# Patient Record
Sex: FEMALE | Race: WHITE | NOT HISPANIC OR LATINO | Employment: OTHER | URBAN - METROPOLITAN AREA
[De-identification: names, ages, dates, MRNs, and addresses within clinical notes are randomized per-mention and may not be internally consistent; named-entity substitution may affect disease eponyms.]

---

## 2017-01-09 ENCOUNTER — TELEPHONE (OUTPATIENT)
Dept: TRANSPLANT | Facility: CLINIC | Age: 72
End: 2017-01-09

## 2017-01-09 NOTE — TELEPHONE ENCOUNTER
Correspondence w/patient:    Hi-I'm not sure where folks are getting it, but it can be pretty relieving for some....  Terrific on calling to schedule, and happy to hear you are feeling fine!   Yes, if needed, they could possibly even send a nurse out to take a look and make recommendations.  Thank you,  Albertina      -----Original Message-----  From: DANA QUINONES [mailto:[email or web address]]   Sent: Monday, January 09, 2017 2:33 PM  To: Albertina Diallo  Subject: Re: RE:    THIS EMAIL IS FROM AN EXTERNAL SENDER! DO NOT click links or provide your User ID or Password if the sender is unknown.    Tiger balm - for pain? Is that from a [employer] store? Never heard of it but I'm surely willing to try.   I'll call and schedule an appt. I'm feeling fine though.   I have tried everything for the itching  - I'll check with [facility] to see if there's anything new    Sent from my iPhone    > On Jan 9, 2017, at 2:27 PM, Albertina Diallo <[email or web address]> wrote:  >   > Hi Ms Quinones--Please call scheduling at [phone] to get scheduled--It looks like you are in the recall screen to be seen in early Feb, but I am not sure that she has any availability at that time.   > For the rash on your chest, have you spoken with specialty pharmacy about alternative dressings,techniques w/dressing change? If you haven't tried doing the dressing WITHOUT the skin barrier, that might also be worth trying.   > Regarding the Arnica--Dr Tim said she agreed with whatever stance pharmacy took, and they did not recommend it. You may want to try something topical, like tiger balm? We have a couple patients who have used this and gotten some relief.   > Please let me know if I can help with anything else!  > Albertina   >   > -----Original Message-----  > From: DANA QUINONES [[email or web address]]   > Sent: Monday, January 09, 2017 2:23 PM  > To: Albertina Diallo  > Subject:   >   > THIS EMAIL IS FROM AN EXTERNAL SENDER! DO NOT click links or  provide your User ID or Password if the sender is unknown.  >   > Vasile Eng  > I just remembered that I don't have any future appts scheduled. Should I?  > Also does Dr Tim have any other suggestions for itchy rash on my chest from tape?   > Back pain is still there but I think we ruled out Arnica? Didn't we?  > Jan  >   > Sent from my iPhone

## 2017-01-10 DIAGNOSIS — I27.9 CHRONIC PULMONARY HEART DISEASE: ICD-10-CM

## 2017-01-10 DIAGNOSIS — Z79.899 POLYPHARMACY: Primary | ICD-10-CM

## 2017-01-11 ENCOUNTER — TELEPHONE (OUTPATIENT)
Dept: TRANSPLANT | Facility: CLINIC | Age: 72
End: 2017-01-11

## 2017-01-11 NOTE — TELEPHONE ENCOUNTER
Pt's reply, also forwarded to Dr Tim:    Thanks. I made an appt for March 14 - nothing in Feb. I think I'll skip my midday Adempas

## 2017-01-11 NOTE — TELEPHONE ENCOUNTER
email received from patient, forwarded to Dr Tim. Informed pt she would be contacted with info, once received.     Vasile Eng  I think I might need to lower my Adempas. Once again, I went to rehab. Before I go, I take tracker, Adempas and of course remodulin. When I start, my BP was 101 over something. After 20 min on treadmill I'm at 98 over something. After 20 on bike, I was 82/48. Should I discontinue Adempas? This has been a pattern Abdiel

## 2017-01-13 DIAGNOSIS — I27.0 PRIMARY PULMONARY HYPERTENSION: Primary | ICD-10-CM

## 2017-01-18 DIAGNOSIS — R52 PAIN: Primary | ICD-10-CM

## 2017-01-18 RX ORDER — TRAMADOL HYDROCHLORIDE 50 MG/1
50 TABLET ORAL EVERY 6 HOURS PRN
Qty: 80 TABLET | Refills: 2 | Status: SHIPPED | OUTPATIENT
Start: 2017-01-18 | End: 2017-01-28

## 2017-01-20 ENCOUNTER — TELEPHONE (OUTPATIENT)
Dept: TRANSPLANT | Facility: CLINIC | Age: 72
End: 2017-01-20

## 2017-01-23 ENCOUNTER — TELEPHONE (OUTPATIENT)
Dept: TRANSPLANT | Facility: CLINIC | Age: 72
End: 2017-01-23

## 2017-01-23 NOTE — TELEPHONE ENCOUNTER
Email received from patient over weekend, forwarded to Dr Tim for review:    Another episode of dizziness . BP- 104/60. Couple hours later 91/50. Dizziness was extreme- felt like I was going to pass out. That occurred at 6pm. I had taken Adempas 1 mg, tracleer and cardizem at 3:30pm. Had been taking my meds this way for years. I seem to be having trouble with them now

## 2017-01-23 NOTE — TELEPHONE ENCOUNTER
Adempas approved through 12/31/17, per [place]. Notification sent to Erlinda at [facility].

## 2017-01-24 ENCOUNTER — TELEPHONE (OUTPATIENT)
Dept: TRANSPLANT | Facility: CLINIC | Age: 72
End: 2017-01-24

## 2017-01-24 DIAGNOSIS — Z91.89 AT RISK FOR INFECTION: Primary | ICD-10-CM

## 2017-01-24 NOTE — TELEPHONE ENCOUNTER
----- Message from Alice Tim MD sent at 1/23/2017  5:01 PM CST -----  Another line infection???  Lets have cx drawn, first drug to hold if needed is cardizem    ----- Message -----     From: MELISA Delgado, RN     Sent: 1/23/2017   8:05 AM       To: Alice Tim MD    This was a note I got from her over the weekend...Please advise. Thanks!    Another episode of dizziness . BP- 104/60. Couple hours later 91/50. Dizziness was extreme- felt like I was going to pass out. That occurred at 6pm. I had taken Adempas 1 mg, tracleer and cardizem at 3:30pm. Had been taking my meds this way for years. I seem to be having trouble with them now

## 2017-01-24 NOTE — TELEPHONE ENCOUNTER
Message sent to patient regarding same. Awaiting response regarding where she would like to go for blood cultures.

## 2017-01-25 ENCOUNTER — LAB VISIT (OUTPATIENT)
Dept: LAB | Facility: HOSPITAL | Age: 72
End: 2017-01-25
Payer: MEDICARE

## 2017-01-25 DIAGNOSIS — Z91.89 AT RISK FOR INFECTION: ICD-10-CM

## 2017-01-25 PROCEDURE — 36415 COLL VENOUS BLD VENIPUNCTURE: CPT

## 2017-01-25 PROCEDURE — 87040 BLOOD CULTURE FOR BACTERIA: CPT | Mod: 59

## 2017-01-25 PROCEDURE — 87040 BLOOD CULTURE FOR BACTERIA: CPT

## 2017-01-26 ENCOUNTER — TELEPHONE (OUTPATIENT)
Dept: TRANSPLANT | Facility: CLINIC | Age: 72
End: 2017-01-26

## 2017-01-30 LAB
BACTERIA BLD CULT: NORMAL
BACTERIA BLD CULT: NORMAL

## 2017-02-21 DIAGNOSIS — R52 PAIN: Primary | ICD-10-CM

## 2017-02-22 RX ORDER — TRAMADOL HYDROCHLORIDE 200 MG/1
200 TABLET, EXTENDED RELEASE ORAL DAILY
Qty: 30 TABLET | Refills: 5 | Status: SHIPPED | OUTPATIENT
Start: 2017-02-22 | End: 2017-03-04

## 2017-03-14 ENCOUNTER — HOSPITAL ENCOUNTER (OUTPATIENT)
Dept: PULMONOLOGY | Facility: CLINIC | Age: 72
Discharge: HOME | End: 2017-03-14
Payer: MEDICARE

## 2017-03-14 ENCOUNTER — OFFICE VISIT (OUTPATIENT)
Dept: TRANSPLANT | Facility: CLINIC | Age: 72
End: 2017-03-14
Payer: MEDICARE

## 2017-03-14 VITALS
DIASTOLIC BLOOD PRESSURE: 60 MMHG | BODY MASS INDEX: 20.58 KG/M2 | HEART RATE: 97 BPM | HEIGHT: 66 IN | WEIGHT: 128.06 LBS | OXYGEN SATURATION: 90 % | SYSTOLIC BLOOD PRESSURE: 97 MMHG

## 2017-03-14 VITALS — WEIGHT: 123 LBS | BODY MASS INDEX: 21.79 KG/M2 | HEIGHT: 63 IN

## 2017-03-14 DIAGNOSIS — Q26.3 PARTIAL ANOMALOUS PULMONARY VENOUS CONNECTION (PAPVC): ICD-10-CM

## 2017-03-14 DIAGNOSIS — Z79.01 ANTICOAGULANT LONG-TERM USE: Chronic | ICD-10-CM

## 2017-03-14 DIAGNOSIS — Q21.16 SINUS VENOSUS DEFECT: ICD-10-CM

## 2017-03-14 DIAGNOSIS — I48.3 TYPICAL ATRIAL FLUTTER: ICD-10-CM

## 2017-03-14 DIAGNOSIS — I27.9 CHRONIC PULMONARY HEART DISEASE: ICD-10-CM

## 2017-03-14 DIAGNOSIS — Q21.12 PFO (PATENT FORAMEN OVALE): ICD-10-CM

## 2017-03-14 DIAGNOSIS — J96.11 CHRONIC RESPIRATORY FAILURE WITH HYPOXIA: ICD-10-CM

## 2017-03-14 DIAGNOSIS — I27.20 PULMONARY HYPERTENSION: ICD-10-CM

## 2017-03-14 PROCEDURE — 94620 PR PULMONARY STRESS TESTING,SIMPLE: CPT | Mod: 26,S$PBB,, | Performed by: INTERNAL MEDICINE

## 2017-03-14 PROCEDURE — 99214 OFFICE O/P EST MOD 30 MIN: CPT | Mod: S$PBB,,, | Performed by: INTERNAL MEDICINE

## 2017-03-14 PROCEDURE — 99213 OFFICE O/P EST LOW 20 MIN: CPT | Mod: PBBFAC | Performed by: INTERNAL MEDICINE

## 2017-03-14 PROCEDURE — 99999 PR PBB SHADOW E&M-EST. PATIENT-LVL III: CPT | Mod: PBBFAC,,, | Performed by: INTERNAL MEDICINE

## 2017-03-14 NOTE — PATIENT INSTRUCTIONS
We will switch you back to revatio    And I'll send you back to Dr Hinojosa    Keep salt intake to under 2000 mg sodium, fluids to under 2 L (64 oz)    Call us if you find yourself getting more short of breath, have more swelling or unexpected weight changes, fever, chills or issues with your line

## 2017-03-14 NOTE — PROGRESS NOTES
Subjective:      HPI  Delightful 71 y.o WF with PMHx IPAH with PFO diagnosed in past 10 years (probably for much longer per old CXR) who is now on triple therapy with IV remodulin (69 ng/kg/min), tracleer and adempas (since Dec) who returns for PH f/u-   Pt was Admitted 7/19-26 with fevers at home after being admitted one week prior for the same reason. During the last hospitalization she grew Rosemonas on blood cultures. Central line catheter was not removed. During this hospitalization 2 sets of blood cultures grew Rosemonas and central tip was culture resulted positive for GNR. No speciation up until discharge day. ID was involved in the case and recommended 14 days of cipro po and ceftriaxone 2 g IV daily until 08/05/2016. Home healh orders were placed in and she was set up for ceftriaxone infusion. Bacteremia work up included CT of the abdomen and pelvis as well neck to rule out pyelonephritis as well as osteo of the neck spine. They resulted negative. Patient remained clinically stable and new central line was placed in by nephro intervention. Patient tolerated it well without any complications.  Her blood cultures were sent out to the research lab for speciation and sensitivities. ID was to contact the patient in regards those results and further narrow antibiotic coverage.    In regards her PAH, we did not make any changes. We resumed her Remodulin at 71ng/kg/min, Adempas 1.5 mg TID and Tracleer. Continued with diltiazem and Lasix 40 am and 20 pm and spironolactone.  Coumadin was held initially for line exchange and right before her discharge we resumed warfarin 10 mg po.      Since last visit pt has been doing ok- has episodes where her heart beats fast and irregular and she feels like she's going to pass out. Happened in a restaurant this weekend while standing in line waiting for the restroom, and then again in the restroom. Was not wearing her O2 when it happened, but was on 2L when it happened again. A  "few days later felt the same way as she felt when that episode was starting, but it didn't get as bad. Has also been getting nauseated, and dizzy- says this is reflected in her walk test today.  Prior to the episode on sat had been exercising three days in a row. She has been reducing the dose of her adempas due to SE, has had gastritis three times, as well as low bp/LH. Rarely swells    line is clean, no f/c and no syncope. Skin is sensitive around the line.   Remains on lasix 40mg/20mg with good UOP- has occasional diarrhea, doesn't occur daily like it used to    6mw 222m ( 375m Nov, 305 Sept, 274.5 July,  335.5May, 365  m prev 2 visits)                        O2 sat  96->87% (without O2 started 92%->84%                                                           HR 96-> 109                                                     BP  120/ 64  ->113 /62                                                      Roberto 0.5  ->4      Review of Systems   Constitution: Negative for chills, fever, malaise/fatigue, weight gain and weight loss.   HENT: Negative.    Eyes: Negative.    Cardiovascular: Positive for dyspnea on exertion and palpitations. Negative for chest pain, leg swelling, near-syncope, orthopnea, paroxysmal nocturnal dyspnea and syncope.   Respiratory: Negative for cough and shortness of breath.    Endocrine: Negative.    Skin: Negative.    Musculoskeletal: Positive for arthritis and joint pain. Negative for back pain.   Gastrointestinal: Positive for nausea. Negative for bloating, abdominal pain and change in bowel habit.   Neurological: Negative for dizziness and light-headedness.   Psychiatric/Behavioral: Negative for depression.        Objective:   BP 97/60  Pulse 97  Ht 5' 6" (1.676 m)  Wt 58.1 kg (128 lb 1.4 oz)  SpO2 (!) 90%  BMI 20.67 kg/m2      Physical Exam   Constitutional: She is oriented to person, place, and time. She appears well-developed and well-nourished.   HENT:   Head: Normocephalic and " atraumatic.   Eyes: Right eye exhibits no discharge. Left eye exhibits no discharge.   Neck: Neck supple. No JVD present. No thyromegaly present.   Cardiovascular: Regular rhythm.  Exam reveals no gallop and no friction rub.    No murmur heard.  Tachy, accentuated S2     Pulmonary/Chest: Effort normal and breath sounds normal. No respiratory distress. She has no wheezes. She has no rales.   Abdominal: Soft. Bowel sounds are normal. She exhibits no distension. There is no tenderness.   Musculoskeletal: Normal range of motion. She exhibits no edema or tenderness.   Neurological: She is alert and oriented to person, place, and time. No cranial nerve deficit. Coordination normal.   Skin: Skin is warm and dry. No rash noted.   Psychiatric: She has a normal mood and affect. Judgment and thought content normal.           Chemistry        Component Value Date/Time     03/14/2017 1240    K 4.5 03/14/2017 1240     03/14/2017 1240    CO2 29 03/14/2017 1240    BUN 21 03/14/2017 1240    CREATININE 1.2 03/14/2017 1240    GLU 90 03/14/2017 1240        Component Value Date/Time    CALCIUM 9.8 03/14/2017 1240    ALKPHOS 48 (L) 03/14/2017 1240    AST 19 03/14/2017 1240    ALT 13 03/14/2017 1240    BILITOT 1.1 (H) 03/14/2017 1240            Magnesium   Date Value Ref Range Status   03/14/2017 2.3 1.6 - 2.6 mg/dL Final       Lab Results   Component Value Date    WBC 4.23 03/14/2017    HGB 13.0 03/14/2017    HCT 39.6 03/14/2017    MCV 81 (L) 03/14/2017     03/14/2017         BNP   Date Value Ref Range Status   03/14/2017 173 (H) 0 - 99 pg/mL Final     Comment:     Values of less than 100 pg/ml are consistent with non-CHF populations.   11/02/2016 291 (H) 0 - 99 pg/mL Final     Comment:     Values of less than 100 pg/ml are consistent with non-CHF populations.   09/23/2016 181 (H) 0 - 99 pg/mL Final     Comment:     Values of less than 100 pg/ml are consistent with non-CHF populations.                 Assessment:        1. Pulmonary hypertension- WHO Group 1,6mw distance a worse today.   BNP mildly increased but in her usual range, FC II-III on triple tx   2. PFO (patent foramen ovale)    3. Palpitations/tachycardia  4. Chronic hypoxemic resp failure  5. PAPVR,sinus venosus ASD     Plan:     pt would like a letter requesting approval for a smaller portable O2- she is having trouble managing with her current portable as the machine is heavy and on 2L will last only 1.5hr- O2 qualification study suggests her pulse machine is not enough O2- she needs continuous flow O2- will have to look into smaller portable system that will provide cont flow.     Will switch adempas back to revatio due to symptomatic hypotension    Refer back to EP given her recent episodes of recurrent tachycardia which she can not tolerate- she did not due well with external event recorders/holter due to skin sensitivity, ? If she might be candidate for a loop recorder vs EPS/RFA    Keep salt intake to under 2000 mg sodium, fluids to under 2 L (64 oz)    Check weights every morning after getting out of bed and urinating. If  weight goes up 3# overnight or 5# in one week she should take 40 mg lasix in the pm and call us if fluid doesn't come off.      f/u 2 mo with labs echo and 6mw

## 2017-03-14 NOTE — MR AVS SNAPSHOT
[facility]  [address]  [facility] [address]  Phone: [phone]                  Molly Smith   3/14/2017 2:30 PM   Office Visit    Description:  Female : 1945   Provider:  Alice Tim MD   Department:  [facility]           Reason for Visit     Pulmonary Hypertension           Diagnoses this Visit        Comments    Pulmonary hypertension         Chronic respiratory failure with hypoxia         Typical atrial flutter         Anticoagulant long-term use         Partial anomalous pulmonary venous connection (PAPVC)         PFO (patent foramen ovale)         Sinus venosus defect                To Do List           Goals (5 Years of Data)     None      [facility] On [facility]     [facility] -  Assistance  Registered nurses in the [facility] provide clinical advisement, health education, appointment booking, and other advisory services.  Call for this free service at [phone].             Medications           Message regarding Medications     Verify the changes and/or additions to your medication regime listed below are the same as discussed with your clinician today.  If any of these changes or additions are incorrect, please notify your healthcare provider.             Verify that the below list of medications is an accurate representation of the medications you are currently taking.  If none reported, the list may be blank. If incorrect, please contact your healthcare provider. Carry this list with you in case of emergency.           Current Medications     atorvastatin (LIPITOR) 10 MG tablet Take 10 mg by mouth once daily.      bosentan (TRACLEER) 125 MG Tab Take 1 tablet (125 mg total) by mouth 3 (three) times daily.    CALCIUM CARBONATE (CALCIUM 600 ORAL) Take 2 tablets by mouth once daily.      cetirizine (ZYRTEC) 10 MG tablet Take 10 mg by mouth once daily.      diltiazem (DILACOR XR) 120 MG 24 hr capsule Take 120 mg  "by mouth once daily.      folic acid (FOLVITE) 1 MG tablet Take 1 mg by mouth once daily.      furosemide (LASIX) 40 MG tablet Take 1 tablet (40 mg total) by mouth 2 (two) times daily.    Lactobacillus rhamnosus GG (CULTURELLE) 10 billion cell capsule Take 1 capsule by mouth once daily.    promethazine (PHENERGAN) 6.25 mg/5 mL syrup Take 10 mLs (12.5 mg total) by mouth every 6 (six) hours as needed for Nausea.    riociguat (ADEMPAS) 1 mg Tab Take 1 mg by mouth 3 (three) times daily.    sodium chloride 0.9% 0.9 % SolP 100 mL with treprostinil 1 mg/mL Soln 9,000,000 ng Inject 5,005.5 ng/min into the vein continuous.    spironolactone (ALDACTONE) 25 MG tablet Take 25 mg by mouth once daily.      TREPROSTINIL SODIUM (TREPROSTINIL, REMODULIN, PUMP FOR HOME USE) Pt currently on 69ng/kg/min, continuous IV infusion    warfarin (COUMADIN) 10 MG tablet Take 1 tablet (10 mg total) by mouth Daily.           Clinical Reference Information           Your Vitals Were     BP Pulse Height Weight SpO2 BMI    97/60 97 5' 6" (1.676 m) 58.1 kg (128 lb 1.4 oz) 90% 20.67 kg/m2      Blood Pressure          Most Recent Value    BP  97/60      Allergies as of 3/14/2017     Vancomycin    Multaq [Dronedarone]      Immunizations Administered on Date of Encounter - 3/14/2017     None      Orders Placed During Today's Visit     Future Labs/Procedures Expected by Expires    2D echo with color flow doppler  As directed 3/14/2018      Instructions    We will switch you back to revatio    And I'll send you back to Dr Hinojosa    Keep salt intake to under 2000 mg sodium, fluids to under 2 L (64 oz)    Call us if you find yourself getting more short of breath, have more swelling or unexpected weight changes, fever, chills or issues with your line         Language Assistance Services     ATTENTION: Language assistance services are available, free of charge. Please call [phone].      ATENCIÓN: Si wingla lino, tiene a ovalles disposición servicios " eyados de asistencia lingüística. Eugenio jerry [phone].     RAVEN Ý: N?u b?n nói Ti?ng Vi?t, có các d?ch v? h? tr? ngôn ng? mi?n phí dành cho b?n. G?i s? [phone].         [facility] complies with applicable Federal civil rights laws and does not discriminate on the basis of race, color, national origin, age, disability, or sex.

## 2017-03-15 NOTE — PROCEDURES
Molly Smith is a 71 y.o.  female patient, who presents for a 6 minute walk test ordered by MD Meeta.  The diagnosis is Pulmonary Hypertension.  The patient's BMI is 21.8 kg/m2.  Predicted distance (lower limit of normal) is 333.87 meters.      Test Results:    The test was completed without stopping.    The total time walked was 360 seconds.  During walking, the patient reported:  Dyspnea, Lightheadedness. The patient used no assistive devices during testing.     03/14/2017---------Distance: 222.5 meters (730 feet)     O2 Sat % Supplemental Oxygen Heart Rate Blood Pressure Roberto Scale   Pre-exercise  (Resting) 96 % 3 L/M 96 bpm 120/64 mmHg 0.5   During Exercise 87 % 3 L/M 109 bpm 113/62 mmHg 4   Post-exercise  (Recovery) 94 % 3 L/M  99 bpm   mmHg       Recovery Time: 69 seconds    Performing nurse/tech: MARIELLE Bennett      PREVIOUS STUDY:   The patient had a previous study.  11/02/2016---------Distance: 374.9 meters (1230 feet)       O2 Sat % Supplemental Oxygen Heart Rate Blood Pressure Roberto Scale   Pre-exercise  (Resting) 95 % 4 L/M 88 bpm 106/57 mmHg 0   During Exercise 86 % 4 L/M 114 bpm 117/57 mmHg 7-8   Post-exercise  (Recovery) 96 % 4 L/M  98 bpm   mmHg           CLINICAL INTERPRETATION:  Six minute walk distance is 222.5 meters (730 feet) with somewhat heavy dyspnea.  During exercise, there was significant desaturation while breathing supplemental oxygen.  Both blood pressure and heart rate remained stable with walking.  The patient reported non-pulmonary symptoms during exercise.  Significant exercise impairment is likely due to desaturation.  Since the previous study in November 2016, exercise capacity is significantly worse.  Based upon age and body mass index, exercise capacity is less than predicted.

## 2017-03-16 DIAGNOSIS — I27.0 PRIMARY PULMONARY HYPERTENSION: Primary | ICD-10-CM

## 2017-03-16 RX ORDER — SILDENAFIL CITRATE 20 MG/1
20 TABLET ORAL 3 TIMES DAILY
Qty: 90 TABLET | Refills: 11 | Status: SHIPPED | OUTPATIENT
Start: 2017-03-16 | End: 2017-05-04 | Stop reason: ALTCHOICE

## 2017-03-20 ENCOUNTER — TELEPHONE (OUTPATIENT)
Dept: TRANSPLANT | Facility: CLINIC | Age: 72
End: 2017-03-20

## 2017-03-21 NOTE — TELEPHONE ENCOUNTER
Received notification that PA request was duplicate, approval already in system, good through 12/31/2017. Notification sent to Erlinda at [facility] [place].

## 2017-03-22 ENCOUNTER — TELEPHONE (OUTPATIENT)
Dept: TRANSPLANT | Facility: CLINIC | Age: 72
End: 2017-03-22

## 2017-03-22 NOTE — TELEPHONE ENCOUNTER
Correspondence w/patient(starts at bottom) regarding oxygen. Dr Tim informed that we are still waiting for patient to select continuous portable concentrator, as that is what is recommended for patient.   ----------------------    Ok, please let me know if you decide on a continuous portable unit, and we can get orders sent to wherever they are needed.    -----Original Message-----  From: Abdiel Gilbert [mailto:[email or web address]]   Sent: Wednesday, March 22, 2017 12:11 PM  To: Albertina Diallo  Subject: Re: O2    THIS EMAIL IS FROM AN EXTERNAL SENDER! DO NOT click links or provide your User ID or Password if the sender is unknown.    She gave me a new set up & told me not to use it on continuous. So I'm using continuous on big unit at home and pulse when I'm away from home    Sent from my iPhone    > On Mar 22, 2017, at 10:28 AM, Albertina Diallo <[email or web address]> wrote:  >   > Vasile Martinezmann-Did everything get resolved ok? Please let me know if we can assist.  > Albertina   >   > -----Original Message-----  > From: Abdiel Gilbert [[email or web address]]   > Sent: Tuesday, March 21, 2017 3:09 PM  > To: Albertina Diallo  > Subject: O2  >   > THIS EMAIL IS FROM AN EXTERNAL SENDER! DO NOT click links or provide your User ID or Password if the sender is unknown.  >   > Vasile Eng  > Wanted to update you on oxygen situation. Yesterday [facility] picked up my Simply Go to send off. They left 5 small tanks. Regulator isn't working properly. We went to support gp meeting. I've already gone through 3 tanks that should have lasted 9-15 hours.   > At this point I'm pretty much out of oxygen. We're going past [facility] on the way home to hopefully get equipment that works  >

## 2017-03-22 NOTE — TELEPHONE ENCOUNTER
Patient reports that [employer] portable unit (continous flow) is being repaired or traded for another unit.

## 2017-04-03 ENCOUNTER — TELEPHONE (OUTPATIENT)
Dept: TRANSPLANT | Facility: CLINIC | Age: 72
End: 2017-04-03

## 2017-04-03 NOTE — TELEPHONE ENCOUNTER
Correspondence w/patient, via email-    Hi Ms Smith-I just talked with Dr Tim. She said it is fine to just stop Revatio as well, although she is unsure if your shortness of breath and decreased saturation are related directly to revatio. She agrees that if you are having difficulty, to come to the ER. Please keep us posted on how you are doing.  Albertina     -------------------  Hi Ms Smith--I will let Dr Tim know, but I am not sure how quickly I will have an answer. I think if you continue to have issues with being more short of breath than usual, you shouldn't hesitate to come to the ER.  Albertina       -----Original Message-----  From: Abdiel Gilbert [[email or web address]]   Sent: Sunday, April 02, 2017 6:04 PM  To: Albertina Diallo  Subject:     THIS EMAIL IS FROM AN EXTERNAL SENDER! DO NOT click links or provide your User ID or Password if the sender is unknown.    Vasile Eng  I need advice. I stopped Adempas on Wed night. And started Revatio Fri night. Since Thurs my oxygen level has been dropping. I've been short of breath and my pulse ox readings have been ranging from mid 70's to 87 or so when sitting in oxygen.  Help!    Sent from my iPhone

## 2017-04-04 ENCOUNTER — TELEPHONE (OUTPATIENT)
Dept: TRANSPLANT | Facility: CLINIC | Age: 72
End: 2017-04-04

## 2017-04-04 NOTE — TELEPHONE ENCOUNTER
"Received call from patient, who reports increased episodes of decreased oxygen saturation. Patient says she notices these episodes most frequently when she is stooping down to put something away, and gets back up again. "I feel my heart do this weird rhythm, and then I check my oxygen and it is in the 70s." Pt says if she sits, she is able to get oxygenation up to 90%. She has f/u with Dr Hinojosa on Friday.     Patient was again encouraged to come to the ER, since she has experienced oxygen desaturation with greater frequency. Patient states "It is really off and on. I feel like if I came there, everything would look great."     Patient inquiring about stopping revatio and restarting adempas again. Dr Tim had previously suggested trying this as well. Pt and  wonder if it would be helpful to adjust pt's diltiazem or aldactone to better tolerate Adempas w/her lower blood pressure. Message sent to Dr Tim regarding same. Message sent to [facility] regarding shipping patient 0.5mg tablets, and sending pharmacy nurse to patient to reassess her prior to restarting adempas. Patient is aware that she will need 24 hour free of revatio prior to starting adempas .     Pt's  inquired about other medications/pathways by which to treat/attack PH. Noted that patient is taking medications from all three pathways, but perhaps discussion about opsumit or possibly letairis could be had at next appt. Discussed that letairis may periodically enhance fluid retention.  Pt stated "I wonder if it is just my disease getting worse." Noted to patient that it may be disease progression, medications, or a combination thereof.     Pt will contact us with any questions/concerns between now and restarting adempas. Will await further feedback from Dr Tim as well.   "

## 2017-04-06 DIAGNOSIS — I48.92 ATRIAL FLUTTER, UNSPECIFIED TYPE: Primary | ICD-10-CM

## 2017-04-07 ENCOUNTER — INITIAL CONSULT (OUTPATIENT)
Dept: ELECTROPHYSIOLOGY | Facility: CLINIC | Age: 72
End: 2017-04-07
Payer: MEDICARE

## 2017-04-07 VITALS
DIASTOLIC BLOOD PRESSURE: 68 MMHG | HEIGHT: 63 IN | SYSTOLIC BLOOD PRESSURE: 116 MMHG | BODY MASS INDEX: 22.15 KG/M2 | WEIGHT: 125 LBS | HEART RATE: 99 BPM

## 2017-04-07 DIAGNOSIS — Q21.16 SINUS VENOSUS DEFECT: Primary | ICD-10-CM

## 2017-04-07 DIAGNOSIS — J96.11 CHRONIC RESPIRATORY FAILURE WITH HYPOXIA: ICD-10-CM

## 2017-04-07 DIAGNOSIS — I47.10 SVT (SUPRAVENTRICULAR TACHYCARDIA): ICD-10-CM

## 2017-04-07 DIAGNOSIS — I27.20 PULMONARY HYPERTENSION: ICD-10-CM

## 2017-04-07 DIAGNOSIS — Q26.3 PARTIAL ANOMALOUS PULMONARY VENOUS CONNECTION (PAPVC): ICD-10-CM

## 2017-04-07 DIAGNOSIS — I27.21 PAH (PULMONARY ARTERY HYPERTENSION): ICD-10-CM

## 2017-04-07 DIAGNOSIS — Q21.12 PFO (PATENT FORAMEN OVALE): ICD-10-CM

## 2017-04-07 DIAGNOSIS — R00.2 PALPITATIONS: ICD-10-CM

## 2017-04-07 PROCEDURE — 99999 PR PBB SHADOW E&M-EST. PATIENT-LVL III: CPT | Mod: PBBFAC,,, | Performed by: INTERNAL MEDICINE

## 2017-04-07 PROCEDURE — 99205 OFFICE O/P NEW HI 60 MIN: CPT | Mod: S$PBB,,, | Performed by: INTERNAL MEDICINE

## 2017-04-07 PROCEDURE — 99213 OFFICE O/P EST LOW 20 MIN: CPT | Mod: PBBFAC | Performed by: INTERNAL MEDICINE

## 2017-04-07 NOTE — PROGRESS NOTES
Subjective:    Patient ID:  Molly Smith is a 71 y.o. 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  She also sees Dr. Beaver.  Has had events of rapid heart beating, associated with severe shortness of breath and presyncope, as well as documented hypoxia. Episodes are acute onset, last 1 to 7 minutes. Have been occuring for a year, increasing in frequency, now every couple of weeks. Event monitors have been obtained, unable to capture an event.  She captured events on an [employer] Simón.  Seen in 2014.  We discussed options, including Dronedarone vs RFA.  Recommended the former.  She felt poorly.  6/17/13 no evidence of dual AV michel physiology, AP.  No SVT induced.  Recommended focusing on management of Pulm Htn.  Has continued to have episodes of palpitations, associated with dizziness and increase in baseline shortness of breath.  Has been monitoring her pulse via [employer] over the last few weeks and has not had tachycardia, with no pulse above 103 bpm.  Has had difficulty titrating Pulm Htn medications secondary to drop in blood pressure.      Review of Systems   Constitution: Positive for malaise/fatigue. Negative for weakness.   Cardiovascular: Positive for dyspnea on exertion and palpitations. Negative for chest pain, irregular heartbeat, leg swelling, near-syncope, orthopnea, paroxysmal nocturnal dyspnea and syncope.   Respiratory: Positive for shortness of breath. Negative for cough.    Neurological: Negative for dizziness and light-headedness.   All other systems reviewed and are negative.       Objective:    Physical Exam   Constitutional: She is oriented to person, place, and time. She appears well-developed and well-nourished.   Eyes: Conjunctivae are normal. No scleral icterus.   Neck: No JVD present. No tracheal deviation present.    Cardiovascular: Regular rhythm.  Tachycardia present.  PMI is not displaced.    Pulmonary/Chest: Effort normal and breath sounds normal. No respiratory distress.   Abdominal: Soft. There is no hepatosplenomegaly. There is no tenderness.   Musculoskeletal: She exhibits no edema or tenderness.   Neurological: She is alert and oriented to person, place, and time.   Skin: Skin is warm and dry. No rash noted.   Psychiatric: She has a normal mood and affect. Her behavior is normal.         Assessment:       1. Sinus venosus defect    2. SVT (supraventricular tachycardia)    3. Pulmonary hypertension    4. PFO (patent foramen ovale)    5. Partial anomalous pulmonary venous connection (PAPVC)    6. Palpitations    7. PAH (pulmonary artery hypertension)    8. Chronic respiratory failure with hypoxia         Plan:           Recurrent palpitations. By [employer] in the past has had SVT, suspect AT.  EPS in the past was negative.   My suspicion is that her hypoxia is driving her episodes.  Agree with trial off cardizem, if that will help optimize management of her Pulm Htn.  Options in regard to her tachy-arrhythmias are limited.  Would consider Tikosyn, but would need to make sure her Pulm Htn medications don't prolong QT.

## 2017-04-07 NOTE — LETTER
April 7, 2017      Alice Tim MD  [address]  [facility] [address]           [facility]  [address]  [facility] [address]  Phone: [phone]  Fax: [phone]          Patient: Molly Smith   MR Number: [id number]   YOB: 1945   Date of Visit: 4/7/2017       Dear Dr. Alice Tim:    Thank you for referring Molly Smith to me for evaluation. Attached you will find relevant portions of my assessment and plan of care.    If you have questions, please do not hesitate to call me. I look forward to following Molly Smith along with you.    Sincerely,    Jose J Hinojosa MD    Enclosure  CC:  No Recipients    If you would like to receive this communication electronically, please contact [email or web address] or [phone] to request more information on [employer] access.    For providers and/or their staff who would like to refer a patient to [facility], please contact us through our one-stop-shop provider referral line, [facility], at [phone].    If you feel you have received this communication in error or would no longer like to receive these types of communications, please e-mail [email or web address]

## 2017-04-17 ENCOUNTER — TELEPHONE (OUTPATIENT)
Dept: ELECTROPHYSIOLOGY | Facility: CLINIC | Age: 72
End: 2017-04-17

## 2017-04-17 ENCOUNTER — PATIENT MESSAGE (OUTPATIENT)
Dept: TRANSPLANT | Facility: CLINIC | Age: 72
End: 2017-04-17

## 2017-04-17 NOTE — TELEPHONE ENCOUNTER
Called pt and spouse back. Pt states had two episodes of fast heart rate (150s) over the weekend. Both episodes occurred with minimal exertion (walking 10 steps to the bathroom). Pt's spouse to email EKGs from ap that were recorded during episodes for Dr Hinojosa to review. Will follow up.

## 2017-04-17 NOTE — TELEPHONE ENCOUNTER
----- Message from Migdalia Gomez sent at 4/17/2017  9:51 AM CDT -----  Contact: pt's -Dr.Newman Dr. Parada was calling in reference to the pt's Tachycardia.  She is having shortness of breath.  He said he captured it on a hand held monitor and wants to speak with Dr. Hinojosa about it.  The pt is stable right now.  He can be reached @ [phone].  Thanks!

## 2017-04-18 ENCOUNTER — TELEPHONE (OUTPATIENT)
Dept: TRANSPLANT | Facility: CLINIC | Age: 72
End: 2017-04-18

## 2017-04-18 DIAGNOSIS — I48.92 ATRIAL FLUTTER, UNSPECIFIED TYPE: Primary | ICD-10-CM

## 2017-04-18 RX ORDER — DILTIAZEM HYDROCHLORIDE 60 MG/1
60 TABLET, FILM COATED ORAL DAILY
Qty: 30 TABLET | Refills: 11 | Status: SHIPPED | OUTPATIENT
Start: 2017-04-18 | End: 2017-10-11 | Stop reason: ALTCHOICE

## 2017-04-18 NOTE — TELEPHONE ENCOUNTER
Pt's  requested to have Diltiazem 60mg tabs on hand for when patient's BP is low secondary to Adempas. He reports this was also discussed w/Dr Hinojosa, who was also in agreement. Ok'd per Dr Tim. Pt also will occasionally hold afternoon dose of lasix if BP is too low secondary to Adempas. Will continue to monitor patient closely. Both patient and her  have this RN's direct number for additional questions/concerns.

## 2017-04-18 NOTE — TELEPHONE ENCOUNTER
Correspondence from patient and from [facility] nurse Danilo Henderson.   --------------------------  From Danilo, 4/17/17  A visit was made today to start Mrs. Smith back on Adempas 0.5 mg. Her vital signs stayed stable for the first hour with with /60, 98/62, 98/60 and 90/60.  She stated that when on Adempas previously she experienced gastritis and her  asked about being placed on  some type of antacid or PPI like Prilosec, Protonix, ranitidine etc? He was also questioning if her diltiazem should be reduced.  She is currently on a dose of 120 mg po daily.  She is aware that the dose of Adempas 0.5 mg po TID will remain the same unless directed otherwise by Dr. Tim. Please let me know if you would like me to make any future nursing visits.   ---------------------------------------------    Pt was told OK to take PPI, but one hour around Adempas dose. Ok to also decrease to diltiazem 60mg per Dr Tim. See correspondence from patient, below, received 4/18/17.    Talked with Micaela's office yesterday. Not wise to lower cardizem since onSaturday and Sunday I had episodes of rapid heart beats 150-160s and really low oxygen.   Rufus was at work yesterday so he's going to try to get in touch with Micaela to see if he has any other suggestions   -------------  Will continue to monitor.

## 2017-05-05 RX ORDER — LORATADINE 10 MG/1
10 TABLET ORAL DAILY
COMMUNITY
End: 2018-01-12

## 2017-05-08 ENCOUNTER — OFFICE VISIT (OUTPATIENT)
Dept: TRANSPLANT | Facility: CLINIC | Age: 72
End: 2017-05-08
Payer: MEDICARE

## 2017-05-08 ENCOUNTER — HOSPITAL ENCOUNTER (OUTPATIENT)
Dept: PULMONOLOGY | Facility: CLINIC | Age: 72
Discharge: HOME | End: 2017-05-08
Payer: MEDICARE

## 2017-05-08 ENCOUNTER — HOSPITAL ENCOUNTER (OUTPATIENT)
Dept: CARDIOLOGY | Facility: CLINIC | Age: 72
Discharge: HOME | End: 2017-05-08
Payer: MEDICARE

## 2017-05-08 VITALS
WEIGHT: 129 LBS | HEART RATE: 96 BPM | BODY MASS INDEX: 20.25 KG/M2 | DIASTOLIC BLOOD PRESSURE: 60 MMHG | OXYGEN SATURATION: 92 % | HEIGHT: 67 IN | SYSTOLIC BLOOD PRESSURE: 102 MMHG

## 2017-05-08 VITALS — WEIGHT: 127.88 LBS | BODY MASS INDEX: 22.66 KG/M2 | HEIGHT: 63 IN

## 2017-05-08 DIAGNOSIS — Z79.01 ANTICOAGULANT LONG-TERM USE: Chronic | ICD-10-CM

## 2017-05-08 DIAGNOSIS — J96.11 CHRONIC RESPIRATORY FAILURE WITH HYPOXIA: ICD-10-CM

## 2017-05-08 DIAGNOSIS — Q26.3 PARTIAL ANOMALOUS PULMONARY VENOUS CONNECTION (PAPVC): ICD-10-CM

## 2017-05-08 DIAGNOSIS — I27.20 PULMONARY HYPERTENSION: ICD-10-CM

## 2017-05-08 DIAGNOSIS — R00.2 PALPITATIONS: ICD-10-CM

## 2017-05-08 DIAGNOSIS — Q21.16 SINUS VENOSUS DEFECT: ICD-10-CM

## 2017-05-08 LAB
DIASTOLIC DYSFUNCTION: NO
ESTIMATED PA SYSTOLIC PRESSURE: 116.16
RETIRED EF AND QEF - SEE NOTES: 65 (ref 55–65)
TRICUSPID VALVE REGURGITATION: ABNORMAL

## 2017-05-08 PROCEDURE — 99214 OFFICE O/P EST MOD 30 MIN: CPT | Mod: PBBFAC | Performed by: INTERNAL MEDICINE

## 2017-05-08 PROCEDURE — 93306 TTE W/DOPPLER COMPLETE: CPT | Mod: 26,S$PBB,, | Performed by: INTERNAL MEDICINE

## 2017-05-08 PROCEDURE — 99999 PR PBB SHADOW E&M-EST. PATIENT-LVL IV: CPT | Mod: PBBFAC,,, | Performed by: INTERNAL MEDICINE

## 2017-05-08 PROCEDURE — 99214 OFFICE O/P EST MOD 30 MIN: CPT | Mod: S$PBB,,, | Performed by: INTERNAL MEDICINE

## 2017-05-08 PROCEDURE — 94620 PR PULMONARY STRESS TESTING,SIMPLE: CPT | Mod: 26,S$PBB,, | Performed by: INTERNAL MEDICINE

## 2017-05-08 NOTE — PATIENT INSTRUCTIONS
We will do a right heart catheterization to measure the blood pressure in your lungs-  Take your usual medicines and eat a light breakfast that am.  Labs on 2nd floor- then go to third [facility] cath lab waiting area and check in    Hold coumadin 3 nights before

## 2017-05-08 NOTE — MR AVS SNAPSHOT
[facility]  [address]  [place] [address]  Phone: [phone]                  Molly Smith   2017 2:00 PM   Office Visit    Description:  Female : 1945   Provider:  Alice Tim MD   Department:  [facility]           Reason for Visit     Pulmonary Hypertension           Diagnoses this Visit        Comments    Pulmonary hypertension         Chronic respiratory failure with hypoxia         Anticoagulant long-term use         Sinus venosus defect         Partial anomalous pulmonary venous connection (PAPVC)         Palpitations                To Do List           Goals (5 Years of Data)     None      [facility]     [facility] Nurse Care Line -  Assistance  Unless otherwise directed by your provider, please contact [facility], our nurse care line that is available for  assistance.     Registered nurses in the [facility] provide: appointment scheduling, clinical advisement, health education, and other advisory services.  Call: [phone] (toll free)               Medications           Message regarding Medications     Verify the changes and/or additions to your medication regime listed below are the same as discussed with your clinician today.  If any of these changes or additions are incorrect, please notify your healthcare provider.             Verify that the below list of medications is an accurate representation of the medications you are currently taking.  If none reported, the list may be blank. If incorrect, please contact your healthcare provider. Carry this list with you in case of emergency.           Current Medications     atorvastatin (LIPITOR) 10 MG tablet Take 10 mg by mouth once daily.      bosentan (TRACLEER) 125 MG Tab Take 1 tablet (125 mg total) by mouth 3 (three) times daily.    CALCIUM CARBONATE (CALCIUM 600 ORAL) Take 2 tablets by mouth once daily.      diltiaZEM (CARDIZEM) 60 MG tablet Take 1  "tablet (60 mg total) by mouth once daily.    diltiazem (DILACOR XR) 120 MG 24 hr capsule Take 120 mg by mouth once daily.      furosemide (LASIX) 40 MG tablet Take 1 tablet (40 mg total) by mouth 2 (two) times daily.    Lactobacillus rhamnosus GG (CULTURELLE) 10 billion cell capsule Take 1 capsule by mouth once daily.    loratadine (CLARITIN) 10 mg tablet Take 10 mg by mouth once daily.    promethazine (PHENERGAN) 6.25 mg/5 mL syrup Take 10 mLs (12.5 mg total) by mouth every 6 (six) hours as needed for Nausea.    riociguat (ADEMPAS) 1 mg Tab Take by mouth 3 (three) times daily.    sodium chloride 0.9% 0.9 % SolP 100 mL with treprostinil 1 mg/mL Soln 9,000,000 ng Inject 5,005.5 ng/min into the vein continuous.    spironolactone (ALDACTONE) 25 MG tablet Take 25 mg by mouth once daily.      TREPROSTINIL SODIUM (TREPROSTINIL, REMODULIN, PUMP FOR HOME USE) Pt currently on 69ng/kg/min, continuous IV infusion    warfarin (COUMADIN) 10 MG tablet Take 1 tablet (10 mg total) by mouth Daily.           Clinical Reference Information           Your Vitals Were     BP Pulse Height Weight SpO2 BMI    102/60 96 5' 7" (1.702 m) 58.5 kg (128 lb 15.5 oz) 92% 20.2 kg/m2      Blood Pressure          Most Recent Value    BP  102/60      Allergies as of 5/8/2017     Vancomycin    Multaq [Dronedarone]      Immunizations Administered on Date of Encounter - 5/8/2017     None      Orders Placed During Today's Visit      Normal Orders This Visit    Case Request Operating Room: HEART CATH-RIGHT     Future Labs/Procedures Expected by Expires    CBC auto differential  5/8/2017 (Approximate) 7/7/2018    Comprehensive metabolic panel  5/8/2017 (Approximate) 7/7/2018    Protime-INR  5/8/2017 (Approximate) 7/7/2018      Instructions    We will do a right heart catheterization to measure the blood pressure in your lungs-  Take your usual medicines and eat a light breakfast that am.  Labs on 2nd floor- then go to [facility] cath lab waiting area and " check in    Hold coumadin 3 nights before       Language Assistance Services     ATTENTION: Language assistance services are available, free of charge. Please call [phone].      ATENCIÓN: Si habla lino, tiene a ovalles disposición servicios gratuitos de asistencia lingüística. Llame al [phone].     CHÚ Ý: N?u b?n nói Ti?ng Vi?t, có các d?ch v? h? tr? ngôn ng? mi?n phí dành cho b?n. G?i s? [phone].         [facility] complies with applicable Federal civil rights laws and does not discriminate on the basis of race, color, national origin, age, disability, or sex.

## 2017-05-08 NOTE — PROCEDURES
Molly Smith is a 71 y.o.  female patient, who presents for a 6 minute walk test ordered by MD Meeta.  The diagnosis is Pulmonary Hypertension.  The patient's BMI is 22.7 kg/m2.  Predicted distance (lower limit of normal) is 328.25 meters.      Test Results:    The test was completed without stopping. The total time walked was 360 seconds.  During walking, the patient reported:  Dyspnea, Lightheadedness. The patient used no assistive devices and supplemental oxygen during testing.     05/08/2017---------Distance: 283.46 meters (930 feet)     O2 Sat % Supplemental Oxygen Heart Rate Blood Pressure Roberto Scale   Pre-exercise  (Resting) 94 % 4 L/M 97 bpm 102/60 mmHg 0.5   During Exercise 86 % 4 L/M 108 bpm 100/59 mmHg 4   Post-exercise  (Recovery) 92 % 4 L/M  100 bpm   mmHg       Recovery Time: 77 seconds    Performing nurse/tech: MARIELLE Banks      PREVIOUS STUDY:   The patient had a previous study.  03/14/2017---------Distance: 222.5 meters (730 feet)       O2 Sat % Supplemental Oxygen Heart Rate Blood Pressure Roberto Scale   Pre-exercise  (Resting) 96 % 3 L/M 96 bpm 120/64 mmHg 0.5   During Exercise 87 % 3 L/M 109 bpm 113/62 mmHg 4   Post-exercise  (Recovery) 94 % 3 L/M  99 bpm   mmHg           CLINICAL INTERPRETATION:  Six minute walk distance is 283.46 meters (930 feet) with somewhat heavy dyspnea.  During exercise, there was significant desaturation while breathing supplemental oxygen.  Both blood pressure and heart rate remained stable with walking.  Tachycardia was present prior to exercise.  The patient reported non-pulmonary symptoms during exercise.  Significant exercise impairment is likely due to desaturation and cardiovascular causes.  Since the previous study in March 2017, exercise capacity is unchanged.  Based upon age and body mass index, exercise capacity is less than predicted.

## 2017-05-08 NOTE — PROGRESS NOTES
Subjective:      HPI  Delightful 71 y.o WF with PMHx IPAH with PFO diagnosed in past 10 years (probably for much longer per old CXR) who is now on triple therapy with IV remodulin (69 ng/kg/min), tracleer and adempas (since Dec) who returns for PH f/u-   Pt was Admitted 7/19-26 with fevers at home after being admitted one week prior for the same reason. During the last hospitalization she grew Rosemonas on blood cultures. Central line catheter was not removed. During this hospitalization 2 sets of blood cultures grew Rosemonas and central tip was culture resulted positive for GNR. No speciation up until discharge day. ID was involved in the case and recommended 14 days of cipro po and ceftriaxone 2 g IV daily until 08/05/2016. Home healh orders were placed in and she was set up for ceftriaxone infusion. Bacteremia work up included CT of the abdomen and pelvis as well neck to rule out pyelonephritis as well as osteo of the neck spine. They resulted negative. Patient remained clinically stable and new central line was placed in by nephro intervention. Patient tolerated it well without any complications.  Her blood cultures were sent out to the research lab for speciation and sensitivities. ID was to contact the patient in regards those results and further narrow antibiotic coverage.    In regards her PAH, we did not make any changes. We resumed her Remodulin at 71ng/kg/min, Adempas 1.5 mg TID and Tracleer. Continued with diltiazem and Lasix 40 am and 20 pm and spironolactone.  Coumadin was held initially for line exchange and right before her discharge we resumed warfarin 10 mg po.      Since last visit pt has been feeling up and down- doing better this past week with less heart fluttering- has been very faithful wearing her o2. A couple of weeks ago struggled with low BP associated with dizziness and nausea- we cut her adempas to 0.5 mg tid, seems to be doing better. Even on her o2 her sats remain in the 80's unless  she sits still for a half hour or so with her o2 on, then it will come up- but she is always on the go- not at all sedentary    line is clean, no f/c and no syncope. Skin is sensitive around the line.   Remains on lasix 40mg/20mg with good UOP- has occasional diarrhea, doesn't occur daily like it used to    her Remodulin is at 69ng/kg/min, Adempas 0.5 mg TID and Tracleer    6mw 283m (222m Mar, 375m Nov, 305 Sept, 274.5 July,  335.5May, 365  m prev 2 visits)                        O2 sat  94->86% (without O2 started 92%->84%                                                           HR 97-> 108                                                     BP  102/ 60  ->100 /59                                                      Roberto 0.5  ->4    TTE today    1 - Right ventricular enlargement with hypertrophy, with severely depressed systolic function.     2 - Moderate to severe tricuspid regurgitation.     3 - Pulmonary hypertension. The estimated PA systolic pressure is 116 mmHg.     4 - Biatrial enlargement.     5 - Normal left ventricular systolic function (EF 60-65%); reduced LV stroke volume.  The right ventricle is enlarged measuring 5.8 cm at the base in the apical right ventricle-focused view. There is RVH. Global right ventricular systolic function appears severely depressed. There is abnormal septal motion consistent with   RV pressure/volume overload. Tricuspid annular plane systolic excursion (TAPSE) is 1.5 cm. The estimated PA systolic pressure is 116 mmHg.     Review of Systems   Constitution: Negative for chills, fever, malaise/fatigue, weight gain and weight loss.   HENT: Negative.    Eyes: Negative.    Cardiovascular: Positive for dyspnea on exertion and palpitations. Negative for chest pain, leg swelling, near-syncope, orthopnea, paroxysmal nocturnal dyspnea and syncope.   Respiratory: Negative for cough and shortness of breath.    Endocrine: Negative.    Skin: Negative.    Musculoskeletal: Positive for  "arthritis and joint pain. Negative for back pain.   Gastrointestinal: Positive for nausea. Negative for bloating, abdominal pain and change in bowel habit.   Neurological: Negative for dizziness and light-headedness.   Psychiatric/Behavioral: Negative for depression.        Objective:   /60  Pulse 96  Ht 5' 7" (1.702 m)  Wt 58.5 kg (128 lb 15.5 oz)  SpO2 (!) 92% Comment: Pt on 3L of O2 at time of reading  BMI 20.2 kg/m2      Physical Exam   Constitutional: She is oriented to person, place, and time. She appears well-developed and well-nourished.   HENT:   Head: Normocephalic and atraumatic.   Eyes: Right eye exhibits no discharge. Left eye exhibits no discharge.   Neck: Neck supple. No JVD present. No thyromegaly present.   Cardiovascular: Regular rhythm.  Exam reveals no gallop and no friction rub.    No murmur heard.  Tachy, accentuated S2     Pulmonary/Chest: Effort normal and breath sounds normal. No respiratory distress. She has no wheezes. She has no rales.   Abdominal: Soft. Bowel sounds are normal. She exhibits no distension. There is no tenderness.   Musculoskeletal: Normal range of motion. She exhibits no edema or tenderness.   Neurological: She is alert and oriented to person, place, and time. No cranial nerve deficit. Coordination normal.   Skin: Skin is warm and dry. No rash noted.   Psychiatric: She has a normal mood and affect. Judgment and thought content normal.           Chemistry        Component Value Date/Time     05/08/2017 1157    K 4.3 05/08/2017 1157     05/08/2017 1157    CO2 26 05/08/2017 1157    BUN 22 05/08/2017 1157    CREATININE 1.1 05/08/2017 1157    GLU 88 05/08/2017 1157        Component Value Date/Time    CALCIUM 9.7 05/08/2017 1157    ALKPHOS 53 (L) 05/08/2017 1157    AST 21 05/08/2017 1157    ALT 13 05/08/2017 1157    BILITOT 1.1 (H) 05/08/2017 1157            Magnesium   Date Value Ref Range Status   03/14/2017 2.3 1.6 - 2.6 mg/dL Final       Lab Results "   Component Value Date    WBC 4.83 05/08/2017    HGB 13.4 05/08/2017    HCT 40.1 05/08/2017    MCV 81 (L) 05/08/2017     05/08/2017         BNP   Date Value Ref Range Status   05/08/2017 189 (H) 0 - 99 pg/mL Final     Comment:     Values of less than 100 pg/ml are consistent with non-CHF populations.   03/14/2017 173 (H) 0 - 99 pg/mL Final     Comment:     Values of less than 100 pg/ml are consistent with non-CHF populations.   11/02/2016 291 (H) 0 - 99 pg/mL Final     Comment:     Values of less than 100 pg/ml are consistent with non-CHF populations.                 Assessment:       1. Pulmonary hypertension- WHO Group 1,6mw distance a little better today though not at goal.   BNP mildly increased but in her usual range, FC II-III on triple tx- echo today has me worried as RV is enlarged and severely depressed, no effusion though which is good.   2. PFO (patent foramen ovale)    3. Palpitations/tachycardia- stale, followed by EP, on CCB  4. Chronic hypoxemic resp failure- stable on O2  5. PAPVR,sinus venosus ASD- decision made to treat conservatively     Plan:     no med changes today    Keep salt intake to under 2000 mg sodium, fluids to under 2 L (64 oz)    Check weights every morning after getting out of bed and urinating. If  weight goes up 3# overnight or 5# in one week she should take 40 mg lasix in the pm and call us if fluid doesn't come off.    f/u 2 mo with [facility] with me, hold coumadin 3 nights before

## 2017-07-10 ENCOUNTER — SURGERY (OUTPATIENT)
Age: 72
End: 2017-07-10

## 2017-07-10 ENCOUNTER — HOSPITAL ENCOUNTER (OUTPATIENT)
Facility: HOSPITAL | Age: 72
Discharge: HOME | End: 2017-07-10
Attending: INTERNAL MEDICINE | Admitting: INTERNAL MEDICINE
Payer: MEDICARE

## 2017-07-10 PROCEDURE — 25000003 PHARM REV CODE 250

## 2017-07-10 NOTE — H&P
HPI  Delightful 71 y.o WF with PMHx IPAH with PFO diagnosed in past 10 years (probably for much longer per old CXR) who is now on triple therapy with IV remodulin (69 ng/kg/min), tracleer and adempas (since Dec) who returns for PH f/u-   Pt was Admitted 7/19-26 with fevers at home after being admitted one week prior for the same reason. During the last hospitalization she grew Rosemonas on blood cultures. Central line catheter was not removed. During this hospitalization 2 sets of blood cultures grew Rosemonas and central tip was culture resulted positive for GNR. No speciation up until discharge day. ID was involved in the case and recommended 14 days of cipro po and ceftriaxone 2 g IV daily until 08/05/2016. Home healh orders were placed in and she was set up for ceftriaxone infusion. Bacteremia work up included CT of the abdomen and pelvis as well neck to rule out pyelonephritis as well as osteo of the neck spine. They resulted negative. Patient remained clinically stable and new central line was placed in by nephro intervention. Patient tolerated it well without any complications.  Her blood cultures were sent out to the research lab for speciation and sensitivities. ID was to contact the patient in regards those results and further narrow antibiotic coverage.    In regards her PAH, we did not make any changes. We resumed her Remodulin at 71ng/kg/min, Adempas 1.5 mg TID and Tracleer. Continued with diltiazem and Lasix 40 am and 20 pm and spironolactone.  Coumadin was held initially for line exchange and right before her discharge we resumed warfarin 10 mg po.       Since last visit pt has been feeling up and down- doing better this past week with less heart fluttering- has been very faithful wearing her o2. A couple of weeks ago struggled with low BP associated with dizziness and nausea- we cut her adempas to 0.5 mg tid, seems to be doing better. Even on her o2 her sats remain in the 80's unless she sits still  for a half hour or so with her o2 on, then it will come up- but she is always on the go- not at all sedentary     line is clean, no f/c and no syncope. Skin is sensitive around the line.   Remains on lasix 40mg/20mg with good UOP- has occasional diarrhea, doesn't occur daily like it used to     her Remodulin is at 69ng/kg/min, Adempas 1 mg TID and Tracleer    Now here for RHC to reassess PAP     6mw 283m (222m Mar, 375m Nov, 305 Sept, 274.5 July,  335.5May, 365  m prev 2 visits)                        O2 sat  94->86% (without O2 started 92%->84%                                                           HR 97-> 108                                                     BP  102/ 60  ->100 /59                                                      Roberto 0.5  ->4     TTE today    1 - Right ventricular enlargement with hypertrophy, with severely depressed systolic function.     2 - Moderate to severe tricuspid regurgitation.     3 - Pulmonary hypertension. The estimated PA systolic pressure is 116 mmHg.     4 - Biatrial enlargement.     5 - Normal left ventricular systolic function (EF 60-65%); reduced LV stroke volume.  The right ventricle is enlarged measuring 5.8 cm at the base in the apical right ventricle-focused view. There is RVH. Global right ventricular systolic function appears severely depressed. There is abnormal septal motion consistent with   RV pressure/volume overload. Tricuspid annular plane systolic excursion (TAPSE) is 1.5 cm. The estimated PA systolic pressure is 116 mmHg.      Review of Systems   Constitution: Negative for chills, fever, malaise/fatigue, weight gain and weight loss.   HENT: Negative.    Eyes: Negative.    Cardiovascular: Positive for dyspnea on exertion and palpitations. Negative for chest pain, leg swelling, near-syncope, orthopnea, paroxysmal nocturnal dyspnea and syncope.   Respiratory: Negative for cough and shortness of breath.    Endocrine: Negative.    Skin: Negative.     Musculoskeletal: Positive for arthritis and joint pain. Negative for back pain.   Gastrointestinal: Positive for nausea. Negative for bloating, abdominal pain and change in bowel habit.   Neurological: Negative for dizziness and light-headedness.   Psychiatric/Behavioral: Negative for depression.         Objective:       Physical Exam   Constitutional: She is oriented to person, place, and time. She appears well-developed and well-nourished.   HENT:   Head: Normocephalic and atraumatic.   Eyes: Right eye exhibits no discharge. Left eye exhibits no discharge.   Neck: Neck supple. No JVD present. No thyromegaly present.   Cardiovascular: Regular rhythm.  Exam reveals no gallop and no friction rub.    No murmur heard.  Tachy, accentuated S2     Pulmonary/Chest: Effort normal and breath sounds normal. No respiratory distress. She has no wheezes. She has no rales.   Abdominal: Soft. Bowel sounds are normal. She exhibits no distension. There is no tenderness.   Musculoskeletal: Normal range of motion. She exhibits no edema or tenderness.   Neurological: She is alert and oriented to person, place, and time. No cranial nerve deficit. Coordination normal.   Skin: Skin is warm and dry. No rash noted.   Psychiatric: She has a normal mood and affect. Judgment and thought content normal.          Lab Results   Component Value Date     (H) 05/08/2017     07/10/2017    K 3.9 07/10/2017    MG 2.3 03/14/2017     07/10/2017    CO2 26 07/10/2017    BUN 16 07/10/2017    CREATININE 1.0 07/10/2017    GLU 88 07/10/2017    HGBA1C 5.4 12/16/2011    AST 21 07/10/2017    ALT 13 07/10/2017    ALBUMIN 4.0 07/10/2017    PROT 7.3 07/10/2017    BILITOT 1.6 (H) 07/10/2017    CHOL 127 12/16/2011    HDL 51 12/16/2011    LDLCALC 51.0 (L) 12/16/2011    TRIG 127 12/16/2011       Magnesium   Date Value Ref Range Status   03/14/2017 2.3 1.6 - 2.6 mg/dL Final       Lab Results   Component Value Date    WBC 4.95 07/10/2017    HGB 13.3  07/10/2017    HCT 40.8 07/10/2017    MCV 83 07/10/2017     07/10/2017       Lab Results   Component Value Date    INR 1.2 07/10/2017    INR 2.6 (H) 09/23/2016    INR 1.0 07/25/2016       BNP   Date Value Ref Range Status   05/08/2017 189 (H) 0 - 99 pg/mL Final     Comment:     Values of less than 100 pg/ml are consistent with non-CHF populations.   03/14/2017 173 (H) 0 - 99 pg/mL Final     Comment:     Values of less than 100 pg/ml are consistent with non-CHF populations.   11/02/2016 291 (H) 0 - 99 pg/mL Final     Comment:     Values of less than 100 pg/ml are consistent with non-CHF populations.       No results found for: LDH                          Assessment:       1. Pulmonary hypertension- WHO Group 1,6mw distance a little better today though not at goal.   BNP mildly increased but in her usual range, FC II-III on triple tx- echo today has me worried as RV is enlarged and severely depressed, no effusion though which is good.   2. PFO (patent foramen ovale)    3. Palpitations/tachycardia- stale, followed by EP, on CCB  4. Chronic hypoxemic resp failure- stable on O2  5. 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    I have explained the risks, benefits and alternatives of the procedure in detail. The patient voices understanding and all questions have been answered,. The patient agrees to proceed as planned.

## 2017-08-17 RX ORDER — TRAMADOL HYDROCHLORIDE 200 MG/1
200 TABLET, EXTENDED RELEASE ORAL DAILY
Qty: 30 TABLET | Refills: 3 | Status: SHIPPED | OUTPATIENT
Start: 2017-08-17 | End: 2017-10-16 | Stop reason: ALTCHOICE

## 2017-09-05 ENCOUNTER — TELEPHONE (OUTPATIENT)
Dept: TRANSPLANT | Facility: CLINIC | Age: 72
End: 2017-09-05

## 2017-09-05 NOTE — TELEPHONE ENCOUNTER
Correspondence from patient, who also reports periodic increased HR as high as 162. Notification sent to Dr Tim, and awaiting response. Advised patient that Dr Tim might advise ER.    ------------------------    Vasile Eng  I feel like something needs to change. Weight gain 5 pounds, shortness of breath worsened ,  pulse ox of 74 while off poc for only a moment, corrected to 87 within a few min by sitting with oxygen, longer periods of nausea after eating & no falls but periods of being light headed.  Should I increase lasix ?

## 2017-09-05 NOTE — TELEPHONE ENCOUNTER
"Spoke with patient, and confirmed she received most recent message that recommendation was to come to hospital for evaluation. Pt reports she has been "up and down" for the past week, and actually had a pretty good day today. She states she does not want anybody other than Dr Tim to do RHC. Encouraged patient to come to hospital for evaluation, even if she did not have RHC earlier than currently scheduled. Pt prefers to wait, stating "I am going to get Rufus to his oral surgery, and if anything changes at all I will not hesitate to come in to be admitted." Dr Tim notified.   "

## 2017-09-05 NOTE — TELEPHONE ENCOUNTER
Per Dr Tim, patient should be directly admitted to hospital for suspected fluid volume overload, or come to the ER. Notified patient of same and awaiting response/decision from patient.

## 2017-09-05 NOTE — TELEPHONE ENCOUNTER
"Pt requested via email that I contact her , so he could "explain better" what was going on. Spoke w/patient's , who states "I think things seem more dramatic in writing. She was congested and had a cold and sore throat, but no fever. I think the congestion has made the breathing harder. She is not far from her baseline at 87% on her oxygen, and when she takes the oxygen off, it (her saturation) comes up quickly. She has gained a few pounds (5 lbs) but this is well over course of a week, and her heart rate always goes up to 160s now and then. I don't think any of this is life threatening and I think she just needs to increase her lasix." Informed patient's  that this information would be presented to Dr Tim, but at this time, admission or ER was still the recommendation.   "

## 2017-09-05 NOTE — TELEPHONE ENCOUNTER
Per Dr Tim, recommendation for patient to come to the hospital has not changed. Pt messaged regarding same, and awaiting her response.

## 2017-09-20 ENCOUNTER — TELEPHONE (OUTPATIENT)
Dept: TRANSPLANT | Facility: CLINIC | Age: 72
End: 2017-09-20

## 2017-09-22 DIAGNOSIS — I27.9 CHRONIC PULMONARY HEART DISEASE: ICD-10-CM

## 2017-09-22 DIAGNOSIS — I27.20 PULMONARY HYPERTENSION: Primary | ICD-10-CM

## 2017-09-25 ENCOUNTER — HOSPITAL ENCOUNTER (OUTPATIENT)
Facility: HOSPITAL | Age: 72
Discharge: HOME | End: 2017-09-25
Attending: INTERNAL MEDICINE | Admitting: INTERNAL MEDICINE
Payer: MEDICARE

## 2017-09-25 DIAGNOSIS — I27.20 PULMONARY HYPERTENSION: ICD-10-CM

## 2017-09-25 PROCEDURE — G0378 HOSPITAL OBSERVATION PER HR: HCPCS

## 2017-09-25 PROCEDURE — 93451 RIGHT HEART CATH: CPT | Mod: 26,,, | Performed by: INTERNAL MEDICINE

## 2017-09-25 PROCEDURE — 25000003 PHARM REV CODE 250

## 2017-09-25 PROCEDURE — C1760 CLOSURE DEV, VASC: HCPCS

## 2017-09-25 NOTE — H&P
HPI  Delightful 71 y.o WF with PMHx IPAH with PFO diagnosed in past 10 years (probably for much longer per old CXR) who is now on triple therapy with IV remodulin (69 ng/kg/min), tracleer and adempas (since Dec) who returns for PH f/u-   Pt was Admitted 7/19-26 with fevers at home after being admitted one week prior for the same reason. During the last hospitalization she grew Rosemonas on blood cultures. Central line catheter was not removed. During this hospitalization 2 sets of blood cultures grew Rosemonas and central tip was culture resulted positive for GNR. No speciation up until discharge day. ID was involved in the case and recommended 14 days of cipro po and ceftriaxone 2 g IV daily until 08/05/2016. Home healh orders were placed in and she was set up for ceftriaxone infusion. Bacteremia work up included CT of the abdomen and pelvis as well neck to rule out pyelonephritis as well as osteo of the neck spine. They resulted negative. Patient remained clinically stable and new central line was placed in by nephro intervention. Patient tolerated it well without any complications.  Her blood cultures were sent out to the research lab for speciation and sensitivities. ID was to contact the patient in regards those results and further narrow antibiotic coverage.    In regards her PAH, we did not make any changes. We resumed her Remodulin at 71ng/kg/min, Adempas 1.5 mg TID and Tracleer. Continued with diltiazem and Lasix 40 am and 20 pm and spironolactone.  Coumadin was held initially for line exchange and right before her discharge we resumed warfarin 10 mg po.       Here for RHC to reassess hemodynamics in setting of what sounds like worsening PH.      her Remodulin is at 69ng/kg/min, Adempas 0.5 mg TID and Tracleer     6mw 283m (222m Mar, 375m Nov, 305 Sept, 274.5 July,  335.5May, 365  m prev 2 visits)                        O2 sat  94->86% (without O2 started 92%->84%                                                            HR 97-> 108                                                     BP  102/ 60  ->100 /59                                                      Roberto 0.5  ->4     TTE today    1 - Right ventricular enlargement with hypertrophy, with severely depressed systolic function.     2 - Moderate to severe tricuspid regurgitation.     3 - Pulmonary hypertension. The estimated PA systolic pressure is 116 mmHg.     4 - Biatrial enlargement.     5 - Normal left ventricular systolic function (EF 60-65%); reduced LV stroke volume.  The right ventricle is enlarged measuring 5.8 cm at the base in the apical right ventricle-focused view. There is RVH. Global right ventricular systolic function appears severely depressed. There is abnormal septal motion consistent with   RV pressure/volume overload. Tricuspid annular plane systolic excursion (TAPSE) is 1.5 cm. The estimated PA systolic pressure is 116 mmHg.      Review of Systems   Constitution: Negative for chills, fever, malaise/fatigue, weight gain and weight loss.   HENT: Negative.    Eyes: Negative.    Cardiovascular: Positive for dyspnea on exertion and palpitations. Negative for chest pain, leg swelling, near-syncope, orthopnea, paroxysmal nocturnal dyspnea and syncope.   Respiratory: Negative for cough and shortness of breath.    Endocrine: Negative.    Skin: Negative.    Musculoskeletal: Positive for arthritis and joint pain. Negative for back pain.   Gastrointestinal: Positive for nausea. Negative for bloating, abdominal pain and change in bowel habit.   Neurological: Negative for dizziness and light-headedness.   Psychiatric/Behavioral: Negative for depression.         Objective:       Physical Exam   Constitutional: She is oriented to person, place, and time. She appears well-developed and well-nourished.   HENT:   Head: Normocephalic and atraumatic.   Eyes: Right eye exhibits no discharge. Left eye exhibits no discharge.   Neck: Neck supple. No JVD present. No  thyromegaly present.   Cardiovascular: Regular rhythm.  Exam reveals no gallop and no friction rub.    No murmur heard.  Tachy, accentuated S2     Pulmonary/Chest: Effort normal and breath sounds normal. No respiratory distress. She has no wheezes. She has no rales.   Abdominal: Soft. Bowel sounds are normal. She exhibits no distension. There is no tenderness.   Musculoskeletal: Normal range of motion. She exhibits no edema or tenderness.   Neurological: She is alert and oriented to person, place, and time. No cranial nerve deficit. Coordination normal.   Skin: Skin is warm and dry. No rash noted.   Psychiatric: She has a normal mood and affect. Judgment and thought content normal.          Lab Results   Component Value Date     (H) 09/25/2017     07/10/2017    K 3.9 07/10/2017    MG 2.3 03/14/2017     07/10/2017    CO2 26 07/10/2017    BUN 16 07/10/2017    CREATININE 1.0 07/10/2017    GLU 88 07/10/2017    HGBA1C 5.4 12/16/2011    AST 21 07/10/2017    ALT 13 07/10/2017    ALBUMIN 4.0 07/10/2017    PROT 7.3 07/10/2017    BILITOT 1.6 (H) 07/10/2017    CHOL 127 12/16/2011    HDL 51 12/16/2011    LDLCALC 51.0 (L) 12/16/2011    TRIG 127 12/16/2011       Magnesium   Date Value Ref Range Status   03/14/2017 2.3 1.6 - 2.6 mg/dL Final       Lab Results   Component Value Date    WBC 3.87 (L) 09/25/2017    HGB 12.4 09/25/2017    HCT 39.3 09/25/2017    MCV 81 (L) 09/25/2017     09/25/2017       Lab Results   Component Value Date    INR 1.1 09/25/2017    INR 1.2 07/10/2017    INR 2.6 (H) 09/23/2016       BNP   Date Value Ref Range Status   09/25/2017 366 (H) 0 - 99 pg/mL Final     Comment:     Values of less than 100 pg/ml are consistent with non-CHF populations.   05/08/2017 189 (H) 0 - 99 pg/mL Final     Comment:     Values of less than 100 pg/ml are consistent with non-CHF populations.   03/14/2017 173 (H) 0 - 99 pg/mL Final     Comment:     Values of less than 100 pg/ml are consistent with non-CHF  populations.       No results found for: LDH                       Assessment:       1. Pulmonary hypertension- WHO Group 1,6mw distance a little better today though not at goal.   BNP mildly increased but in her usual range, FC II-III on triple tx- echo today has me worried as RV is enlarged and severely depressed, no effusion though which is good.   2. PFO (patent foramen ovale)    3. Palpitations/tachycardia- stale, followed by EP, on CCB  4. Chronic hypoxemic resp failure- stable on O2  5. 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    I have explained the risks, benefits and alternatives of the procedure in detail. The patient voices understanding and all questions have been answered,. The patient agrees to proceed as planned.

## 2017-09-25 NOTE — DISCHARGE INSTRUCTIONS

## 2017-10-04 ENCOUNTER — HOSPITAL ENCOUNTER (INPATIENT)
Facility: HOSPITAL | Age: 72
LOS: 4 days | Discharge: HOME | DRG: 189 | End: 2017-10-09
Attending: EMERGENCY MEDICINE | Admitting: INTERNAL MEDICINE
Payer: MEDICARE

## 2017-10-04 ENCOUNTER — TELEPHONE (OUTPATIENT)
Dept: TRANSPLANT | Facility: CLINIC | Age: 72
End: 2017-10-04

## 2017-10-04 DIAGNOSIS — R19.7 DIARRHEA: ICD-10-CM

## 2017-10-04 DIAGNOSIS — R19.7 DIARRHEA, UNSPECIFIED TYPE: ICD-10-CM

## 2017-10-04 DIAGNOSIS — R19.7 ACUTE DIARRHEA: ICD-10-CM

## 2017-10-04 DIAGNOSIS — I27.20 PULMONARY HYPERTENSION: ICD-10-CM

## 2017-10-04 DIAGNOSIS — Q24.9 ADULT CONGENITAL HEART DISEASE: ICD-10-CM

## 2017-10-04 DIAGNOSIS — Z79.01 ANTICOAGULANT LONG-TERM USE: Chronic | ICD-10-CM

## 2017-10-04 DIAGNOSIS — Q26.3 PARTIAL ANOMALOUS PULMONARY VENOUS CONNECTION (PAPVC): ICD-10-CM

## 2017-10-04 DIAGNOSIS — J96.11 CHRONIC RESPIRATORY FAILURE WITH HYPOXIA: ICD-10-CM

## 2017-10-04 DIAGNOSIS — R09.02 HYPOXIA: ICD-10-CM

## 2017-10-04 DIAGNOSIS — R52 PAIN: ICD-10-CM

## 2017-10-04 DIAGNOSIS — R06.89 ACUTE RESPIRATORY INSUFFICIENCY: Primary | ICD-10-CM

## 2017-10-04 DIAGNOSIS — J96.21 ACUTE ON CHRONIC RESPIRATORY FAILURE WITH HYPOXIA: ICD-10-CM

## 2017-10-04 DIAGNOSIS — Q21.16 SINUS VENOSUS DEFECT: ICD-10-CM

## 2017-10-04 DIAGNOSIS — R50.9 FEVER, UNSPECIFIED FEVER CAUSE: ICD-10-CM

## 2017-10-04 DIAGNOSIS — I27.21 PAH (PULMONARY ARTERY HYPERTENSION): ICD-10-CM

## 2017-10-04 LAB
ALBUMIN SERPL BCP-MCNC: 3.4 G/DL
ALP SERPL-CCNC: 41 U/L
ALT SERPL W/O P-5'-P-CCNC: 12 U/L
ANION GAP SERPL CALC-SCNC: 8 MMOL/L
APTT BLDCRRT: 24.7 SEC
AST SERPL-CCNC: 31 U/L
BACTERIA #/AREA URNS AUTO: ABNORMAL /HPF
BASOPHILS # BLD AUTO: 0.01 K/UL
BASOPHILS NFR BLD: 0.3 %
BILIRUB SERPL-MCNC: 0.9 MG/DL
BILIRUB UR QL STRIP: NEGATIVE
BNP SERPL-MCNC: 208 PG/ML
BUN SERPL-MCNC: 20 MG/DL
CALCIUM SERPL-MCNC: 7.9 MG/DL
CHLORIDE SERPL-SCNC: 106 MMOL/L
CLARITY UR REFRACT.AUTO: ABNORMAL
CO2 SERPL-SCNC: 17 MMOL/L
COLOR UR AUTO: YELLOW
CREAT SERPL-MCNC: 1 MG/DL
DIFFERENTIAL METHOD: ABNORMAL
EOSINOPHIL # BLD AUTO: 0 K/UL
EOSINOPHIL NFR BLD: 0.8 %
ERYTHROCYTE [DISTWIDTH] IN BLOOD BY AUTOMATED COUNT: 15.3 %
EST. GFR  (AFRICAN AMERICAN): >60 ML/MIN/1.73 M^2
EST. GFR  (NON AFRICAN AMERICAN): 56.8 ML/MIN/1.73 M^2
GLUCOSE SERPL-MCNC: 99 MG/DL
GLUCOSE UR QL STRIP: NEGATIVE
HCT VFR BLD AUTO: 36 %
HGB BLD-MCNC: 11.7 G/DL
HGB UR QL STRIP: NEGATIVE
HYALINE CASTS UR QL AUTO: 0 /LPF
INR PPP: 1.4
KETONES UR QL STRIP: NEGATIVE
LEUKOCYTE ESTERASE UR QL STRIP: ABNORMAL
LIPASE SERPL-CCNC: 53 U/L
LYMPHOCYTES # BLD AUTO: 0.5 K/UL
LYMPHOCYTES NFR BLD: 11.4 %
MCH RBC QN AUTO: 25.9 PG
MCHC RBC AUTO-ENTMCNC: 32.5 G/DL
MCV RBC AUTO: 80 FL
MICROSCOPIC COMMENT: ABNORMAL
MONOCYTES # BLD AUTO: 0.2 K/UL
MONOCYTES NFR BLD: 5.3 %
NEUTROPHILS # BLD AUTO: 3.3 K/UL
NEUTROPHILS NFR BLD: 82.2 %
NITRITE UR QL STRIP: NEGATIVE
PH UR STRIP: 5 [PH] (ref 5–8)
PLATELET # BLD AUTO: 196 K/UL
PMV BLD AUTO: 9.1 FL
POTASSIUM SERPL-SCNC: 4.7 MMOL/L
PROT SERPL-MCNC: 6.8 G/DL
PROT UR QL STRIP: ABNORMAL
PROTHROMBIN TIME: 14.1 SEC
RBC # BLD AUTO: 4.51 M/UL
RBC #/AREA URNS AUTO: 0 /HPF (ref 0–4)
SODIUM SERPL-SCNC: 131 MMOL/L
SP GR UR STRIP: 1.02 (ref 1–1.03)
SQUAMOUS #/AREA URNS AUTO: 0 /HPF
TROPONIN I SERPL DL<=0.01 NG/ML-MCNC: 0.06 NG/ML
URN SPEC COLLECT METH UR: ABNORMAL
UROBILINOGEN UR STRIP-ACNC: NEGATIVE EU/DL
WBC # BLD AUTO: 3.95 K/UL
WBC #/AREA URNS AUTO: 9 /HPF (ref 0–5)

## 2017-10-04 PROCEDURE — 25000003 PHARM REV CODE 250: Performed by: EMERGENCY MEDICINE

## 2017-10-04 PROCEDURE — 85610 PROTHROMBIN TIME: CPT

## 2017-10-04 PROCEDURE — 80053 COMPREHEN METABOLIC PANEL: CPT

## 2017-10-04 PROCEDURE — 87040 BLOOD CULTURE FOR BACTERIA: CPT | Mod: 59

## 2017-10-04 PROCEDURE — 83880 ASSAY OF NATRIURETIC PEPTIDE: CPT

## 2017-10-04 PROCEDURE — G0378 HOSPITAL OBSERVATION PER HR: HCPCS

## 2017-10-04 PROCEDURE — 93010 ELECTROCARDIOGRAM REPORT: CPT | Mod: ,,, | Performed by: INTERNAL MEDICINE

## 2017-10-04 PROCEDURE — 96374 THER/PROPH/DIAG INJ IV PUSH: CPT

## 2017-10-04 PROCEDURE — 99284 EMERGENCY DEPT VISIT MOD MDM: CPT | Mod: 25

## 2017-10-04 PROCEDURE — 99285 EMERGENCY DEPT VISIT HI MDM: CPT | Mod: ,,, | Performed by: EMERGENCY MEDICINE

## 2017-10-04 PROCEDURE — 25000003 PHARM REV CODE 250: Performed by: INTERNAL MEDICINE

## 2017-10-04 PROCEDURE — 93005 ELECTROCARDIOGRAM TRACING: CPT

## 2017-10-04 PROCEDURE — 85730 THROMBOPLASTIN TIME PARTIAL: CPT

## 2017-10-04 PROCEDURE — 84484 ASSAY OF TROPONIN QUANT: CPT

## 2017-10-04 PROCEDURE — 85025 COMPLETE CBC W/AUTO DIFF WBC: CPT

## 2017-10-04 PROCEDURE — 83690 ASSAY OF LIPASE: CPT

## 2017-10-04 PROCEDURE — 96361 HYDRATE IV INFUSION ADD-ON: CPT

## 2017-10-04 PROCEDURE — 81001 URINALYSIS AUTO W/SCOPE: CPT

## 2017-10-04 PROCEDURE — 63600175 PHARM REV CODE 636 W HCPCS: Performed by: EMERGENCY MEDICINE

## 2017-10-04 RX ORDER — BOSENTAN 125 MG/1
125 TABLET, FILM COATED ORAL 3 TIMES DAILY
Status: DISCONTINUED | OUTPATIENT
Start: 2017-10-05 | End: 2017-10-09 | Stop reason: HOSPADM

## 2017-10-04 RX ORDER — ACETAMINOPHEN 325 MG/1
650 TABLET ORAL EVERY 6 HOURS PRN
Status: DISCONTINUED | OUTPATIENT
Start: 2017-10-04 | End: 2017-10-09 | Stop reason: HOSPADM

## 2017-10-04 RX ORDER — WARFARIN SODIUM 5 MG/1
15 TABLET ORAL ONCE
Status: COMPLETED | OUTPATIENT
Start: 2017-10-04 | End: 2017-10-04

## 2017-10-04 RX ORDER — TRAMADOL HYDROCHLORIDE 50 MG/1
50 TABLET ORAL EVERY 6 HOURS
Status: DISCONTINUED | OUTPATIENT
Start: 2017-10-04 | End: 2017-10-05

## 2017-10-04 RX ORDER — DILTIAZEM HYDROCHLORIDE 60 MG/1
60 TABLET, FILM COATED ORAL DAILY
Status: DISCONTINUED | OUTPATIENT
Start: 2017-10-05 | End: 2017-10-05

## 2017-10-04 RX ORDER — ATORVASTATIN CALCIUM 10 MG/1
10 TABLET, FILM COATED ORAL DAILY
Status: DISCONTINUED | OUTPATIENT
Start: 2017-10-05 | End: 2017-10-09 | Stop reason: HOSPADM

## 2017-10-04 RX ORDER — OXYCODONE AND ACETAMINOPHEN 5; 325 MG/1; MG/1
1 TABLET ORAL EVERY 4 HOURS PRN
Status: DISCONTINUED | OUTPATIENT
Start: 2017-10-04 | End: 2017-10-05

## 2017-10-04 RX ORDER — CETIRIZINE HYDROCHLORIDE 5 MG/1
10 TABLET ORAL DAILY
Status: DISCONTINUED | OUTPATIENT
Start: 2017-10-05 | End: 2017-10-09 | Stop reason: HOSPADM

## 2017-10-04 RX ORDER — SODIUM CHLORIDE 9 MG/ML
1000 INJECTION, SOLUTION INTRAVENOUS
Status: COMPLETED | OUTPATIENT
Start: 2017-10-04 | End: 2017-10-04

## 2017-10-04 RX ORDER — LOPERAMIDE HYDROCHLORIDE 2 MG/1
2 CAPSULE ORAL EVERY 4 HOURS PRN
Status: DISCONTINUED | OUTPATIENT
Start: 2017-10-04 | End: 2017-10-09 | Stop reason: HOSPADM

## 2017-10-04 RX ORDER — ONDANSETRON 2 MG/ML
4 INJECTION INTRAMUSCULAR; INTRAVENOUS EVERY 8 HOURS PRN
Status: DISCONTINUED | OUTPATIENT
Start: 2017-10-04 | End: 2017-10-09 | Stop reason: HOSPADM

## 2017-10-04 RX ORDER — ONDANSETRON 2 MG/ML
4 INJECTION INTRAMUSCULAR; INTRAVENOUS
Status: COMPLETED | OUTPATIENT
Start: 2017-10-04 | End: 2017-10-04

## 2017-10-04 RX ADMIN — SODIUM CHLORIDE 1000 ML: 0.9 INJECTION, SOLUTION INTRAVENOUS at 07:10

## 2017-10-04 RX ADMIN — ONDANSETRON 4 MG: 2 INJECTION INTRAMUSCULAR; INTRAVENOUS at 07:10

## 2017-10-04 RX ADMIN — TRAMADOL HYDROCHLORIDE 50 MG: 50 TABLET, FILM COATED ORAL at 11:10

## 2017-10-04 RX ADMIN — WARFARIN SODIUM 15 MG: 5 TABLET ORAL at 11:10

## 2017-10-04 NOTE — ED TRIAGE NOTES
72 year old female with history of pulmonary hypertension on IV Remodulin presents to the ED c/o several episodes of diarrhea that started this morning. Also c/o generalized fatigue

## 2017-10-04 NOTE — TELEPHONE ENCOUNTER
"Received VM from pt's , stating that patient "continues to feel very weak" and her oxygen saturations are in the 70s. He said "She is not getting any better, so we are headed your way." Pt's  asked this RN to please "expedite" her visit in any way possible.     Returned call to pt's  and got VM. Advised possibly stopping at ER closer to pt's home, given pt's low oxygen levels and wait time in [facility] ER. Pt could be transferred to [facility] once stabilized, if needed.     Notified [facility] charge RN of pt's possible arrival, as well as Di Gusman, Meeta, and Harshad. Dosing sheet faxed to [facility] and emailed to all providers, as well as BENEDICT Sweeney RN.   "

## 2017-10-04 NOTE — TELEPHONE ENCOUNTER
"Pt provided "Thumbs up" in response to last message.      If the symptoms are under control with the medications, I would just sit at the current increase and wait until things are more settled out, then try to increase again. This probably means that you need to allow a good week in between increases, rather than trying to increase with each cassette change.    >   > -----Original Message-----  > From: Abdiel Gilbert [[email or web address]]   > Sent: Wednesday, October 04, 2017 9:35 AM  > To: Alberitna Diallo <[email or web address]>  > Subject: Re: RE:   >   > THIS EMAIL IS FROM AN EXTERNAL SENDER! DO NOT click links or provide your User ID or Password if the sender is unknown.  >   > Covering the symptoms but seems to be the same issues I had when we tried to increase remodulin before  >   > Sent from my iPhone  >   >> On Oct 4, 2017, at 8:16 AM, Albertina Diallo <[email or web address]> wrote:  >>   >> Do you have medication for nausea (Zofran or Phenergan) and for diarrhea (lomotil or immodium)?  >>   >>   >> -----Original Message-----  >> From: Abdiel Gilbert [[email or web address]]   >> Sent: Wednesday, October 04, 2017 8:12 AM  >> To: Albertina Diallo <[email or web address]>  >> Subject:   >>   >> THIS EMAIL IS FROM AN EXTERNAL SENDER! DO NOT click links or provide your User ID or Password if the sender is unknown.  >>   >> Vasile Eng  >> I increased my  rate over the last week or so from 36 to 38 and I am so sick.. I've got really bad diahrhea and nausea. What to do?  >>   >> Sent from my iPhone  "

## 2017-10-04 NOTE — TELEPHONE ENCOUNTER
Email exchange w/patient:    Hi-Yes, I got Dr Smith's message and left a voicmail. I am leaving the office now, but I did inform Di Tim, Uzma, and Harshad that you were coming. Dr Gusman and Harshad are on inpatient service at this time. I also suggested possibly going to local ER and then you could be transferred here, if needed because our wait time is a little over an hour right now, and I know beds are tight. I hope you feel better, soon. I will be out of the office tomorrow, but if there is something urgent, you may call [phone], as Sonia is also out of the office.  Take care,  Albertina     -----Original Message-----  From: Abdiel Gilbert [[email or web address]]   Sent: Wednesday, October 04, 2017 4:57 PM  To: Albertina Diallo <[email or web address]>  Subject: Re: RE:     THIS EMAIL IS FROM AN EXTERNAL SENDER! DO NOT click links or provide your User ID or Password if the sender is unknown.    Going to ER    Sent from my iPhone    > On Oct 4, 2017, at 2:50 PM, Albertina Diallo <[email or web address]> wrote:  >   > Hi Ms Smith-I will check with Dr Tim and let you know what she says.  > Albertina   >   > -----Original Message-----  > From: Abdiel Gilbert [[email or web address]]   > Sent: Wednesday, October 04, 2017 2:19 PM  > To: Albertina Diallo <[email or web address]>  > Subject:   >   > THIS EMAIL IS FROM AN EXTERNAL SENDER! DO NOT click links or provide your User ID or Password if the sender is unknown.  >   > What are your thoughts about going back on Adempas?   > I think I was dehydrated today. O2 dropped into 60s for a while. Now it's back up to 83  >   > Sent from my iPhone

## 2017-10-05 PROBLEM — R09.02 HYPOXIA: Status: ACTIVE | Noted: 2017-10-05

## 2017-10-05 PROBLEM — R06.89 ACUTE RESPIRATORY INSUFFICIENCY: Status: ACTIVE | Noted: 2017-10-05

## 2017-10-05 PROBLEM — R19.7 DIARRHEA: Status: ACTIVE | Noted: 2017-10-05

## 2017-10-05 PROBLEM — J96.21 ACUTE ON CHRONIC RESPIRATORY FAILURE WITH HYPOXIA: Status: ACTIVE | Noted: 2017-10-05

## 2017-10-05 LAB
ALBUMIN SERPL BCP-MCNC: 3.1 G/DL
ALLENS TEST: ABNORMAL
ALP SERPL-CCNC: 39 U/L
ALT SERPL W/O P-5'-P-CCNC: 11 U/L
ANION GAP SERPL CALC-SCNC: 7 MMOL/L
AST SERPL-CCNC: 16 U/L
BASOPHILS # BLD AUTO: 0.01 K/UL
BASOPHILS NFR BLD: 0.4 %
BILIRUB SERPL-MCNC: 0.5 MG/DL
BUN SERPL-MCNC: 18 MG/DL
CALCIUM SERPL-MCNC: 7.6 MG/DL
CHLORIDE SERPL-SCNC: 108 MMOL/L
CO2 SERPL-SCNC: 18 MMOL/L
CREAT SERPL-MCNC: 0.9 MG/DL
DELSYS: ABNORMAL
DIFFERENTIAL METHOD: ABNORMAL
EOSINOPHIL # BLD AUTO: 0 K/UL
EOSINOPHIL NFR BLD: 0.4 %
ERYTHROCYTE [DISTWIDTH] IN BLOOD BY AUTOMATED COUNT: 15 %
EST. GFR  (AFRICAN AMERICAN): >60 ML/MIN/1.73 M^2
EST. GFR  (NON AFRICAN AMERICAN): >60 ML/MIN/1.73 M^2
GLUCOSE SERPL-MCNC: 92 MG/DL
HCO3 UR-SCNC: 17.2 MMOL/L (ref 24–28)
HCT VFR BLD AUTO: 34.1 %
HGB BLD-MCNC: 10.7 G/DL
INR PPP: 1.5
LYMPHOCYTES # BLD AUTO: 0.6 K/UL
LYMPHOCYTES NFR BLD: 26.7 %
MAGNESIUM SERPL-MCNC: 1.9 MG/DL
MCH RBC QN AUTO: 25.8 PG
MCHC RBC AUTO-ENTMCNC: 31.4 G/DL
MCV RBC AUTO: 82 FL
MODE: ABNORMAL
MONOCYTES # BLD AUTO: 0.3 K/UL
MONOCYTES NFR BLD: 12.9 %
NEUTROPHILS # BLD AUTO: 1.4 K/UL
NEUTROPHILS NFR BLD: 59.2 %
PCO2 BLDA: 31.6 MMHG (ref 35–45)
PH SMN: 7.34 [PH] (ref 7.35–7.45)
PLATELET # BLD AUTO: 191 K/UL
PMV BLD AUTO: 9.4 FL
PO2 BLDA: 38 MMHG (ref 80–100)
POC BE: -8 MMOL/L
POC SATURATED O2: 70 % (ref 95–100)
POC TCO2: 18 MMOL/L (ref 23–27)
POTASSIUM SERPL-SCNC: 3.4 MMOL/L
PROT SERPL-MCNC: 6 G/DL
PROTHROMBIN TIME: 15.4 SEC
RBC # BLD AUTO: 4.14 M/UL
SAMPLE: ABNORMAL
SITE: ABNORMAL
SODIUM SERPL-SCNC: 133 MMOL/L
SP02: 84
WBC # BLD AUTO: 2.32 K/UL

## 2017-10-05 PROCEDURE — 99223 1ST HOSP IP/OBS HIGH 75: CPT | Mod: ,,, | Performed by: INTERNAL MEDICINE

## 2017-10-05 PROCEDURE — 36415 COLL VENOUS BLD VENIPUNCTURE: CPT

## 2017-10-05 PROCEDURE — C1751 CATH, INF, PER/CENT/MIDLINE: HCPCS

## 2017-10-05 PROCEDURE — 25500020 PHARM REV CODE 255: Performed by: INTERNAL MEDICINE

## 2017-10-05 PROCEDURE — 27000221 HC OXYGEN, UP TO 24 HOURS

## 2017-10-05 PROCEDURE — 63600175 PHARM REV CODE 636 W HCPCS: Performed by: INTERNAL MEDICINE

## 2017-10-05 PROCEDURE — 25000003 PHARM REV CODE 250: Performed by: INTERNAL MEDICINE

## 2017-10-05 PROCEDURE — 80053 COMPREHEN METABOLIC PANEL: CPT

## 2017-10-05 PROCEDURE — 20600001 HC STEP DOWN PRIVATE ROOM

## 2017-10-05 PROCEDURE — 87040 BLOOD CULTURE FOR BACTERIA: CPT | Mod: 59

## 2017-10-05 PROCEDURE — 36569 INSJ PICC 5 YR+ W/O IMAGING: CPT

## 2017-10-05 PROCEDURE — 36600 WITHDRAWAL OF ARTERIAL BLOOD: CPT

## 2017-10-05 PROCEDURE — 94761 N-INVAS EAR/PLS OXIMETRY MLT: CPT

## 2017-10-05 PROCEDURE — 85025 COMPLETE CBC W/AUTO DIFF WBC: CPT

## 2017-10-05 PROCEDURE — 76937 US GUIDE VASCULAR ACCESS: CPT

## 2017-10-05 PROCEDURE — 85610 PROTHROMBIN TIME: CPT

## 2017-10-05 PROCEDURE — 25000003 PHARM REV CODE 250: Performed by: NURSE PRACTITIONER

## 2017-10-05 PROCEDURE — 27100171 HC OXYGEN HIGH FLOW UP TO 24 HOURS

## 2017-10-05 PROCEDURE — 83735 ASSAY OF MAGNESIUM: CPT

## 2017-10-05 PROCEDURE — 63600175 PHARM REV CODE 636 W HCPCS: Performed by: NURSE PRACTITIONER

## 2017-10-05 PROCEDURE — 82803 BLOOD GASES ANY COMBINATION: CPT

## 2017-10-05 RX ORDER — CIPROFLOXACIN 2 MG/ML
400 INJECTION, SOLUTION INTRAVENOUS
Status: DISCONTINUED | OUTPATIENT
Start: 2017-10-05 | End: 2017-10-06

## 2017-10-05 RX ORDER — POTASSIUM CHLORIDE 20 MEQ/1
40 TABLET, EXTENDED RELEASE ORAL ONCE
Status: COMPLETED | OUTPATIENT
Start: 2017-10-05 | End: 2017-10-05

## 2017-10-05 RX ORDER — CEFEPIME HYDROCHLORIDE 2 G/50ML
2 INJECTION, SOLUTION INTRAVENOUS
Status: DISCONTINUED | OUTPATIENT
Start: 2017-10-05 | End: 2017-10-05

## 2017-10-05 RX ORDER — DILTIAZEM HYDROCHLORIDE 120 MG/1
120 CAPSULE, COATED, EXTENDED RELEASE ORAL DAILY
Status: DISCONTINUED | OUTPATIENT
Start: 2017-10-05 | End: 2017-10-09 | Stop reason: HOSPADM

## 2017-10-05 RX ADMIN — TRAMADOL HYDROCHLORIDE 50 MG: 50 TABLET, FILM COATED ORAL at 05:10

## 2017-10-05 RX ADMIN — IOHEXOL 75 ML: 350 INJECTION, SOLUTION INTRAVENOUS at 06:10

## 2017-10-05 RX ADMIN — BOSENTAN 125 MG: 125 TABLET, FILM COATED ORAL at 08:10

## 2017-10-05 RX ADMIN — POTASSIUM CHLORIDE 40 MEQ: 1500 TABLET, EXTENDED RELEASE ORAL at 09:10

## 2017-10-05 RX ADMIN — CIPROFLOXACIN 400 MG: 2 INJECTION, SOLUTION INTRAVENOUS at 02:10

## 2017-10-05 RX ADMIN — ATORVASTATIN CALCIUM 10 MG: 10 TABLET, FILM COATED ORAL at 09:10

## 2017-10-05 RX ADMIN — TREPROSTINIL 73 NG/KG/MIN: 100 INJECTION, SOLUTION INTRAVENOUS; SUBCUTANEOUS at 06:10

## 2017-10-05 RX ADMIN — Medication: at 03:10

## 2017-10-05 RX ADMIN — CETIRIZINE HYDROCHLORIDE 10 MG: 5 TABLET ORAL at 09:10

## 2017-10-05 RX ADMIN — BOSENTAN 125 MG: 125 TABLET, FILM COATED ORAL at 02:10

## 2017-10-05 RX ADMIN — DILTIAZEM HYDROCHLORIDE 120 MG: 120 CAPSULE, COATED, EXTENDED RELEASE ORAL at 09:10

## 2017-10-05 RX ADMIN — BOSENTAN 125 MG: 125 TABLET, FILM COATED ORAL at 05:10

## 2017-10-05 NOTE — ED PROVIDER NOTES
"Encounter Date: 10/4/2017    SCRIBE #1 NOTE: I, Denver Fuller, am scribing for, and in the presence of,  Dr. Garcia. I have scribed the following portions of the note - the EKG reading. Other sections scribed: HPI, ROS, PE.       History     Chief Complaint   Patient presents with    Diarrhea     pulm htn. diarrhea and nausea today. and "my oxygen has dropped as low as 55%". on home O2 at 5L. normally on 3L. has continuous remodulin infusion    Nausea     Time patient was seen by the provider: 7:01 PM      The patient is a 72 y.o. female with hx of: pulmonary HTN that presents to the ED with a complaint of persistent diarrhea, which began approximately 16 hours ago. Pt reports she began feeling unwell 1 day ago during a car ride from [place], [place] to [employer]. She notes an associated generalized malaise(x24 hours), nausea, shortness of breath, low grade fever(99.7), reduced appetiete and mild non productive cough. States cough has persisted for the last several weeks. Pt is on 3 liters of home oxygen at baseline. States today her pulse ox was as low as 59 particularly around episodes of diarrhea.  Also notes an elevated heart rate today. 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 Recently she was put on a higher dose of remodulin after her BNP was found to be above 400 and she was taken off adempas .  believes this may be the source of her diarrhea. She denies any dysuria or vomiting. No recent sick contact or change in diet. Pt further denies any known allergies.       The history is provided by the patient and the spouse.     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    Multaq [dronedarone]      Past Medical History:   Diagnosis Date    Allergy     Anticoagulant long-term use     Arthritis     Heart murmur     Pneumonia     Pulmonary hypertension      Past " Surgical History:   Procedure Laterality Date    BACK SURGERY       Family History   Problem Relation Age of Onset    Arthritis Mother     Arthritis Father     Diabetes Father     Heart disease Father     Hypertension Father      Social History   Substance Use Topics    Smoking status: Never Smoker    Smokeless tobacco: Not on file    Alcohol use 0.6 oz/week     1 Glasses of wine per week      Comment: rarely     Review of Systems   Constitutional: Positive for appetite change and fever (low grade).        (+) generalized malaise    Respiratory: Positive for cough and shortness of breath.    Gastrointestinal: Positive for diarrhea and nausea. Negative for vomiting.   Genitourinary: Negative for dysuria.   All other systems reviewed and are negative.      Physical Exam     Initial Vitals [10/04/17 1850]   BP Pulse Resp Temp SpO2   (!) 92/55 90 (!) 24 99.7 °F (37.6 °C) (!) 78 %      MAP       67.33         Physical Exam  PHYSICAL EXAM:  Vital signs and nursing assessment noted: systolic blood pressure 84, heart rate 84, sats 84    GEN:   Petite female with tacypnea requiring 5 liters of oxygen, A & Ox3, atraumatic, well appearing, nontoxic appearing  HEENT:  PERRLA, EOMI, dry mucous membranes, nl conjunctiva, no scleral icterus, no nystagmus, no nodes/nodules, soft, supple, FROM, no trachial deviation, nexus negative  CV:   RRR no m/r/g, 2+ radial pulses, <2sec cap refill  RESP:  CTA B, no w/r/r, equal and bilateral chest rise  ABD:   soft, Nontender, Nondistended, +BS, no guarding/rebound  BACK:  FROM, no midline tenderness, no paraspinal tenderness  :   Deferred  EXT:   FROM, RANDALL x 4, no edema, no calf tenderness, no swelling  LYMPH:  no gross adenopathy  NEURO:  CN II-XII grossly intact, no obvious motor/sensory deficit, no tremor ,negative Romberg,  nl gait/coordination  PSYCH:   no SI/HI, no anxiety, nl mood/affect, nl judgement/thought process  SKIN:  Warm, dry, intact, no rashes/lesions or masses, nl  color, no pallor    ED Course   Procedures  Labs Reviewed   CBC W/ AUTO DIFFERENTIAL - Abnormal; Notable for the following:        Result Value    Hemoglobin 11.7 (*)     Hematocrit 36.0 (*)     MCV 80 (*)     MCH 25.9 (*)     RDW 15.3 (*)     MPV 9.1 (*)     Lymph # 0.5 (*)     Mono # 0.2 (*)     Gran% 82.2 (*)     Lymph% 11.4 (*)     All other components within normal limits   COMPREHENSIVE METABOLIC PANEL - Abnormal; Notable for the following:     Sodium 131 (*)     CO2 17 (*)     Calcium 7.9 (*)     Albumin 3.4 (*)     Alkaline Phosphatase 41 (*)     eGFR if non  56.8 (*)     All other components within normal limits   TROPONIN I - Abnormal; Notable for the following:     Troponin I 0.060 (*)     All other components within normal limits   B-TYPE NATRIURETIC PEPTIDE - Abnormal; Notable for the following:      (*)     All other components within normal limits   LIPASE   APTT   PROTIME-INR   URINALYSIS, REFLEX TO URINE CULTURE   PROTIME-INR   APTT   URINALYSIS MICROSCOPIC     EKG Readings: (Independently Interpreted)   1951  normal sinus rhythm, heart rate 70, incomplete right  bundle branch block, right ventricular hypertrophy, right axis deviation, ST segment downward segment depression in inferior and lateral LEADs. Compared to EKG on April 7, 2017 appears similar           Medical Decision Making:   History:   Old Medical Records: I decided to obtain old medical records.  Differential Diagnosis:   Shortness of breath differential includes but not exclusive to Pulmonary embolism, asthma/COPD exacerbation, deconditioning, myocardial infarction, pneumonia, CHF, pneumothorax, worsening pulm HTN  Nausea and vomiting differential includes but not exclusive to: DKA, metabolic disturbances, cyclic vomiting syndrome, hyperemesis gravid, Medications and toxic etiologies, Ethanol abuse, viral syndrome, gastritis, Gastroenteritis, Mechanical obstruction, Gastroparesis, Cholecystitis, Pancreatitis,  Hepatitis, Migraine, Increased intracranial pressure, Emotional responses, Psychogenic vomiting, Anxiety disorders, Pain, concussion, Myocardial infarction, Starvation    Independently Interpreted Test(s):   I have ordered and independently interpreted X-rays - see summary below.       <> Summary of X-Ray Reading(s): CXR increased size of pulm vasculature  I have ordered and independently interpreted EKG Reading(s) - see prior notes  Clinical Tests:   Lab Tests: Ordered and Reviewed       <> Summary of Lab: Elevated troponin  Elevated bnp  hyponatremia  Radiological Study: Ordered and Reviewed  Medical Tests: Ordered and Reviewed  ED Management:  Treatment plan includes physical exam, cardiac monitoring, labs, imaging studies, antiemetic, IVF and supportive care.    Consider bipap if needs increase oxygen requirements  Patient improved after treatment  Not tolerating PO with decreased appetite    Patient and family updated on care  SBP 89 Hr89 saO2 85 on 5L O2  Appears comfortable, talking to family  Further plan per inpatient team      Other:   I have discussed this case with another health care provider.       <> Summary of the Discussion: 2022 call out to Counts include 234 beds at the [facility] for Dr. Alice Tim with heart transplant   2030 Dr. James of Cardiology came into ED to stay they will admit patient  Critical care time 31 min    Time spent at the bedside, reviewing test results, discussing the case with staff and/or consult(s), documenting the medical record and time spent with family members discussing treatment and management decisions.    The time involved in the performance of separately reportable procedures was not counted toward critical care time.              Scribe Attestation:   Scribe #1: I performed the above scribed service and the documentation accurately describes the services I performed. I attest to the accuracy of the note.    Attending Attestation:           Physician Attestation for Scribe:  Physician  Attestation Statement for Scribe #1: I, Anayeli Garcia, reviewed documentation, as scribed by Denver Fuller in my presence, and it is both accurate and complete.                 ED Course      Clinical Impression:   The primary encounter diagnosis was Acute respiratory insufficiency. Diagnoses of Pain, Hypoxia, Diarrhea, unspecified type, and Pulmonary hypertension were also pertinent to this visit.    Disposition:   Disposition: Admitted  Condition: Serious                        Anayeli Garcia MD  10/04/17 [id number]

## 2017-10-05 NOTE — ASSESSMENT & PLAN NOTE
Continue home doses of remodulin (recently increased to 75 ng/kg/min and bosentan (125mg TID)   Remodulin doses verified by the cardiology fellow.   Will plan to adjust / titrate PAH medications this admission   Continue coumadin with goal INR 1.5-2.5 (per ACCF/AHA expert consensus)  Will need to dose coumadin daily (10mg given x1) INR 14  Continue oxygen supplementation with plan to wean to 3L (baseline requirement)   Discussed goals of care. If she were to have a respiratory arrest they would want intubation and aggressive care.   Continue lasix 40mg BID

## 2017-10-05 NOTE — PROGRESS NOTES
Pt SpO2 reverted to 68% on 15L of high flow NC- notified MD Rodrigues, ordered pt to be placed on comfort flow 15L and 100% FiO2. Pt SpO2 now 84% and resting comfortably.

## 2017-10-05 NOTE — HPI
Mrs. Molly Smith is a 71 yo female with a PMHx of PAH (currently on remodulin IV and bosentan), PFO, chronic hypoxia on 3L at baseline and previous bacteremia (rosemonas) s/p line exchange who presents to the ED with worsening fatigue, diarrhea since the morning of 10/4/17, nausea and low grade temperature at home (100.4 being the highest). Also had a single event of hypoxia (SpO2 50s) that resolved without intervention.  is an ED physician and states they were both pretty worn out after spending last week with their grand kids in [place] and most of her symptoms manifested around the time of their trip home on Tuesday. Her home remodulin was recently increased a week ago and adempas was stopped after her recent RHC with Dr. Tim showed persistently elevated right sided pressures.

## 2017-10-05 NOTE — ASSESSMENT & PLAN NOTE
71 y/o female with a history of pulmonary arterial hypertension for which she is on IV remodulin chronically at home.  She presented with fevers, nausea, diarrhea and worsening shortness of breath.  Her work up has included a chest x-ray that has been stable,  Blood cultures that are pending.  She has not had any diarrhea today.  Differential includes viral gastroenteritis.  Other possibilities include viral respiratory infection and a line infection.  Recs as follows    -Patient is stable.  No antibiotics for now pending work up.  Discontinue cipro.  -Agree with blood cultures to look for any evidence of line infection.  -Agree with stool cultures and C diff to rule out bacterial causes for her diarrhea.    We will continue to follow

## 2017-10-05 NOTE — SUBJECTIVE & OBJECTIVE
Past Medical History:   Diagnosis Date    Allergy     Anticoagulant long-term use     Arthritis     Heart murmur     Pneumonia     Pulmonary hypertension        Past Surgical History:   Procedure Laterality Date    BACK SURGERY         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    Multaq [dronedarone]        Medications:  Prescriptions Prior to Admission   Medication Sig    atorvastatin (LIPITOR) 10 MG tablet Take 10 mg by mouth once daily.      bosentan (TRACLEER) 125 MG Tab Take 1 tablet (125 mg total) by mouth 3 (three) times daily.    CALCIUM CARBONATE (CALCIUM 600 ORAL) Take 2 tablets by mouth once daily.      diltiaZEM (CARDIZEM) 60 MG tablet Take 1 tablet (60 mg total) by mouth once daily.    diltiazem (DILACOR XR) 120 MG 24 hr capsule Take 120 mg by mouth once daily.      furosemide (LASIX) 40 MG tablet Take 1 tablet (40 mg total) by mouth 2 (two) times daily.    Lactobacillus rhamnosus GG (CULTURELLE) 10 billion cell capsule Take 1 capsule by mouth once daily.    loratadine (CLARITIN) 10 mg tablet Take 10 mg by mouth once daily.    promethazine (PHENERGAN) 6.25 mg/5 mL syrup Take 10 mLs (12.5 mg total) by mouth every 6 (six) hours as needed for Nausea.    sodium chloride 0.9% 0.9 % SolP 100 mL with treprostinil 1 mg/mL Soln 9,000,000 ng Inject 5,005.5 ng/min into the vein continuous.    spironolactone (ALDACTONE) 25 MG tablet Take 25 mg by mouth once daily.      tramadol (ULTRAM-ER) 200 MG Tb24 Take 1 tablet (200 mg total) by mouth once daily.    TREPROSTINIL SODIUM (TREPROSTINIL, REMODULIN, PUMP FOR HOME USE) Pt currently on 71ng/kg/min=36mL/24 hr, continuous IV infusion    warfarin (COUMADIN) 10 MG tablet Take 1 tablet (10 mg total) by mouth Daily.     Antibiotics     Start     Stop Route Frequency Ordered    10/05/17 1330  ciprofloxacin (CIPRO)400mg/200ml D5W IVPB 400 mg      -- IV Every 12 hours (non-standard times) 10/05/17  1259        Antifungals     None        Antivirals     None             There is no immunization history on file for this patient.    Family History     Problem Relation (Age of Onset)    Arthritis Mother, Father    Diabetes Father    Heart disease Father    Hypertension Father        Social History     Social History    Marital status:      Spouse name: N/A    Number of children: N/A    Years of education: N/A     Social History Main Topics    Smoking status: Never Smoker    Smokeless tobacco: None    Alcohol use 0.6 oz/week     1 Glasses of wine per week      Comment: rarely    Drug use: No    Sexual activity: Not Asked     Other Topics Concern    None     Social History Narrative    None     Review of Systems   Constitutional: Positive for chills.   Respiratory: Positive for shortness of breath.    Gastrointestinal: Positive for diarrhea and nausea.   All other systems reviewed and are negative.    Objective:     Vital Signs (Most Recent):  Temp: (!) 100.4 °F (38 °C) (10/05/17 1214)  Pulse: 76 (10/05/17 1500)  Resp: 18 (10/05/17 1214)  BP: (!) 83/46 (10/05/17 1214)  SpO2: (!) 73 % (pt has stuffy nose and slightly mouth breathing) (10/05/17 1500) Vital Signs (24h Range):  Temp:  [98.7 °F (37.1 °C)-100.4 °F (38 °C)] 100.4 °F (38 °C)  Pulse:  [70-90] 76  Resp:  [16-24] 18  SpO2:  [65 %-91 %] 73 %  BP: (81-94)/(46-83) 83/46     Weight: 61.7 kg (136 lb)  Body mass index is 23.71 kg/m².    Estimated Creatinine Clearance: 48.5 mL/min (based on SCr of 0.9 mg/dL).    Physical Exam   Constitutional: She is oriented to person, place, and time. She appears well-developed and well-nourished. No distress.   HENT:   Head: Normocephalic and atraumatic.   Right Ear: External ear normal.   Left Ear: External ear normal.   Nose: Nose normal.   Mouth/Throat: Oropharynx is clear and moist. No oropharyngeal exudate.   Eyes: Conjunctivae and EOM are normal. Pupils are equal, round, and reactive to light. Right eye  exhibits no discharge. Left eye exhibits no discharge. No scleral icterus.   Neck: Normal range of motion. Neck supple. No JVD present. No tracheal deviation present. No thyromegaly present.   Cardiovascular: Normal rate, regular rhythm and intact distal pulses.  Exam reveals no gallop and no friction rub.    Murmur heard.  Pulmonary/Chest: Effort normal and breath sounds normal. No stridor. No respiratory distress. She has no wheezes. She has no rales. She exhibits no tenderness.   Abdominal: Soft. Bowel sounds are normal. She exhibits no distension and no mass. There is no tenderness. There is no rebound and no guarding.   Musculoskeletal: Normal range of motion. She exhibits no edema or tenderness.   Lymphadenopathy:     She has no cervical adenopathy.   Neurological: She is alert and oriented to person, place, and time. She displays normal reflexes. No cranial nerve deficit. She exhibits normal muscle tone. Coordination normal.   Skin: Skin is warm. No rash noted. She is not diaphoretic. No erythema. No pallor.   Left upper chest port.    Left arm midline   Psychiatric: She has a normal mood and affect. Her behavior is normal. Judgment and thought content normal.   Nursing note and vitals reviewed.      Significant Labs:   Blood Culture:   Recent Labs  Lab 10/04/17  2224   LABBLOO No Growth to date  No Growth to date     CBC:   Recent Labs  Lab 10/04/17  1923 10/05/17  0457   WBC 3.95 2.32*   HGB 11.7* 10.7*   HCT 36.0* 34.1*    191     CMP:   Recent Labs  Lab 10/04/17  1923 10/05/17  0457   * 133*   K 4.7 3.4*    108   CO2 17* 18*   GLU 99 92   BUN 20 18   CREATININE 1.0 0.9   CALCIUM 7.9* 7.6*   PROT 6.8 6.0   ALBUMIN 3.4* 3.1*   BILITOT 0.9 0.5   ALKPHOS 41* 39*   AST 31 16   ALT 12 11   ANIONGAP 8 7*   EGFRNONAA 56.8* >60.0       Significant Imaging: I have reviewed all pertinent imaging results/findings within the past 24 hours.

## 2017-10-05 NOTE — PROGRESS NOTES
Ms. Smith is a 72 y.o. Lady with a history of PAH, bacteremia s/p line change, who recent had RHC showing persistent severely elevated right sided filling pressures.  She presents today with diarrhea and nausea since the morning and low grade temperature this afternoon.  Remodulin recently increased a week ago and adempas stopped.  Also with worsening hypoxia this evening.    -DDx includes remodulin effect vs. Infectious diarrhea vs. Bacteremia.  -Admit to HTS  -Blood and stool work up  -Discuss with Dr. Gusman for remodulin dosing    Alessandro James MD

## 2017-10-05 NOTE — ASSESSMENT & PLAN NOTE
DDx: Effect of remodulin vs. Infectious etiology (viral vs. Bacterial)   Will order Cdiff and stool studies   WBC normal.   No recent exposure to ABX

## 2017-10-05 NOTE — ASSESSMENT & PLAN NOTE
-DDx: Effect of remodulin vs. Infectious etiology (viral vs. Bacterial)   - Cdiff and stool studies pending  - No recent exposure to ABX

## 2017-10-05 NOTE — PLAN OF CARE
RN reported patient is hypotensive 83/46, temp 100.4, and reports increased lethargy.     Notified CARRINGTON Diaz.  Will + IV ABXs, switch remodulin to PIV and obtain Cx from [facility] after RN aspirates remodulin from line.

## 2017-10-05 NOTE — PROGRESS NOTES
Admit Note     Met with  to assess needs due to pt sleeping. Patient is a 72 y.o.  female, admitted for Acute respiratory insufficiency [R06.89], Pain [R52], Hypoxia [R09.02], Pulmonary hypertension [I27.20], Diarrhea, unspecified type [R19.7], Acute respiratory insufficiency [R06.89], and fever, per medical record.    Patient admitted from home on 10/4/2017.  At this time, patient presents as asleep.  At this time, patients caregiver presents as alert and oriented x 4, pleasant and asking and answering questions appropriately.    Household/Family Systems     Patient resides with patient's , Rufus Smith at:    [address] [address]     Pt phone: [phone]  Rufus (): [phone]  May (daughter): [phone]    Support system includes  Rufus, four adult children, sister, and sister in laws.  Pt's daughter May lives near pt.  Other children live out of state, and pt and  report they are very supprotive.  Patient does not have dependents that are need of being cared for.     Patients primary caregiver is Rufus Smith, patients , phone number [phone].  Patient's  is an ER doctor in [place] and still works several shifts per month.    During admission, patient's caregiver is not sure if he will be staying in pt's room. Pt's  reports he is scheduled to work tomorrow, and is not sure if he will be able to find someone to cover his shift. Pt's  reports pt will have family with her during the entire admission.  Confirmed patient and patients caregivers do have access to reliable transportation.    Cognitive Status/Learning     Patient reports reading ability as college and states patient does not have difficulty with reading, writing, comprehension and memory.  Patient's  reports pt has some trouble with hearing, and denies having hearing aids.  Patient wears glasses.   Needed: No.   Highest  education level: Associate/Bachelor Degree    Vocation/Disability     Working for Income: No  If no, reason not working: Disability  Patient is disabled due to pulmonary hypertension since .  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    Adherence     Patient's  reports pt has a high level of adherence to patients health care regimen.  Adherence counseling and education provided.  Patient's caregiver verbalizes understanding.    Substance Use    Patient's  reports the following substance usage.    Tobacco: none, patient denies any use.  Alcohol: none, patient denies any use.  Illicit Drugs/Non-prescribed Medications: none, patient denies any use.  Patient states clear understanding of the potential impact of substance use.  Substance abstinence/cessation counseling, education and resources provided and reviewed.     Services Utilizing/ADLS    Infusion Service: Prior to admission, patient utilizing? yes, pt has home IV Remodulin with [facility]  Home Health: Prior to admission, patient utilizing? no  DME: Prior to admission, yes. Pt has a home O2 concentrator, and 2 portable concentrators.   Pulmonary/Cardiac Rehab: Prior to admission, Patient went to pulmonary rehab in the past. Patient reports using [facility] in [facility] in the past.  Dialysis:  Prior to admission, no  Transplant Specialty Pharmacy:  Prior to admission, no.    Prior to admission, patient reports patient was independent with ADLS and was driving.  Patient reports patient is able to care for self at this time.  Patient indicates a willingness to care for self once medically cleared to do so.    Insurance/Medications    Insured by   Payor/Plan Subscr  Sex Relation Sub. Ins. ID Effective Group Num   1. MEDICARE - [place]* MARBELLA QUINONES 1945 Female  [id number]                                    [address]   2. STANDARD LIFE* MARBELLA QUINONES  "1945 Female  [id number] 2/[birth year]/02                                    [address] [address]        Primary Insurance (for UNOS reporting): Public Insurance - Medicare FFS (Fee For Service)  Secondary Insurance (for UNOS reporting): Private Insurance    Patient reports patient is able to obtain and afford medications at this time and at time of discharge.    Living Will/Healthcare Power of     Patient's  states patient does not have a LW and/or HCPA.   provided education regarding LW and HCPA and the completion of forms.    Coping/Mental Health    Patient is coping well with the aid of  family members, friends, maren and engaging in hobbies.  Patient's  reports denies mental health difficulties.  reports pt is "strong" and has never struggled with coping with diagnosis.    Discharge Planning    At time of discharge, patient plans to return to patient's home under the care of self and .  Patients  will transport patient.  Per rounds today, expected discharge date has not been medically determined at this time. Patient and patients caretaker verbalize understanding and are involved in treatment planning and discharge process.    Additional Concerns    Patient's caretaker denies additional needs and/or concerns at this time. Patient is being followed for needs, education, resources, information, emotional support, supportive counseling, and for supportive and skilled discharge plan of care.  providing ongoing psychosocial support, education, resources and d/c planning as needed.  SW remains available. Patient's caregiver verbalizes understanding and agreement with information reviewed,  availability and how to access available resources as needed.  "

## 2017-10-05 NOTE — ASSESSMENT & PLAN NOTE
-Low grade temps overnight.  -Cultures pending.   -CXR without signs of pneumonia. Will get CTA chest today

## 2017-10-05 NOTE — CONSULTS
Single lumen 18g x 8cm midline placed to left brachial vein. Max dwell date 11/3/17, Lot# [id number].  Needle advanced into the vessel under real time ultrasound guidance.  Image recorded and saved.

## 2017-10-05 NOTE — H&P
[facility]  Heart Transplant  H&P    Patient Name: Molly Smith  MRN: [id number]  Admission Date: 10/4/2017  Attending Physician: Katia Gusman MD  Primary Care Provider: Alice Tim MD  Principal Problem:Pulmonary hypertension    Subjective:     History of Present Illness:  Mrs. Molly Smith is a 71 yo female with a PMHx of PAH (currently on remodulin IV and bosentan), PFO, chronic hypoxia on 3L at baseline and previous bacteremia (rosemonas) s/p line exchange who presents to the ED with worsening fatigue, diarrhea since the morning of 10/4/17, nausea and low grade temperature at home (100.4 being the highest). Also had a single event of hypoxia (SpO2 50s) that resolved without intervention.  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 Her home remodulin was recently increased a week ago and adempas was stopped after her recent RHC with Dr. Tim showed persistently elevated right sided pressures.    Past Medical History:   Diagnosis Date    Allergy     Anticoagulant long-term use     Arthritis     Heart murmur     Pneumonia     Pulmonary hypertension      Past Surgical History:   Procedure Laterality Date    BACK SURGERY       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    Multaq [dronedarone]      Current Facility-Administered Medications   Medication    acetaminophen tablet 650 mg    atorvastatin tablet 10 mg    bosentan tablet 125 mg    cetirizine tablet 10 mg    diltiaZEM tablet 60 mg    loperamide capsule 2 mg    ondansetron injection 4 mg    oxycodone-acetaminophen 5-325 mg per tablet 1 tablet    tramadol tablet 50 mg    treprostinil 5 mg/mL vial    VELETRI/REMODULIN CASSETTE    VELETRI/REMODULIN TUBING     Family History     Problem  Relation (Age of Onset)    Arthritis Mother, Father    Diabetes Father    Heart disease Father    Hypertension Father        Social History Main Topics    Smoking status: Never Smoker    Smokeless tobacco: Not on file    Alcohol use 0.6 oz/week     1 Glasses of wine per week      Comment: rarely    Drug use: No    Sexual activity: Not on file     Review of Systems   Constitutional: Positive for fatigue and fever.   HENT: Negative.    Eyes: Negative.    Respiratory: Negative.    Cardiovascular: Negative.    Gastrointestinal: Positive for diarrhea.   Endocrine: Negative.    Genitourinary: Negative.    Musculoskeletal: Negative.    Skin: Negative.    Allergic/Immunologic: Negative.    Neurological: Negative.    Hematological: Negative.    Psychiatric/Behavioral: Negative.      Objective:     Vital Signs (Most Recent):  Temp: 99.7 °F (37.6 °C) (10/04/17 1850)  Pulse: 77 (10/04/17 2334)  Resp: 16 (10/04/17 2334)  BP: (!) 83/50 (10/04/17 2334)  SpO2: (!) 85 % (10/04/17 2334) Vital Signs (24h Range):  Temp:  [99.7 °F (37.6 °C)] 99.7 °F (37.6 °C)  Pulse:  [77-90] 77  Resp:  [16-24] 16  SpO2:  [78 %-86 %] 85 %  BP: (83-92)/(50-55) 83/50     Patient Vitals for the past 72 hrs (Last 3 readings):   Weight   10/04/17 2355 56.7 kg (125 lb)   10/04/17 1850 56.7 kg (125 lb)     Body mass index is 21.8 kg/m².    No intake or output data in the 24 hours ending 10/05/17 0058    Physical Exam   Constitutional: She is oriented to person, place, and time. She appears well-developed and well-nourished.   HENT:   Head: Normocephalic and atraumatic.   Eyes: Pupils are equal, round, and reactive to light. No scleral icterus.   Neck: Neck supple. JVD present.   Cardiovascular: Normal rate and regular rhythm.    No murmur heard.  Dunbar site appears C/D/I   Pulmonary/Chest: Effort normal. No respiratory distress.   Abdominal: Soft. She exhibits no distension. There is no tenderness.   Musculoskeletal: Normal range of motion. She exhibits  no edema.   Neurological: She is oriented to person, place, and time.   Skin: Skin is warm and dry.   Psychiatric: She has a normal mood and affect. Her behavior is normal. Judgment and thought content normal.       Significant Labs:  CBC:    Recent Labs  Lab 10/04/17  1923   WBC 3.95   RBC 4.51   HGB 11.7*   HCT 36.0*      MCV 80*   MCH 25.9*   MCHC 32.5     BNP:    Recent Labs  Lab 10/04/17  1923   *     CMP:    Recent Labs  Lab 10/04/17  1923   GLU 99   CALCIUM 7.9*   ALBUMIN 3.4*   PROT 6.8   *   K 4.7   CO2 17*      BUN 20   CREATININE 1.0   ALKPHOS 41*   ALT 12   AST 31   BILITOT 0.9      Coagulation:     Recent Labs  Lab 10/04/17  1923   INR 1.4*   APTT 24.7     Microbiology:  Microbiology Results (last 7 days)     Procedure Component Value Units Date/Time    Blood culture [[id number]] Collected:  10/04/17 2224    Order Status:  Sent Specimen:  Blood from Peripheral, Antecubital, Right Updated:  10/04/17 2259    Blood culture [[id number]] Collected:  10/04/17 2224    Order Status:  Sent Specimen:  Blood from Peripheral, Hand, Right Updated:  10/04/17 2258    Clostridium difficile EIA [[id number]]     Order Status:  No result Specimen:  Stool from Stool     Stool culture [[id number]]     Order Status:  No result Specimen:  Stool from Stool     Stool culture [[id number]]     Order Status:  No result Specimen:  Stool from Stool         I have reviewed all pertinent labs within the past 24 hours.    Diagnostic Results:    RHC (9/25/17)  RA: 5/2 (1)  RV: 84/-5  RVEDP: 6     PW: 17/19 (16)  PA: 87/21 (47)  AO_SAT: 95  AO: 115/71 (86)  PA_SAT: 71  SPO2: 97  FICKCI: 3.2800  FICKCO: 5.5500    Last TTE (05/08/17)   1 - Right ventricular enlargement with hypertrophy, with severely depressed systolic function.     2 - Moderate to severe tricuspid regurgitation.     3 - Pulmonary hypertension. The estimated PA systolic pressure is 116 mmHg.     4 - Biatrial enlargement.     5 - Normal left ventricular  systolic function (EF 60-65%); reduced LV stroke volume.    Assessment/Plan:     * Pulmonary hypertension    Continue home doses of remodulin (recently increased to 75 ng/kg/min and bosentan (125mg TID)   Remodulin doses verified by the cardiology fellow.   Will plan to adjust / titrate PAH medications this admission   Continue coumadin with goal INR 1.5-2.5 (per ACCF/AHA expert consensus)  Will need to dose coumadin daily (10mg given x1) INR 14  Continue oxygen supplementation with plan to wean to 3L (baseline requirement)   Discussed goals of care. If she were to have a respiratory arrest they would want intubation and aggressive care.   Continue lasix 40mg BID         Diarrhea    DDx: Effect of remodulin vs. Infectious etiology (viral vs. Bacterial)   Will order Cdiff and stool studies   WBC normal.   No recent exposure to ABX         Fever    Afebrile here but 100.2 at home per    No recent sequale of infection (URI/UTI/cellulitis type symptoms)   Will order blood cultures (including culture from [facility]) given previous bacteremia   WBC normal   Hold anti-infectives for now pending workup   CXR without signs of pneumonia           SVT / Aflutter  Continue PTA diltiazem     Discussed Case with Dr. Ruiz who is on call CHF fellow. Patient will be seen by Dr. Gusman in the AM.     Niall garcia,   Heart Transplant  [facility]

## 2017-10-05 NOTE — SUBJECTIVE & OBJECTIVE
Interval History: Feeling a little bit better this am. Still on 100% FiO2 HFNC. Low grade temps overnight.     Continuous Infusions:   treprostinil 5 mg/mL Stopped (10/04/17 2315)    veletri/remodulin cassette      veletri/remodulin tubing       Scheduled Meds:   atorvastatin  10 mg Oral Daily    bosentan  125 mg Oral TID    cetirizine  10 mg Oral Daily    diltiaZEM  120 mg Oral Daily     PRN Meds:acetaminophen, loperamide, ondansetron    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    Multaq [dronedarone]      Objective:     Vital Signs (Most Recent):  Temp: 99.8 °F (37.7 °C) (10/05/17 0800)  Pulse: 80 (10/05/17 0800)  Resp: 18 (10/05/17 0800)  BP: (!) 89/52 (10/05/17 0800)  SpO2: (!) 83 % (10/05/17 0926) Vital Signs (24h Range):  Temp:  [98.7 °F (37.1 °C)-99.8 °F (37.7 °C)] 99.8 °F (37.7 °C)  Pulse:  [70-90] 80  Resp:  [16-24] 18  SpO2:  [65 %-91 %] 83 %  BP: (81-94)/(48-83) 89/52     Patient Vitals for the past 72 hrs (Last 3 readings):   Weight   10/04/17 2355 56.7 kg (125 lb)   10/04/17 2334 56.7 kg (125 lb)   10/04/17 1850 56.7 kg (125 lb)     Body mass index is 21.8 kg/m².    No intake or output data in the 24 hours ending 10/05/17 1017         Physical Exam   Constitutional: She is oriented to person, place, and time. She appears well-developed and well-nourished.   HENT:   Head: Normocephalic.   Eyes: Pupils are equal, round, and reactive to light.   Neck: Normal range of motion. Neck supple.   Cardiovascular: Normal rate and regular rhythm.    Pulmonary/Chest: Effort normal and breath sounds normal.   Decreased BLL   Abdominal: Soft. Bowel sounds are normal.   Musculoskeletal: Normal range of motion.   Neurological: She is alert and oriented to person, place, and time.   Skin: Skin is warm and dry.   Psychiatric: She has a normal mood and affect. Her behavior is normal.   Nursing note and vitals reviewed.      Significant Labs:  CBC:    Recent  Labs  Lab 10/04/17  1923 10/05/17  0457   WBC 3.95 2.32*   RBC 4.51 4.14   HGB 11.7* 10.7*   HCT 36.0* 34.1*    191   MCV 80* 82   MCH 25.9* 25.8*   MCHC 32.5 31.4*     BNP:    Recent Labs  Lab 10/04/17  1923   *     CMP:    Recent Labs  Lab 10/04/17  1923 10/05/17  0457   GLU 99 92   CALCIUM 7.9* 7.6*   ALBUMIN 3.4* 3.1*   PROT 6.8 6.0   * 133*   K 4.7 3.4*   CO2 17* 18*    108   BUN 20 18   CREATININE 1.0 0.9   ALKPHOS 41* 39*   ALT 12 11   AST 31 16   BILITOT 0.9 0.5      Coagulation:     Recent Labs  Lab 10/04/17  1923 10/05/17  0457   INR 1.4* 1.5*   APTT 24.7  --      LDH:  No results for input(s): LDH in the last 72 hours.  Microbiology:  Microbiology Results (last 7 days)     Procedure Component Value Units Date/Time    Blood culture [[id number]] Collected:  10/04/17 2224    Order Status:  Completed Specimen:  Blood from Peripheral, Hand, Right Updated:  10/05/17 0545     Blood Culture, Routine No Growth to date    Blood culture [[id number]] Collected:  10/04/17 2224    Order Status:  Completed Specimen:  Blood from Peripheral, Antecubital, Right Updated:  10/05/17 0545     Blood Culture, Routine No Growth to date    Blood culture [[id number]]     Order Status:  Canceled Specimen:  Blood     Clostridium difficile EIA [[id number]]     Order Status:  No result Specimen:  Stool from Stool     Stool culture [[id number]]     Order Status:  No result Specimen:  Stool from Stool     Stool culture [[id number]]     Order Status:  No result Specimen:  Stool from Stool           I have reviewed all pertinent labs within the past 24 hours.    Estimated Creatinine Clearance: 48.5 mL/min (based on SCr of 0.9 mg/dL).    Diagnostic Results:  I have reviewed all pertinent imaging results/findings within the past 24 hours.

## 2017-10-05 NOTE — SUBJECTIVE & OBJECTIVE
Past Medical History:   Diagnosis Date    Allergy     Anticoagulant long-term use     Arthritis     Heart murmur     Pneumonia     Pulmonary hypertension      Past Surgical History:   Procedure Laterality Date    BACK SURGERY       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    Multaq [dronedarone]      Current Facility-Administered Medications   Medication    acetaminophen tablet 650 mg    atorvastatin tablet 10 mg    bosentan tablet 125 mg    cetirizine tablet 10 mg    diltiaZEM tablet 60 mg    loperamide capsule 2 mg    ondansetron injection 4 mg    oxycodone-acetaminophen 5-325 mg per tablet 1 tablet    tramadol tablet 50 mg    treprostinil 5 mg/mL vial    VELETRI/REMODULIN CASSETTE    VELETRI/REMODULIN TUBING     Family History     Problem Relation (Age of Onset)    Arthritis Mother, Father    Diabetes Father    Heart disease Father    Hypertension Father        Social History Main Topics    Smoking status: Never Smoker    Smokeless tobacco: Not on file    Alcohol use 0.6 oz/week     1 Glasses of wine per week      Comment: rarely    Drug use: No    Sexual activity: Not on file     Review of Systems   Constitutional: Positive for fatigue and fever.   HENT: Negative.    Eyes: Negative.    Respiratory: Negative.    Cardiovascular: Negative.    Gastrointestinal: Positive for diarrhea.   Endocrine: Negative.    Genitourinary: Negative.    Musculoskeletal: Negative.    Skin: Negative.    Allergic/Immunologic: Negative.    Neurological: Negative.    Hematological: Negative.    Psychiatric/Behavioral: Negative.      Objective:     Vital Signs (Most Recent):  Temp: 99.7 °F (37.6 °C) (10/04/17 1850)  Pulse: 77 (10/04/17 2334)  Resp: 16 (10/04/17 2334)  BP: (!) 83/50 (10/04/17 2334)  SpO2: (!) 85 % (10/04/17 2334) Vital Signs (24h Range):  Temp:  [99.7 °F (37.6 °C)] 99.7 °F (37.6 °C)  Pulse:  [77-90] 77  Resp:  [16-24] 16  SpO2:  [78 %-86 %]  85 %  BP: (83-92)/(50-55) 83/50     Patient Vitals for the past 72 hrs (Last 3 readings):   Weight   10/04/17 2355 56.7 kg (125 lb)   10/04/17 1850 56.7 kg (125 lb)     Body mass index is 21.8 kg/m².    No intake or output data in the 24 hours ending 10/05/17 0058    Physical Exam   Constitutional: She is oriented to person, place, and time. She appears well-developed and well-nourished.   HENT:   Head: Normocephalic and atraumatic.   Eyes: Pupils are equal, round, and reactive to light. No scleral icterus.   Neck: Neck supple. JVD present.   Cardiovascular: Normal rate and regular rhythm.    No murmur heard.  Dunbar site appears C/D/I   Pulmonary/Chest: Effort normal. No respiratory distress.   Abdominal: Soft. She exhibits no distension. There is no tenderness.   Musculoskeletal: Normal range of motion. She exhibits no edema.   Neurological: She is oriented to person, place, and time.   Skin: Skin is warm and dry.   Psychiatric: She has a normal mood and affect. Her behavior is normal. Judgment and thought content normal.       Significant Labs:  CBC:    Recent Labs  Lab 10/04/17  1923   WBC 3.95   RBC 4.51   HGB 11.7*   HCT 36.0*      MCV 80*   MCH 25.9*   MCHC 32.5     BNP:    Recent Labs  Lab 10/04/17  1923   *     CMP:    Recent Labs  Lab 10/04/17  1923   GLU 99   CALCIUM 7.9*   ALBUMIN 3.4*   PROT 6.8   *   K 4.7   CO2 17*      BUN 20   CREATININE 1.0   ALKPHOS 41*   ALT 12   AST 31   BILITOT 0.9      Coagulation:     Recent Labs  Lab 10/04/17  1923   INR 1.4*   APTT 24.7     Microbiology:  Microbiology Results (last 7 days)     Procedure Component Value Units Date/Time    Blood culture [[id number]] Collected:  10/04/17 2224    Order Status:  Sent Specimen:  Blood from Peripheral, Antecubital, Right Updated:  10/04/17 2259    Blood culture [[id number]] Collected:  10/04/17 2224    Order Status:  Sent Specimen:  Blood from Peripheral, Hand, Right Updated:  10/04/17 2258    Clostridium  difficile EIA [[id number]]     Order Status:  No result Specimen:  Stool from Stool     Stool culture [[id number]]     Order Status:  No result Specimen:  Stool from Stool     Stool culture [[id number]]     Order Status:  No result Specimen:  Stool from Stool         I have reviewed all pertinent labs within the past 24 hours.    Diagnostic Results:    RHC (9/25/17)  RA: 5/2 (1)  RV: 84/-5  RVEDP: 6     PW: 17/19 (16)  PA: 87/21 (47)  AO_SAT: 95  AO: 115/71 (86)  PA_SAT: 71  SPO2: 97  FICKCI: 3.2800  FICKCO: 5.5500    Last TTE (05/08/17)   1 - Right ventricular enlargement with hypertrophy, with severely depressed systolic function.     2 - Moderate to severe tricuspid regurgitation.     3 - Pulmonary hypertension. The estimated PA systolic pressure is 116 mmHg.     4 - Biatrial enlargement.     5 - Normal left ventricular systolic function (EF 60-65%); reduced LV stroke volume.

## 2017-10-05 NOTE — ASSESSMENT & PLAN NOTE
Suspect that this is viral gastroenteritis.  Bacterial gastroenteritis is also possible.  Most bacterial causes of gastroenteritis are self limited as well.  Recommend supportive care with hydration.  No need for empiric antibiotics for now.

## 2017-10-05 NOTE — PLAN OF CARE
-Pt AAOx4, VSS- BP remains lower SBP 85-91, SpO2 84% on comfort flow 15 L- and 100% FiO2,ABG's revealed low PO2 levels- see flow sheet for results, Tmax 99.7, Tele- NSR  - Pt admitted from ER last night for low grade temp, weakness, and diarrhea- c diff cx waiting to be collected, placed on special contact precautions in meantime  - Has Remodulin continuous to L subclavian catheter running at 37 ml/day- latest  and troponin 0.060 cassette changed per pt at 5pm 10/4/17 before admission.   - Pt also has 22 G in the R hand saline locked  - Pt bed in lowest position, up to BSC,  will remain at the bedside, will continue to monitor.

## 2017-10-05 NOTE — HPI
Ms. Smith is a 73 y/o female with a history of pulmonary hypertension for which she is on chronic home IV remodulin.  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  This was successfully treated with line removal and appropriate antibiotics.  She was in her usual state of health until recently.  She travelled to [place], [place] to visit their children and grandchildren.  The spent about 6 days there.  They ate out frequently while they were there.  She walking with her daughter and their dog.  She reports that she noticed some increased shortness of breath while she was in [place].  About 2 days ago while they were driving back from [place], diarrhea, low grad fevers, nausea.  Yesterday, she noticed that her breathing was significantly worse.  She decided to to come into the hospital to be evaluated.

## 2017-10-05 NOTE — PROGRESS NOTES
[facility]  Heart Transplant  Progress Note    Patient Name: Molly Smith  MRN: [id number]  Admission Date: 10/4/2017  Hospital Length of Stay: 0 days  Attending Physician: Katia Gusman MD  Primary Care Provider: Alice Tim MD  Principal Problem:Pulmonary hypertension    Subjective:     Interval History: Feeling a little bit better this am. Still on 100% FiO2 HFNC. Low grade temps overnight.     Continuous Infusions:   treprostinil 5 mg/mL Stopped (10/04/17 2315)    veletri/remodulin cassette      veletri/remodulin tubing       Scheduled Meds:   atorvastatin  10 mg Oral Daily    bosentan  125 mg Oral TID    cetirizine  10 mg Oral Daily    diltiaZEM  120 mg Oral Daily     PRN Meds:acetaminophen, loperamide, ondansetron    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    Multaq [dronedarone]      Objective:     Vital Signs (Most Recent):  Temp: 99.8 °F (37.7 °C) (10/05/17 0800)  Pulse: 80 (10/05/17 0800)  Resp: 18 (10/05/17 0800)  BP: (!) 89/52 (10/05/17 0800)  SpO2: (!) 83 % (10/05/17 0926) Vital Signs (24h Range):  Temp:  [98.7 °F (37.1 °C)-99.8 °F (37.7 °C)] 99.8 °F (37.7 °C)  Pulse:  [70-90] 80  Resp:  [16-24] 18  SpO2:  [65 %-91 %] 83 %  BP: (81-94)/(48-83) 89/52     Patient Vitals for the past 72 hrs (Last 3 readings):   Weight   10/04/17 2355 56.7 kg (125 lb)   10/04/17 2334 56.7 kg (125 lb)   10/04/17 1850 56.7 kg (125 lb)     Body mass index is 21.8 kg/m².    No intake or output data in the 24 hours ending 10/05/17 1017         Physical Exam   Constitutional: She is oriented to person, place, and time. She appears well-developed and well-nourished.   HENT:   Head: Normocephalic.   Eyes: Pupils are equal, round, and reactive to light.   Neck: Normal range of motion. Neck supple.   Cardiovascular: Normal rate and regular rhythm.    Pulmonary/Chest: Effort normal and breath sounds normal.   Decreased BLL   Abdominal: Soft.  Bowel sounds are normal.   Musculoskeletal: Normal range of motion.   Neurological: She is alert and oriented to person, place, and time.   Skin: Skin is warm and dry.   Psychiatric: She has a normal mood and affect. Her behavior is normal.   Nursing note and vitals reviewed.      Significant Labs:  CBC:    Recent Labs  Lab 10/04/17  1923 10/05/17  0457   WBC 3.95 2.32*   RBC 4.51 4.14   HGB 11.7* 10.7*   HCT 36.0* 34.1*    191   MCV 80* 82   MCH 25.9* 25.8*   MCHC 32.5 31.4*     BNP:    Recent Labs  Lab 10/04/17  1923   *     CMP:    Recent Labs  Lab 10/04/17  1923 10/05/17  0457   GLU 99 92   CALCIUM 7.9* 7.6*   ALBUMIN 3.4* 3.1*   PROT 6.8 6.0   * 133*   K 4.7 3.4*   CO2 17* 18*    108   BUN 20 18   CREATININE 1.0 0.9   ALKPHOS 41* 39*   ALT 12 11   AST 31 16   BILITOT 0.9 0.5      Coagulation:     Recent Labs  Lab 10/04/17  1923 10/05/17  0457   INR 1.4* 1.5*   APTT 24.7  --      LDH:  No results for input(s): LDH in the last 72 hours.  Microbiology:  Microbiology Results (last 7 days)     Procedure Component Value Units Date/Time    Blood culture [[id number]] Collected:  10/04/17 2224    Order Status:  Completed Specimen:  Blood from Peripheral, Hand, Right Updated:  10/05/17 0545     Blood Culture, Routine No Growth to date    Blood culture [[id number]] Collected:  10/04/17 2224    Order Status:  Completed Specimen:  Blood from Peripheral, Antecubital, Right Updated:  10/05/17 0545     Blood Culture, Routine No Growth to date    Blood culture [[id number]]     Order Status:  Canceled Specimen:  Blood     Clostridium difficile EIA [[id number]]     Order Status:  No result Specimen:  Stool from Stool     Stool culture [[id number]]     Order Status:  No result Specimen:  Stool from Stool     Stool culture [[id number]]     Order Status:  No result Specimen:  Stool from Stool           I have reviewed all pertinent labs within the past 24 hours.    Estimated Creatinine Clearance: 48.5 mL/min  (based on SCr of 0.9 mg/dL).    Diagnostic Results:  I have reviewed all pertinent imaging results/findings within the past 24 hours.    Assessment and Plan:     Mrs. Molly Smith is a 71 yo female with a PMHx of PAH (currently on remodulin IV and bosentan), PFO, chronic hypoxia on 3L at baseline and previous bacteremia (rosemonas) s/p line exchange who presents to the ED with worsening fatigue, diarrhea since the morning of 10/4/17, nausea and low grade temperature at home (100.4 being the highest). Also had a single event of hypoxia (SpO2 50s) that resolved without intervention.  is an ED physician and states they were both pretty worn out after spending last week with their grand kids in [place] and most of her symptoms manifested around the time of their trip home on Tuesday. Her home remodulin was recently increased a week ago and adempas was stopped after her recent RHC.    * Pulmonary hypertension    -Continue home doses of remodulin (recently increased to 75 ng/kg/min and bosentan (125mg TID)   -Continue coumadin with goal INR 1.5-2.5.  -Continue oxygen supplementation with plan to wean to 3L (baseline requirement)   -Discussed goals of care. If she were to have a respiratory arrest they would want intubation and aggressive care.   -Continue lasix 40mg BID.          Acute on chronic respiratory failure with hypoxia    -Continue PH treatment as above.  - CTA as above.  - Wean HFNC as tolerated.         Diarrhea    -DDx: Effect of remodulin vs. Infectious etiology (viral vs. Bacterial)   - Cdiff and stool studies pending  - No recent exposure to ABX         Fever    -Low grade temps overnight.  -Cultures pending.   -CXR without signs of pneumonia. Will get CTA chest today            Joe Arellano, NP  Heart Transplant  [facility]

## 2017-10-05 NOTE — ASSESSMENT & PLAN NOTE
Afebrile here but 100.2 at home per    No recent sequale of infection (URI/UTI/cellulitis type symptoms)   Will order blood cultures (including culture from [facility]) given previous bacteremia   WBC normal   Hold anti-infectives for now pending workup   CXR without signs of pneumonia

## 2017-10-05 NOTE — CONSULTS
[facility]  Infectious Disease  Consult Note    Patient Name: Molly Smith  MRN: [id number]  Admission Date: 10/4/2017  Hospital Length of Stay: 0 days  Attending Physician: Katia Gusman MD  Primary Care Provider: Alice Tim MD     Isolation Status: Special Contact    Patient information was obtained from patient, spouse/SO, past medical records and ER records.      Inpatient consult to Infectious Diseases  Consult performed by: GENNA THORPE.  Consult ordered by: CT TANG        Assessment/Plan:     Fever    73 y/o female with a history of pulmonary arterial hypertension for which she is on IV remodulin chronically at home.  She presented with fevers, nausea, diarrhea and worsening shortness of breath.  Her work up has included a chest x-ray that has been stable,  Blood cultures that are pending.  She has not had any diarrhea today.  Differential includes viral gastroenteritis.  Other possibilities include viral respiratory infection and a line infection.  Recs as follows    -Patient is stable.  No antibiotics for now pending work up.  Discontinue cipro.  -Agree with blood cultures to look for any evidence of line infection.  -Agree with stool cultures and C diff to rule out bacterial causes for her diarrhea.    We will continue to follow        Diarrhea    Suspect that this is viral gastroenteritis.  Bacterial gastroenteritis is also possible.  Most bacterial causes of gastroenteritis are self limited as well.  Recommend supportive care with hydration.  No need for empiric antibiotics for now.            Thank you for your consult. I will follow-up with patient. Please contact us if you have any additional questions.    Genna Thorpe MD  Infectious Disease  [facility]    Subjective:     Principal Problem: Pulmonary hypertension    HPI: Ms. Smith is a 73 y/o female with a history of pulmonary hypertension for which she is on chronic home IV remodulin.   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  This was successfully treated with line removal and appropriate antibiotics.  She was in her usual state of health until recently.  She travelled to [place], [place] to visit their children and grandchildren.  The spent about 6 days there.  They ate out frequently while they were there.  She walking with her daughter and their dog.  She reports that she noticed some increased shortness of breath while she was in [place].  About 2 days ago while they were driving back from [place], diarrhea, low grad fevers, nausea.  Yesterday, she noticed that her breathing was significantly worse.  She decided to to come into the hospital to be evaluated.     Past Medical History:   Diagnosis Date    Allergy     Anticoagulant long-term use     Arthritis     Heart murmur     Pneumonia     Pulmonary hypertension        Past Surgical History:   Procedure Laterality Date    BACK SURGERY         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    Multaq [dronedarone]        Medications:  Prescriptions Prior to Admission   Medication Sig    atorvastatin (LIPITOR) 10 MG tablet Take 10 mg by mouth once daily.      bosentan (TRACLEER) 125 MG Tab Take 1 tablet (125 mg total) by mouth 3 (three) times daily.    CALCIUM CARBONATE (CALCIUM 600 ORAL) Take 2 tablets by mouth once daily.      diltiaZEM (CARDIZEM) 60 MG tablet Take 1 tablet (60 mg total) by mouth once daily.    diltiazem (DILACOR XR) 120 MG 24 hr capsule Take 120 mg by mouth once daily.      furosemide (LASIX) 40 MG tablet Take 1 tablet (40 mg total) by mouth 2 (two) times daily.    Lactobacillus rhamnosus GG (CULTURELLE) 10 billion cell capsule Take 1 capsule by mouth once daily.    loratadine (CLARITIN) 10 mg tablet Take 10 mg by mouth once daily.    promethazine (PHENERGAN) 6.25 mg/5 mL syrup Take 10 mLs (12.5 mg  total) by mouth every 6 (six) hours as needed for Nausea.    sodium chloride 0.9% 0.9 % SolP 100 mL with treprostinil 1 mg/mL Soln 9,000,000 ng Inject 5,005.5 ng/min into the vein continuous.    spironolactone (ALDACTONE) 25 MG tablet Take 25 mg by mouth once daily.      tramadol (ULTRAM-ER) 200 MG Tb24 Take 1 tablet (200 mg total) by mouth once daily.    TREPROSTINIL SODIUM (TREPROSTINIL, REMODULIN, PUMP FOR HOME USE) Pt currently on 71ng/kg/min=36mL/24 hr, continuous IV infusion    warfarin (COUMADIN) 10 MG tablet Take 1 tablet (10 mg total) by mouth Daily.     Antibiotics     Start     Stop Route Frequency Ordered    10/05/17 1330  ciprofloxacin (CIPRO)400mg/200ml D5W IVPB 400 mg      -- IV Every 12 hours (non-standard times) 10/05/17 1259        Antifungals     None        Antivirals     None             There is no immunization history on file for this patient.    Family History     Problem Relation (Age of Onset)    Arthritis Mother, Father    Diabetes Father    Heart disease Father    Hypertension Father        Social History     Social History    Marital status:      Spouse name: N/A    Number of children: N/A    Years of education: N/A     Social History Main Topics    Smoking status: Never Smoker    Smokeless tobacco: None    Alcohol use 0.6 oz/week     1 Glasses of wine per week      Comment: rarely    Drug use: No    Sexual activity: Not Asked     Other Topics Concern    None     Social History Narrative    None     Review of Systems   Constitutional: Positive for chills.   Respiratory: Positive for shortness of breath.    Gastrointestinal: Positive for diarrhea and nausea.   All other systems reviewed and are negative.    Objective:     Vital Signs (Most Recent):  Temp: (!) 100.4 °F (38 °C) (10/05/17 1214)  Pulse: 76 (10/05/17 1500)  Resp: 18 (10/05/17 1214)  BP: (!) 83/46 (10/05/17 1214)  SpO2: (!) 73 % (pt has stuffy nose and slightly mouth breathing) (10/05/17 1500) Vital Signs  (24h Range):  Temp:  [98.7 °F (37.1 °C)-100.4 °F (38 °C)] 100.4 °F (38 °C)  Pulse:  [70-90] 76  Resp:  [16-24] 18  SpO2:  [65 %-91 %] 73 %  BP: (81-94)/(46-83) 83/46     Weight: 61.7 kg (136 lb)  Body mass index is 23.71 kg/m².    Estimated Creatinine Clearance: 48.5 mL/min (based on SCr of 0.9 mg/dL).    Physical Exam   Constitutional: She is oriented to person, place, and time. She appears well-developed and well-nourished. No distress.   HENT:   Head: Normocephalic and atraumatic.   Right Ear: External ear normal.   Left Ear: External ear normal.   Nose: Nose normal.   Mouth/Throat: Oropharynx is clear and moist. No oropharyngeal exudate.   Eyes: Conjunctivae and EOM are normal. Pupils are equal, round, and reactive to light. Right eye exhibits no discharge. Left eye exhibits no discharge. No scleral icterus.   Neck: Normal range of motion. Neck supple. No JVD present. No tracheal deviation present. No thyromegaly present.   Cardiovascular: Normal rate, regular rhythm and intact distal pulses.  Exam reveals no gallop and no friction rub.    Murmur heard.  Pulmonary/Chest: Effort normal and breath sounds normal. No stridor. No respiratory distress. She has no wheezes. She has no rales. She exhibits no tenderness.   Abdominal: Soft. Bowel sounds are normal. She exhibits no distension and no mass. There is no tenderness. There is no rebound and no guarding.   Musculoskeletal: Normal range of motion. She exhibits no edema or tenderness.   Lymphadenopathy:     She has no cervical adenopathy.   Neurological: She is alert and oriented to person, place, and time. She displays normal reflexes. No cranial nerve deficit. She exhibits normal muscle tone. Coordination normal.   Skin: Skin is warm. No rash noted. She is not diaphoretic. No erythema. No pallor.   Left upper chest port.    Left arm midline   Psychiatric: She has a normal mood and affect. Her behavior is normal. Judgment and thought content normal.   Nursing note  and vitals reviewed.      Significant Labs:   Blood Culture:   Recent Labs  Lab 10/04/17  2224   LABBLOO No Growth to date  No Growth to date     CBC:   Recent Labs  Lab 10/04/17  1923 10/05/17  0457   WBC 3.95 2.32*   HGB 11.7* 10.7*   HCT 36.0* 34.1*    191     CMP:   Recent Labs  Lab 10/04/17  1923 10/05/17  0457   * 133*   K 4.7 3.4*    108   CO2 17* 18*   GLU 99 92   BUN 20 18   CREATININE 1.0 0.9   CALCIUM 7.9* 7.6*   PROT 6.8 6.0   ALBUMIN 3.4* 3.1*   BILITOT 0.9 0.5   ALKPHOS 41* 39*   AST 31 16   ALT 12 11   ANIONGAP 8 7*   EGFRNONAA 56.8* >60.0       Significant Imaging: I have reviewed all pertinent imaging results/findings within the past 24 hours.

## 2017-10-05 NOTE — PROGRESS NOTES
Pt SpO2 65% on 5 L NC, notified MD Rodrigues and Respiratory therapy. Pt placed on non re breather 15 L and sats returned to 85-90%. ABG ordered, PO2 low at 38, MD Rodrigues notified, ordered high flow NC 15 L

## 2017-10-06 PROBLEM — J96.21 ACUTE ON CHRONIC RESPIRATORY FAILURE WITH HYPOXIA: Status: ACTIVE | Noted: 2017-10-06

## 2017-10-06 LAB
ALBUMIN SERPL BCP-MCNC: 2.9 G/DL
ALP SERPL-CCNC: 49 U/L
ALT SERPL W/O P-5'-P-CCNC: 10 U/L
ANION GAP SERPL CALC-SCNC: 7 MMOL/L
AST SERPL-CCNC: 16 U/L
BASOPHILS # BLD AUTO: 0.01 K/UL
BASOPHILS NFR BLD: 0.4 %
BILIRUB SERPL-MCNC: 0.5 MG/DL
BUN SERPL-MCNC: 13 MG/DL
CALCIUM SERPL-MCNC: 7.4 MG/DL
CHLORIDE SERPL-SCNC: 112 MMOL/L
CO2 SERPL-SCNC: 18 MMOL/L
CREAT SERPL-MCNC: 0.8 MG/DL
DIFFERENTIAL METHOD: ABNORMAL
EOSINOPHIL # BLD AUTO: 0 K/UL
EOSINOPHIL NFR BLD: 1.5 %
ERYTHROCYTE [DISTWIDTH] IN BLOOD BY AUTOMATED COUNT: 15.3 %
EST. GFR  (AFRICAN AMERICAN): >60 ML/MIN/1.73 M^2
EST. GFR  (NON AFRICAN AMERICAN): >60 ML/MIN/1.73 M^2
GLUCOSE SERPL-MCNC: 93 MG/DL
HCT VFR BLD AUTO: 33.4 %
HGB BLD-MCNC: 10.8 G/DL
INR PPP: 1.8
LYMPHOCYTES # BLD AUTO: 0.6 K/UL
LYMPHOCYTES NFR BLD: 23.3 %
MAGNESIUM SERPL-MCNC: 2 MG/DL
MCH RBC QN AUTO: 25.8 PG
MCHC RBC AUTO-ENTMCNC: 32.3 G/DL
MCV RBC AUTO: 80 FL
MONOCYTES # BLD AUTO: 0.4 K/UL
MONOCYTES NFR BLD: 14.8 %
NEUTROPHILS # BLD AUTO: 1.6 K/UL
NEUTROPHILS NFR BLD: 59.6 %
PLATELET # BLD AUTO: 155 K/UL
PMV BLD AUTO: 9 FL
POTASSIUM SERPL-SCNC: 3.4 MMOL/L
PROT SERPL-MCNC: 5.7 G/DL
PROTHROMBIN TIME: 17.7 SEC
RBC # BLD AUTO: 4.19 M/UL
SODIUM SERPL-SCNC: 137 MMOL/L
WBC # BLD AUTO: 2.7 K/UL

## 2017-10-06 PROCEDURE — 83735 ASSAY OF MAGNESIUM: CPT

## 2017-10-06 PROCEDURE — 99232 SBSQ HOSP IP/OBS MODERATE 35: CPT | Mod: ,,, | Performed by: INTERNAL MEDICINE

## 2017-10-06 PROCEDURE — 80053 COMPREHEN METABOLIC PANEL: CPT

## 2017-10-06 PROCEDURE — 25000003 PHARM REV CODE 250: Performed by: INTERNAL MEDICINE

## 2017-10-06 PROCEDURE — 85610 PROTHROMBIN TIME: CPT

## 2017-10-06 PROCEDURE — 20600001 HC STEP DOWN PRIVATE ROOM

## 2017-10-06 PROCEDURE — 27000221 HC OXYGEN, UP TO 24 HOURS

## 2017-10-06 PROCEDURE — 85025 COMPLETE CBC W/AUTO DIFF WBC: CPT

## 2017-10-06 PROCEDURE — 25000003 PHARM REV CODE 250: Performed by: NURSE PRACTITIONER

## 2017-10-06 PROCEDURE — 27100171 HC OXYGEN HIGH FLOW UP TO 24 HOURS

## 2017-10-06 PROCEDURE — 63600175 PHARM REV CODE 636 W HCPCS: Performed by: INTERNAL MEDICINE

## 2017-10-06 PROCEDURE — 36415 COLL VENOUS BLD VENIPUNCTURE: CPT

## 2017-10-06 PROCEDURE — 99232 SBSQ HOSP IP/OBS MODERATE 35: CPT | Mod: GC,,, | Performed by: INTERNAL MEDICINE

## 2017-10-06 PROCEDURE — 63600175 PHARM REV CODE 636 W HCPCS: Performed by: NURSE PRACTITIONER

## 2017-10-06 RX ORDER — BENZONATATE 100 MG/1
100 CAPSULE ORAL 3 TIMES DAILY PRN
Status: DISCONTINUED | OUTPATIENT
Start: 2017-10-06 | End: 2017-10-09 | Stop reason: HOSPADM

## 2017-10-06 RX ORDER — POTASSIUM CHLORIDE 20 MEQ/1
40 TABLET, EXTENDED RELEASE ORAL ONCE
Status: COMPLETED | OUTPATIENT
Start: 2017-10-06 | End: 2017-10-06

## 2017-10-06 RX ADMIN — BOSENTAN 125 MG: 125 TABLET, FILM COATED ORAL at 05:10

## 2017-10-06 RX ADMIN — DILTIAZEM HYDROCHLORIDE 120 MG: 120 CAPSULE, COATED, EXTENDED RELEASE ORAL at 08:10

## 2017-10-06 RX ADMIN — CIPROFLOXACIN 400 MG: 2 INJECTION, SOLUTION INTRAVENOUS at 12:10

## 2017-10-06 RX ADMIN — BENZONATATE 100 MG: 100 CAPSULE ORAL at 09:10

## 2017-10-06 RX ADMIN — CETIRIZINE HYDROCHLORIDE 10 MG: 5 TABLET ORAL at 08:10

## 2017-10-06 RX ADMIN — BOSENTAN 125 MG: 125 TABLET, FILM COATED ORAL at 02:10

## 2017-10-06 RX ADMIN — Medication: at 04:10

## 2017-10-06 RX ADMIN — ATORVASTATIN CALCIUM 10 MG: 10 TABLET, FILM COATED ORAL at 08:10

## 2017-10-06 RX ADMIN — TREPROSTINIL 73 NG/KG/MIN: 100 INJECTION, SOLUTION INTRAVENOUS; SUBCUTANEOUS at 05:10

## 2017-10-06 RX ADMIN — BOSENTAN 125 MG: 125 TABLET, FILM COATED ORAL at 09:10

## 2017-10-06 RX ADMIN — POTASSIUM CHLORIDE 40 MEQ: 1500 TABLET, EXTENDED RELEASE ORAL at 08:10

## 2017-10-06 RX ADMIN — LOPERAMIDE HYDROCHLORIDE 2 MG: 2 CAPSULE ORAL at 11:10

## 2017-10-06 RX ADMIN — ONDANSETRON 4 MG: 2 INJECTION INTRAMUSCULAR; INTRAVENOUS at 11:10

## 2017-10-06 NOTE — PROGRESS NOTES
[facility]  Heart Transplant  Progress Note    Patient Name: Molly Smith  MRN: [id number]  Admission Date: 10/4/2017  Hospital Length of Stay: 1 days  Attending Physician: Katia Gusman MD  Primary Care Provider: Alice Tim MD  Principal Problem:Pulmonary hypertension    Subjective:     Interval History: Feeling a little bit better this am. Fevers a bit better and O2 weaned to 80% this morning.     Continuous Infusions:   treprostinil (REMODULIN) infusion 73 ng/kg/min (10/05/17 1826)    veletri/remodulin cassette      veletri/remodulin tubing       Scheduled Meds:   atorvastatin  10 mg Oral Daily    bosentan  125 mg Oral TID    cetirizine  10 mg Oral Daily    diltiaZEM  120 mg Oral Daily     PRN Meds:acetaminophen, loperamide, ondansetron    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    Multaq [dronedarone]      Objective:     Vital Signs (Most Recent):  Temp: 97.4 °F (36.3 °C) (10/06/17 0803)  Pulse: 81 (10/06/17 0803)  Resp: 20 (10/06/17 0803)  BP: (!) 103/53 (10/06/17 0803)  SpO2: (!) 91 % (10/06/17 0803) Vital Signs (24h Range):  Temp:  [97.4 °F (36.3 °C)-100.4 °F (38 °C)] 97.4 °F (36.3 °C)  Pulse:  [70-84] 81  Resp:  [17-20] 20  SpO2:  [65 %-91 %] 91 %  BP: ()/(46-54) 103/53     Patient Vitals for the past 72 hrs (Last 3 readings):   Weight   10/06/17 0500 60.4 kg (133 lb 1.6 oz)   10/05/17 0600 61.7 kg (136 lb)   10/04/17 2355 56.7 kg (125 lb)     Body mass index is 23.21 kg/m².      Intake/Output Summary (Last 24 hours) at 10/06/17 0956  Last data filed at 10/06/17 0800   Gross per 24 hour   Intake              610 ml   Output             1250 ml   Net             -640 ml        Physical Exam   Constitutional: She is oriented to person, place, and time. She appears well-developed and well-nourished.   HENT:   Head: Normocephalic.   Eyes: Pupils are equal, round, and reactive to light.   Neck: Normal range of  motion. Neck supple.   Cardiovascular: Normal rate and regular rhythm.    Pulmonary/Chest: Effort normal and breath sounds normal.   Decreased BLL   Abdominal: Soft. Bowel sounds are normal.   Musculoskeletal: Normal range of motion.   Neurological: She is alert and oriented to person, place, and time.   Skin: Skin is warm and dry.   Psychiatric: She has a normal mood and affect. Her behavior is normal.   Nursing note and vitals reviewed.      Significant Labs:  CBC:    Recent Labs  Lab 10/04/17  1923 10/05/17  0457 10/06/17  0435   WBC 3.95 2.32* 2.70*   RBC 4.51 4.14 4.19   HGB 11.7* 10.7* 10.8*   HCT 36.0* 34.1* 33.4*    191 155   MCV 80* 82 80*   MCH 25.9* 25.8* 25.8*   MCHC 32.5 31.4* 32.3     BNP:    Recent Labs  Lab 10/04/17  1923   *     CMP:    Recent Labs  Lab 10/04/17  1923 10/05/17  0457 10/06/17  0435   GLU 99 92 93   CALCIUM 7.9* 7.6* 7.4*   ALBUMIN 3.4* 3.1* 2.9*   PROT 6.8 6.0 5.7*   * 133* 137   K 4.7 3.4* 3.4*   CO2 17* 18* 18*    108 112*   BUN 20 18 13   CREATININE 1.0 0.9 0.8   ALKPHOS 41* 39* 49*   ALT 12 11 10   AST 31 16 16   BILITOT 0.9 0.5 0.5      Coagulation:     Recent Labs  Lab 10/04/17  1923 10/05/17  0457 10/06/17  0435   INR 1.4* 1.5* 1.8*   APTT 24.7  --   --      LDH:  No results for input(s): LDH in the last 72 hours.  Microbiology:  Microbiology Results (last 7 days)     Procedure Component Value Units Date/Time    Blood culture [[id number]] Collected:  10/04/17 2224    Order Status:  Completed Specimen:  Blood from Peripheral, Hand, Right Updated:  10/06/17 0613     Blood Culture, Routine No Growth to date     Blood Culture, Routine No Growth to date    Blood culture [[id number]] Collected:  10/04/17 2224    Order Status:  Completed Specimen:  Blood from Peripheral, Antecubital, Right Updated:  10/06/17 0613     Blood Culture, Routine No Growth to date     Blood Culture, Routine No Growth to date    Blood culture [[id number]] Collected:  10/05/17 1929     Order Status:  Completed Specimen:  Blood from Line, Subclavian, Left Updated:  10/06/17 0345     Blood Culture, Routine No Growth to date    Blood culture [[id number]] Collected:  10/05/17 1206    Order Status:  Completed Specimen:  Blood Updated:  10/06/17 0115     Blood Culture, Routine No Growth to date    Narrative:       Left SC, From central line after aspirating Remodulin when  switching to hospital cassette    Blood culture [[id number]]     Order Status:  Canceled Specimen:  Blood     Clostridium difficile EIA [[id number]]     Order Status:  No result Specimen:  Stool from Stool     Stool culture [[id number]]     Order Status:  No result Specimen:  Stool from Stool     Stool culture [[id number]]     Order Status:  No result Specimen:  Stool from Stool           I have reviewed all pertinent labs within the past 24 hours.    Estimated Creatinine Clearance: 54.6 mL/min (based on SCr of 0.8 mg/dL).    Diagnostic Results:  I have reviewed all pertinent imaging results/findings within the past 24 hours.    Assessment and Plan:     Mrs. Molly Smith is a 71 yo female with a PMHx of PAH (currently on remodulin IV and bosentan), PFO, chronic hypoxia on 3L at baseline and previous bacteremia (rosemonas) s/p line exchange who presents to the ED with worsening fatigue, diarrhea since the morning of 10/4/17, nausea and low grade temperature at home (100.4 being the highest). Also had a single event of hypoxia (SpO2 50s) that resolved without intervention.  is an ED physician and states they were both pretty worn out after spending last week with their grand kids in [place] and most of her symptoms manifested around the time of their trip home on Tuesday. Her home remodulin was recently increased a week ago and adempas was stopped after her recent RHC with Dr. Tim showed persistently elevated right sided pressures.    * Pulmonary hypertension    -Continue home doses of remodulin (recently increased to 75  ng/kg/min and bosentan (125mg TID)   -Continue coumadin with goal INR 1.5-2.5.  -Continue oxygen supplementation with plan to wean to 3L (baseline requirement)   -Discussed goals of care. If she were to have a respiratory arrest they would want intubation and aggressive care.   -Continue lasix 40mg BID.          Acute on chronic respiratory failure with hypoxia    - Continue PH treatment as above.  - CTA neg.   - Wean HFNC as tolerated.         Diarrhea    -DDx: Effect of remodulin vs. Infectious etiology (viral vs. Bacterial)   - Cdiff and stool studies pending  - No recent exposure to ABX         Fever    - Fevers improved. May have been viral illness.   - Cultures pending. Neg so far.   - CXR without signs of pneumonia. CTA neg.  - Appreciate ID Cx. Will stop Abx for now.             Joe Arellano, NP  Heart Transplant  [facility]

## 2017-10-06 NOTE — SUBJECTIVE & OBJECTIVE
Interval History: Feeling a little bit better this am. Fevers a bit better and O2 weaned to 80% this morning.     Continuous Infusions:   treprostinil (REMODULIN) infusion 73 ng/kg/min (10/05/17 1826)    veletri/remodulin cassette      veletri/remodulin tubing       Scheduled Meds:   atorvastatin  10 mg Oral Daily    bosentan  125 mg Oral TID    cetirizine  10 mg Oral Daily    diltiaZEM  120 mg Oral Daily     PRN Meds:acetaminophen, loperamide, ondansetron    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    Multaq [dronedarone]      Objective:     Vital Signs (Most Recent):  Temp: 97.4 °F (36.3 °C) (10/06/17 0803)  Pulse: 81 (10/06/17 0803)  Resp: 20 (10/06/17 0803)  BP: (!) 103/53 (10/06/17 0803)  SpO2: (!) 91 % (10/06/17 0803) Vital Signs (24h Range):  Temp:  [97.4 °F (36.3 °C)-100.4 °F (38 °C)] 97.4 °F (36.3 °C)  Pulse:  [70-84] 81  Resp:  [17-20] 20  SpO2:  [65 %-91 %] 91 %  BP: ()/(46-54) 103/53     Patient Vitals for the past 72 hrs (Last 3 readings):   Weight   10/06/17 0500 60.4 kg (133 lb 1.6 oz)   10/05/17 0600 61.7 kg (136 lb)   10/04/17 2355 56.7 kg (125 lb)     Body mass index is 23.21 kg/m².      Intake/Output Summary (Last 24 hours) at 10/06/17 0956  Last data filed at 10/06/17 0800   Gross per 24 hour   Intake              610 ml   Output             1250 ml   Net             -640 ml        Physical Exam   Constitutional: She is oriented to person, place, and time. She appears well-developed and well-nourished.   HENT:   Head: Normocephalic.   Eyes: Pupils are equal, round, and reactive to light.   Neck: Normal range of motion. Neck supple.   Cardiovascular: Normal rate and regular rhythm.    Pulmonary/Chest: Effort normal and breath sounds normal.   Decreased BLL   Abdominal: Soft. Bowel sounds are normal.   Musculoskeletal: Normal range of motion.   Neurological: She is alert and oriented to person, place, and time.   Skin: Skin is  warm and dry.   Psychiatric: She has a normal mood and affect. Her behavior is normal.   Nursing note and vitals reviewed.      Significant Labs:  CBC:    Recent Labs  Lab 10/04/17  1923 10/05/17  0457 10/06/17  0435   WBC 3.95 2.32* 2.70*   RBC 4.51 4.14 4.19   HGB 11.7* 10.7* 10.8*   HCT 36.0* 34.1* 33.4*    191 155   MCV 80* 82 80*   MCH 25.9* 25.8* 25.8*   MCHC 32.5 31.4* 32.3     BNP:    Recent Labs  Lab 10/04/17  1923   *     CMP:    Recent Labs  Lab 10/04/17  1923 10/05/17  0457 10/06/17  0435   GLU 99 92 93   CALCIUM 7.9* 7.6* 7.4*   ALBUMIN 3.4* 3.1* 2.9*   PROT 6.8 6.0 5.7*   * 133* 137   K 4.7 3.4* 3.4*   CO2 17* 18* 18*    108 112*   BUN 20 18 13   CREATININE 1.0 0.9 0.8   ALKPHOS 41* 39* 49*   ALT 12 11 10   AST 31 16 16   BILITOT 0.9 0.5 0.5      Coagulation:     Recent Labs  Lab 10/04/17  1923 10/05/17  0457 10/06/17  0435   INR 1.4* 1.5* 1.8*   APTT 24.7  --   --      LDH:  No results for input(s): LDH in the last 72 hours.  Microbiology:  Microbiology Results (last 7 days)     Procedure Component Value Units Date/Time    Blood culture [[id number]] Collected:  10/04/17 2224    Order Status:  Completed Specimen:  Blood from Peripheral, Hand, Right Updated:  10/06/17 0613     Blood Culture, Routine No Growth to date     Blood Culture, Routine No Growth to date    Blood culture [[id number]] Collected:  10/04/17 2224    Order Status:  Completed Specimen:  Blood from Peripheral, Antecubital, Right Updated:  10/06/17 0613     Blood Culture, Routine No Growth to date     Blood Culture, Routine No Growth to date    Blood culture [[id number]] Collected:  10/05/17 1929    Order Status:  Completed Specimen:  Blood from Line, Subclavian, Left Updated:  10/06/17 0345     Blood Culture, Routine No Growth to date    Blood culture [[id number]] Collected:  10/05/17 1206    Order Status:  Completed Specimen:  Blood Updated:  10/06/17 0115     Blood Culture, Routine No Growth to date    Narrative:        Left SC, From central line after aspirating Remodulin when  switching to hospital cassette    Blood culture [[id number]]     Order Status:  Canceled Specimen:  Blood     Clostridium difficile EIA [[id number]]     Order Status:  No result Specimen:  Stool from Stool     Stool culture [[id number]]     Order Status:  No result Specimen:  Stool from Stool     Stool culture [[id number]]     Order Status:  No result Specimen:  Stool from Stool           I have reviewed all pertinent labs within the past 24 hours.    Estimated Creatinine Clearance: 54.6 mL/min (based on SCr of 0.8 mg/dL).    Diagnostic Results:  I have reviewed all pertinent imaging results/findings within the past 24 hours.

## 2017-10-06 NOTE — ASSESSMENT & PLAN NOTE
-Continue home doses of remodulin (recently increased to 75 ng/kg/min and bosentan (125mg TID)   -Continue coumadin with goal INR 1.5-2.5.  -Continue oxygen supplementation with plan to wean to 3L (baseline requirement)   -Discussed goals of care. If she were to have a respiratory arrest they would want intubation and aggressive care.   -Continue lasix 40mg BID.

## 2017-10-06 NOTE — PROGRESS NOTES
[facility]  Infectious Disease  Progress Note    Patient Name: Molly Smith  MRN: [id number]  Admission Date: 10/4/2017  Length of Stay: 1 days  Attending Physician: Katia Gusman MD  Primary Care Provider: Alice Tim MD    Isolation Status: No active isolations  Assessment/Plan:      Fever    73 y/o female with a history of pulmonary arterial hypertension for which she is on IV remodulin chronically at home.  She presented with fevers, nausea, diarrhea and worsening shortness of breath.  Most of her symptoms have resolved, including diarrhea.  Differential includes viral gastroenteritis.  Other possibilities include viral respiratory infection and a line infection.  Recs as follows    -Patient is stable.  No antibiotics for now pending work up.    -Agree with blood cultures to look for any evidence of line infection.                  Anticipated Disposition: as above    Thank you for your consult. We will follow-up with patient. Please contact us if you have any additional questions.    Annie Ford MD  Infectious Disease  [facility]    Subjective:     Principal Problem:Pulmonary hypertension    HPI: Ms. Smith is a 73 y/o female with a history of pulmonary hypertension for which she is on chronic home IV remodulin.  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  This was successfully treated with line removal and appropriate antibiotics.  She was in her usual state of health until recently.  She travelled to [place], [place] to visit their children and grandchildren.  The spent about 6 days there.  They ate out frequently while they were there.  She walking with her daughter and their dog.  She reports that she noticed some increased shortness of breath while she was in [place].  About 2 days ago while they were driving back from [place], diarrhea, low grad fevers, nausea.  Yesterday, she noticed that her  breathing was significantly worse.  She decided to to come into the hospital to be evaluated.   Past Medical History:   Diagnosis Date    Allergy     Anticoagulant long-term use     Arthritis     Heart murmur     Pneumonia     Pulmonary hypertension        Past Surgical History:   Procedure Laterality Date    BACK SURGERY         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    Multaq [dronedarone]        Medications:  Prescriptions Prior to Admission   Medication Sig    atorvastatin (LIPITOR) 10 MG tablet Take 10 mg by mouth once daily.      bosentan (TRACLEER) 125 MG Tab Take 1 tablet (125 mg total) by mouth 3 (three) times daily.    CALCIUM CARBONATE (CALCIUM 600 ORAL) Take 2 tablets by mouth once daily.      diltiaZEM (CARDIZEM) 60 MG tablet Take 1 tablet (60 mg total) by mouth once daily.    diltiazem (DILACOR XR) 120 MG 24 hr capsule Take 120 mg by mouth once daily.      furosemide (LASIX) 40 MG tablet Take 1 tablet (40 mg total) by mouth 2 (two) times daily.    Lactobacillus rhamnosus GG (CULTURELLE) 10 billion cell capsule Take 1 capsule by mouth once daily.    loratadine (CLARITIN) 10 mg tablet Take 10 mg by mouth once daily.    promethazine (PHENERGAN) 6.25 mg/5 mL syrup Take 10 mLs (12.5 mg total) by mouth every 6 (six) hours as needed for Nausea.    sodium chloride 0.9% 0.9 % SolP 100 mL with treprostinil 1 mg/mL Soln 9,000,000 ng Inject 5,005.5 ng/min into the vein continuous.    spironolactone (ALDACTONE) 25 MG tablet Take 25 mg by mouth once daily.      tramadol (ULTRAM-ER) 200 MG Tb24 Take 1 tablet (200 mg total) by mouth once daily.    TREPROSTINIL SODIUM (TREPROSTINIL, REMODULIN, PUMP FOR HOME USE) Pt currently on 71ng/kg/min=36mL/24 hr, continuous IV infusion    warfarin (COUMADIN) 10 MG tablet Take 1 tablet (10 mg total) by mouth Daily.     Antibiotics     None        Antifungals     None        Antivirals     None              There is no immunization history on file for this patient.    Family History     Problem Relation (Age of Onset)    Arthritis Mother, Father    Diabetes Father    Heart disease Father    Hypertension Father        Social History     Social History    Marital status:      Spouse name: N/A    Number of children: N/A    Years of education: N/A     Social History Main Topics    Smoking status: Never Smoker    Smokeless tobacco: None    Alcohol use 0.6 oz/week     1 Glasses of wine per week      Comment: rarely    Drug use: No    Sexual activity: Not Asked     Other Topics Concern    None     Social History Narrative    None     Review of Systems   Constitutional: Negative for chills and fever.   HENT: Negative for sneezing and sore throat.    Eyes: Negative for pain and visual disturbance.   Respiratory: Negative for cough, shortness of breath and wheezing.    Cardiovascular: Negative for chest pain and leg swelling.   Gastrointestinal: Negative for abdominal pain, constipation, diarrhea and nausea.   Genitourinary: Negative for dysuria, frequency and urgency.   Allergic/Immunologic: Negative for environmental allergies.   Neurological: Negative for dizziness, light-headedness, numbness and headaches.   Psychiatric/Behavioral: Negative for agitation. The patient is not nervous/anxious.    All other systems reviewed and are negative.    Objective:     Vital Signs (Most Recent):  Temp: 98.1 °F (36.7 °C) (10/06/17 1142)  Pulse: 74 (10/06/17 1517)  Resp: 20 (10/06/17 1142)  BP: (!) 106/57 (10/06/17 1142)  SpO2: (!) 89 % (10/06/17 1504) Vital Signs (24h Range):  Temp:  [97.4 °F (36.3 °C)-99.4 °F (37.4 °C)] 98.1 °F (36.7 °C)  Pulse:  [70-84] 74  Resp:  [17-20] 20  SpO2:  [71 %-91 %] 89 %  BP: ()/(52-57) 106/57     Weight: 60.4 kg (133 lb 1.6 oz)  Body mass index is 23.21 kg/m².    Estimated Creatinine Clearance: 54.6 mL/min (based on SCr of 0.8 mg/dL).    Physical Exam   Constitutional: She is  oriented to person, place, and time. She appears well-developed and well-nourished. No distress.   HENT:   Head: Normocephalic and atraumatic.   Right Ear: External ear normal.   Left Ear: External ear normal.   Nose: Nose normal.   Mouth/Throat: Oropharynx is clear and moist. No oropharyngeal exudate.   Eyes: Conjunctivae and EOM are normal. Pupils are equal, round, and reactive to light. Right eye exhibits no discharge. Left eye exhibits no discharge. No scleral icterus.   Neck: Normal range of motion. Neck supple. No JVD present. No tracheal deviation present. No thyromegaly present.   Cardiovascular: Normal rate, regular rhythm and intact distal pulses.  Exam reveals no gallop and no friction rub.    Murmur heard.  Pulmonary/Chest: Effort normal and breath sounds normal. No stridor. No respiratory distress. She has no wheezes. She has no rales. She exhibits no tenderness.   Abdominal: Soft. Bowel sounds are normal. She exhibits no distension and no mass. There is no tenderness. There is no rebound and no guarding.   Musculoskeletal: Normal range of motion. She exhibits no edema or tenderness.   Lymphadenopathy:     She has no cervical adenopathy.   Neurological: She is alert and oriented to person, place, and time. She displays normal reflexes. No cranial nerve deficit. She exhibits normal muscle tone. Coordination normal.   Skin: Skin is warm. No rash noted. She is not diaphoretic. No erythema. No pallor.   Psychiatric: She has a normal mood and affect. Her behavior is normal. Judgment and thought content normal.   Nursing note and vitals reviewed.      Significant Labs:   Blood Culture:     Recent Labs  Lab 10/04/17  2224 10/05/17  1206 10/05/17  1929   LABBLOO No Growth to date  No Growth to date  No Growth to date  No Growth to date No Growth to date No Growth to date     CBC:     Recent Labs  Lab 10/04/17  1923 10/05/17  0457 10/06/17  0435   WBC 3.95 2.32* 2.70*   HGB 11.7* 10.7* 10.8*   HCT 36.0* 34.1*  33.4*    191 155     CMP:     Recent Labs  Lab 10/04/17  1923 10/05/17  0457 10/06/17  0435   * 133* 137   K 4.7 3.4* 3.4*    108 112*   CO2 17* 18* 18*   GLU 99 92 93   BUN 20 18 13   CREATININE 1.0 0.9 0.8   CALCIUM 7.9* 7.6* 7.4*   PROT 6.8 6.0 5.7*   ALBUMIN 3.4* 3.1* 2.9*   BILITOT 0.9 0.5 0.5   ALKPHOS 41* 39* 49*   AST 31 16 16   ALT 12 11 10   ANIONGAP 8 7* 7*   EGFRNONAA 56.8* >60.0 >60.0       Significant Imaging: I have reviewed all pertinent imaging results/findings within the past 24 hours.

## 2017-10-06 NOTE — PLAN OF CARE
Problem: Patient Care Overview  Goal: Plan of Care Review  Outcome: Ongoing (interventions implemented as appropriate)  Pt's VSS, AAOx3, and independent with stand by assist. Oxygen has been titrated down to 10L and 50% comfort flow- Pulse ox of mid 80s. Telemetry monitoring in place. Blood cultures came back negative. CT scan did not show signs of PE.  Romodulin cassette and tubing changed with the same rate of 73ng/kg/min in 9,000,000 ng in 100ml NS at 85ml/hr-dosing weight of 70.5kg infusing to the r.chest wall Dunbar. Pt is up in chair with call bell is within reach and nonskid socks applied. Will continue to monitor.

## 2017-10-06 NOTE — ASSESSMENT & PLAN NOTE
73 y/o female with a history of pulmonary arterial hypertension for which she is on IV remodulin chronically at home.  She presented with fevers, nausea, diarrhea and worsening shortness of breath.  Most of her symptoms have resolved, including diarrhea.  Differential includes viral gastroenteritis.  Other possibilities include viral respiratory infection and a line infection.  Recs as follows    -Patient is stable.  No antibiotics for now pending work up.    -Agree with blood cultures to look for any evidence of line infection.

## 2017-10-06 NOTE — ASSESSMENT & PLAN NOTE
- Fevers improved. May have been viral illness.   - Cultures pending. Neg so far.   - CXR without signs of pneumonia. CTA neg.  - Appreciate ID Cx. Will stop Abx for now.

## 2017-10-06 NOTE — PLAN OF CARE
Problem: Patient Care Overview  Goal: Plan of Care Review  Outcome: Ongoing (interventions implemented as appropriate)  Pt's VSS with 15 L on comfort flow. Pt's blood pressure was slightly hypotensive and had a Tmax of 100.4F- reported to team. Midline placed in left forearm to dedicate to Romodulin-rate changed to 85ml/day, 73ng/kg/min. Romodulin cassette and tubing changed. Cultured Dunbar catheter. CTA of the chest was performed to r/o PE. Telemetry monitoring in place. Pt's bed in lowest position with call bell within reach. Pt's  at bed side.

## 2017-10-06 NOTE — PLAN OF CARE
-Pt AAOx4, VSS- BP remains lower SBP 85-91, SpO2 84-88% on comfort flow 15 L- and 100% FiO2,Tmax 99.4, Tele- NSR, waiting BC results, CTA negative for PE  - Pt also has 22 G in the R hand saline locked  - Pt bed in lowest position, up to BSC,  will remain at the bedside, will continue to monitor.

## 2017-10-06 NOTE — SUBJECTIVE & OBJECTIVE
Past Medical History:   Diagnosis Date    Allergy     Anticoagulant long-term use     Arthritis     Heart murmur     Pneumonia     Pulmonary hypertension        Past Surgical History:   Procedure Laterality Date    BACK SURGERY         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    Multaq [dronedarone]        Medications:  Prescriptions Prior to Admission   Medication Sig    atorvastatin (LIPITOR) 10 MG tablet Take 10 mg by mouth once daily.      bosentan (TRACLEER) 125 MG Tab Take 1 tablet (125 mg total) by mouth 3 (three) times daily.    CALCIUM CARBONATE (CALCIUM 600 ORAL) Take 2 tablets by mouth once daily.      diltiaZEM (CARDIZEM) 60 MG tablet Take 1 tablet (60 mg total) by mouth once daily.    diltiazem (DILACOR XR) 120 MG 24 hr capsule Take 120 mg by mouth once daily.      furosemide (LASIX) 40 MG tablet Take 1 tablet (40 mg total) by mouth 2 (two) times daily.    Lactobacillus rhamnosus GG (CULTURELLE) 10 billion cell capsule Take 1 capsule by mouth once daily.    loratadine (CLARITIN) 10 mg tablet Take 10 mg by mouth once daily.    promethazine (PHENERGAN) 6.25 mg/5 mL syrup Take 10 mLs (12.5 mg total) by mouth every 6 (six) hours as needed for Nausea.    sodium chloride 0.9% 0.9 % SolP 100 mL with treprostinil 1 mg/mL Soln 9,000,000 ng Inject 5,005.5 ng/min into the vein continuous.    spironolactone (ALDACTONE) 25 MG tablet Take 25 mg by mouth once daily.      tramadol (ULTRAM-ER) 200 MG Tb24 Take 1 tablet (200 mg total) by mouth once daily.    TREPROSTINIL SODIUM (TREPROSTINIL, REMODULIN, PUMP FOR HOME USE) Pt currently on 71ng/kg/min=36mL/24 hr, continuous IV infusion    warfarin (COUMADIN) 10 MG tablet Take 1 tablet (10 mg total) by mouth Daily.     Antibiotics     None        Antifungals     None        Antivirals     None             There is no immunization history on file for this patient.    Family History     Problem  Relation (Age of Onset)    Arthritis Mother, Father    Diabetes Father    Heart disease Father    Hypertension Father        Social History     Social History    Marital status:      Spouse name: N/A    Number of children: N/A    Years of education: N/A     Social History Main Topics    Smoking status: Never Smoker    Smokeless tobacco: None    Alcohol use 0.6 oz/week     1 Glasses of wine per week      Comment: rarely    Drug use: No    Sexual activity: Not Asked     Other Topics Concern    None     Social History Narrative    None     Review of Systems   Constitutional: Negative for chills and fever.   HENT: Negative for sneezing and sore throat.    Eyes: Negative for pain and visual disturbance.   Respiratory: Negative for cough, shortness of breath and wheezing.    Cardiovascular: Negative for chest pain and leg swelling.   Gastrointestinal: Negative for abdominal pain, constipation, diarrhea and nausea.   Genitourinary: Negative for dysuria, frequency and urgency.   Allergic/Immunologic: Negative for environmental allergies.   Neurological: Negative for dizziness, light-headedness, numbness and headaches.   Psychiatric/Behavioral: Negative for agitation. The patient is not nervous/anxious.    All other systems reviewed and are negative.    Objective:     Vital Signs (Most Recent):  Temp: 98.1 °F (36.7 °C) (10/06/17 1142)  Pulse: 74 (10/06/17 1517)  Resp: 20 (10/06/17 1142)  BP: (!) 106/57 (10/06/17 1142)  SpO2: (!) 89 % (10/06/17 1504) Vital Signs (24h Range):  Temp:  [97.4 °F (36.3 °C)-99.4 °F (37.4 °C)] 98.1 °F (36.7 °C)  Pulse:  [70-84] 74  Resp:  [17-20] 20  SpO2:  [71 %-91 %] 89 %  BP: ()/(52-57) 106/57     Weight: 60.4 kg (133 lb 1.6 oz)  Body mass index is 23.21 kg/m².    Estimated Creatinine Clearance: 54.6 mL/min (based on SCr of 0.8 mg/dL).    Physical Exam   Constitutional: She is oriented to person, place, and time. She appears well-developed and well-nourished. No distress.    HENT:   Head: Normocephalic and atraumatic.   Right Ear: External ear normal.   Left Ear: External ear normal.   Nose: Nose normal.   Mouth/Throat: Oropharynx is clear and moist. No oropharyngeal exudate.   Eyes: Conjunctivae and EOM are normal. Pupils are equal, round, and reactive to light. Right eye exhibits no discharge. Left eye exhibits no discharge. No scleral icterus.   Neck: Normal range of motion. Neck supple. No JVD present. No tracheal deviation present. No thyromegaly present.   Cardiovascular: Normal rate, regular rhythm and intact distal pulses.  Exam reveals no gallop and no friction rub.    Murmur heard.  Pulmonary/Chest: Effort normal and breath sounds normal. No stridor. No respiratory distress. She has no wheezes. She has no rales. She exhibits no tenderness.   Abdominal: Soft. Bowel sounds are normal. She exhibits no distension and no mass. There is no tenderness. There is no rebound and no guarding.   Musculoskeletal: Normal range of motion. She exhibits no edema or tenderness.   Lymphadenopathy:     She has no cervical adenopathy.   Neurological: She is alert and oriented to person, place, and time. She displays normal reflexes. No cranial nerve deficit. She exhibits normal muscle tone. Coordination normal.   Skin: Skin is warm. No rash noted. She is not diaphoretic. No erythema. No pallor.   Psychiatric: She has a normal mood and affect. Her behavior is normal. Judgment and thought content normal.   Nursing note and vitals reviewed.      Significant Labs:   Blood Culture:     Recent Labs  Lab 10/04/17  2224 10/05/17  1206 10/05/17  1929   LABBLOO No Growth to date  No Growth to date  No Growth to date  No Growth to date No Growth to date No Growth to date     CBC:     Recent Labs  Lab 10/04/17  1923 10/05/17  0457 10/06/17  0435   WBC 3.95 2.32* 2.70*   HGB 11.7* 10.7* 10.8*   HCT 36.0* 34.1* 33.4*    191 155     CMP:     Recent Labs  Lab 10/04/17  1923 10/05/17  0455  10/06/17  0435   * 133* 137   K 4.7 3.4* 3.4*    108 112*   CO2 17* 18* 18*   GLU 99 92 93   BUN 20 18 13   CREATININE 1.0 0.9 0.8   CALCIUM 7.9* 7.6* 7.4*   PROT 6.8 6.0 5.7*   ALBUMIN 3.4* 3.1* 2.9*   BILITOT 0.9 0.5 0.5   ALKPHOS 41* 39* 49*   AST 31 16 16   ALT 12 11 10   ANIONGAP 8 7* 7*   EGFRNONAA 56.8* >60.0 >60.0       Significant Imaging: I have reviewed all pertinent imaging results/findings within the past 24 hours.

## 2017-10-07 LAB
ALBUMIN SERPL BCP-MCNC: 2.8 G/DL
ALP SERPL-CCNC: 48 U/L
ALT SERPL W/O P-5'-P-CCNC: 10 U/L
ANION GAP SERPL CALC-SCNC: 6 MMOL/L
AST SERPL-CCNC: 15 U/L
BASOPHILS # BLD AUTO: 0.01 K/UL
BASOPHILS NFR BLD: 0.2 %
BILIRUB SERPL-MCNC: 0.6 MG/DL
BUN SERPL-MCNC: 13 MG/DL
CALCIUM SERPL-MCNC: 7.7 MG/DL
CHLORIDE SERPL-SCNC: 113 MMOL/L
CO2 SERPL-SCNC: 19 MMOL/L
CREAT SERPL-MCNC: 0.8 MG/DL
DIFFERENTIAL METHOD: ABNORMAL
EOSINOPHIL # BLD AUTO: 0.1 K/UL
EOSINOPHIL NFR BLD: 3.2 %
ERYTHROCYTE [DISTWIDTH] IN BLOOD BY AUTOMATED COUNT: 14.9 %
EST. GFR  (AFRICAN AMERICAN): >60 ML/MIN/1.73 M^2
EST. GFR  (NON AFRICAN AMERICAN): >60 ML/MIN/1.73 M^2
GLUCOSE SERPL-MCNC: 102 MG/DL
HCT VFR BLD AUTO: 36.5 %
HGB BLD-MCNC: 11.5 G/DL
INR PPP: 1.2
LYMPHOCYTES # BLD AUTO: 0.9 K/UL
LYMPHOCYTES NFR BLD: 21.4 %
MAGNESIUM SERPL-MCNC: 2 MG/DL
MCH RBC QN AUTO: 26 PG
MCHC RBC AUTO-ENTMCNC: 31.5 G/DL
MCV RBC AUTO: 82 FL
MONOCYTES # BLD AUTO: 0.5 K/UL
MONOCYTES NFR BLD: 11.2 %
NEUTROPHILS # BLD AUTO: 2.6 K/UL
NEUTROPHILS NFR BLD: 63.8 %
PLATELET # BLD AUTO: 184 K/UL
PMV BLD AUTO: 9.2 FL
POTASSIUM SERPL-SCNC: 4 MMOL/L
PROT SERPL-MCNC: 5.6 G/DL
PROTHROMBIN TIME: 12.5 SEC
RBC # BLD AUTO: 4.43 M/UL
SODIUM SERPL-SCNC: 138 MMOL/L
WBC # BLD AUTO: 4.01 K/UL

## 2017-10-07 PROCEDURE — 85025 COMPLETE CBC W/AUTO DIFF WBC: CPT

## 2017-10-07 PROCEDURE — 27100171 HC OXYGEN HIGH FLOW UP TO 24 HOURS

## 2017-10-07 PROCEDURE — 85610 PROTHROMBIN TIME: CPT

## 2017-10-07 PROCEDURE — 36415 COLL VENOUS BLD VENIPUNCTURE: CPT

## 2017-10-07 PROCEDURE — 27000221 HC OXYGEN, UP TO 24 HOURS

## 2017-10-07 PROCEDURE — 20600001 HC STEP DOWN PRIVATE ROOM

## 2017-10-07 PROCEDURE — 99232 SBSQ HOSP IP/OBS MODERATE 35: CPT | Mod: ,,, | Performed by: INTERNAL MEDICINE

## 2017-10-07 PROCEDURE — 83735 ASSAY OF MAGNESIUM: CPT

## 2017-10-07 PROCEDURE — 99231 SBSQ HOSP IP/OBS SF/LOW 25: CPT | Mod: ,,, | Performed by: INTERNAL MEDICINE

## 2017-10-07 PROCEDURE — 25000003 PHARM REV CODE 250: Performed by: INTERNAL MEDICINE

## 2017-10-07 PROCEDURE — 80053 COMPREHEN METABOLIC PANEL: CPT

## 2017-10-07 PROCEDURE — 63600175 PHARM REV CODE 636 W HCPCS: Performed by: INTERNAL MEDICINE

## 2017-10-07 PROCEDURE — 94761 N-INVAS EAR/PLS OXIMETRY MLT: CPT

## 2017-10-07 RX ADMIN — CETIRIZINE HYDROCHLORIDE 10 MG: 5 TABLET ORAL at 08:10

## 2017-10-07 RX ADMIN — BENZONATATE 100 MG: 100 CAPSULE ORAL at 09:10

## 2017-10-07 RX ADMIN — BOSENTAN 125 MG: 125 TABLET, FILM COATED ORAL at 09:10

## 2017-10-07 RX ADMIN — BOSENTAN 125 MG: 125 TABLET, FILM COATED ORAL at 06:10

## 2017-10-07 RX ADMIN — Medication: at 05:10

## 2017-10-07 RX ADMIN — BOSENTAN 125 MG: 125 TABLET, FILM COATED ORAL at 03:10

## 2017-10-07 RX ADMIN — DILTIAZEM HYDROCHLORIDE 120 MG: 120 CAPSULE, COATED, EXTENDED RELEASE ORAL at 08:10

## 2017-10-07 RX ADMIN — ATORVASTATIN CALCIUM 10 MG: 10 TABLET, FILM COATED ORAL at 08:10

## 2017-10-07 RX ADMIN — TREPROSTINIL 73 NG/KG/MIN: 100 INJECTION, SOLUTION INTRAVENOUS; SUBCUTANEOUS at 05:10

## 2017-10-07 RX ADMIN — LOPERAMIDE HYDROCHLORIDE 2 MG: 2 CAPSULE ORAL at 03:10

## 2017-10-07 RX ADMIN — LOPERAMIDE HYDROCHLORIDE 2 MG: 2 CAPSULE ORAL at 07:10

## 2017-10-07 NOTE — PROGRESS NOTES
[facility]  Infectious Disease  Progress Note    Patient Name: Molly Smith  MRN: [id number]  Admission Date: 10/4/2017  Length of Stay: 2 days  Attending Physician: Katia Gusman MD  Primary Care Provider: Alice Tim MD    Isolation Status: No active isolations  Assessment/Plan:      Fever    71 y/o female with a history of pulmonary arterial hypertension for which she is on IV remodulin chronically at home.  She presented with fevers, nausea, diarrhea and worsening shortness of breath.  Most of her symptoms have resolved, including diarrhea.  Differential includes viral gastroenteritis.  Other possibilities include viral respiratory infection and a line infection.  Continued improvement except some loose stools    Rec:  -No antibiotics.    -f/u cultures.                Thank you for your consult. Will follow at a distance. Please call if ???    Luis E Maravilla MD  Infectious Disease  [facility]    Subjective:     Principal Problem:Pulmonary hypertension    HPI: Ms. Smith is a 71 y/o female with a history of pulmonary hypertension for which she is on chronic home IV remodulin.  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  This was successfully treated with line removal and appropriate antibiotics.  She was in her usual state of health until recently.  She travelled to [place], [place] to visit their children and grandchildren.  The spent about 6 days there.  They ate out frequently while they were there.  She walking with her daughter and their dog.  She reports that she noticed some increased shortness of breath while she was in [place].  About 2 days ago while they were driving back from [place], diarrhea, low grad fevers, nausea.  Yesterday, she noticed that her breathing was significantly worse.  She decided to to come into the hospital to be evaluated.   Interval History: coughing a little because of  throat irritation; some loose stools; no other complaints    Review of Systems   All other systems reviewed and are negative.    Objective:     Vital Signs (Most Recent):  Temp: 98.1 °F (36.7 °C) (10/07/17 1116)  Pulse: 80 (10/07/17 1130)  Resp: 20 (10/07/17 0911)  BP: (!) 95/52 (10/07/17 1116)  SpO2: (!) 92 % (10/07/17 1116) Vital Signs (24h Range):  Temp:  [98 °F (36.7 °C)-99.2 °F (37.3 °C)] 98.1 °F (36.7 °C)  Pulse:  [67-96] 80  Resp:  [19-21] 20  SpO2:  [84 %-93 %] 92 %  BP: ()/(52-62) 95/52     Weight: 60.4 kg (133 lb 1.6 oz)  Body mass index is 23.21 kg/m².    Estimated Creatinine Clearance: 54.6 mL/min (based on SCr of 0.8 mg/dL).    Physical Exam   Constitutional: She is oriented to person, place, and time. She appears well-developed and well-nourished. No distress.   HENT:   Head: Normocephalic and atraumatic.   Mouth/Throat: Oropharynx is clear and moist.   Eyes: Conjunctivae and EOM are normal. Pupils are equal, round, and reactive to light.   Neck: Neck supple.   Cardiovascular: Normal rate and regular rhythm.    Distant S1S2 without murmur; intact pulses   Pulmonary/Chest: Effort normal and breath sounds normal. She has no rales.   Abdominal: Soft. Bowel sounds are normal.   Genitourinary:   Genitourinary Comments: No sargent   Musculoskeletal: Normal range of motion. She exhibits no tenderness or deformity.   R chest IV site nontender   Lymphadenopathy:     She has no cervical adenopathy.   Neurological: She is alert and oriented to person, place, and time. No cranial nerve deficit. She exhibits normal muscle tone. Coordination normal.   Skin: Skin is warm and dry. Capillary refill takes less than 2 seconds.   Psychiatric:   Appropriate and cooperative   Nursing note and vitals reviewed.      Significant Labs:   Blood Culture:   Recent Labs  Lab 10/04/17  2224 10/05/17  1206 10/05/17  1929   LABOO No Growth to date  No Growth to date  No Growth to date  No Growth to date  No Growth to date   No Growth to date No Growth to date  No Growth to date No Growth to date  No Growth to date     CBC:   Recent Labs  Lab 10/06/17  0435 10/07/17  0441   WBC 2.70* 4.01   HGB 10.8* 11.5*   HCT 33.4* 36.5*    184     CMP:   Recent Labs  Lab 10/06/17  0435 10/07/17  0440    138   K 3.4* 4.0   * 113*   CO2 18* 19*   GLU 93 102   BUN 13 13   CREATININE 0.8 0.8   CALCIUM 7.4* 7.7*   PROT 5.7* 5.6*   ALBUMIN 2.9* 2.8*   BILITOT 0.5 0.6   ALKPHOS 49* 48*   AST 16 15   ALT 10 10   ANIONGAP 7* 6*   EGFRNONAA >60.0 >60.0     All pertinent labs within the past 24 hours have been reviewed.    Significant Imaging: I have reviewed all pertinent imaging results/findings within the past 24 hours.

## 2017-10-07 NOTE — ASSESSMENT & PLAN NOTE
- Fevers Resolved. May have been viral illness.   - Cultures neg so far  - CXR without signs of pneumonia. CTA neg.  - No abx

## 2017-10-07 NOTE — SUBJECTIVE & OBJECTIVE
Interval History:     Seen this morning at bedside. Report loose stool after a watery one last night at 2300. Denies abdominal pain, fever or chills. Stool study during this visit are negative.    Continuous Infusions:   treprostinil (REMODULIN) infusion 73 ng/kg/min (10/06/17 1727)    veletri/remodulin cassette      veletri/remodulin tubing       Scheduled Meds:   atorvastatin  10 mg Oral Daily    bosentan  125 mg Oral TID    cetirizine  10 mg Oral Daily    diltiaZEM  120 mg Oral Daily     PRN Meds:acetaminophen, benzonatate, loperamide, ondansetron    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    Multaq [dronedarone]      Objective:     Vital Signs (Most Recent):  Temp: 98.1 °F (36.7 °C) (10/07/17 1116)  Pulse: 80 (10/07/17 1130)  Resp: 20 (10/07/17 0911)  BP: (!) 95/52 (10/07/17 1116)  SpO2: (!) 92 % (10/07/17 1116) Vital Signs (24h Range):  Temp:  [98 °F (36.7 °C)-99.2 °F (37.3 °C)] 98.1 °F (36.7 °C)  Pulse:  [67-96] 80  Resp:  [19-21] 20  SpO2:  [84 %-93 %] 92 %  BP: ()/(52-62) 95/52     Patient Vitals for the past 72 hrs (Last 3 readings):   Weight   10/06/17 0500 60.4 kg (133 lb 1.6 oz)   10/05/17 0600 61.7 kg (136 lb)   10/04/17 2355 56.7 kg (125 lb)     Body mass index is 23.21 kg/m².      Intake/Output Summary (Last 24 hours) at 10/07/17 1234  Last data filed at 10/06/17 2300   Gross per 24 hour   Intake             1530 ml   Output              350 ml   Net             1180 ml       Hemodynamic Parameters:       Telemetry:     Physical Exam     Constitutional: She is oriented to person, place, and time. She appears well-developed and well-nourished.   HENT:   Head: Normocephalic.   Eyes: Pupils are equal, round, and reactive to light.   Neck: Normal range of motion. Neck supple.   Cardiovascular: Normal rate and regular rhythm.    Pulmonary/Chest: Effort normal and breath sounds normal.   Decreased BLL   Abdominal: Soft. Bowel sounds  are normal.   Musculoskeletal: Normal range of motion.   Neurological: She is alert and oriented to person, place, and time.   Skin: Skin is warm and dry.   Psychiatric: She has a normal mood and affect. Her behavior is normal.   Nursing note and vitals reviewed.    Significant Labs:  CBC:    Recent Labs  Lab 10/05/17  0457 10/06/17  0435 10/07/17  0441   WBC 2.32* 2.70* 4.01   RBC 4.14 4.19 4.43   HGB 10.7* 10.8* 11.5*   HCT 34.1* 33.4* 36.5*    155 184   MCV 82 80* 82   MCH 25.8* 25.8* 26.0*   MCHC 31.4* 32.3 31.5*     BNP:    Recent Labs  Lab 10/04/17  1923   *     CMP:    Recent Labs  Lab 10/05/17  0457 10/06/17  0435 10/07/17  0440   GLU 92 93 102   CALCIUM 7.6* 7.4* 7.7*   ALBUMIN 3.1* 2.9* 2.8*   PROT 6.0 5.7* 5.6*   * 137 138   K 3.4* 3.4* 4.0   CO2 18* 18* 19*    112* 113*   BUN 18 13 13   CREATININE 0.9 0.8 0.8   ALKPHOS 39* 49* 48*   ALT 11 10 10   AST 16 16 15   BILITOT 0.5 0.5 0.6      Coagulation:     Recent Labs  Lab 10/04/17  1923 10/05/17  0457 10/06/17  0435 10/07/17  0441   INR 1.4* 1.5* 1.8* 1.2   APTT 24.7  --   --   --      LDH:  No results for input(s): LDH in the last 72 hours.  Microbiology:  Microbiology Results (last 7 days)     Procedure Component Value Units Date/Time    Blood culture [[id number]] Collected:  10/04/17 2224    Order Status:  Completed Specimen:  Blood from Peripheral, Hand, Right Updated:  10/07/17 0612     Blood Culture, Routine No Growth to date     Blood Culture, Routine No Growth to date     Blood Culture, Routine No Growth to date    Blood culture [[id number]] Collected:  10/04/17 2224    Order Status:  Completed Specimen:  Blood from Peripheral, Antecubital, Right Updated:  10/07/17 0612     Blood Culture, Routine No Growth to date     Blood Culture, Routine No Growth to date     Blood Culture, Routine No Growth to date    Blood culture [[id number]] Collected:  10/05/17 1929    Order Status:  Completed Specimen:  Blood from Line, Subclavian,  Left Updated:  10/06/17 2212     Blood Culture, Routine No Growth to date     Blood Culture, Routine No Growth to date    Blood culture [[id number]] Collected:  10/05/17 1206    Order Status:  Completed Specimen:  Blood Updated:  10/06/17 1812     Blood Culture, Routine No Growth to date     Blood Culture, Routine No Growth to date    Narrative:       Left SC, From central line after aspirating Remodulin when  switching to hospital cassette    Blood culture [[id number]]     Order Status:  Canceled Specimen:  Blood     Clostridium difficile EIA [[id number]]     Order Status:  No result Specimen:  Stool from Stool     Stool culture [[id number]]     Order Status:  No result Specimen:  Stool from Stool     Stool culture [[id number]]     Order Status:  No result Specimen:  Stool from Stool           I have reviewed all pertinent labs within the past 24 hours.    Estimated Creatinine Clearance: 54.6 mL/min (based on SCr of 0.8 mg/dL).    Diagnostic Results:

## 2017-10-07 NOTE — PLAN OF CARE
Problem: Patient Care Overview  Goal: Plan of Care Review  Outcome: Ongoing (interventions implemented as appropriate)  Pt is AAOx4 in bed wearing non-skid footwear, free from fall/injury so far this shift, bed in low/locked position, call bell next to pt. Pt VSS, on 10L 50% O2 comfort flow, sats >87%. Pt on tele, NSR in the 70's-80's. Pt is afebrile at this time. Proper hand hygiene performed before and after pt care activities. Pt remodulin infusing to RCW maguire per orders, site and dressing CDI. Pt had loose BM x1, but no diarrhea this shift. Plan is to wean pt off of comfort flow O2 before d/c. Pt reminded to use call bell to call for assistance, pt verbalizes understanding. Will continue to monitor.

## 2017-10-07 NOTE — PROGRESS NOTES
[facility]  Heart Transplant  Progress Note    Patient Name: Molly Smith  MRN: [id number]  Admission Date: 10/4/2017  Hospital Length of Stay: 2 days  Attending Physician: Katia Gusman MD  Primary Care Provider: Alice Tim MD  Principal Problem:Pulmonary hypertension    Subjective:     Interval History:     Seen this morning at bedside. Report loose stool after a watery one last night at 2300. Denies abdominal pain, fever or chills. Stool study during this visit are negative.    Continuous Infusions:   treprostinil (REMODULIN) infusion 73 ng/kg/min (10/06/17 1727)    veletri/remodulin cassette      veletri/remodulin tubing       Scheduled Meds:   atorvastatin  10 mg Oral Daily    bosentan  125 mg Oral TID    cetirizine  10 mg Oral Daily    diltiaZEM  120 mg Oral Daily     PRN Meds:acetaminophen, benzonatate, loperamide, ondansetron    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    Multaq [dronedarone]      Objective:     Vital Signs (Most Recent):  Temp: 98.1 °F (36.7 °C) (10/07/17 1116)  Pulse: 80 (10/07/17 1130)  Resp: 20 (10/07/17 0911)  BP: (!) 95/52 (10/07/17 1116)  SpO2: (!) 92 % (10/07/17 1116) Vital Signs (24h Range):  Temp:  [98 °F (36.7 °C)-99.2 °F (37.3 °C)] 98.1 °F (36.7 °C)  Pulse:  [67-96] 80  Resp:  [19-21] 20  SpO2:  [84 %-93 %] 92 %  BP: ()/(52-62) 95/52     Patient Vitals for the past 72 hrs (Last 3 readings):   Weight   10/06/17 0500 60.4 kg (133 lb 1.6 oz)   10/05/17 0600 61.7 kg (136 lb)   10/04/17 2355 56.7 kg (125 lb)     Body mass index is 23.21 kg/m².      Intake/Output Summary (Last 24 hours) at 10/07/17 1234  Last data filed at 10/06/17 2300   Gross per 24 hour   Intake             1530 ml   Output              350 ml   Net             1180 ml       Hemodynamic Parameters:       Telemetry:     Physical Exam     Constitutional: She is oriented to person, place, and time. She appears  well-developed and well-nourished.   HENT:   Head: Normocephalic.   Eyes: Pupils are equal, round, and reactive to light.   Neck: Normal range of motion. Neck supple.   Cardiovascular: Normal rate and regular rhythm.    Pulmonary/Chest: Effort normal and breath sounds normal.   Decreased BLL   Abdominal: Soft. Bowel sounds are normal.   Musculoskeletal: Normal range of motion.   Neurological: She is alert and oriented to person, place, and time.   Skin: Skin is warm and dry.   Psychiatric: She has a normal mood and affect. Her behavior is normal.   Nursing note and vitals reviewed.    Significant Labs:  CBC:    Recent Labs  Lab 10/05/17  0457 10/06/17  0435 10/07/17  0441   WBC 2.32* 2.70* 4.01   RBC 4.14 4.19 4.43   HGB 10.7* 10.8* 11.5*   HCT 34.1* 33.4* 36.5*    155 184   MCV 82 80* 82   MCH 25.8* 25.8* 26.0*   MCHC 31.4* 32.3 31.5*     BNP:    Recent Labs  Lab 10/04/17  1923   *     CMP:    Recent Labs  Lab 10/05/17  0457 10/06/17  0435 10/07/17  0440   GLU 92 93 102   CALCIUM 7.6* 7.4* 7.7*   ALBUMIN 3.1* 2.9* 2.8*   PROT 6.0 5.7* 5.6*   * 137 138   K 3.4* 3.4* 4.0   CO2 18* 18* 19*    112* 113*   BUN 18 13 13   CREATININE 0.9 0.8 0.8   ALKPHOS 39* 49* 48*   ALT 11 10 10   AST 16 16 15   BILITOT 0.5 0.5 0.6      Coagulation:     Recent Labs  Lab 10/04/17  1923 10/05/17  0457 10/06/17  0435 10/07/17  0441   INR 1.4* 1.5* 1.8* 1.2   APTT 24.7  --   --   --      LDH:  No results for input(s): LDH in the last 72 hours.  Microbiology:  Microbiology Results (last 7 days)     Procedure Component Value Units Date/Time    Blood culture [[id number]] Collected:  10/04/17 2224    Order Status:  Completed Specimen:  Blood from Peripheral, Hand, Right Updated:  10/07/17 0612     Blood Culture, Routine No Growth to date     Blood Culture, Routine No Growth to date     Blood Culture, Routine No Growth to date    Blood culture [[id number]] Collected:  10/04/17 2224    Order Status:  Completed Specimen:   Blood from Peripheral, Antecubital, Right Updated:  10/07/17 0612     Blood Culture, Routine No Growth to date     Blood Culture, Routine No Growth to date     Blood Culture, Routine No Growth to date    Blood culture [[id number]] Collected:  10/05/17 1929    Order Status:  Completed Specimen:  Blood from Line, Subclavian, Left Updated:  10/06/17 2212     Blood Culture, Routine No Growth to date     Blood Culture, Routine No Growth to date    Blood culture [[id number]] Collected:  10/05/17 1206    Order Status:  Completed Specimen:  Blood Updated:  10/06/17 1812     Blood Culture, Routine No Growth to date     Blood Culture, Routine No Growth to date    Narrative:       Left SC, From central line after aspirating Remodulin when  switching to hospital cassette    Blood culture [[id number]]     Order Status:  Canceled Specimen:  Blood     Clostridium difficile EIA [[id number]]     Order Status:  No result Specimen:  Stool from Stool     Stool culture [[id number]]     Order Status:  No result Specimen:  Stool from Stool     Stool culture [[id number]]     Order Status:  No result Specimen:  Stool from Stool           I have reviewed all pertinent labs within the past 24 hours.    Estimated Creatinine Clearance: 54.6 mL/min (based on SCr of 0.8 mg/dL).    Diagnostic Results:        Assessment and Plan:     Mrs. Molly Smith is a 71 yo female with a PMHx of PAH (currently on remodulin IV and bosentan), PFO, chronic hypoxia on 3L at baseline and previous bacteremia (rosemonas) s/p line exchange who presents to the ED with worsening fatigue, diarrhea since the morning of 10/4/17, nausea and low grade temperature at home (100.4 being the highest). Also had a single event of hypoxia (SpO2 50s) that resolved without intervention.  is an ED physician and states they were both pretty worn out after spending last week with their grand kids in [place] and most of her symptoms manifested around the time of their trip home on  Tuesday. Her home remodulin was recently increased a week ago and adempas was stopped after her recent RHC with Dr. Tim showed persistently elevated right sided pressures.    * Pulmonary hypertension    -Continue home doses of remodulin (recently increased to 73 ng/kg/min and bosentan (125mg TID)   -Continue coumadin at 10mg with goal INR 1.5-2.5.  -Continue oxygen supplementation with plan to wean to 3L (baseline requirement). Currently at 6 liters   -Code status. If she were to have a respiratory arrest they would want intubation and aggressive care.   -Continue lasix 40mg BID oral.          Acute on chronic respiratory failure with hypoxia    - Continue PH treatment as above.  - CTA neg for PE  - Wean  off oxygen as need  - breathing better with increased dose of Remodulin.        Diarrhea    -DDx: Effect of remodulin vs viral infection  - Cdiff and other stool studies negative  - most likely remodulin related.  - Advised her to titrate lomotil to comfort based on stool fq and constitution  - replace electrolytes if needed  -encourage oral intake        Fever    - Fevers Resolved. May have been viral illness.   - Cultures neg so far  - CXR without signs of pneumonia. CTA neg.  - No abx             Leo Miller MD  Heart Transplant  [facility]

## 2017-10-07 NOTE — ASSESSMENT & PLAN NOTE
-Continue home doses of remodulin (recently increased to 73 ng/kg/min and bosentan (125mg TID)   -Continue coumadin at 10mg with goal INR 1.5-2.5.  -Continue oxygen supplementation with plan to wean to 3L (baseline requirement). Currently at 6 liters   -Code status. If she were to have a respiratory arrest they would want intubation and aggressive care.   -Continue lasix 40mg BID oral.

## 2017-10-07 NOTE — SUBJECTIVE & OBJECTIVE
Interval History: coughing a little because of throat irritation; some loose stools; no other complaints    Review of Systems   All other systems reviewed and are negative.    Objective:     Vital Signs (Most Recent):  Temp: 98.1 °F (36.7 °C) (10/07/17 1116)  Pulse: 80 (10/07/17 1130)  Resp: 20 (10/07/17 0911)  BP: (!) 95/52 (10/07/17 1116)  SpO2: (!) 92 % (10/07/17 1116) Vital Signs (24h Range):  Temp:  [98 °F (36.7 °C)-99.2 °F (37.3 °C)] 98.1 °F (36.7 °C)  Pulse:  [67-96] 80  Resp:  [19-21] 20  SpO2:  [84 %-93 %] 92 %  BP: ()/(52-62) 95/52     Weight: 60.4 kg (133 lb 1.6 oz)  Body mass index is 23.21 kg/m².    Estimated Creatinine Clearance: 54.6 mL/min (based on SCr of 0.8 mg/dL).    Physical Exam   Constitutional: She is oriented to person, place, and time. She appears well-developed and well-nourished. No distress.   HENT:   Head: Normocephalic and atraumatic.   Mouth/Throat: Oropharynx is clear and moist.   Eyes: Conjunctivae and EOM are normal. Pupils are equal, round, and reactive to light.   Neck: Neck supple.   Cardiovascular: Normal rate and regular rhythm.    Distant S1S2 without murmur; intact pulses   Pulmonary/Chest: Effort normal and breath sounds normal. She has no rales.   Abdominal: Soft. Bowel sounds are normal.   Genitourinary:   Genitourinary Comments: No sargent   Musculoskeletal: Normal range of motion. She exhibits no tenderness or deformity.   R chest IV site nontender   Lymphadenopathy:     She has no cervical adenopathy.   Neurological: She is alert and oriented to person, place, and time. No cranial nerve deficit. She exhibits normal muscle tone. Coordination normal.   Skin: Skin is warm and dry. Capillary refill takes less than 2 seconds.   Psychiatric:   Appropriate and cooperative   Nursing note and vitals reviewed.      Significant Labs:   Blood Culture:   Recent Labs  Lab 10/04/17  2224 10/05/17  1206 10/05/17  1929   [facility] No Growth to date  No Growth to date  No Growth to  date  No Growth to date  No Growth to date  No Growth to date No Growth to date  No Growth to date No Growth to date  No Growth to date     CBC:   Recent Labs  Lab 10/06/17  0435 10/07/17  0441   WBC 2.70* 4.01   HGB 10.8* 11.5*   HCT 33.4* 36.5*    184     CMP:   Recent Labs  Lab 10/06/17  0435 10/07/17  0440    138   K 3.4* 4.0   * 113*   CO2 18* 19*   GLU 93 102   BUN 13 13   CREATININE 0.8 0.8   CALCIUM 7.4* 7.7*   PROT 5.7* 5.6*   ALBUMIN 2.9* 2.8*   BILITOT 0.5 0.6   ALKPHOS 49* 48*   AST 16 15   ALT 10 10   ANIONGAP 7* 6*   EGFRNONAA >60.0 >60.0     All pertinent labs within the past 24 hours have been reviewed.    Significant Imaging: I have reviewed all pertinent imaging results/findings within the past 24 hours.

## 2017-10-07 NOTE — ASSESSMENT & PLAN NOTE
- Continue PH treatment as above.  - CTA neg for PE  - Wean  Of oxygen as need  - breathing better with new dose of Remodulin.

## 2017-10-07 NOTE — PLAN OF CARE
Problem: Patient Care Overview  Goal: Plan of Care Review  Outcome: Ongoing (interventions implemented as appropriate)  POC reviewed w/ pt this am to include weaning O2, safety, and controlling diarrhea.  Pt weaned from comfort flow 10L/50% this am to 4 L NC - sat 90-91%.  Pt able to ambulate within room without assistance, commode at bedside for urgent bathroom needs.  Pt has had > 5 BMs today so far, encouraged to take prn immodium for diarrhea.  Remodulin cassette changed this afternoon around 1730.

## 2017-10-07 NOTE — ASSESSMENT & PLAN NOTE
71 y/o female with a history of pulmonary arterial hypertension for which she is on IV remodulin chronically at home.  She presented with fevers, nausea, diarrhea and worsening shortness of breath.  Most of her symptoms have resolved, including diarrhea.  Differential includes viral gastroenteritis.  Other possibilities include viral respiratory infection and a line infection.  Continued improvement except some loose stools    Rec:  -No antibiotics.    -f/u cultures.

## 2017-10-07 NOTE — ASSESSMENT & PLAN NOTE
-DDx: Effect of remodulin vs viral infection  - Cdiff and stool studies negative  - most likely remodulin related.  - Advised to titrate lomotil to comfort based on stool fq and constitution  - replace electrolytes if needed  -encourage oral intake

## 2017-10-08 LAB
ALBUMIN SERPL BCP-MCNC: 3.4 G/DL
ALP SERPL-CCNC: 53 U/L
ALT SERPL W/O P-5'-P-CCNC: 11 U/L
ANION GAP SERPL CALC-SCNC: 5 MMOL/L
AST SERPL-CCNC: 16 U/L
BASOPHILS # BLD AUTO: 0.01 K/UL
BASOPHILS NFR BLD: 0.3 %
BILIRUB SERPL-MCNC: 0.9 MG/DL
BUN SERPL-MCNC: 10 MG/DL
CALCIUM SERPL-MCNC: 8.3 MG/DL
CHLORIDE SERPL-SCNC: 113 MMOL/L
CO2 SERPL-SCNC: 23 MMOL/L
CREAT SERPL-MCNC: 0.8 MG/DL
DIFFERENTIAL METHOD: ABNORMAL
EOSINOPHIL # BLD AUTO: 0.2 K/UL
EOSINOPHIL NFR BLD: 5.6 %
ERYTHROCYTE [DISTWIDTH] IN BLOOD BY AUTOMATED COUNT: 15.2 %
EST. GFR  (AFRICAN AMERICAN): >60 ML/MIN/1.73 M^2
EST. GFR  (NON AFRICAN AMERICAN): >60 ML/MIN/1.73 M^2
GLUCOSE SERPL-MCNC: 93 MG/DL
HCT VFR BLD AUTO: 40 %
HGB BLD-MCNC: 12.5 G/DL
INR PPP: 1
LYMPHOCYTES # BLD AUTO: 1 K/UL
LYMPHOCYTES NFR BLD: 27.6 %
MAGNESIUM SERPL-MCNC: 2.4 MG/DL
MCH RBC QN AUTO: 25.3 PG
MCHC RBC AUTO-ENTMCNC: 31.3 G/DL
MCV RBC AUTO: 81 FL
MONOCYTES # BLD AUTO: 0.3 K/UL
MONOCYTES NFR BLD: 7.2 %
NEUTROPHILS # BLD AUTO: 2.2 K/UL
NEUTROPHILS NFR BLD: 59 %
PLATELET # BLD AUTO: 232 K/UL
PMV BLD AUTO: 9.4 FL
POTASSIUM SERPL-SCNC: 4 MMOL/L
PROT SERPL-MCNC: 6.6 G/DL
PROTHROMBIN TIME: 11.1 SEC
RBC # BLD AUTO: 4.95 M/UL
SODIUM SERPL-SCNC: 141 MMOL/L
WBC # BLD AUTO: 3.73 K/UL

## 2017-10-08 PROCEDURE — 25000003 PHARM REV CODE 250: Performed by: INTERNAL MEDICINE

## 2017-10-08 PROCEDURE — 85610 PROTHROMBIN TIME: CPT

## 2017-10-08 PROCEDURE — 83735 ASSAY OF MAGNESIUM: CPT

## 2017-10-08 PROCEDURE — 80053 COMPREHEN METABOLIC PANEL: CPT

## 2017-10-08 PROCEDURE — 20600001 HC STEP DOWN PRIVATE ROOM

## 2017-10-08 PROCEDURE — 36415 COLL VENOUS BLD VENIPUNCTURE: CPT

## 2017-10-08 PROCEDURE — 63600175 PHARM REV CODE 636 W HCPCS: Performed by: INTERNAL MEDICINE

## 2017-10-08 PROCEDURE — 85025 COMPLETE CBC W/AUTO DIFF WBC: CPT

## 2017-10-08 PROCEDURE — 99232 SBSQ HOSP IP/OBS MODERATE 35: CPT | Mod: ,,, | Performed by: INTERNAL MEDICINE

## 2017-10-08 RX ADMIN — BOSENTAN 125 MG: 125 TABLET, FILM COATED ORAL at 10:10

## 2017-10-08 RX ADMIN — BOSENTAN 125 MG: 125 TABLET, FILM COATED ORAL at 05:10

## 2017-10-08 RX ADMIN — CETIRIZINE HYDROCHLORIDE 10 MG: 5 TABLET ORAL at 08:10

## 2017-10-08 RX ADMIN — DILTIAZEM HYDROCHLORIDE 120 MG: 120 CAPSULE, COATED, EXTENDED RELEASE ORAL at 08:10

## 2017-10-08 RX ADMIN — BOSENTAN 125 MG: 125 TABLET, FILM COATED ORAL at 01:10

## 2017-10-08 RX ADMIN — TREPROSTINIL 73 NG/KG/MIN: 100 INJECTION, SOLUTION INTRAVENOUS; SUBCUTANEOUS at 04:10

## 2017-10-08 RX ADMIN — ATORVASTATIN CALCIUM 10 MG: 10 TABLET, FILM COATED ORAL at 08:10

## 2017-10-08 RX ADMIN — Medication: at 04:10

## 2017-10-08 NOTE — PROGRESS NOTES
[facility]  Heart Transplant  Progress Note    Patient Name: Molly Smith  MRN: [id number]  Admission Date: 10/4/2017  Hospital Length of Stay: 3 days  Attending Physician: Katia Gusman MD  Primary Care Provider: Alice Tim MD  Principal Problem:Pulmonary hypertension    Subjective:     Interval History:     Seen this morning. More comfortable and back to baseline home oxygen with good oxygenation. Still taking imodium once last night but seems to be working ok. Asking about getting back on tadalafil    Continuous Infusions:   treprostinil (REMODULIN) infusion 73 ng/kg/min (10/07/17 1725)    veletri/remodulin cassette      veletri/remodulin tubing       Scheduled Meds:   atorvastatin  10 mg Oral Daily    bosentan  125 mg Oral TID    cetirizine  10 mg Oral Daily    diltiaZEM  120 mg Oral Daily     PRN Meds:acetaminophen, benzonatate, loperamide, ondansetron    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    Multaq [dronedarone]      Objective:     Vital Signs (Most Recent):  Temp: 97.7 °F (36.5 °C) (10/08/17 0745)  Pulse: 74 (10/08/17 1100)  Resp: 18 (10/08/17 0745)  BP: (!) 120/55 (10/08/17 0745)  SpO2: (!) 93 % (10/08/17 0745) Vital Signs (24h Range):  Temp:  [97.7 °F (36.5 °C)-98.8 °F (37.1 °C)] 97.7 °F (36.5 °C)  Pulse:  [74-89] 74  Resp:  [16-20] 18  SpO2:  [90 %-94 %] 93 %  BP: ()/(51-65) 120/55     Patient Vitals for the past 72 hrs (Last 3 readings):   Weight   10/08/17 0455 61.1 kg (134 lb 11.2 oz)   10/06/17 0500 60.4 kg (133 lb 1.6 oz)     Body mass index is 23.49 kg/m².      Intake/Output Summary (Last 24 hours) at 10/08/17 1127  Last data filed at 10/08/17 0800   Gross per 24 hour   Intake             1260 ml   Output                0 ml   Net             1260 ml       Hemodynamic Parameters:       Telemetry: reviewed. No event    Physical Exam     Constitutional: She is oriented to person, place, and time.  She appears well-developed and well-nourished.   HENT:   Head: Normocephalic.   Eyes: Pupils are equal, round, and reactive to light.   Neck: Normal range of motion. Neck supple.   Cardiovascular: Normal rate and regular rhythm.    Pulmonary/Chest: Effort normal and breath sounds normal.   Decreased BLL   Abdominal: Soft. Bowel sounds are normal.   Musculoskeletal: Normal range of motion.   Neurological: She is alert and oriented to person, place, and time.   Skin: Skin is warm and dry.   Psychiatric: She has a normal mood and affect. Her behavior is normal.   Nursing note and vitals reviewed.    Significant Labs:  CBC:    Recent Labs  Lab 10/06/17  0435 10/07/17  0441 10/08/17  0651   WBC 2.70* 4.01 3.73*   RBC 4.19 4.43 4.95   HGB 10.8* 11.5* 12.5   HCT 33.4* 36.5* 40.0    184 232   MCV 80* 82 81*   MCH 25.8* 26.0* 25.3*   MCHC 32.3 31.5* 31.3*     BNP:    Recent Labs  Lab 10/04/17  1923   *     CMP:    Recent Labs  Lab 10/06/17  0435 10/07/17  0440 10/08/17  0651   GLU 93 102 93   CALCIUM 7.4* 7.7* 8.3*   ALBUMIN 2.9* 2.8* 3.4*   PROT 5.7* 5.6* 6.6    138 141   K 3.4* 4.0 4.0   CO2 18* 19* 23   * 113* 113*   BUN 13 13 10   CREATININE 0.8 0.8 0.8   ALKPHOS 49* 48* 53*   ALT 10 10 11   AST 16 15 16   BILITOT 0.5 0.6 0.9      Coagulation:     Recent Labs  Lab 10/04/17  1923  10/06/17  0435 10/07/17  0441 10/08/17  0651   INR 1.4*  < > 1.8* 1.2 1.0   APTT 24.7  --   --   --   --    < > = values in this interval not displayed.  LDH:  No results for input(s): LDH in the last 72 hours.  Microbiology:  Microbiology Results (last 7 days)     Procedure Component Value Units Date/Time    Blood culture [[id number]] Collected:  10/04/17 2224    Order Status:  Completed Specimen:  Blood from Peripheral, Hand, Right Updated:  10/08/17 0612     Blood Culture, Routine No Growth to date     Blood Culture, Routine No Growth to date     Blood Culture, Routine No Growth to date     Blood Culture, Routine No  Growth to date    Blood culture [[id number]] Collected:  10/04/17 2224    Order Status:  Completed Specimen:  Blood from Peripheral, Antecubital, Right Updated:  10/08/17 0612     Blood Culture, Routine No Growth to date     Blood Culture, Routine No Growth to date     Blood Culture, Routine No Growth to date     Blood Culture, Routine No Growth to date    Blood culture [[id number]] Collected:  10/05/17 1929    Order Status:  Completed Specimen:  Blood from Line, Subclavian, Left Updated:  10/07/17 2212     Blood Culture, Routine No Growth to date     Blood Culture, Routine No Growth to date     Blood Culture, Routine No Growth to date    Blood culture [[id number]] Collected:  10/05/17 1206    Order Status:  Completed Specimen:  Blood Updated:  10/07/17 1812     Blood Culture, Routine No Growth to date     Blood Culture, Routine No Growth to date     Blood Culture, Routine No Growth to date    Narrative:       Left SC, From central line after aspirating Remodulin when  switching to hospital cassette    Blood culture [[id number]]     Order Status:  Canceled Specimen:  Blood     Clostridium difficile EIA [[id number]]     Order Status:  No result Specimen:  Stool from Stool     Stool culture [[id number]]     Order Status:  No result Specimen:  Stool from Stool     Stool culture [[id number]]     Order Status:  No result Specimen:  Stool from Stool           I have reviewed all pertinent labs within the past 24 hours.    Estimated Creatinine Clearance: 54.6 mL/min (based on SCr of 0.8 mg/dL).    Diagnostic Results:      Assessment and Plan:     Mrs. Molly Smith is a 71 yo female with a PMHx of PAH (currently on remodulin IV and bosentan), PFO, chronic hypoxia on 3L at baseline and previous bacteremia (rosemonas) s/p line exchange who presents to the ED with worsening fatigue, diarrhea since the morning of 10/4/17, nausea and low grade temperature at home (100.4 being the highest). Also had a single event of hypoxia (SpO2  50s) that resolved without intervention.  is an ED physician and states they were both pretty worn out after spending last week with their grand kids in [place] and most of her symptoms manifested around the time of their trip home on Tuesday. Her home remodulin was recently increased a week ago and adempas was stopped after her recent RHC with Dr. Tim showed persistently elevated right sided pressures.    * Pulmonary hypertension    -Continue home doses of remodulin (recently increased to 73 ng/kg/min and bosentan (125mg TID)   -Continue coumadin at 10mg with goal INR 1.5-2.5.  -Continue oxygen supplementation with plan to wean to 3L (baseline requirement). Currently at 4 liters   -Code status. If she were to have a respiratory arrest they would want intubation and aggressive care.   -Continue lasix 40mg BID oral.          Acute on chronic respiratory failure with hypoxia    - Continue PH treatment as above.  - CTA neg for PE  - Wean of oxygen as need  - breathing better with new dose of Remodulin.  - SpO2 today > 90 at 4 liters  -  Plan on D/C tomorrow.  is the caretaker        Diarrhea    -DDx: Effect of remodulin vs viral infection  - Cdiff and stool studies negative  - most likely remodulin related.  - Advised to titrate lomotil to comfort based on stool fq and constitution  - replace electrolytes if needed  -encourage oral intake        Fever    - Fevers Resolved. May have been viral illness.   - Cultures neg so far  - CXR without signs of pneumonia. CTA neg.  - No abx             Leo Miller MD  Heart Transplant  [facility]

## 2017-10-08 NOTE — ASSESSMENT & PLAN NOTE
-Continue home doses of remodulin (recently increased to 73 ng/kg/min and bosentan (125mg TID)   -Continue coumadin at 10mg with goal INR 1.5-2.5.  -Continue oxygen supplementation with plan to wean to 3L (baseline requirement). Currently at 4 liters   -Code status. If she were to have a respiratory arrest they would want intubation and aggressive care.   -Continue lasix 40mg BID oral.

## 2017-10-08 NOTE — SUBJECTIVE & OBJECTIVE
Interval History:     Seen this morning. More comfortable and back to baseline home oxygen with good oxygenation. Still taking imodium once last night but seems to be working ok. Asking about getting back on tadalafil    Continuous Infusions:   treprostinil (REMODULIN) infusion 73 ng/kg/min (10/07/17 1725)    veletri/remodulin cassette      veletri/remodulin tubing       Scheduled Meds:   atorvastatin  10 mg Oral Daily    bosentan  125 mg Oral TID    cetirizine  10 mg Oral Daily    diltiaZEM  120 mg Oral Daily     PRN Meds:acetaminophen, benzonatate, loperamide, ondansetron    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    Multaq [dronedarone]      Objective:     Vital Signs (Most Recent):  Temp: 97.7 °F (36.5 °C) (10/08/17 0745)  Pulse: 74 (10/08/17 1100)  Resp: 18 (10/08/17 0745)  BP: (!) 120/55 (10/08/17 0745)  SpO2: (!) 93 % (10/08/17 0745) Vital Signs (24h Range):  Temp:  [97.7 °F (36.5 °C)-98.8 °F (37.1 °C)] 97.7 °F (36.5 °C)  Pulse:  [74-89] 74  Resp:  [16-20] 18  SpO2:  [90 %-94 %] 93 %  BP: ()/(51-65) 120/55     Patient Vitals for the past 72 hrs (Last 3 readings):   Weight   10/08/17 0455 61.1 kg (134 lb 11.2 oz)   10/06/17 0500 60.4 kg (133 lb 1.6 oz)     Body mass index is 23.49 kg/m².      Intake/Output Summary (Last 24 hours) at 10/08/17 1127  Last data filed at 10/08/17 0800   Gross per 24 hour   Intake             1260 ml   Output                0 ml   Net             1260 ml       Hemodynamic Parameters:       Telemetry: reviewed. No event    Physical Exam     Constitutional: She is oriented to person, place, and time. She appears well-developed and well-nourished.   HENT:   Head: Normocephalic.   Eyes: Pupils are equal, round, and reactive to light.   Neck: Normal range of motion. Neck supple.   Cardiovascular: Normal rate and regular rhythm.    Pulmonary/Chest: Effort normal and breath sounds normal.   Decreased BLL   Abdominal:  Soft. Bowel sounds are normal.   Musculoskeletal: Normal range of motion.   Neurological: She is alert and oriented to person, place, and time.   Skin: Skin is warm and dry.   Psychiatric: She has a normal mood and affect. Her behavior is normal.   Nursing note and vitals reviewed.    Significant Labs:  CBC:    Recent Labs  Lab 10/06/17  0435 10/07/17  0441 10/08/17  0651   WBC 2.70* 4.01 3.73*   RBC 4.19 4.43 4.95   HGB 10.8* 11.5* 12.5   HCT 33.4* 36.5* 40.0    184 232   MCV 80* 82 81*   MCH 25.8* 26.0* 25.3*   MCHC 32.3 31.5* 31.3*     BNP:    Recent Labs  Lab 10/04/17  1923   *     CMP:    Recent Labs  Lab 10/06/17  0435 10/07/17  0440 10/08/17  0651   GLU 93 102 93   CALCIUM 7.4* 7.7* 8.3*   ALBUMIN 2.9* 2.8* 3.4*   PROT 5.7* 5.6* 6.6    138 141   K 3.4* 4.0 4.0   CO2 18* 19* 23   * 113* 113*   BUN 13 13 10   CREATININE 0.8 0.8 0.8   ALKPHOS 49* 48* 53*   ALT 10 10 11   AST 16 15 16   BILITOT 0.5 0.6 0.9      Coagulation:     Recent Labs  Lab 10/04/17  1923  10/06/17  0435 10/07/17  0441 10/08/17  0651   INR 1.4*  < > 1.8* 1.2 1.0   APTT 24.7  --   --   --   --    < > = values in this interval not displayed.  LDH:  No results for input(s): LDH in the last 72 hours.  Microbiology:  Microbiology Results (last 7 days)     Procedure Component Value Units Date/Time    Blood culture [[id number]] Collected:  10/04/17 2224    Order Status:  Completed Specimen:  Blood from Peripheral, Hand, Right Updated:  10/08/17 0612     Blood Culture, Routine No Growth to date     Blood Culture, Routine No Growth to date     Blood Culture, Routine No Growth to date     Blood Culture, Routine No Growth to date    Blood culture [[id number]] Collected:  10/04/17 2224    Order Status:  Completed Specimen:  Blood from Peripheral, Antecubital, Right Updated:  10/08/17 0612     Blood Culture, Routine No Growth to date     Blood Culture, Routine No Growth to date     Blood Culture, Routine No Growth to date     Blood  Culture, Routine No Growth to date    Blood culture [[id number]] Collected:  10/05/17 1929    Order Status:  Completed Specimen:  Blood from Line, Subclavian, Left Updated:  10/07/17 2212     Blood Culture, Routine No Growth to date     Blood Culture, Routine No Growth to date     Blood Culture, Routine No Growth to date    Blood culture [[id number]] Collected:  10/05/17 1206    Order Status:  Completed Specimen:  Blood Updated:  10/07/17 1812     Blood Culture, Routine No Growth to date     Blood Culture, Routine No Growth to date     Blood Culture, Routine No Growth to date    Narrative:       Left SC, From central line after aspirating Remodulin when  switching to hospital cassette    Blood culture [[id number]]     Order Status:  Canceled Specimen:  Blood     Clostridium difficile EIA [[id number]]     Order Status:  No result Specimen:  Stool from Stool     Stool culture [[id number]]     Order Status:  No result Specimen:  Stool from Stool     Stool culture [[id number]]     Order Status:  No result Specimen:  Stool from Stool           I have reviewed all pertinent labs within the past 24 hours.    Estimated Creatinine Clearance: 54.6 mL/min (based on SCr of 0.8 mg/dL).    Diagnostic Results:

## 2017-10-08 NOTE — PLAN OF CARE
Problem: Patient Care Overview  Goal: Plan of Care Review  Outcome: Ongoing (interventions implemented as appropriate)  Pt is AAOx4 in bed wearing non-skid footwear, free from fall/injury so far this shift, bed in low/locked position, call bell next to pt. Pt VSS, on tele, NSR in the 70's-80's. Pt no longer on comfort flow O2, pt being weaned down back to NC. Pt on 4L O2 NC c sats 91-94% so far this shift. Pt is afebrile at this time. Proper hand hygiene performed before and after pt care activities. Pt remodulin infusing to RCW maguire per orders, site and dressing CDI. Pt has not had a BM so far this shift, but did request prn dose of immodium at beginning of shift. Pt will possibly d/c on monday. Pt reminded to use call bell to call for assistance, pt verbalizes understanding. Will continue to monitor.

## 2017-10-08 NOTE — NURSING
Patient ambulated in [place] approximately 250 feet with standby assistance of nurse. Oxygen in place throughout. Patient denied chest pain, dizziness. Reported mild shortness of breath. Patient assisted back to bedside chair. Will continue to monitor. YENNI-RN

## 2017-10-09 VITALS
HEIGHT: 64 IN | SYSTOLIC BLOOD PRESSURE: 118 MMHG | BODY MASS INDEX: 22.99 KG/M2 | HEART RATE: 89 BPM | OXYGEN SATURATION: 92 % | TEMPERATURE: 98 F | RESPIRATION RATE: 18 BRPM | WEIGHT: 134.69 LBS | DIASTOLIC BLOOD PRESSURE: 58 MMHG

## 2017-10-09 PROBLEM — Q24.9 ADULT CONGENITAL HEART DISEASE: Status: ACTIVE | Noted: 2017-10-09

## 2017-10-09 LAB
ALBUMIN SERPL BCP-MCNC: 2.8 G/DL
ALP SERPL-CCNC: 48 U/L
ALT SERPL W/O P-5'-P-CCNC: 11 U/L
ANION GAP SERPL CALC-SCNC: 5 MMOL/L
AST SERPL-CCNC: 14 U/L
BASOPHILS # BLD AUTO: 0.01 K/UL
BASOPHILS NFR BLD: 0.3 %
BILIRUB SERPL-MCNC: 0.6 MG/DL
BUN SERPL-MCNC: 13 MG/DL
CALCIUM SERPL-MCNC: 7.9 MG/DL
CHLORIDE SERPL-SCNC: 115 MMOL/L
CO2 SERPL-SCNC: 21 MMOL/L
CREAT SERPL-MCNC: 0.8 MG/DL
DIFFERENTIAL METHOD: ABNORMAL
EOSINOPHIL # BLD AUTO: 0.1 K/UL
EOSINOPHIL NFR BLD: 3.8 %
ERYTHROCYTE [DISTWIDTH] IN BLOOD BY AUTOMATED COUNT: 15.2 %
EST. GFR  (AFRICAN AMERICAN): >60 ML/MIN/1.73 M^2
EST. GFR  (NON AFRICAN AMERICAN): >60 ML/MIN/1.73 M^2
GLUCOSE SERPL-MCNC: 92 MG/DL
HCT VFR BLD AUTO: 34.1 %
HGB BLD-MCNC: 11 G/DL
INR PPP: 1
LYMPHOCYTES # BLD AUTO: 1.1 K/UL
LYMPHOCYTES NFR BLD: 28.8 %
MAGNESIUM SERPL-MCNC: 1.9 MG/DL
MCH RBC QN AUTO: 25.6 PG
MCHC RBC AUTO-ENTMCNC: 32.3 G/DL
MCV RBC AUTO: 80 FL
MONOCYTES # BLD AUTO: 0.3 K/UL
MONOCYTES NFR BLD: 7.8 %
NEUTROPHILS # BLD AUTO: 2.2 K/UL
NEUTROPHILS NFR BLD: 59 %
PLATELET # BLD AUTO: 204 K/UL
PMV BLD AUTO: 9.1 FL
POTASSIUM SERPL-SCNC: 3.9 MMOL/L
PROT SERPL-MCNC: 5.5 G/DL
PROTHROMBIN TIME: 10.8 SEC
RBC # BLD AUTO: 4.29 M/UL
SODIUM SERPL-SCNC: 141 MMOL/L
WBC # BLD AUTO: 3.71 K/UL

## 2017-10-09 PROCEDURE — 25000003 PHARM REV CODE 250: Performed by: INTERNAL MEDICINE

## 2017-10-09 PROCEDURE — 83735 ASSAY OF MAGNESIUM: CPT

## 2017-10-09 PROCEDURE — 85610 PROTHROMBIN TIME: CPT

## 2017-10-09 PROCEDURE — 80053 COMPREHEN METABOLIC PANEL: CPT

## 2017-10-09 PROCEDURE — 85025 COMPLETE CBC W/AUTO DIFF WBC: CPT

## 2017-10-09 PROCEDURE — 36415 COLL VENOUS BLD VENIPUNCTURE: CPT

## 2017-10-09 PROCEDURE — 99239 HOSP IP/OBS DSCHRG MGMT >30: CPT | Mod: ,,, | Performed by: INTERNAL MEDICINE

## 2017-10-09 PROCEDURE — 25000003 PHARM REV CODE 250: Performed by: NURSE PRACTITIONER

## 2017-10-09 RX ORDER — FUROSEMIDE 40 MG/1
40 TABLET ORAL 2 TIMES DAILY
Status: DISCONTINUED | OUTPATIENT
Start: 2017-10-09 | End: 2017-10-09 | Stop reason: HOSPADM

## 2017-10-09 RX ADMIN — CETIRIZINE HYDROCHLORIDE 10 MG: 5 TABLET ORAL at 08:10

## 2017-10-09 RX ADMIN — ATORVASTATIN CALCIUM 10 MG: 10 TABLET, FILM COATED ORAL at 08:10

## 2017-10-09 RX ADMIN — FUROSEMIDE 40 MG: 40 TABLET ORAL at 08:10

## 2017-10-09 RX ADMIN — DILTIAZEM HYDROCHLORIDE 120 MG: 120 CAPSULE, COATED, EXTENDED RELEASE ORAL at 08:10

## 2017-10-09 RX ADMIN — BOSENTAN 125 MG: 125 TABLET, FILM COATED ORAL at 06:10

## 2017-10-09 NOTE — PLAN OF CARE
Problem: Patient Care Overview  Goal: Plan of Care Review  Outcome: Ongoing (interventions implemented as appropriate)  Pt is AAOx4 in bed wearing non-skid footwear, free from fall/injury so far this shift, bed in low/locked position, call bell next to pt. Pt VSS, on tele, NSR in the 70's-80's. Pt no longer on comfort Pt on 4L O2 NC c sats 90-94% so far this shift. Pt is afebrile at this time. Proper hand hygiene performed before and after pt care activities. Pt remodulin infusing to RCW maguire per orders, site and dressing CDI. Pt has not had a BM so far this shift, but did request prn dose of immodium at beginning of shift. Pt will possibly d/c td. Pt reminded to use call bell to call for assistance, pt verbalizes understanding. Will continue to monitor.

## 2017-10-09 NOTE — DISCHARGE SUMMARY
[facility]  Heart Transplant  Discharge Summary      Patient Name: Molly Smith  MRN: [id number]  Admission Date: 10/4/2017  Hospital Length of Stay: 4 days  Discharge Date and Time: 10/09/2017 3:14 PM  Attending Physician: No att. providers found   Discharging Provider: Ct Tang NP  Primary Care Provider: Alice Tim MD     HPI: 73 yo female with a PMHx of PAH (currently on remodulin IV and bosentan), PFO, chronic hypoxia on 3L at baseline and previous bacteremia (rosemonas) s/p line exchange who presents to the ED with worsening fatigue, diarrhea since the morning of 10/4/17, nausea and low grade temperature at home (100.4 being the highest). Also had a single event of hypoxia (SpO2 50s) that resolved without intervention.  is an ED physician and states they were both pretty worn out after spending last week with their grand kids in [place] and most of her symptoms manifested around the time of their trip home on Tuesday. Her home remodulin was recently increased a week ago and adempas was stopped after her recent RHC with Dr. Tim showed persistently elevated right sided pressures.    * No surgery found *     Hospital Course: Pt was placed on 100% FiO2 via HFNC on admit secondary to hypoxia. Blood cultures were drawn and remodulin was moved to peripheral line. Blood cultures were drawn from [place] as well. ID was consulted. He symptoms improved gradually throughout hospitalization and she was eventually weaned back to low flow NC. Cultures remained negative and remodulin was moved back to central access. We attributed fevers/hypoxia to possible viral infection. She ws eventually weaned down to 4L NC and discharged home in good condition. She will f/u in 1-2 weeks in PH clinic.     Consults         Status Ordering Provider     Inpatient consult to Infectious Diseases  Once     Provider:  (Not yet assigned)    Completed CT TANG     Inpatient consult to  Midline team  Once     Provider:  (Not yet assigned)    Completed GREGORY BAI          Pending Diagnostic Studies:     None        Final Active Diagnoses:    Diagnosis Date Noted POA    PRINCIPAL PROBLEM:  Pulmonary hypertension [I27.20] 08/24/2012 Yes    Acute on chronic respiratory failure with hypoxia [J96.21] 10/06/2017 Yes    Diarrhea [R19.7] 10/05/2017 Unknown    Acute respiratory insufficiency [R06.89] 10/05/2017 Yes    SVT (supraventricular tachycardia) [I47.1] 04/07/2017 Yes    Fever [R50.9] 07/19/2016 Yes      Problems Resolved During this Admission:    Diagnosis Date Noted Date Resolved POA      Discharged Condition: good    Disposition: Home or Self Care    Follow Up:    Patient Instructions:   No discharge procedures on file.  Medications:  Reconciled Home Medications:   Discharge Medication List as of 10/9/2017 10:30 AM      CONTINUE these medications which have NOT CHANGED    Details   atorvastatin (LIPITOR) 10 MG tablet Take 10 mg by mouth once daily.  , Starting 11/27/2007, Until Discontinued, Historical Med      bosentan (TRACLEER) 125 MG Tab Take 1 tablet (125 mg total) by mouth 3 (three) times daily., Starting 6/14/2016, Until Discontinued, No Print      CALCIUM CARBONATE (CALCIUM 600 ORAL) Take 2 tablets by mouth once daily.  , Starting 5/5/2009, Until Discontinued, Historical Med      diltiaZEM (CARDIZEM) 60 MG tablet Take 1 tablet (60 mg total) by mouth once daily., Starting 4/18/2017, Until Wed 4/18/18, Print      diltiazem (DILACOR XR) 120 MG 24 hr capsule Take 120 mg by mouth once daily.  , Starting 2/28/2005, Until Discontinued, Historical Med      furosemide (LASIX) 40 MG tablet Take 1 tablet (40 mg total) by mouth 2 (two) times daily., Starting 2/19/2014, Until Discontinued, Normal      Lactobacillus rhamnosus GG (CULTURELLE) 10 billion cell capsule Take 1 capsule by mouth once daily., Starting 7/26/2016, Until Discontinued, Normal      loratadine (CLARITIN) 10 mg tablet Take  10 mg by mouth once daily., Until Discontinued, Historical Med      promethazine (PHENERGAN) 6.25 mg/5 mL syrup Take 10 mLs (12.5 mg total) by mouth every 6 (six) hours as needed for Nausea., Starting 5/17/2016, Until Discontinued, Normal      sodium chloride 0.9% 0.9 % SolP 100 mL with treprostinil 1 mg/mL Soln 9,000,000 ng Inject 5,005.5 ng/min into the vein continuous., Starting 7/25/2016, Until Discontinued, Print      spironolactone (ALDACTONE) 25 MG tablet Take 25 mg by mouth once daily.  , Starting 2/28/2005, Until Discontinued, Historical Med      tramadol (ULTRAM-ER) 200 MG Tb24 Take 1 tablet (200 mg total) by mouth once daily., Starting Thu 8/17/2017, Until Fri 12/15/2017, Normal      TREPROSTINIL SODIUM (TREPROSTINIL, REMODULIN, PUMP FOR HOME USE) Pt currently on 71ng/kg/min=36mL/24 hr, continuous IV infusion, No Print      warfarin (COUMADIN) 10 MG tablet Take 1 tablet (10 mg total) by mouth Daily., Starting 7/26/2016, Until Wed 7/26/17, Print             Joe Arellano NP  Heart Transplant  [facility]

## 2017-10-09 NOTE — NURSING
Patient discharged to home accompanied by . Reviewed discharged instructions with patient. Patient verbalized understanding of follow-up on 11/28 for fasting labs, followed by 6 minute walk test, then appointment with Dr. Tim. Patient verbalized understanding that she should contact [facility] to confirm if Dr. Tim wants to see her sooner than this scheduled time. Reviewed medications with patient. Patient verbalized understanding of when next dose of scheduled meds due. Left upper arm midline removed with catheter tip intact. Telemetry monitoring discontinued. Spouse in room to mix home dose of IV remodulin.  instructed to notify nursing once completed so that line can be primed and patient can be switched from hospital dose to home dose. Verbalized understanding. VS stable. Patient denies concerns or questions. YENNI-RN

## 2017-10-09 NOTE — PROGRESS NOTES
D/c note:    SW to pt's room for d/c with no needs identified. Pt and  not in room. Per RN, pt D/C'ed a few minutes ago. SW providing psychosocial counseling and support, education, assistance, resources, and D/C planning as indicated. SW remains available.

## 2017-10-10 LAB
BACTERIA BLD CULT: NORMAL

## 2017-10-11 ENCOUNTER — PATIENT OUTREACH (OUTPATIENT)
Dept: ADMINISTRATIVE | Facility: CLINIC | Age: 72
End: 2017-10-11

## 2017-10-11 NOTE — PHYSICIAN QUERY
PT Name: Molly Smith  MR #: [id number]    Physician Query Form - Atrial Flutter Specificity Clarification     CDS/: Andie Florentino RN, CCDS              Contact information: [email or web address]  This form is a permanent document in the medical record.     Query Date: October 11, 2017    By submitting this query, we are merely seeking further clarification of documentation. Please utilize your independent clinical judgment when addressing the question(s) below.    The medical record contains the following:   Indicators     Supporting Clinical Findings Location in Medical Record   X Atrial Flutter SVT/Atrial Flutter   hx of atrial flutter on long term coumadin H/p 10/5  10/9 d/c summary    EKG results     X Medication Continue pta diliazem H/p 10/5    Treatment      Other       Provider, please further specify the Atrial Flutter diagnosis.    [  ] Atypical  [  ] Type I  [  ] Type II  [  ] Typical  [  ] Other (please specify): ___________________________________  [x  ] Clinically Undetermined    Please document in your progress notes daily for the duration of treatment until resolved, and include in your discharge summary.

## 2017-10-11 NOTE — PHYSICIAN QUERY
PT Name: Molly Smith  MR #: [id number]     Physician Query Form - Documentation Clarification      CDS/: Andie Florentino RN, CCDS          Contact information: [email or web address]    This form is a permanent document in the medical record.     Query Date: October 11, 2017    By submitting this query, we are merely seeking further clarification of documentation. Please utilize your independent clinical judgment when addressing the question(s) below.    The Medical record reflects the following:    Supporting Clinical Findings Location in Medical Record     K+ 3.4        10/5, 10/6 lab     Potassium Chloride 40 meq po x1        10/6 mar                                                                            Doctor, Please specify diagnosis or diagnoses associated with above clinical findings.    Provider Use Only        (   x )  Hypokalemia    (    )  Other_________________                                                                                                                       [  ] Clinically undetermined

## 2017-10-11 NOTE — PATIENT INSTRUCTIONS
Pulmonary Hypertension  Pulmonary hypertension is high pressure in the blood vessels that carry blood into the lungs. This strains the lungs and heart and can lead to serious problems.  Systemic hypertension means the pressure is too high in blood vessels throughout the body. A person with pulmonary hypertension may also have systemic hypertension.   What causes pulmonary hypertension?  The cause of pulmonary hypertension is sometimes unknown. But it is most often caused by another health problem. In many cases, controlling this problem can help prevent or control pulmonary hypertension. Some of the most common causes of pulmonary hypertension are:  In children  · Severe lung problems in a   · Lung conditions, such as cystic fibrosis or interstitial lung disease  · Heart disease  · Congenital heart defects  · HIV infection  · Other conditions, such as scleroderma, lupus, or sickle cell disease  In adults  · Lung conditions, such as chronic obstructive pulmonary disease (COPD), advanced bronchitis, cystic fibrosis, or pulmonary fibrosis  · Liver disease  · Blood clots in the lungs  · Left-sided heart failure  · HIV infection  · Sleep apnea  · Other conditions, such as scleroderma, lupus, or sickle cell disease  What are the symptoms of pulmonary hypertension?  Symptoms may come on suddenly. Or, they may come on slowly over time. Symptoms can include:  · Shortness of breath  · Blue lips or fingernails (signs that the body is having trouble getting oxygen)  · Tiring quickly, especially when active  · Fast heartbeat  · Pain in the upper right side of the abdomen  · Swelling in the legs or ankles  · Chest pain or pressure  · Fainting or dizzy spells  How is pulmonary hypertension diagnosed?  Your healthcare provider will examine you and listen to your heart and lungs. Your blood pressure will also be measured. Tests may be done as well. These may include:  · Blood tests. These measure certain body functions.  They also check for problems such as infection.  · A chest X-ray. This takes a picture of the inside of the chest. It can show certain heart and lung problems.  · An electrocardiogram (ECG). This test records the hearts electrical activity.  · An echocardiogram (echo). This test uses sound waves to create a moving picture of the heart.  · Pulmonary function tests. These tests measure breathing and lung capacity.  · Cardiac catheterization. This procedure gives detailed information about the hearts structures. A thin tube (catheter) is put into a blood vessel in the groin or neck and guided into the right side of the heart. Certain blood pressure tests are then done.  How is pulmonary hypertension treated?   Treatment depends on your age, health, and the severity of your symptoms. Any underlying health problems you have will be treated. Treatment may also include:  · Oxygen  · Medicine to lower the pressure in the lung blood vessels  · Medicine to help the body lose excess water  · Medicine to prevent blood clots  · Medicine that help the heart beat stronger, pump more blood and control abnormal heart rhythms  What are the long-term concerns?  Though pulmonary hypertension has no cure, certain treatments may relieve symptoms and slow progression of the disease. In rare and severe cases, a lung transplant may be needed. Your healthcare provider can tell you more about this if needed.  When you should call your healthcare provider  Call your healthcare provider right away if you have any of the following:  · Persistent blueness of lips or fingernails  · Shortness of breath  · Fever of 100.4°F (38.0°C) or higher  · Fainting spells   Date Last Reviewed: 1/1/2017  © 0035-8576 [employer]. [address] [place] [address], [place] [address]. All rights reserved. This information is not intended as a substitute for professional medical care. Always follow your healthcare professional's instructions.

## 2017-10-16 ENCOUNTER — OFFICE VISIT (OUTPATIENT)
Dept: TRANSPLANT | Facility: CLINIC | Age: 72
End: 2017-10-16
Payer: MEDICARE

## 2017-10-16 ENCOUNTER — HOSPITAL ENCOUNTER (OUTPATIENT)
Dept: PULMONOLOGY | Facility: CLINIC | Age: 72
Discharge: HOME | End: 2017-10-16
Payer: MEDICARE

## 2017-10-16 ENCOUNTER — DOCUMENTATION ONLY (OUTPATIENT)
Dept: TRANSPLANT | Facility: CLINIC | Age: 72
End: 2017-10-16

## 2017-10-16 VITALS
DIASTOLIC BLOOD PRESSURE: 63 MMHG | HEIGHT: 67 IN | WEIGHT: 127 LBS | BODY MASS INDEX: 22.5 KG/M2 | SYSTOLIC BLOOD PRESSURE: 101 MMHG | HEIGHT: 63 IN | BODY MASS INDEX: 20.55 KG/M2 | OXYGEN SATURATION: 89 % | WEIGHT: 130.94 LBS | HEART RATE: 101 BPM

## 2017-10-16 DIAGNOSIS — I27.20 PULMONARY HYPERTENSION: ICD-10-CM

## 2017-10-16 DIAGNOSIS — I27.9 CHRONIC PULMONARY HEART DISEASE: ICD-10-CM

## 2017-10-16 DIAGNOSIS — I27.21 PAH (PULMONARY ARTERY HYPERTENSION): Primary | ICD-10-CM

## 2017-10-16 DIAGNOSIS — G89.29 OTHER CHRONIC PAIN: ICD-10-CM

## 2017-10-16 DIAGNOSIS — Z79.01 ANTICOAGULANT LONG-TERM USE: Chronic | ICD-10-CM

## 2017-10-16 DIAGNOSIS — J96.11 CHRONIC RESPIRATORY FAILURE WITH HYPOXIA: ICD-10-CM

## 2017-10-16 DIAGNOSIS — Q24.9 ADULT CONGENITAL HEART DISEASE: ICD-10-CM

## 2017-10-16 DIAGNOSIS — Q26.3 PARTIAL ANOMALOUS PULMONARY VENOUS CONNECTION (PAPVC): ICD-10-CM

## 2017-10-16 PROCEDURE — 94620 PR PULMONARY STRESS TESTING,SIMPLE: CPT | Mod: PBBFAC | Performed by: INTERNAL MEDICINE

## 2017-10-16 PROCEDURE — 94620 PR PULMONARY STRESS TESTING,SIMPLE: CPT | Mod: 26,S$PBB,, | Performed by: INTERNAL MEDICINE

## 2017-10-16 PROCEDURE — 99999 PR PBB SHADOW E&M-EST. PATIENT-LVL III: CPT | Mod: PBBFAC,,, | Performed by: INTERNAL MEDICINE

## 2017-10-16 PROCEDURE — 99213 OFFICE O/P EST LOW 20 MIN: CPT | Mod: PBBFAC | Performed by: INTERNAL MEDICINE

## 2017-10-16 PROCEDURE — 99214 OFFICE O/P EST MOD 30 MIN: CPT | Mod: S$PBB,,, | Performed by: INTERNAL MEDICINE

## 2017-10-16 RX ORDER — TRAMADOL HYDROCHLORIDE 50 MG/1
50 TABLET ORAL EVERY 6 HOURS PRN
Qty: 90 TABLET | Refills: 3 | Status: SHIPPED | OUTPATIENT
Start: 2017-10-16 | End: 2017-10-26

## 2017-10-16 NOTE — PROCEDURES
Molly Smith is a 72 y.o.  female patient, who presents for a 6 minute walk test ordered by MD. Meeta.  The diagnosis is Pulmonary Hypertension.  The patient's BMI is 22.5 kg/m2.  Predicted distance (lower limit of normal) is 323.67 meters.      Test Results:    The test was completed without stopping.  The total time walked was 360 seconds.  During walking, the patient reported:  Dyspnea. The patient used no assistive devices and supplemental oxygen during testing.     10/16/2017---------Distance: 225.55 meters (740 feet)     O2 Sat % Supplemental Oxygen Heart Rate Blood Pressure Roberto Scale   Pre-exercise  (Resting) 92 % 4 L/M 108 bpm 100/55 mmHg 0.5   During Exercise 84 % 4 L/M 122 bpm 107/57 mmHg 9   Post-exercise  (Recovery) 91 % 4 L/M  113 bpm   mmHg       Recovery Time: 106 seconds    Performing nurse/tech: Justin PALOMARES      PREVIOUS STUDY:   The patient had a previous study.  05/08/2017---------Distance: 283.46 meters (930 feet)       O2 Sat % Supplemental Oxygen Heart Rate Blood Pressure Roberto Scale   Pre-exercise  (Resting) 94 % 4 L/M 97 bpm 102/60 mmHg 0.5   During Exercise 86 % 4 L/M 108 bpm 100/59 mmHg 4   Post-exercise  (Recovery) 92 % 4 L/M  100 bpm   mmHg           CLINICAL INTERPRETATION:  Six minute walk distance is 225.55 meters (740 feet) with very, very heavy dyspnea.  During exercise, there was significant desaturation while breathing supplemental oxygen.  Both blood pressure and heart rate remained stable with walking.  Tachycardia was present prior to exercise.  The patient did not report non-pulmonary symptoms during exercise.  Significant exercise impairment is likely due to desaturation.  Since the previous study in May 2017, exercise capacity is unchanged.  Based upon age and body mass index, exercise capacity is less than predicted.

## 2017-10-16 NOTE — PROGRESS NOTES
Subjective:      HPI  Delightful 72 y.o. 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 She also has h/o Rosemonas bacteremia s/p central line removal, and IV abx per ID with replacement by nephrology.    Pt was admitted 10/4-10/9 with worsening fatigue, diarrhea since the morning of 10/4/17, nausea and low grade temperature at home (100.4 being the highest). Also had a single event of hypoxia (SpO2 50s) that resolved without intervention. During that hospitalization she was placed on 100% FiO2 via HFNC on admit secondary to hypoxia. Blood cultures were drawn and remodulin was moved to peripheral line. Blood cultures were drawn from [facility] as well. ID was consulted. He symptoms improved gradually throughout hospitalization and she was eventually weaned back to low flow NC. Cultures remained negative and remodulin was moved back to central access. They attributed fevers/hypoxia to possible viral infection. She ws eventually weaned down to 4L NC and discharged home in good condition.    Since discharge, she reports feeling better. She denies f/c, no n/v; Her diarrhea has been stable if not improved. However, she reports that her O2 sats are still not quite as good as they were a month ago. She desats pretty quickly with ambulation and experiences some chest pressure/discomfort along with palpitations. She reports that these symptoms resolve with rest. Central line is clean.     Remodulin is at 73ng/kg/min and on Tracleer    6MWT: 225m (283m May, 222m Mar, 375m Nov, 305 Sept, 274.5 July,  335.5May, 365  m prev 2 visits)                        O2 sat  94->86% (without O2 started 92%->84%                             "                               -->122                                                     BP  100/ 55  ->107 /57                                                      Roberto 0.5  ->9    TTE 5/8/17:    1 - Right ventricular enlargement with hypertrophy, with severely depressed systolic function.     2 - Moderate to severe tricuspid regurgitation.     3 - Pulmonary hypertension. The estimated PA systolic pressure is 116 mmHg.     4 - Biatrial enlargement.     5 - Normal left ventricular systolic function (EF 60-65%); reduced LV stroke volume.  The right ventricle is enlarged measuring 5.8 cm at the base in the apical right ventricle-focused view. There is RVH. Global right ventricular systolic function appears severely depressed. There is abnormal septal motion consistent with   RV pressure/volume overload. Tricuspid annular plane systolic excursion (TAPSE) is 1.5 cm. The estimated PA systolic pressure is 116 mmHg.     [facility] 9/25/17:  CONDITION 1 (9/25/2017 11:20:07):  RA: 5/2 (1)  RV: 84/-5  RVEDP: 6     PW: 17/19 (16)  PA: 87/21 (47)  AO_SAT: 95  AO: 115/71 (86)  PA_SAT: 71  SPO2: 97  FICKCI: 3.2800  FICKCO: 5.5500    Review of Systems   Constitution: Negative for chills, fever, malaise/fatigue, weight gain and weight loss.   HENT: Negative.    Eyes: Negative.    Cardiovascular: Positive for dyspnea on exertion and palpitations. Negative for chest pain, leg swelling, near-syncope, orthopnea, paroxysmal nocturnal dyspnea and syncope.   Respiratory: Negative for cough and shortness of breath.    Endocrine: Negative.    Skin: Negative.    Musculoskeletal: Positive for arthritis and joint pain. Negative for back pain.   Gastrointestinal: Negative for bloating, abdominal pain, change in bowel habit and nausea.   Neurological: Negative for dizziness and light-headedness.   Psychiatric/Behavioral: Negative for depression.        Objective:   /63   Pulse 101   Ht 5' 7" (1.702 m)   Wt 59.4 kg (130 lb 15.3 oz)   " SpO2 (!) 89% Comment: Pt is on 4L of O2 at time of reading  BMI 20.51 kg/m²       Physical Exam   Constitutional: She is oriented to person, place, and time. She appears well-developed and well-nourished.   HENT:   Head: Normocephalic and atraumatic.   Eyes: Right eye exhibits no discharge. Left eye exhibits no discharge.   Neck: Neck supple. No JVD present. No thyromegaly present.   Cardiovascular: Regular rhythm.  Exam reveals no gallop and no friction rub.    No murmur heard.  Tachy, accentuated S2     Pulmonary/Chest: Effort normal and breath sounds normal. No respiratory distress. She has no wheezes. She has no rales.   Abdominal: Soft. Bowel sounds are normal. She exhibits no distension. There is no tenderness.   Musculoskeletal: Normal range of motion. She exhibits no edema or tenderness.   Neurological: She is alert and oriented to person, place, and time. No cranial nerve deficit. Coordination normal.   Skin: Skin is warm and dry. No rash noted.   Psychiatric: She has a normal mood and affect. Judgment and thought content normal.           Chemistry        Component Value Date/Time     10/09/2017 0458    K 3.9 10/09/2017 0458     (H) 10/09/2017 0458    CO2 21 (L) 10/09/2017 0458    BUN 13 10/09/2017 0458    CREATININE 0.8 10/09/2017 0458    GLU 92 10/09/2017 0458        Component Value Date/Time    CALCIUM 7.9 (L) 10/09/2017 0458    ALKPHOS 48 (L) 10/09/2017 0458    AST 14 10/09/2017 0458    ALT 11 10/09/2017 0458    BILITOT 0.6 10/09/2017 0458            Magnesium   Date Value Ref Range Status   10/09/2017 1.9 1.6 - 2.6 mg/dL Final       Lab Results   Component Value Date    WBC 4.88 10/16/2017    HGB 12.7 10/16/2017    HCT 40.3 10/16/2017    MCV 81 (L) 10/16/2017     10/16/2017         BNP   Date Value Ref Range Status   10/04/2017 208 (H) 0 - 99 pg/mL Final     Comment:     Values of less than 100 pg/ml are consistent with non-CHF populations.   09/25/2017 366 (H) 0 - 99 pg/mL Final      Comment:     Values of less than 100 pg/ml are consistent with non-CHF populations.   05/08/2017 189 (H) 0 - 99 pg/mL Final     Comment:     Values of less than 100 pg/ml are consistent with non-CHF populations.                 Assessment:       1. Pulmonary hypertension- WHO Group 1,6mw distance a little better today though not at goal.   BNP mildly increased but in her usual range, FC II-III on triple tx- echo today has me worried as RV is enlarged and severely depressed, no effusion though which is good.   2. PFO (patent foramen ovale)    3. Palpitations/tachycardia- stale, followed by EP, on CCB  4. Chronic hypoxemic resp failure- stable on O2  5. PAPVR,sinus venosus ASD- decision made to treat conservatively     Plan:    - Stable since d/c; per patient, her family all go sick just after she was admitted so favor her admission diagnosis related to viral illness (gastroenteritis +/- URI);Hopefully this continues to improve in the coming days/weeks (and thus her lower than normal sats)  - no med changes today; continue up-titrating Remodulin, will go 1ng per week for now; new dosing sheet to be sent  - will discuss with Dr. Tim further tx strategy as she has f/u with her on 11/28.   - Keep salt intake to under 2000 mg sodium, fluids to under 2 L (64 oz)  - Tramadol 50mg q6hrs prn written (in place of 200mg ER tablets that her insurance company no longer covers)    Check weights every morning after getting out of bed and urinating. If  weight goes up 3# overnight or 5# in one week she should take 40 mg lasix in the pm and call us if fluid doesn't come off.    THONG oLvelace MD  Transplant Cardiology

## 2017-10-18 ENCOUNTER — TELEPHONE (OUTPATIENT)
Dept: TRANSPLANT | Facility: CLINIC | Age: 72
End: 2017-10-18

## 2017-10-18 DIAGNOSIS — I27.9 CHRONIC PULMONARY HEART DISEASE: Primary | ICD-10-CM

## 2017-10-18 NOTE — TELEPHONE ENCOUNTER
----- Message from Migdalia Gomez sent at 10/18/2017  1:05 PM CDT -----  Contact: Francesca patel/ [employer]  Francesca called about the pt's adverse reaction to the bosentan (TRACLEER) 125 MG Tab.   She was following up with some questions.  She can be reached @ [phone].  Thanks!!

## 2017-11-07 ENCOUNTER — DOCUMENTATION ONLY (OUTPATIENT)
Dept: TRANSPLANT | Facility: CLINIC | Age: 72
End: 2017-11-07

## 2017-11-07 ENCOUNTER — TELEPHONE (OUTPATIENT)
Dept: TRANSPLANT | Facility: CLINIC | Age: 72
End: 2017-11-07

## 2017-11-07 DIAGNOSIS — R52 PAIN: Primary | ICD-10-CM

## 2017-11-07 RX ORDER — TRAMADOL HYDROCHLORIDE 200 MG/1
200 TABLET, EXTENDED RELEASE ORAL DAILY
Qty: 30 TABLET | Refills: 1
Start: 2017-11-07 | End: 2017-11-17

## 2017-11-07 NOTE — TELEPHONE ENCOUNTER
Ok'd per Dr Tim to restart Adempas 0.5mg. Pt reports she has unopened package on hand. Raghu notified, and [facility] notified.

## 2017-11-07 NOTE — TELEPHONE ENCOUNTER
Email correspondence w/patient (starts at bottom and moves up).     We can send for the , but the issue the last time, if I remember correctly, was that the insurance would not pay for them. Please let me know what you'd like me to do, and I can send the request to Dr Tim.  Albertina     -----Original Message-----  From: Abdiel Gilbert [[email or web address]]   Sent: Monday, November 06, 2017 6:11 PM  To: Albertina Diallo <[email or web address]>  Subject:     THIS EMAIL IS FROM AN EXTERNAL SENDER! DO NOT click links or provide your User ID or Password if the sender is unknown.    Also e xperiencing lots of bone pain as I increase. Tramadol 50 not controlling it. Can I go back to ?    Sent from my iPhone        -------------    Good morning-You should still have one more increase to make, to reach 80.9ng. Do you have that last line? I am not sure if Dr Tim wants you to continue with increases or not. I am going to let her know you are continuing to feel very short of breath. I know it is never a great thing to say, but I have to advise ER if you continue to feel very short of breath. We can look into mixing ahead of time when you are no longer titrating--I could ask Max at [facility] to send you the Remodulin diluent to do this.   Albertina       -----Original Message-----  From: Abdiel Gilbert [[email or web address]]   Sent: Monday, November 06, 2017 4:59 PM  To: Albertina Diallo <[email or web address]>  Subject:     THIS EMAIL IS FROM AN EXTERNAL SENDER! DO NOT click links or provide your User ID or Password if the sender is unknown.    Vasile Eng  I need the next dosing sheet. Ive been increasing every change. Can I mix my cassette ahead of time. Im on rate of 47, dose 79.3 and still very short of breath - in the 70s & on 5L oxygen    Sent from my iPhone

## 2017-11-07 NOTE — TELEPHONE ENCOUNTER
Per Dr Tim, patient should not increase Remodulin further. Dr Tim recommends patient come to ER. Patient informed of same.

## 2017-11-07 NOTE — TELEPHONE ENCOUNTER
Hi-Yes, sometimes this can be typical for PH. Your right heart cath still showed that you have significant/severe PH, so the oxygen dependency could definitely be related to this. However, it is possible that something else could be developing (we've seen a few pneumonias, etc). If things don't improve, please don't hesitate to come.  Thank you,  Albertina     -----Original Message-----  From: Abdiel Gilbert [[email or web address]]   Sent: Tuesday, November 07, 2017 9:26 AM  To: Albertina Diallo <[email or web address]>  Subject: Re: RE: RE: FW:     THIS EMAIL IS FROM AN EXTERNAL SENDER! DO NOT click links or provide your User ID or Password if the sender is unknown.    I've ever been so dependent on supplemental oxygen. Is this typical of PH? With all of my tests good, including the right heart cath, I don't know what to think

## 2017-11-07 NOTE — TELEPHONE ENCOUNTER
Gabbi-I will ask Dr Tim about the Adempas...I am not sure on that.  I think if you are very short of breath, the ER could be helpful in supporting higher oxygen needs, to hopefully help you feel better. They could also assess you to see if you need admission for possible fluid overload, as well.   I am not sure if this is what is to be expected, or not, and I truly wish I had better answers for you. I really hope we are able to help maximize how you feel so you can feel good and have energy to enjoy all of your upcoming activities.  You can take the tramadol every 6 hours, and yes, the pain should decrease as you settle out on the dose you are on. It is highly possible you've had more pain/discomfort because you have increased with every cassette change.   Albertina     -----Original Message-----  From: Abidel Gilbert [[email or web address]]   Sent: Tuesday, November 07, 2017 8:43 AM  To: Albertina Diallo <[email or web address]>  Subject: Re: FW:     THIS EMAIL IS FROM AN EXTERNAL SENDER! DO NOT click links or provide your User ID or Password if the sender is unknown.    When I was feeling better, I was also on that very low dose of Adempas. What about trying that again?   I thought about ER a couple of days ago but I was just there and all my tests were good. What else can be done?   Actually , day before yesterday I felt a bit  less short of breath. On 5 L I even hit 90% once. But yesterday ( I didnt realize the tubing had disconnected) I immediately couldnt breathe and dropped to 63%.   Is this just what is to be expected? Sudeep got my son coming from [place] [facility] & my other son getting  in [place] a week later.

## 2017-11-21 ENCOUNTER — TELEPHONE (OUTPATIENT)
Dept: TRANSPLANT | Facility: CLINIC | Age: 72
End: 2017-11-21

## 2017-11-21 NOTE — TELEPHONE ENCOUNTER
I am happy to hear that you are doing a little better! I hope you have a great time away and enjoy the wedding. Yes, saw that you had an appointment. We will see you then!  Albertina     From: Abdiel Gilbert [[email or web address]]   Sent: Tuesday, November 21, 2017 3:48 PM  To: Albertina Diallo <[email or web address]>  Subject: Re: Checking on you     THIS EMAIL IS FROM AN EXTERNAL SENDER!  STOP! Do you trust this email?  If you are unsure DO NOT click any links and NEVER input your username and password.          Actually Im doing a little better. Oxygens back between 78ish and mid 80s. Feeling better than a couple weeks ago. Leaving [facility] Tues to go to [place] - my son is getting .   Happy thanksgiving to you too. I have an appt on Dec 5  Sent from my iPhone    On Nov 21, 2017, at 3:33 PM, Albertina Diallo <[email or web address]> wrote:  Hi Ms Smith-I wanted to check in to see how things were going back on the Adeas, and I also wanted to wish you a Happy Thanksgiving! I was going to call, but I am headed out the door in a little while, and I am currently on the phone (again!)  When you have a moment, please update me on how you are doing.  Albertina

## 2017-11-30 ENCOUNTER — TELEPHONE (OUTPATIENT)
Dept: TRANSPLANT | Facility: CLINIC | Age: 72
End: 2017-11-30

## 2017-11-30 NOTE — TELEPHONE ENCOUNTER
E mail correspondence from patient:      Hospitalized in [place]-repeat case gastroenteritis with marginal pressures and pulse ox [id number]%.  How coordinate management here with Lisets input?  --------------------------------------------------    Hi Ms Smith-I am so sorry to hear this! The GI thing has definitely been going around. I hate to hear that you are still struggling with oxygenation as well.   The easiest way to coordinate your care is for the physician caring for you in [place] to call [phone] or [phone], and ask to page Dr Tim.   Do you have sufficient medication with you? If not, I can see if the specialty pharmacy can help us with that.  Take care,  Albertina

## 2017-12-05 ENCOUNTER — OFFICE VISIT (OUTPATIENT)
Dept: TRANSPLANT | Facility: CLINIC | Age: 72
End: 2017-12-05
Payer: MEDICARE

## 2017-12-05 ENCOUNTER — HOSPITAL ENCOUNTER (OUTPATIENT)
Dept: PULMONOLOGY | Facility: CLINIC | Age: 72
Discharge: HOME | End: 2017-12-05
Payer: MEDICARE

## 2017-12-05 VITALS
HEART RATE: 92 BPM | HEIGHT: 67 IN | OXYGEN SATURATION: 88 % | DIASTOLIC BLOOD PRESSURE: 48 MMHG | BODY MASS INDEX: 21.63 KG/M2 | SYSTOLIC BLOOD PRESSURE: 96 MMHG | WEIGHT: 137.81 LBS

## 2017-12-05 VITALS — HEIGHT: 63 IN | WEIGHT: 130 LBS | BODY MASS INDEX: 23.04 KG/M2

## 2017-12-05 DIAGNOSIS — D64.9 SYMPTOMATIC ANEMIA: Primary | ICD-10-CM

## 2017-12-05 DIAGNOSIS — R19.7 DIARRHEA, UNSPECIFIED TYPE: ICD-10-CM

## 2017-12-05 DIAGNOSIS — D52.0 DIETARY FOLATE DEFICIENCY ANEMIA: ICD-10-CM

## 2017-12-05 DIAGNOSIS — Q21.16 SINUS VENOSUS DEFECT: ICD-10-CM

## 2017-12-05 DIAGNOSIS — J96.11 CHRONIC RESPIRATORY FAILURE WITH HYPOXIA: ICD-10-CM

## 2017-12-05 DIAGNOSIS — Z00.00 ENCOUNTER FOR SCREENING AND PREVENTATIVE CARE: ICD-10-CM

## 2017-12-05 DIAGNOSIS — E44.1 MILD PROTEIN-CALORIE MALNUTRITION: ICD-10-CM

## 2017-12-05 DIAGNOSIS — Q26.3 PARTIAL ANOMALOUS PULMONARY VENOUS CONNECTION (PAPVC): ICD-10-CM

## 2017-12-05 DIAGNOSIS — I48.3 TYPICAL ATRIAL FLUTTER: ICD-10-CM

## 2017-12-05 DIAGNOSIS — Q24.9 ADULT CONGENITAL HEART DISEASE: ICD-10-CM

## 2017-12-05 DIAGNOSIS — I27.20 PULMONARY HYPERTENSION: ICD-10-CM

## 2017-12-05 DIAGNOSIS — R79.9 ABNORMAL FINDING OF BLOOD CHEMISTRY: ICD-10-CM

## 2017-12-05 DIAGNOSIS — R00.2 PALPITATIONS: ICD-10-CM

## 2017-12-05 DIAGNOSIS — I27.9 CHRONIC PULMONARY HEART DISEASE: ICD-10-CM

## 2017-12-05 DIAGNOSIS — I47.10 SVT (SUPRAVENTRICULAR TACHYCARDIA): ICD-10-CM

## 2017-12-05 PROBLEM — J96.21 ACUTE ON CHRONIC RESPIRATORY FAILURE WITH HYPOXIA: Status: RESOLVED | Noted: 2017-10-06 | Resolved: 2017-12-05

## 2017-12-05 PROCEDURE — 94620 PR PULMONARY STRESS TESTING,SIMPLE: CPT | Mod: 26,S$PBB,, | Performed by: INTERNAL MEDICINE

## 2017-12-05 PROCEDURE — 99213 OFFICE O/P EST LOW 20 MIN: CPT | Mod: PBBFAC | Performed by: INTERNAL MEDICINE

## 2017-12-05 PROCEDURE — 94620 PR PULMONARY STRESS TESTING,SIMPLE: CPT | Mod: PBBFAC | Performed by: INTERNAL MEDICINE

## 2017-12-05 PROCEDURE — 99999 PR PBB SHADOW E&M-EST. PATIENT-LVL III: CPT | Mod: PBBFAC,,, | Performed by: INTERNAL MEDICINE

## 2017-12-05 PROCEDURE — 99214 OFFICE O/P EST MOD 30 MIN: CPT | Mod: S$PBB,,, | Performed by: INTERNAL MEDICINE

## 2017-12-05 NOTE — PROGRESS NOTES
Subjective:      HPI  Delightful 72 y.o. 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 She also has h/o Rosemonas bacteremia s/p central line removal, and IV abx per ID with replacement by nephrology.    Pt was admitted 10/4-10/9 with worsening fatigue, diarrhea since the morning of 10/4/17, nausea and low grade temperature at home (100.4 being the highest). Also had a single event of hypoxia (SpO2 50s) that resolved without intervention. During that hospitalization she was placed on 100% FiO2 via HFNC on admit secondary to hypoxia. Blood cultures were drawn and remodulin was moved to peripheral line. Blood cultures were drawn from [facility] as well. ID was consulted. He symptoms improved gradually throughout hospitalization and she was eventually weaned back to low flow NC. Cultures remained negative and remodulin was moved back to central access. They attributed fevers/hypoxia to possible viral infection. She ws eventually weaned down to 4L NC and discharged home in good condition.    She was then admitted at [facility] 11/29/17 with hypotension w severe vomiting, fever 101.9, chills and diarrhea- she was over hydrated in the ER, GI w/u was (-), had recently increased her remodulin- was discharged in time to go to her son's wedding- since her return home, thinks she might be still carrying fluid- wt was 129-130 when she left home and felt bloated then- was 137# on arrival to the hosp- her cardizem was stopped as her bp was running in the low 90's- (70-80's at night)  Her palps have been quiet even without the CCB.  Lasix is 40mg in am, 20mg in midday, and aldactone.  Still has loose stools but not watery diarrhea  like it was- took until yesterday before she began to have an appetite        Remodulin is at 78 ng/kg/min and on Tracleer, adempas 0.5mg tid    6MWT: 30.5m (225m (283m May, 222m Mar, 375m Nov, 305 Sept, 274.5 July,  335.5May, 365  m prev 2 visits)                        O2 sat  91->80% (without O2 started 92%->84%                                                           HR 86-->104                                                     BP  105/ 58  ->104 /59                                                      Roberto 3  ->7-8    TTE 5/8/17:    1 - Right ventricular enlargement with hypertrophy, with severely depressed systolic function.     2 - Moderate to severe tricuspid regurgitation.     3 - Pulmonary hypertension. The estimated PA systolic pressure is 116 mmHg.     4 - Biatrial enlargement.     5 - Normal left ventricular systolic function (EF 60-65%); reduced LV stroke volume.  The right ventricle is enlarged measuring 5.8 cm at the base in the apical right ventricle-focused view. There is RVH. Global right ventricular systolic function appears severely depressed. There is abnormal septal motion consistent with   RV pressure/volume overload. Tricuspid annular plane systolic excursion (TAPSE) is 1.5 cm. The estimated PA systolic pressure is 116 mmHg.     [facility] 9/25/17:  CONDITION 1 (9/25/2017 11:20:07):  RA: 5/2 (1)  RV: 84/-5  RVEDP: 6     PW: 17/19 (16)  PA: 87/21 (47)  AO_SAT: 95  AO: 115/71 (86)  PA_SAT: 71  SPO2: 97  FICKCI: 3.2800  FICKCO: 5.5500    Review of Systems   Constitution: Negative for chills, fever, malaise/fatigue, weight gain and weight loss.   HENT: Negative.    Eyes: Negative.    Cardiovascular: Positive for dyspnea on exertion and palpitations. Negative for chest pain, leg swelling, near-syncope, orthopnea, paroxysmal nocturnal dyspnea and syncope.   Respiratory: Negative for cough and shortness of breath.    Endocrine: Negative.    Skin: Negative.    Musculoskeletal: Positive for arthritis and  "joint pain. Negative for back pain.   Gastrointestinal: Negative for bloating, abdominal pain, change in bowel habit and nausea.   Neurological: Negative for dizziness and light-headedness.   Psychiatric/Behavioral: Negative for depression.        Objective:   BP (!) 96/48   Pulse 92   Ht 5' 7" (1.702 m)   Wt 62.5 kg (137 lb 12.6 oz)   SpO2 (!) 88% Comment: Pt on 5L of O2 at time of reading  BMI 21.58 kg/m²       Physical Exam   Constitutional: She is oriented to person, place, and time. She appears well-developed and well-nourished.   HENT:   Head: Normocephalic and atraumatic.   Eyes: Right eye exhibits no discharge. Left eye exhibits no discharge.   Neck: Neck supple. No JVD present. No thyromegaly present.   Cardiovascular: Regular rhythm.  Exam reveals no gallop and no friction rub.    No murmur heard.  Tachy, accentuated S2     Pulmonary/Chest: Effort normal and breath sounds normal. No respiratory distress. She has no wheezes. She has no rales.   Abdominal: Soft. Bowel sounds are normal. She exhibits no distension. There is no tenderness.   Musculoskeletal: Normal range of motion. She exhibits no edema or tenderness.   Neurological: She is alert and oriented to person, place, and time. No cranial nerve deficit. Coordination normal.   Skin: Skin is warm and dry. No rash noted.   Psychiatric: She has a normal mood and affect. Judgment and thought content normal.           Chemistry        Component Value Date/Time     10/16/2017 1419    K 3.9 10/16/2017 1419     10/16/2017 1419    CO2 30 (H) 10/16/2017 1419    BUN 20 10/16/2017 1419    CREATININE 1.1 10/16/2017 1419     (H) 10/16/2017 1419        Component Value Date/Time    CALCIUM 9.9 10/16/2017 1419    ALKPHOS 57 10/16/2017 1419    AST 21 10/16/2017 1419    ALT 15 10/16/2017 1419    BILITOT 0.9 10/16/2017 1419            Magnesium   Date Value Ref Range Status   10/09/2017 1.9 1.6 - 2.6 mg/dL Final       Lab Results   Component Value " Date    WBC 4.88 10/16/2017    HGB 12.7 10/16/2017    HCT 40.3 10/16/2017    MCV 81 (L) 10/16/2017     10/16/2017         BNP   Date Value Ref Range Status   12/05/2017 320 (H) 0 - 99 pg/mL Final     Comment:     Values of less than 100 pg/ml are consistent with non-CHF populations.   10/16/2017 172 (H) 0 - 99 pg/mL Final     Comment:     Values of less than 100 pg/ml are consistent with non-CHF populations.   10/04/2017 208 (H) 0 - 99 pg/mL Final     Comment:     Values of less than 100 pg/ml are consistent with non-CHF populations.                 Assessment:       1. Pulmonary hypertension- WHO Group 1,6mw distance terrible today after recent admit for what sounds like gastroenteritis at [facility].   BNP  increased alittle above her usual range, FC III-IV on triple tx- echo has me worried as RV is enlarged and severely depressed, no effusion though which is good.   2. PFO (patent foramen ovale)    3. Palpitations/tachycardia- stale, followed by EP, on CCB  4. Chronic hypoxemic resp failure- stable on O2  5. PAPVR,sinus venosus ASD- decision made to treat conservatively     Plan:    -  Still not back at baseline in terms of her breathing energy level and sats-   - Keep salt intake to under 2000 mg sodium, fluids to under 2 L (64 oz)  - no med changes today- perhaps after the first of the year will try pulmonary rehab if she is  Up to it.    Check weights every morning after getting out of bed and urinating. If  weight goes up 3# overnight or 5# in one week she should take 40 mg lasix in the pm and call us if fluid doesn't come off.    F/u 6 wk with labs and walk- want to keep close eye on her as I fear she is really declining

## 2017-12-06 ENCOUNTER — TELEPHONE (OUTPATIENT)
Dept: TRANSPLANT | Facility: CLINIC | Age: 72
End: 2017-12-06

## 2017-12-06 NOTE — TELEPHONE ENCOUNTER
----- Message from Alice Tim MD sent at 12/6/2017  3:27 PM CST -----  I really dont think shes going to tolerate 1mg but if she wants to try we can-  ----- Message -----  From: MELISA Delgado, RN  Sent: 12/6/2017  10:56 AM  To: Alice Tim MD    She emailed this AM asking about Adempas. Stay at 0.5 or bump up to 1?

## 2017-12-07 ENCOUNTER — DOCUMENTATION ONLY (OUTPATIENT)
Dept: TRANSPLANT | Facility: CLINIC | Age: 72
End: 2017-12-07

## 2017-12-07 ENCOUNTER — LAB VISIT (OUTPATIENT)
Dept: LAB | Facility: HOSPITAL | Age: 72
End: 2017-12-07
Attending: INTERNAL MEDICINE
Payer: MEDICARE

## 2017-12-07 DIAGNOSIS — D52.0 DIETARY FOLATE DEFICIENCY ANEMIA: ICD-10-CM

## 2017-12-07 DIAGNOSIS — D64.9 SYMPTOMATIC ANEMIA: ICD-10-CM

## 2017-12-07 DIAGNOSIS — J96.11 CHRONIC RESPIRATORY FAILURE WITH HYPOXIA: ICD-10-CM

## 2017-12-07 DIAGNOSIS — Q24.9 ADULT CONGENITAL HEART DISEASE: ICD-10-CM

## 2017-12-07 DIAGNOSIS — I27.20 PULMONARY HYPERTENSION: ICD-10-CM

## 2017-12-07 DIAGNOSIS — I47.10 SVT (SUPRAVENTRICULAR TACHYCARDIA): ICD-10-CM

## 2017-12-07 DIAGNOSIS — Z00.00 ENCOUNTER FOR SCREENING AND PREVENTATIVE CARE: ICD-10-CM

## 2017-12-07 DIAGNOSIS — Q26.3 PARTIAL ANOMALOUS PULMONARY VENOUS CONNECTION (PAPVC): ICD-10-CM

## 2017-12-07 DIAGNOSIS — R79.9 ABNORMAL FINDING OF BLOOD CHEMISTRY: ICD-10-CM

## 2017-12-07 LAB
CHOLEST SERPL-MCNC: 149 MG/DL
CHOLEST/HDLC SERPL: 3.2 {RATIO}
FERRITIN SERPL-MCNC: 17 NG/ML
FOLATE SERPL-MCNC: 20 NG/ML
HDLC SERPL-MCNC: 46 MG/DL
HDLC SERPL: 30.9 %
IRON SERPL-MCNC: 35 UG/DL
LDLC SERPL CALC-MCNC: 75.2 MG/DL
NONHDLC SERPL-MCNC: 103 MG/DL
SATURATED IRON: 8 %
TOTAL IRON BINDING CAPACITY: 448 UG/DL
TRANSFERRIN SERPL-MCNC: 303 MG/DL
TRIGL SERPL-MCNC: 139 MG/DL

## 2017-12-07 PROCEDURE — 80061 LIPID PANEL: CPT

## 2017-12-07 PROCEDURE — 82746 ASSAY OF FOLIC ACID SERUM: CPT

## 2017-12-07 PROCEDURE — 36415 COLL VENOUS BLD VENIPUNCTURE: CPT

## 2017-12-07 PROCEDURE — 83540 ASSAY OF IRON: CPT

## 2017-12-07 PROCEDURE — 82728 ASSAY OF FERRITIN: CPT

## 2017-12-07 PROCEDURE — 83921 ORGANIC ACID SINGLE QUANT: CPT

## 2017-12-08 LAB — VIT B12 SERPL-MCNC: 314 NG/L

## 2017-12-12 LAB — METHYLMALONATE SERPL-SCNC: 0.39 NMOL/ML

## 2018-01-09 ENCOUNTER — TELEPHONE (OUTPATIENT)
Dept: TRANSPLANT | Facility: CLINIC | Age: 73
End: 2018-01-09

## 2018-01-09 NOTE — TELEPHONE ENCOUNTER
Per Dr Tim, patient should go to PCP, nothing more to do at this time. Notified patient and will continue to monitor.

## 2018-01-09 NOTE — TELEPHONE ENCOUNTER
Email correspondence w/patient-awaiting further input from Dr Tim.   -------------    Hi-Was flu confirmed?   I would suggest going to your PCP ASAP...You could get started on Tamiflu if it is a confirmed case.   Albertina     -----Original Message-----  From: Abdiel Gilbert [[email or web address]]   Sent: Tuesday, January 09, 2018 8:11 AM  To: Albertina Diallo <[email or web address]  Subject:     THIS EMAIL IS FROM AN EXTERNAL SENDER! DO NOT click links or provide your User ID or Password if the sender is unknown.    Vasile Eng  I started with the flu on Fri. Ive still got a cough , some congestion, no fever ( I did have fever Fri & low fever on Sat), achyness - typical symptoms. When I saw Dr Tim last she said yall were seeing a lot of the upper respiratory flu. Im taking Tylenol but is there anything else you found to help?  Sent from my iPhone

## 2018-01-09 NOTE — TELEPHONE ENCOUNTER
Ok, thank you for clarifying! The main thing we recommend here is mucinex and Claritin or zyrtec regularly. It will help loosen the mucus and dry things out simultaneously.    Albertina     From: Abdiel Gilbert [[email or web address]]   Sent: Tuesday, January 09, 2018 11:40 AM  To: Albertina Diallo <[email or web address]>  Subject: Re: Flu     THIS EMAIL IS FROM AN EXTERNAL SENDER!  STOP! Do you trust this email?  If you are unsure DO NOT click any links and NEVER input your username and password.          Clinically yes. Swab no. I cant use tamiflu now anyway since its been 5 days. Also there is a strong bias against Tamiflu in the ER & I never did run a high fever.  Symptoms have improved, I thought you might have a few tricks up your sleeve since yall have seen so much of it. Im not used to being down like this??

## 2018-01-12 ENCOUNTER — OFFICE VISIT (OUTPATIENT)
Dept: TRANSPLANT | Facility: CLINIC | Age: 73
End: 2018-01-12
Payer: MEDICARE

## 2018-01-12 ENCOUNTER — HOSPITAL ENCOUNTER (OUTPATIENT)
Dept: PULMONOLOGY | Facility: CLINIC | Age: 73
Discharge: HOME | End: 2018-01-12
Payer: MEDICARE

## 2018-01-12 VITALS
HEART RATE: 108 BPM | OXYGEN SATURATION: 73 % | WEIGHT: 129 LBS | WEIGHT: 134.25 LBS | DIASTOLIC BLOOD PRESSURE: 56 MMHG | SYSTOLIC BLOOD PRESSURE: 110 MMHG | HEIGHT: 63 IN | BODY MASS INDEX: 22.86 KG/M2 | BODY MASS INDEX: 23.79 KG/M2 | HEIGHT: 63 IN

## 2018-01-12 DIAGNOSIS — J96.11 CHRONIC RESPIRATORY FAILURE WITH HYPOXIA: ICD-10-CM

## 2018-01-12 DIAGNOSIS — Z79.01 ANTICOAGULANT LONG-TERM USE: Chronic | ICD-10-CM

## 2018-01-12 DIAGNOSIS — I47.10 SVT (SUPRAVENTRICULAR TACHYCARDIA): ICD-10-CM

## 2018-01-12 DIAGNOSIS — R09.02 HYPOXIA: ICD-10-CM

## 2018-01-12 DIAGNOSIS — Q26.3 PARTIAL ANOMALOUS PULMONARY VENOUS CONNECTION (PAPVC): ICD-10-CM

## 2018-01-12 DIAGNOSIS — Q21.16 SINUS VENOSUS DEFECT: ICD-10-CM

## 2018-01-12 DIAGNOSIS — I48.3 TYPICAL ATRIAL FLUTTER: ICD-10-CM

## 2018-01-12 DIAGNOSIS — I27.21 PAH (PULMONARY ARTERY HYPERTENSION): Primary | ICD-10-CM

## 2018-01-12 DIAGNOSIS — I27.20 PULMONARY HYPERTENSION: ICD-10-CM

## 2018-01-12 DIAGNOSIS — Q24.9 ADULT CONGENITAL HEART DISEASE: ICD-10-CM

## 2018-01-12 PROBLEM — R06.89 ACUTE RESPIRATORY INSUFFICIENCY: Status: RESOLVED | Noted: 2017-10-05 | Resolved: 2018-01-12

## 2018-01-12 PROCEDURE — 99213 OFFICE O/P EST LOW 20 MIN: CPT | Mod: PBBFAC | Performed by: INTERNAL MEDICINE

## 2018-01-12 PROCEDURE — 99214 OFFICE O/P EST MOD 30 MIN: CPT | Mod: S$PBB,,, | Performed by: INTERNAL MEDICINE

## 2018-01-12 PROCEDURE — 94618 PULMONARY STRESS TESTING: CPT | Mod: S$PBB,,, | Performed by: INTERNAL MEDICINE

## 2018-01-12 PROCEDURE — 99999 PR PBB SHADOW E&M-EST. PATIENT-LVL III: CPT | Mod: PBBFAC,,, | Performed by: INTERNAL MEDICINE

## 2018-01-12 PROCEDURE — 94618 PULMONARY STRESS TESTING: CPT | Mod: PBBFAC | Performed by: INTERNAL MEDICINE

## 2018-01-12 RX ORDER — FERROUS GLUCONATE 324(38)MG
324 TABLET ORAL
COMMUNITY
Start: 2018-01-12 | End: 2023-01-01 | Stop reason: SDUPTHER

## 2018-01-12 NOTE — PROCEDURES
Molly Smith is a 72 y.o.  female patient, who presents for a 6 minute walk test ordered by Alice Tim MD.  The diagnosis is Pulmonary Hypertension.  The patient's BMI is 22.9 kg/m2.  Predicted distance (lower limit of normal) is 321.17 meters.      Test Results:    The test was completed without stopping.  The total time walked was 360 seconds.  During walking, the patient reported:  Chest pain, Dizziness, Dyspnea, Leg pain, Lightheadedness.  The patient used supplemental oxygen during testing.     01/12/2018---------Distance: 236.22 meters (775 feet)     O2 Sat % Supplemental Oxygen Heart Rate Blood Pressure Roberto Scale   Pre-exercise  (Resting) 88 % 8 L/M 98 bpm 122/69 mmHg 0   During Exercise 77 % 8 L/M 118 bpm 122/58 mmHg 4   Post-exercise  (Recovery) 88 % 8 L/M  102 bpm       Recovery Time:  120 seconds    Performing nurse/tech:  MARIELLE Bennett      PREVIOUS STUDY:   10/16/2017---------Distance: 225.55 meters (740 feet)       O2 Sat % Supplemental Oxygen Heart Rate Blood Pressure Roberto Scale   Pre-exercise  (Resting) 92 % 4 L/M 108 bpm 100/55 mmHg 0.5   During Exercise 84 % 4 L/M 122 bpm 107/57 mmHg 9   Post-exercise  (Recovery) 91 % 4 L/M  113 bpm   mmHg        CLINICAL INTERPRETATION:  Six minute walk distance is 236.22 meters (775 feet) with somewhat heavy dyspnea.  During exercise, there was significant desaturation while breathing supplemental oxygen.  Blood pressure remained stable and Heart rate increased significantly with walking.  The patient reported non-pulmonary symptoms during exercise.  Significant exercise impairment is likely due to desaturation, cardiovascular causes and subjective symptoms.  The patient did complete the study, walking 360 seconds of the 360 second test.  Since the previous study in October 2017, exercise capacity is unchanged.  Based upon age and body mass index, exercise capacity is less than predicted.

## 2018-01-12 NOTE — PROGRESS NOTES
Subjective:      HPI  Delightful 72 y.o. 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 She also has h/o Rosemonas bacteremia s/p central line removal, and IV abx per ID with replacement by nephrology.  Pt was admitted 10/4-10/9 with worsening fatigue, diarrhea since the morning of 10/4/17, nausea and low grade temperature at home (100.4 being the highest). Also had a single event of hypoxia (SpO2 50s) that resolved without intervention. During that hospitalization she was placed on 100% FiO2 via HFNC on admit secondary to hypoxia. Blood cultures were drawn and remodulin was moved to peripheral line. Blood cultures were drawn from [facility] as well. ID was consulted. He symptoms improved gradually throughout hospitalization and she was eventually weaned back to low flow NC. Cultures remained negative and remodulin was moved back to central access. They attributed fevers/hypoxia to possible viral infection. She ws eventually weaned down to 4L NC and discharged home in good condition. She was then admitted at [facility] 11/29/17 with hypotension w severe vomiting, fever 101.9, chills and diarrhea- she was over hydrated in the ER, GI w/u was (-), had recently increased her remodulin- was discharged in time to go to her son's wedding-     since last visit pt had an urgent care visit for fever 101- flu and strep both (-)- had body aches, cough- CXR was (-) for pneumonia, but I klooked at the film and she does seem to have some vascular congestion  and she was not started on abx- she is taking tessalon perles and robutussin DM and alb- these are helping.   Though she Has not been swelling-  Has CP when her O2 gets low, and  also from coughing- has been sleeping in the chair because she feels like there are gallon jugs sitting on her chest. Wt normally 125-6, this am 129  .  Lasix is 40mg in am, 20mg in midday, and aldactone        Remodulin is at 78 ng/kg/min and on Tracleer, adempas 0.5mg tid    6MWT: 236m (225m (283m May, 222m Mar, 375m Nov, 305 Sept, 274.5 July,  335.5May, 365  m prev 2 visits)                        O2 sat  88->77% (without O2 started 92%->84%                                                           HR 98-->118                                                     BP  122/ 569  ->122 /58                                                      Roberto 0 ->4    TTE 5/8/17:    1 - Right ventricular enlargement with hypertrophy, with severely depressed systolic function.     2 - Moderate to severe tricuspid regurgitation.     3 - Pulmonary hypertension. The estimated PA systolic pressure is 116 mmHg.     4 - Biatrial enlargement.     5 - Normal left ventricular systolic function (EF 60-65%); reduced LV stroke volume.  The right ventricle is enlarged measuring 5.8 cm at the base in the apical right ventricle-focused view. There is RVH. Global right ventricular systolic function appears severely depressed. There is abnormal septal motion consistent with   RV pressure/volume overload. Tricuspid annular plane systolic excursion (TAPSE) is 1.5 cm. The estimated PA systolic pressure is 116 mmHg.     [facility] 9/25/17:  CONDITION 1 (9/25/2017 11:20:07):  RA: 5/2 (1)  RV: 84/-5  RVEDP: 6     PW: 17/19 (16)  PA: 87/21 (47)  AO_SAT: 95  AO: 115/71 (86)  PA_SAT: 71  SPO2: 97  FICKCI: 3.2800  FICKCO: 5.5500    Review of Systems   Constitution: Negative for chills, fever, malaise/fatigue, weight gain and weight loss.   HENT: Negative.    Eyes: Negative.    Cardiovascular: Positive for dyspnea on exertion and palpitations. Negative for chest pain, leg swelling, near-syncope, orthopnea, paroxysmal nocturnal dyspnea and syncope.   Respiratory:  "Positive for cough. Negative for shortness of breath.    Endocrine: Negative.    Skin: Negative.    Musculoskeletal: Positive for arthritis and joint pain. Negative for back pain.   Gastrointestinal: Negative for bloating, abdominal pain, change in bowel habit and nausea.   Neurological: Negative for dizziness and light-headedness.   Psychiatric/Behavioral: Negative for depression.        Objective:   BP (!) 110/56 (BP Location: Left arm, Patient Position: Sitting, BP Method: Large (Automatic))   Pulse 108   Ht 5' 3" (1.6 m)   Wt 60.9 kg (134 lb 4.2 oz)   SpO2 (!) 73%   BMI 23.78 kg/m²       Physical Exam   Constitutional: She is oriented to person, place, and time. She appears well-developed and well-nourished.   HENT:   Head: Normocephalic and atraumatic.   Eyes: Right eye exhibits no discharge. Left eye exhibits no discharge.   Neck: Neck supple. No JVD present. No thyromegaly present.   Cardiovascular: Regular rhythm.  Exam reveals no gallop and no friction rub.    No murmur heard.  Tachy, accentuated S2     Pulmonary/Chest: Effort normal and breath sounds normal. No respiratory distress. She has no wheezes. She has no rales.   few rhonchi   Abdominal: Soft. Bowel sounds are normal. She exhibits no distension. There is no tenderness.   Musculoskeletal: Normal range of motion. She exhibits edema. She exhibits no tenderness.   Mild edema in the left ankle (always more swollen,broke it years ago)   Neurological: She is alert and oriented to person, place, and time. No cranial nerve deficit. Coordination normal.   Skin: Skin is warm and dry. No rash noted.   Psychiatric: She has a normal mood and affect. Judgment and thought content normal.               Chemistry        Component Value Date/Time     12/05/2017 1506    K 4.2 12/05/2017 1506     12/05/2017 1506    CO2 27 12/05/2017 1506    BUN 15 12/05/2017 1506    CREATININE 1.1 12/05/2017 1506    GLU 98 12/05/2017 1506        Component Value " Date/Time    CALCIUM 9.3 12/05/2017 1506    ALKPHOS 55 12/05/2017 1506    AST 20 12/05/2017 1506    ALT 17 12/05/2017 1506    BILITOT 0.7 12/05/2017 1506            Magnesium   Date Value Ref Range Status   10/09/2017 1.9 1.6 - 2.6 mg/dL Final       Lab Results   Component Value Date    WBC 7.59 01/12/2018    HGB 9.8 (L) 01/12/2018    HCT 32.9 (L) 01/12/2018    MCV 80 (L) 01/12/2018     01/12/2018         BNP   Date Value Ref Range Status   01/12/2018 410 (H) 0 - 99 pg/mL Final     Comment:     Values of less than 100 pg/ml are consistent with non-CHF populations.   12/05/2017 320 (H) 0 - 99 pg/mL Final     Comment:     Values of less than 100 pg/ml are consistent with non-CHF populations.   10/16/2017 172 (H) 0 - 99 pg/mL Final     Comment:     Values of less than 100 pg/ml are consistent with non-CHF populations.                 Assessment:       1. Pulmonary hypertension- WHO Group 1,6mw distance slightly better today though pt has URI.   BNP  increased alittle above her usual range with orthopnea which is new, FC III-IV on triple tx- echo has me worried as RV is enlarged and severely depressed, no effusion though which is good.   2. PFO (patent foramen ovale)    3. Palpitations/tachycardia- stale, followed by EP, on CCB  4. Chronic hypoxemic resp failure- stable on O2  5. PAPVR,sinus venosus ASD- decision made to treat conservatively     Plan:    -  Will increase lasix to 40mg bid for next few days until wt back to baseline  -  Add iron 324mg daily  - Keep salt intake to under 2000 mg sodium, fluids to under 2 L (64 oz)  - perhaps after her URI resolves will try pulmonary rehab if she is  Up to it.    Check weights every morning after getting out of bed and urinating. If  weight goes up 3# overnight or 5# in one week she should take 40 mg lasix in the pm and call us if fluid doesn't come off.    F/u 8 wk with labs and walk and echo- want to keep close eye on her as I fear she is really  declining

## 2018-01-12 NOTE — PATIENT INSTRUCTIONS
Increase lasix to 40mg in pm for the next few days, when you wt goes back down to 125-6 you can go back to 20mg in pm    Lets add iron 324mg with breakfast     Keep salt intake to under 2000 mg sodium, fluids to under 2 L (64 oz)    Call us if you find yourself getting more short of breath, have more swelling or unexpected weight changes, fever, chills or problems with your line

## 2018-01-19 ENCOUNTER — TELEPHONE (OUTPATIENT)
Dept: TRANSPLANT | Facility: CLINIC | Age: 73
End: 2018-01-19

## 2018-02-02 RX ORDER — TRAMADOL HYDROCHLORIDE 200 MG/1
TABLET, EXTENDED RELEASE ORAL
Qty: 30 TABLET | Refills: 0 | Status: SHIPPED | OUTPATIENT
Start: 2018-02-02 | End: 2018-03-12 | Stop reason: SDUPTHER

## 2018-02-05 ENCOUNTER — TELEPHONE (OUTPATIENT)
Dept: TRANSPLANT | Facility: CLINIC | Age: 73
End: 2018-02-05

## 2018-02-05 ENCOUNTER — TELEPHONE (OUTPATIENT)
Dept: ELECTROPHYSIOLOGY | Facility: CLINIC | Age: 73
End: 2018-02-05

## 2018-02-05 ENCOUNTER — PATIENT MESSAGE (OUTPATIENT)
Dept: ELECTROPHYSIOLOGY | Facility: CLINIC | Age: 73
End: 2018-02-05

## 2018-02-05 DIAGNOSIS — I47.10 SVT (SUPRAVENTRICULAR TACHYCARDIA): Primary | ICD-10-CM

## 2018-02-05 RX ORDER — DILTIAZEM HYDROCHLORIDE 180 MG/1
180 CAPSULE, COATED, EXTENDED RELEASE ORAL DAILY
Qty: 30 CAPSULE | Refills: 11 | Status: SHIPPED | OUTPATIENT
Start: 2018-02-05 | End: 2018-02-06 | Stop reason: SDUPTHER

## 2018-02-05 NOTE — TELEPHONE ENCOUNTER
Message given to Dr Hinojosa along with clinic notes and med list printed. He is going to call Dr Gilbert.

## 2018-02-05 NOTE — TELEPHONE ENCOUNTER
----- Message from Erendira Persaud MA sent at 2/5/2018 12:14 PM CST -----  Contact: Dr. Smith [phone] pt       ----- Message -----  From: Elda Mckeon  Sent: 2/5/2018  11:30 AM  To: Micaela Lux Staff    Pt  states that Dr. Tim told him to speak to Dr. Hinojosa about his wife medication Diltiazem 120 mg    Thanks

## 2018-02-05 NOTE — TELEPHONE ENCOUNTER
2 email messages from pt, this AM--Both forwarded to Dr Tim. Replied to pt that if she is increasingly SOB above baseline, she should come to ER.       My concentrator is set on 5 L , pulse-ox 85,  Sent from my iPhone  -------------    Would it be reasonable to increase my Cardizem cd to 180? Sudeep been on 120cd for a long time. Sudeep had 2 episodes of heart rate greater than 200 & 3 or 4 at 163 over the last 3 weeks all of which abort with coughing. Experience no syncope. BP is 100/56

## 2018-02-05 NOTE — TELEPHONE ENCOUNTER
----- Message from Sonia Rizzo RN sent at 2/5/2018  3:42 PM CST -----  Contact: pt       ----- Message -----  From: Migdalia Gomez  Sent: 2/5/2018   3:39 PM  To: Sonia Rizzo RN    Pt called returning Dr. Hinojosa's call.  He can be reached @ [phone].

## 2018-02-06 RX ORDER — DILTIAZEM HYDROCHLORIDE 180 MG/1
180 CAPSULE, COATED, EXTENDED RELEASE ORAL DAILY
Qty: 30 CAPSULE | Refills: 11 | Status: ON HOLD | OUTPATIENT
Start: 2018-02-06 | End: 2018-03-02

## 2018-02-28 ENCOUNTER — HOSPITAL ENCOUNTER (INPATIENT)
Facility: HOSPITAL | Age: 73
LOS: 5 days | Discharge: HOME | DRG: 286 | End: 2018-03-05
Attending: EMERGENCY MEDICINE | Admitting: INTERNAL MEDICINE
Payer: MEDICARE

## 2018-02-28 ENCOUNTER — TELEPHONE (OUTPATIENT)
Dept: TRANSPLANT | Facility: CLINIC | Age: 73
End: 2018-02-28

## 2018-02-28 DIAGNOSIS — I47.10 SVT (SUPRAVENTRICULAR TACHYCARDIA): ICD-10-CM

## 2018-02-28 DIAGNOSIS — R09.02 HYPOXIA: ICD-10-CM

## 2018-02-28 DIAGNOSIS — R00.2 PALPITATIONS: ICD-10-CM

## 2018-02-28 DIAGNOSIS — R06.02 SHORTNESS OF BREATH: Primary | ICD-10-CM

## 2018-02-28 DIAGNOSIS — I24.9 ACS (ACUTE CORONARY SYNDROME): ICD-10-CM

## 2018-02-28 DIAGNOSIS — R07.89 OTHER CHEST PAIN: ICD-10-CM

## 2018-02-28 DIAGNOSIS — R07.89 CHEST TIGHTNESS: ICD-10-CM

## 2018-02-28 DIAGNOSIS — I27.20 PULMONARY HYPERTENSION: ICD-10-CM

## 2018-02-28 LAB
ALBUMIN SERPL BCP-MCNC: 3.9 G/DL
ALP SERPL-CCNC: 46 U/L
ALT SERPL W/O P-5'-P-CCNC: 13 U/L
ANION GAP SERPL CALC-SCNC: 9 MMOL/L
APTT BLDCRRT: 27.6 SEC
AST SERPL-CCNC: 21 U/L
BASOPHILS # BLD AUTO: 0.05 K/UL
BASOPHILS NFR BLD: 0.9 %
BILIRUB SERPL-MCNC: 0.8 MG/DL
BNP SERPL-MCNC: 350 PG/ML
BUN SERPL-MCNC: 22 MG/DL
CALCIUM SERPL-MCNC: 9.2 MG/DL
CHLORIDE SERPL-SCNC: 105 MMOL/L
CO2 SERPL-SCNC: 24 MMOL/L
CREAT SERPL-MCNC: 1.2 MG/DL
DIFFERENTIAL METHOD: ABNORMAL
EOSINOPHIL # BLD AUTO: 0.1 K/UL
EOSINOPHIL NFR BLD: 0.9 %
ERYTHROCYTE [DISTWIDTH] IN BLOOD BY AUTOMATED COUNT: 15.7 %
EST. GFR  (AFRICAN AMERICAN): 52.5 ML/MIN/1.73 M^2
EST. GFR  (NON AFRICAN AMERICAN): 45.6 ML/MIN/1.73 M^2
GLUCOSE SERPL-MCNC: 206 MG/DL
HCT VFR BLD AUTO: 33.7 %
HGB BLD-MCNC: 10 G/DL
IMM GRANULOCYTES # BLD AUTO: 0.01 K/UL
IMM GRANULOCYTES NFR BLD AUTO: 0.2 %
INR PPP: 2.2
LACTATE SERPL-SCNC: 1.8 MMOL/L
LIPASE SERPL-CCNC: 67 U/L
LYMPHOCYTES # BLD AUTO: 0.9 K/UL
LYMPHOCYTES NFR BLD: 17.6 %
MAGNESIUM SERPL-MCNC: 2.5 MG/DL
MCH RBC QN AUTO: 21.6 PG
MCHC RBC AUTO-ENTMCNC: 29.7 G/DL
MCV RBC AUTO: 73 FL
MONOCYTES # BLD AUTO: 0.3 K/UL
MONOCYTES NFR BLD: 5.5 %
NEUTROPHILS # BLD AUTO: 3.9 K/UL
NEUTROPHILS NFR BLD: 74.9 %
NRBC BLD-RTO: 0 /100 WBC
PLATELET # BLD AUTO: 300 K/UL
PMV BLD AUTO: 9.2 FL
POTASSIUM SERPL-SCNC: 3.7 MMOL/L
PROT SERPL-MCNC: 7.4 G/DL
PROTHROMBIN TIME: 21.2 SEC
RBC # BLD AUTO: 4.63 M/UL
SODIUM SERPL-SCNC: 138 MMOL/L
TROPONIN I SERPL DL<=0.01 NG/ML-MCNC: 1.67 NG/ML
WBC # BLD AUTO: 5.27 K/UL

## 2018-02-28 PROCEDURE — 93005 ELECTROCARDIOGRAM TRACING: CPT

## 2018-02-28 PROCEDURE — 83690 ASSAY OF LIPASE: CPT

## 2018-02-28 PROCEDURE — 25000242 PHARM REV CODE 250 ALT 637 W/ HCPCS: Performed by: STUDENT IN AN ORGANIZED HEALTH CARE EDUCATION/TRAINING PROGRAM

## 2018-02-28 PROCEDURE — 25000003 PHARM REV CODE 250: Performed by: STUDENT IN AN ORGANIZED HEALTH CARE EDUCATION/TRAINING PROGRAM

## 2018-02-28 PROCEDURE — 83735 ASSAY OF MAGNESIUM: CPT

## 2018-02-28 PROCEDURE — 80053 COMPREHEN METABOLIC PANEL: CPT

## 2018-02-28 PROCEDURE — 12000002 HC ACUTE/MED SURGE SEMI-PRIVATE ROOM

## 2018-02-28 PROCEDURE — 63600175 PHARM REV CODE 636 W HCPCS: Performed by: STUDENT IN AN ORGANIZED HEALTH CARE EDUCATION/TRAINING PROGRAM

## 2018-02-28 PROCEDURE — 93010 ELECTROCARDIOGRAM REPORT: CPT | Mod: ,,, | Performed by: INTERNAL MEDICINE

## 2018-02-28 PROCEDURE — 99285 EMERGENCY DEPT VISIT HI MDM: CPT | Mod: 25

## 2018-02-28 PROCEDURE — 85025 COMPLETE CBC W/AUTO DIFF WBC: CPT

## 2018-02-28 PROCEDURE — 83880 ASSAY OF NATRIURETIC PEPTIDE: CPT

## 2018-02-28 PROCEDURE — 83605 ASSAY OF LACTIC ACID: CPT

## 2018-02-28 PROCEDURE — 20600001 HC STEP DOWN PRIVATE ROOM

## 2018-02-28 PROCEDURE — 96374 THER/PROPH/DIAG INJ IV PUSH: CPT

## 2018-02-28 PROCEDURE — 85730 THROMBOPLASTIN TIME PARTIAL: CPT

## 2018-02-28 PROCEDURE — 84484 ASSAY OF TROPONIN QUANT: CPT

## 2018-02-28 PROCEDURE — 85610 PROTHROMBIN TIME: CPT

## 2018-02-28 PROCEDURE — 99291 CRITICAL CARE FIRST HOUR: CPT | Mod: ,,, | Performed by: EMERGENCY MEDICINE

## 2018-02-28 RX ORDER — FUROSEMIDE 10 MG/ML
40 INJECTION INTRAMUSCULAR; INTRAVENOUS ONCE
Status: COMPLETED | OUTPATIENT
Start: 2018-02-28 | End: 2018-02-28

## 2018-02-28 RX ORDER — SPIRONOLACTONE 25 MG/1
25 TABLET ORAL DAILY
Status: DISCONTINUED | OUTPATIENT
Start: 2018-03-01 | End: 2018-03-05 | Stop reason: HOSPADM

## 2018-02-28 RX ORDER — FLUTICASONE PROPIONATE 50 MCG
2 SPRAY, SUSPENSION (ML) NASAL DAILY
Status: DISCONTINUED | OUTPATIENT
Start: 2018-02-28 | End: 2018-03-05 | Stop reason: HOSPADM

## 2018-02-28 RX ORDER — WARFARIN SODIUM 5 MG/1
10 TABLET ORAL DAILY
Status: DISCONTINUED | OUTPATIENT
Start: 2018-03-01 | End: 2018-03-01

## 2018-02-28 RX ORDER — FUROSEMIDE 40 MG/1
40 TABLET ORAL 2 TIMES DAILY
Status: DISCONTINUED | OUTPATIENT
Start: 2018-02-28 | End: 2018-02-28

## 2018-02-28 RX ORDER — DILTIAZEM HYDROCHLORIDE 180 MG/1
180 CAPSULE, COATED, EXTENDED RELEASE ORAL DAILY
Status: DISCONTINUED | OUTPATIENT
Start: 2018-03-01 | End: 2018-03-02

## 2018-02-28 RX ORDER — BOSENTAN 125 MG/1
125 TABLET, FILM COATED ORAL 3 TIMES DAILY
Status: DISCONTINUED | OUTPATIENT
Start: 2018-02-28 | End: 2018-02-28

## 2018-02-28 RX ORDER — SODIUM CHLORIDE 0.9 % (FLUSH) 0.9 %
3 SYRINGE (ML) INJECTION
Status: DISCONTINUED | OUTPATIENT
Start: 2018-02-28 | End: 2018-03-05 | Stop reason: HOSPADM

## 2018-02-28 RX ORDER — BOSENTAN 125 MG/1
125 TABLET, FILM COATED ORAL EVERY 12 HOURS
Status: DISCONTINUED | OUTPATIENT
Start: 2018-02-28 | End: 2018-03-05

## 2018-02-28 RX ADMIN — FUROSEMIDE 40 MG: 10 INJECTION, SOLUTION INTRAMUSCULAR; INTRAVENOUS at 09:02

## 2018-02-28 RX ADMIN — BOSENTAN 125 MG: 125 TABLET, FILM COATED ORAL at 10:02

## 2018-02-28 RX ADMIN — FLUTICASONE PROPIONATE 100 MCG: 50 SPRAY, METERED NASAL at 09:02

## 2018-02-28 NOTE — ED PROVIDER NOTES
Encounter Date: 2/28/2018    SCRIBE #1 NOTE: I, Aundrea Ba, am scribing for, and in the presence of,  Dr. Edwards. I have scribed the following portions of the note - the Resident attestation and the EKG reading.       History     Chief Complaint   Patient presents with    Shortness of Breath     on remodulin for pul htn,      HPI   Pt with PMH as reviewed and detailed below presents with worsening SOB and increase in baseline O2 requirement from 5L to 7L.     She states she was sewing and all of a sudden developed neck tightness and SOB and chest tightness across the front of her chest and feeling of her heart racing.  She checked her O2 and it was in the 50's to 70's on 5L's.  She is usually in the mid to high 80's on 5L.  She increased it to 9L and couldn't get her 02 sats over the 70's.      Denies recent fever, chills, new cough, N/V/D, HA, new neuro deficits, abd pain, flank pain, dysuria.    Now she feels much better, not having chest tightness, neck tighness, chest pain or SOB at this time - currently on 6L O2.    She's been on Remodulin through central line.        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    Multaq [dronedarone]      Past Medical History:   Diagnosis Date    Allergy     Anticoagulant long-term use     Arthritis     Heart murmur     Pneumonia     Pulmonary hypertension      Past Surgical History:   Procedure Laterality Date    BACK SURGERY       Family History   Problem Relation Age of Onset    Arthritis Mother     Arthritis Father     Diabetes Father     Heart disease Father     Hypertension Father      Social History   Substance Use Topics    Smoking status: Never Smoker    Smokeless tobacco: Not on file    Alcohol use No      Comment: rarely     Review of Systems   Respiratory: Positive for chest tightness and shortness of breath.    Cardiovascular: Positive for chest pain and palpitations.   Musculoskeletal:         Neck tightness   All other systems reviewed and are negative.      Physical Exam     Initial Vitals [02/28/18 1548]   BP Pulse Resp Temp SpO2   (!) 103/51 108 (!) 32 99.2 °F (37.3 °C) (!) 81 %      MAP       68.33         Vitals:    02/28/18 1701 02/28/18 1731 02/28/18 1801 02/28/18 1834   BP: (!) 110/56 (!) 100/53 (!) 109/55 (!) 107/55   Pulse: 108 102 104 103   Resp: (!) 25 (!) 21  20   Temp:    98.8 °F (37.1 °C)   TempSrc:    Oral   SpO2: (!) 89% (!) 90% (!) 92% (!) 90%   Weight:       Height:        02/28/18 1914   BP:    Pulse: 104   Resp: (!) 21   Temp:    TempSrc:    SpO2: (!) 89%   Weight:    Height:        Physical Exam   Gen/Constitutional: Interactive. No acute distress  Head: Normocephalic, Atraumatic  Neck: supple, no masses or LAD  Eyes: PERRLA, conjunctiva clear  Ears, Nose and Throat: No rhinorrhea or stridor.  Cardiac: Tachy, regular, No murmur, no noteable JVD  Pulmonary: CTA Bilat, no wheezes, rhonchi, bilateral LL rales  GI: Abdomen soft, non-tender, non-distended; no rebound or guarding  : No CVA tenderness.  Musculoskeletal: Extremities warm, well perfused, no erythema, no edema  Skin: No rashes  Neuro: Alert and Oriented x 3; No focal motor or sensory deficits.    Psych: Normal affect      ED Course   Procedures  Labs Reviewed   CBC W/ AUTO DIFFERENTIAL - Abnormal; Notable for the following:        Result Value    Hemoglobin 10.0 (*)     Hematocrit 33.7 (*)     MCV 73 (*)     MCH 21.6 (*)     MCHC 29.7 (*)     RDW 15.7 (*)     Lymph # 0.9 (*)     Gran% 74.9 (*)     Lymph% 17.6 (*)     All other components within normal limits   COMPREHENSIVE METABOLIC PANEL - Abnormal; Notable for the following:     Glucose 206 (*)     Alkaline Phosphatase 46 (*)     eGFR if  52.5 (*)     eGFR if non  45.6 (*)     All other components within normal limits   B-TYPE NATRIURETIC PEPTIDE - Abnormal; Notable for the following:      (*)     All other components within  normal limits   LIPASE - Abnormal; Notable for the following:     Lipase 67 (*)     All other components within normal limits   PROTIME-INR - Abnormal; Notable for the following:     Prothrombin Time 21.2 (*)     INR 2.2 (*)     All other components within normal limits   TROPONIN I - Abnormal; Notable for the following:     Troponin I 1.672 (*)     All other components within normal limits   APTT   LACTIC ACID, PLASMA   MAGNESIUM     EKG Readings: (Independently Interpreted)   EKG: Sinus tachycardia at 108, with frequent PVC's, incomplete RBBB, no WES's, ST depressions in V2-V6 (present on prior) and T wave inversions in II, III and AVF (present on prior), no STEMI.       Imaging Results          X-Ray Chest 1 View (Final result)  Result time 02/28/18 17:56:51    Final result by Henny Simms MD (02/28/18 17:56:51)                 Impression:      1.  Clear lungs.  2.  Stable enlarged cardiac silhouette and pulmonary arteries.      Electronically signed by: HENNY SIMMS MD  Date:     02/28/18  Time:    17:56              Narrative:    EXAM:  AP CHEST RADIOGRAPH.     CLINICAL INDICATION:  Shortness of breath.    TECHNIQUE: Single AP view of the chest was obtained.     COMPARISON: Prior CT dated 10/5/17    FINDINGS: The mediastinal structures are midline.  The cardiac silhouette is enlarged and stable.  Pulmonary arteries once again appear enlarged suggesting pulmonary hypertension.  There is a right-sided port with tip projecting over the SVC/RA junction. The lungs appear grossly clear.  No osseous abnormalities are identified.                                 Medical Decision Making:   History:   Old Medical Records: I decided to obtain old medical records.  Initial Assessment:   Patient presents after acute onset throat tightness, SOB, chest heaviness, and desaturation to 60% on normal 5 L O2 while at rest. She only coming up to 70s on 9 L while at home. In ED on wall O2 10 L, she is at her baseline 88%  and says symptoms have now resolved. She has been compliant with home meds, no allergan exposures, no recent illness. Will touch base with her primary cardiology team concerning management and dispo.   Independently Interpreted Test(s):   I have ordered and independently interpreted EKG Reading(s) - see prior notes  Clinical Tests:   Lab Tests: Ordered and Reviewed  Radiological Study: Ordered and Reviewed  Medical Tests: Ordered and Reviewed  Other:   I have discussed this case with another health care provider.            Scribe Attestation:   Scribe #1: I performed the above scribed service and the documentation accurately describes the services I performed. I attest to the accuracy of the note.    Attending Attestation:   Physician Attestation Statement for Resident:  As the supervising MD   Physician Attestation Statement: I have personally seen and examined this patient.   I agree with the above history. -:   As the supervising MD I agree with the above PE.    As the supervising MD I agree with the above treatment, course, plan, and disposition.   -: Elements of the bedside immediately after this patient was roomed to evaluate the patient given her tachycardia and hypoxia and her history of pulmonary hypertension on IV medications for treatment.  I considered MI/ACS, PE, CHF exacerbation, pulmonary hypertension exacerbation, pneumonia among other diagnoses.  Immediately after I evaluated the patient I consult the cardiology who evaluated the patient at the bedside.    Discussed with Cards fellow whether or not they would like me to order ASA in the ED given bump in trop from baseline and chronic STD's on EKG.  They wanted to discuss this with their staffa nd stated they would order aspirin for the patient if their staff agreed given they though her symptoms and bump in trop was likely due to demand ischemia from transient tachycardic event.    Critical Care Procedure Note:  Direct patient critical care time:  10 minutes  Additional history critical care time:10 minutes  Ordering / reviewing labs and studies: critical care time:10 minutes  Documentation critical care time: 10 minutes  Consulting other physicians critical care time: 10 minutes  Total critical care time (exclusive of procedural time) : 50 minutes   Reason for Critical Care: Chest pain, hypoxia, cardiopulmonary instability.    I have reviewed and agree with the residents interpretation of the following: lab data, x-rays and EKG.  I have reviewed the following: old records at this facility.                       Clinical Impression:   The primary encounter diagnosis was Shortness of breath. Diagnoses of Hypoxia, Palpitations, Chest tightness, and Pulmonary hypertension were also pertinent to this visit.                           Reid Edwards MD  02/28/18 8050

## 2018-02-28 NOTE — ED NOTES
Patient identifiers verified and correct for Molly Smith.  LOC: The patient is awake, alert and aware of environment with an appropriate affect, the patient is oriented x 3 and speaking appropriately.   APPEARANCE: Patient appears comfortable and in no acute distress, patient is clean and well groomed.  SKIN: The skin is warm and dry, color consistent with ethnicity, moist mucus membranes, skin intact, no breakdown or bruising noted.  MUSCULOSKELETAL: Patient moving all extremities spontaneously, patient has +1 edema bilateral lower extremities.  RESPIRATORY: Airway is open and patent, respirations are spontaneous, patient has a normal effort and tachy rate, no accessory muscle use noted, pt placed on continuous pulse ox with O2 sats noted at 88% on 6L/min. Pt states her normal is O2 sat is around the high 80s.  CARDIAC: Pt placed on cardiac monitor. Patient has a tachy rate and regular rhythm, capillary refill < 3 seconds.   GASTRO: Soft and non tender to palpation, no distention noted, normoactive bowel sounds present in all four quadrants. Pt states bowel movements have been regular.  : Pt denies any pain or frequency with urination.  NEURO: Pt opens eyes spontaneously, behavior appropriate to situation, follows commands, facial expression symmetrical, bilateral hand grasp equal and even, purposeful motor response noted, normal sensation in all extremities when touched with a finger.

## 2018-02-28 NOTE — TELEPHONE ENCOUNTER
"Returned call to pt's , who had left  stating he was bringing pt to ER. He reports "Abdiel is not doing too well. She is requiring 7 liters of oxygen, and had a run with heartrate in the 130s that lasted about 15 minutes. She is quasi-stable so I am bringing her to the ER."  Pt's  confirms Remodulin dose 89.2ng, as per flowsheet. They are not presently getting Remodulin pre-filled cassettes, secondary to "there was a dose discrepancy, so they ([facility]) canned it." Dr Tim notified of patient coming to ER. [facility] notified regarding pre-filled cassette issue.   "

## 2018-02-28 NOTE — ED TRIAGE NOTES
Pt arrived by personal transport to the ED with c/o SOB, pain in her thoat. Pt states her HR got up 140 and O2 sat went down to the 50s. Pt has increased home O2 from 3-5l/min. Current O2 is set at 6l/min. Hx of A-fib. Pt has central line in Right subclavian

## 2018-03-01 PROBLEM — I21.4 NSTEMI (NON-ST ELEVATED MYOCARDIAL INFARCTION): Status: ACTIVE | Noted: 2018-03-01

## 2018-03-01 LAB
ABO + RH BLD: NORMAL
ALBUMIN SERPL BCP-MCNC: 3.8 G/DL
ALP SERPL-CCNC: 41 U/L
ALT SERPL W/O P-5'-P-CCNC: 13 U/L
ANION GAP SERPL CALC-SCNC: 9 MMOL/L
AST SERPL-CCNC: 29 U/L
BASOPHILS # BLD AUTO: 0.06 K/UL
BASOPHILS # BLD AUTO: 0.06 K/UL
BASOPHILS NFR BLD: 1.4 %
BASOPHILS NFR BLD: 1.5 %
BILIRUB SERPL-MCNC: 0.9 MG/DL
BLD GP AB SCN CELLS X3 SERPL QL: NORMAL
BUN SERPL-MCNC: 18 MG/DL
CALCIUM SERPL-MCNC: 9 MG/DL
CHLORIDE SERPL-SCNC: 108 MMOL/L
CO2 SERPL-SCNC: 23 MMOL/L
CREAT SERPL-MCNC: 1 MG/DL
DIFFERENTIAL METHOD: ABNORMAL
DIFFERENTIAL METHOD: ABNORMAL
EOSINOPHIL # BLD AUTO: 0.1 K/UL
EOSINOPHIL # BLD AUTO: 0.1 K/UL
EOSINOPHIL NFR BLD: 2.5 %
EOSINOPHIL NFR BLD: 2.6 %
ERYTHROCYTE [DISTWIDTH] IN BLOOD BY AUTOMATED COUNT: 15.6 %
ERYTHROCYTE [DISTWIDTH] IN BLOOD BY AUTOMATED COUNT: 15.7 %
EST. GFR  (AFRICAN AMERICAN): >60 ML/MIN/1.73 M^2
EST. GFR  (NON AFRICAN AMERICAN): 56.8 ML/MIN/1.73 M^2
ESTIMATED PA SYSTOLIC PRESSURE: 77.44
FACT X PPP CHRO-ACNC: 0.49 IU/ML
FACT X PPP CHRO-ACNC: <0.1 IU/ML
GLUCOSE SERPL-MCNC: 98 MG/DL
HCT VFR BLD AUTO: 33.4 %
HCT VFR BLD AUTO: 34.3 %
HGB BLD-MCNC: 10 G/DL
HGB BLD-MCNC: 10.1 G/DL
IMM GRANULOCYTES # BLD AUTO: 0.01 K/UL
IMM GRANULOCYTES # BLD AUTO: 0.01 K/UL
IMM GRANULOCYTES NFR BLD AUTO: 0.2 %
IMM GRANULOCYTES NFR BLD AUTO: 0.2 %
INR PPP: 1.9
LYMPHOCYTES # BLD AUTO: 1.2 K/UL
LYMPHOCYTES # BLD AUTO: 1.4 K/UL
LYMPHOCYTES NFR BLD: 29.8 %
LYMPHOCYTES NFR BLD: 31.8 %
MAGNESIUM SERPL-MCNC: 2.3 MG/DL
MCH RBC QN AUTO: 21.5 PG
MCH RBC QN AUTO: 21.7 PG
MCHC RBC AUTO-ENTMCNC: 29.4 G/DL
MCHC RBC AUTO-ENTMCNC: 29.9 G/DL
MCV RBC AUTO: 73 FL
MCV RBC AUTO: 73 FL
MITRAL VALVE REGURGITATION: ABNORMAL
MONOCYTES # BLD AUTO: 0.4 K/UL
MONOCYTES # BLD AUTO: 0.5 K/UL
MONOCYTES NFR BLD: 10.1 %
MONOCYTES NFR BLD: 11.1 %
NEUTROPHILS # BLD AUTO: 2.3 K/UL
NEUTROPHILS # BLD AUTO: 2.3 K/UL
NEUTROPHILS NFR BLD: 52.9 %
NEUTROPHILS NFR BLD: 55.9 %
NRBC BLD-RTO: 0 /100 WBC
NRBC BLD-RTO: 0 /100 WBC
PLATELET # BLD AUTO: 266 K/UL
PLATELET # BLD AUTO: 276 K/UL
PMV BLD AUTO: 8.7 FL
PMV BLD AUTO: 8.9 FL
POTASSIUM SERPL-SCNC: 3.8 MMOL/L
PROT SERPL-MCNC: 7.2 G/DL
PROTHROMBIN TIME: 18.6 SEC
RBC # BLD AUTO: 4.6 M/UL
RBC # BLD AUTO: 4.69 M/UL
RETIRED EF AND QEF - SEE NOTES: 55 (ref 55–65)
SODIUM SERPL-SCNC: 140 MMOL/L
TRICUSPID VALVE REGURGITATION: ABNORMAL
TROPONIN I SERPL DL<=0.01 NG/ML-MCNC: 3.75 NG/ML
TROPONIN I SERPL DL<=0.01 NG/ML-MCNC: 4.86 NG/ML
TROPONIN I SERPL DL<=0.01 NG/ML-MCNC: 5.45 NG/ML
TROPONIN I SERPL DL<=0.01 NG/ML-MCNC: 5.6 NG/ML
TROPONIN I SERPL DL<=0.01 NG/ML-MCNC: 5.6 NG/ML
WBC # BLD AUTO: 4.06 K/UL
WBC # BLD AUTO: 4.31 K/UL

## 2018-03-01 PROCEDURE — 25000003 PHARM REV CODE 250

## 2018-03-01 PROCEDURE — C1894 INTRO/SHEATH, NON-LASER: HCPCS

## 2018-03-01 PROCEDURE — B2111ZZ FLUOROSCOPY OF MULTIPLE CORONARY ARTERIES USING LOW OSMOLAR CONTRAST: ICD-10-PCS | Performed by: INTERNAL MEDICINE

## 2018-03-01 PROCEDURE — 99152 MOD SED SAME PHYS/QHP 5/>YRS: CPT | Mod: GC,,, | Performed by: INTERNAL MEDICINE

## 2018-03-01 PROCEDURE — 25000003 PHARM REV CODE 250: Performed by: PHYSICIAN ASSISTANT

## 2018-03-01 PROCEDURE — 4A023N7 MEASUREMENT OF CARDIAC SAMPLING AND PRESSURE, LEFT HEART, PERCUTANEOUS APPROACH: ICD-10-PCS | Performed by: INTERNAL MEDICINE

## 2018-03-01 PROCEDURE — 93458 L HRT ARTERY/VENTRICLE ANGIO: CPT

## 2018-03-01 PROCEDURE — 83735 ASSAY OF MAGNESIUM: CPT

## 2018-03-01 PROCEDURE — 93010 ELECTROCARDIOGRAM REPORT: CPT | Mod: ,,, | Performed by: INTERNAL MEDICINE

## 2018-03-01 PROCEDURE — 36415 COLL VENOUS BLD VENIPUNCTURE: CPT

## 2018-03-01 PROCEDURE — 25000003 PHARM REV CODE 250: Performed by: SURGERY

## 2018-03-01 PROCEDURE — 20600001 HC STEP DOWN PRIVATE ROOM

## 2018-03-01 PROCEDURE — 80053 COMPREHEN METABOLIC PANEL: CPT

## 2018-03-01 PROCEDURE — 25000003 PHARM REV CODE 250: Performed by: INTERNAL MEDICINE

## 2018-03-01 PROCEDURE — 25000003 PHARM REV CODE 250: Performed by: STUDENT IN AN ORGANIZED HEALTH CARE EDUCATION/TRAINING PROGRAM

## 2018-03-01 PROCEDURE — 84484 ASSAY OF TROPONIN QUANT: CPT

## 2018-03-01 PROCEDURE — 85025 COMPLETE CBC W/AUTO DIFF WBC: CPT | Mod: 91

## 2018-03-01 PROCEDURE — 99223 1ST HOSP IP/OBS HIGH 75: CPT | Mod: ,,, | Performed by: INTERNAL MEDICINE

## 2018-03-01 PROCEDURE — 84484 ASSAY OF TROPONIN QUANT: CPT | Mod: 91

## 2018-03-01 PROCEDURE — 93306 TTE W/DOPPLER COMPLETE: CPT | Mod: 26,,, | Performed by: INTERNAL MEDICINE

## 2018-03-01 PROCEDURE — 93458 L HRT ARTERY/VENTRICLE ANGIO: CPT | Mod: 26,GC,, | Performed by: INTERNAL MEDICINE

## 2018-03-01 PROCEDURE — 99223 1ST HOSP IP/OBS HIGH 75: CPT | Mod: AI,,, | Performed by: INTERNAL MEDICINE

## 2018-03-01 PROCEDURE — 85520 HEPARIN ASSAY: CPT

## 2018-03-01 PROCEDURE — 93306 TTE W/DOPPLER COMPLETE: CPT

## 2018-03-01 PROCEDURE — 86850 RBC ANTIBODY SCREEN: CPT

## 2018-03-01 PROCEDURE — 85520 HEPARIN ASSAY: CPT | Mod: 91

## 2018-03-01 PROCEDURE — 93005 ELECTROCARDIOGRAM TRACING: CPT

## 2018-03-01 PROCEDURE — 63600175 PHARM REV CODE 636 W HCPCS: Performed by: INTERNAL MEDICINE

## 2018-03-01 PROCEDURE — 63600175 PHARM REV CODE 636 W HCPCS

## 2018-03-01 PROCEDURE — 85610 PROTHROMBIN TIME: CPT

## 2018-03-01 RX ORDER — PROMETHAZINE HYDROCHLORIDE 25 MG/1
25 TABLET ORAL ONCE AS NEEDED
Status: COMPLETED | OUTPATIENT
Start: 2018-03-01 | End: 2018-03-01

## 2018-03-01 RX ORDER — DIPHENHYDRAMINE HCL 25 MG
CAPSULE ORAL
Status: DISPENSED
Start: 2018-03-01 | End: 2018-03-02

## 2018-03-01 RX ORDER — ASPIRIN 325 MG
TABLET ORAL
Status: DISPENSED
Start: 2018-03-01 | End: 2018-03-01

## 2018-03-01 RX ORDER — POTASSIUM CHLORIDE 750 MG/1
20 CAPSULE, EXTENDED RELEASE ORAL ONCE
Status: COMPLETED | OUTPATIENT
Start: 2018-03-01 | End: 2018-03-01

## 2018-03-01 RX ORDER — ASPIRIN 325 MG
325 TABLET ORAL DAILY
Status: DISCONTINUED | OUTPATIENT
Start: 2018-03-01 | End: 2018-03-05 | Stop reason: HOSPADM

## 2018-03-01 RX ORDER — TRAMADOL HYDROCHLORIDE 50 MG/1
100 TABLET ORAL 2 TIMES DAILY PRN
Status: DISCONTINUED | OUTPATIENT
Start: 2018-03-01 | End: 2018-03-05 | Stop reason: HOSPADM

## 2018-03-01 RX ORDER — CLOPIDOGREL BISULFATE 75 MG/1
300 TABLET ORAL ONCE
Status: COMPLETED | OUTPATIENT
Start: 2018-03-01 | End: 2018-03-01

## 2018-03-01 RX ORDER — ASPIRIN 325 MG
325 TABLET ORAL DAILY
Status: DISCONTINUED | OUTPATIENT
Start: 2018-03-01 | End: 2018-03-01

## 2018-03-01 RX ORDER — HEPARIN SODIUM,PORCINE/D5W 25000/250
17 INTRAVENOUS SOLUTION INTRAVENOUS CONTINUOUS
Status: DISCONTINUED | OUTPATIENT
Start: 2018-03-01 | End: 2018-03-01

## 2018-03-01 RX ORDER — DIPHENHYDRAMINE HCL 50 MG
50 CAPSULE ORAL ONCE
Status: COMPLETED | OUTPATIENT
Start: 2018-03-01 | End: 2018-03-01

## 2018-03-01 RX ORDER — SODIUM CHLORIDE 9 MG/ML
INJECTION, SOLUTION INTRAVENOUS CONTINUOUS
Status: DISCONTINUED | OUTPATIENT
Start: 2018-03-01 | End: 2018-03-01

## 2018-03-01 RX ORDER — SODIUM CHLORIDE 9 MG/ML
INJECTION, SOLUTION INTRAVENOUS CONTINUOUS
Status: ACTIVE | OUTPATIENT
Start: 2018-03-01 | End: 2018-03-01

## 2018-03-01 RX ORDER — CETIRIZINE HYDROCHLORIDE 10 MG/1
10 TABLET ORAL DAILY
Status: DISCONTINUED | OUTPATIENT
Start: 2018-03-01 | End: 2018-03-05 | Stop reason: HOSPADM

## 2018-03-01 RX ADMIN — BOSENTAN 125 MG: 125 TABLET, FILM COATED ORAL at 08:03

## 2018-03-01 RX ADMIN — CLOPIDOGREL 300 MG: 75 TABLET, FILM COATED ORAL at 05:03

## 2018-03-01 RX ADMIN — DILTIAZEM HYDROCHLORIDE 180 MG: 180 CAPSULE, COATED, EXTENDED RELEASE ORAL at 08:03

## 2018-03-01 RX ADMIN — POTASSIUM CHLORIDE 20 MEQ: 750 CAPSULE, EXTENDED RELEASE ORAL at 08:03

## 2018-03-01 RX ADMIN — TRAMADOL HYDROCHLORIDE 50 MG: 50 TABLET, COATED ORAL at 04:03

## 2018-03-01 RX ADMIN — PROMETHAZINE HYDROCHLORIDE 25 MG: 25 TABLET ORAL at 09:03

## 2018-03-01 RX ADMIN — DIPHENHYDRAMINE HYDROCHLORIDE 50 MG: 25 CAPSULE ORAL at 01:03

## 2018-03-01 RX ADMIN — TRAMADOL HYDROCHLORIDE 50 MG: 50 TABLET, COATED ORAL at 09:03

## 2018-03-01 RX ADMIN — SODIUM CHLORIDE: 0.9 INJECTION, SOLUTION INTRAVENOUS at 02:03

## 2018-03-01 RX ADMIN — SPIRONOLACTONE 25 MG: 25 TABLET, FILM COATED ORAL at 08:03

## 2018-03-01 RX ADMIN — CETIRIZINE HYDROCHLORIDE 10 MG: 10 TABLET, FILM COATED ORAL at 09:03

## 2018-03-01 RX ADMIN — HEPARIN SODIUM 17 UNITS/KG/HR: 10000 INJECTION, SOLUTION INTRAVENOUS at 06:03

## 2018-03-01 RX ADMIN — ASPIRIN 325 MG ORAL TABLET 325 MG: 325 PILL ORAL at 05:03

## 2018-03-01 NOTE — CONSULTS
[facility]  Cardiac Electrophysiology  Consult Note    Admission Date: 2/28/2018  Code Status: Full Code   Attending Provider: Alice Tim MD  Consulting Provider: Tal Zavala MD  Principal Problem:<principal problem not specified>    Inpatient consult to Electrophysiology  Consult performed by: TAL ZAVALA  Consult ordered by: WYATT COPELAND        Subjective:     Chief Complaint:  Shortness of breath     HPI:   71 yo female with a PMHx of WHO I- PAH (currently on remodulin Iv, adempas, bosentan). EP consulted for rhythm control of SVT in the setting of severe PH and chronic hypoxic respiratory failure.    She was previously on 3L at baseline, increased to 5L since 1/2018. Yesterday, she noted increasing shortness of breath and some mild chest tightness as well as some palpitations. Noted O2 sats in 70s and inability to increase with her home O2. Here she was in low 90s on 6l, tolerated well overnight.     She follows with Dr. Hinojosa. Recently increased her diltiazem to 180 from 120 as she had reported frequent runs up to 200 bpm lasting 1-7min at home. Since increase, she reports no episodes until yesterday when she noted her HR in 140s during her dyspnea. She brings a rhythm strip from home, consistent with multifocal AT. Per past notes, suspected arrhythmia At. EKG here with sinus tachycardia. Tele reviewed and no SVT present on tele. QRS noted to be 500 on most recent EKG.     She previously tried multaq and did not tolerate. She feels the increased dose of diltiazem has better controlled her episodes of sx palpitations and significantly reduced the frequency over last couple weeks.    She has also been noted to have an increasing troponin up to 4.8 here with EKG changes on presentation. Started on NSTEMI treatment and interventional cardiology consulted. CP free since dyspnea improved yesterday.       Past Medical History:   Diagnosis Date    Allergy      Anticoagulant long-term use     Arthritis     Heart murmur     Pneumonia     Pulmonary hypertension        Past Surgical History:   Procedure Laterality Date    BACK SURGERY         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    Multaq [dronedarone]        No current facility-administered medications on file prior to encounter.      Current Outpatient Prescriptions on File Prior to Encounter   Medication Sig    bosentan (TRACLEER) 125 MG Tab Take 1 tablet (125 mg total) by mouth 3 (three) times daily.    CALCIUM CARBONATE (CALCIUM 600 ORAL) Take 2 tablets by mouth once daily.      diltiaZEM (CARDIZEM CD) 180 MG 24 hr capsule Take 1 capsule (180 mg total) by mouth once daily.    furosemide (LASIX) 40 MG tablet Take 1 tablet (40 mg total) by mouth 2 (two) times daily.    riociguat (ADEMPAS) 0.5 mg Tab tablet Take 1 tablet (0.5 mg total) by mouth 3 (three) times daily.    spironolactone (ALDACTONE) 25 MG tablet Take 25 mg by mouth once daily.      traMADol (ULTRAM-ER) 200 MG Tb24 TAKE ONE TABLET BY MOUTH ONCE A DAY AS NEEDED FOR PAIN THANK YOU!    TREPROSTINIL SODIUM (TREPROSTINIL, REMODULIN, PUMP FOR HOME USE) Pt currently on 89.2 ng/kg/min=45 mL/day dosing weight 70.05 kg    ferrous gluconate (FERGON) 324 MG tablet Take 1 tablet (324 mg total) by mouth daily with breakfast.    sodium chloride 0.9% 0.9 % SolP 100 mL with treprostinil 1 mg/mL Soln 9,000,000 ng Inject 5,005.5 ng/min into the vein continuous.    warfarin (COUMADIN) 10 MG tablet Take 1 tablet (10 mg total) by mouth Daily.     Family History     Problem Relation (Age of Onset)    Arthritis Mother, Father    Diabetes Father    Heart disease Father    Hypertension Father        Social History Main Topics    Smoking status: Never Smoker    Smokeless tobacco: Not on file    Alcohol use No      Comment: rarely    Drug use: No    Sexual activity: Not on file     Review of Systems   All  other systems reviewed and are negative.    Objective:     Vital Signs (Most Recent):  Temp: 98.8 °F (37.1 °C) (02/28/18 1834)  Pulse: 100 (02/28/18 2305)  Resp: 20 (02/28/18 2305)  BP: (!) 110/53 (02/28/18 2114)  SpO2: (!) 92 % (02/28/18 2119) Vital Signs (24h Range):  Temp:  [98.8 °F (37.1 °C)-99.2 °F (37.3 °C)] 98.8 °F (37.1 °C)  Pulse:  [100-108] 100  Resp:  [20-32] 20  SpO2:  [81 %-92 %] 92 %  BP: (100-110)/(51-56) 110/53     Weight: 58.1 kg (128 lb)  Body mass index is 22.67 kg/m².    SpO2: (!) 92 %  O2 Device (Oxygen Therapy): nasal cannula    Physical Exam   Constitutional: She is oriented to person, place, and time. She appears well-developed and well-nourished. No distress.   HENT:   Head: Normocephalic and atraumatic.   Eyes: No scleral icterus.   Neck: JVD present.   JVD 12 cm   Cardiovascular: Regular rhythm.  Tachycardia present.    Pulmonary/Chest: Effort normal. No respiratory distress. She has no wheezes. She has rales.   Abdominal: Soft.   Musculoskeletal: She exhibits no edema.   Neurological: She is alert and oriented to person, place, and time.   Skin: Skin is warm.   Psychiatric: She has a normal mood and affect.       Significant Labs: All pertinent lab results from the last 24 hours have been reviewed.    Significant Imaging: EKG: As above in Hpi    Assessment and Plan:     SVT (supraventricular tachycardia)    71 y/o F with WHO class I Pulmonary HTN, here with acute on chronic hypoxic respiratory failure as well as troponinemia (type I vs type II nstemi). SVT suspected to be atrial tachycardia likely driven by acute on chronic hypoxic respiratory failure. Suspect hypoxia driving episodes rather than tachyarrhythmia driving hypoxia.   Currently sinus tachycardia, EKG with Qtc 500. Previously did not tolerate multaq.  SVT better controlled since increasing diltiazem 120 to 180 daily for last several weeks.  Very limited options in this patient: previously discussed tikosyn but would avoid  given baseline Qt. Not a candidate for sotalol. Amiodarone is a consideration; prior PFTs with nml DLCO; however, any lung toxicity or increase in pulm pressures related to amiodarone use could cause significant worsening of underlying disease in this patient.  Thus, joaquin continue with diltiazem at 180 daily. Better controlled on higher dose with room for uptitration. Would continue at current dose for now given recent increase, can uptitrate going forward as needed.    Seen and discussed with staff Dr. Key as well as discussed with her EP Dr. Hinojoas.                   Tal Cornejo MD  Cardiac Electrophysiology  [facility]  [id number]

## 2018-03-01 NOTE — ASSESSMENT & PLAN NOTE
73 y/o F with WHO class I Pulmonary HTN, here with acute on chronic respiratory insufficiency. Other differentials include URI, ADHF, unlikely to be URI/pneumonia, and her LV EF is normal, with absence of pulmonary edema therefore I do not think this is left sided ADHF. Elevated troponin noted, likely to be demand ischemia in the setting of SVT, and hypoxia. Currently chest pain free and will trend trop until downtrend.    -Admit to TSU  -C/w remodulin, currently has enough in her cassette to last the night  -Home dose of remodulin is 89.2ng/kg/min  -C/w Adempas 0.5mg TID  -C/w bosentan 125mg Q12h   -Lasix 40mg IVP now   -Repeat troponin until downtrend  -EP consult in the morning to address SVT (P.HTN patients tolerate SVT poorly)   -Echo in the AM   -C/w coumadin and daily INR

## 2018-03-01 NOTE — HPI
Patient is a 71 yo female with past medical hx WHO 1- PAH Currently on remodulin, adempas and Bosentan presented to the [facility] on 02/28/18 with a chief complaint of increasing SOB and chest pressure associated with palpitations. Her chest pressure resolved without administration of nitro and she was admitted to the [facility] where she was treated for acute on chronic respiratory insufficiency. The etiology behind her SOB was unclear as it may have been attributed to URI/PNA or worsened ADHF. However CXR was clear and the patient's has a hx of normal LVEF. EKG's taken show  sinus tachycardia with TWI  Anterolateral leads with STD, inferior leads STD, incomplete RBBB. Troponin's have increased from 1.6 to 5.6. Initial BNP was 359. Latest Cr is 1.0 and her Hb is 10.1. ACS protocol was initiated for NSTEMI @06:00 and was loaded with Plavix 300 mg daily. No prior ischemic evaluation on file.    Currently the patient is on 5L and is stating that her SOB has resolved. She is also been CP free. She has a previous LHC at [facility] 17-18 years ago. Takes Warfarin chronically for PAH.

## 2018-03-01 NOTE — HPI
71 yo female with a PMHx of WHO I- PAH (currently on remodulin Iv, adempas, bosentan), PFO, chronic hypoxia on 3L at baseline previously, but on 5L NC since 01/2018. Today patient noticed increasing shortness of breath, states that her O2 sat on pulse oximeter was 50-70% while resting. Denies fever, cough, LE edema, flu like symptoms, syncope. States that she had chest pressure associated with palpitations. Currently O2sat is 92% on 6L Nc, no chest pressure, denies palpitations. Has a rhythm strip from [facility], suggests Atrial fibrillation vs AT, currently sinus tachycardia with TWI  Anterolateral leads with STD, inferior leads STD, incomplete RBBB. Troponin elevated to 1.6. 's, CXR no edema.    EPS in the past was negative. Did not tolerate Multaq, on cardizem.

## 2018-03-01 NOTE — SIGNIFICANT EVENT
POST CATH NOTE    Procedure:  [facility]  Referring MD:  Meeta   Indication:  NSTEMI   Access:  R CFA via 5F sheath    Findings:  Nonobstructive CAD  LVEDP:  10mmHg      Intervention:  None    Patient tolerated the procedure well, no complications    Post Cath Exam:  Vitals:    03/01/18 1150   BP: (!) 115/59   Pulse: 105   Resp: 16   Temp: 98.1 °F (36.7 °C)     No unusual pain, hematoma or thrill at vascular access site.  Distal pulse present without signs of ischemia.    Recommendation:  -No significant coronary artery disease or evidence of plaque rupture to explain elevated troponin  - Possibly from demand ischemia secondary to SVT and hypoxemia. ? possible PE though less likely as she is on coumadin.  - Will give gentle hydration with 100cc/hr x4hrs for CATHERINE prevention given LVEDP=10mmhg.  - Rec primary prevention for CAD.

## 2018-03-01 NOTE — SIGNIFICANT EVENT
Pt seen at bed side after the nurse informed for troponin trend up.     Pt is 72 YOCW with PMHx of WHO I- PAH (currently on remodulin Iv, adempas, bosentan), PFO, chronic hypoxia on 3L at baseline previously, but on 5L NC since 01/2018, who was admitted today for worsening SOB, and during that time has had chest pain. Since she increased her supplemental oxygen dose, her symptoms of chest pain resolved since then.     Currently asymptomatic, no more chest pain.     O/E. Lying comfortably in bed in no distress.   Vitals reviewed and stable  HEART: S1S2 no extra murmur,   LUNGS: Clear to auscultation bilaterlly.   ABD: soft, NT/ND/BS +  EXTR: Normal skin color, no ooze, no venous engorgement.       Plan: After discussing with card fellow, he thnks this is all the setting of acute respiratory failure and there was no need for initiation treatment for ACS, as this is deemed to be demand ischemia.

## 2018-03-01 NOTE — PROGRESS NOTES
Briefly, patient here for acute on chronic respiratory insufficiency 2/2 pulmonary HTN. Initial troponin 1.6, thereafter uptrending to 3.7 then 4.8. Patient reports no chest discomfort, and initial EKG although abnormal was not different from EKG on 10/2017.   In my assessment this troponin elevation is secondary to demand ischemia, however in light of the rate of troponin increase, primary ACS (nSTEMi) cannot be excluded and is high on the differential,  therefore will treat with ACS protocol load with ASA, Plavix and started heparin gtt. Currently NATE score of 2.    Siria Rodriguez DO  Cardiology Fellow

## 2018-03-01 NOTE — PROGRESS NOTES
Admit Note     Met with patient, spouse and sister to assess needs. Patient is a 72 y.o.  female, admitted for SOB and Pulmonary  hypertension.  Pt is on home remodulin.     Patient admitted from emergency on 2/28/2018 .  At this time, patient presents as alert and oriented x 4, pleasant and good eye contact.  At this time, patients caregiver presents as alert and oriented x 4, pleasant, good eye contact and calm.    Household/Family Systems     Patient resides with patient's spouse, at     [address] [place] [address].      Support system includes spouse, sister and four adult children. All but one of the pt's adult children live out of state.     Patient does not have dependents that are need of being cared for.     Patients primary caregiver is Rufus Smith, patients spouse.   Pt's cell:  [phone]  Additional emergency contacts:  Rufus (spouse) [phone]  Andra Palafox (sister, lives in [place]) [phone]  May (daughter, lives near pt) [phone]    During admission, patient's caregiver plans to stay in patient's room.  Confirmed patient and patients caregivers do have access to reliable transportation.    Cognitive Status/Learning     Patient reports reading ability as college and states patient does not have difficulty with reading, writing, seeing, comprehension, learning and memory. Pt does report difficulty with hearing and does wear glasses.   Patient reports patient learns best by reading/one on one.    Needed: No.   Highest education level: Attended College/Technical School    Vocation/Disability   .  Working for Income: No  If no, reason not working: Disability  Patient is disabled due to pulmonary hypertension since 2000.  Prior to disability, patient  was employed as  ..     Pt's spouse is an ER doctor in [place] and works several shifts per month.     Adherence     Patient reports a high level of adherence to patients health  care regimen.  Adherence counseling and education provided. Patient verbalizes understanding.    Substance Use    Patient reports the following substance usage.    Tobacco: none, patient denies any use.  Alcohol: Pt reports she drinks only a little wine once in a while.  Illicit Drugs/Non-prescribed Medications: none, patient denies any use.  Patient states clear understanding of the potential impact of substance use.  Substance abstinence/cessation counseling, education and resources provided and reviewed.     Services Utilizing/ADLS    Infusion Service: Prior to admission, patient utilizing? yes IV Remodulin with [facility]  Home Health: Prior to admission, patient utilizing? no  DME: Prior to admission, yes home 02 concentrator and 2 portable concentrators.  Pulmonary/Cardiac Rehab: Prior to admission, no Pt has gone to pulmonary rehab in the past  Dialysis:  Prior to admission, no  Transplant Specialty Pharmacy:  Prior to admission, no.    Prior to admission, patient reports patient was independent with ADLS and was driving. Pt's spouse and sister also drive.  Patient reports patient is not able to care for self at this time due to compromised medical condition (as documented in medical record) and physical weakness..  Patient indicates a willingness to care for self once medically cleared to do so.    Insurance/Medications    Insured by   Payor/Plan Subscr  Sex Relation Sub. Ins. ID Effective Group Num   1. MEDICARE - [place]* JONI,MARBELLA D 1945 Female  [id number]                                    [address]   2. STANDARD LIFE* MARBELLA QUINONES LENI 1945 Female  [id number]                                    [address] [address]      Primary Insurance (for UNOS reporting): Public Insurance - Medicare FFS (Fee For Service)  Secondary Insurance (for UNOS reporting): Private Insurance    Patient reports patient is able to obtain and afford medications at this time and at time of discharge.    Living  Will/Healthcare Power of     Patient states patient does not have a LW and/or HCPA.   provided education regarding LW and HCPA and the completion of forms.    Coping/Mental Health    Patient is coping adequately with the aid of  family members, friends and maren.  Patient denies mental health difficulties. Pt reports she has a good family support system. General support provided.     Discharge Planning    At time of discharge, patient plans to return to patient's home under the care of spouse.  Patients spouse will transport patient.  Per rounds today, expected discharge date has not been medically determined at this time. Patient and patients caregiver  verbalize understanding and are involved in treatment planning and discharge process.    Additional Concerns    Patient's caretaker denies additional needs and/or concerns at this time. Patient is being followed for needs, education, resources, information, emotional support, supportive counseling, and for supportive and skilled discharge plan of care.  providing ongoing psychosocial support, education, resources and d/c planning as needed.  SW remains available. Patient's caregiver verbalizes understanding and agreement with information reviewed,  availability and how to access available resources as needed. Patient denies additional needs and/or concerns at this time. Patient verbalizes understanding and agreement with information reviewed, social work availability, and how to access available resources as needed.

## 2018-03-01 NOTE — PROGRESS NOTES
Pt arrived to TSU room 8091 via stretcher. Pt on home dose IV remodulin infusing to dedicated line, 5 L n/c.

## 2018-03-01 NOTE — CONSULTS
[facility]  Interventional Cardiology  Consult Note    Patient Name: Molly Smith  MRN: [id number]  Admission Date: 2/28/2018  Hospital Length of Stay: 1 days  Code Status: Full Code   Attending Provider: Alice Tim MD  Consulting Provider: Chely Cheung MD  Primary Care Physician: Alice Tim MD  Principal Problem:NSTEMI (non-ST elevated myocardial infarction)    Patient information was obtained from patient, past medical records and ER records.     Inpatient consult to Interventional Cardiology  Consult performed by: CHELY CHEUNG  Consult ordered by: IRAJ ARGUETA  Reason for consult: NSTEMI  Assessment/Recommendations: Plan [facility]  Femoral access  Consents done and orders placed  ASA done        Subjective:     Chief Complaint:  Worsening SOB sec to NSTEMI     HPI:  Patient is a 73 yo female with past medical hx WHO 1- PAH Currently on remodulin, adempas and Bosentan presented to the C on 02/28/18 with a chief complaint of increasing SOB and chest pressure associated with palpitations. Her chest pressure resolved without administration of nitro and she was [facility] where she was treated for acute on chronic respiratory insufficiency. The etiology behind her SOB was unclear as it may have been attributed to URI/PNA or worsened ADHF. However CXR was clear and the patient's has a hx of normal LVEF. EKG's taken show  sinus tachycardia with TWI  Anterolateral leads with STD, inferior leads STD, incomplete RBBB. Troponin's have increased from 1.6 to 5.6. Initial BNP was 359. Latest Cr is 1.0 and her Hb is 10.1. ACS protocol was initiated for NSTEMI @06:00 and was loaded with Plavix 300 mg daily. No prior ischemic evaluation on file.    Currently the patient is on 5L and is stating that her SOB has resolved. She is also been CP free. She has a previous LHC at [facility] 17-18 years ago. Takes Warfarin chronically for PAH.     Past Medical History:   Diagnosis  Date    Allergy     Anticoagulant long-term use     Arthritis     Heart murmur     Pneumonia     Pulmonary hypertension        Past Surgical History:   Procedure Laterality Date    BACK SURGERY         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    Multaq [dronedarone]        PTA Medications   Medication Sig    bosentan (TRACLEER) 125 MG Tab Take 1 tablet (125 mg total) by mouth 3 (three) times daily.    CALCIUM CARBONATE (CALCIUM 600 ORAL) Take 2 tablets by mouth once daily.      diltiaZEM (CARDIZEM CD) 180 MG 24 hr capsule Take 1 capsule (180 mg total) by mouth once daily.    furosemide (LASIX) 40 MG tablet Take 1 tablet (40 mg total) by mouth 2 (two) times daily.    riociguat (ADEMPAS) 0.5 mg Tab tablet Take 1 tablet (0.5 mg total) by mouth 3 (three) times daily.    spironolactone (ALDACTONE) 25 MG tablet Take 25 mg by mouth once daily.      traMADol (ULTRAM-ER) 200 MG Tb24 TAKE ONE TABLET BY MOUTH ONCE A DAY AS NEEDED FOR PAIN THANK YOU!    TREPROSTINIL SODIUM (TREPROSTINIL, REMODULIN, PUMP FOR HOME USE) Pt currently on 89.2 ng/kg/min=45 mL/day dosing weight 70.05 kg    ferrous gluconate (FERGON) 324 MG tablet Take 1 tablet (324 mg total) by mouth daily with breakfast.    sodium chloride 0.9% 0.9 % SolP 100 mL with treprostinil 1 mg/mL Soln 9,000,000 ng Inject 5,005.5 ng/min into the vein continuous.    warfarin (COUMADIN) 10 MG tablet Take 1 tablet (10 mg total) by mouth Daily.     Family History     Problem Relation (Age of Onset)    Arthritis Mother, Father    Diabetes Father    Heart disease Father    Hypertension Father        Social History Main Topics    Smoking status: Never Smoker    Smokeless tobacco: Not on file    Alcohol use No      Comment: rarely    Drug use: No    Sexual activity: Not on file     Review of Systems   Constitution: Negative for chills and fever.   Cardiovascular: Negative for chest pain.   Respiratory:  Negative for cough and shortness of breath.    Gastrointestinal: Negative for abdominal pain, nausea and vomiting.   Genitourinary: Negative for dysuria and hematuria.   Neurological: Negative for dizziness and focal weakness.   Psychiatric/Behavioral: Negative for altered mental status.   All other systems reviewed and are negative.    Objective:     Vital Signs (Most Recent):  Temp: 98.1 °F (36.7 °C) (03/01/18 1150)  Pulse: 105 (03/01/18 1150)  Resp: 16 (03/01/18 1150)  BP: (!) 115/59 (03/01/18 1150)  SpO2: (!) 89 % (03/01/18 1150) Vital Signs (24h Range):  Temp:  [98.1 °F (36.7 °C)-99.2 °F (37.3 °C)] 98.1 °F (36.7 °C)  Pulse:  [] 105  Resp:  [16-32] 16  SpO2:  [81 %-92 %] 89 %  BP: (100-117)/(51-59) 115/59     Weight: 70 kg (154 lb 5.2 oz)  Body mass index is 27.34 kg/m².    SpO2: (!) 89 %  O2 Device (Oxygen Therapy): nasal cannula      Intake/Output Summary (Last 24 hours) at 03/01/18 1249  Last data filed at 03/01/18 0430   Gross per 24 hour   Intake                0 ml   Output              500 ml   Net             -500 ml       Lines/Drains/Airways     Central Venous Catheter Line                 Tunneled Central Line Insertion/Assessment - Single Lumen  left subclavian -- days         Tunneled Central Line Insertion/Assessment - Single Lumen  right subclavian -- days          Peripheral Intravenous Line                 Midline Catheter Insertion/Assessment  - Single Lumen 10/05/17 1358 Left brachial vein 18g x 8cm 146 days         Peripheral IV - Single Lumen 02/28/18 2154 Right Hand less than 1 day                Physical Exam   Constitutional: She is oriented to person, place, and time. She appears well-developed and well-nourished.   HENT:   Head: Normocephalic and atraumatic.   Eyes: EOM are normal. Pupils are equal, round, and reactive to light.   Neck: Normal range of motion. Neck supple.   Cardiovascular:   Tachycardic  Intact radial pulses bilaterally  R BP is 107/64  L BP is 115/59  2+  femoral, 2+ DP and 2+ PT's   Pulmonary/Chest: Effort normal and breath sounds normal. She has no wheezes.   Abdominal: Soft. Bowel sounds are normal. There is no tenderness.   Musculoskeletal: Normal range of motion. She exhibits no edema.   Neurological: She is alert and oriented to person, place, and time. She has normal reflexes.       Significant Labs:   CMP   Recent Labs  Lab 02/28/18  1710 03/01/18  0546    140   K 3.7 3.8    108   CO2 24 23   * 98   BUN 22 18   CREATININE 1.2 1.0   CALCIUM 9.2 9.0   PROT 7.4 7.2   ALBUMIN 3.9 3.8   BILITOT 0.8 0.9   ALKPHOS 46* 41*   AST 21 29   ALT 13 13   ANIONGAP 9 9   ESTGFRAFRICA 52.5* >60.0   EGFRNONAA 45.6* 56.8*   , CBC   Recent Labs  Lab 02/28/18  1710 03/01/18  0546   WBC 5.27 4.31  4.06   HGB 10.0* 10.1*  10.0*   HCT 33.7* 34.3*  33.4*    276  266   , Lipid Panel No results for input(s): CHOL, HDL, LDLCALC, TRIG, CHOLHDL in the last 48 hours., Troponin   Recent Labs  Lab 03/01/18  0005 03/01/18  0212 03/01/18  0833   TROPONINI 3.749* 4.856* 5.602*    and All pertinent lab results from the last 24 hours have been reviewed.    Significant Imaging: Echocardiogram:   2D echo with color flow doppler:   Results for orders placed or performed during the hospital encounter of 02/28/18   2D echo with color flow doppler   Result Value Ref Range    EF 55 55 - 65    Mitral Valve Regurgitation TRIVIAL     Est. PA Systolic Pressure 77.44 (A)     Tricuspid Valve Regurgitation MODERATE (A)     and X-Ray: CXR: X-Ray Chest 1 View (CXR):   Results for orders placed or performed during the hospital encounter of 02/28/18   X-Ray Chest 1 View    Narrative    EXAM:  AP CHEST RADIOGRAPH.     CLINICAL INDICATION:  Shortness of breath.    TECHNIQUE: Single AP view of the chest was obtained.     COMPARISON: Prior CT dated 10/5/17    FINDINGS: The mediastinal structures are midline.  The cardiac silhouette is enlarged and stable.  Pulmonary arteries once again  appear enlarged suggesting pulmonary hypertension.  There is a right-sided port with tip projecting over the SVC/RA junction. The lungs appear grossly clear.  No osseous abnormalities are identified.    Impression    1.  Clear lungs.  2.  Stable enlarged cardiac silhouette and pulmonary arteries.      Electronically signed by: JASS SIMMS MD  Date:     02/28/18  Time:    17:56      Assessment and Plan:     * NSTEMI (non-ST elevated myocardial infarction)    Patient is a 73 yo female with past medical hx WHO 1- PAH Currently on remodulin, adempas and Bosentan presented to the [facility] on 02/28/18 with a chief complaint of increasing SOB and chest pressure secondary to NSTEMI. NATE risk is 4.     Plan:   For LHC via femoral access JL/JR  -The risks, benefits & alternatives of the procedure were explained to the patient.    -The risks of coronary angiography include but are not limited to:  Bleeding, infection, heart rhythm abnormalities, allergic reactions, kidney injury, stroke and death.    -Should stenting be indicated, the patient has agreed to dual anti-platelet therapy for 1-consecutive year with a drug-eluting stent and a minimum of 1-month with the use of a bare metal stent.    -The risks of moderate sedation include hypotension, respiratory depression, arrhythmias, bronchospasm, & death.    -Informed consent was obtained & the patient is agreeable to proceed with the procedure.  -This patient was discussed with the attending interventional cardiologist who agrees with the above assessment & plan.           Shortness of breath    Secondary to NSTEMI        SVT (supraventricular tachycardia)    Defer to EP        Pulmonary hypertension    Management as per Primary team HTS            VTE Risk Mitigation         Ordered     heparin 25,000 units in dextrose 5% 250 mL (100 units/mL) infusion  Continuous     Route:  Intravenous        03/01/18 0451     Low Risk of VTE  Once      02/28/18 1806          Thank you for  your consult. I will follow-up with patient. Please contact us if you have any additional questions.    Chely Boone MD  Interventional Cardiology   [facility]

## 2018-03-01 NOTE — HPI
71 yo female with a PMHx of WHO I- PAH (currently on remodulin Iv, adempas, bosentan). EP consulted for rhythm control of SVT in the setting of severe PH and chronic hypoxic respiratory failure.    She was previously on 3L at baseline, increased to 5L since 1/2018. Yesterday, she noted increasing shortness of breath and some mild chest tightness as well as some palpitations. Noted O2 sats in 70s and inability to increase with her home O2. Here she was in low 90s on 6l, tolerated well overnight.     She follows with Dr. Hinojosa. Recently increased her diltiazem to 180 from 120 as she had reported frequent runs up to 200 bpm lasting 1-7min at home. Since increase, she reports no episodes until yesterday when she noted her HR in 140s during her dyspnea. She brings a rhythm strip from home, consistent with multifocal AT. Per past notes, suspected arrhythmia At. EKG here with sinus tachycardia. Tele reviewed and no SVT present on tele. QRS noted to be 500 on most recent EKG.     She previously tried multaq and did not tolerate. She feels the increased dose of diltiazem has better controlled her episodes of sx palpitations and significantly reduced the frequency over last couple weeks.    She has also been noted to have an increasing troponin up to 4.8 here with EKG changes on presentation. Started on NSTEMI treatment and interventional cardiology consulted. CP free since dyspnea improved yesterday.

## 2018-03-01 NOTE — PROGRESS NOTES
03/01/18 1430   Type of Frequent Check   Type Post Cardiac Cath   Vital Signs   Pulse 96   Resp 16   SpO2 (!) 89 %   /64   MAP (mmHg) 79   Positioning   Body Position supine   Head of Bed (HOB) other (see comments)  (reverse trendelenberg)   Pt returned to TSU 8091 via bed, accompanied by staff.  VS WNL, start post-cath assessments & VS.  Assessment WNL.  Reverse trendelenberg for patient comfort, states understanding of mobillity restrictions at this time.  Will be able to sit up at 6 pm & OOB at 8 pm

## 2018-03-01 NOTE — ASSESSMENT & PLAN NOTE
Patient is a 71 yo female with past medical hx WHO 1- PAH Currently on remodulin, adempas and Bosentan presented to the [facility] on 02/28/18 with a chief complaint of increasing SOB and chest pressure secondary to NSTEMI. NATE risk is 4.     Plan:   For LHC via femoral access JL/JR  -The risks, benefits & alternatives of the procedure were explained to the patient.    -The risks of coronary angiography include but are not limited to:  Bleeding, infection, heart rhythm abnormalities, allergic reactions, kidney injury, stroke and death.    -Should stenting be indicated, the patient has agreed to dual anti-platelet therapy for 1-consecutive year with a drug-eluting stent and a minimum of 1-month with the use of a bare metal stent.    -The risks of moderate sedation include hypotension, respiratory depression, arrhythmias, bronchospasm, & death.    -Informed consent was obtained & the patient is agreeable to proceed with the procedure.  -This patient was discussed with the attending interventional cardiologist who agrees with the above assessment & plan.

## 2018-03-01 NOTE — SUBJECTIVE & OBJECTIVE
Past Medical History:   Diagnosis Date    Allergy     Anticoagulant long-term use     Arthritis     Heart murmur     Pneumonia     Pulmonary hypertension        Past Surgical History:   Procedure Laterality Date    BACK SURGERY         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    Multaq [dronedarone]        PTA Medications   Medication Sig    bosentan (TRACLEER) 125 MG Tab Take 1 tablet (125 mg total) by mouth 3 (three) times daily.    CALCIUM CARBONATE (CALCIUM 600 ORAL) Take 2 tablets by mouth once daily.      diltiaZEM (CARDIZEM CD) 180 MG 24 hr capsule Take 1 capsule (180 mg total) by mouth once daily.    furosemide (LASIX) 40 MG tablet Take 1 tablet (40 mg total) by mouth 2 (two) times daily.    riociguat (ADEMPAS) 0.5 mg Tab tablet Take 1 tablet (0.5 mg total) by mouth 3 (three) times daily.    spironolactone (ALDACTONE) 25 MG tablet Take 25 mg by mouth once daily.      traMADol (ULTRAM-ER) 200 MG Tb24 TAKE ONE TABLET BY MOUTH ONCE A DAY AS NEEDED FOR PAIN THANK YOU!    TREPROSTINIL SODIUM (TREPROSTINIL, REMODULIN, PUMP FOR HOME USE) Pt currently on 89.2 ng/kg/min=45 mL/day dosing weight 70.05 kg    ferrous gluconate (FERGON) 324 MG tablet Take 1 tablet (324 mg total) by mouth daily with breakfast.    sodium chloride 0.9% 0.9 % SolP 100 mL with treprostinil 1 mg/mL Soln 9,000,000 ng Inject 5,005.5 ng/min into the vein continuous.    warfarin (COUMADIN) 10 MG tablet Take 1 tablet (10 mg total) by mouth Daily.     Family History     Problem Relation (Age of Onset)    Arthritis Mother, Father    Diabetes Father    Heart disease Father    Hypertension Father        Social History Main Topics    Smoking status: Never Smoker    Smokeless tobacco: Not on file    Alcohol use No      Comment: rarely    Drug use: No    Sexual activity: Not on file     Review of Systems   Constitution: Negative for chills and fever.   Cardiovascular:  Negative for chest pain.   Respiratory: Negative for cough and shortness of breath.    Gastrointestinal: Negative for abdominal pain, nausea and vomiting.   Genitourinary: Negative for dysuria and hematuria.   Neurological: Negative for dizziness and focal weakness.   Psychiatric/Behavioral: Negative for altered mental status.   All other systems reviewed and are negative.    Objective:     Vital Signs (Most Recent):  Temp: 98.1 °F (36.7 °C) (03/01/18 1150)  Pulse: 105 (03/01/18 1150)  Resp: 16 (03/01/18 1150)  BP: (!) 115/59 (03/01/18 1150)  SpO2: (!) 89 % (03/01/18 1150) Vital Signs (24h Range):  Temp:  [98.1 °F (36.7 °C)-99.2 °F (37.3 °C)] 98.1 °F (36.7 °C)  Pulse:  [] 105  Resp:  [16-32] 16  SpO2:  [81 %-92 %] 89 %  BP: (100-117)/(51-59) 115/59     Weight: 70 kg (154 lb 5.2 oz)  Body mass index is 27.34 kg/m².    SpO2: (!) 89 %  O2 Device (Oxygen Therapy): nasal cannula      Intake/Output Summary (Last 24 hours) at 03/01/18 1249  Last data filed at 03/01/18 0430   Gross per 24 hour   Intake                0 ml   Output              500 ml   Net             -500 ml       Lines/Drains/Airways     Central Venous Catheter Line                 Tunneled Central Line Insertion/Assessment - Single Lumen  left subclavian -- days         Tunneled Central Line Insertion/Assessment - Single Lumen  right subclavian -- days          Peripheral Intravenous Line                 Midline Catheter Insertion/Assessment  - Single Lumen 10/05/17 1358 Left brachial vein 18g x 8cm 146 days         Peripheral IV - Single Lumen 02/28/18 2154 Right Hand less than 1 day                Physical Exam   Constitutional: She is oriented to person, place, and time. She appears well-developed and well-nourished.   HENT:   Head: Normocephalic and atraumatic.   Eyes: EOM are normal. Pupils are equal, round, and reactive to light.   Neck: Normal range of motion. Neck supple.   Cardiovascular:   Tachycardic  Intact radial pulses bilaterally  R  BP is 107/64  L BP is 115/59  2+ femoral, 2+ DP and 2+ PT's   Pulmonary/Chest: Effort normal and breath sounds normal. She has no wheezes.   Abdominal: Soft. Bowel sounds are normal. There is no tenderness.   Musculoskeletal: Normal range of motion. She exhibits no edema.   Neurological: She is alert and oriented to person, place, and time. She has normal reflexes.       Significant Labs:   CMP   Recent Labs  Lab 02/28/18  1710 03/01/18  0546    140   K 3.7 3.8    108   CO2 24 23   * 98   BUN 22 18   CREATININE 1.2 1.0   CALCIUM 9.2 9.0   PROT 7.4 7.2   ALBUMIN 3.9 3.8   BILITOT 0.8 0.9   ALKPHOS 46* 41*   AST 21 29   ALT 13 13   ANIONGAP 9 9   ESTGFRAFRICA 52.5* >60.0   EGFRNONAA 45.6* 56.8*   , CBC   Recent Labs  Lab 02/28/18  1710 03/01/18  0546   WBC 5.27 4.31  4.06   HGB 10.0* 10.1*  10.0*   HCT 33.7* 34.3*  33.4*    276  266   , Lipid Panel No results for input(s): CHOL, HDL, LDLCALC, TRIG, CHOLHDL in the last 48 hours., Troponin   Recent Labs  Lab 03/01/18  0005 03/01/18  0212 03/01/18  0833   TROPONINI 3.749* 4.856* 5.602*    and All pertinent lab results from the last 24 hours have been reviewed.    Significant Imaging: Echocardiogram:   2D echo with color flow doppler:   Results for orders placed or performed during the hospital encounter of 02/28/18   2D echo with color flow doppler   Result Value Ref Range    EF 55 55 - 65    Mitral Valve Regurgitation TRIVIAL     Est. PA Systolic Pressure 77.44 (A)     Tricuspid Valve Regurgitation MODERATE (A)     and X-Ray: CXR: X-Ray Chest 1 View (CXR):   Results for orders placed or performed during the hospital encounter of 02/28/18   X-Ray Chest 1 View    Narrative    EXAM:  AP CHEST RADIOGRAPH.     CLINICAL INDICATION:  Shortness of breath.    TECHNIQUE: Single AP view of the chest was obtained.     COMPARISON: Prior CT dated 10/5/17    FINDINGS: The mediastinal structures are midline.  The cardiac silhouette is enlarged and stable.   Pulmonary arteries once again appear enlarged suggesting pulmonary hypertension.  There is a right-sided port with tip projecting over the SVC/RA junction. The lungs appear grossly clear.  No osseous abnormalities are identified.    Impression    1.  Clear lungs.  2.  Stable enlarged cardiac silhouette and pulmonary arteries.      Electronically signed by: JASS SIMMS MD  Date:     02/28/18  Time:    17:56

## 2018-03-01 NOTE — SUBJECTIVE & OBJECTIVE
Past Medical History:   Diagnosis Date    Allergy     Anticoagulant long-term use     Arthritis     Heart murmur     Pneumonia     Pulmonary hypertension        Past Surgical History:   Procedure Laterality Date    BACK SURGERY         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    Multaq [dronedarone]        Current Facility-Administered Medications   Medication    bosentan tablet 125 mg    [START ON 3/1/2018] diltiaZEM 24 hr capsule 180 mg    furosemide injection 40 mg    riociguat (ADEMPAS) tablet 0.5 mg    sodium chloride 0.9% flush 3 mL    [START ON 3/1/2018] spironolactone tablet 25 mg    [START ON 3/1/2018] warfarin (COUMADIN) tablet 10 mg     Current Outpatient Prescriptions   Medication Sig    bosentan (TRACLEER) 125 MG Tab Take 1 tablet (125 mg total) by mouth 3 (three) times daily.    CALCIUM CARBONATE (CALCIUM 600 ORAL) Take 2 tablets by mouth once daily.      diltiaZEM (CARDIZEM CD) 180 MG 24 hr capsule Take 1 capsule (180 mg total) by mouth once daily.    furosemide (LASIX) 40 MG tablet Take 1 tablet (40 mg total) by mouth 2 (two) times daily.    riociguat (ADEMPAS) 0.5 mg Tab tablet Take 1 tablet (0.5 mg total) by mouth 3 (three) times daily.    spironolactone (ALDACTONE) 25 MG tablet Take 25 mg by mouth once daily.      traMADol (ULTRAM-ER) 200 MG Tb24 TAKE ONE TABLET BY MOUTH ONCE A DAY AS NEEDED FOR PAIN THANK YOU!    TREPROSTINIL SODIUM (TREPROSTINIL, REMODULIN, PUMP FOR HOME USE) Pt currently on 89.2 ng/kg/min=45 mL/day dosing weight 70.05 kg    ferrous gluconate (FERGON) 324 MG tablet Take 1 tablet (324 mg total) by mouth daily with breakfast.    sodium chloride 0.9% 0.9 % SolP 100 mL with treprostinil 1 mg/mL Soln 9,000,000 ng Inject 5,005.5 ng/min into the vein continuous.    warfarin (COUMADIN) 10 MG tablet Take 1 tablet (10 mg total) by mouth Daily.     Family History     Problem Relation (Age of Onset)     Arthritis Mother, Father    Diabetes Father    Heart disease Father    Hypertension Father        Social History Main Topics    Smoking status: Never Smoker    Smokeless tobacco: Not on file    Alcohol use No      Comment: rarely    Drug use: No    Sexual activity: Not on file     Review of Systems   All other systems reviewed and are negative.    Objective:     Vital Signs (Most Recent):  Temp: 98.8 °F (37.1 °C) (02/28/18 1834)  Pulse: 104 (02/28/18 1914)  Resp: (!) 21 (02/28/18 1914)  BP: (!) 107/55 (02/28/18 1834)  SpO2: (!) 89 % (02/28/18 1914) Vital Signs (24h Range):  Temp:  [98.8 °F (37.1 °C)-99.2 °F (37.3 °C)] 98.8 °F (37.1 °C)  Pulse:  [102-108] 104  Resp:  [20-32] 21  SpO2:  [81 %-92 %] 89 %  BP: (100-110)/(51-56) 107/55     Patient Vitals for the past 72 hrs (Last 3 readings):   Weight   02/28/18 1548 58.1 kg (128 lb)     Body mass index is 22.67 kg/m².    No intake or output data in the 24 hours ending 02/28/18 2007    Physical Exam   Constitutional: She is oriented to person, place, and time. She appears well-developed and well-nourished. No distress.   HENT:   Head: Normocephalic and atraumatic.   Eyes: No scleral icterus.   Neck: JVD present.   JVD 12cm    Cardiovascular: Regular rhythm.    No murmur heard.  Loud S2  Tachycardic    Pulmonary/Chest: Effort normal. No respiratory distress. She has no wheezes. She has rales.   Abdominal: Soft. There is no tenderness.   Musculoskeletal: She exhibits no edema.   Neurological: She is alert and oriented to person, place, and time.   Skin: Skin is warm.   Psychiatric: She has a normal mood and affect.       Significant Labs:  CBC:    Recent Labs  Lab 02/28/18  1710   WBC 5.27   RBC 4.63   HGB 10.0*   HCT 33.7*      MCV 73*   MCH 21.6*   MCHC 29.7*     BNP:    Recent Labs  Lab 02/28/18  1710   *     CMP:    Recent Labs  Lab 02/28/18  1710   *   CALCIUM 9.2   ALBUMIN 3.9   PROT 7.4      K 3.7   CO2 24      BUN 22    CREATININE 1.2   ALKPHOS 46*   ALT 13   AST 21   BILITOT 0.8      Coagulation:     Recent Labs  Lab 02/28/18  1710   INR 2.2*   APTT 27.6     LDH:  No results for input(s): LDH in the last 72 hours.  Microbiology:  Microbiology Results (last 7 days)     ** No results found for the last 168 hours. **          I have reviewed all pertinent labs within the past 24 hours.    Diagnostic Results:  EKG: I have reviewed all pertinent results/findings within the past 24 hours and my personal findings are: Sinus tachycardia, with STD and TWI in anterolateral and inferior leads  Incomplete RBBB

## 2018-03-01 NOTE — SUBJECTIVE & OBJECTIVE
Past Medical History:   Diagnosis Date    Allergy     Anticoagulant long-term use     Arthritis     Heart murmur     Pneumonia     Pulmonary hypertension        Past Surgical History:   Procedure Laterality Date    BACK SURGERY         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    Multaq [dronedarone]        No current facility-administered medications on file prior to encounter.      Current Outpatient Prescriptions on File Prior to Encounter   Medication Sig    bosentan (TRACLEER) 125 MG Tab Take 1 tablet (125 mg total) by mouth 3 (three) times daily.    CALCIUM CARBONATE (CALCIUM 600 ORAL) Take 2 tablets by mouth once daily.      diltiaZEM (CARDIZEM CD) 180 MG 24 hr capsule Take 1 capsule (180 mg total) by mouth once daily.    furosemide (LASIX) 40 MG tablet Take 1 tablet (40 mg total) by mouth 2 (two) times daily.    riociguat (ADEMPAS) 0.5 mg Tab tablet Take 1 tablet (0.5 mg total) by mouth 3 (three) times daily.    spironolactone (ALDACTONE) 25 MG tablet Take 25 mg by mouth once daily.      traMADol (ULTRAM-ER) 200 MG Tb24 TAKE ONE TABLET BY MOUTH ONCE A DAY AS NEEDED FOR PAIN THANK YOU!    TREPROSTINIL SODIUM (TREPROSTINIL, REMODULIN, PUMP FOR HOME USE) Pt currently on 89.2 ng/kg/min=45 mL/day dosing weight 70.05 kg    ferrous gluconate (FERGON) 324 MG tablet Take 1 tablet (324 mg total) by mouth daily with breakfast.    sodium chloride 0.9% 0.9 % SolP 100 mL with treprostinil 1 mg/mL Soln 9,000,000 ng Inject 5,005.5 ng/min into the vein continuous.    warfarin (COUMADIN) 10 MG tablet Take 1 tablet (10 mg total) by mouth Daily.     Family History     Problem Relation (Age of Onset)    Arthritis Mother, Father    Diabetes Father    Heart disease Father    Hypertension Father        Social History Main Topics    Smoking status: Never Smoker    Smokeless tobacco: Not on file    Alcohol use No      Comment: rarely    Drug use: No     Sexual activity: Not on file     Review of Systems   All other systems reviewed and are negative.    Objective:     Vital Signs (Most Recent):  Temp: 98.8 °F (37.1 °C) (02/28/18 1834)  Pulse: 100 (02/28/18 2305)  Resp: 20 (02/28/18 2305)  BP: (!) 110/53 (02/28/18 2114)  SpO2: (!) 92 % (02/28/18 2119) Vital Signs (24h Range):  Temp:  [98.8 °F (37.1 °C)-99.2 °F (37.3 °C)] 98.8 °F (37.1 °C)  Pulse:  [100-108] 100  Resp:  [20-32] 20  SpO2:  [81 %-92 %] 92 %  BP: (100-110)/(51-56) 110/53     Weight: 58.1 kg (128 lb)  Body mass index is 22.67 kg/m².    SpO2: (!) 92 %  O2 Device (Oxygen Therapy): nasal cannula    Physical Exam   Constitutional: She is oriented to person, place, and time. She appears well-developed and well-nourished. No distress.   HENT:   Head: Normocephalic and atraumatic.   Eyes: No scleral icterus.   Neck: JVD present.   JVD 12 cm   Cardiovascular: Regular rhythm.  Tachycardia present.    Pulmonary/Chest: Effort normal. No respiratory distress. She has no wheezes. She has rales.   Abdominal: Soft.   Musculoskeletal: She exhibits no edema.   Neurological: She is alert and oriented to person, place, and time.   Skin: Skin is warm.   Psychiatric: She has a normal mood and affect.       Significant Labs: All pertinent lab results from the last 24 hours have been reviewed.    Significant Imaging: EKG: As above in Hpi

## 2018-03-01 NOTE — NURSING
Notified REILLY Sanchez of continued upward trend of Troponin I - 5.602.  PA states understanding, they will round on patient shortly with staff MD.  Continue NPO

## 2018-03-01 NOTE — PROGRESS NOTES
After discussing the rate of troponin elevation with Card Fellow, he recommended to start on ACS protocol with Heparin bolus and continuous infusion and  and Plavix of 300 mg.

## 2018-03-01 NOTE — NURSING
Patient transported to Cath Lab via stretcher, accompanied by cath lab nursing.  Prior to transport, patient received Benadryl 50 mg.  Telemetry continues, war room notified.  [facility] there is quantity to run for an additional ~12 hours.  Heparin gtt paused at 1255.  Did not start  mL/hr.  LIZBETH Garcia received report & stated understanding.  Spouse & sister going with & will proceed to waiting area as directed by staff.  Monitor on return to [facility] [id number].

## 2018-03-01 NOTE — ASSESSMENT & PLAN NOTE
71 y/o F with WHO class I Pulmonary HTN, here with acute on chronic hypoxic respiratory failure. SVT suspected to be atrial tachycardia likely driven by acute on chronic hypoxic respiratory failure. Suspect hypoxia driving episodes rather than tachyarrhythmia driving hypoxia.   Currently sinus tachycardia, EKG with Qtc 500. Previously did not tolerate multaq.  SVT better controlled since increasing diltiazem 120 to 180 daily for last several weeks.  Very limited options in this patient: previously discussed tikosyn but would avoid given baseline Qt. Not a candidate for sotalol. Amiodarone is a consideration; prior PFTs with nml DLCO; however, any lung toxicity or increase in pulm pressures related to amiodarone use could cause significant worsening of underlying disease in this patient.  Thus, joaquin continue with diltiazem at 180 daily. Better controlled on higher dose with room for uptitration. Would continue at current dose for now given recent increase, can uptitrate going forward as needed.    Seen and discussed with staff Dr. Key as well as discussed with her EP Dr. Hinojosa.

## 2018-03-02 PROBLEM — R79.89 ELEVATED TROPONIN: Status: ACTIVE | Noted: 2018-03-02

## 2018-03-02 LAB
ANION GAP SERPL CALC-SCNC: 7 MMOL/L
BASOPHILS # BLD AUTO: 0.06 K/UL
BASOPHILS NFR BLD: 1.6 %
BUN SERPL-MCNC: 17 MG/DL
CALCIUM SERPL-MCNC: 8.4 MG/DL
CHLORIDE SERPL-SCNC: 111 MMOL/L
CO2 SERPL-SCNC: 21 MMOL/L
CREAT SERPL-MCNC: 1 MG/DL
DIFFERENTIAL METHOD: ABNORMAL
EOSINOPHIL # BLD AUTO: 0.1 K/UL
EOSINOPHIL NFR BLD: 2.4 %
ERYTHROCYTE [DISTWIDTH] IN BLOOD BY AUTOMATED COUNT: 15.8 %
EST. GFR  (AFRICAN AMERICAN): >60 ML/MIN/1.73 M^2
EST. GFR  (NON AFRICAN AMERICAN): 56.8 ML/MIN/1.73 M^2
GLUCOSE SERPL-MCNC: 128 MG/DL
HCT VFR BLD AUTO: 33.2 %
HGB BLD-MCNC: 10 G/DL
IMM GRANULOCYTES # BLD AUTO: 0.01 K/UL
IMM GRANULOCYTES NFR BLD AUTO: 0.3 %
INR PPP: 1.4
LYMPHOCYTES # BLD AUTO: 0.7 K/UL
LYMPHOCYTES NFR BLD: 18.7 %
MAGNESIUM SERPL-MCNC: 2.3 MG/DL
MCH RBC QN AUTO: 21.9 PG
MCHC RBC AUTO-ENTMCNC: 30.1 G/DL
MCV RBC AUTO: 73 FL
MONOCYTES # BLD AUTO: 0.4 K/UL
MONOCYTES NFR BLD: 9.3 %
NEUTROPHILS # BLD AUTO: 2.5 K/UL
NEUTROPHILS NFR BLD: 67.7 %
NRBC BLD-RTO: 0 /100 WBC
PLATELET # BLD AUTO: 256 K/UL
PMV BLD AUTO: 8.4 FL
POTASSIUM SERPL-SCNC: 3.4 MMOL/L
PROTHROMBIN TIME: 13.6 SEC
RBC # BLD AUTO: 4.57 M/UL
SODIUM SERPL-SCNC: 139 MMOL/L
WBC # BLD AUTO: 3.75 K/UL

## 2018-03-02 PROCEDURE — 85610 PROTHROMBIN TIME: CPT

## 2018-03-02 PROCEDURE — 20600001 HC STEP DOWN PRIVATE ROOM

## 2018-03-02 PROCEDURE — 25000003 PHARM REV CODE 250: Performed by: STUDENT IN AN ORGANIZED HEALTH CARE EDUCATION/TRAINING PROGRAM

## 2018-03-02 PROCEDURE — 25000003 PHARM REV CODE 250: Performed by: PHYSICIAN ASSISTANT

## 2018-03-02 PROCEDURE — 85025 COMPLETE CBC W/AUTO DIFF WBC: CPT

## 2018-03-02 PROCEDURE — 25000003 PHARM REV CODE 250: Performed by: INTERNAL MEDICINE

## 2018-03-02 PROCEDURE — 83735 ASSAY OF MAGNESIUM: CPT

## 2018-03-02 PROCEDURE — 25000242 PHARM REV CODE 250 ALT 637 W/ HCPCS: Performed by: STUDENT IN AN ORGANIZED HEALTH CARE EDUCATION/TRAINING PROGRAM

## 2018-03-02 PROCEDURE — 99233 SBSQ HOSP IP/OBS HIGH 50: CPT | Mod: GC,,, | Performed by: INTERNAL MEDICINE

## 2018-03-02 PROCEDURE — 36415 COLL VENOUS BLD VENIPUNCTURE: CPT

## 2018-03-02 PROCEDURE — 99232 SBSQ HOSP IP/OBS MODERATE 35: CPT | Mod: ,,, | Performed by: INTERNAL MEDICINE

## 2018-03-02 PROCEDURE — 80048 BASIC METABOLIC PNL TOTAL CA: CPT

## 2018-03-02 RX ORDER — DILTIAZEM HYDROCHLORIDE 240 MG/1
240 CAPSULE, COATED, EXTENDED RELEASE ORAL DAILY
Status: DISCONTINUED | OUTPATIENT
Start: 2018-03-02 | End: 2018-03-02

## 2018-03-02 RX ORDER — POTASSIUM CHLORIDE 750 MG/1
60 CAPSULE, EXTENDED RELEASE ORAL ONCE
Status: DISCONTINUED | OUTPATIENT
Start: 2018-03-02 | End: 2018-03-02

## 2018-03-02 RX ORDER — POTASSIUM CHLORIDE 750 MG/1
60 CAPSULE, EXTENDED RELEASE ORAL ONCE
Status: COMPLETED | OUTPATIENT
Start: 2018-03-02 | End: 2018-03-02

## 2018-03-02 RX ORDER — DILTIAZEM HYDROCHLORIDE 60 MG/1
60 TABLET, FILM COATED ORAL DAILY PRN
Qty: 30 TABLET | Refills: 2 | Status: SHIPPED | OUTPATIENT
Start: 2018-03-02 | End: 2018-03-05 | Stop reason: HOSPADM

## 2018-03-02 RX ORDER — FUROSEMIDE 40 MG/1
40 TABLET ORAL 2 TIMES DAILY
Status: DISCONTINUED | OUTPATIENT
Start: 2018-03-02 | End: 2018-03-05 | Stop reason: HOSPADM

## 2018-03-02 RX ORDER — ONDANSETRON 4 MG/1
8 TABLET, FILM COATED ORAL EVERY 8 HOURS PRN
Status: DISCONTINUED | OUTPATIENT
Start: 2018-03-02 | End: 2018-03-05 | Stop reason: HOSPADM

## 2018-03-02 RX ORDER — WARFARIN SODIUM 5 MG/1
10 TABLET ORAL DAILY
Status: DISCONTINUED | OUTPATIENT
Start: 2018-03-02 | End: 2018-03-05 | Stop reason: HOSPADM

## 2018-03-02 RX ORDER — DILTIAZEM HYDROCHLORIDE 240 MG/1
240 CAPSULE, COATED, EXTENDED RELEASE ORAL DAILY
Status: DISCONTINUED | OUTPATIENT
Start: 2018-03-03 | End: 2018-03-02

## 2018-03-02 RX ORDER — DILTIAZEM HYDROCHLORIDE 240 MG/1
240 CAPSULE, COATED, EXTENDED RELEASE ORAL DAILY
Qty: 30 CAPSULE | Refills: 11 | Status: SHIPPED | OUTPATIENT
Start: 2018-03-02 | End: 2018-03-05 | Stop reason: HOSPADM

## 2018-03-02 RX ORDER — AMIODARONE HYDROCHLORIDE 200 MG/1
400 TABLET ORAL DAILY
Status: DISCONTINUED | OUTPATIENT
Start: 2018-03-03 | End: 2018-03-05 | Stop reason: HOSPADM

## 2018-03-02 RX ADMIN — WARFARIN SODIUM 10 MG: 5 TABLET ORAL at 04:03

## 2018-03-02 RX ADMIN — FUROSEMIDE 40 MG: 40 TABLET ORAL at 07:03

## 2018-03-02 RX ADMIN — SPIRONOLACTONE 25 MG: 25 TABLET, FILM COATED ORAL at 09:03

## 2018-03-02 RX ADMIN — FLUTICASONE PROPIONATE 100 MCG: 50 SPRAY, METERED NASAL at 09:03

## 2018-03-02 RX ADMIN — DILTIAZEM HYDROCHLORIDE 240 MG: 240 CAPSULE, COATED, EXTENDED RELEASE ORAL at 12:03

## 2018-03-02 RX ADMIN — ONDANSETRON 8 MG: 4 TABLET, FILM COATED ORAL at 09:03

## 2018-03-02 RX ADMIN — BOSENTAN 125 MG: 125 TABLET, FILM COATED ORAL at 09:03

## 2018-03-02 RX ADMIN — FUROSEMIDE 40 MG: 40 TABLET ORAL at 11:03

## 2018-03-02 RX ADMIN — CETIRIZINE HYDROCHLORIDE 10 MG: 10 TABLET, FILM COATED ORAL at 09:03

## 2018-03-02 RX ADMIN — ASPIRIN 325 MG ORAL TABLET 325 MG: 325 PILL ORAL at 09:03

## 2018-03-02 RX ADMIN — TRAMADOL HYDROCHLORIDE 50 MG: 50 TABLET, COATED ORAL at 11:03

## 2018-03-02 RX ADMIN — POTASSIUM CHLORIDE 60 MEQ: 750 CAPSULE, EXTENDED RELEASE ORAL at 11:03

## 2018-03-02 NOTE — ASSESSMENT & PLAN NOTE
-Cont home dose of IV remodulin 89.2ng/kg/min  -Stopped Adempas on admit. Cont bosentan 125mg Q12h   -Restart home dose Lasix 40 BID today. Cont spironolactone  -Echo yesterday with normal LV function, RVE with depressed function

## 2018-03-02 NOTE — ASSESSMENT & PLAN NOTE
73 y/o F with WHO class I Pulmonary HTN, here with acute on chronic hypoxic respiratory failure. SVT suspected to be atrial tachycardia likely driven by acute on chronic hypoxic respiratory failure. Suspect hypoxia driving episodes rather than tachyarrhythmia driving hypoxia.   Currently sinus tachycardia, EKG with Qtc 500. Previously did not tolerate multaq.  SVT better controlled since increasing diltiazem 120 to 180 daily for last several weeks.  Very limited options in this patient: previously discussed tikosyn but would avoid given baseline Qt. Not a candidate for sotalol.   Would consider amiodarone as next line but with significant caution given advanced PAH. Prior PFTs with nml DLCO. Would need frequent monitoring    Plan to continue diltiazem and uptitrate as indicated. Plan for 240 today.

## 2018-03-02 NOTE — ASSESSMENT & PLAN NOTE
-Chronic resp failure, on 5L O2 at home  -Now requiring 6L O2 today to keep O2 sat above 80%. Usually runs between 83-89% at home

## 2018-03-02 NOTE — PROGRESS NOTES
DISCHARGE    SW to pt's room for d/c plan. Pt presents as aaox3 with calm and pleasant affect. Pt reports in agreement with plan to d/c home tomorrow with no needs. Pt denies transportation issues. No questions or concerns voiced at this time. SW providing psychosocial and counseling support, education, resources, and d/c planning as needed. SW remains available.

## 2018-03-02 NOTE — SUBJECTIVE & OBJECTIVE
Subjective: NAEON. Sinus tachycardia on telemetry. [facility] normal coronaries. Remains on 6L.    Review of Systems   All other systems reviewed and are negative.    Objective:     Vital Signs (Most Recent):  Temp: 99.1 °F (37.3 °C) (03/02/18 0741)  Pulse: 107 (03/02/18 0741)  Resp: 18 (03/02/18 0741)  BP: (!) 95/52 (03/02/18 0741)  SpO2: (!) 86 % (03/02/18 0741) Vital Signs (24h Range):  Temp:  [97.8 °F (36.6 °C)-99.1 °F (37.3 °C)] 99.1 °F (37.3 °C)  Pulse:  [] 107  Resp:  [16-18] 18  SpO2:  [85 %-89 %] 86 %  BP: ()/(52-66) 95/52     Weight: 60.6 kg (133 lb 9.6 oz)  Body mass index is 23.67 kg/m².    SpO2: (!) 86 %  O2 Device (Oxygen Therapy): nasal cannula    Physical Exam   Constitutional: She is oriented to person, place, and time. She appears well-developed and well-nourished. No distress.   HENT:   Head: Normocephalic and atraumatic.   Eyes: No scleral icterus.   Neck: No JVD present.   Cardiovascular: Regular rhythm.  Tachycardia present.    Pulmonary/Chest: Effort normal. No respiratory distress. She has no wheezes. She has rales.   Abdominal: Soft.   Musculoskeletal: She exhibits no edema.   Neurological: She is alert and oriented to person, place, and time.   Skin: Skin is warm.   Psychiatric: She has a normal mood and affect.       Significant Labs: All pertinent lab results from the last 24 hours have been reviewed.    Significant Imaging: EKG: As above in Hpi

## 2018-03-02 NOTE — PLAN OF CARE
Problem: Patient Care Overview  Goal: Plan of Care Review  Outcome: Ongoing (interventions implemented as appropriate)  Pt is AAOX4, independent, and VSS-pulse ox was decreased from 79-80% on 5.5L NC. Pt denies pain. Telemetry monitoring in place. Pt's bed in lowest position, call bell within reach, and nonskid socks on. Pt's  at bedside. Will continue to monitor.

## 2018-03-02 NOTE — PLAN OF CARE
Problem: Patient Care Overview  Goal: Plan of Care Review  Outcome: Ongoing (interventions implemented as appropriate)  - No complaints of SOB, chest pain.  Trend troponins (ordered TID), labs.  Monitor telemetry & VS  - Keep sats >88%, currently on home rate of O2 (5 LPM NC with humidification)  - Pt to continue home dose remodulin unless urgently indicated to change to hospital mix.  She will mix and change her cassette this evening.  - Heparin gtt stopped.  See cath lab notes  - Bed low & locked, call light in reach, nonslip socks in use, calls for assistance appropriately

## 2018-03-02 NOTE — PROGRESS NOTES
[facility]  Heart Transplant  Progress Note    Patient Name: Molly Smith  MRN: [id number]  Admission Date: 2/28/2018  Hospital Length of Stay: 2 days  Attending Physician: Alice Tim MD  Primary Care Provider: Alice Tim MD  Principal Problem:SVT (supraventricular tachycardia)    Subjective:     Interval History: Feels a little LH this am    Continuous Infusions:  Scheduled Meds:   aspirin  325 mg Oral Daily    bosentan  125 mg Oral Q12H    cetirizine  10 mg Oral Daily    diltiaZEM  240 mg Oral Daily    fluticasone  2 spray Each Nare Daily    furosemide  40 mg Oral BID    spironolactone  25 mg Oral Daily    warfarin  10 mg Oral Daily     PRN Meds:ondansetron, sodium chloride 0.9%, traMADol, treprostinil (REMODULIN) infusion, veletri/remodulin cassette, veletri/remodulin tubing    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    Multaq [dronedarone]      Objective:     Vital Signs (Most Recent):  Temp: 98.5 °F (36.9 °C) (03/02/18 1211)  Pulse: 108 (03/02/18 1424)  Resp: 18 (03/02/18 0741)  BP: (!) 93/47 (03/02/18 1424)  SpO2: (!) 81 % (03/02/18 1424) Vital Signs (24h Range):  Temp:  [97.8 °F (36.6 °C)-99.1 °F (37.3 °C)] 98.5 °F (36.9 °C)  Pulse:  [] 108  Resp:  [16-18] 18  SpO2:  [81 %-88 %] 81 %  BP: ()/(47-66) 93/47     Patient Vitals for the past 72 hrs (Last 3 readings):   Weight   03/02/18 0545 60.6 kg (133 lb 9.6 oz)   03/01/18 0430 70 kg (154 lb 5.2 oz)   02/28/18 2345 71.8 kg (158 lb 4.6 oz)     Body mass index is 23.67 kg/m².      Intake/Output Summary (Last 24 hours) at 03/02/18 1436  Last data filed at 03/02/18 0906   Gross per 24 hour   Intake           420.03 ml   Output                0 ml   Net           420.03 ml       Hemodynamic Parameters:         Physical Exam   Constitutional: She is oriented to person, place, and time. She appears well-developed and well-nourished.   Neck: Normal range of  motion. Neck supple. JVD (a few cm above clavicle) present.   Cardiovascular: Normal rate and regular rhythm.  Exam reveals no gallop and no friction rub.    No murmur heard.  Pulmonary/Chest: Effort normal and breath sounds normal.   On O2 via NC   Abdominal: Soft. Bowel sounds are normal. There is no tenderness.   Musculoskeletal: She exhibits no edema.   Neurological: She is alert and oriented to person, place, and time.   Skin: Skin is warm and dry.       Significant Labs:  CBC:    Recent Labs  Lab 02/28/18 1710 03/01/18  0546 03/02/18  0842   WBC 5.27 4.31  4.06 3.75*   RBC 4.63 4.69  4.60 4.57   HGB 10.0* 10.1*  10.0* 10.0*   HCT 33.7* 34.3*  33.4* 33.2*    276  266 256   MCV 73* 73*  73* 73*   MCH 21.6* 21.5*  21.7* 21.9*   MCHC 29.7* 29.4*  29.9* 30.1*     BNP:    Recent Labs  Lab 02/28/18 1710   *     CMP:    Recent Labs  Lab 02/28/18 1710 03/01/18  0546 03/02/18  0842   * 98 128*   CALCIUM 9.2 9.0 8.4*   ALBUMIN 3.9 3.8  --    PROT 7.4 7.2  --     140 139   K 3.7 3.8 3.4*   CO2 24 23 21*    108 111*   BUN 22 18 17   CREATININE 1.2 1.0 1.0   ALKPHOS 46* 41*  --    ALT 13 13  --    AST 21 29  --    BILITOT 0.8 0.9  --       Coagulation:     Recent Labs  Lab 02/28/18 1710 03/01/18  0546 03/02/18  0842   INR 2.2* 1.9* 1.4*   APTT 27.6  --   --      LDH:  No results for input(s): LDH in the last 72 hours.  Microbiology:  Microbiology Results (last 7 days)     ** No results found for the last 168 hours. **          I have reviewed all pertinent labs within the past 24 hours.    Estimated Creatinine Clearance: 42.7 mL/min (based on SCr of 1 mg/dL).    Diagnostic Results:  I have reviewed all pertinent imaging results/findings within the past 24 hours.    Assessment and Plan:     73 yo female with a PMHx of WHO I- PAH (currently on remodulin Iv, adempas, bosentan), PFO, chronic hypoxia on 3L at baseline previously, but on 5L NC since 01/2018. Today patient noticed  increasing shortness of breath, states that her O2 sat on pulse oximeter was 50-70% while resting. Denies fever, cough, LE edema, flu like symptoms, syncope. States that she had chest pressure associated with palpitations. Currently O2sat is 92% on 6L Nc, no chest pressure, denies palpitations. Has a rhythm strip from [facility], suggests Atrial fibrillation vs AT, currently sinus tachycardia with TWI  Anterolateral leads with STD, inferior leads STD, incomplete RBBB. Troponin elevated to 1.6. 's, CXR no edema.    EPS in the past was negative. Did not tolerate Multaq, on cardizem.    * SVT (supraventricular tachycardia)    -Had a run of SVT (likley A Tach) prior to admit  -EP following- Very limited options in this patient: previously discussed tikosyn but would avoid given baseline Qt. Not a candidate for sotalol. Would consider amiodarone as next line but with significant caution given advanced PAH. Prior PFTs with nml DLCO. Would need frequent monitoring. For now, increase diltiazem  -Diltiazem dose increased to 180 mg daily 2 weeks ago. Increase to 240 mg today and monitor BP  -Cont coumadin. INR goal 2-3        Pulmonary hypertension    -Cont home dose of IV remodulin 89.2ng/kg/min  -Stopped Adempas on admit. Cont bosentan 125mg Q12h   -Restart home dose Lasix 40 BID today. Cont spironolactone  -Echo yesterday with normal LV function, RVE with depressed function          Elevated troponin    -Due to demand ischemia in setting of epsiode of A Tach        Hypoxia    -Chronic resp failure, on 5L O2 at home  -Now requiring 6L O2 today to keep O2 sat above 80%. Usually runs between 83-89% at home             Shell Ocampo PA-C  Heart Transplant  [facility]

## 2018-03-02 NOTE — SUBJECTIVE & OBJECTIVE
Interval History: Feels a little LH this am    Continuous Infusions:  Scheduled Meds:   aspirin  325 mg Oral Daily    bosentan  125 mg Oral Q12H    cetirizine  10 mg Oral Daily    diltiaZEM  240 mg Oral Daily    fluticasone  2 spray Each Nare Daily    furosemide  40 mg Oral BID    spironolactone  25 mg Oral Daily    warfarin  10 mg Oral Daily     PRN Meds:ondansetron, sodium chloride 0.9%, traMADol, treprostinil (REMODULIN) infusion, veletri/remodulin cassette, veletri/remodulin tubing    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    Multaq [dronedarone]      Objective:     Vital Signs (Most Recent):  Temp: 98.5 °F (36.9 °C) (03/02/18 1211)  Pulse: 108 (03/02/18 1424)  Resp: 18 (03/02/18 0741)  BP: (!) 93/47 (03/02/18 1424)  SpO2: (!) 81 % (03/02/18 1424) Vital Signs (24h Range):  Temp:  [97.8 °F (36.6 °C)-99.1 °F (37.3 °C)] 98.5 °F (36.9 °C)  Pulse:  [] 108  Resp:  [16-18] 18  SpO2:  [81 %-88 %] 81 %  BP: ()/(47-66) 93/47     Patient Vitals for the past 72 hrs (Last 3 readings):   Weight   03/02/18 0545 60.6 kg (133 lb 9.6 oz)   03/01/18 0430 70 kg (154 lb 5.2 oz)   02/28/18 2345 71.8 kg (158 lb 4.6 oz)     Body mass index is 23.67 kg/m².      Intake/Output Summary (Last 24 hours) at 03/02/18 1436  Last data filed at 03/02/18 0906   Gross per 24 hour   Intake           420.03 ml   Output                0 ml   Net           420.03 ml       Hemodynamic Parameters:         Physical Exam   Constitutional: She is oriented to person, place, and time. She appears well-developed and well-nourished.   Neck: Normal range of motion. Neck supple. JVD (a few cm above clavicle) present.   Cardiovascular: Normal rate and regular rhythm.  Exam reveals no gallop and no friction rub.    No murmur heard.  Pulmonary/Chest: Effort normal and breath sounds normal.   On O2 via NC   Abdominal: Soft. Bowel sounds are normal. There is no tenderness.    Musculoskeletal: She exhibits no edema.   Neurological: She is alert and oriented to person, place, and time.   Skin: Skin is warm and dry.       Significant Labs:  CBC:    Recent Labs  Lab 02/28/18 1710 03/01/18  0546 03/02/18  0842   WBC 5.27 4.31  4.06 3.75*   RBC 4.63 4.69  4.60 4.57   HGB 10.0* 10.1*  10.0* 10.0*   HCT 33.7* 34.3*  33.4* 33.2*    276  266 256   MCV 73* 73*  73* 73*   MCH 21.6* 21.5*  21.7* 21.9*   MCHC 29.7* 29.4*  29.9* 30.1*     BNP:    Recent Labs  Lab 02/28/18 1710   *     CMP:    Recent Labs  Lab 02/28/18 1710 03/01/18  0546 03/02/18  0842   * 98 128*   CALCIUM 9.2 9.0 8.4*   ALBUMIN 3.9 3.8  --    PROT 7.4 7.2  --     140 139   K 3.7 3.8 3.4*   CO2 24 23 21*    108 111*   BUN 22 18 17   CREATININE 1.2 1.0 1.0   ALKPHOS 46* 41*  --    ALT 13 13  --    AST 21 29  --    BILITOT 0.8 0.9  --       Coagulation:     Recent Labs  Lab 02/28/18 1710 03/01/18  0546 03/02/18  0842   INR 2.2* 1.9* 1.4*   APTT 27.6  --   --      LDH:  No results for input(s): LDH in the last 72 hours.  Microbiology:  Microbiology Results (last 7 days)     ** No results found for the last 168 hours. **          I have reviewed all pertinent labs within the past 24 hours.    Estimated Creatinine Clearance: 42.7 mL/min (based on SCr of 1 mg/dL).    Diagnostic Results:  I have reviewed all pertinent imaging results/findings within the past 24 hours.

## 2018-03-02 NOTE — PROGRESS NOTES
[facility]  Cardiac Electrophysiology  Progress Note    Admission Date: 2/28/2018  Code Status: Full Code   Attending Physician: Alice Tim MD   Expected Discharge Date: 3/2/2018  Principal Problem:SVT (supraventricular tachycardia)    Subjective:     Subjective: NAEON. Sinus tachycardia on telemetry. [facility] normal coronaries. Remains on 6L.    Review of Systems   All other systems reviewed and are negative.    Objective:     Vital Signs (Most Recent):  Temp: 99.1 °F (37.3 °C) (03/02/18 0741)  Pulse: 107 (03/02/18 0741)  Resp: 18 (03/02/18 0741)  BP: (!) 95/52 (03/02/18 0741)  SpO2: (!) 86 % (03/02/18 0741) Vital Signs (24h Range):  Temp:  [97.8 °F (36.6 °C)-99.1 °F (37.3 °C)] 99.1 °F (37.3 °C)  Pulse:  [] 107  Resp:  [16-18] 18  SpO2:  [85 %-89 %] 86 %  BP: ()/(52-66) 95/52     Weight: 60.6 kg (133 lb 9.6 oz)  Body mass index is 23.67 kg/m².    SpO2: (!) 86 %  O2 Device (Oxygen Therapy): nasal cannula    Physical Exam   Constitutional: She is oriented to person, place, and time. She appears well-developed and well-nourished. No distress.   HENT:   Head: Normocephalic and atraumatic.   Eyes: No scleral icterus.   Neck: No JVD present.   Cardiovascular: Regular rhythm.  Tachycardia present.    Pulmonary/Chest: Effort normal. No respiratory distress. She has no wheezes. She has rales.   Abdominal: Soft.   Musculoskeletal: She exhibits no edema.   Neurological: She is alert and oriented to person, place, and time.   Skin: Skin is warm.   Psychiatric: She has a normal mood and affect.       Significant Labs: All pertinent lab results from the last 24 hours have been reviewed.    Significant Imaging: EKG: As above in Hpi    Assessment and Plan:     * SVT (supraventricular tachycardia)    71 y/o F with WHO class I Pulmonary HTN, here with acute on chronic hypoxic respiratory failure. SVT suspected to be atrial tachycardia likely driven by acute on chronic hypoxic respiratory failure.  Suspect hypoxia driving episodes rather than tachyarrhythmia driving hypoxia.   Currently sinus tachycardia, EKG with Qtc 500. Previously did not tolerate multaq.  SVT better controlled since increasing diltiazem 120 to 180 daily for last several weeks.  Very limited options in this patient: previously discussed tikosyn but would avoid given baseline Qt. Not a candidate for sotalol.   Would consider amiodarone as next line but with significant caution given advanced PAH. Prior PFTs with nml DLCO. Would need frequent monitoring    Plan to continue diltiazem and uptitrate as indicated. Plan for 240 today.                   Tal Cornejo MD  Cardiac Electrophysiology  [facility]

## 2018-03-02 NOTE — PLAN OF CARE
AAO x 4. VSS, afebrile, SpO2>88% on 6L NC. Telemetry monitoring-ST. Pt denies pain at this time. Troponin TID. Home dose Remodulin changed at 2100. R groin site CDI. Pt reports that she is sometimes nauseous after PM meals-one time dose of PO Phenergan 25 mg administered. Fall precautions maintained and pt repositions self. See flowsheet for assessment findings. Will continue to monitor.

## 2018-03-02 NOTE — ASSESSMENT & PLAN NOTE
-Had a run of SVT (micahley A Tach) prior to admit  -EP following- Very limited options in this patient: previously discussed tikosyn but would avoid given baseline Qt. Not a candidate for sotalol. Would consider amiodarone as next line but with significant caution given advanced PAH. Prior PFTs with nml DLCO. Would need frequent monitoring. For now, increase diltiazem  -Diltiazem dose increased to 180 mg daily 2 weeks ago. Increase to 240 mg today and monitor BP  -Cont coumadin. INR goal 2-3

## 2018-03-02 NOTE — SIGNIFICANT EVENT
POST CATH NOTE    Procedure:  [facility]  Referring MD:  Meeta   Indication:  NSTEMI   Access:  R CFA via 4F sheath       Post Cath Exam:  Vitals:    03/02/18 0359   BP: (!) 89/53   Pulse: 80   Resp: 18   Temp: 98 °F (36.7 °C)     No unusual pain, hematoma or thrill at vascular access site.  Distal pulse present without signs of ischemia.    Recommendation:  - Cont Routine post cath care  - Medical management per primary

## 2018-03-03 LAB
ANION GAP SERPL CALC-SCNC: 8 MMOL/L
BASOPHILS # BLD AUTO: 0.05 K/UL
BASOPHILS NFR BLD: 1.2 %
BUN SERPL-MCNC: 22 MG/DL
CALCIUM SERPL-MCNC: 8.2 MG/DL
CHLORIDE SERPL-SCNC: 110 MMOL/L
CO2 SERPL-SCNC: 21 MMOL/L
CREAT SERPL-MCNC: 1 MG/DL
DIFFERENTIAL METHOD: ABNORMAL
EOSINOPHIL # BLD AUTO: 0.2 K/UL
EOSINOPHIL NFR BLD: 4.1 %
ERYTHROCYTE [DISTWIDTH] IN BLOOD BY AUTOMATED COUNT: 15.7 %
EST. GFR  (AFRICAN AMERICAN): >60 ML/MIN/1.73 M^2
EST. GFR  (NON AFRICAN AMERICAN): 56.8 ML/MIN/1.73 M^2
GLUCOSE SERPL-MCNC: 89 MG/DL
HCT VFR BLD AUTO: 30.3 %
HGB BLD-MCNC: 8.7 G/DL
IMM GRANULOCYTES # BLD AUTO: 0.01 K/UL
IMM GRANULOCYTES NFR BLD AUTO: 0.2 %
INR PPP: 1.2
LYMPHOCYTES # BLD AUTO: 1.2 K/UL
LYMPHOCYTES NFR BLD: 29.4 %
MAGNESIUM SERPL-MCNC: 2.2 MG/DL
MCH RBC QN AUTO: 21 PG
MCHC RBC AUTO-ENTMCNC: 28.7 G/DL
MCV RBC AUTO: 73 FL
MONOCYTES # BLD AUTO: 0.4 K/UL
MONOCYTES NFR BLD: 9.5 %
NEUTROPHILS # BLD AUTO: 2.3 K/UL
NEUTROPHILS NFR BLD: 55.6 %
NRBC BLD-RTO: 0 /100 WBC
PLATELET # BLD AUTO: 262 K/UL
PMV BLD AUTO: 9.3 FL
POTASSIUM SERPL-SCNC: 4.5 MMOL/L
PROTHROMBIN TIME: 11.9 SEC
RBC # BLD AUTO: 4.14 M/UL
SODIUM SERPL-SCNC: 139 MMOL/L
WBC # BLD AUTO: 4.12 K/UL

## 2018-03-03 PROCEDURE — 25000003 PHARM REV CODE 250: Performed by: PHYSICIAN ASSISTANT

## 2018-03-03 PROCEDURE — 80048 BASIC METABOLIC PNL TOTAL CA: CPT

## 2018-03-03 PROCEDURE — 85025 COMPLETE CBC W/AUTO DIFF WBC: CPT

## 2018-03-03 PROCEDURE — 85610 PROTHROMBIN TIME: CPT

## 2018-03-03 PROCEDURE — 25000003 PHARM REV CODE 250: Performed by: INTERNAL MEDICINE

## 2018-03-03 PROCEDURE — 83735 ASSAY OF MAGNESIUM: CPT

## 2018-03-03 PROCEDURE — 25000242 PHARM REV CODE 250 ALT 637 W/ HCPCS: Performed by: STUDENT IN AN ORGANIZED HEALTH CARE EDUCATION/TRAINING PROGRAM

## 2018-03-03 PROCEDURE — 99232 SBSQ HOSP IP/OBS MODERATE 35: CPT | Mod: ,,, | Performed by: INTERNAL MEDICINE

## 2018-03-03 PROCEDURE — 36415 COLL VENOUS BLD VENIPUNCTURE: CPT

## 2018-03-03 PROCEDURE — 25000003 PHARM REV CODE 250: Performed by: STUDENT IN AN ORGANIZED HEALTH CARE EDUCATION/TRAINING PROGRAM

## 2018-03-03 PROCEDURE — 20600001 HC STEP DOWN PRIVATE ROOM

## 2018-03-03 RX ADMIN — ASPIRIN 325 MG ORAL TABLET 325 MG: 325 PILL ORAL at 08:03

## 2018-03-03 RX ADMIN — WARFARIN SODIUM 10 MG: 5 TABLET ORAL at 05:03

## 2018-03-03 RX ADMIN — CETIRIZINE HYDROCHLORIDE 10 MG: 10 TABLET, FILM COATED ORAL at 08:03

## 2018-03-03 RX ADMIN — AMIODARONE HYDROCHLORIDE 400 MG: 200 TABLET ORAL at 08:03

## 2018-03-03 RX ADMIN — BOSENTAN 125 MG: 125 TABLET, FILM COATED ORAL at 08:03

## 2018-03-03 RX ADMIN — FUROSEMIDE 40 MG: 40 TABLET ORAL at 08:03

## 2018-03-03 RX ADMIN — FUROSEMIDE 40 MG: 40 TABLET ORAL at 05:03

## 2018-03-03 RX ADMIN — FLUTICASONE PROPIONATE 100 MCG: 50 SPRAY, METERED NASAL at 08:03

## 2018-03-03 RX ADMIN — SPIRONOLACTONE 25 MG: 25 TABLET, FILM COATED ORAL at 08:03

## 2018-03-03 NOTE — PROGRESS NOTES
Notified Dr Montoya that the patient was 82% at rest on 6LNC and that at home the patients oxygen will only go up to 5LNC.  Patient is not SOB at 82%.  Will continue to monitor.

## 2018-03-03 NOTE — PROGRESS NOTES
Patient AAO I today.  Admitted with SOB UNGER currently. Sats low 80s on 6LNC.  Patients  stated that the patient will have the ability to go up to 10 LNC at home when the patient is dcd.  Patient was started back on adempas (home supply) today. SR per tele.  Patient currently on remodulin, patients  will change cassette tonight.  NO pain noted today.  Patient will stay throughout the weekend and possibly DC on Monday.

## 2018-03-03 NOTE — SUBJECTIVE & OBJECTIVE
Interval History: Oxygen saturation continues to be in low 80s on 6 L NC oxygen. Light headed on ambulation.    Continuous Infusions:  Scheduled Meds:   amiodarone  400 mg Oral Daily    aspirin  325 mg Oral Daily    bosentan  125 mg Oral Q12H    cetirizine  10 mg Oral Daily    fluticasone  2 spray Each Nare Daily    furosemide  40 mg Oral BID    spironolactone  25 mg Oral Daily    warfarin  10 mg Oral Daily     PRN Meds:ondansetron, sodium chloride 0.9%, traMADol, treprostinil (REMODULIN) infusion, veletri/remodulin cassette, veletri/remodulin tubing    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    Multaq [dronedarone]      Objective:     Vital Signs (Most Recent):  Temp: 98.7 °F (37.1 °C) (03/03/18 1143)  Pulse: 92 (03/03/18 1143)  Resp: 18 (03/03/18 1143)  BP: (!) 93/55 (asymptomatic) (03/03/18 1143)  SpO2: (!) 85 % (03/03/18 1143) Vital Signs (24h Range):  Temp:  [97.3 °F (36.3 °C)-98.9 °F (37.2 °C)] 98.7 °F (37.1 °C)  Pulse:  [] 92  Resp:  [15-20] 18  SpO2:  [78 %-86 %] 85 %  BP: ()/(47-59) 93/55     Patient Vitals for the past 72 hrs (Last 3 readings):   Weight   03/03/18 0415 57.9 kg (127 lb 10.3 oz)   03/02/18 0545 60.6 kg (133 lb 9.6 oz)   03/01/18 0430 70 kg (154 lb 5.2 oz)     Body mass index is 22.61 kg/m².      Intake/Output Summary (Last 24 hours) at 03/03/18 1318  Last data filed at 03/03/18 0400   Gross per 24 hour   Intake              780 ml   Output              500 ml   Net              280 ml       Hemodynamic Parameters:           Physical Exam  General: alert, awake and oriented x 3  Eyes:PERRL.   Neck:no JVD   Lungs:  clear to auscultation bilaterally and normal respiratory effort  Cardiovascular: Heart: regular rate and rhythm, S1, S2 normal, no murmur, click, rub or gallop.   Chest Wall: no tenderness.   Extremities: no cyanosis or edema.   Pulses: 2+ and symmetric.        Significant Labs:  CBC:    Recent Labs  Lab  03/01/18  0546 03/02/18  0842 03/03/18  0353   WBC 4.31  4.06 3.75* 4.12   RBC 4.69  4.60 4.57 4.14   HGB 10.1*  10.0* 10.0* 8.7*   HCT 34.3*  33.4* 33.2* 30.3*     266 256 262   MCV 73*  73* 73* 73*   MCH 21.5*  21.7* 21.9* 21.0*   MCHC 29.4*  29.9* 30.1* 28.7*     BNP:    Recent Labs  Lab 02/28/18 1710   *     CMP:    Recent Labs  Lab 02/28/18 1710 03/01/18 0546 03/02/18  0842 03/03/18  0353   * 98 128* 89   CALCIUM 9.2 9.0 8.4* 8.2*   ALBUMIN 3.9 3.8  --   --    PROT 7.4 7.2  --   --     140 139 139   K 3.7 3.8 3.4* 4.5   CO2 24 23 21* 21*    108 111* 110   BUN 22 18 17 22   CREATININE 1.2 1.0 1.0 1.0   ALKPHOS 46* 41*  --   --    ALT 13 13  --   --    AST 21 29  --   --    BILITOT 0.8 0.9  --   --       Coagulation:     Recent Labs  Lab 02/28/18 1710 03/01/18 0546 03/02/18  0842 03/03/18  0353   INR 2.2* 1.9* 1.4* 1.2   APTT 27.6  --   --   --      LDH:  No results for input(s): LDH in the last 72 hours.  Microbiology:  Microbiology Results (last 7 days)     ** No results found for the last 168 hours. **

## 2018-03-03 NOTE — PROGRESS NOTES
[facility]  Heart Transplant  Progress Note    Patient Name: Molly Smith  MRN: [id number]  Admission Date: 2/28/2018  Hospital Length of Stay: 3 days  Attending Physician: Alice Tim MD  Primary Care Provider: Alice Tim MD  Principal Problem:SVT (supraventricular tachycardia)    Subjective:     Interval History: Oxygen saturation continues to be in low 80s on 6 L NC oxygen. Light headed on ambulation.    Continuous Infusions:  Scheduled Meds:   amiodarone  400 mg Oral Daily    aspirin  325 mg Oral Daily    bosentan  125 mg Oral Q12H    cetirizine  10 mg Oral Daily    fluticasone  2 spray Each Nare Daily    furosemide  40 mg Oral BID    spironolactone  25 mg Oral Daily    warfarin  10 mg Oral Daily     PRN Meds:ondansetron, sodium chloride 0.9%, traMADol, treprostinil (REMODULIN) infusion, veletri/remodulin cassette, veletri/remodulin tubing    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    Multaq [dronedarone]      Objective:     Vital Signs (Most Recent):  Temp: 98.7 °F (37.1 °C) (03/03/18 1143)  Pulse: 92 (03/03/18 1143)  Resp: 18 (03/03/18 1143)  BP: (!) 93/55 (asymptomatic) (03/03/18 1143)  SpO2: (!) 85 % (03/03/18 1143) Vital Signs (24h Range):  Temp:  [97.3 °F (36.3 °C)-98.9 °F (37.2 °C)] 98.7 °F (37.1 °C)  Pulse:  [] 92  Resp:  [15-20] 18  SpO2:  [78 %-86 %] 85 %  BP: ()/(47-59) 93/55     Patient Vitals for the past 72 hrs (Last 3 readings):   Weight   03/03/18 0415 57.9 kg (127 lb 10.3 oz)   03/02/18 0545 60.6 kg (133 lb 9.6 oz)   03/01/18 0430 70 kg (154 lb 5.2 oz)     Body mass index is 22.61 kg/m².      Intake/Output Summary (Last 24 hours) at 03/03/18 1318  Last data filed at 03/03/18 0400   Gross per 24 hour   Intake              780 ml   Output              500 ml   Net              280 ml       Hemodynamic Parameters:           Physical Exam  General: alert, awake and oriented x  3  Eyes:PERRL.   Neck:no JVD   Lungs:  clear to auscultation bilaterally and normal respiratory effort  Cardiovascular: Heart: regular rate and rhythm, S1, S2 normal, no murmur, click, rub or gallop.   Chest Wall: no tenderness.   Extremities: no cyanosis or edema.   Pulses: 2+ and symmetric.        Significant Labs:  CBC:    Recent Labs  Lab 03/01/18  0546 03/02/18  0842 03/03/18  0353   WBC 4.31  4.06 3.75* 4.12   RBC 4.69  4.60 4.57 4.14   HGB 10.1*  10.0* 10.0* 8.7*   HCT 34.3*  33.4* 33.2* 30.3*     266 256 262   MCV 73*  73* 73* 73*   MCH 21.5*  21.7* 21.9* 21.0*   MCHC 29.4*  29.9* 30.1* 28.7*     BNP:    Recent Labs  Lab 02/28/18  1710   *     CMP:    Recent Labs  Lab 02/28/18 1710 03/01/18  0546 03/02/18  0842 03/03/18  0353   * 98 128* 89   CALCIUM 9.2 9.0 8.4* 8.2*   ALBUMIN 3.9 3.8  --   --    PROT 7.4 7.2  --   --     140 139 139   K 3.7 3.8 3.4* 4.5   CO2 24 23 21* 21*    108 111* 110   BUN 22 18 17 22   CREATININE 1.2 1.0 1.0 1.0   ALKPHOS 46* 41*  --   --    ALT 13 13  --   --    AST 21 29  --   --    BILITOT 0.8 0.9  --   --       Coagulation:     Recent Labs  Lab 02/28/18 1710 03/01/18  0546 03/02/18  0842 03/03/18  0353   INR 2.2* 1.9* 1.4* 1.2   APTT 27.6  --   --   --      LDH:  No results for input(s): LDH in the last 72 hours.  Microbiology:  Microbiology Results (last 7 days)     ** No results found for the last 168 hours. **              Assessment and Plan:     71 yo female with a PMHx of WHO I- PAH (currently on remodulin Iv, adempas, bosentan), PFO, chronic hypoxia on 3L at baseline previously, but on 5L NC since 01/2018. Today patient noticed increasing shortness of breath, states that her O2 sat on pulse oximeter was 50-70% while resting. Denies fever, cough, LE edema, flu like symptoms, syncope. States that she had chest pressure associated with palpitations. Currently O2sat is 92% on 6L Nc, no chest pressure, denies palpitations. Has a rhythm  strip from [facility], suggests Atrial fibrillation vs AT, currently sinus tachycardia with TWI  Anterolateral leads with STD, inferior leads STD, incomplete RBBB. Troponin elevated to 1.6. 's, CXR no edema.    EPS in the past was negative. Did not tolerate Multaq, on cardizem.    * SVT (supraventricular tachycardia)    -Had a run of SVT (likley A Tach) prior to admit  -EP following- Very limited options in this patient: previously discussed tikosyn but would avoid given baseline Qt. Not a candidate for sotalol.  -Discontinuing Cardizem due to poor RV function  -started on amiodarone 400 mg po QD. Prior PFTs with nml DLCO. Would need frequent monitoring.   -Cont coumadin. INR goal 2-3        Elevated troponin    -Due to demand ischemia in setting of epsiode of A Tach  -Negative [facility]      Hypoxia    -Chronic resp failure, on 5L O2 at home  -Now requiring 6L O2 today to keep O2 sat above 80%. Usually runs between 83-89% at home   -will restart Adempas to see if improves oxygenation  -LVEDP 10 on [facility]        Pulmonary hypertension    -Cont home dose of IV remodulin 89.2ng/kg/min  -Stopped Adempas on admit-will restart at home dose as continues to be hypoxic. Cont bosentan 125mg Q12h   -Restart home dose Lasix 40 BID today. Cont spironolactone  -Echo with normal LV function, RVE with depressed function              Adis Montoya MD  Heart Transplant  [facility]

## 2018-03-04 LAB
ANION GAP SERPL CALC-SCNC: 8 MMOL/L
BASOPHILS # BLD AUTO: 0.05 K/UL
BASOPHILS NFR BLD: 1.2 %
BUN SERPL-MCNC: 19 MG/DL
CALCIUM SERPL-MCNC: 8.6 MG/DL
CHLORIDE SERPL-SCNC: 107 MMOL/L
CO2 SERPL-SCNC: 22 MMOL/L
CREAT SERPL-MCNC: 0.9 MG/DL
DIFFERENTIAL METHOD: ABNORMAL
EOSINOPHIL # BLD AUTO: 0.2 K/UL
EOSINOPHIL NFR BLD: 4.5 %
ERYTHROCYTE [DISTWIDTH] IN BLOOD BY AUTOMATED COUNT: 15.8 %
EST. GFR  (AFRICAN AMERICAN): >60 ML/MIN/1.73 M^2
EST. GFR  (NON AFRICAN AMERICAN): >60 ML/MIN/1.73 M^2
GLUCOSE SERPL-MCNC: 89 MG/DL
HCT VFR BLD AUTO: 32.9 %
HGB BLD-MCNC: 9.8 G/DL
IMM GRANULOCYTES # BLD AUTO: 0.01 K/UL
IMM GRANULOCYTES NFR BLD AUTO: 0.2 %
INR PPP: 1.1
LYMPHOCYTES # BLD AUTO: 0.8 K/UL
LYMPHOCYTES NFR BLD: 20.2 %
MAGNESIUM SERPL-MCNC: 2.3 MG/DL
MCH RBC QN AUTO: 21.5 PG
MCHC RBC AUTO-ENTMCNC: 29.8 G/DL
MCV RBC AUTO: 72 FL
MONOCYTES # BLD AUTO: 0.4 K/UL
MONOCYTES NFR BLD: 9 %
NEUTROPHILS # BLD AUTO: 2.6 K/UL
NEUTROPHILS NFR BLD: 64.9 %
NRBC BLD-RTO: 0 /100 WBC
PLATELET # BLD AUTO: 266 K/UL
PMV BLD AUTO: 9.1 FL
POCT GLUCOSE: 102 MG/DL (ref 70–110)
POTASSIUM SERPL-SCNC: 3.8 MMOL/L
PROTHROMBIN TIME: 11.6 SEC
RBC # BLD AUTO: 4.55 M/UL
SODIUM SERPL-SCNC: 137 MMOL/L
WBC # BLD AUTO: 4.01 K/UL

## 2018-03-04 PROCEDURE — 36415 COLL VENOUS BLD VENIPUNCTURE: CPT

## 2018-03-04 PROCEDURE — 25000003 PHARM REV CODE 250: Performed by: PHYSICIAN ASSISTANT

## 2018-03-04 PROCEDURE — 85025 COMPLETE CBC W/AUTO DIFF WBC: CPT

## 2018-03-04 PROCEDURE — 99233 SBSQ HOSP IP/OBS HIGH 50: CPT | Mod: GC,,, | Performed by: INTERNAL MEDICINE

## 2018-03-04 PROCEDURE — 25000003 PHARM REV CODE 250: Performed by: STUDENT IN AN ORGANIZED HEALTH CARE EDUCATION/TRAINING PROGRAM

## 2018-03-04 PROCEDURE — 20600001 HC STEP DOWN PRIVATE ROOM

## 2018-03-04 PROCEDURE — 99232 SBSQ HOSP IP/OBS MODERATE 35: CPT | Mod: GC,,, | Performed by: INTERNAL MEDICINE

## 2018-03-04 PROCEDURE — 85610 PROTHROMBIN TIME: CPT

## 2018-03-04 PROCEDURE — 80048 BASIC METABOLIC PNL TOTAL CA: CPT

## 2018-03-04 PROCEDURE — 83735 ASSAY OF MAGNESIUM: CPT

## 2018-03-04 PROCEDURE — 25000003 PHARM REV CODE 250: Performed by: INTERNAL MEDICINE

## 2018-03-04 RX ADMIN — BOSENTAN 125 MG: 125 TABLET, FILM COATED ORAL at 08:03

## 2018-03-04 RX ADMIN — FUROSEMIDE 40 MG: 40 TABLET ORAL at 08:03

## 2018-03-04 RX ADMIN — FLUTICASONE PROPIONATE 100 MCG: 50 SPRAY, METERED NASAL at 08:03

## 2018-03-04 RX ADMIN — CETIRIZINE HYDROCHLORIDE 10 MG: 10 TABLET, FILM COATED ORAL at 08:03

## 2018-03-04 RX ADMIN — FUROSEMIDE 40 MG: 40 TABLET ORAL at 05:03

## 2018-03-04 RX ADMIN — AMIODARONE HYDROCHLORIDE 400 MG: 200 TABLET ORAL at 08:03

## 2018-03-04 RX ADMIN — WARFARIN SODIUM 10 MG: 5 TABLET ORAL at 04:03

## 2018-03-04 RX ADMIN — TRAMADOL HYDROCHLORIDE 50 MG: 50 TABLET, COATED ORAL at 09:03

## 2018-03-04 RX ADMIN — SPIRONOLACTONE 25 MG: 25 TABLET, FILM COATED ORAL at 08:03

## 2018-03-04 RX ADMIN — ASPIRIN 325 MG ORAL TABLET 325 MG: 325 PILL ORAL at 08:03

## 2018-03-04 RX ADMIN — ONDANSETRON 8 MG: 4 TABLET, FILM COATED ORAL at 10:03

## 2018-03-04 RX ADMIN — ONDANSETRON 8 MG: 4 TABLET, FILM COATED ORAL at 06:03

## 2018-03-04 NOTE — ASSESSMENT & PLAN NOTE
73 y/o F with WHO class I Pulmonary HTN, here with acute on chronic hypoxic respiratory failure. SVT suspected to be atrial tachycardia likely driven by acute on chronic hypoxic respiratory failure. Suspect hypoxia driving episodes rather than tachyarrhythmia driving hypoxia.   Currently sinus tachycardia, EKG with Qtc 500. Previously did not tolerate multaq.  SVT better controlled since increasing diltiazem 120 to 180 daily for last several weeks.  Very limited options in this patient: previously discussed tikosyn but would avoid given baseline Qt. Not a candidate for sotalol. Diltiazem stopped 2/2 concern for negative ionotropy.    -Started on amiodarone 400 daily, tolerating well  -Will need frequent PFT monitoring

## 2018-03-04 NOTE — PLAN OF CARE
Problem: Patient Care Overview  Goal: Plan of Care Review  Outcome: Ongoing (interventions implemented as appropriate)  * AAO x 4  * Regular diet patient tolerating well. See chart for % eaten.   * No edema noted.  * Bed at lowest position and locked, call light within reach, non-slip socks on,  at bedside, and side rails up x 2.  Encouraged patient to call for assistance when needed patient and  stated understanding. patient and  ambulated in hopkins gait and balance WNL. Patient does report SOB with long ambulation.  * Right PIV 22 G (3/2/18) patent, dressing clean dry and intact no signs or symptoms of infection noted.  * Right CW perm-a-cath patent, dressing clean dry and intact no signs or symptoms of infection noted.  * Remodulin 89.2 ng/kg/min DW=70.5 kg patient on home pump cassette changed by  who was at bedside through most of the night. Patient tolerated well.    * Oxygen 6 liters via nasal cannula.  Oxygen saturation 84-88 % respirations even and unlabored SOB noted with extended ambulation.   * Skin assessment preformed no breakdown noted.  * Standard precautions maintained.  * Continuous telemetry monitoring continued SR 80-90s.  * Patient able to turn self no assistance needed.  * Patient voiding unmeasured urine.  See flow sheet for intake and output totals.

## 2018-03-04 NOTE — PROGRESS NOTES
[facility]  Heart Transplant  Progress Note    Patient Name: Molly Smith  MRN: [id number]  Admission Date: 2/28/2018  Hospital Length of Stay: 4 days  Attending Physician: Alice Tim MD  Primary Care Provider: Alice Tim MD  Principal Problem:SVT (supraventricular tachycardia)    Subjective:     Interval History: Oxygen saturation had improved overnight and this am 86 % on 6 L-decreased to 5 L -Patient became lightheaded and flushed while sitting up-Bp systolic 90s, oxygen saturation remained stable, SInus on tele-improved after moving back to bed.    Continuous Infusions:  Scheduled Meds:   amiodarone  400 mg Oral Daily    aspirin  325 mg Oral Daily    bosentan  125 mg Oral Q12H    cetirizine  10 mg Oral Daily    fluticasone  2 spray Each Nare Daily    furosemide  40 mg Oral BID    riociguat  0.5 mg Oral TID    spironolactone  25 mg Oral Daily    warfarin  10 mg Oral Daily     PRN Meds:ondansetron, sodium chloride 0.9%, traMADol, treprostinil (REMODULIN) infusion, veletri/remodulin cassette, veletri/remodulin tubing    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    Multaq [dronedarone]      Objective:     Vital Signs (Most Recent):  Temp: 98.3 °F (36.8 °C) (03/04/18 1202)  Pulse: 92 (03/04/18 1210)  Resp: 20 (03/04/18 1202)  BP: (!) 101/55 (03/04/18 1202)  SpO2: (!) 88 % (03/04/18 1202) Vital Signs (24h Range):  Temp:  [97.7 °F (36.5 °C)-98.7 °F (37.1 °C)] 98.3 °F (36.8 °C)  Pulse:  [] 92  Resp:  [15-20] 20  SpO2:  [82 %-90 %] 88 %  BP: ()/(51-59) 101/55     Patient Vitals for the past 72 hrs (Last 3 readings):   Weight   03/03/18 0415 57.9 kg (127 lb 10.3 oz)   03/02/18 0545 60.6 kg (133 lb 9.6 oz)     Body mass index is 22.61 kg/m².      Intake/Output Summary (Last 24 hours) at 03/04/18 1446  Last data filed at 03/04/18 0600   Gross per 24 hour   Intake              560 ml   Output                0 ml    Net              560 ml       Hemodynamic Parameters:           Physical Exam  General: alert, awake and oriented x 3  Eyes:PERRL.   Neck:no JVD   Lungs:  clear to auscultation bilaterally and normal respiratory effort  Cardiovascular: Heart: regular rate and rhythm, S1, S2 normal, no murmur, click, rub or gallop.   Chest Wall: no tenderness.   Extremities: no cyanosis or edema.   Pulses: 2+ and symmetric.  Abdomen/Rectal: Abdomen: soft, non-tender non-distented; bowel sounds normal      Significant Labs:  CBC:    Recent Labs  Lab 03/02/18  0842 03/03/18  0353 03/04/18  0410   WBC 3.75* 4.12 4.01   RBC 4.57 4.14 4.55   HGB 10.0* 8.7* 9.8*   HCT 33.2* 30.3* 32.9*    262 266   MCV 73* 73* 72*   MCH 21.9* 21.0* 21.5*   MCHC 30.1* 28.7* 29.8*     BNP:    Recent Labs  Lab 02/28/18  1710   *     CMP:    Recent Labs  Lab 02/28/18 1710 03/01/18  0546 03/02/18  0842 03/03/18  0353 03/04/18  0409   * 98 128* 89 89   CALCIUM 9.2 9.0 8.4* 8.2* 8.6*   ALBUMIN 3.9 3.8  --   --   --    PROT 7.4 7.2  --   --   --     140 139 139 137   K 3.7 3.8 3.4* 4.5 3.8   CO2 24 23 21* 21* 22*    108 111* 110 107   BUN 22 18 17 22 19   CREATININE 1.2 1.0 1.0 1.0 0.9   ALKPHOS 46* 41*  --   --   --    ALT 13 13  --   --   --    AST 21 29  --   --   --    BILITOT 0.8 0.9  --   --   --       Coagulation:     Recent Labs  Lab 02/28/18  1710  03/02/18  0842 03/03/18  0353 03/04/18  0410   INR 2.2*  < > 1.4* 1.2 1.1   APTT 27.6  --   --   --   --    < > = values in this interval not displayed.  LDH:  No results for input(s): LDH in the last 72 hours.  Microbiology:  Microbiology Results (last 7 days)     ** No results found for the last 168 hours. **              Assessment and Plan:     71 yo female with a PMHx of WHO I- PAH (currently on remodulin Iv, adempas, bosentan), PFO, chronic hypoxia on 3L at baseline previously, but on 5L NC since 01/2018. Today patient noticed increasing shortness of breath, states that  her O2 sat on pulse oximeter was 50-70% while resting. Denies fever, cough, LE edema, flu like symptoms, syncope. States that she had chest pressure associated with palpitations. Currently O2sat is 92% on 6L [place], no chest pressure, denies palpitations. Has a rhythm strip from [facility], suggests Atrial fibrillation vs AT, currently sinus tachycardia with TWI  Anterolateral leads with STD, inferior leads STD, incomplete RBBB. Troponin elevated to 1.6. 's, CXR no edema.    EPS in the past was negative. Did not tolerate Multaq, on cardizem.    * SVT (supraventricular tachycardia)    -Had a run of SVT (likley A Tach) prior to admit  -EP following- Very limited options in this patient: previously discussed tikosyn but would avoid given baseline Qt. Not a candidate for sotalol.  -Discontinuing Cardizem due to poor RV function  -started on amiodarone 400 mg po QD. Prior PFTs with nml DLCO. Would need frequent monitoring.   -Cont coumadin. INR goal 2-3        Elevated troponin    -Due to demand ischemia in setting of epsiode of A Tach  -Negative [facility]      Hypoxia    -   -Chronic resp failure, on 5L O2 at home  -Now requiring 6L O2 today to keep O2 sat above 80%. Usually runs between 83-89% at home   -restarted Adempas with improved  oxygenation  -LVEDP 10 on [facility]  -Continue weaning to home o2  -Episode this am likely related to restarting adempas             Pulmonary hypertension    -Cont home dose of IV remodulin 89.2ng/kg/min  -Stopped Adempas on admit-restarted yesterday. Cont bosentan 125mg Q12h   -Restart home dose Lasix 40 BID. Cont spironolactone  -Echo with normal LV function, RVE with depressed function              Adis Montoya MD  Heart Transplant  [facility]

## 2018-03-04 NOTE — PROGRESS NOTES
"Patient stated "im feeling slightly better.  BG WNL.  Vitals stable. Zofran administered.  Will continue to monitor.  "

## 2018-03-04 NOTE — SUBJECTIVE & OBJECTIVE
Interval History: Oxygen saturation had improved overnight and this am 86 % on 6 L-decreased to 5 L -Patient became lightheaded and flushed while sitting up-Bp systolic 90s, oxygen saturation remained stable, SInus on tele-improved after moving back to bed.    Continuous Infusions:  Scheduled Meds:   amiodarone  400 mg Oral Daily    aspirin  325 mg Oral Daily    bosentan  125 mg Oral Q12H    cetirizine  10 mg Oral Daily    fluticasone  2 spray Each Nare Daily    furosemide  40 mg Oral BID    riociguat  0.5 mg Oral TID    spironolactone  25 mg Oral Daily    warfarin  10 mg Oral Daily     PRN Meds:ondansetron, sodium chloride 0.9%, traMADol, treprostinil (REMODULIN) infusion, veletri/remodulin cassette, veletri/remodulin tubing    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    Multaq [dronedarone]      Objective:     Vital Signs (Most Recent):  Temp: 98.3 °F (36.8 °C) (03/04/18 1202)  Pulse: 92 (03/04/18 1210)  Resp: 20 (03/04/18 1202)  BP: (!) 101/55 (03/04/18 1202)  SpO2: (!) 88 % (03/04/18 1202) Vital Signs (24h Range):  Temp:  [97.7 °F (36.5 °C)-98.7 °F (37.1 °C)] 98.3 °F (36.8 °C)  Pulse:  [] 92  Resp:  [15-20] 20  SpO2:  [82 %-90 %] 88 %  BP: ()/(51-59) 101/55     Patient Vitals for the past 72 hrs (Last 3 readings):   Weight   03/03/18 0415 57.9 kg (127 lb 10.3 oz)   03/02/18 0545 60.6 kg (133 lb 9.6 oz)     Body mass index is 22.61 kg/m².      Intake/Output Summary (Last 24 hours) at 03/04/18 1446  Last data filed at 03/04/18 0600   Gross per 24 hour   Intake              560 ml   Output                0 ml   Net              560 ml       Hemodynamic Parameters:           Physical Exam  General: alert, awake and oriented x 3  Eyes:PERRL.   Neck:no JVD   Lungs:  clear to auscultation bilaterally and normal respiratory effort  Cardiovascular: Heart: regular rate and rhythm, S1, S2 normal, no murmur, click, rub or gallop.   Chest Wall:  no tenderness.   Extremities: no cyanosis or edema.   Pulses: 2+ and symmetric.  Abdomen/Rectal: Abdomen: soft, non-tender non-distented; bowel sounds normal      Significant Labs:  CBC:    Recent Labs  Lab 03/02/18  0842 03/03/18  0353 03/04/18  0410   WBC 3.75* 4.12 4.01   RBC 4.57 4.14 4.55   HGB 10.0* 8.7* 9.8*   HCT 33.2* 30.3* 32.9*    262 266   MCV 73* 73* 72*   MCH 21.9* 21.0* 21.5*   MCHC 30.1* 28.7* 29.8*     BNP:    Recent Labs  Lab 02/28/18 1710   *     CMP:    Recent Labs  Lab 02/28/18 1710 03/01/18  0546 03/02/18  0842 03/03/18  0353 03/04/18  0409   * 98 128* 89 89   CALCIUM 9.2 9.0 8.4* 8.2* 8.6*   ALBUMIN 3.9 3.8  --   --   --    PROT 7.4 7.2  --   --   --     140 139 139 137   K 3.7 3.8 3.4* 4.5 3.8   CO2 24 23 21* 21* 22*    108 111* 110 107   BUN 22 18 17 22 19   CREATININE 1.2 1.0 1.0 1.0 0.9   ALKPHOS 46* 41*  --   --   --    ALT 13 13  --   --   --    AST 21 29  --   --   --    BILITOT 0.8 0.9  --   --   --       Coagulation:     Recent Labs  Lab 02/28/18 1710 03/02/18  0842 03/03/18  0353 03/04/18  0410   INR 2.2*  < > 1.4* 1.2 1.1   APTT 27.6  --   --   --   --    < > = values in this interval not displayed.  LDH:  No results for input(s): LDH in the last 72 hours.  Microbiology:  Microbiology Results (last 7 days)     ** No results found for the last 168 hours. **

## 2018-03-04 NOTE — PROGRESS NOTES
"Deana called RN to the room.  Patient stated "i dont feel well, im nauseated and dizzy".  Vitals stable. BP low.  Notified Lindsay NUÑEZ.  Will continue to monitor closely.    "

## 2018-03-04 NOTE — SUBJECTIVE & OBJECTIVE
Subjective: NAEON. Sinus rhythm mostly  on telemetry. Remains on 6l, feeling better this am.  Review of Systems   All other systems reviewed and are negative.    Objective:     Vital Signs (Most Recent):  Temp: 98 °F (36.7 °C) (03/04/18 0828)  Pulse: 101 (03/04/18 0828)  Resp: 20 (03/04/18 0828)  BP: (!) 111/56 (03/04/18 0828)  SpO2: (!) 87 % (03/04/18 0828) Vital Signs (24h Range):  Temp:  [97.7 °F (36.5 °C)-98.7 °F (37.1 °C)] 98 °F (36.7 °C)  Pulse:  [] 101  Resp:  [15-20] 20  SpO2:  [82 %-88 %] 87 %  BP: ()/(51-59) 111/56     Weight: 57.9 kg (127 lb 10.3 oz)  Body mass index is 22.61 kg/m².    SpO2: (!) 87 %  O2 Device (Oxygen Therapy): nasal cannula    Physical Exam   Constitutional: She is oriented to person, place, and time. She appears well-developed and well-nourished. No distress.   HENT:   Head: Normocephalic and atraumatic.   Eyes: No scleral icterus.   Neck: No JVD present.   Cardiovascular: Regular rhythm.  Tachycardia present.    Pulmonary/Chest: Effort normal. No respiratory distress. She has no wheezes. She has rales.   Abdominal: Soft.   Musculoskeletal: She exhibits no edema.   Neurological: She is alert and oriented to person, place, and time.   Skin: Skin is warm.   Psychiatric: She has a normal mood and affect.       Significant Labs: All pertinent lab results from the last 24 hours have been reviewed.    Significant Imaging: EKG: As above in Hpi

## 2018-03-04 NOTE — PROGRESS NOTES
[facility]  Cardiac Electrophysiology  Progress Note    Admission Date: 2/28/2018  Code Status: Full Code   Attending Physician: Alice Tim MD   Expected Discharge Date: 3/3/2018  Principal Problem:SVT (supraventricular tachycardia)    Subjective:     Subjective: NAEON. Sinus rhythm mostly  on telemetry. Remains on 6l, feeling better this am.  Review of Systems   All other systems reviewed and are negative.    Objective:     Vital Signs (Most Recent):  Temp: 98 °F (36.7 °C) (03/04/18 0828)  Pulse: 101 (03/04/18 0828)  Resp: 20 (03/04/18 0828)  BP: (!) 111/56 (03/04/18 0828)  SpO2: (!) 87 % (03/04/18 0828) Vital Signs (24h Range):  Temp:  [97.7 °F (36.5 °C)-98.7 °F (37.1 °C)] 98 °F (36.7 °C)  Pulse:  [] 101  Resp:  [15-20] 20  SpO2:  [82 %-88 %] 87 %  BP: ()/(51-59) 111/56     Weight: 57.9 kg (127 lb 10.3 oz)  Body mass index is 22.61 kg/m².    SpO2: (!) 87 %  O2 Device (Oxygen Therapy): nasal cannula    Physical Exam   Constitutional: She is oriented to person, place, and time. She appears well-developed and well-nourished. No distress.   HENT:   Head: Normocephalic and atraumatic.   Eyes: No scleral icterus.   Neck: No JVD present.   Cardiovascular: Regular rhythm.  Tachycardia present.    Pulmonary/Chest: Effort normal. No respiratory distress. She has no wheezes. She has rales.   Abdominal: Soft.   Musculoskeletal: She exhibits no edema.   Neurological: She is alert and oriented to person, place, and time.   Skin: Skin is warm.   Psychiatric: She has a normal mood and affect.       Significant Labs: All pertinent lab results from the last 24 hours have been reviewed.    Significant Imaging: EKG: As above in Hpi    Assessment and Plan:     * SVT (supraventricular tachycardia)    71 y/o F with WHO class I Pulmonary HTN, here with acute on chronic hypoxic respiratory failure. SVT suspected to be atrial tachycardia likely driven by acute on chronic hypoxic respiratory  failure. Suspect hypoxia driving episodes rather than tachyarrhythmia driving hypoxia.   Currently sinus tachycardia, EKG with Qtc 500. Previously did not tolerate multaq.  SVT better controlled since increasing diltiazem 120 to 180 daily for last several weeks.  Very limited options in this patient: previously discussed tikosyn but would avoid given baseline Qt. Not a candidate for sotalol. Diltiazem stopped 2/2 concern for negative ionotropy.    -Started on amiodarone 400 daily, tolerating well  -Will need frequent PFT monitoring                 Tal Cornejo MD  Cardiac Electrophysiology  [facility]

## 2018-03-04 NOTE — PROGRESS NOTES
While discussing the patients medications the patient stated that she as taking bosentan at home 3 times a day.  Upon review of the MAR patient is only receiving bosentan BID in the hospital.  Notified  with heart transplant.  No new orders will continue to monitor.

## 2018-03-05 VITALS
OXYGEN SATURATION: 88 % | RESPIRATION RATE: 16 BRPM | TEMPERATURE: 98 F | SYSTOLIC BLOOD PRESSURE: 105 MMHG | HEART RATE: 91 BPM | HEIGHT: 63 IN | BODY MASS INDEX: 22.61 KG/M2 | WEIGHT: 127.63 LBS | DIASTOLIC BLOOD PRESSURE: 60 MMHG

## 2018-03-05 LAB
ANION GAP SERPL CALC-SCNC: 8 MMOL/L
BASOPHILS # BLD AUTO: 0.05 K/UL
BASOPHILS NFR BLD: 1.3 %
BUN SERPL-MCNC: 21 MG/DL
CALCIUM SERPL-MCNC: 8.4 MG/DL
CHLORIDE SERPL-SCNC: 107 MMOL/L
CO2 SERPL-SCNC: 23 MMOL/L
CREAT SERPL-MCNC: 1.1 MG/DL
DIFFERENTIAL METHOD: ABNORMAL
EOSINOPHIL # BLD AUTO: 0.1 K/UL
EOSINOPHIL NFR BLD: 3.7 %
ERYTHROCYTE [DISTWIDTH] IN BLOOD BY AUTOMATED COUNT: 15.6 %
EST. GFR  (AFRICAN AMERICAN): 58.4 ML/MIN/1.73 M^2
EST. GFR  (NON AFRICAN AMERICAN): 50.6 ML/MIN/1.73 M^2
GLUCOSE SERPL-MCNC: 87 MG/DL
HCT VFR BLD AUTO: 30.8 %
HGB BLD-MCNC: 9.3 G/DL
IMM GRANULOCYTES # BLD AUTO: 0.01 K/UL
IMM GRANULOCYTES NFR BLD AUTO: 0.3 %
INR PPP: 1.3
LYMPHOCYTES # BLD AUTO: 1 K/UL
LYMPHOCYTES NFR BLD: 26.5 %
MAGNESIUM SERPL-MCNC: 2 MG/DL
MCH RBC QN AUTO: 21.8 PG
MCHC RBC AUTO-ENTMCNC: 30.2 G/DL
MCV RBC AUTO: 72 FL
MONOCYTES # BLD AUTO: 0.4 K/UL
MONOCYTES NFR BLD: 9.5 %
NEUTROPHILS # BLD AUTO: 2.2 K/UL
NEUTROPHILS NFR BLD: 58.7 %
NRBC BLD-RTO: 0 /100 WBC
PLATELET # BLD AUTO: 248 K/UL
PMV BLD AUTO: 8.9 FL
POTASSIUM SERPL-SCNC: 3.8 MMOL/L
PROTHROMBIN TIME: 12.8 SEC
RBC # BLD AUTO: 4.26 M/UL
SODIUM SERPL-SCNC: 138 MMOL/L
TSH SERPL DL<=0.005 MIU/L-ACNC: 1.93 UIU/ML
WBC # BLD AUTO: 3.78 K/UL

## 2018-03-05 PROCEDURE — 85025 COMPLETE CBC W/AUTO DIFF WBC: CPT

## 2018-03-05 PROCEDURE — 25000003 PHARM REV CODE 250: Performed by: PHYSICIAN ASSISTANT

## 2018-03-05 PROCEDURE — 85610 PROTHROMBIN TIME: CPT

## 2018-03-05 PROCEDURE — 25000003 PHARM REV CODE 250: Performed by: STUDENT IN AN ORGANIZED HEALTH CARE EDUCATION/TRAINING PROGRAM

## 2018-03-05 PROCEDURE — 25000003 PHARM REV CODE 250: Performed by: INTERNAL MEDICINE

## 2018-03-05 PROCEDURE — 36415 COLL VENOUS BLD VENIPUNCTURE: CPT

## 2018-03-05 PROCEDURE — 84443 ASSAY THYROID STIM HORMONE: CPT

## 2018-03-05 PROCEDURE — 80048 BASIC METABOLIC PNL TOTAL CA: CPT

## 2018-03-05 PROCEDURE — 99231 SBSQ HOSP IP/OBS SF/LOW 25: CPT | Mod: GC,,, | Performed by: INTERNAL MEDICINE

## 2018-03-05 PROCEDURE — 83735 ASSAY OF MAGNESIUM: CPT

## 2018-03-05 PROCEDURE — 99238 HOSP IP/OBS DSCHRG MGMT 30/<: CPT | Mod: ,,, | Performed by: INTERNAL MEDICINE

## 2018-03-05 RX ORDER — BOSENTAN 125 MG/1
125 TABLET, FILM COATED ORAL 3 TIMES DAILY
Status: DISCONTINUED | OUTPATIENT
Start: 2018-03-05 | End: 2018-03-05

## 2018-03-05 RX ORDER — POTASSIUM CHLORIDE 750 MG/1
20 CAPSULE, EXTENDED RELEASE ORAL ONCE
Status: COMPLETED | OUTPATIENT
Start: 2018-03-05 | End: 2018-03-05

## 2018-03-05 RX ORDER — BOSENTAN 125 MG/1
125 TABLET, FILM COATED ORAL EVERY 12 HOURS
Qty: 60 TABLET | Refills: 0
Start: 2018-03-05 | End: 2018-04-04

## 2018-03-05 RX ORDER — BOSENTAN 125 MG/1
125 TABLET, FILM COATED ORAL EVERY 12 HOURS
Status: DISCONTINUED | OUTPATIENT
Start: 2018-03-05 | End: 2018-03-05 | Stop reason: HOSPADM

## 2018-03-05 RX ORDER — AMIODARONE HYDROCHLORIDE 400 MG/1
400 TABLET ORAL DAILY
Qty: 30 TABLET | Refills: 11 | Status: SHIPPED | OUTPATIENT
Start: 2018-03-06 | End: 2018-03-23 | Stop reason: SDUPTHER

## 2018-03-05 RX ADMIN — POTASSIUM CHLORIDE 20 MEQ: 750 CAPSULE, EXTENDED RELEASE ORAL at 08:03

## 2018-03-05 RX ADMIN — BOSENTAN 125 MG: 125 TABLET, FILM COATED ORAL at 08:03

## 2018-03-05 RX ADMIN — ASPIRIN 325 MG ORAL TABLET 325 MG: 325 PILL ORAL at 08:03

## 2018-03-05 RX ADMIN — SPIRONOLACTONE 25 MG: 25 TABLET, FILM COATED ORAL at 08:03

## 2018-03-05 RX ADMIN — AMIODARONE HYDROCHLORIDE 400 MG: 200 TABLET ORAL at 08:03

## 2018-03-05 RX ADMIN — FUROSEMIDE 40 MG: 40 TABLET ORAL at 08:03

## 2018-03-05 RX ADMIN — CETIRIZINE HYDROCHLORIDE 10 MG: 10 TABLET, FILM COATED ORAL at 08:03

## 2018-03-05 RX ADMIN — FLUTICASONE PROPIONATE 100 MCG: 50 SPRAY, METERED NASAL at 08:03

## 2018-03-05 NOTE — ASSESSMENT & PLAN NOTE
73 y/o F with WHO class I Pulmonary HTN, here with acute on chronic hypoxic respiratory failure. SVT suspected to be atrial tachycardia likely driven by acute on chronic hypoxic respiratory failure. Suspect hypoxia driving episodes rather than tachyarrhythmia driving hypoxia.   Currently sinus tachycardia, EKG with Qtc 500. Previously did not tolerate multaq.  SVT better controlled since increasing diltiazem 120 to 180 daily for last several weeks.  Very limited options in this patient: previously discussed tikosyn but would avoid given baseline Qt. Not a candidate for sotalol. Diltiazem stopped 2/2 concern for negative ionotropy.    -Started on amiodarone 400 daily, tolerating well, remains in sinus rhythm since end of last week  -Will need frequent PFT monitoring as outpatient, monitor for decrease in DLCO as most sensitive measure for amio toxicity

## 2018-03-05 NOTE — SUBJECTIVE & OBJECTIVE
Subjective: NAEON. Sinus rhythm 70s-90s. Feeling better, ready to go home.  Review of Systems   All other systems reviewed and are negative.    Objective:     Vital Signs (Most Recent):  Temp: 97.7 °F (36.5 °C) (03/05/18 0427)  Pulse: 92 (03/05/18 0826)  Resp: 16 (03/05/18 0826)  BP: (!) 108/57 (03/05/18 0826)  SpO2: (!) 86 % (03/05/18 0826) Vital Signs (24h Range):  Temp:  [97.7 °F (36.5 °C)-98.4 °F (36.9 °C)] 97.7 °F (36.5 °C)  Pulse:  [] 92  Resp:  [15-20] 16  SpO2:  [84 %-88 %] 86 %  BP: ()/(51-59) 108/57     Weight: 57.9 kg (127 lb 10.3 oz)  Body mass index is 22.61 kg/m².    SpO2: (!) 86 %  O2 Device (Oxygen Therapy): room air    Physical Exam   Constitutional: She is oriented to person, place, and time. She appears well-developed and well-nourished. No distress.   HENT:   Head: Normocephalic and atraumatic.   Eyes: No scleral icterus.   Neck: No JVD present.   Cardiovascular: Regular rhythm.  Tachycardia present.    Pulmonary/Chest: Effort normal. No respiratory distress. She has no wheezes. She has rales.   Abdominal: Soft.   Musculoskeletal: She exhibits no edema.   Neurological: She is alert and oriented to person, place, and time.   Skin: Skin is warm.   Psychiatric: She has a normal mood and affect.       Significant Labs: All pertinent lab results from the last 24 hours have been reviewed.    Significant Imaging: EKG: As above in Hpi

## 2018-03-05 NOTE — DISCHARGE SUMMARY
[facility]  Heart Transplant  Discharge Summary      Patient Name: Molly Smith  MRN: [id number]  Admission Date: 2/28/2018  Hospital Length of Stay: 5 days  Discharge Date and Time: 03/05/2018 1:35 PM  Attending Physician: No att. providers found   Discharging Provider: Shell Ocampo PA-C  Primary Care Provider: Alice Tim MD     HPI: 73 yo female with a PMHx of WHO I- PAH (currently on remodulin Iv, adempas, bosentan), PFO, chronic hypoxia on 3L at baseline previously, but on 5L NC since 01/2018. Today patient noticed increasing shortness of breath, states that her O2 sat on pulse oximeter was 50-70% while resting. Denies fever, cough, LE edema, flu like symptoms, syncope. States that she had chest pressure associated with palpitations. Currently O2sat is 92% on 6L Nc, no chest pressure, denies palpitations. Has a rhythm strip from cardi, suggests Atrial fibrillation vs AT, currently sinus tachycardia with TWI  Anterolateral leads with STD, inferior leads STD, incomplete RBBB. Troponin elevated to 1.6. 's, CXR no edema.    EPS in the past was negative. Did not tolerate Multaq, on cardizem.    Procedure(s) (LRB):  Left heart cath (Left)     Hospital Course: Pt was admitted to [facility] with increased SOB and O2 requirements in setting of prolonged episode of A tach with associated neck pressure/pain. Troponin was positive and EKG had nonspecific ST changes. Pt was loaded with ASA and Plavix and started on Heparin bridge. Interventional Cards consulted for NSTEMI/LHC, but LHC 3/1/18 showed normal coronary arteries.  EP was consulted for A Tach. There are very limited options in this patient-  previously discussed tikosyn but would avoid given baseline Qt. Not a candidate for sotalol. Given that pt has had ongoing intermittent atrial tach and does not tolerate it, will try amio in place of cardizem. Pt had no further episodes on tele. Pt will need monitoring of PFTs, TFTs, and LFTs  while on Amio.   Pt was continued on home dose of IV Remodulin. We attempted to wean off Adempas, but with continued increasing oxygen requirements, restarted Adempas and this improved so she will continue on this for now. Also of note, pt has been taking bosentan TID for years, when this should be a BID dosed med. So we will have her change bosentan to BID dosing on d/c. Pt has f/u in PH clinic in 2 weeks. She will need INR on this Wed 3/7/18- decreased dose of coumadin since starting on Amio.     Consults         Status Ordering Provider     Inpatient consult to Electrophysiology  Once     Provider:  (Not yet assigned)    Completed WYATT COPELAND     Inpatient consult to Interventional Cardiology  Once     Provider:  (Not yet assigned)    Completed IRAJ ARGUETA              Pending Diagnostic Studies:     None        Final Active Diagnoses:    Diagnosis Date Noted POA    PRINCIPAL PROBLEM:  Pulmonary hypertension [I27.20] 08/24/2012 Yes    SVT (supraventricular tachycardia) [I47.1] 04/07/2017 Yes    Elevated troponin [R74.8] 03/02/2018 Yes    NSTEMI (non-ST elevated myocardial infarction) [I21.4] 03/01/2018 Unknown    Shortness of breath [R06.02] 02/28/2018 Yes    Hypoxia [R09.02] 10/05/2017 Yes    Anticoagulant long-term use [Z79.01] 12/11/2014 Not Applicable     Chronic      Problems Resolved During this Admission:    Diagnosis Date Noted Date Resolved POA      Discharged Condition: stable    Disposition: Home or Self Care      Patient Instructions:     Diet Adult Regular   Order Specific Question Answer Comments   Na restriction, if any: 2gNa      Activity as tolerated     Notify your health care provider if you experience any of the following:  temperature >100.4     Notify your health care provider if you experience any of the following:  persistent nausea and vomiting or diarrhea     Notify your health care provider if you experience any of the following:  redness, tenderness, or  signs of infection (pain, swelling, redness, odor or green/yellow discharge around incision site)     Notify your health care provider if you experience any of the following:  severe uncontrolled pain     Notify your health care provider if you experience any of the following:  difficulty breathing or increased cough     Notify your health care provider if you experience any of the following:  severe persistent headache     Notify your health care provider if you experience any of the following:  persistent dizziness, light-headedness, or visual disturbances     Notify your health care provider if you experience any of the following:  increased confusion or weakness     Activity as tolerated     Notify your health care provider if you experience any of the following:  temperature >100.4     Notify your health care provider if you experience any of the following:  persistent nausea and vomiting or diarrhea     Notify your health care provider if you experience any of the following:  severe uncontrolled pain     Notify your health care provider if you experience any of the following:  redness, tenderness, or signs of infection (pain, swelling, redness, odor or green/yellow discharge around incision site)     Notify your health care provider if you experience any of the following:  difficulty breathing or increased cough     Notify your health care provider if you experience any of the following:  severe persistent headache     Notify your health care provider if you experience any of the following:  worsening rash     Notify your health care provider if you experience any of the following:  persistent dizziness, light-headedness, or visual disturbances     Notify your health care provider if you experience any of the following:  increased confusion or weakness     Notify your health care provider if you experience any of the following:       Medications:  Reconciled Home Medications:   Discharge Medication List as of  3/5/2018 10:32 AM      START taking these medications    Details   amiodarone (PACERONE) 400 MG tablet Take 1 tablet (400 mg total) by mouth once daily., Starting Tue 3/6/2018, Until Wed 3/6/2019, Normal         CONTINUE these medications which have CHANGED    Details   bosentan (TRACLEER) 125 MG Tab Take 1 tablet (125 mg total) by mouth every 12 (twelve) hours., Starting Mon 3/5/2018, Until Wed 4/4/2018, No Print      riociguat (ADEMPAS) 0.5 mg Tab tablet Take 1 tablet (0.5 mg total) by mouth 3 (three) times daily., Starting Mon 3/5/2018, No Print         CONTINUE these medications which have NOT CHANGED    Details   CALCIUM CARBONATE (CALCIUM 600 ORAL) Take 2 tablets by mouth once daily.  , Starting 5/5/2009, Until Discontinued, Historical Med      furosemide (LASIX) 40 MG tablet Take 1 tablet (40 mg total) by mouth 2 (two) times daily., Starting 2/19/2014, Until Discontinued, Normal      spironolactone (ALDACTONE) 25 MG tablet Take 25 mg by mouth once daily.  , Starting 2/28/2005, Until Discontinued, Historical Med      traMADol (ULTRAM-ER) 200 MG Tb24 TAKE ONE TABLET BY MOUTH ONCE A DAY AS NEEDED FOR PAIN THANK YOU!, Normal      TREPROSTINIL SODIUM (TREPROSTINIL, REMODULIN, PUMP FOR HOME USE) Pt currently on 89.2 ng/kg/min=45 mL/day dosing weight 70.05 kg, No Print      ferrous gluconate (FERGON) 324 MG tablet Take 1 tablet (324 mg total) by mouth daily with breakfast., Starting Fri 1/12/2018, OTC      sodium chloride 0.9% 0.9 % SolP 100 mL with treprostinil 1 mg/mL Soln 9,000,000 ng Inject 5,005.5 ng/min into the vein continuous., Starting 7/25/2016, Until Discontinued, Print      warfarin (COUMADIN) 10 MG tablet Take 1 tablet (10 mg total) by mouth Daily., Starting Tue 7/26/2016, Until Fri 1/12/2018, Print         STOP taking these medications       diltiaZEM (CARDIZEM CD) 240 MG 24 hr capsule Comments:   Reason for Stopping:               Shell Ocampo PA-C  Heart Transplant  [facility]

## 2018-03-05 NOTE — PLAN OF CARE
* AAO x 4  * Regular diet patient tolerating well. See chart for % eaten.   * No edema noted.  * Bed at lowest position and locked, call light within reach, non-slip socks on,  at bedside, and side rails up x 2.  Encouraged patient to call for assistance when needed patient and  stated understanding.   * Right PIV 22 G (3/2/18) patent, dressing clean dry and intact no signs or symptoms of infection noted.  * Right CW perm-a-cath patent, dressing clean dry and intact no signs or symptoms of infection noted.  * Remodulin 89.2 ng/kg/min DW=70.5 kg patient on home pump cassette due to be changed on 3/5/18 at 2100.    * Oxygen 6 liters via nasal cannula.  Oxygen saturation 87-88 % respirations even and unlabored SOB noted with extended ambulation.   * Skin assessment preformed no breakdown noted.  * Standard precautions maintained.  * Continuous telemetry monitoring continued SR 80-90s.  * Patient able to turn self no assistance needed.  * Patient voiding clear yellow urine.  See flow sheet for intake and output totals.

## 2018-03-05 NOTE — PROGRESS NOTES
[facility]  Cardiac Electrophysiology  Progress Note    Admission Date: 2/28/2018  Code Status: Full Code   Attending Physician: Alice Tim MD   Expected Discharge Date: 3/5/2018  Principal Problem:Pulmonary hypertension    Subjective:     Subjective: NAEON. Sinus rhythm 70s-90s. Feeling better, ready to go home.  Review of Systems   All other systems reviewed and are negative.    Objective:     Vital Signs (Most Recent):  Temp: 97.7 °F (36.5 °C) (03/05/18 0427)  Pulse: 92 (03/05/18 0826)  Resp: 16 (03/05/18 0826)  BP: (!) 108/57 (03/05/18 0826)  SpO2: (!) 86 % (03/05/18 0826) Vital Signs (24h Range):  Temp:  [97.7 °F (36.5 °C)-98.4 °F (36.9 °C)] 97.7 °F (36.5 °C)  Pulse:  [] 92  Resp:  [15-20] 16  SpO2:  [84 %-88 %] 86 %  BP: ()/(51-59) 108/57     Weight: 57.9 kg (127 lb 10.3 oz)  Body mass index is 22.61 kg/m².    SpO2: (!) 86 %  O2 Device (Oxygen Therapy): room air    Physical Exam   Constitutional: She is oriented to person, place, and time. She appears well-developed and well-nourished. No distress.   HENT:   Head: Normocephalic and atraumatic.   Eyes: No scleral icterus.   Neck: No JVD present.   Cardiovascular: Regular rhythm.  Tachycardia present.    Pulmonary/Chest: Effort normal. No respiratory distress. She has no wheezes. She has rales.   Abdominal: Soft.   Musculoskeletal: She exhibits no edema.   Neurological: She is alert and oriented to person, place, and time.   Skin: Skin is warm.   Psychiatric: She has a normal mood and affect.       Significant Labs: All pertinent lab results from the last 24 hours have been reviewed.    Significant Imaging: EKG: As above in Hpi    Assessment and Plan:     SVT (supraventricular tachycardia)    71 y/o F with WHO class I Pulmonary HTN, here with acute on chronic hypoxic respiratory failure. SVT suspected to be atrial tachycardia likely driven by acute on chronic hypoxic respiratory failure. Suspect hypoxia driving episodes  rather than tachyarrhythmia driving hypoxia.   Currently sinus tachycardia, EKG with Qtc 500. Previously did not tolerate multaq.  SVT better controlled since increasing diltiazem 120 to 180 daily for last several weeks.  Very limited options in this patient: previously discussed tikosyn but would avoid given baseline Qt. Not a candidate for sotalol. Diltiazem stopped 2/2 concern for negative ionotropy.    -Started on amiodarone 400 daily, tolerating well, remains in sinus rhythm since end of last week  -Will need frequent PFT monitoring as outpatient, monitor for decrease in DLCO as most sensitive measure for amio toxicity               Tal Cornejo MD  Cardiac Electrophysiology  [facility]

## 2018-03-05 NOTE — HOSPITAL COURSE
Pt was admitted to [facility] with increased SOB and O2 requirements in setting of prolonged episode of A tach with associated neck pressure/pain. Troponin was positive and EKG had nonspecific ST changes. Pt was loaded with ASA and Plavix and started on Heparin bridge. Interventional Cards consulted for NSTEMI/LHC, but LHC 3/1/18 showed normal coronary arteries.  EP was consulted for A Tach. There are very limited options in this patient-  previously discussed tikosyn but would avoid given baseline Qt. Not a candidate for sotalol. Given that pt has had ongoing intermittent atrial tach and does not tolerate it, will try amio in place of cardizem. Pt had no further episodes on tele. Pt will need monitoring of PFTs, TFTs, and LFTs while on Amio.   Pt was continued on home dose of IV Remodulin. We attempted to wean off Adempas, but with continued increasing oxygen requirements, restarted Adempas and this improved so she will continue on this for now. Also of note, pt has been taking bosentan TID for years, when this should be a BID dosed med. So we will have her change bosentan to BID dosing on d/c. Pt has f/u in PH clinic in 2 weeks. She will need INR on this Wed 3/7/18- decreased dose of coumadin since starting on Amio.

## 2018-03-05 NOTE — ASSESSMENT & PLAN NOTE
-Had a run of SVT (likley A Tach) prior to admit  -EP following- Very limited options in this patient: previously discussed tikosyn but would avoid given baseline Qt. Not a candidate for sotalol. Given that pt has had ongoing intermittent atrial tach and does not tolerate it-will try amio in place of cardizem  -Cont coumadin. INR goal 2-3

## 2018-03-05 NOTE — PROGRESS NOTES
Patient DCD per md order.  Given AVS with updated medication list, and up coming appointments.  Patients PIV DCD patient tolerated well.  Patient notified when to call the MD, and educated on coumadin administration.  Patient and patients  gave FULL verbal understanding on all wirtten and verbal DC instructions.  Patient transported down to parking garage with  at her side by RN.  NO acute distress noted at this time.

## 2018-03-05 NOTE — PROGRESS NOTES
[facility]  Heart Transplant  Progress Note    Patient Name: Molly Smith  MRN: [id number]  Admission Date: 2/28/2018  Hospital Length of Stay: 5 days  Attending Physician: No att. providers found  Primary Care Provider: Alice Tim MD  Principal Problem:Pulmonary hypertension    Subjective:     Interval History: Feeling much better this morning. Hoping to go home    Continuous Infusions:  Scheduled Meds:   amiodarone  400 mg Oral Daily    aspirin  325 mg Oral Daily    bosentan  125 mg Oral Q12H    cetirizine  10 mg Oral Daily    fluticasone  2 spray Each Nare Daily    furosemide  40 mg Oral BID    riociguat  0.5 mg Oral TID    spironolactone  25 mg Oral Daily    warfarin  10 mg Oral Daily     PRN Meds:ondansetron, sodium chloride 0.9%, traMADol, treprostinil (REMODULIN) infusion, veletri/remodulin cassette, veletri/remodulin tubing    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    Multaq [dronedarone]      Objective:     Vital Signs (Most Recent):  Temp: 97.6 °F (36.4 °C) (03/05/18 1115)  Pulse: 91 (03/05/18 1115)  Resp: 16 (03/05/18 1115)  BP: 105/60 (03/05/18 1115)  SpO2: (!) 88 % (03/05/18 1115) Vital Signs (24h Range):  Temp:  [97.6 °F (36.4 °C)-98.4 °F (36.9 °C)] 97.6 °F (36.4 °C)  Pulse:  [] 91  Resp:  [15-18] 16  SpO2:  [84 %-88 %] 88 %  BP: ()/(51-60) 105/60     Patient Vitals for the past 72 hrs (Last 3 readings):   Weight   03/03/18 0415 57.9 kg (127 lb 10.3 oz)     Body mass index is 22.61 kg/m².      Intake/Output Summary (Last 24 hours) at 03/05/18 1242  Last data filed at 03/05/18 0000   Gross per 24 hour   Intake              740 ml   Output              850 ml   Net             -110 ml       Hemodynamic Parameters:           Physical Exam    General: alert, awake and oriented x 3  Eyes:PERRL.   Neck:no JVD   Lungs:  clear to auscultation bilaterally and normal respiratory effort  Cardiovascular:  Heart: regular rate and rhythm, S1, S2 normal, no murmur, click, rub or gallop.   Chest Wall: no tenderness.   Extremities: no cyanosis or edema.   Pulses: 2+ and symmetric.  Abdomen/Rectal: Abdomen: soft, non-tender non-distented; bowel sounds normal      Significant Labs:  CBC:    Recent Labs  Lab 03/03/18 0353 03/04/18 0410 03/05/18  0432   WBC 4.12 4.01 3.78*   RBC 4.14 4.55 4.26   HGB 8.7* 9.8* 9.3*   HCT 30.3* 32.9* 30.8*    266 248   MCV 73* 72* 72*   MCH 21.0* 21.5* 21.8*   MCHC 28.7* 29.8* 30.2*     BNP:    Recent Labs  Lab 02/28/18 1710   *     CMP:    Recent Labs  Lab 02/28/18 1710 03/01/18  0546  03/03/18 0353 03/04/18  0409 03/05/18  0432   * 98  < > 89 89 87   CALCIUM 9.2 9.0  < > 8.2* 8.6* 8.4*   ALBUMIN 3.9 3.8  --   --   --   --    PROT 7.4 7.2  --   --   --   --     140  < > 139 137 138   K 3.7 3.8  < > 4.5 3.8 3.8   CO2 24 23  < > 21* 22* 23    108  < > 110 107 107   BUN 22 18  < > 22 19 21   CREATININE 1.2 1.0  < > 1.0 0.9 1.1   ALKPHOS 46* 41*  --   --   --   --    ALT 13 13  --   --   --   --    AST 21 29  --   --   --   --    BILITOT 0.8 0.9  --   --   --   --    < > = values in this interval not displayed.   Coagulation:     Recent Labs  Lab 02/28/18 1710 03/03/18 0353 03/04/18 0410 03/05/18  0432   INR 2.2*  < > 1.2 1.1 1.3*   APTT 27.6  --   --   --   --    < > = values in this interval not displayed.  LDH:  No results for input(s): LDH in the last 72 hours.  Microbiology:  Microbiology Results (last 7 days)     ** No results found for the last 168 hours. **              Assessment and Plan:     73 yo female with a PMHx of WHO I- PAH (currently on remodulin Iv, adempas, bosentan), PFO, chronic hypoxia on 3L at baseline previously, but on 5L NC since 01/2018. Today patient noticed increasing shortness of breath, states that her O2 sat on pulse oximeter was 50-70% while resting. Denies fever, cough, LE edema, flu like symptoms, syncope. States that  she had chest pressure associated with palpitations. Currently O2sat is 92% on 6L Nc, no chest pressure, denies palpitations. Has a rhythm strip from cardia, suggests Atrial fibrillation vs AT, currently sinus tachycardia with TWI  Anterolateral leads with STD, inferior leads STD, incomplete RBBB. Troponin elevated to 1.6. 's, CXR no edema.    EPS in the past was negative. Did not tolerate Multaq, on cardizem.    * Pulmonary hypertension    -Cont home dose of IV remodulin 89.2ng/kg/min  -Stopped Adempas on admit. Cont bosentan 125mg Q12h   -Cont home dose Lasix 40 BID today. Cont spironolactone  -Echo with normal LV function, RVE with depressed function          SVT (supraventricular tachycardia)    -Had a run of SVT (likley A Tach) prior to admit  -EP following- Very limited options in this patient: previously discussed tikosyn but would avoid given baseline Qt. Not a candidate for sotalol. Given that pt has had ongoing intermittent atrial tach and does not tolerate it-will try amio in place of cardizem  -Cont coumadin. INR goal 2-3        Elevated troponin    -Due to demand ischemia in setting of epsiode of A Tach        Hypoxia    -Chronic resp failure, on 5L O2 at home. Back on 5L this am             Shell Ocampo PA-C  Heart Transplant  [facility]

## 2018-03-05 NOTE — SUBJECTIVE & OBJECTIVE
Interval History: Feeling much better this morning. Hoping to go home    Continuous Infusions:  Scheduled Meds:   amiodarone  400 mg Oral Daily    aspirin  325 mg Oral Daily    bosentan  125 mg Oral Q12H    cetirizine  10 mg Oral Daily    fluticasone  2 spray Each Nare Daily    furosemide  40 mg Oral BID    riociguat  0.5 mg Oral TID    spironolactone  25 mg Oral Daily    warfarin  10 mg Oral Daily     PRN Meds:ondansetron, sodium chloride 0.9%, traMADol, treprostinil (REMODULIN) infusion, veletri/remodulin cassette, veletri/remodulin tubing    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    Multaq [dronedarone]      Objective:     Vital Signs (Most Recent):  Temp: 97.6 °F (36.4 °C) (03/05/18 1115)  Pulse: 91 (03/05/18 1115)  Resp: 16 (03/05/18 1115)  BP: 105/60 (03/05/18 1115)  SpO2: (!) 88 % (03/05/18 1115) Vital Signs (24h Range):  Temp:  [97.6 °F (36.4 °C)-98.4 °F (36.9 °C)] 97.6 °F (36.4 °C)  Pulse:  [] 91  Resp:  [15-18] 16  SpO2:  [84 %-88 %] 88 %  BP: ()/(51-60) 105/60     Patient Vitals for the past 72 hrs (Last 3 readings):   Weight   03/03/18 0415 57.9 kg (127 lb 10.3 oz)     Body mass index is 22.61 kg/m².      Intake/Output Summary (Last 24 hours) at 03/05/18 1242  Last data filed at 03/05/18 0000   Gross per 24 hour   Intake              740 ml   Output              850 ml   Net             -110 ml       Hemodynamic Parameters:           Physical Exam    General: alert, awake and oriented x 3  Eyes:PERRL.   Neck:no JVD   Lungs:  clear to auscultation bilaterally and normal respiratory effort  Cardiovascular: Heart: regular rate and rhythm, S1, S2 normal, no murmur, click, rub or gallop.   Chest Wall: no tenderness.   Extremities: no cyanosis or edema.   Pulses: 2+ and symmetric.  Abdomen/Rectal: Abdomen: soft, non-tender non-distented; bowel sounds normal      Significant Labs:  CBC:    Recent Labs  Lab 03/03/18  0353  03/04/18 0410 03/05/18 0432   WBC 4.12 4.01 3.78*   RBC 4.14 4.55 4.26   HGB 8.7* 9.8* 9.3*   HCT 30.3* 32.9* 30.8*    266 248   MCV 73* 72* 72*   MCH 21.0* 21.5* 21.8*   MCHC 28.7* 29.8* 30.2*     BNP:    Recent Labs  Lab 02/28/18 1710   *     CMP:    Recent Labs  Lab 02/28/18 1710 03/01/18  0546  03/03/18 0353 03/04/18  0409 03/05/18  0432   * 98  < > 89 89 87   CALCIUM 9.2 9.0  < > 8.2* 8.6* 8.4*   ALBUMIN 3.9 3.8  --   --   --   --    PROT 7.4 7.2  --   --   --   --     140  < > 139 137 138   K 3.7 3.8  < > 4.5 3.8 3.8   CO2 24 23  < > 21* 22* 23    108  < > 110 107 107   BUN 22 18  < > 22 19 21   CREATININE 1.2 1.0  < > 1.0 0.9 1.1   ALKPHOS 46* 41*  --   --   --   --    ALT 13 13  --   --   --   --    AST 21 29  --   --   --   --    BILITOT 0.8 0.9  --   --   --   --    < > = values in this interval not displayed.   Coagulation:     Recent Labs  Lab 02/28/18 1710 03/03/18 0353 03/04/18 0410 03/05/18 0432   INR 2.2*  < > 1.2 1.1 1.3*   APTT 27.6  --   --   --   --    < > = values in this interval not displayed.  LDH:  No results for input(s): LDH in the last 72 hours.  Microbiology:  Microbiology Results (last 7 days)     ** No results found for the last 168 hours. **

## 2018-03-05 NOTE — PROGRESS NOTES
D/C:    SW to pt's room for no needs D/C. Pt and  already gone. Pt seen by DANIEL Piña for D/C on Friday. No new needs identified over weekend. SW providing psychosocial and counseling support, education, assistance, resources, and D/C planning as indicated. SW remains available.

## 2018-03-05 NOTE — ASSESSMENT & PLAN NOTE
-Cont home dose of IV remodulin 89.2ng/kg/min  -Stopped Adempas on admit. Cont bosentan 125mg Q12h   -Cont home dose Lasix 40 BID today. Cont spironolactone  -Echo with normal LV function, RVE with depressed function

## 2018-03-06 NOTE — PHYSICIAN QUERY
PT Name: Molly Smith  MR #: [id number]  Physician Query Form - Renal Clarification     CDS/: Carol Fisher               Contact information: [email or web address]     This form is a permanent document in the medical record.     QueryDate: March 6, 2018    By submitting this query, we are merely seeking further clarification of documentation. Please utilize your independent clinical judgment when addressing the question(s) below.    The Medical record contains the following:   Indicator Supporting Clinical Findings Location in Medical Record    Kidney (Renal) Insufficiency      Kidney (Renal) Failure / Injury     X Nephrotoxic Agents Lasix 40mg IV x one dose   Lasix 40mg po bid   Lasix 40mg po bid    MAR 2/28  MAR 2/28  MAR 3/2-3/5   X BUN/Creatinine GFR CR 1.2-0.9  BUN 22-21  GFR 45.6- 50.6 Labs 2/28-3/5    Urine: Casts         Eosinophils      Dehydration      Nausea/Vomiting      Dialysis/CRRT     X Treatment: 0.9% NaCl @100cc/hr  MAR 3/1    Other:          Provider, please specify the diagnosis or diagnoses associated with above clinical findings.    [ ] Acute Kidney Failure/Injury with Acute Cortical Necrosis  [ ] Acute Kidney Failure/Injury with Medullary Necrosis  [ ] Acute Kidney Failure/Injury with Tubular Necrosis  [ ] Other Acute Kidney Failure/Injury (please specify): ____________  [ ] Unspecified Acute Kidney Failure/Injury  [ ] Acute Nephritic Syndrome  [ ] Acute Renal Insufficiency  [ ] Acute on Chronic Renal Insufficiency (please specify stage*): _____________  [ ] Acute on Chronic Renal Failure (please specify stage*): _____________  [ ] Chronic Renal Insufficiency (please specify stage*): _____________  [ x] Chronic Kidney Disease (CKD) (please specify stage* below):      *National Kidney Foundation Definitions:   Stage Description      eGFR (mL/min)   [ ] I  Slight kidney damage with normal or increased filtration  90+   [ ] II  Mildly reduced kidney function     60-89   [x ]  III  Moderately reduced kidney function    30-59   [ ] IV  Severly reduced kidney function     15-29   [ ] V  Kidney failure, requiring transplant or dialysis   < 15      [ ] CKD on Chronic Hemodialysis (please specify stage*): _____________  [ ] End Stage Renal Disease (ESRD)  [ ] Other (please specify): _______________________________  [ ] Clinically Undetermined    Please document in your progress notes daily for the duration of treatment, until resolved, and include in your discharge summary.

## 2018-03-06 NOTE — PHYSICIAN QUERY
"PT Name: Molly Smith  MR #: [id number]    Physician Query Form - Heart  Condition Clarification     CDS/: Carol Fisher               Contact information: [email or web address]   This form is a permanent document in the medical record.     Query Date: March 6, 2018    By submitting this query, we are merely seeking further clarification of documentation. Please utilize your independent clinical judgment when addressing the question(s) below.    The medical record contains the following   Indicators     Supporting Clinical Findings Location in Medical Record   X BNP    Lab 2/28   X EF EF 55   2D Echo 3/1   X Radiology findings The cardiac silhouette is enlarged    CXR 2/28   X Echo Results  1 - Normal left ventricular systolic function (EF 55-60%).     2 - Indeterminate LV diastolic function.     3 - Right atrial enlargement.     4 - Right ventricular enlargement with severely depressed systolic function.     5 - Trivial mitral regurgitation.     6 - Moderate tricuspid regurgitation.     7 - No wall motion abnormalities.     8 - Pulmonary hypertension. The estimated PA systolic pressure is greater than 77 mmHg.  2D Echo 3/1    "Ascites" documented     X "SOB" or "UNGER" documented Admitted with increased SOB and O2 requirements    H&P 2/28   X "Hypoxia" documented , chronic hypoxia on 3L at baseline previously, but on 5L NC since 01/2018. H&P 2/28    Heart Failure documented      "Edema" documented     X Diuretics/Meds Lasix 40mg IV x one dose   Lasix 40mg po bid   Lasix 40mg po bid  MAR 2/28  MAR 2/28  MAR 3/2-3/5    Treatment:      Other:          Provider, please specify diagnosis or diagnoses associated with above clinical findings.                               [  ] Acute Diastolic Heart Failure ( EF > 40)*    [  ] Acute on Chronic Diastolic Heart Failure( EF > 40)*    [  ] Chronic Diastolic Heart Failure (EF > 40)*    [  x] Other Type of Heart Failure (please specify type): __acute " chronic RVF_______________________    [  ] Heart Failure Ruled Out    [  ] Clinically Undetermined            *American Heart Association                                                                                                          Please document in your progress notes daily for the duration of treatment until resolved and include in your discharge summary.

## 2018-03-06 NOTE — PHYSICIAN QUERY
PT Name: Molly Smith  MR #: [id number]     Physician Query Form - Diagnosis Clarification      CDS/: Carol Fisher               Contact information: [email or web address]     This form is a permanent document in the medical record.     Query Date: March 6, 2018    By submitting this query, we are merely seeking further clarification of documentation.  Please utilize your independent clinical judgment when addressing the question(s) below.     The medical record contains the following:      Findings Supporting Clinical Information Location in Medical Record   NSTEMI          Admitted with increased SOB and O2 requirements in setting of prolonged episode of a tach with associated chest pressure-  Trop + and with nonspecific ST changes on ECG ( I viewed tracings)     -interventional c/s for NSTEMI/LHC appreciated- LHC normal DC Summary     Transplant Heart PN 3/4          Transplant Heart PN 3/4     Please clarify if the __NSTEMI_______ diagnosis has been:    [  ] Ruled In  [  ] Ruled In, Now Resolved  [ x ] Ruled Out  [  ] Clinically insignificant  [  ] Clinically undetermined  [  ] Other/Clarification of findings (please specify)_______________________________    Please document in your progress notes daily for the duration of treatment, until resolved, and include in your discharge summary.

## 2018-03-06 NOTE — PHYSICIAN QUERY
PT Name: Molly Smith  MR #: [id number]     Physician Query Form - Documentation Clarification      CDS/: Carol Fisher               Contact information: [email or web address]   This form is a permanent document in the medical record.     Query Date: March 6, 2018    By submitting this query, we are merely seeking further clarification of documentation. Please utilize your independent clinical judgment when addressing the question(s) below.    The Medical record reflects the following:    Supporting Clinical Findings Location in Medical Record   Potassium 3.4-3.8    Potassium chloride CR 20meq po x one dose   Potassium chloride CR 20meq po x one dose   Potassium chloride CR 60meq po x one dose  Lab 3/2-3/5    MAR 3/1  MAR 3/5  MAR 3/2                                                                                Doctor, Please specify diagnosis or diagnoses associated with above clinical findings.    Provider Use Only    ( x ) Hypokalemia     (  ) Other_________________                                                                                                         [  ] Clinically undetermined

## 2018-03-07 ENCOUNTER — PATIENT OUTREACH (OUTPATIENT)
Dept: ADMINISTRATIVE | Facility: CLINIC | Age: 73
End: 2018-03-07

## 2018-03-07 NOTE — PATIENT INSTRUCTIONS
Discharge Instructions for Catheter Ablation  You have had a procedure called catheter ablation. It was used to treat an abnormal heartbeat (arrhythmia). This procedure destroyed (ablated) the cells in your heart that were causing your heart rhythm problem. During the procedure, the healthcare provider put a thin, flexible wire (catheter) into a blood vessel in your upper thigh. The provider then threaded it up to your heart.  Home care  Here are recommendations for care at home:   · You won't be able to drive yourself home because you had medicine to relax you (sedation) You will need to make arrangements for a ride. Your healthcare provider may tell you not to drive for 24 hours after the procedure.  · You should be able to go back to your normal daily activities in the next 1 to 2 days. These include walking, climbing stairs, and doing household chores.  · Don't do any heavy physical activity for several days after the procedure. This will allow your body to heal.  · Ask your healthcare provider when you can return to work.  · Take your temperature and check your incision for signs of infection every day for a week. Signs of infection include redness, swelling, drainage, or warmth at the incision site. It is normal to have a small bruise or lump where the catheter was inserted.  · Take your medicines exactly as directed. Dont skip doses. You may need to make some changes in your medicines because of the ablation procedure. Be sure to go over your medicine instructions with your healthcare provider before you are discharged.  · Learn to take your own pulse. Keep a record of your results. Ask your healthcare provider which readings mean that you need medical attention.  · Don't lift heavy objects for a period of time after your ablation. Ask your healthcare provider for specific advice.  Follow-up care  Make a follow-up appointment as directed by your healthcare provider. Your provider will check how your  incision site is healing. In many cases, one ablation is enough to treat an arrhythmia. But sometimes the problem comes back or another is found. If this happens, you may need a second procedure.  When to call your healthcare provider  Call your healthcare provider right away if you have any of the following:  · Redness, pain, swelling, bleeding, or drainage from your incision  · Chest pain, shortness of breath, or dizziness  · Temperature of 100.4°F (38.0°C) or higher, or as directed by your healthcare provider   · Sudden coldness, pain, or numbness in the leg or arm with the insertion site  · Nausea or vomiting  Note: Ask your healthcare provider what to expect about your heartbeat. Sometimes the irregularity goes away right after the procedure. Other times it may take longer to go away.   Date Last Reviewed: 10/1/2016  © 6414-3390 [employer]. [address] [place] [address]. All rights reserved. This information is not intended as a substitute for professional medical care. Always follow your healthcare professional's instructions.

## 2018-03-09 ENCOUNTER — TELEPHONE (OUTPATIENT)
Dept: TRANSPLANT | Facility: CLINIC | Age: 73
End: 2018-03-09

## 2018-03-09 NOTE — TELEPHONE ENCOUNTER
Approval received for Adempas, 90 tabs for 30 days, good through 12/31/2018. Notification sent to Damaris at [facility], per request.

## 2018-03-12 RX ORDER — TRAMADOL HYDROCHLORIDE 200 MG/1
TABLET, EXTENDED RELEASE ORAL
Qty: 30 TABLET | Refills: 0 | Status: SHIPPED | OUTPATIENT
Start: 2018-03-12 | End: 2018-04-18 | Stop reason: SDUPTHER

## 2018-03-23 ENCOUNTER — HOSPITAL ENCOUNTER (OUTPATIENT)
Dept: CARDIOLOGY | Facility: CLINIC | Age: 73
Discharge: HOME | End: 2018-03-23
Attending: INTERNAL MEDICINE
Payer: MEDICARE

## 2018-03-23 ENCOUNTER — HOSPITAL ENCOUNTER (OUTPATIENT)
Dept: PULMONOLOGY | Facility: CLINIC | Age: 73
Discharge: HOME | End: 2018-03-23
Payer: MEDICARE

## 2018-03-23 ENCOUNTER — OFFICE VISIT (OUTPATIENT)
Dept: TRANSPLANT | Facility: CLINIC | Age: 73
End: 2018-03-23
Payer: MEDICARE

## 2018-03-23 VITALS
WEIGHT: 133.38 LBS | BODY MASS INDEX: 23.63 KG/M2 | WEIGHT: 128 LBS | DIASTOLIC BLOOD PRESSURE: 52 MMHG | OXYGEN SATURATION: 88 % | HEIGHT: 63 IN | BODY MASS INDEX: 22.68 KG/M2 | HEART RATE: 85 BPM | HEIGHT: 63 IN | SYSTOLIC BLOOD PRESSURE: 102 MMHG

## 2018-03-23 DIAGNOSIS — I36.9 NONRHEUMATIC TRICUSPID VALVE DISORDER: ICD-10-CM

## 2018-03-23 DIAGNOSIS — Q21.16 SINUS VENOSUS DEFECT: ICD-10-CM

## 2018-03-23 DIAGNOSIS — I47.10 SVT (SUPRAVENTRICULAR TACHYCARDIA): ICD-10-CM

## 2018-03-23 DIAGNOSIS — I27.20 PULMONARY HYPERTENSION: ICD-10-CM

## 2018-03-23 DIAGNOSIS — Q26.3 PARTIAL ANOMALOUS PULMONARY VENOUS CONNECTION (PAPVC): ICD-10-CM

## 2018-03-23 DIAGNOSIS — I27.21 PAH (PULMONARY ARTERY HYPERTENSION): ICD-10-CM

## 2018-03-23 DIAGNOSIS — Z79.01 ANTICOAGULANT LONG-TERM USE: Chronic | ICD-10-CM

## 2018-03-23 DIAGNOSIS — J96.11 CHRONIC RESPIRATORY FAILURE WITH HYPOXIA: ICD-10-CM

## 2018-03-23 DIAGNOSIS — I27.20 PULMONARY HYPERTENSION: Primary | ICD-10-CM

## 2018-03-23 LAB
DIASTOLIC DYSFUNCTION: YES
ESTIMATED PA SYSTOLIC PRESSURE: 89.72
GLOBAL PERICARDIAL EFFUSION: ABNORMAL
MITRAL VALVE MOBILITY: NORMAL
RETIRED EF AND QEF - SEE NOTES: 60 (ref 55–65)
TRICUSPID VALVE REGURGITATION: ABNORMAL

## 2018-03-23 PROCEDURE — 94618 PULMONARY STRESS TESTING: CPT | Mod: 26,S$PBB,, | Performed by: INTERNAL MEDICINE

## 2018-03-23 PROCEDURE — 99214 OFFICE O/P EST MOD 30 MIN: CPT | Mod: S$PBB,,, | Performed by: INTERNAL MEDICINE

## 2018-03-23 PROCEDURE — 99213 OFFICE O/P EST LOW 20 MIN: CPT | Mod: PBBFAC,25 | Performed by: INTERNAL MEDICINE

## 2018-03-23 PROCEDURE — 99999 PR PBB SHADOW E&M-EST. PATIENT-LVL III: CPT | Mod: PBBFAC,,, | Performed by: INTERNAL MEDICINE

## 2018-03-23 PROCEDURE — 93306 TTE W/DOPPLER COMPLETE: CPT | Mod: PBBFAC | Performed by: INTERNAL MEDICINE

## 2018-03-23 PROCEDURE — 94618 PULMONARY STRESS TESTING: CPT | Mod: PBBFAC | Performed by: INTERNAL MEDICINE

## 2018-03-23 RX ORDER — AMIODARONE HYDROCHLORIDE 200 MG/1
200 TABLET ORAL DAILY
Qty: 30 TABLET | Refills: 11 | Status: SHIPPED | OUTPATIENT
Start: 2018-03-23 | End: 2019-04-15 | Stop reason: SDUPTHER

## 2018-03-23 NOTE — PROGRESS NOTES
Subjective:      HPI  Delightful 72 y.o. 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 She also has h/o Rosemonas bacteremia s/p central line removal, and IV abx per ID with replacement by nephrology.  Pt was admitted 10/4-10/9 with worsening fatigue, diarrhea since the morning of 10/4/17, nausea and low grade temperature at home (100.4 being the highest). Also had a single event of hypoxia (SpO2 50s) that resolved without intervention. During that hospitalization she was placed on 100% FiO2 via HFNC on admit secondary to hypoxia. Blood cultures were drawn and remodulin was moved to peripheral line. Blood cultures were drawn from [facility] as well. ID was consulted. He symptoms improved gradually throughout hospitalization and she was eventually weaned back to low flow NC. Cultures remained negative and remodulin was moved back to central access. They attributed fevers/hypoxia to possible viral infection. She ws eventually weaned down to 4L NC and discharged home in good condition. She was then admitted at [facility] 11/29/17 with hypotension w severe vomiting, fever 101.9, chills and diarrhea- she was over hydrated in the ER, GI w/u was (-), had recently increased her remodulin- was discharged in time to go to her son's wedding-     since last visit pt was admitted 2/28-3/5 with  increased SOB and O2 requirements in setting of prolonged episode of A tach with associated neck pressure/pain. Troponin was positive and EKG had nonspecific ST changes. Pt was loaded with ASA and Plavix and started on Heparin bridge. Interventional Cards consulted for NSTEMI/LHC, but LHC 3/1/18 showed normal coronary arteries.  EP was consulted  for A Tach. There are very limited options in this patient-  previously discussed tikosyn but would avoid given baseline Qt. Not a candidate for sotalol. Given that pt has had ongoing intermittent atrial tach and does not tolerate it, will try amio in place of cardizem. Pt had no further episodes on tele. Pt will need monitoring of PFTs, TFTs, and LFTs while on Amio.   Pt was continued on home dose of IV Remodulin. We attempted to wean off Adempas, but with continued increasing oxygen requirements, restarted Adempas and this improved so she will continue on this for now. Also of note, pt has been taking bosentan TID for years, when this should be a BID dosed med. So we will have her change bosentan to BID dosing on d/c.    Since dc home, pt says yest was a BAD day- took her amio yest am (400mg) and her o2 level was really low (55-65% most of the day) and she felt miserable- couldn't take two steps without feeling like she was going to pass out- considered coming into the hosp, by evening she felt better- this am she hasnt taken it and feels better- her pulse yest was in the 60-80 range and has not had any more runs of A tach since she started it  Had been sleeping in the lounge chair as she cant lay flat, but has not had any swelling and her wt has been 125-130# (bounces up and down a 2-3 # over the week)      Remodulin is at 78 ng/kg/min and on Tracleer, adempas 0.5mg tid      6MWT: 195m (236m Abdiel, 225m, 283m May, 222m Mar, 375m Nov, 305 Sept, 274.5 July,  335.5May, 365  m prev 2 visits)                        O2 sat  88->76% 8L pull tank                                                          HR 82-->103                           BP  96/ 52  ->107/53                                                      Roberto 0 ->9    TTE today    1 - Normal left ventricular systolic function (EF 60-65%).     2 - No wall motion abnormalities.     3 - Biatrial enlargement.     4 - Impaired LV relaxation, elevated LAP (grade 2  diastolic dysfunction).     5 - Right ventricular enlargement with hypertrophy, with moderately depressed systolic function.     6 - Pulmonary hypertension. The estimated PA systolic pressure is 90 mmHg.     7 - Small pericardial effusion.     8 - Intermediate central venous pressure.     TTE 3/1/18    1 - Normal left ventricular systolic function (EF 55-60%).     2 - Indeterminate LV diastolic function.     3 - Right atrial enlargement.     4 - Right ventricular enlargement with severely depressed systolic function.     5 - Trivial mitral regurgitation.     6 - Moderate tricuspid regurgitation.     7 - No wall motion abnormalities.     8 - Pulmonary hypertension. The estimated PA systolic pressure is greater than 77 mmHg.     TTE 5/8/17:    1 - Right ventricular enlargement with hypertrophy, with severely depressed systolic function.     2 - Moderate to severe tricuspid regurgitation.     3 - Pulmonary hypertension. The estimated PA systolic pressure is 116 mmHg.     4 - Biatrial enlargement.     5 - Normal left ventricular systolic function (EF 60-65%); reduced LV stroke volume.  The right ventricle is enlarged measuring 5.8 cm at the base in the apical right ventricle-focused view. There is RVH. Global right ventricular systolic function appears severely depressed. There is abnormal septal motion consistent with   RV pressure/volume overload. Tricuspid annular plane systolic excursion (TAPSE) is 1.5 cm. The estimated PA systolic pressure is 116 mmHg.     [facility] 9/25/17:  CONDITION 1 (9/25/2017 11:20:07):  RA: 5/2 (1)  RV: 84/-5  RVEDP: 6     PW: 17/19 (16)  PA: 87/21 (47)  AO_SAT: 95  AO: 115/71 (86)  PA_SAT: 71  SPO2: 97  FICKCI: 3.2800  FICKCO: 5.5500    Review of Systems   Constitution: Negative for chills, fever, malaise/fatigue, weight gain and weight loss.   HENT: Negative.    Eyes: Negative.    Cardiovascular: Positive for dyspnea on exertion and palpitations. Negative for chest pain, leg swelling,  "near-syncope, orthopnea, paroxysmal nocturnal dyspnea and syncope.   Respiratory: Positive for cough. Negative for shortness of breath.    Endocrine: Negative.    Skin: Negative.    Musculoskeletal: Positive for arthritis and joint pain. Negative for back pain.   Gastrointestinal: Negative for bloating, abdominal pain, change in bowel habit and nausea.   Neurological: Negative for dizziness and light-headedness.   Psychiatric/Behavioral: Negative for depression.        Objective:   BP (!) 102/52   Pulse 85   Ht 5' 3" (1.6 m)   Wt 60.5 kg (133 lb 6.1 oz)   SpO2 (!) 88% Comment: Pt on 6L of O2 at time of reading  BMI 23.63 kg/m²       Physical Exam   Constitutional: She is oriented to person, place, and time. She appears well-developed and well-nourished.   HENT:   Head: Normocephalic and atraumatic.   Eyes: Right eye exhibits no discharge. Left eye exhibits no discharge.   Neck: Neck supple. No JVD present. No thyromegaly present.   Cardiovascular: Regular rhythm.  Exam reveals no gallop and no friction rub.    No murmur heard.  Tachy, accentuated S2     Pulmonary/Chest: Effort normal and breath sounds normal. No respiratory distress. She has no wheezes. She has no rales.   few rhonchi   Abdominal: Soft. Bowel sounds are normal. She exhibits no distension. There is no tenderness.   Musculoskeletal: Normal range of motion. She exhibits edema. She exhibits no tenderness.   Mild edema in the left ankle (always more swollen,broke it years ago)   Neurological: She is alert and oriented to person, place, and time. No cranial nerve deficit. Coordination normal.   Skin: Skin is warm and dry. No rash noted.   Psychiatric: She has a normal mood and affect. Judgment and thought content normal.                   Chemistry        Component Value Date/Time     03/05/2018 0432    K 3.8 03/05/2018 0432     03/05/2018 0432    CO2 23 03/05/2018 0432    BUN 21 03/05/2018 0432    CREATININE 1.1 03/05/2018 0432    GLU 87 " 03/05/2018 0432        Component Value Date/Time    CALCIUM 8.4 (L) 03/05/2018 0432    ALKPHOS 41 (L) 03/01/2018 0546    AST 29 03/01/2018 0546    ALT 13 03/01/2018 0546    BILITOT 0.9 03/01/2018 0546            Magnesium   Date Value Ref Range Status   03/05/2018 2.0 1.6 - 2.6 mg/dL Final       Lab Results   Component Value Date    WBC 4.42 03/23/2018    HGB 9.0 (L) 03/23/2018    HCT 31.8 (L) 03/23/2018    MCV 74 (L) 03/23/2018     03/23/2018         BNP   Date Value Ref Range Status   02/28/2018 350 (H) 0 - 99 pg/mL Final     Comment:     Values of less than 100 pg/ml are consistent with non-CHF populations.   01/12/2018 410 (H) 0 - 99 pg/mL Final     Comment:     Values of less than 100 pg/ml are consistent with non-CHF populations.   12/05/2017 320 (H) 0 - 99 pg/mL Final     Comment:     Values of less than 100 pg/ml are consistent with non-CHF populations.                 Assessment:       1. Pulmonary hypertension- WHO Group 1,6mw distance slightly worse again today after having symptomatic relative bradycardiac yet (has been on amio load).   BNP  pending FC III-IV on triple tx- echo has me worried as RV is enlarged and severely depressed, and now with small pericardial effusion   2. PFO (patent foramen ovale)    3. Palpitations/tachycardia- stale, followed by EP, on amio now  4. Chronic hypoxemic resp failure- stable on O2  5. PAPVR,sinus venosus ASD- decision made to treat conservatively     Plan:    - reduce amio to 200mg daily  - Keep salt intake to under 2000 mg sodium, fluids to under 2 L (64 oz)  - will see what her next 6mw looks like, if not at her baseline next visit will plan to repeat RHC with express purpose of deciding whether to titrate up her remodulin farther    Check weights every morning after getting out of bed and urinating. If  weight goes up 3# overnight or 5# in one week she should take 40 mg lasix in the pm and call us if fluid doesn't come off.    F/u 4-6 wk with labs and walk

## 2018-03-23 NOTE — PROCEDURES
Molly Smith is a 72 y.o.  female patient, who presents for a 6 minute walk test ordered by Meeta NUÑEZ.  The diagnosis is Pulmonary Hypertension.  The patient's BMI is 22.7 kg/m2.  Predicted distance (lower limit of normal) is 322.42 meters.      Test Results:    The test was completed without stopping.   The total time walked was 360 seconds.  During walking, the patient reported:  Chest pain, Leg pain, Dyspnea, Lightheadedness. The patient used no assistive devices and supplemental oxygen during testing.     03/23/2018---------Distance: 26.82 meters (88 feet)     O2 Sat % Supplemental Oxygen Heart Rate Blood Pressure Roberto Scale   Pre-exercise  (Resting) 88 % Other (see comments) (8lpm NC) 82 bpm 96/52 mmHg 0   During Exercise 76 % Other (see comments) (8lpm NC) 103 bpm 107/53 mmHg 9   Post-exercise  (Recovery) 89 % Other (see comments) (8lpm NC)  90 bpm   mmHg 0     Recovery Time: 103 seconds    Performing nurse/tech: Jose PALOMARES      PREVIOUS STUDY:   The patient had a previous study.  01/12/2018---------Distance: 236.22 meters (775 feet)       O2 Sat % Supplemental Oxygen Heart Rate Blood Pressure Roberto Scale   Pre-exercise  (Resting) 88 % 8 L/M 98 bpm 122/69 mmHg 0   During Exercise 77 % 8 L/M 118 bpm 122/58 mmHg 4   Post-exercise  (Recovery) 88 % 8 L/M  102 bpm             CLINICAL INTERPRETATION:  Six minute walk distance is 26.82 meters (88 feet) with very, very heavy dyspnea.  During exercise, there was significant desaturation while breathing supplemental oxygen.  Both blood pressure and heart rate remained stable with walking.  The patient reported non-pulmonary symptoms during exercise.  Severe exercise impairment is likely due to desaturation and subjective symptoms.  Since the previous study in January 2018, exercise capacity is significantly worse.  Based upon age and body mass index, exercise capacity is less than predicted.

## 2018-03-23 NOTE — PATIENT INSTRUCTIONS
Reduce amiodarone to 200mg daily    Keep salt intake to under 2000 mg sodium, fluids to under 2 L (64 oz)    Check your weights every morning after getting out of bed and urinating. If your weight goes up 3# overnight or 5# in one week call Albertina

## 2018-03-26 ENCOUNTER — TELEPHONE (OUTPATIENT)
Dept: TRANSPLANT | Facility: CLINIC | Age: 73
End: 2018-03-26

## 2018-03-27 ENCOUNTER — TELEPHONE (OUTPATIENT)
Dept: TRANSPLANT | Facility: CLINIC | Age: 73
End: 2018-03-27

## 2018-03-27 NOTE — TELEPHONE ENCOUNTER
email correspondence with patient:    Hi-I will talk w/Dr Tim. You would probably need to go to sleep medicine for a consult, so they could evaluate you for this and also order it.  Thank you,  Albertina     -----Original Message-----  From: Abdiel Gilbert [[email or web address]]   Sent: Tuesday, March 27, 2018 12:08 PM  To: Albertina Diallo <[email or web address]>  Subject:     THIS EMAIL IS FROM AN EXTERNAL SENDER! DO NOT click links or provide your User ID or Password if the sender is unknown.    Vasile Sheets been having to increase my liter flow to 6 & 7 particularly at night. Of course that dries my nose totally & it starts to hurt. [facility] said they could work with me maybe with bi-pap. Is this something that could be suitable for me?    Sent from my iPhone

## 2018-03-27 NOTE — PHYSICIAN QUERY
PT Name: Molly Smith  MR #: [id number]    Physician Query Form - Consultant Diagnosis Clarification     CDS/: Carol Fisher               Contact information: [email or web address]   This form is a permanent document in the medical record.     Query Date: March 27, 2018      By submitting this query, we are merely seeking further clarification of documentation.  Please utilize your independent clinical judgment when addressing the question(s) below.      The Medical record contains the following:   Diagnosis Supporting Clinical Information Location in Medical Record   here with acute on chronic hypoxic respiratory failure       Admitted with increased SOB and O2 requirements     chronic hypoxia on 3L at baseline previously    here with acute on chronic respiratory insufficiency    Hypoxia.    Current O2 is set at 6l/min    pt placed on continuous pulse ox with O2 sats noted at 88% on 6L/min.  Arrhythmia Consult 3/1      H&P 2/28    H&P 2/28    H&P 2/28    H&P 2/28    RN ED Triage Note 2/28    ED Note 2/28@458           Do you agree with the Consultants diagnosis of cute on chronic hypoxic respiratory failure__?                 [x   ]   Yes               [   ]   Yes, but it resolved prior to my assessment of the patient               [   ]   No                [   ]   Clinically insignificant               [   ]   Clinically undetermined               [   ]   Other/Clarification of findings: ___________________________________________

## 2018-03-28 DIAGNOSIS — I27.9 CHRONIC PULMONARY HEART DISEASE: Primary | ICD-10-CM

## 2018-04-12 RX ORDER — BOSENTAN 125 MG/1
TABLET, FILM COATED ORAL 2 TIMES DAILY
COMMUNITY
End: 2019-07-09 | Stop reason: ALTCHOICE

## 2018-04-18 RX ORDER — TRAMADOL HYDROCHLORIDE 200 MG/1
TABLET, EXTENDED RELEASE ORAL
Qty: 30 TABLET | Refills: 0 | Status: SHIPPED | OUTPATIENT
Start: 2018-04-18 | End: 2018-05-22 | Stop reason: SDUPTHER

## 2018-05-04 ENCOUNTER — OFFICE VISIT (OUTPATIENT)
Dept: TRANSPLANT | Facility: CLINIC | Age: 73
End: 2018-05-04
Payer: MEDICARE

## 2018-05-04 ENCOUNTER — HOSPITAL ENCOUNTER (OUTPATIENT)
Dept: PULMONOLOGY | Facility: CLINIC | Age: 73
Discharge: HOME | End: 2018-05-04
Payer: MEDICARE

## 2018-05-04 VITALS
HEART RATE: 90 BPM | OXYGEN SATURATION: 84 % | BODY MASS INDEX: 22.5 KG/M2 | SYSTOLIC BLOOD PRESSURE: 97 MMHG | HEIGHT: 63 IN | WEIGHT: 127 LBS | DIASTOLIC BLOOD PRESSURE: 48 MMHG | HEIGHT: 63 IN

## 2018-05-04 DIAGNOSIS — R06.02 SHORTNESS OF BREATH: ICD-10-CM

## 2018-05-04 DIAGNOSIS — Q26.3 PARTIAL ANOMALOUS PULMONARY VENOUS CONNECTION (PAPVC): ICD-10-CM

## 2018-05-04 DIAGNOSIS — I27.20 PULMONARY HYPERTENSION: ICD-10-CM

## 2018-05-04 DIAGNOSIS — J96.11 CHRONIC RESPIRATORY FAILURE WITH HYPOXIA: ICD-10-CM

## 2018-05-04 DIAGNOSIS — I48.3 TYPICAL ATRIAL FLUTTER: ICD-10-CM

## 2018-05-04 DIAGNOSIS — Q24.9 ADULT CONGENITAL HEART DISEASE: ICD-10-CM

## 2018-05-04 DIAGNOSIS — Q21.16 SINUS VENOSUS DEFECT: ICD-10-CM

## 2018-05-04 DIAGNOSIS — I27.20 PULMONARY HYPERTENSION: Primary | ICD-10-CM

## 2018-05-04 DIAGNOSIS — I47.10 SVT (SUPRAVENTRICULAR TACHYCARDIA): ICD-10-CM

## 2018-05-04 DIAGNOSIS — R00.2 PALPITATIONS: ICD-10-CM

## 2018-05-04 DIAGNOSIS — Z79.01 ANTICOAGULANT LONG-TERM USE: Chronic | ICD-10-CM

## 2018-05-04 PROCEDURE — 99999 PR PBB SHADOW E&M-EST. PATIENT-LVL III: CPT | Mod: PBBFAC,,, | Performed by: INTERNAL MEDICINE

## 2018-05-04 PROCEDURE — 94618 PULMONARY STRESS TESTING: CPT | Mod: 26,S$PBB,, | Performed by: INTERNAL MEDICINE

## 2018-05-04 PROCEDURE — 99213 OFFICE O/P EST LOW 20 MIN: CPT | Mod: PBBFAC,25 | Performed by: INTERNAL MEDICINE

## 2018-05-04 PROCEDURE — 94618 PULMONARY STRESS TESTING: CPT | Mod: PBBFAC | Performed by: INTERNAL MEDICINE

## 2018-05-04 PROCEDURE — 99214 OFFICE O/P EST MOD 30 MIN: CPT | Mod: S$PBB,,, | Performed by: INTERNAL MEDICINE

## 2018-05-04 NOTE — PROCEDURES
Molly Smith is a 72 y.o.  female patient, who presents for a 6 minute walk test ordered by Alice iTm MD.  The diagnosis is Pulmonary Hypertension.  The patient's BMI is 22.5 kg/m2.  Predicted distance (lower limit of normal) is 323.67 meters.      Test Results:    The test was completed without stopping.  The total time walked was 360 seconds.  During walking, the patient reported:  Dyspnea, Leg pain, Lightheadedness.  The patient used a walker for assistance and supplemental oxygen during testing.     05/04/2018---------Distance: 231.04 meters (758 feet)     O2 Sat % Supplemental Oxygen Heart Rate Blood Pressure Roberto Scale   Pre-exercise  (Resting) 90 % 10 L/M 87 bpm 89/52 mmHg 0   During Exercise 78 % 10 L/M 108 bpm 110/53 mmHg 7-8   Post-exercise  (Recovery) 90 % 10 L/M  92 bpm       Recovery Time:  153 seconds    Performing nurse/tech:  MARIELLE Anderson.      PREVIOUS STUDY:   03/23/2018---------Distance: 26.82 meters (88 feet)       O2 Sat % Supplemental Oxygen Heart Rate Blood Pressure Roberto Scale   Pre-exercise  (Resting) 88 % Other (see comments) (8lpm NC) 82 bpm 96/52 mmHg 0   During Exercise 76 % Other (see comments) (8lpm NC) 103 bpm 107/53 mmHg 9   Post-exercise  (Recovery) 89 % Other (see comments) (8lpm NC)  90 bpm   mmHg 0       CLINICAL INTERPRETATION:  Six minute walk distance is 231.04 meters (758 feet) with very heavy dyspnea.  During exercise, there was significant desaturation while breathing supplemental oxygen.  Blood pressure remained stable and Heart rate increased significantly with walking.  Hypotension was present prior to exercise.  The patient reported non-pulmonary symptoms during exercise.  Significant exercise impairment is likely due to desaturation, cardiovascular causes and subjective symptoms.  The patient did complete the study, walking 360 seconds of the 360 second test.  Since the previous study in March 2018, exercise capacity is significantly improved.  Based  upon age and body mass index, exercise capacity is less than predicted.

## 2018-05-04 NOTE — PATIENT INSTRUCTIONS
I'll ask Albertina to send you a new dosing sheet that goes to 100 ng- maybe once or week try to go up by 1 ng- call us if your pressure drops or if  You get dizzy or if your heart starts to race again    Keep salt intake to under 2000 mg sodium, fluids to under 2 L (64 oz)    Check your weights every morning after getting out of bed and urinating. If your weight goes up 3# overnight or 5# in one week call us    Call us if you find yourself getting more short of breath, have more swelling or unexpected weight changes, fever, chills or problems with your line

## 2018-05-04 NOTE — PROGRESS NOTES
Subjective:      HPI  Delightful 72 y.o. 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 She also has h/o Rosemonas bacteremia s/p central line removal, and IV abx per ID with replacement by nephrology.  Pt was admitted 10/4-10/9 with worsening fatigue, diarrhea since the morning of 10/4/17, nausea and low grade temperature at home (100.4 being the highest). Also had a single event of hypoxia (SpO2 50s) that resolved without intervention. During that hospitalization she was placed on 100% FiO2 via HFNC on admit secondary to hypoxia. Blood cultures were drawn and remodulin was moved to peripheral line. Blood cultures were drawn from [facility] as well. ID was consulted. He symptoms improved gradually throughout hospitalization and she was eventually weaned back to low flow NC. Cultures remained negative and remodulin was moved back to central access. They attributed fevers/hypoxia to possible viral infection. She ws eventually weaned down to 4L NC and discharged home in good condition. She was then admitted at [facility] 11/29/17 with hypotension w severe vomiting, fever 101.9, chills and diarrhea- she was over hydrated in the ER, GI w/u was (-), had recently increased her remodulin- was discharged in time to go to her son's wedding-  pt was then admitted 2/28-3/5 with  increased SOB and O2 requirements in setting of prolonged episode of A tach with associated neck pressure/pain. Troponin was positive and EKG had nonspecific ST changes. Pt was loaded with ASA and Plavix and started on Heparin bridge. Interventional Cards consulted for NSTEMI/LHC, but LHC 3/1/18 showed normal coronary arteries.  EP was consulted for A Tach.  There are very limited options in this patient-  previously discussed tikosyn but would avoid given baseline Qt. Not a candidate for sotalol. Given that pt has had ongoing intermittent atrial tach and does not tolerate it, will try amio in place of cardizem. Pt had no further episodes on tele. Pt will need monitoring of PFTs, TFTs, and LFTs while on Amio.   Pt was continued on home dose of IV Remodulin. We attempted to wean off Adempas, but with continued increasing oxygen requirements, restarted Adempas and this improved so she will continue on this for now. Also of note, pt has been taking bosentan TID for years, when this should be a BID dosed med. So we will have her change bosentan to BID dosing on d/c.    Since last visit pt has been feeling better- does find her O2 levels are no better, though she feel comfortable with her sats in the 70's. Does not have much swelling now, no cp/palps/LH. Line is clean.      Remodulin is at 89.2 ng/kg/min (unchanged from Flowsheet recorded in march) and on Tracleer, adempas 0.5mg tid      6MWT: 231m (195m (236m Abdiel, 225m, 283m May, 222m Mar, 375m Nov, 305 Sept, 274.5 July,  335.5May, 365  m prev 2 visits)                        O2 sat  90->78% 8L pull tank                                                          HR 87-->108                           BP  89/ 52  ->110/53                                                      Roberto 0 ->7-8    TTE 3/23/18    1 - Normal left ventricular systolic function (EF 60-65%).     2 - No wall motion abnormalities.     3 - Biatrial enlargement.     4 - Impaired LV relaxation, elevated LAP (grade 2 diastolic dysfunction).     5 - Right ventricular enlargement with hypertrophy, with moderately depressed systolic function.     6 - Pulmonary hypertension. The estimated PA systolic pressure is 90 mmHg.     7 - Small pericardial effusion.     8 - Intermediate central venous pressure.     TTE 3/1/18    1 - Normal left ventricular systolic function  (EF 55-60%).     2 - Indeterminate LV diastolic function.     3 - Right atrial enlargement.     4 - Right ventricular enlargement with severely depressed systolic function.     5 - Trivial mitral regurgitation.     6 - Moderate tricuspid regurgitation.     7 - No wall motion abnormalities.     8 - Pulmonary hypertension. The estimated PA systolic pressure is greater than 77 mmHg.     TTE 5/8/17:    1 - Right ventricular enlargement with hypertrophy, with severely depressed systolic function.     2 - Moderate to severe tricuspid regurgitation.     3 - Pulmonary hypertension. The estimated PA systolic pressure is 116 mmHg.     4 - Biatrial enlargement.     5 - Normal left ventricular systolic function (EF 60-65%); reduced LV stroke volume.  The right ventricle is enlarged measuring 5.8 cm at the base in the apical right ventricle-focused view. There is RVH. Global right ventricular systolic function appears severely depressed. There is abnormal septal motion consistent with   RV pressure/volume overload. Tricuspid annular plane systolic excursion (TAPSE) is 1.5 cm. The estimated PA systolic pressure is 116 mmHg.     [facility] 9/25/17:  CONDITION 1 (9/25/2017 11:20:07):  RA: 5/2 (1)  RV: 84/-5  RVEDP: 6     PW: 17/19 (16)  PA: 87/21 (47)  AO_SAT: 95  AO: 115/71 (86)  PA_SAT: 71  SPO2: 97  FICKCI: 3.2800  FICKCO: 5.5500    Review of Systems   Constitution: Negative for chills, fever, malaise/fatigue, weight gain and weight loss.   HENT: Negative.    Eyes: Negative.    Cardiovascular: Positive for dyspnea on exertion and palpitations. Negative for chest pain, leg swelling, near-syncope, orthopnea, paroxysmal nocturnal dyspnea and syncope.   Respiratory: Positive for cough. Negative for shortness of breath.    Endocrine: Negative.    Skin: Negative.    Musculoskeletal: Positive for arthritis and joint pain. Negative for back pain.   Gastrointestinal: Negative for bloating, abdominal pain, change in bowel habit and nausea.  "  Neurological: Negative for dizziness and light-headedness.   Psychiatric/Behavioral: Negative for depression.        Objective:   BP (!) 97/48   Pulse 90   Ht 5' 3" (1.6 m)   SpO2 (!) 84% Comment: Pt on 5L of O2 at reading      Physical Exam   Constitutional: She is oriented to person, place, and time. She appears well-developed and well-nourished.   HENT:   Head: Normocephalic and atraumatic.   Eyes: Right eye exhibits no discharge. Left eye exhibits no discharge.   Neck: Neck supple. No JVD present. No thyromegaly present.   Cardiovascular: Regular rhythm.  Exam reveals no gallop and no friction rub.    No murmur heard.  Tachy, accentuated S2     Pulmonary/Chest: Effort normal and breath sounds normal. No respiratory distress. She has no wheezes. She has no rales.   few rhonchi   Abdominal: Soft. Bowel sounds are normal. She exhibits no distension. There is no tenderness.   Musculoskeletal: Normal range of motion. She exhibits edema. She exhibits no tenderness.   Mild edema in the left ankle (always more swollen,broke it years ago)   Neurological: She is alert and oriented to person, place, and time. No cranial nerve deficit. Coordination normal.   Skin: Skin is warm and dry. No rash noted.   Psychiatric: She has a normal mood and affect. Judgment and thought content normal.                     Chemistry        Component Value Date/Time     04/02/2018 1911    K 3.2 (L) 04/02/2018 1911    CL 98 04/02/2018 1911    CO2 31 (H) 04/02/2018 1911    BUN 20 (H) 04/02/2018 1911    CREATININE 1.20 04/02/2018 1911     (H) 04/02/2018 1911        Component Value Date/Time    CALCIUM 9.3 04/02/2018 1911    ALKPHOS 42 04/02/2018 1911    AST 30 04/02/2018 1911    ALT 21 04/02/2018 1911    BILITOT 0.9 04/02/2018 1911            Magnesium   Date Value Ref Range Status   03/05/2018 2.0 1.6 - 2.6 mg/dL Final       Lab Results   Component Value Date    WBC 4.90 04/02/2018    HGB 10.5 (L) 04/02/2018    HCT 33.1 (L) " 04/02/2018    MCV 71 (L) 04/02/2018     04/02/2018         BNP   Date Value Ref Range Status   03/23/2018 620 (H) 0 - 99 pg/mL Final     Comment:     Values of less than 100 pg/ml are consistent with non-CHF populations.   02/28/2018 350 (H) 0 - 99 pg/mL Final     Comment:     Values of less than 100 pg/ml are consistent with non-CHF populations.   01/12/2018 410 (H) 0 - 99 pg/mL Final     Comment:     Values of less than 100 pg/ml are consistent with non-CHF populations.                 Assessment:       1. Pulmonary hypertension- WHO Group 1,6mw distance not at goal, and sats remain in 70's- without a doubt, pt suffers from R to left shunting- ? If we might be able to reduce her PVR a little by increasing her remodulin and reduce some of the shunting-  BNP  Also higher today which is a little worrisome- FC III-IV on triple tx- echo has me worried as RV is enlarged and severely depressed, and now with small pericardial effusion   2. PFO (patent foramen ovale)    3. Palpitations/tachycardia- stale, followed by EP, on amio now  4. Chronic hypoxemic resp failure- stable on O2  5. PAPVR,sinus venosus ASD- decision made to treat conservatively     Plan:    -  Will send pt new dosing sheet that goes to 100 ng/kg/min- will have her increase once or twice a week as bp allows- if she tolerates this, will plan to push her higher.  -tramadol refilled 4/18/18  - Keep salt intake to under 2000 mg sodium, fluids to under 2 L (64 oz)    Check weights every morning after getting out of bed and urinating. If  weight goes up 3# overnight or 5# in one week she should take 40 mg lasix in the pm and call us if fluid doesn't come off.    F/u 8 wk with labs and walk

## 2018-05-07 ENCOUNTER — TELEPHONE (OUTPATIENT)
Dept: TRANSPLANT | Facility: CLINIC | Age: 73
End: 2018-05-07

## 2018-05-07 NOTE — TELEPHONE ENCOUNTER
----- Message from Alice Tim MD sent at 5/4/2018  4:16 PM CDT -----  Will need higher dosing sheet- will start with 100 ng and then maybe go up from there if we can

## 2018-05-22 RX ORDER — TRAMADOL HYDROCHLORIDE 200 MG/1
TABLET, EXTENDED RELEASE ORAL
Qty: 30 TABLET | Refills: 0 | Status: SHIPPED | OUTPATIENT
Start: 2018-05-22 | End: 2018-06-12 | Stop reason: SDUPTHER

## 2018-06-07 ENCOUNTER — TELEPHONE (OUTPATIENT)
Dept: TRANSPLANT | Facility: CLINIC | Age: 73
End: 2018-06-07

## 2018-06-07 NOTE — TELEPHONE ENCOUNTER
Email correspondence with patient--Dr Tim notified.     ----------    Tried increasing twice. Each time I had tremendous body pain so I went back down. Im still at original dosage  Sent from my iPhone    On Jun 6, 2018, at 2:52 PM, Albertina Ruffin <[email or web address]> wrote:  Hi Ms Smith-Checking in to see how you are doing. Are you making Remodulin increases ok?  Take care,  Albertina

## 2018-06-12 RX ORDER — TRAMADOL HYDROCHLORIDE 200 MG/1
200 TABLET, EXTENDED RELEASE ORAL DAILY
Qty: 30 TABLET | Refills: 5
Start: 2018-06-12 | End: 2019-01-03 | Stop reason: SDUPTHER

## 2018-07-31 PROBLEM — M61.00: Status: ACTIVE | Noted: 2018-07-31

## 2018-08-03 ENCOUNTER — HOSPITAL ENCOUNTER (OUTPATIENT)
Dept: PULMONOLOGY | Facility: CLINIC | Age: 73
Discharge: HOME | End: 2018-08-03
Payer: MEDICARE

## 2018-08-03 ENCOUNTER — OFFICE VISIT (OUTPATIENT)
Dept: TRANSPLANT | Facility: CLINIC | Age: 73
End: 2018-08-03
Payer: MEDICARE

## 2018-08-03 VITALS
HEIGHT: 64 IN | BODY MASS INDEX: 22.81 KG/M2 | DIASTOLIC BLOOD PRESSURE: 55 MMHG | OXYGEN SATURATION: 84 % | SYSTOLIC BLOOD PRESSURE: 99 MMHG | WEIGHT: 133.63 LBS | HEART RATE: 87 BPM

## 2018-08-03 VITALS — HEIGHT: 63 IN | WEIGHT: 128 LBS | BODY MASS INDEX: 22.68 KG/M2

## 2018-08-03 DIAGNOSIS — Q26.3 PARTIAL ANOMALOUS PULMONARY VENOUS CONNECTION (PAPVC): ICD-10-CM

## 2018-08-03 DIAGNOSIS — I27.20 PULMONARY HYPERTENSION: ICD-10-CM

## 2018-08-03 DIAGNOSIS — Q21.16 SINUS VENOSUS DEFECT: ICD-10-CM

## 2018-08-03 DIAGNOSIS — I47.10 SVT (SUPRAVENTRICULAR TACHYCARDIA): ICD-10-CM

## 2018-08-03 DIAGNOSIS — I27.21 PAH (PULMONARY ARTERY HYPERTENSION): Primary | ICD-10-CM

## 2018-08-03 DIAGNOSIS — Q24.9 ADULT CONGENITAL HEART DISEASE: ICD-10-CM

## 2018-08-03 DIAGNOSIS — R00.2 PALPITATIONS: ICD-10-CM

## 2018-08-03 DIAGNOSIS — Z79.01 ANTICOAGULANT LONG-TERM USE: Chronic | ICD-10-CM

## 2018-08-03 DIAGNOSIS — I48.3 TYPICAL ATRIAL FLUTTER: ICD-10-CM

## 2018-08-03 DIAGNOSIS — J96.11 CHRONIC RESPIRATORY FAILURE WITH HYPOXIA: ICD-10-CM

## 2018-08-03 PROCEDURE — 99999 PR PBB SHADOW E&M-EST. PATIENT-LVL IV: CPT | Mod: PBBFAC,,, | Performed by: INTERNAL MEDICINE

## 2018-08-03 PROCEDURE — 99214 OFFICE O/P EST MOD 30 MIN: CPT | Mod: S$PBB,,, | Performed by: INTERNAL MEDICINE

## 2018-08-03 PROCEDURE — 94618 PULMONARY STRESS TESTING: CPT | Mod: 26,S$PBB,, | Performed by: INTERNAL MEDICINE

## 2018-08-03 PROCEDURE — 99214 OFFICE O/P EST MOD 30 MIN: CPT | Mod: PBBFAC,25 | Performed by: INTERNAL MEDICINE

## 2018-08-03 PROCEDURE — 94618 PULMONARY STRESS TESTING: CPT | Mod: PBBFAC | Performed by: INTERNAL MEDICINE

## 2018-08-03 NOTE — PROGRESS NOTES
Subjective:      HPI  Delightful 72 y.o. 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 She also has h/o Rosemonas bacteremia s/p central line removal, and IV abx per ID with replacement by nephrology.  Pt was admitted 10/4-10/9 with worsening fatigue, diarrhea since the morning of 10/4/17, nausea and low grade temperature at home (100.4 being the highest). Also had a single event of hypoxia (SpO2 50s) that resolved without intervention. During that hospitalization she was placed on 100% FiO2 via HFNC on admit secondary to hypoxia. Blood cultures were drawn and remodulin was moved to peripheral line. Blood cultures were drawn from [facility] as well. ID was consulted. He symptoms improved gradually throughout hospitalization and she was eventually weaned back to low flow NC. Cultures remained negative and remodulin was moved back to central access. They attributed fevers/hypoxia to possible viral infection. She ws eventually weaned down to 4L NC and discharged home in good condition. She was then admitted at [facility] 11/29/17 with hypotension w severe vomiting, fever 101.9, chills and diarrhea- she was over hydrated in the ER, GI w/u was (-), had recently increased her remodulin- was discharged in time to go to her son's wedding-  pt was then admitted 2/28-3/5 with  increased SOB and O2 requirements in setting of prolonged episode of A tach with associated neck pressure/pain. Troponin was positive and EKG had nonspecific ST changes. Pt was loaded with ASA and Plavix and started on Heparin bridge. Interventional Cards consulted for NSTEMI/LHC, but LHC 3/1/18 showed normal coronary arteries.  EP was consulted for A Tach.  There are very limited options in this patient-  previously discussed tikosyn but would avoid given baseline Qt. Not a candidate for sotalol. Given that pt has had ongoing intermittent atrial tach and does not tolerate it, will try amio in place of cardizem. Pt had no further episodes on tele. Pt will need monitoring of PFTs, TFTs, and LFTs while on Amio.   Pt was continued on home dose of IV Remodulin. We attempted to wean off Adempas, but with continued increasing oxygen requirements, restarted Adempas and this improved so she will continue on this for now. Also of note, pt has been taking bosentan TID for years, when this should be a BID dosed med. So we will have her change bosentan to BID dosing on d/c.    Since last visit, pt says she has been doing ok- her wt at home goes up and down just a couple of # as always- 128.9-131.3#  Attempted to increase her remodulin but had GI SE and went back down. Says her breathing has been pretty stable. Spends her time sewing, goes  Outside and plans to start walking the dog- has a stationary bike and elliptical at home, knows she needs to do some more activity- occasionally swells in her left ankle which she broke- no f/f and line is clean    Does not have much swelling now, no cp/palps/LH.       Remodulin is at 89.2 ng/kg/min (unchanged from Flowsheet recorded in march) and on Tracleer, adempas 0.5mg tid      6MWT: 231m (195m (236m Abdiel, 225m, 283m May, 222m Mar, 375m Nov, 305 Sept, 274.5 July,  335.5May, 365  m prev 2 visits)                        O2 sat  90->78% 8L pull tank                                                          HR 87-->108                           BP  89/ 52  ->110/53                                                      Roberto 0 ->7-8    TTE 3/23/18    1 - Normal left ventricular systolic function (EF 60-65%).     2 - No wall motion abnormalities.     3 - Biatrial enlargement.     4 - Impaired LV relaxation, elevated LAP (grade 2 diastolic  dysfunction).     5 - Right ventricular enlargement with hypertrophy, with moderately depressed systolic function.     6 - Pulmonary hypertension. The estimated PA systolic pressure is 90 mmHg.     7 - Small pericardial effusion.     8 - Intermediate central venous pressure.     TTE 3/1/18    1 - Normal left ventricular systolic function (EF 55-60%).     2 - Indeterminate LV diastolic function.     3 - Right atrial enlargement.     4 - Right ventricular enlargement with severely depressed systolic function.     5 - Trivial mitral regurgitation.     6 - Moderate tricuspid regurgitation.     7 - No wall motion abnormalities.     8 - Pulmonary hypertension. The estimated PA systolic pressure is greater than 77 mmHg.     TTE 5/8/17:    1 - Right ventricular enlargement with hypertrophy, with severely depressed systolic function.     2 - Moderate to severe tricuspid regurgitation.     3 - Pulmonary hypertension. The estimated PA systolic pressure is 116 mmHg.     4 - Biatrial enlargement.     5 - Normal left ventricular systolic function (EF 60-65%); reduced LV stroke volume.  The right ventricle is enlarged measuring 5.8 cm at the base in the apical right ventricle-focused view. There is RVH. Global right ventricular systolic function appears severely depressed. There is abnormal septal motion consistent with   RV pressure/volume overload. Tricuspid annular plane systolic excursion (TAPSE) is 1.5 cm. The estimated PA systolic pressure is 116 mmHg.     [facility] 9/25/17:  CONDITION 1 (9/25/2017 11:20:07):  RA: 5/2 (1)  RV: 84/-5  RVEDP: 6     PW: 17/19 (16)  PA: 87/21 (47)  AO_SAT: 95  AO: 115/71 (86)  PA_SAT: 71  SPO2: 97  FICKCI: 3.2800  FICKCO: 5.5500    Review of Systems   Constitution: Negative for chills, fever, malaise/fatigue, weight gain and weight loss.   HENT: Negative.    Eyes: Negative.    Cardiovascular: Positive for dyspnea on exertion and palpitations. Negative for chest pain, leg swelling, near-syncope,  "orthopnea, paroxysmal nocturnal dyspnea and syncope.   Respiratory: Positive for cough. Negative for shortness of breath.    Endocrine: Negative.    Skin: Negative.    Musculoskeletal: Positive for arthritis and joint pain. Negative for back pain.   Gastrointestinal: Negative for bloating, abdominal pain, change in bowel habit and nausea.   Neurological: Negative for dizziness and light-headedness.   Psychiatric/Behavioral: Negative for depression.        Objective:   BP (!) 99/55   Pulse 87   Ht 5' 3.5" (1.613 m)   Wt 60.6 kg (133 lb 9.6 oz)   SpO2 (!) 84% Comment: 5 LITERS O2  BMI 23.29 kg/m²       Physical Exam   Constitutional: She is oriented to person, place, and time. She appears well-developed and well-nourished.   HENT:   Head: Normocephalic and atraumatic.   Eyes: Right eye exhibits no discharge. Left eye exhibits no discharge.   Neck: Neck supple. No JVD present. No thyromegaly present.   Cardiovascular: Regular rhythm.  Exam reveals no gallop and no friction rub.    No murmur heard.  Tachy, accentuated S2     Pulmonary/Chest: Effort normal and breath sounds normal. No respiratory distress. She has no wheezes. She has no rales.   few rhonchi   Abdominal: Soft. Bowel sounds are normal. She exhibits no distension. There is no tenderness.   Musculoskeletal: Normal range of motion. She exhibits edema. She exhibits no tenderness.   Mild edema in the left ankle (always more swollen,broke it years ago)   Neurological: She is alert and oriented to person, place, and time. No cranial nerve deficit. Coordination normal.   Skin: Skin is warm and dry. No rash noted.   Psychiatric: She has a normal mood and affect. Judgment and thought content normal.                             Chemistry        Component Value Date/Time     08/03/2018 1523    K 3.8 08/03/2018 1523     08/03/2018 1523    CO2 28 08/03/2018 1523    BUN 25 (H) 08/03/2018 1523    CREATININE 1.3 08/03/2018 1523    GLU 93 08/03/2018 1523    "     Component Value Date/Time    CALCIUM 9.7 08/03/2018 1523    ALKPHOS 59 08/03/2018 1523    AST 17 08/03/2018 1523    ALT 12 08/03/2018 1523    BILITOT 1.2 (H) 08/03/2018 1523            Magnesium   Date Value Ref Range Status   03/05/2018 2.0 1.6 - 2.6 mg/dL Final       Lab Results   Component Value Date    WBC 4.93 08/03/2018    HGB 14.9 08/03/2018    HCT 45.5 08/03/2018    MCV 90 08/03/2018     08/03/2018         BNP   Date Value Ref Range Status   08/03/2018 335 (H) 0 - 99 pg/mL Final     Comment:     Values of less than 100 pg/ml are consistent with non-CHF populations.   05/04/2018 570 (H) 0 - 99 pg/mL Final     Comment:     Values of less than 100 pg/ml are consistent with non-CHF populations.   03/23/2018 620 (H) 0 - 99 pg/mL Final     Comment:     Values of less than 100 pg/ml are consistent with non-CHF populations.                 Assessment:       1. Pulmonary hypertension- WHO Group 1,6mw distance not at goal, and sats remain in 70's- without a doubt, pt suffers from R to left shunting- was unable to tolerate increasing remodulin  BNP  A little better today - FC III on triple tx- echo has me worried as RV is enlarged and severely depressed, and now with small pericardial effusion- focus now should be on improving her QOL as much as possible   2. PFO (patent foramen ovale)    3. Palpitations/tachycardia- stale, followed by EP, on amio now  4. Chronic hypoxemic resp failure- stable on O2  5. PAPVR,sinus venosus ASD- decision made to treat conservatively     Plan:    -  No change to remodulin today  -  she was considering flying to [place] when her new grandbaby is born, but I tried to discourage her from flying given her baseline hypoxia    - Keep salt intake to under 2000 mg sodium, fluids to under 2 L (64 oz)    Check weights every morning after getting out of bed and urinating. If  weight goes up 3# overnight or 5# in one week she should take 40 mg lasix in the pm and call us if fluid doesn't come  off.    F/u 12 wk with labs and walk     Interested in implantable pump once we are ready to start implanting them.     Might ultimately be a good pt to have meet with palliative care once the [facility] is up and running

## 2018-08-03 NOTE — PATIENT INSTRUCTIONS
1. Continue current therapy.  2.  Keep salt intake to under 2000 mg sodium, fluids to under 2 L (64 oz)  3  Check your weights every morning after getting out of bed and urinating. If your weight goes up 3# overnight or 5# in one week call us  4. Call us if you find yourself getting more short of breath, have more swelling or unexpected weight changes, fever, chills, or problems with your line    Flu shot this fall    support group is 9/18

## 2018-08-07 NOTE — PROCEDURES
Molly Smith is a 72 y.o.  female patient, who presents for a 6 minute walk test ordered by Alice Tim MD.  The diagnosis is Pulmonary Hypertension.  The patient's BMI is 22.7 kg/m2.  Predicted distance (lower limit of normal) is 322.42 meters.      Test Results:    The test was completed without stopping.  The total time walked was 360 seconds.  During walking, the patient reported:  Dyspnea, Leg pain, Lightheadedness.  The patient used supplemental oxygen during testing.     08/03/2018---------Distance: 223.11 meters (732 feet)     O2 Sat % Supplemental Oxygen Heart Rate Blood Pressure Roberto Scale   Pre-exercise  (Resting) 85 % 5 L/M 90 bpm 89/50 mmHg 0   During Exercise 79 % 5 L/M 101 bpm 116/60 mmHg 7-8   Post-exercise  (Recovery) 85 % 5 L/M  92 bpm       Recovery Time:  109 seconds    Performing nurse/tech:  LEVI Bennett RRT      PREVIOUS STUDY:   05/04/2018---------Distance: 231.04 meters (758 feet)       O2 Sat % Supplemental Oxygen Heart Rate Blood Pressure Roberto Scale   Pre-exercise  (Resting) 90 % 10 L/M 87 bpm 89/52 mmHg 0   During Exercise 78 % 10 L/M 108 bpm 110/53 mmHg 7-8   Post-exercise  (Recovery) 90 % 10 L/M  92 bpm          CLINICAL INTERPRETATION:  Six minute walk distance is 223.11 meters (732 feet) with very heavy dyspnea.  During exercise, there was significant desaturation while breathing supplemental oxygen.  Blood pressure increased significantly and Heart rate remained stable with walking.  Hypotension was present prior to exercise.  The patient reported non-pulmonary symptoms during exercise.  Significant exercise impairment is likely due to desaturation, cardiovascular causes and subjective symptoms.  The patient did complete the study, walking 360 seconds of the 360 second test.  Since the previous study in May 2018, exercise capacity is unchanged.  Based upon age and body mass index, exercise capacity is less than predicted.

## 2018-08-20 ENCOUNTER — TELEPHONE (OUTPATIENT)
Dept: TRANSPLANT | Facility: CLINIC | Age: 73
End: 2018-08-20

## 2018-11-13 ENCOUNTER — OFFICE VISIT (OUTPATIENT)
Dept: TRANSPLANT | Facility: CLINIC | Age: 73
End: 2018-11-13
Payer: MEDICARE

## 2018-11-13 ENCOUNTER — HOSPITAL ENCOUNTER (OUTPATIENT)
Dept: PULMONOLOGY | Facility: CLINIC | Age: 73
Discharge: HOME | End: 2018-11-13
Payer: MEDICARE

## 2018-11-13 VITALS
SYSTOLIC BLOOD PRESSURE: 101 MMHG | OXYGEN SATURATION: 88 % | WEIGHT: 131.19 LBS | BODY MASS INDEX: 22.4 KG/M2 | HEIGHT: 64 IN | DIASTOLIC BLOOD PRESSURE: 58 MMHG | HEART RATE: 86 BPM

## 2018-11-13 VITALS — BODY MASS INDEX: 22.15 KG/M2 | WEIGHT: 125 LBS | HEIGHT: 63 IN

## 2018-11-13 DIAGNOSIS — I27.20 PULMONARY HYPERTENSION: ICD-10-CM

## 2018-11-13 DIAGNOSIS — Z79.01 ANTICOAGULANT LONG-TERM USE: Chronic | ICD-10-CM

## 2018-11-13 DIAGNOSIS — I47.10 SVT (SUPRAVENTRICULAR TACHYCARDIA): ICD-10-CM

## 2018-11-13 DIAGNOSIS — Q26.3 PARTIAL ANOMALOUS PULMONARY VENOUS CONNECTION (PAPVC): ICD-10-CM

## 2018-11-13 DIAGNOSIS — Q24.9 ADULT CONGENITAL HEART DISEASE: ICD-10-CM

## 2018-11-13 DIAGNOSIS — I48.3 TYPICAL ATRIAL FLUTTER: ICD-10-CM

## 2018-11-13 DIAGNOSIS — Z79.899 LONG TERM CURRENT USE OF AMIODARONE: ICD-10-CM

## 2018-11-13 DIAGNOSIS — R06.02 SHORTNESS OF BREATH: ICD-10-CM

## 2018-11-13 DIAGNOSIS — Q21.16 SINUS VENOSUS DEFECT: ICD-10-CM

## 2018-11-13 DIAGNOSIS — I27.21 PAH (PULMONARY ARTERY HYPERTENSION): Primary | ICD-10-CM

## 2018-11-13 DIAGNOSIS — J96.11 CHRONIC RESPIRATORY FAILURE WITH HYPOXIA: ICD-10-CM

## 2018-11-13 DIAGNOSIS — I21.4 NSTEMI (NON-ST ELEVATED MYOCARDIAL INFARCTION): ICD-10-CM

## 2018-11-13 PROBLEM — R09.02 HYPOXIA: Status: RESOLVED | Noted: 2017-10-05 | Resolved: 2018-11-13

## 2018-11-13 PROCEDURE — 99999 PR PBB SHADOW E&M-EST. PATIENT-LVL IV: CPT | Mod: PBBFAC,,, | Performed by: INTERNAL MEDICINE

## 2018-11-13 PROCEDURE — 99214 OFFICE O/P EST MOD 30 MIN: CPT | Mod: S$PBB,,, | Performed by: INTERNAL MEDICINE

## 2018-11-13 PROCEDURE — 94618 PULMONARY STRESS TESTING: CPT | Mod: 26,S$PBB,, | Performed by: INTERNAL MEDICINE

## 2018-11-13 PROCEDURE — 94618 PULMONARY STRESS TESTING: CPT | Mod: PBBFAC | Performed by: INTERNAL MEDICINE

## 2018-11-13 PROCEDURE — 99214 OFFICE O/P EST MOD 30 MIN: CPT | Mod: PBBFAC,25 | Performed by: INTERNAL MEDICINE

## 2018-11-13 RX ORDER — PROMETHAZINE HYDROCHLORIDE 25 MG/1
TABLET ORAL
COMMUNITY
End: 2020-03-06

## 2018-11-13 RX ORDER — OMEPRAZOLE 20 MG/1
CAPSULE, DELAYED RELEASE ORAL DAILY
Refills: 0 | COMMUNITY
Start: 2018-10-16 | End: 2019-03-23

## 2018-11-13 RX ORDER — ONDANSETRON 4 MG/1
TABLET, ORALLY DISINTEGRATING ORAL
Refills: 6 | COMMUNITY
Start: 2018-09-26 | End: 2022-05-17

## 2018-11-13 NOTE — PROGRESS NOTES
Subjective:      HPI    Delightful 73 y.o. 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 She also has h/o Rosemonas bacteremia s/p central line removal, and IV abx per ID with replacement by nephrology.  Pt was admitted 10/4-10/9 with worsening fatigue, diarrhea since the morning of 10/4/17, nausea and low grade temperature at home (100.4 being the highest). Also had a single event of hypoxia (SpO2 50s) that resolved without intervention. During that hospitalization she was placed on 100% FiO2 via HFNC on admit secondary to hypoxia. Blood cultures were drawn and remodulin was moved to peripheral line. Blood cultures were drawn from [facility] as well. ID was consulted. He symptoms improved gradually throughout hospitalization and she was eventually weaned back to low flow NC. Cultures remained negative and remodulin was moved back to central access. They attributed fevers/hypoxia to possible viral infection. She ws eventually weaned down to 4L NC and discharged home in good condition. She was then admitted at [facility] 11/29/17 with hypotension w severe vomiting, fever 101.9, chills and diarrhea- she was over hydrated in the ER, GI w/u was (-), had recently increased her remodulin- was discharged in time to go to her son's wedding-  pt was then admitted 2/28-3/5 with  increased SOB and O2 requirements in setting of prolonged episode of A tach with associated neck pressure/pain. Troponin was positive and EKG had nonspecific ST changes. Pt was loaded with ASA and Plavix and started on Heparin bridge. Interventional Cards consulted for NSTEMI/LHC, but LHC 3/1/18 showed normal coronary arteries.  EP was consulted for A Tach.  There are very limited options in this patient-  previously discussed tikosyn but would avoid given baseline Qt. Not a candidate for sotalol. Given that pt has had ongoing intermittent atrial tach and does not tolerate it, will try amio in place of cardizem. Pt had no further episodes on tele. Pt will need monitoring of PFTs, TFTs, and LFTs while on Amio.   Pt was continued on home dose of IV Remodulin. We attempted to wean off Adempas, but with continued increasing oxygen requirements, restarted Adempas and this improved so she will continue on this for now. Also of note, pt has been taking bosentan TID for years, when this should be a BID dosed med. So we will have her change bosentan to BID dosing on d/c.      Since last visit, pt says she had a flare of what she was told was colitis years ago- saw GI  Who did an US and didn't see anything no abd bloating, does alternate though bet diarrhea and constipation, and at times no matter how little she eats feels bloated  Trying to eat smaller meals for freq and that seemed to help. Has noticed having her rapid heart beat episodes recur, though not as frequent or as bad as before she was on amio, but when it happens she cant breathe will sit and then recovers her breath. Got on her stationary bike for 20 min yest and her pulse ox actually improved     no f/c and line is clean    Does not have much swelling now, no cp/palps/LH.       Remodulin is at 89.2 ng/kg/min (unchanged from Flowsheet recorded in march) and on Tracleer, adempas 0.5mg tid      6MWT: 223m (unchanged from August) 231m (195m (236m Abdiel, 225m, 283m May, 222m Mar, 375m Nov, 305 Sept, 274.5 July,  335.5May, 365  m prev 2 visits)                        O2 sat  86->81% 6L pull tank                                                          HR 87-->94                           BP  110/ 57  ->121/62                                                      Roberto 0.5 ->9    TTE 3/23/18    1 - Normal left ventricular  systolic function (EF 60-65%).     2 - No wall motion abnormalities.     3 - Biatrial enlargement.     4 - Impaired LV relaxation, elevated LAP (grade 2 diastolic dysfunction).     5 - Right ventricular enlargement with hypertrophy, with moderately depressed systolic function.     6 - Pulmonary hypertension. The estimated PA systolic pressure is 90 mmHg.     7 - Small pericardial effusion.     8 - Intermediate central venous pressure.     TTE 3/1/18    1 - Normal left ventricular systolic function (EF 55-60%).     2 - Indeterminate LV diastolic function.     3 - Right atrial enlargement.     4 - Right ventricular enlargement with severely depressed systolic function.     5 - Trivial mitral regurgitation.     6 - Moderate tricuspid regurgitation.     7 - No wall motion abnormalities.     8 - Pulmonary hypertension. The estimated PA systolic pressure is greater than 77 mmHg.     TTE 5/8/17:    1 - Right ventricular enlargement with hypertrophy, with severely depressed systolic function.     2 - Moderate to severe tricuspid regurgitation.     3 - Pulmonary hypertension. The estimated PA systolic pressure is 116 mmHg.     4 - Biatrial enlargement.     5 - Normal left ventricular systolic function (EF 60-65%); reduced LV stroke volume.  The right ventricle is enlarged measuring 5.8 cm at the base in the apical right ventricle-focused view. There is RVH. Global right ventricular systolic function appears severely depressed. There is abnormal septal motion consistent with   RV pressure/volume overload. Tricuspid annular plane systolic excursion (TAPSE) is 1.5 cm. The estimated PA systolic pressure is 116 mmHg.     [facility] 9/25/17:  CONDITION 1 (9/25/2017 11:20:07):  RA: 5/2 (1)  RV: 84/-5  RVEDP: 6     PW: 17/19 (16)  PA: 87/21 (47)  AO_SAT: 95  AO: 115/71 (86)  PA_SAT: 71  SPO2: 97  FICKCI: 3.2800  FICKCO: 5.5500    Review of Systems   Constitution: Negative for chills, fever, malaise/fatigue, weight gain and weight loss.  "  HENT: Negative.    Eyes: Negative.    Cardiovascular: Positive for dyspnea on exertion and palpitations. Negative for chest pain, leg swelling, near-syncope, orthopnea, paroxysmal nocturnal dyspnea and syncope.   Respiratory: Positive for cough. Negative for shortness of breath.    Endocrine: Negative.    Skin: Negative.    Musculoskeletal: Positive for arthritis and joint pain. Negative for back pain.   Gastrointestinal: Negative for bloating, abdominal pain, change in bowel habit and nausea.   Neurological: Negative for dizziness and light-headedness.   Psychiatric/Behavioral: Negative for depression.        Objective:   BP (!) 101/58 (BP Location: Left arm, Patient Position: Sitting, BP Method: Small (Automatic))   Pulse 86   Ht 5' 3.5" (1.613 m)   Wt 59.5 kg (131 lb 2.8 oz)   SpO2 (!) 88% Comment: 5L  BMI 22.87 kg/m²       Physical Exam   Constitutional: She is oriented to person, place, and time. She appears well-developed and well-nourished.   HENT:   Head: Normocephalic and atraumatic.   Eyes: Right eye exhibits no discharge. Left eye exhibits no discharge.   Neck: Neck supple. No JVD present. No thyromegaly present.   Cardiovascular: Regular rhythm. Exam reveals no gallop and no friction rub.   No murmur heard.  Tachy, accentuated S2     Pulmonary/Chest: Effort normal and breath sounds normal. No respiratory distress. She has no wheezes. She has no rales.   few rhonchi   Abdominal: Soft. Bowel sounds are normal. She exhibits no distension. There is no tenderness.   Musculoskeletal: Normal range of motion. She exhibits edema. She exhibits no tenderness.   Mild edema in the left ankle (always more swollen,broke it years ago)   Neurological: She is alert and oriented to person, place, and time. No cranial nerve deficit. Coordination normal.   Skin: Skin is warm and dry. No rash noted.   Psychiatric: She has a normal mood and affect. Judgment and thought content normal.                             " Chemistry        Component Value Date/Time     11/13/2018 1320    K 4.4 11/13/2018 1320     11/13/2018 1320    CO2 25 11/13/2018 1320    BUN 25 (H) 11/13/2018 1320    CREATININE 1.2 11/13/2018 1320    GLU 95 11/13/2018 1320        Component Value Date/Time    CALCIUM 9.4 11/13/2018 1320    ALKPHOS 57 11/13/2018 1320    AST 18 11/13/2018 1320    ALT 13 11/13/2018 1320    BILITOT 1.2 (H) 11/13/2018 1320            Magnesium   Date Value Ref Range Status   03/05/2018 2.0 1.6 - 2.6 mg/dL Final       Lab Results   Component Value Date    WBC 5.10 11/13/2018    HGB 15.0 11/13/2018    HCT 46.4 11/13/2018    MCV 91 11/13/2018     11/13/2018         BNP   Date Value Ref Range Status   11/13/2018 210 (H) 0 - 99 pg/mL Final     Comment:     Values of less than 100 pg/ml are consistent with non-CHF populations.   08/03/2018 335 (H) 0 - 99 pg/mL Final     Comment:     Values of less than 100 pg/ml are consistent with non-CHF populations.   05/04/2018 570 (H) 0 - 99 pg/mL Final     Comment:     Values of less than 100 pg/ml are consistent with non-CHF populations.                 Assessment:       1. Pulmonary hypertension- WHO Group 1,6mw distance not at goal, and sats remain in 80's on 6LNC- without a doubt, pt suffers from R to left shunting- was unable to tolerate increasing remodulin  BNP  A little better again today - FC III on triple tx- echo has me worried as RV is enlarged and severely depressed, and now with small pericardial effusion- focus now should be on improving her QOL as much as possible   2. PFO (patent foramen ovale)    3. Palpitations/tachycardia- stale, followed by EP, on amio now  4. Chronic hypoxemic resp failure- stable on O2  5. PAPVR,sinus venosus ASD- decision made to treat conservatively     Plan:    -  No change to remodulin today  -  Will check TSH and PFTs next time as pt remains on amio    - Keep salt intake to under 2000 mg sodium, fluids to under 2 L (64 oz)    Check weights  every morning after getting out of bed and urinating. If  weight goes up 3# overnight or 5# in one week she should take 40 mg lasix in the pm and call us if fluid doesn't come off.    Interested in implantable pump once we are ready to start implanting them.     Might ultimately be a good pt to have meet with palliative care once the [facility] is up and running    Got her flu shot, discussed shingles booster    F/u 2-3 mo with walk, labs, echo, PFTs and TSH as above

## 2018-11-13 NOTE — PATIENT INSTRUCTIONS
1. Continue current therapy.  2.  Keep salt intake to under 2000 mg sodium, fluids to under 2 L (64 oz)  3  Check your weights every morning after getting out of bed and urinating. If your weight goes up 3# overnight or 5# in one week take an extra lasix  and call us if the fluid doesn't come off.  4. Call us if you find yourself getting more short of breath, have more swelling or unexpected weight changes, fever, chills, or problems with your line       From: Jazmine Garza  To: Aida Lackey MD  Sent: 4/7/2017 8:45 AM CDT  Subject: Health Screening form    Hi Dr Lackey!     I was just informed that I need a physician to fill out a health screening form for my insurance program this year. I'm pretty sure we've already completed the biometric tests (Blood Pressure, Glucose, Cholesterol) so it would just be you filling out the form and signing it. Is this something you'd be able to do? Would I just be able to come in sometime or should I email the form to you?     Any help would be much appreciated!   Thanks!

## 2018-11-14 NOTE — PROCEDURES
Molly Smith is a 73 y.o.  female patient, who presents for a 6 minute walk test ordered by MD Meeta.  The diagnosis is Pulmonary Hypertension.  The patient's BMI is 22.2 kg/m2.  Predicted distance (lower limit of normal) is 319.71 meters.      Test Results:    The test was completed without stopping.    The total time walked was 360 seconds.  During walking, the patient reported:  Chest pain, Dyspnea, Lightheadedness. The patient used no assistive devices and supplemental oxygen during testing.     11/13/2018---------Distance: 223.11 meters (732 feet)     O2 Sat % Supplemental Oxygen Heart Rate Blood Pressure Roberto Scale   Pre-exercise  (Resting) 86 % 6 L/M 87 bpm 110/57 mmHg 0.5   During Exercise 81 % 6 L/M 94 bpm 121/62 mmHg 9   Post-exercise  (Recovery) 88 % 6 L/M  88 bpm   mmHg       Recovery Time: 95 seconds    Performing nurse/tech: LEVI Bennett RRT      PREVIOUS STUDY:   The patient had a previous study.  08/03/2018---------Distance: 223.11 meters (732 feet)       O2 Sat % Supplemental Oxygen Heart Rate Blood Pressure Roberto Scale   Pre-exercise  (Resting) 85 % 5 L/M 90 bpm 89/50 mmHg 0   During Exercise 79 % 5 L/M 101 bpm 116/60 mmHg 7-8   Post-exercise  (Recovery) 85 % 5 L/M  92 bpm             CLINICAL INTERPRETATION:  Six minute walk distance is 223.11 meters (732 feet) with very, very heavy dyspnea.  During exercise, there was significant desaturation while breathing supplemental oxygen.  Both blood pressure and heart rate remained stable with walking.  The patient reported non-pulmonary symptoms during exercise.  Significant exercise impairment is likely due to desaturation.  Since the previous study in August 2018, exercise capacity is unchanged.  Based upon age and body mass index, exercise capacity is less than predicted.

## 2018-12-24 ENCOUNTER — TELEPHONE (OUTPATIENT)
Dept: TRANSPLANT | Facility: CLINIC | Age: 73
End: 2018-12-24

## 2018-12-26 NOTE — TELEPHONE ENCOUNTER
"Approval for tracleer received, good through 12/31/19. Re-sent PA for Adempas, because paper received which said "Duplicate" (insurance believed they received two PAs for Tracleer). Approval notification for Tracleer sent to [facility].   "

## 2018-12-26 NOTE — TELEPHONE ENCOUNTER
Approval notification received for Adempas, good through 12/31/2019. Notification sent to cynthia Krishnamurthyo.

## 2019-01-01 NOTE — PLAN OF CARE
Problem: Patient Care Overview  Goal: Plan of Care Review  Outcome: Ongoing (interventions implemented as appropriate)  * AAO x 4  * Regular diet patient tolerating well. See chart for % eaten.   * No edema noted.  * Bed at lowest position and locked, call light within reach, non-slip socks on,  at bedside, and side rails up x 2.  Encouraged patient to call for assistance when needed patient and  stated understanding. patient and  ambulated in hopkins gait and balance WNL. Patient does report SOB with long ambulation.  * Right PIV 22 G (3/2/18) patent, dressing clean dry and intact no signs or symptoms of infection noted.  * Right CW perm-a-cath patent, dressing clean dry and intact no signs or symptoms of infection noted.  * Remodulin 89.2 ng/kg/min DW=70.5 kg patient on home pump cassette due to be changed on Saturday night.    * Oxygen 6 liters via nasal cannula.  Oxygen saturation 76-82 % respirations even and unlabored SOB noted with extended ambulation.   * Skin assessment preformed no breakdown noted.  * Standard precautions maintained.  * Continuous telemetry monitoring continued SR 80-90s.  * Patient able to turn self no assistance needed.  * Patient voiding clear yello urine.  See flow sheet for intake and output totals.         Subjective:       Patient ID: Kev Cannon is a 5 m.o. male.    Chief Complaint: Stridor; Snoring; and Sleep Apnea (not all the time)    EVIE Angulo is a 5 m.o. male who presents for evaluation of noisy breathing. The problem is described as moderate. The problem began 5 months ago. The stridor is always present. The stridor is variable.     The parents do note vibrations in the chest/back. The child does have associated retractions.  The child is thriving. The problem seems to be unchanged over the last 5 months .      There is no prior history of intubation. There is no history of unusual cry and/or hoarseness. There is no  history of  skin hemangiomas. The child does frequently spit up . The child does not have GERD.     The child has been treated with the following: none . Response to this treatment is described as:NA.      Review of Systems   Constitutional: Negative for appetite change and fever.        No wt loss   HENT: Negative for congestion, rhinorrhea and trouble swallowing.    Eyes: Negative.  Negative for visual disturbance.   Respiratory: Positive for stridor. Negative for apnea, cough and wheezing.    Cardiovascular: Negative for fatigue with feeds and cyanosis.        Neg for CHD   Gastrointestinal: Negative for diarrhea and vomiting.   Genitourinary: Negative.         Neg for congenital abn   Musculoskeletal: Negative for joint swelling.   Skin: Negative for color change and rash.   Neurological: Negative for seizures and facial asymmetry.   Hematological: Negative for adenopathy. Does not bruise/bleed easily.           (Peds Addendum)    PMH: Gestation/: Term, well child            G&D: Nl             Med/Surg/Accidents:    See ROS                                                  CV: no congenital abn                                                    Pulm: no asthma, no chronic diseases                                                       FH:  Bleeding disorders:                          none         MH/anesthetic problems:                 none                  Sickle Cell:                                      none         OM/HL:                                           none         Allergy/Asthma:                              none    SH:  Nursery/School:                             0   - d/wk          Tobacco Exposure:                             0          Objective:      Physical Exam   Constitutional: He appears well-developed and well-nourished. He is active. He does not appear ill (mild variable stridor; robust infant; smiles all the time ). No distress.   HENT:   Head: Normocephalic. No cranial deformity or facial anomaly.   Right Ear: Tympanic membrane, external ear and pinna normal. Ear canal is occluded (ci). No middle ear effusion.   Left Ear: Tympanic membrane, external ear, pinna and canal normal. Ear canal is occluded (ci).  No middle ear effusion.   Nose: Nose normal. No nasal deformity or nasal discharge.   Mouth/Throat: Mucous membranes are moist. No oral lesions. Tonsils are 1+ on the right. Tonsils are 1+ on the left. Oropharynx is clear.   Eyes: Pupils are equal, round, and reactive to light. EOM are normal.   Neck: Trachea normal and normal range of motion. Thyroid normal.   Cardiovascular: Normal rate and regular rhythm.   Pulmonary/Chest: Effort normal. No respiratory distress.   Musculoskeletal: Normal range of motion.   Lymphadenopathy:     He has no cervical adenopathy.   Neurological: He is alert. No cranial nerve deficit.   Skin: Skin is warm. No rash noted.           Cerumen removal: Ears cleared under microscopic vision with curette, forceps and suction as necessary. Child appropriately restrained by parent or/and papoose board.        Nasal/Nasopharyngo/Laryn/Hypopharyngoscopy Procedures    Procedure:  Diagnostic nasal, nasopharyngoscopy, laryngoscopy and hypopharyngoscopy.    Routine preparation with local atomizer with 1% neosynephrine and lidocaine . With  customary flexible endoscope.     NOSE:   External:  No gross deformity   Intranasal:    Mucosa:  No polyps, ulcers or lesions.    Septum:  No gross deformity.    Turbinates:  Not enlarged.    Nasopharynx:  No lesions.   Mucosa:  No lesions.   Adenoids:  Present.small, < 15%    Posterior Choanae:  Patent.   Eustachian Tubes:  Patent.  Larynx/hypopharynx:   Epiglottis: clearly omega shaped w proplapse   AE Folds:  Floppy w prolapse on insp   Vocal cords:  No polyps; nl mobility   Subglottis: No obvious stenosis   Hypopharynx:  No lesions.   Piriform sinus:  No pooling or lesions.   Post Cricoid:  No edema or erythema  Assessment:       1. Stridor    2. Snoring    3. Laryngomalacia    4. Spitting up infant        Plan:       1. Reassure   2 no surg nec; will outgrow   3. Consult requested by:  Felipa Pedraza, CAL

## 2019-01-03 RX ORDER — TRAMADOL HYDROCHLORIDE 200 MG/1
TABLET, EXTENDED RELEASE ORAL
Qty: 30 TABLET | Refills: 5 | Status: SHIPPED | OUTPATIENT
Start: 2019-01-03 | End: 2019-05-20 | Stop reason: SDUPTHER

## 2019-01-14 ENCOUNTER — TELEPHONE (OUTPATIENT)
Dept: TRANSPLANT | Facility: CLINIC | Age: 74
End: 2019-01-14

## 2019-01-14 NOTE — TELEPHONE ENCOUNTER
"Pt emails, stating "The cuff is exposed but with no discharge & no fever. Some redness above the catheter . Cleaning it with chloraprep , applying Bactraban  & taking doxy 100 mg twice a day. What should I do?" Replied to patient that because cuff is exposed, catheter will need to be replaced. Inquired with patient regarding whether or not she still takes coumadin. Awaiting response.       "

## 2019-01-14 NOTE — TELEPHONE ENCOUNTER
""We were out of town on the weekend .  Rufus got concerned & thought we should start something (doxy) but we didnt have cultures . He thought this one would be ok until we got to [facility]."   -----------------------  Pt instructed not to use bactroban on site, as this makes site more irritated/sticky, plus involves touching what is supposed to be a sterile area. Pt set up for line replacement Thursday (will hold coumadin 3 days in anticipation), and instructed to bring new remodulin set up with her (cassette, line, etc). Ginger in nephrology access was sent picture of catheter site, which she will review with Dr Atkins. If concerning for infection, she will let us know, and we can change plan of care for patient accordingly. Claire RN case manager notifed of pt's appointment as well, so TSU can be available to support Neph nurses as needed. Per Ginger, difficult to tell what is going on from pic (cuff does not look exposed). Will have pt stop coumadin in anticipation of possible replacement, and then come to have line evaluated.     "

## 2019-01-17 ENCOUNTER — HOSPITAL ENCOUNTER (OUTPATIENT)
Facility: HOSPITAL | Age: 74
Discharge: HOME | End: 2019-01-17
Attending: INTERNAL MEDICINE | Admitting: INTERNAL MEDICINE
Payer: MEDICARE

## 2019-01-17 VITALS
WEIGHT: 126 LBS | BODY MASS INDEX: 21.51 KG/M2 | DIASTOLIC BLOOD PRESSURE: 60 MMHG | SYSTOLIC BLOOD PRESSURE: 120 MMHG | HEART RATE: 82 BPM | RESPIRATION RATE: 18 BRPM | HEIGHT: 64 IN | TEMPERATURE: 98 F | OXYGEN SATURATION: 87 %

## 2019-01-17 DIAGNOSIS — I27.20 PULMONARY HTN: ICD-10-CM

## 2019-01-17 PROCEDURE — 99152 PR MOD CONSCIOUS SEDATION, SAME PHYS, 5+ YRS, FIRST 15 MIN: ICD-10-PCS | Mod: ,,, | Performed by: INTERNAL MEDICINE

## 2019-01-17 PROCEDURE — 99152 MOD SED SAME PHYS/QHP 5/>YRS: CPT | Performed by: INTERNAL MEDICINE

## 2019-01-17 PROCEDURE — 36581 REPLACE TUNNELED CV CATH: CPT

## 2019-01-17 PROCEDURE — 63600175 PHARM REV CODE 636 W HCPCS: Performed by: INTERNAL MEDICINE

## 2019-01-17 PROCEDURE — 36582 PR REPLACE TUNNELED CV CATH - W/ PORT COMPLETE: ICD-10-PCS | Mod: ,,, | Performed by: INTERNAL MEDICINE

## 2019-01-17 PROCEDURE — 77001 CHG FLUOROGUIDE CNTRL VEN ACCESS,PLACE,REPLACE,REMOVE: ICD-10-PCS | Mod: 26,,, | Performed by: INTERNAL MEDICINE

## 2019-01-17 PROCEDURE — C1769 GUIDE WIRE: HCPCS | Performed by: INTERNAL MEDICINE

## 2019-01-17 PROCEDURE — 99152 MOD SED SAME PHYS/QHP 5/>YRS: CPT | Mod: ,,, | Performed by: INTERNAL MEDICINE

## 2019-01-17 PROCEDURE — C1751 CATH, INF, PER/CENT/MIDLINE: HCPCS | Performed by: INTERNAL MEDICINE

## 2019-01-17 PROCEDURE — 36582 REPLACE TUNNELED CV CATH: CPT | Mod: ,,, | Performed by: INTERNAL MEDICINE

## 2019-01-17 PROCEDURE — 77001 FLUOROGUIDE FOR VEIN DEVICE: CPT | Mod: 26,,, | Performed by: INTERNAL MEDICINE

## 2019-01-17 DEVICE — POWERHICKMAN 8F SINGLE-LUMEN POLYURETHANE CATHETER WITH SURECUFF INGROWTH CUFF (5CM) INTERMEDIATE KIT
Type: IMPLANTABLE DEVICE | Site: NECK | Status: FUNCTIONAL
Brand: POWERHICKMAN

## 2019-01-17 RX ORDER — FENTANYL CITRATE 50 UG/ML
INJECTION, SOLUTION INTRAMUSCULAR; INTRAVENOUS
Status: DISCONTINUED | OUTPATIENT
Start: 2019-01-17 | End: 2019-01-17 | Stop reason: HOSPADM

## 2019-01-17 NOTE — H&P
Nephrology Consult  H&P      Consult Requested By: Phoenix Hand MD  Reason for Consult: catheter malfunction     SUBJECTIVE:     History of Present Illness:  Patient is a 73 y.o. female presents for catheter malfunction/exposed cuff. Patient currently on Doxy for prophylaxis. Patient denied fever, chills, bleeding, drainage.     PTA Medications   Medication Sig    amiodarone (PACERONE) 200 MG Tab Take 1 tablet (200 mg total) by mouth once daily.    bosentan (TRACLEER) 125 MG Tab Take by mouth 2 (two) times daily.    CALCIUM CARBONATE (CALCIUM 600 ORAL) Take 2 tablets by mouth once daily.      chlordiazepoxide-clidinium 5-2.5 mg (LIBRAX) 5-2.5 mg Cap Take 1 capsule by mouth 3 (three) times daily with meals. TAKE TABLET AS PRN.    ferrous gluconate (FERGON) 324 MG tablet Take 1 tablet (324 mg total) by mouth daily with breakfast.    FLUZONE HIGH-DOSE 2018-19, PF, 180 mcg/0.5 mL vaccine ADM 0.5ML IM UTD    folic acid (FOLVITE) 1 MG tablet Take 1 mg by mouth once daily.    furosemide (LASIX) 40 MG tablet Take 1 tablet (40 mg total) by mouth 2 (two) times daily.    influenza (FLUZONE HIGH-DOSE 2017-18, PF,) 180 mcg/0.5 mL vaccine Fluzone High-Dose 2095-5726 (PF) 180 mcg/0.5 mL intramuscular syringe   ADM 0.5ML IM UTD    loratadine (CLARITIN) 10 mg tablet Take 10 mg by mouth once daily.    omeprazole (PRILOSEC) 20 MG capsule     ondansetron (ZOFRAN-ODT) 4 MG TbDL     promethazine (PHENERGAN) 25 MG tablet promethazine 25 mg tablet    riociguat (ADEMPAS) 0.5 mg Tab tablet Take 1 tablet (0.5 mg total) by mouth 3 (three) times daily.    sodium chloride 0.9% 0.9 % SolP 100 mL with treprostinil 1 mg/mL Soln 9,000,000 ng Inject 5,005.5 ng/min into the vein continuous.    spironolactone (ALDACTONE) 25 MG tablet Take 25 mg by mouth once daily.      traMADol (ULTRAM-ER) 200 MG Tb24 TAKE ONE TABLET BY MOUTH ONCE A DAY AS NEEDED FOR PAIN THANK YOU!    TREPROSTINIL SODIUM (TREPROSTINIL, REMODULIN, PUMP FOR  HOME USE) Pt currently on 89.2 ng/kg/min=45 mL/day dosing weight 70.05 kg    warfarin (COUMADIN) 10 MG tablet Take 1 tablet (10 mg total) by mouth Daily.       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    Multaq [dronedarone]         Past Medical History:   Diagnosis Date    Allergy     Anticoagulant long-term use     Arthritis     Heart murmur     Pneumonia     Pulmonary hypertension     Tachycardia      Past Surgical History:   Procedure Laterality Date    ABLATION N/A 6/17/2013    Performed by [facility] J [facility]josa [facility] at [facility] CATH LAB    BACK SURGERY      HEART CATH-RIGHT Right 7/10/2017    Performed by Alice Tim MD at [facility] CATH LAB    INSERTION-CATHETER-ROBERTSON Left 6/24/2014    Performed by González Hernandez MD at [facility] OR [facility] FLR     Family History   Problem Relation Age of Onset    Arthritis Mother     Arthritis Father     Diabetes Father     Heart disease Father     Hypertension Father      Social History     Tobacco Use    Smoking status: Never Smoker    Smokeless tobacco: Never Used   Substance Use Topics    Alcohol use: No     Comment: rarely    Drug use: No       Review of Systems:  Constitutional: Negative for fatigue.   Eyes: Negative for discharge.   Respiratory: Negative for cough, shortness of breath and wheezing.   Cardiovascular: Negative for chest pain and palpitations.   Gastrointestinal: Negative for nausea, vomiting, abdominal pain and diarrhea.   Genitourinary: Negative for dysuria, urgency, frequency and hematuria.   Skin: Negative for color change and rash.   Psychiatric/Behavioral: Negative for confusion.      OBJECTIVE:     Vital Signs (Most Recent)  Temp: 98.1 °F (36.7 °C) (01/17/19 1311)  Pulse: 83 (01/17/19 1311)  Resp: 18 (01/17/19 1311)  BP: (!) 111/55 (01/17/19 1312)  SpO2: (!) 83 % (01/17/19 1311)       No intake or output data in the 24 hours ending 01/17/19 1323    Physical Exam:  Gen: AAOx3,  NAD  HEENT: mmm  Neck: no bruit, no JVD  CV: RRR, no m/r  Resp: CTAx2, normal effort  GI: soft, ND, NTTP, +BS  Extr: no LE edema  Neuro: normal reflexes, no focal deficits      Laboratory:  CBC with Diff:   No results for input(s): WBC, HGB, HCT, PLT, NEUTOPHILPCT, LYMPH, MONO, EOSINOPHIL in the last 168 hours.  COAG:  No results for input(s): APTT, INR, PTT in the last 168 hours.    CMP: No results for input(s): GLU, CALCIUM, ALBUMIN, PROT, NA, K, CO2, CL, BUN, CREATININE, ALKPHOS, ALT, AST, BILITOT, MG, PHOS in the last 168 hours.  CrCl cannot be calculated (Patient's most recent lab result is older than the maximum 7 days allowed.).  Corrected Calcium:      Lab Results   Component Value Date    LABPROT 12.8 (H) 03/05/2018     Lab Results   Component Value Date    CALCIUM 8.8 11/22/2018    PHOS 2.9 07/19/2016     Lab Results   Component Value Date    IRON 35 12/07/2017    TIBC 448 12/07/2017    FERRITIN 17 (L) 12/07/2017           Diagnostic Results:  Labs: Reviewed      Scheduled Meds:  Continuous Infusions:          ASSESSMENT/PLAN:     Patient Active Problem List   Diagnosis    Atrial flutter    Palpitations    Pseudogout    Anticoagulant long-term use    Swelling of joint of right wrist    Chronic pulmonary heart disease    Chronic respiratory failure with hypoxia    Partial anomalous pulmonary venous connection (PAPVC)    Sinus venosus defect    PAH (pulmonary artery hypertension)    SVT (supraventricular tachycardia)    Diarrhea    Adult congenital heart disease    Shortness of breath    NSTEMI (non-ST elevated myocardial infarction)    Elevated troponin    Myositis ossificans traumatica    Long term current use of amiodarone    Pulmonary HTN       Plan:     Will exchange single lumen maguire OTW.

## 2019-01-17 NOTE — PLAN OF CARE
Problem: Adult Inpatient Plan of Care  Goal: Plan of Care Review  Outcome: Ongoing (interventions implemented as appropriate)  Pt transferred from cath lab via stretcher.  Vss.  Pt aao, tolerating po.  Post op orders and assessment initiated.  ness maguire remains mikala.  0 bleed, 0 hematoma.  Pt in no acute distress and verbalizes no complaints.  Will continue to monitor.  Call bell within reach.

## 2019-01-17 NOTE — NURSING
Patient discharged per MD orders. Instructions given on medications, wound care, activity, signs of infection, when to call MD, and follow up appointments. Pt verbalized understanding.  Patient AAOx3, VSS, no c/o pain or discomfort at this time. Telemetry and PIV removed. Patient left unit via wheelchair with transport and family.

## 2019-01-17 NOTE — Clinical Note
The site was marked. Prepped: right neck. Prepped with: ChloraPrep and Hibiclens. The patient was draped.

## 2019-01-17 NOTE — PROCEDURES
DATE OF PROCEDURE: 1/17/19    PROCEDURE TYPE: Exchange of right internal jugular tunneled single dunbar catheter rewiring from existing dunbar catheter using same exit site.     INDICATIONS: Dunbar catheter malfunction.     Operators: Phoenix Hand MD     PROCEDURE NOTE: After informed consent was obtained, the area of Mrs. Smith's right neck, right chest and dunbar catheter was sterilely prepped and draped in the usual fashion. Anesthesia was then obtained with 2% lidocaine with epinephrine. Roadrunner guidewire was introduced into the existing catheter and the wire was parked in the IVC under fluoroscopic guidance. Then the catheter was removed. A new dunbar catheter was introduced over the wire using previous exit site under fluoroscopic guidance. The tip of the catheter was confirmed to be in the center of the right atrium via fluoroscopy. There was excellent aspiration and flush through both ports. The catheter was secured using 3-0 Prolene in a wrapped stitch. Mrs. Smith tolerated the procedure well. There were no immediate complications. Estimated blood loss was less than 5 mL. She was then tranferred from the Nephrology Access Suite in stable condition.     Anesthesia:  Conscious sedation was achieved with 12.5 mcg of Fentanyl. Local anesthesia was achieved with 20 ml of Lidocaine 2%. Moderate conscious sedation was performed and cardiorespiratory functions were monitored the entire procedure by me and RN. Sedation time 20 minutes.

## 2019-01-17 NOTE — PROGRESS NOTES
Right IJ single lumen Dunbar placed under direct flouro without problems. OK for use per Dr. Hand.

## 2019-01-17 NOTE — DISCHARGE INSTRUCTIONS
Discharge Instructions: Caring for Your Central Line  You are going home with a central line. Its also called a central venous access device (CVAD) or central venous catheter (CVC). A small, soft tube (catheter) has been put in a vein that leads to your heart. This provides medicine during your treatment. It is taken out when you no longer need it. At home, you need to take care of your central line to keep it working. A central line has a high infection risk. So you must take extra care washing your hands and preventing the spread of germs. This sheet will help you remember what to do at home.  Understanding your role  · A nurse or other healthcare provider will teach you and your caregivers how to care for the central line. Before leaving the hospital, make sure you understand what to do at home, how long you may need the central line, and when to have a follow-up visit.  · You will likely be told to flush the central line with saline or heparin solution. You may also be told to change the catheters injection caps and change the bandage (dressing). Or, a nurse may do this for you during a follow-up visit. Only do these things if youre told to do so. Follow the instructions you were given.  Protecting the central line  If the central line gets damaged, it wont work right and could raise your chance of infection. Call your healthcare team right away if any damage occurs. To protect the central line at home:  · Prevent infection. Use good hand hygiene by following the guidelines on this sheet. Dont touch the catheter or dressing unless you need to. And always clean your hands before and after you come in contact with any part of the central line. Your caregivers, family members, and any visitors should use good hand hygiene, too.  · Keep the central line dry. The catheter and dressing must stay dry. Dont take baths, go swimming, use a hot tub, or do other activities that could get the central line wet. Take a  sponge bath to avoid getting the central line wet, unless your healthcare provider tells you otherwise. Ask your provider about the best way to keep the line dry when bathing or showering. If the dressing does get wet, change it only if you have been shown how. Otherwise, call your healthcare team right away for help.  · Avoid damage. Dont use any sharp or pointy objects around the catheter. This includes scissors, pins, knives, razors, or anything else that could cut it or put a hole in it (puncture it). Also, dont let anything pull or rub on the catheter, such as clothing.  · Watch for signs of problems. Pay attention to how much of the catheter sticks out from your skin. If this changes at all, let your healthcare provider know. Also watch for cracks, leaks, or other damage. If the dressing becomes dirty, loose, or wet, change it (if you have been instructed to). Or call your healthcare team right away.  · Avoid lowering your chest below your waist. This includes bending at the waist to do things like tying your shoes. When your chest is below your waist, especially for a long time, the catheters internal tip could slip out of place in the vein.  · Tell your healthcare team if you vomit or have severe coughing. This can also make the catheter slip out of place.  Risk of blood clot  If a blood clot forms it can block blood flow through the vein where the catheter is placed. Signs of a blood clot include pain or swelling in your neck, face, chest or arm. If you have any of these symptoms, call your healthcare provider right away. You may need an ultrasound exam to find the blood clot. You may also be treated with a blood thinner.    Prevent infection with good hand hygiene  A central line can let germs into your body. This can lead to serious and sometimes deadly infections. To prevent infection, its very important that you, your caregivers, and others around you use good hand hygiene. This means washing your  hands well with soap and water, and cleaning them with alcohol-based hand gel as directed. Never touch the central line or dressing without first using one of these methods.  To wash your hands with soap and water:  1. Wet your hands with warm water. (Avoid hot water. It can cause skin irritation when you wash your hands often.)  2. Apply enough soap to cover the entire surface of your hands, including your fingers.  3. Rub your hands together briskly for at least 15 seconds. Make sure to rub the front and back of each hand up to the wrist, your fingers and fingernails, between the fingers, and each thumb.  4. Rinse your hands with warm water.  5. Dry your hands completely with a new, unused paper towel. Dont use a cloth towel or other reusable towel. These can harbor germs.  6. Use the paper towel to turn off the faucet, then throw it away. If youre in a bathroom, also use a paper towel to open the door instead of touching the handle.  When you dont have access to soap and water: Use alcohol-based hand gel to clean your hands. The gel should have at least 60% alcohol. Follow the instructions on the package. Your healthcare team can answer any questions you have about when to use hand gel, or when its better to wash with soap and water.   When to seek medical care  Call your healthcare provider right away if you have any of the following:  · Pain or burning in your shoulder, chest, back, arm, or leg  · Fever of 100.4°F (38.0°C) or higher  · Chills  · Signs of infection at the catheter site (pain, redness, drainage, burning, or stinging)  · Coughing, wheezing, or shortness of breath  · A racing or irregular heartbeat  · Muscle stiffness or trouble moving  · Gurgling noises coming from the catheter  · The catheter falls out, breaks, cracks, leaks, or has other damage   Date Last Reviewed: 7/1/2016  © 9980-9595 [employer]. [address] [place] [address], [place] [address]. All rights reserved. This  information is not intended as a substitute for professional medical care. Always follow your healthcare professional's instructions.

## 2019-01-17 NOTE — DISCHARGE SUMMARY
[facility]  Discharge Note  Short Stay    Admit Date: 1/17/2019    Discharge Date and Time: 1/17/19    Attending Physician: Phoenix Hand MD     Discharge Provider: Phoenix Hand    Diagnoses:  Active Hospital Problems    Diagnosis  POA    Pulmonary HTN [I27.20]  Yes      Resolved Hospital Problems   No resolved problems to display.       Discharged Condition: stable    Hospital Course: Patient was admitted for an outpatient procedure and tolerated the procedure well with no complications.    Final Diagnoses: Same as principal problem.    Disposition: Home or Self Care    Follow up/Patient Instructions:  F/u with HTS. Instruction given.     Medications:  Reconciled Home Medications:      Medication List      ASK your doctor about these medications    amiodarone 200 MG Tab  Commonly known as:  PACERONE  Take 1 tablet (200 mg total) by mouth once daily.     bosentan 125 MG Tab  Commonly known as:  TRACLEER  Take by mouth 2 (two) times daily.     CALCIUM 600 ORAL  Take 2 tablets by mouth once daily.     chlordiazepoxide-clidinium 5-2.5 mg 5-2.5 mg Cap  Commonly known as:  LIBRAX  Take 1 capsule by mouth 3 (three) times daily with meals. TAKE TABLET AS PRN.     ferrous gluconate 324 MG tablet  Commonly known as:  FERGON  Take 1 tablet (324 mg total) by mouth daily with breakfast.     FLUZONE HIGH-DOSE 2017-18 (PF) 180 mcg/0.5 mL vaccine  Generic drug:  influenza  Fluzone High-Dose 0826-9779 (PF) 180 mcg/0.5 mL intramuscular syringe   ADM 0.5ML IM UTD     FLUZONE HIGH-DOSE 2018-19 (PF) 180 mcg/0.5 mL vaccine  Generic drug:  influenza  ADM 0.5ML IM UTD     folic acid 1 MG tablet  Commonly known as:  FOLVITE  Take 1 mg by mouth once daily.     furosemide 40 MG tablet  Commonly known as:  LASIX  Take 1 tablet (40 mg total) by mouth 2 (two) times daily.     loratadine 10 mg tablet  Commonly known as:  CLARITIN  Take 10 mg by mouth once daily.     omeprazole 20 MG capsule  Commonly known as:   PRILOSEC  once daily.     ondansetron 4 MG Tbdl  Commonly known as:  ZOFRAN-ODT     promethazine 25 MG tablet  Commonly known as:  PHENERGAN  promethazine 25 mg tablet     riociguat 0.5 mg Tab tablet  Commonly known as:  ADEMPAS  Take 1 tablet (0.5 mg total) by mouth 3 (three) times daily.     sodium chloride 0.9% 0.9 % SolP 100 mL with treprostinil 1 mg/mL Soln 9,000,000 ng  Inject 5,005.5 ng/min into the vein continuous.     spironolactone 25 MG tablet  Commonly known as:  ALDACTONE  Take 25 mg by mouth once daily.     traMADol 200 MG Tb24  Commonly known as:  ULTRAM-ER  TAKE ONE TABLET BY MOUTH ONCE A DAY AS NEEDED FOR PAIN THANK YOU!     TREPROSTINIL (REMODULIN) PUMP FOR HOME USE  Pt currently on 89.2 ng/kg/min=45 mL/day dosing weight 70.05 kg     warfarin 10 MG tablet  Commonly known as:  COUMADIN  Take 1 tablet (10 mg total) by mouth Daily.          No discharge procedures on file.      Discharge Procedure Orders: Resume previous diet and activity.

## 2019-01-25 PROBLEM — M81.0 AGE-RELATED OSTEOPOROSIS WITHOUT CURRENT PATHOLOGICAL FRACTURE: Status: ACTIVE | Noted: 2019-01-25

## 2019-01-29 ENCOUNTER — HOSPITAL ENCOUNTER (OUTPATIENT)
Dept: PULMONOLOGY | Facility: CLINIC | Age: 74
Discharge: HOME | End: 2019-01-29
Payer: MEDICARE

## 2019-01-29 ENCOUNTER — OFFICE VISIT (OUTPATIENT)
Dept: TRANSPLANT | Facility: CLINIC | Age: 74
End: 2019-01-29
Payer: MEDICARE

## 2019-01-29 ENCOUNTER — HOSPITAL ENCOUNTER (OUTPATIENT)
Dept: CARDIOLOGY | Facility: CLINIC | Age: 74
Discharge: HOME | End: 2019-01-29
Attending: INTERNAL MEDICINE
Payer: MEDICARE

## 2019-01-29 VITALS
SYSTOLIC BLOOD PRESSURE: 104 MMHG | HEART RATE: 68 BPM | SYSTOLIC BLOOD PRESSURE: 120 MMHG | BODY MASS INDEX: 22.02 KG/M2 | BODY MASS INDEX: 22.17 KG/M2 | WEIGHT: 129.88 LBS | DIASTOLIC BLOOD PRESSURE: 61 MMHG | HEIGHT: 64 IN | HEIGHT: 64 IN | WEIGHT: 129 LBS | OXYGEN SATURATION: 86 % | HEART RATE: 89 BPM | DIASTOLIC BLOOD PRESSURE: 60 MMHG

## 2019-01-29 VITALS — WEIGHT: 129.19 LBS | BODY MASS INDEX: 22.06 KG/M2 | HEIGHT: 64 IN

## 2019-01-29 DIAGNOSIS — Z79.899 LONG TERM CURRENT USE OF AMIODARONE: ICD-10-CM

## 2019-01-29 DIAGNOSIS — I27.21 PAH (PULMONARY ARTERY HYPERTENSION): Primary | ICD-10-CM

## 2019-01-29 DIAGNOSIS — I27.20 PULMONARY HYPERTENSION: ICD-10-CM

## 2019-01-29 DIAGNOSIS — I27.21 PAH (PULMONARY ARTERY HYPERTENSION): ICD-10-CM

## 2019-01-29 DIAGNOSIS — Q26.3 PARTIAL ANOMALOUS PULMONARY VENOUS CONNECTION (PAPVC): ICD-10-CM

## 2019-01-29 DIAGNOSIS — Q24.9 ADULT CONGENITAL HEART DISEASE: ICD-10-CM

## 2019-01-29 LAB
ASCENDING AORTA: 2.67 CM
AV INDEX (PROSTH): 0.88
AV MEAN GRADIENT: 2.82 MMHG
AV PEAK GRADIENT: 5.48 MMHG
AV VALVE AREA: 2.41 CM2
AV VELOCITY RATIO: 0.82
BSA FOR ECHO PROCEDURE: 1.63 M2
CV ECHO LV RWT: 0.33 CM
DOP CALC AO PEAK VEL: 1.17 M/S
DOP CALC AO VTI: 19.71 CM
DOP CALC LVOT AREA: 2.75 CM2
DOP CALC LVOT DIAMETER: 1.87 CM
DOP CALC LVOT PEAK VEL: 0.96 M/S
DOP CALC LVOT STROKE VOLUME: 47.57 CM3
DOP CALCLVOT PEAK VEL VTI: 17.33 CM
E WAVE DECELERATION TIME: 195.77 MSEC
E/A RATIO: 0.7
E/E' RATIO: 9.5
ECHO LV POSTERIOR WALL: 0.71 CM (ref 0.6–1.1)
FRACTIONAL SHORTENING: 40 % (ref 28–44)
INTERVENTRICULAR SEPTUM: 0.75 CM (ref 0.6–1.1)
IVRT: 0.06 MSEC
LA MAJOR: 4.89 CM
LA MINOR: 4.96 CM
LA WIDTH: 4.05 CM
LEFT ATRIUM SIZE: 4.61 CM
LEFT ATRIUM VOLUME INDEX: 48.1 ML/M2
LEFT ATRIUM VOLUME: 78.16 CM3
LEFT INTERNAL DIMENSION IN SYSTOLE: 2.6 CM (ref 2.1–4)
LEFT VENTRICLE DIASTOLIC VOLUME INDEX: 51.45 ML/M2
LEFT VENTRICLE DIASTOLIC VOLUME: 83.53 ML
LEFT VENTRICLE MASS INDEX: 57.8 G/M2
LEFT VENTRICLE SYSTOLIC VOLUME INDEX: 15.1 ML/M2
LEFT VENTRICLE SYSTOLIC VOLUME: 24.5 ML
LEFT VENTRICULAR INTERNAL DIMENSION IN DIASTOLE: 4.31 CM (ref 3.5–6)
LEFT VENTRICULAR MASS: 93.81 G
LV LATERAL E/E' RATIO: 8.44
LV SEPTAL E/E' RATIO: 10.86
MV PEAK A VEL: 1.09 M/S
MV PEAK E VEL: 0.76 M/S
PISA TR MAX VEL: 4.64 M/S
PRE FEV1 FVC: 72
PRE FEV1: 1.46
PRE FVC: 2.02
PREDICTED FEV1 FVC: 78
PREDICTED FEV1: 2.18
PREDICTED FVC: 2.82
RA MAJOR: 5.01 CM
RA PRESSURE: 3 MMHG
RA WIDTH: 5.81 CM
RIGHT VENTRICULAR END-DIASTOLIC DIMENSION: 6.6 CM
RV TISSUE DOPPLER FREE WALL SYSTOLIC VELOCITY 1 (APICAL 4 CHAMBER VIEW): 8.9 M/S
SINUS: 2.74 CM
STJ: 2.4 CM
TDI LATERAL: 0.09
TDI SEPTAL: 0.07
TDI: 0.08
TR MAX PG: 86.12 MMHG
TRICUSPID ANNULAR PLANE SYSTOLIC EXCURSION: 1.53 CM
TV REST PULMONARY ARTERY PRESSURE: 89 MMHG

## 2019-01-29 PROCEDURE — 94618 PULMONARY STRESS TESTING: CPT | Mod: 26,S$PBB,, | Performed by: INTERNAL MEDICINE

## 2019-01-29 PROCEDURE — 94727 PR PULM FUNCTION TEST BY GAS: ICD-10-PCS | Mod: 26,S$PBB,, | Performed by: INTERNAL MEDICINE

## 2019-01-29 PROCEDURE — 94010 BREATHING CAPACITY TEST: ICD-10-PCS | Mod: 26,59,S$PBB, | Performed by: INTERNAL MEDICINE

## 2019-01-29 PROCEDURE — 94729 DIFFUSING CAPACITY: CPT | Mod: 26,S$PBB,, | Performed by: INTERNAL MEDICINE

## 2019-01-29 PROCEDURE — 93306 TTE W/DOPPLER COMPLETE: CPT | Mod: PBBFAC | Performed by: INTERNAL MEDICINE

## 2019-01-29 PROCEDURE — 99999 PR PBB SHADOW E&M-EST. PATIENT-LVL III: CPT | Mod: PBBFAC,,, | Performed by: INTERNAL MEDICINE

## 2019-01-29 PROCEDURE — 99999 PR PBB SHADOW E&M-EST. PATIENT-LVL III: ICD-10-PCS | Mod: PBBFAC,,, | Performed by: INTERNAL MEDICINE

## 2019-01-29 PROCEDURE — 99213 OFFICE O/P EST LOW 20 MIN: CPT | Mod: PBBFAC,25 | Performed by: INTERNAL MEDICINE

## 2019-01-29 PROCEDURE — 99214 PR OFFICE/OUTPT VISIT, EST, LEVL IV, 30-39 MIN: ICD-10-PCS | Mod: S$PBB,,, | Performed by: INTERNAL MEDICINE

## 2019-01-29 PROCEDURE — 93306 TRANSTHORACIC ECHO (TTE) COMPLETE (CUPID ONLY): ICD-10-PCS | Mod: 26,S$PBB,, | Performed by: INTERNAL MEDICINE

## 2019-01-29 PROCEDURE — 94729 DIFFUSING CAPACITY: CPT | Mod: PBBFAC | Performed by: INTERNAL MEDICINE

## 2019-01-29 PROCEDURE — 94010 BREATHING CAPACITY TEST: CPT | Mod: 26,59,S$PBB, | Performed by: INTERNAL MEDICINE

## 2019-01-29 PROCEDURE — 94727 GAS DIL/WSHOT DETER LNG VOL: CPT | Mod: 26,S$PBB,, | Performed by: INTERNAL MEDICINE

## 2019-01-29 PROCEDURE — 99214 OFFICE O/P EST MOD 30 MIN: CPT | Mod: S$PBB,,, | Performed by: INTERNAL MEDICINE

## 2019-01-29 PROCEDURE — 94729 PR C02/MEMBANE DIFFUSE CAPACITY: ICD-10-PCS | Mod: 26,S$PBB,, | Performed by: INTERNAL MEDICINE

## 2019-01-29 PROCEDURE — 94618 PULMONARY STRESS TESTING: CPT | Mod: PBBFAC | Performed by: INTERNAL MEDICINE

## 2019-01-29 PROCEDURE — 94618 PULMONARY STRESS TESTING: ICD-10-PCS | Mod: 26,S$PBB,, | Performed by: INTERNAL MEDICINE

## 2019-01-29 PROCEDURE — 94010 BREATHING CAPACITY TEST: CPT | Mod: PBBFAC | Performed by: INTERNAL MEDICINE

## 2019-01-29 PROCEDURE — 94727 GAS DIL/WSHOT DETER LNG VOL: CPT | Mod: PBBFAC | Performed by: INTERNAL MEDICINE

## 2019-01-29 RX ORDER — FUROSEMIDE 20 MG/1
20 TABLET ORAL DAILY
Qty: 30 TABLET | Refills: 11
Start: 2019-01-29 | End: 2019-09-06

## 2019-01-29 NOTE — PROGRESS NOTES
I have personally taken the history and examined this patient and agree with Dr Wasserman's note as stated above.    Remains FC III, without volume on exam- PFTs today look good, and echo images demonstrate small, underfilled hyperdynamic LV with markedly enlarged right sided chambers and pressure overload/RV depression, with LOW filling pressures    Will reduce lasix to 20mg once a day to increase preload and follow daily wts with extra dose prn.    Discussed implantable pump and unfortunately I fear she is too sick to be considered a candidate     F/u 2 mo with labs, walk

## 2019-01-29 NOTE — PATIENT INSTRUCTIONS
PH on Facebook: A couple of our patients created a group on Facebook for people living in [place] with PH, it's called [place] [facility].    Check it out!      [place] [facility]  February 20, 2019  11am-1pm  [facility]  [address] [address]  RSVP to Marianna Patterson by 2/18/19 at [phone] or [email or web address]        Reduce lasix to 20mg once a day    Check your weights every morning after getting out of bed and urinating. If your weight goes up 3# overnight or 5# in one week take a second one in the afternoon and call us if it doesn't come off

## 2019-01-29 NOTE — PROCEDURES
Molly Smith is a 73 y.o.  female patient, who presents for a 6 minute walk test ordered by MD Meeta.  The diagnosis is Pulmonary Hypertension.  The patient's BMI is 22.2 kg/m2.  Predicted distance (lower limit of normal) is 319.71 meters.      Test Results:    The test was completed without stopping. The total time walked was 360 seconds.  During walking, the patient reported:  Dyspnea, Leg pain. The patient used no assistive devices and supplemental oxygen during testing.     01/29/2019---------Distance: 243.84 meters (800 feet)     O2 Sat % Supplemental Oxygen Heart Rate Blood Pressure Roberto Scale   Pre-exercise  (Resting) 91 % 4 L/M 98 bpm 104/64 mmHg 2   During Exercise 83 % 4 L/M 113 bpm 113/56 mmHg 4   Post-exercise  (Recovery) 91 % 4 L/M  100 bpm   mmHg       Recovery Time: 130 seconds    Performing nurse/tech: Lillian CORCORAN      PREVIOUS STUDY:   The patient had a previous study.  11/13/2018---------Distance: 223.11 meters (732 feet)       O2 Sat % Supplemental Oxygen Heart Rate Blood Pressure Roberto Scale   Pre-exercise  (Resting) 86 % 6 L/M 87 bpm 110/57 mmHg 0.5   During Exercise 81 % 6 L/M 94 bpm 121/62 mmHg 9   Post-exercise  (Recovery) 88 % 6 L/M  88 bpm   mmHg           CLINICAL INTERPRETATION:  Six minute walk distance is 243.84 meters (800 feet) with somewhat heavy dyspnea.  During exercise, there was significant desaturation while breathing supplemental oxygen.  Both blood pressure and heart rate remained stable with walking.  The patient reported non-pulmonary symptoms during exercise.  Significant exercise impairment is likely due to desaturation.  Since the previous study in 11/2018, exercise capacity is unchanged.  Based upon age and body mass index, exercise capacity is less than predicted.

## 2019-01-29 NOTE — PROGRESS NOTES
Subjective:   Ms. Smith is a 73 y.o. year old White female with PH who is been seen today for routine follow up.        HPI:    73 y.o. 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 She also has h/o Rosemonas bacteremia s/p central line removal, and IV abx per ID with replacement by nephrology.Pt was admitted 10/4-10/9 with worsening fatigue, diarrhea since the morning of 10/4/17, nausea and low grade temperature at home (100.4 being the highest). Also had a single event of hypoxia (SpO2 50s) that resolved without intervention. During that hospitalization she was placed on 100% FiO2 via HFNC on admit secondary to hypoxia. Blood cultures were drawn and remodulin was moved to peripheral line. Blood cultures were drawn from [place] as well. ID was consulted. He symptoms improved gradually throughout hospitalization and she was eventually weaned back to low flow NC. Cultures remained negative and remodulin was moved back to central access. They attributed fevers/hypoxia to possible viral infection. She ws eventually weaned down to 4L NC and discharged home in good condition. She was then admitted at [facility] 11/29/17 with hypotension w severe vomiting, fever 101.9, chills and diarrhea- she was over hydrated in the ER, GI w/u was (-), had recently increased her remodulin- was discharged in time to go to her son's wedding-  pt was then admitted 2/28-3/5 with  increased SOB and O2 requirements in setting of prolonged episode of A tach with associated neck pressure/pain. Troponin was positive and EKG had nonspecific ST changes. Pt was loaded with ASA and Plavix and started on Heparin bridge. Interventional Cards consulted for  NSTEMI/LHC, but LHC 3/1/18 showed normal coronary arteries.EP was consulted for A Tach. There are very limited options in this patient-  previously discussed tikosyn but would avoid given baseline Qt. Not a candidate for sotalol. Given that pt has had ongoing intermittent atrial tach and does not tolerate it, will try amio in place of cardizem. Pt had no further episodes on tele. Pt will need monitoring of PFTs, TFTs, and LFTs while on Amio. Pt was continued on home dose of IV Remodulin. We attempted to wean off Adempas, but with continued increasing oxygen requirements, restarted Adempas and this improved so she will continue on this for now. Also of note, pt has been taking bosentan TID for years, when this should be a BID dosed med. So we will have her change bosentan to BID dosing on d/c.    Patient is been seen today for routine follow up. On most recent labs the patient was noted with stable cell counts and renal function. BNP is elevated but within patient's baseline. T. Bili is also mildly elevated. TSH is stable. Patient underwent recently and exchange of her central catheter for Remodulin infusion. Patient with stable symptoms although falling on WHO FC III. Patient denied any new side effects related to PAH therapy. Patient has denied recent hospitalization since last evaluation. PFT done today with normal spirometry and DLCO.    Remodulin is at 89.2 ng/kg/min (unchanged from Flowsheet recorded in march) and on Tracleer, adempas 0.5mg tid        6MWT: 244 m                     O2 sat  91->83% 6L pull tank                                                          HR 98-->113                          BP  104/64  ->113/56                                                    Roberto 2 ->4    TTE (01/29/2019):    · Normal left ventricular systolic function. The estimated ejection fraction is 65%  · Septal wall has abnormal motion. Systolic flattening of the interventricular septum consistent with right ventricle  pressure overload.  · Biatrial enlargement.  · Grade I (mild) left ventricular diastolic dysfunction consistent with impaired relaxation.  · Normal left atrial pressure.  · Moderately reduced right ventricular systolic function.  · Severe right ventricular enlargement.  · Moderate tricuspid regurgitation.  · Moderate pulmonic regurgitation.  · The estimated PA systolic pressure is 89 mm Hg  · Pulmonary hypertension present.  · Normal central venous pressure (3 mm Hg).      6MWT: 223m (unchanged from August) 231m (195m (236m Jan, 225m, 283m May, 222m Mar, 375m Nov, 305 Sept, 274.5 July,  335.5May, 365  m prev 2 visits)                        O2 sat  86->81% 6L pull tank                                                          HR 87-->94                           BP  110/ 57  ->121/62                                                      Roberto 0.5 ->9    TTE 3/23/18    1 - Normal left ventricular systolic function (EF 60-65%).     2 - No wall motion abnormalities.     3 - Biatrial enlargement.     4 - Impaired LV relaxation, elevated LAP (grade 2 diastolic dysfunction).     5 - Right ventricular enlargement with hypertrophy, with moderately depressed systolic function.     6 - Pulmonary hypertension. The estimated PA systolic pressure is 90 mmHg.     7 - Small pericardial effusion.     8 - Intermediate central venous pressure.     TTE 3/1/18    1 - Normal left ventricular systolic function (EF 55-60%).     2 - Indeterminate LV diastolic function.     3 - Right atrial enlargement.     4 - Right ventricular enlargement with severely depressed systolic function.     5 - Trivial mitral regurgitation.     6 - Moderate tricuspid regurgitation.     7 - No wall motion abnormalities.     8 - Pulmonary hypertension. The estimated PA systolic pressure is greater than 77 mmHg.     TTE 5/8/17:    1 - Right ventricular enlargement with hypertrophy, with severely depressed systolic function.     2 - Moderate to severe tricuspid  "regurgitation.     3 - Pulmonary hypertension. The estimated PA systolic pressure is 116 mmHg.     4 - Biatrial enlargement.     5 - Normal left ventricular systolic function (EF 60-65%); reduced LV stroke volume.  The right ventricle is enlarged measuring 5.8 cm at the base in the apical right ventricle-focused view. There is RVH. Global right ventricular systolic function appears severely depressed. There is abnormal septal motion consistent with   RV pressure/volume overload. Tricuspid annular plane systolic excursion (TAPSE) is 1.5 cm. The estimated PA systolic pressure is 116 mmHg.     [facility] 9/25/17:  CONDITION 1 (9/25/2017 11:20:07):  RA: 5/2 (1)  RV: 84/-5  RVEDP: 6     PW: 17/19 (16)  PA: 87/21 (47)  AO_SAT: 95  AO: 115/71 (86)  PA_SAT: 71  SPO2: 97  FICKCI: 3.2800  FICKCO: 5.5500          Review of Systems   Constitution: Negative for malaise/fatigue.   Cardiovascular: Positive for dyspnea on exertion (NYHA class III) and palpitations. Negative for chest pain, claudication, irregular heartbeat, leg swelling, near-syncope, orthopnea and syncope.   Respiratory: Negative for hemoptysis.    Gastrointestinal: Negative for abdominal pain and hematemesis.       Objective:   Height 5' 4" (1.626 m), weight 58.9 kg (129 lb 13.6 oz).body mass index is 22.29 kg/m².    Physical Exam   Constitutional: She is oriented to person, place, and time. She appears well-developed.   HENT:   Head: Normocephalic.   Eyes: Pupils are equal, round, and reactive to light.   Neck: Normal range of motion. No JVD present.   Cardiovascular: Normal rate.   Murmur heard.  Pulmonary/Chest: Effort normal and breath sounds normal. No respiratory distress. She has no wheezes. She has no rales.   Abdominal: Soft. Bowel sounds are normal. She exhibits no distension. There is no tenderness. There is no rebound.   Musculoskeletal: Normal range of motion. She exhibits no edema.   Neurological: She is alert and oriented to person, place, and time. She " has normal reflexes.       Lab Results   Component Value Date    WBC 4.63 01/29/2019    HGB 14.5 01/29/2019    HCT 45.3 01/29/2019    MCV 92 01/29/2019     01/29/2019    CO2 26 01/29/2019    CREATININE 1.4 01/29/2019    CALCIUM 10.6 (H) 01/29/2019    ALBUMIN 4.0 01/29/2019    AST 20 01/29/2019     (H) 01/29/2019    ALT 15 01/29/2019       Lab Results   Component Value Date    INR 2.3 (H) 04/02/2018    INR 1.3 (H) 03/05/2018    INR 1.1 03/04/2018       BNP   Date Value Ref Range Status   01/29/2019 220 (H) 0 - 99 pg/mL Final     Comment:     Values of less than 100 pg/ml are consistent with non-CHF populations.   11/13/2018 210 (H) 0 - 99 pg/mL Final     Comment:     Values of less than 100 pg/ml are consistent with non-CHF populations.   08/03/2018 335 (H) 0 - 99 pg/mL Final     Comment:     Values of less than 100 pg/ml are consistent with non-CHF populations.           Assessment:     1. PAH (pulmonary artery hypertension)    2. Partial anomalous pulmonary venous connection (PAPVC)    3. Long term current use of amiodarone    4. Adult congenital heart disease        Plan:   -Will reduce Lasix to 20 mg PO daily, 2D TTE revised today, CVP is low and LV is hyperdynamic, suggest mild dehydration.  -Will continue with current PAH therapy, patient [facility] WHO FC III but stable although TTE with evidence of severe RV enlargement and severely elevated PASP.  -Keep salt intake to under 2000 mg sodium, fluids to under 2 L (64 oz)  -Check weights every morning after getting out of bed and urinating. If  weight goes up 3# overnight or 5# in one week she should take 40 mg lasix in the pm and call us if fluid doesn't come off.  -Case discussed with attending physician Dr. MIGNON Uribe MD

## 2019-03-23 ENCOUNTER — HOSPITAL ENCOUNTER (INPATIENT)
Facility: HOSPITAL | Age: 74
LOS: 13 days | Discharge: HOME HEALTH | DRG: 469 | End: 2019-04-05
Attending: EMERGENCY MEDICINE | Admitting: INTERNAL MEDICINE
Payer: MEDICARE

## 2019-03-23 ENCOUNTER — ANESTHESIA EVENT (OUTPATIENT)
Dept: EMERGENCY MEDICINE | Facility: HOSPITAL | Age: 74
End: 2019-03-23

## 2019-03-23 ENCOUNTER — ANESTHESIA (OUTPATIENT)
Dept: EMERGENCY MEDICINE | Facility: HOSPITAL | Age: 74
End: 2019-03-23

## 2019-03-23 DIAGNOSIS — K59.00 CONSTIPATION, UNSPECIFIED CONSTIPATION TYPE: ICD-10-CM

## 2019-03-23 DIAGNOSIS — I50.810 RVF (RIGHT VENTRICULAR FAILURE): ICD-10-CM

## 2019-03-23 DIAGNOSIS — S72.002A CLOSED FRACTURE OF LEFT HIP, INITIAL ENCOUNTER: ICD-10-CM

## 2019-03-23 DIAGNOSIS — J96.11 CHRONIC RESPIRATORY FAILURE WITH HYPOXIA: ICD-10-CM

## 2019-03-23 DIAGNOSIS — I47.10 SVT (SUPRAVENTRICULAR TACHYCARDIA): ICD-10-CM

## 2019-03-23 DIAGNOSIS — Z76.89 ENCOUNTER FOR ASSESSMENT FOR DEEP VEIN THROMBOSIS (DVT): ICD-10-CM

## 2019-03-23 DIAGNOSIS — S72.002D CLOSED FRACTURE OF NECK OF LEFT FEMUR WITH ROUTINE HEALING, SUBSEQUENT ENCOUNTER: ICD-10-CM

## 2019-03-23 DIAGNOSIS — S72.002A CLOSED FRACTURE OF NECK OF LEFT FEMUR: ICD-10-CM

## 2019-03-23 DIAGNOSIS — R50.9 FEVER, UNSPECIFIED FEVER CAUSE: ICD-10-CM

## 2019-03-23 DIAGNOSIS — K59.03 DRUG-INDUCED CONSTIPATION: ICD-10-CM

## 2019-03-23 DIAGNOSIS — I27.20 PULMONARY HYPERTENSION: ICD-10-CM

## 2019-03-23 DIAGNOSIS — Z01.818 PREOP EXAMINATION: ICD-10-CM

## 2019-03-23 DIAGNOSIS — Q24.9 ADULT CONGENITAL HEART DISEASE: ICD-10-CM

## 2019-03-23 DIAGNOSIS — I27.21 PAH (PULMONARY ARTERY HYPERTENSION): ICD-10-CM

## 2019-03-23 DIAGNOSIS — S72.002A CLOSED FRACTURE OF NECK OF LEFT FEMUR, INITIAL ENCOUNTER: Primary | ICD-10-CM

## 2019-03-23 DIAGNOSIS — I95.9 HYPOTENSION, UNSPECIFIED HYPOTENSION TYPE: ICD-10-CM

## 2019-03-23 DIAGNOSIS — I49.9 ABNORMAL HEART RHYTHM: ICD-10-CM

## 2019-03-23 LAB
ABO + RH BLD: NORMAL
BILIRUB UR QL STRIP: NEGATIVE
BLD GP AB SCN CELLS X3 SERPL QL: NORMAL
CLARITY UR REFRACT.AUTO: CLEAR
COLOR UR AUTO: NORMAL
ESTIMATED AVG GLUCOSE: 105 MG/DL
GLUCOSE UR QL STRIP: NEGATIVE
HBA1C MFR BLD HPLC: 5.3 %
HGB UR QL STRIP: NEGATIVE
INR PPP: 2.2
KETONES UR QL STRIP: NEGATIVE
LEUKOCYTE ESTERASE UR QL STRIP: NEGATIVE
MAGNESIUM SERPL-MCNC: 2.1 MG/DL
NITRITE UR QL STRIP: NEGATIVE
PH UR STRIP: 7 [PH] (ref 5–8)
PHOSPHATE SERPL-MCNC: 2.8 MG/DL
PREALB SERPL-MCNC: 28 MG/DL
PROT UR QL STRIP: NEGATIVE
PROTHROMBIN TIME: 21.6 SEC
SP GR UR STRIP: 1.01 (ref 1–1.03)
TRANSFERRIN SERPL-MCNC: 221 MG/DL
URN SPEC COLLECT METH UR: NORMAL

## 2019-03-23 PROCEDURE — 25000003 PHARM REV CODE 250: Performed by: NURSE PRACTITIONER

## 2019-03-23 PROCEDURE — 93010 EKG 12-LEAD: ICD-10-PCS | Mod: ,,, | Performed by: INTERNAL MEDICINE

## 2019-03-23 PROCEDURE — 99285 EMERGENCY DEPT VISIT HI MDM: CPT | Mod: ,,, | Performed by: PHYSICIAN ASSISTANT

## 2019-03-23 PROCEDURE — 99285 EMERGENCY DEPT VISIT HI MDM: CPT

## 2019-03-23 PROCEDURE — 84100 ASSAY OF PHOSPHORUS: CPT

## 2019-03-23 PROCEDURE — 20600001 HC STEP DOWN PRIVATE ROOM

## 2019-03-23 PROCEDURE — 99285 PR EMERGENCY DEPT VISIT,LEVEL V: ICD-10-PCS | Mod: ,,, | Performed by: PHYSICIAN ASSISTANT

## 2019-03-23 PROCEDURE — 83036 HEMOGLOBIN GLYCOSYLATED A1C: CPT

## 2019-03-23 PROCEDURE — 86901 BLOOD TYPING SEROLOGIC RH(D): CPT

## 2019-03-23 PROCEDURE — 81003 URINALYSIS AUTO W/O SCOPE: CPT

## 2019-03-23 PROCEDURE — 84134 ASSAY OF PREALBUMIN: CPT

## 2019-03-23 PROCEDURE — 93005 ELECTROCARDIOGRAM TRACING: CPT

## 2019-03-23 PROCEDURE — 84466 ASSAY OF TRANSFERRIN: CPT

## 2019-03-23 PROCEDURE — 86920 COMPATIBILITY TEST SPIN: CPT

## 2019-03-23 PROCEDURE — 93010 ELECTROCARDIOGRAM REPORT: CPT | Mod: ,,, | Performed by: INTERNAL MEDICINE

## 2019-03-23 PROCEDURE — 83735 ASSAY OF MAGNESIUM: CPT

## 2019-03-23 PROCEDURE — 85610 PROTHROMBIN TIME: CPT

## 2019-03-23 RX ORDER — FUROSEMIDE 20 MG/1
20 TABLET ORAL
Status: DISCONTINUED | OUTPATIENT
Start: 2019-03-24 | End: 2019-04-05 | Stop reason: HOSPADM

## 2019-03-23 RX ORDER — FERROUS GLUCONATE 324(38)MG
324 TABLET ORAL
Status: DISCONTINUED | OUTPATIENT
Start: 2019-03-24 | End: 2019-04-02

## 2019-03-23 RX ORDER — PROMETHAZINE HYDROCHLORIDE 25 MG/1
25 TABLET ORAL EVERY 6 HOURS PRN
Status: DISCONTINUED | OUTPATIENT
Start: 2019-03-23 | End: 2019-03-25

## 2019-03-23 RX ORDER — FENTANYL CITRATE 50 UG/ML
12.5 INJECTION, SOLUTION INTRAMUSCULAR; INTRAVENOUS
Status: DISCONTINUED | OUTPATIENT
Start: 2019-03-23 | End: 2019-03-25

## 2019-03-23 RX ORDER — WARFARIN SODIUM 5 MG/1
5 TABLET ORAL
Status: ON HOLD | COMMUNITY
End: 2019-04-05 | Stop reason: HOSPADM

## 2019-03-23 RX ORDER — BOSENTAN 125 MG/1
125 TABLET, FILM COATED ORAL EVERY 12 HOURS
Status: DISCONTINUED | OUTPATIENT
Start: 2019-03-23 | End: 2019-04-05 | Stop reason: HOSPADM

## 2019-03-23 RX ORDER — FOLIC ACID 1 MG/1
1 TABLET ORAL DAILY
Status: DISCONTINUED | OUTPATIENT
Start: 2019-03-24 | End: 2019-04-05 | Stop reason: HOSPADM

## 2019-03-23 RX ORDER — SODIUM CHLORIDE 0.9 % (FLUSH) 0.9 %
3 SYRINGE (ML) INJECTION EVERY 8 HOURS
Status: DISCONTINUED | OUTPATIENT
Start: 2019-03-23 | End: 2019-04-05 | Stop reason: HOSPADM

## 2019-03-23 RX ORDER — TRAMADOL HYDROCHLORIDE 50 MG/1
100 TABLET ORAL EVERY 6 HOURS PRN
Status: DISCONTINUED | OUTPATIENT
Start: 2019-03-23 | End: 2019-03-25

## 2019-03-23 RX ORDER — AMIODARONE HYDROCHLORIDE 200 MG/1
200 TABLET ORAL DAILY
Status: DISCONTINUED | OUTPATIENT
Start: 2019-03-24 | End: 2019-04-05 | Stop reason: HOSPADM

## 2019-03-23 RX ORDER — CHLORDIAZEPOXIDE HYDROCHLORIDE AND CLIDINIUM BROMIDE 5; 2.5 MG/1; MG/1
1 CAPSULE ORAL
Status: DISCONTINUED | OUTPATIENT
Start: 2019-03-23 | End: 2019-03-24

## 2019-03-23 RX ORDER — CALCIUM CARBONATE 500(1250)
1000 TABLET ORAL DAILY
Status: DISCONTINUED | OUTPATIENT
Start: 2019-03-24 | End: 2019-04-05 | Stop reason: HOSPADM

## 2019-03-23 RX ORDER — CETIRIZINE HYDROCHLORIDE 5 MG/1
5 TABLET ORAL DAILY
Status: DISCONTINUED | OUTPATIENT
Start: 2019-03-24 | End: 2019-04-05 | Stop reason: HOSPADM

## 2019-03-23 RX ORDER — SPIRONOLACTONE 25 MG/1
25 TABLET ORAL DAILY
Status: DISCONTINUED | OUTPATIENT
Start: 2019-03-24 | End: 2019-03-27

## 2019-03-23 RX ORDER — ONDANSETRON 4 MG/1
4 TABLET, ORALLY DISINTEGRATING ORAL EVERY 6 HOURS PRN
Status: DISCONTINUED | OUTPATIENT
Start: 2019-03-23 | End: 2019-03-25

## 2019-03-23 RX ADMIN — ONDANSETRON 4 MG: 4 TABLET, ORALLY DISINTEGRATING ORAL at 06:03

## 2019-03-23 RX ADMIN — BOSENTAN 125 MG: 125 TABLET, FILM COATED ORAL at 10:03

## 2019-03-23 RX ADMIN — TRAMADOL HYDROCHLORIDE 100 MG: 50 TABLET, FILM COATED ORAL at 09:03

## 2019-03-23 RX ADMIN — RIOCIGUAT 0.5 MG: 0.5 TABLET, FILM COATED ORAL at 11:03

## 2019-03-23 NOTE — ED TRIAGE NOTES
Pt arrived via [facility] from [facility]. Pt has confirmed left hip fracture. Good 2+ pedal pulses present. No numbness or tingling. Tong in place with 80ml output. Hx of pulm htn with pump in place infusing. 23ml left. Rates pain 3/10. Does not want pain medication at this time due to extreme sensitivity. Pillow and blankets placed under left leg for support.

## 2019-03-23 NOTE — CONSULTS
[facility]  Cardiology  Consult Note    Patient Name: Molly Smith  MRN: [id number]  Admission Date: 3/23/2019  Hospital Length of Stay: 0 days  Code Status: Prior   Attending Provider: Kimberly Brenner MD   Consulting Provider: Scott Kinsey MD  Primary Care Physician: Roberto Braun MD  Principal Problem:<principal problem not specified>    Patient information was obtained from patient, past medical records and ER records.     Inpatient consult to Cardiology  Consult performed by: Scott Kinsey MD  Consult ordered by: Marie Roe PA-C        Subjective:     Chief Complaint:  Hip fracture     HPI:   73 y.o. 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  She was transfered for higher level of care and orthopedic consult for left femoral neck fracture.  Patient presented to [facility] for hip pain and inability to bear weight on the left leg after a trip and fall yesterday.  Patient tripped on a cord for her pulm HTN pump falling directly onto the left hip.  Patient denies head trauma or upper body injury. She denies numbness or tingling.  Remodulin is at 89.2 ng/kg/min (unchanged from Flowsheet recorded in march) and on Tracleer, adempas 0.5mg tid.    Past Medical History:   Diagnosis Date    Allergy     Anticoagulant long-term use     Arthritis     Heart murmur     Pneumonia     Pulmonary hypertension     Tachycardia        Past Surgical History:   Procedure Laterality Date    ABLATION N/A 6/17/2013    Performed by [facility] J [facility]josa [facility] at [facility] CATH LAB    BACK SURGERY      HEART CATH-RIGHT Right 7/10/2017     Performed by Alice Tim MD at [facility] CATH LAB    INSERTION-CATHETER-ROBERTSON Left 6/24/2014    Performed by González Hernandez MD at [facility] OR 2ND FLR    REPLACEMENT, CATHETER, DIALYSIS, OVER GUIDEWIRE, USING EXISTING VENOUS ACCESS N/A 1/17/2019    Performed by Phoenix Hand MD at [facility] CATH LAB       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    Multaq [dronedarone]        No current facility-administered medications on file prior to encounter.      Current Outpatient Medications on File Prior to Encounter   Medication Sig    amiodarone (PACERONE) 200 MG Tab Take 1 tablet (200 mg total) by mouth once daily.    bosentan (TRACLEER) 125 MG Tab Take by mouth 2 (two) times daily.    CALCIUM CARBONATE (CALCIUM 600 ORAL) Take 2 tablets by mouth once daily.      ferrous gluconate (FERGON) 324 MG tablet Take 1 tablet (324 mg total) by mouth daily with breakfast.    folic acid (FOLVITE) 1 MG tablet Take 1 mg by mouth once daily.    furosemide (LASIX) 20 MG tablet Take 1 tablet (20 mg total) by mouth once daily. (Patient taking differently: Take 20 mg by mouth after lunch. )    loratadine (CLARITIN) 10 mg tablet Take 10 mg by mouth once daily.    ondansetron (ZOFRAN-ODT) 4 MG TbDL     riociguat (ADEMPAS) 0.5 mg Tab tablet Take 1 tablet (0.5 mg total) by mouth 3 (three) times daily.    spironolactone (ALDACTONE) 25 MG tablet Take 25 mg by mouth once daily.      traMADol (ULTRAM-ER) 200 MG Tb24 TAKE ONE TABLET BY MOUTH ONCE A DAY AS NEEDED FOR PAIN THANK YOU!    TREPROSTINIL SODIUM (TREPROSTINIL, REMODULIN, PUMP FOR HOME USE) Pt currently on 89.2 ng/kg/min=45 mL/day dosing weight 70.05 kg    warfarin (COUMADIN) 5 MG tablet Take 5 mg by mouth every Mon, Wed, Fri.    chlordiazepoxide-clidinium 5-2.5 mg (LIBRAX) 5-2.5 mg Cap Take 1 capsule by mouth 3 (three) times daily with meals. TAKE TABLET AS PRN.    promethazine (PHENERGAN) 25 MG tablet  promethazine 25 mg tablet    sodium chloride 0.9% 0.9 % SolP 100 mL with treprostinil 1 mg/mL Soln 9,000,000 ng Inject 5,005.5 ng/min into the vein continuous.    [DISCONTINUED] omeprazole (PRILOSEC) 20 MG capsule once daily.      Family History     Problem Relation (Age of Onset)    Arthritis Mother, Father    Diabetes Father    Heart disease Father    Hypertension Father        Tobacco Use    Smoking status: Never Smoker    Smokeless tobacco: Never Used   Substance and Sexual Activity    Alcohol use: No     Comment: rarely    Drug use: No    Sexual activity: Not on file     Review of Systems   Constitution: Negative.   HENT: Negative.    Eyes: Negative.    Cardiovascular: Negative.  Negative for chest pain, dyspnea on exertion and irregular heartbeat.   Respiratory: Positive for shortness of breath.    Endocrine: Negative.    Skin: Negative.    Musculoskeletal: Positive for falls and joint pain.   Gastrointestinal: Negative.    Genitourinary: Negative.    Neurological: Negative.      Objective:     Vital Signs (Most Recent):  Temp: 98.7 °F (37.1 °C) (03/23/19 1335)  Pulse: 81 (03/23/19 1418)  Resp: 19 (03/23/19 1418)  BP: (!) 102/57 (03/23/19 1417)  SpO2: (!) 86 % (03/23/19 1418) Vital Signs (24h Range):  Temp:  [98.7 °F (37.1 °C)-99.1 °F (37.3 °C)] 98.7 °F (37.1 °C)  Pulse:  [80-89] 81  Resp:  [19-20] 19  SpO2:  [84 %-88 %] 86 %  BP: ()/(57-75) 102/57     Weight: 57.2 kg (126 lb)  Body mass index is 22.32 kg/m².    SpO2: (!) 86 %  O2 Device (Oxygen Therapy): nasal cannula    No intake or output data in the 24 hours ending 03/23/19 1436    Lines/Drains/Airways     Central Venous Catheter Line                 Tunneled Central Line Insertion/Assessment - Single Lumen  left subclavian -- days         Tunneled Central Line Insertion/Assessment - Single Lumen  right subclavian -- days          Drain                 Urethral Catheter 03/23/19 1040 Latex 16 Fr. less than 1 day          Peripheral Intravenous  Line                 Peripheral IV - Single Lumen 03/23/19 1020 Right Forearm less than 1 day                Physical Exam   Constitutional: She is oriented to person, place, and time. She appears well-developed and well-nourished.   HENT:   Head: Normocephalic and atraumatic.   Eyes: Conjunctivae and EOM are normal. Pupils are equal, round, and reactive to light.   Neck: Normal range of motion. Neck supple. No JVD present.   Cardiovascular: Normal rate, regular rhythm and normal heart sounds. Exam reveals no gallop and no friction rub.   No murmur heard.  Pulmonary/Chest: Effort normal and breath sounds normal. No respiratory distress. She has no wheezes. She has no rales. She exhibits no tenderness.   On O2   Abdominal: Soft. Bowel sounds are normal. She exhibits no distension. There is no tenderness.   Musculoskeletal: She exhibits no edema or tenderness.   L hip fracture, leg immoile and shortened.   Neurological: She is alert and oriented to person, place, and time.   Skin: Skin is warm and dry. No erythema. No pallor.       Significant Labs:   CMP   Recent Labs   Lab 03/23/19  1020      K 4.3      CO2 26   GLU 91   BUN 21*   CREATININE 1.10   CALCIUM 9.1   PROT 7.5   ALBUMIN 4.3   BILITOT 1.3   ALKPHOS 50   AST 27   ALT 24   ESTGFRAFRICA 58*   EGFRNONAA 50*   , CBC   Recent Labs   Lab 03/23/19  1020   WBC 6.60   HGB 15.2   HCT 45.7      , INR   Recent Labs   Lab 03/23/19  1418   INR 2.2*   , Lipid Panel No results for input(s): CHOL, HDL, LDLCALC, TRIG, CHOLHDL in the last 48 hours. and Troponin No results for input(s): TROPONINI in the last 48 hours.    Significant Imaging:   L hip xr:  Subcapital left hip fracture    CXR: Airspace opacity in the left lung base, concerning for pneumonia.  New trace small bilateral pleural effusions.  Stable prominence of the hilum along with cardiomegaly, suggestive of longstanding pulmonary hypertension.    2decho 1/29/19  · Normal left ventricular  systolic function. The estimated ejection fraction is 65%  · Septal wall has abnormal motion. Systolic flattening of the interventricular septum consistent with right ventricle pressure overload.  · Biatrial enlargement.  · Grade I (mild) left ventricular diastolic dysfunction consistent with impaired relaxation.  · Normal left atrial pressure.  · Moderately reduced right ventricular systolic function.  · Severe right ventricular enlargement.  · Moderate tricuspid regurgitation.  · Moderate pulmonic regurgitation.  · The estimated PA systolic pressure is 89 mm Hg  · Pulmonary hypertension present.  · Normal central venous pressure (3 mm Hg).        Assessment and Plan:     Closed left hip fracture    Pain control with narcotics as needed  Orthopedics consult  Immobilize left leg until surgery  dvt prophylaxis     PAH (pulmonary artery hypertension)    Continue PH meds as indicated in HPI  Currently euvolemic so would continue lasix 20mg po daily  I/o/dw/2gm na/1500cc  Admit to HTS         VTE Risk Mitigation (From admission, onward)    None          Thank you for your consult.   Discussed w/hts staff    Scott Kinsey MD  Cardiology   [facility]

## 2019-03-23 NOTE — ED NOTES
Pt repositioned for comfort per request. Pt asking for pain medication after eating her meal. Sister is bringing her a meal soon.

## 2019-03-23 NOTE — SUBJECTIVE & OBJECTIVE
Past Medical History:   Diagnosis Date    Allergy     Anticoagulant long-term use     Arthritis     Heart murmur     Pneumonia     Pulmonary hypertension     Tachycardia        Past Surgical History:   Procedure Laterality Date    ABLATION N/A 6/17/2013    Performed by [facility] at [facility] CATH LAB    BACK SURGERY      HEART CATH-RIGHT Right 7/10/2017    Performed by Alice Tim MD at [facility] CATH LAB    INSERTION-CATHETER-ROBERTSON Left 6/24/2014    Performed by González Hernandez MD at [facility] OR 2ND FLR    REPLACEMENT, CATHETER, DIALYSIS, OVER GUIDEWIRE, USING EXISTING VENOUS ACCESS N/A 1/17/2019    Performed by Phoenix Hand MD at [facility] CATH LAB       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    Multaq [dronedarone]        Current Facility-Administered Medications   Medication    [START ON 3/24/2019] amiodarone tablet 200 mg    bosentan tablet 125 mg    [START ON 3/24/2019] calcium carbonate (OS-MARIELLE) tablet 500 mg    [START ON 3/24/2019] cetirizine tablet 5 mg    chlordiazepoxide-clidinium 5-2.5 mg per capsule 1 capsule    fentaNYL injection 12.5 mcg    [START ON 3/24/2019] ferrous gluconate tablet 324 mg    [START ON 3/24/2019] folic acid tablet 1 mg    [START ON 3/24/2019] furosemide tablet 20 mg    ondansetron disintegrating tablet 4 mg    promethazine tablet 25 mg    riociguat (ADEMPAS) tablet 0.5 mg    sodium chloride 0.9% flush 3 mL    [START ON 3/24/2019] spironolactone tablet 25 mg    traMADol tablet 100 mg    [START ON 3/24/2019] treprostinil (REMODULIN) 12,000,000 ng in sodium chloride 0.9% 100 mL infusion    VELETRI/REMODULIN CASSETTE    VELETRI/REMODULIN TUBING     Current Outpatient Medications   Medication Sig    amiodarone (PACERONE) 200 MG Tab Take 1 tablet (200 mg total) by mouth once daily.    bosentan (TRACLEER) 125 MG Tab Take by mouth 2 (two) times daily.    CALCIUM CARBONATE (CALCIUM 600 ORAL)  Take 2 tablets by mouth once daily.      ferrous gluconate (FERGON) 324 MG tablet Take 1 tablet (324 mg total) by mouth daily with breakfast.    folic acid (FOLVITE) 1 MG tablet Take 1 mg by mouth once daily.    furosemide (LASIX) 20 MG tablet Take 1 tablet (20 mg total) by mouth once daily. (Patient taking differently: Take 20 mg by mouth after lunch. )    loratadine (CLARITIN) 10 mg tablet Take 10 mg by mouth once daily.    ondansetron (ZOFRAN-ODT) 4 MG TbDL     riociguat (ADEMPAS) 0.5 mg Tab tablet Take 1 tablet (0.5 mg total) by mouth 3 (three) times daily.    spironolactone (ALDACTONE) 25 MG tablet Take 25 mg by mouth once daily.      traMADol (ULTRAM-ER) 200 MG Tb24 TAKE ONE TABLET BY MOUTH ONCE A DAY AS NEEDED FOR PAIN THANK YOU!    TREPROSTINIL SODIUM (TREPROSTINIL, REMODULIN, PUMP FOR HOME USE) Pt currently on 89.2 ng/kg/min=45 mL/day dosing weight 70.05 kg    warfarin (COUMADIN) 5 MG tablet Take 5 mg by mouth every Mon, Wed, Fri.    chlordiazepoxide-clidinium 5-2.5 mg (LIBRAX) 5-2.5 mg Cap Take 1 capsule by mouth 3 (three) times daily with meals. TAKE TABLET AS PRN.    promethazine (PHENERGAN) 25 MG tablet promethazine 25 mg tablet    sodium chloride 0.9% 0.9 % SolP 100 mL with treprostinil 1 mg/mL Soln 9,000,000 ng Inject 5,005.5 ng/min into the vein continuous.     Family History     Problem Relation (Age of Onset)    Arthritis Mother, Father    Diabetes Father    Heart disease Father    Hypertension Father        Tobacco Use    Smoking status: Never Smoker    Smokeless tobacco: Never Used   Substance and Sexual Activity    Alcohol use: No     Comment: rarely    Drug use: No    Sexual activity: Not on file     Review of Systems   Constitutional: Negative.    HENT: Negative.    Eyes: Negative.    Respiratory: Negative.    Cardiovascular: Negative.    Gastrointestinal: Negative.    Endocrine: Negative.    Genitourinary: Negative.    Musculoskeletal:        L hip pain with any movement    Allergic/Immunologic: Negative.    Neurological: Negative.    Hematological: Negative.    Psychiatric/Behavioral: Negative.      Objective:     Vital Signs (Most Recent):  Temp: 98.7 °F (37.1 °C) (03/23/19 1335)  Pulse: 90 (03/23/19 1547)  Resp: 17 (03/23/19 1547)  BP: (!) 106/58 (03/23/19 1547)  SpO2: (!) 88 % (03/23/19 1547) Vital Signs (24h Range):  Temp:  [98.7 °F (37.1 °C)-99.1 °F (37.3 °C)] 98.7 °F (37.1 °C)  Pulse:  [80-90] 90  Resp:  [17-21] 17  SpO2:  [84 %-88 %] 88 %  BP: ()/(56-75) 106/58     Patient Vitals for the past 72 hrs (Last 3 readings):   Weight   03/23/19 1335 57.2 kg (126 lb)     Body mass index is 22.32 kg/m².    No intake or output data in the 24 hours ending 03/23/19 1609    Physical Exam   Constitutional: She is oriented to person, place, and time. She appears well-developed and well-nourished.   HENT:   Head: Normocephalic.   Eyes: Pupils are equal, round, and reactive to light.   Neck: Normal range of motion. Neck supple. No JVD present.   [place] site CDI   Cardiovascular: Normal rate and regular rhythm.   Pulmonary/Chest: Effort normal and breath sounds normal.   Abdominal: Soft. Bowel sounds are normal.   Musculoskeletal: Normal range of motion.   Neurological: She is alert and oriented to person, place, and time.   Skin: Skin is warm and dry.   Psychiatric: She has a normal mood and affect. Her behavior is normal.   Nursing note and vitals reviewed.      Significant Labs:  CBC:  Recent Labs   Lab 03/23/19  1020   WBC 6.60   RBC 5.00   HGB 15.2   HCT 45.7      MCV 91   MCH 30.4   MCHC 33.2     BNP:  No results for input(s): BNP in the last 168 hours.    Invalid input(s): BNPTRIAGELBLO  CMP:  Recent Labs   Lab 03/23/19  1020   GLU 91   CALCIUM 9.1   ALBUMIN 4.3   PROT 7.5      K 4.3   CO2 26      BUN 21*   CREATININE 1.10   ALKPHOS 50   ALT 24   AST 27   BILITOT 1.3      Coagulation:   Recent Labs   Lab 03/23/19  1418   INR 2.2*     LDH:  No results for  input(s): LDH in the last 72 hours.  Microbiology:  Microbiology Results (last 7 days)     ** No results found for the last 168 hours. **        I have reviewed all pertinent labs within the past 24 hours.    Diagnostic Results:  I have reviewed and interpreted all pertinent imaging results/findings within the past 24 hours.

## 2019-03-23 NOTE — HPI
73 y.o. 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  She was transfered for higher level of care and orthopedic consult for left femoral neck fracture.  Patient presented to [facility] for hip pain and inability to bear weight on the left leg after a trip and fall yesterday.  Patient tripped on a cord for her pulm HTN pump falling directly onto the left hip.  Patient denies head trauma or upper body injury. She denies numbness or tingling.  Remodulin is at 89.2 ng/kg/min (unchanged from Flowsheet recorded in march) and on Tracleer, adempas 0.5mg tid.

## 2019-03-23 NOTE — ASSESSMENT & PLAN NOTE
-Euvolemic on exam. Continue PO Lasix.  -Continue riociguat, bosentan, remodulin([facility] CDI).  - to manage remodulin infusion.  -Hold warfarin.

## 2019-03-23 NOTE — ED PROVIDER NOTES
Encounter Date: 3/23/2019       History     Chief Complaint   Patient presents with    Extremity Weakness     patient arrives as a transfer from [facility] for evaluation of left femur fracture sustained last night after a trip and fall - patient has excellent pedal pulses to affected extremity     73-year-old female with a history of pulmonary hypertension with chronic respiratory failure on 4 L continuous O2 (baseline O2 sats in mid 80s to high 70s) on Remodulin therapy presents to the ED as a transfer for higher level of care and orthopedic consult for left femoral neck fracture.  Patient presented to [facility] for hip pain and inability to bear weight on the left leg after a trip and fall yesterday.  Patient tripped on a cord for her pulm HTN pump falling directly onto the left hip.  Patient denies head trauma or upper body injury. She denies numbness or tingling.           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    Multaq [dronedarone]      Past Medical History:   Diagnosis Date    Allergy     Anticoagulant long-term use     Arthritis     Heart murmur     Pneumonia     Pulmonary hypertension     Tachycardia      Past Surgical History:   Procedure Laterality Date    ABLATION N/A 6/17/2013    Performed by [facility] J [facility]osa MD at [facility] CATH LAB    BACK SURGERY      HEART CATH-RIGHT Right 7/10/2017    Performed by Alice Tim MD at [facility] CATH LAB    INSERTION-CATHETER-ROBERTSON Left 6/24/2014    Performed by González Hernandez MD at [facility] OR 2ND FLR    REPLACEMENT, CATHETER, DIALYSIS, OVER GUIDEWIRE, USING EXISTING VENOUS ACCESS N/A 1/17/2019    Performed by Phoenix Hand MD at [facility] CATH LAB     Family History   Problem Relation Age of Onset    Arthritis Mother     Arthritis Father     Diabetes Father     Heart disease Father     Hypertension Father      Social History     Tobacco Use    Smoking status: Never Smoker     Smokeless tobacco: Never Used   Substance Use Topics    Alcohol use: No     Comment: rarely    Drug use: No     Review of Systems   Constitutional: Negative for fever.   HENT: Negative for sore throat.    Respiratory: Negative for shortness of breath.    Cardiovascular: Negative for chest pain.   Gastrointestinal: Negative for nausea.   Genitourinary: Negative for dysuria.   Musculoskeletal: Negative for back pain.        Left hip pain   Skin: Negative for rash.   Neurological: Negative for weakness and headaches.   Hematological: Does not bruise/bleed easily.       Physical Exam     Initial Vitals [03/23/19 1335]   BP Pulse Resp Temp SpO2   (!) 99/59 83 20 98.7 °F (37.1 °C) (!) 88 %      MAP       --         Physical Exam    Nursing note and vitals reviewed.  Constitutional: She appears well-developed and well-nourished. She is not diaphoretic.  Non-toxic appearance. She does not appear ill. No distress.   HENT:   Head: Normocephalic and atraumatic.   Neck: Neck supple.   Cardiovascular: Normal rate and regular rhythm. Exam reveals no gallop and no friction rub.    No murmur heard.  Pulmonary/Chest: Effort normal and breath sounds normal. No accessory muscle usage. No tachypnea. No respiratory distress. She has no decreased breath sounds. She has no wheezes. She has no rhonchi. She has no rales.   Abdominal: She exhibits no distension.   Musculoskeletal:   ROM of L hip not assessed due to known fracture.  Strong DP pulse.  Normal sensation to the LLE.    Neurological: She is alert.   Skin: No rash noted.   Psychiatric: She has a normal mood and affect. Her behavior is normal.         ED Course   Procedures  Labs Reviewed   PROTIME-INR - Abnormal; Notable for the following components:       Result Value    Prothrombin Time 21.6 (*)     INR 2.2 (*)     All other components within normal limits   HEMOGLOBIN A1C   URINALYSIS, REFLEX TO URINE CULTURE    Narrative:     Preferred Collection Type->Urine, Clean  Catch  yellow and grey   TRANSFERRIN   PREALBUMIN   PHOSPHORUS   MAGNESIUM   TYPE & SCREEN          Imaging Results          X-Ray Femur 2 View Left (In process)                X-Ray Chest 1 View (Final result)  Result time 03/23/19 14:16:47    Final result by Kalia Díaz MD (03/23/19 14:16:47)                 Impression:      Airspace opacity in the left lung base, concerning for pneumonia.    New trace small bilateral pleural effusions.    Stable prominence of the hilum along with cardiomegaly, suggestive of longstanding pulmonary hypertension.      Electronically signed by: Kalia Díaz MD  Date:    03/23/2019  Time:    14:16             Narrative:    EXAMINATION:  XR CHEST 1 VIEW    CLINICAL HISTORY:  Encounter for other preprocedural examination    TECHNIQUE:  Single frontal view of the chest was performed.    COMPARISON:  11/22/2018.    FINDINGS:  There is a stable appearance of a right-sided central access catheter with its tip in the right atrium.  There are calcifications involving the tracheal cartilage.  The trachea is otherwise unremarkable.  There is stable enlargement of the cardiac silhouette.  There are calcifications of the aortic knob.  There is stable prominence of the bilateral hilum.  There are small bilateral pleural effusions.  These are new compared to the prior examination.  There is no evidence of a pneumothorax.  There is no evidence of pneumomediastinum.  There is an azygos fissure.  There is a new airspace opacity in the left lung base.  There are degenerative changes in the osseous structures.  The subcutaneous tissues are unremarkable.                                 Medical Decision Making:   History:   Old Medical Records: I decided to obtain old medical records.  Differential Diagnosis:   My differential diagnosis includes but is not limited to:  Fracture, dislocation, strain, contusion  Clinical Tests:   Lab Tests: Ordered and Reviewed  Radiological Study: Ordered and  Reviewed  Medical Tests: Ordered and Reviewed  Other:   I have discussed this case with another health care provider.       <> Summary of the Discussion: Heart transplant service, orthopedics  I did not independently interpret the above noted test(s).     I did not obtain additional patient history or summarized patients previous records      APC / Resident Notes:   73-year-old female presents as a transfer for further management of a left femoral neck fracture sustained after a fall yesterday onto the left hip.  Patient is a pulmonary hypertension patient on Remodulin infusion.  O2 sats are in the mid to high 80s, which is baseline for her.  Patient in no acute distress. Left DP pulse is strong.  Sensation is normal to the left lower extremity.    Cardiology/HTS service was consulted given her pulm HTN and meds.  Ortho also consulted.  Pt will be admitted to HTS service with ortho following. Preop evaluation chest x-ray, EKG, INR and type and screen were added.  Basic blood work obtained prior to transfer.  Offered patient pain medication, but she declined at this time.   I have reviewed the patient's records and discussed this case with my supervising physician.                   Clinical Impression:       ICD-10-CM ICD-9-CM   1. Closed fracture of neck of left femur, initial encounter S72.002A 820.8   2. Preop examination Z01.818 V72.84   3. Pulmonary hypertension I27.20 416.8         Disposition:   Disposition: Admitted  Condition: Fair                               Marie Roe PA-C  03/23/19 1705       Marie Roe PA-C  04/05/19 1158

## 2019-03-23 NOTE — ED NOTES
"Sydney from [facility] states "she is good for another 6 hours. She should have a bed by shift change but if she doesn't I will come down and change pump." Advised pt of POC.   "

## 2019-03-23 NOTE — PLAN OF CARE
Pt at high risk from cardiac/pulmonary standpoint to undergo general anesthesia. Would recommend regional anesthesia if possible. If general anesthesia is required, we would like cardiac anesthesia involved.

## 2019-03-23 NOTE — ED NOTES
Bed: 12  Expected date: 3/23/19  Expected time: 1:30 PM  Means of arrival:   Comments:  [facility] [place] [facility] according to the [facility] transfer

## 2019-03-23 NOTE — SUBJECTIVE & OBJECTIVE
Past Medical History:   Diagnosis Date    Allergy     Anticoagulant long-term use     Arthritis     Heart murmur     Pneumonia     Pulmonary hypertension     Tachycardia        Past Surgical History:   Procedure Laterality Date    ABLATION N/A 6/17/2013    Performed by [facility] J [facility]osa MD at [facility] CATH LAB    BACK SURGERY      HEART CATH-RIGHT Right 7/10/2017    Performed by Alice Tim MD at [facility] CATH LAB    INSERTION-CATHETER-ROBERTSON Left 6/24/2014    Performed by González Hernandez MD at [facility] OR 2ND FLR    REPLACEMENT, CATHETER, DIALYSIS, OVER GUIDEWIRE, USING EXISTING VENOUS ACCESS N/A 1/17/2019    Performed by Phoenix Hand MD at [facility] CATH LAB       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    Multaq [dronedarone]        No current facility-administered medications on file prior to encounter.      Current Outpatient Medications on File Prior to Encounter   Medication Sig    amiodarone (PACERONE) 200 MG Tab Take 1 tablet (200 mg total) by mouth once daily.    bosentan (TRACLEER) 125 MG Tab Take by mouth 2 (two) times daily.    CALCIUM CARBONATE (CALCIUM 600 ORAL) Take 2 tablets by mouth once daily.      ferrous gluconate (FERGON) 324 MG tablet Take 1 tablet (324 mg total) by mouth daily with breakfast.    folic acid (FOLVITE) 1 MG tablet Take 1 mg by mouth once daily.    furosemide (LASIX) 20 MG tablet Take 1 tablet (20 mg total) by mouth once daily. (Patient taking differently: Take 20 mg by mouth after lunch. )    loratadine (CLARITIN) 10 mg tablet Take 10 mg by mouth once daily.    ondansetron (ZOFRAN-ODT) 4 MG TbDL     riociguat (ADEMPAS) 0.5 mg Tab tablet Take 1 tablet (0.5 mg total) by mouth 3 (three) times daily.    spironolactone (ALDACTONE) 25 MG tablet Take 25 mg by mouth once daily.      traMADol (ULTRAM-ER) 200 MG Tb24 TAKE ONE TABLET BY MOUTH ONCE A DAY AS NEEDED FOR PAIN THANK YOU!    TREPROSTINIL SODIUM  (TREPROSTINIL, REMODULIN, PUMP FOR HOME USE) Pt currently on 89.2 ng/kg/min=45 mL/day dosing weight 70.05 kg    warfarin (COUMADIN) 5 MG tablet Take 5 mg by mouth every Mon, Wed, Fri.    chlordiazepoxide-clidinium 5-2.5 mg (LIBRAX) 5-2.5 mg Cap Take 1 capsule by mouth 3 (three) times daily with meals. TAKE TABLET AS PRN.    promethazine (PHENERGAN) 25 MG tablet promethazine 25 mg tablet    sodium chloride 0.9% 0.9 % SolP 100 mL with treprostinil 1 mg/mL Soln 9,000,000 ng Inject 5,005.5 ng/min into the vein continuous.    [DISCONTINUED] omeprazole (PRILOSEC) 20 MG capsule once daily.      Family History     Problem Relation (Age of Onset)    Arthritis Mother, Father    Diabetes Father    Heart disease Father    Hypertension Father        Tobacco Use    Smoking status: Never Smoker    Smokeless tobacco: Never Used   Substance and Sexual Activity    Alcohol use: No     Comment: rarely    Drug use: No    Sexual activity: Not on file     Review of Systems   Constitution: Negative.   HENT: Negative.    Eyes: Negative.    Cardiovascular: Negative.  Negative for chest pain, dyspnea on exertion and irregular heartbeat.   Respiratory: Positive for shortness of breath.    Endocrine: Negative.    Skin: Negative.    Musculoskeletal: Positive for falls and joint pain.   Gastrointestinal: Negative.    Genitourinary: Negative.    Neurological: Negative.      Objective:     Vital Signs (Most Recent):  Temp: 98.7 °F (37.1 °C) (03/23/19 1335)  Pulse: 81 (03/23/19 1418)  Resp: 19 (03/23/19 1418)  BP: (!) 102/57 (03/23/19 1417)  SpO2: (!) 86 % (03/23/19 1418) Vital Signs (24h Range):  Temp:  [98.7 °F (37.1 °C)-99.1 °F (37.3 °C)] 98.7 °F (37.1 °C)  Pulse:  [80-89] 81  Resp:  [19-20] 19  SpO2:  [84 %-88 %] 86 %  BP: ()/(57-75) 102/57     Weight: 57.2 kg (126 lb)  Body mass index is 22.32 kg/m².    SpO2: (!) 86 %  O2 Device (Oxygen Therapy): nasal cannula    No intake or output data in the 24 hours ending 03/23/19  1436    Lines/Drains/Airways     Central Venous Catheter Line                 Tunneled Central Line Insertion/Assessment - Single Lumen  left subclavian -- days         Tunneled Central Line Insertion/Assessment - Single Lumen  right subclavian -- days          Drain                 Urethral Catheter 03/23/19 1040 Latex 16 Fr. less than 1 day          Peripheral Intravenous Line                 Peripheral IV - Single Lumen 03/23/19 1020 Right Forearm less than 1 day                Physical Exam   Constitutional: She is oriented to person, place, and time. She appears well-developed and well-nourished.   HENT:   Head: Normocephalic and atraumatic.   Eyes: Conjunctivae and EOM are normal. Pupils are equal, round, and reactive to light.   Neck: Normal range of motion. Neck supple. No JVD present.   Cardiovascular: Normal rate, regular rhythm and normal heart sounds. Exam reveals no gallop and no friction rub.   No murmur heard.  Pulmonary/Chest: Effort normal and breath sounds normal. No respiratory distress. She has no wheezes. She has no rales. She exhibits no tenderness.   On O2   Abdominal: Soft. Bowel sounds are normal. She exhibits no distension. There is no tenderness.   Musculoskeletal: She exhibits no edema or tenderness.   L hip fracture, leg immoile and shortened.   Neurological: She is alert and oriented to person, place, and time.   Skin: Skin is warm and dry. No erythema. No pallor.       Significant Labs:   CMP   Recent Labs   Lab 03/23/19  1020      K 4.3      CO2 26   GLU 91   BUN 21*   CREATININE 1.10   CALCIUM 9.1   PROT 7.5   ALBUMIN 4.3   BILITOT 1.3   ALKPHOS 50   AST 27   ALT 24   ESTGFRAFRICA 58*   EGFRNONAA 50*   , CBC   Recent Labs   Lab 03/23/19  1020   WBC 6.60   HGB 15.2   HCT 45.7      , INR   Recent Labs   Lab 03/23/19  1418   INR 2.2*   , Lipid Panel No results for input(s): CHOL, HDL, LDLCALC, TRIG, CHOLHDL in the last 48 hours. and Troponin No results for input(s):  TROPONINI in the last 48 hours.    Significant Imaging:   L hip xr:  Subcapital left hip fracture    CXR: Airspace opacity in the left lung base, concerning for pneumonia.  New trace small bilateral pleural effusions.  Stable prominence of the hilum along with cardiomegaly, suggestive of longstanding pulmonary hypertension.    2decho 1/29/19  · Normal left ventricular systolic function. The estimated ejection fraction is 65%  · Septal wall has abnormal motion. Systolic flattening of the interventricular septum consistent with right ventricle pressure overload.  · Biatrial enlargement.  · Grade I (mild) left ventricular diastolic dysfunction consistent with impaired relaxation.  · Normal left atrial pressure.  · Moderately reduced right ventricular systolic function.  · Severe right ventricular enlargement.  · Moderate tricuspid regurgitation.  · Moderate pulmonic regurgitation.  · The estimated PA systolic pressure is 89 mm Hg  · Pulmonary hypertension present.  · Normal central venous pressure (3 mm Hg).

## 2019-03-23 NOTE — H&P
[facility]  Heart Transplant  H&P    Patient Name: Molly Smith  MRN: [id number]  Admission Date: 3/23/2019  Attending Physician: Kimberly Brenner MD  Primary Care Provider: Roberto Braun MD  Principal Problem:Closed fracture of neck of left femur    Subjective:     History of Present Illness:  73 y.o. 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 Patient presented to [facility] for hip pain and inability to bear weight on the left leg after a trip and fall yesterday.  Patient tripped on a cord for her pulm HTN pump falling directly onto the left hip. Patient denies head trauma or upper body injury. She denies numbness or tingling. Currently lying on stretcher with  at bedside in [facility].      Past Medical History:   Diagnosis Date    Allergy     Anticoagulant long-term use     Arthritis     Heart murmur     Pneumonia     Pulmonary hypertension     Tachycardia        Past Surgical History:   Procedure Laterality Date    ABLATION N/A 6/17/2013    Performed by Jose J [facility]josa MD at [facility] CATH LAB    BACK SURGERY      HEART CATH-RIGHT Right 7/10/2017    Performed by Alice Tim MD at [facility] CATH LAB    INSERTION-CATHETER-ROBERTSON Left 6/24/2014    Performed by González Hernandez MD at [facility] OR 2ND FLR    REPLACEMENT, CATHETER, DIALYSIS, OVER GUIDEWIRE, USING EXISTING VENOUS ACCESS N/A 1/17/2019    Performed by Phoenix Hand MD at [facility] CATH LAB       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    Multaq [dronedarone]        Current Facility-Administered  Medications   Medication    [START ON 3/24/2019] amiodarone tablet 200 mg    bosentan tablet 125 mg    [START ON 3/24/2019] calcium carbonate (OS-MARIELLE) tablet 500 mg    [START ON 3/24/2019] cetirizine tablet 5 mg    chlordiazepoxide-clidinium 5-2.5 mg per capsule 1 capsule    fentaNYL injection 12.5 mcg    [START ON 3/24/2019] ferrous gluconate tablet 324 mg    [START ON 3/24/2019] folic acid tablet 1 mg    [START ON 3/24/2019] furosemide tablet 20 mg    ondansetron disintegrating tablet 4 mg    promethazine tablet 25 mg    riociguat (ADEMPAS) tablet 0.5 mg    sodium chloride 0.9% flush 3 mL    [START ON 3/24/2019] spironolactone tablet 25 mg    traMADol tablet 100 mg    [START ON 3/24/2019] treprostinil (REMODULIN) 12,000,000 ng in sodium chloride 0.9% 100 mL infusion    VELETRI/REMODULIN CASSETTE    VELETRI/REMODULIN TUBING     Current Outpatient Medications   Medication Sig    amiodarone (PACERONE) 200 MG Tab Take 1 tablet (200 mg total) by mouth once daily.    bosentan (TRACLEER) 125 MG Tab Take by mouth 2 (two) times daily.    CALCIUM CARBONATE (CALCIUM 600 ORAL) Take 2 tablets by mouth once daily.      ferrous gluconate (FERGON) 324 MG tablet Take 1 tablet (324 mg total) by mouth daily with breakfast.    folic acid (FOLVITE) 1 MG tablet Take 1 mg by mouth once daily.    furosemide (LASIX) 20 MG tablet Take 1 tablet (20 mg total) by mouth once daily. (Patient taking differently: Take 20 mg by mouth after lunch. )    loratadine (CLARITIN) 10 mg tablet Take 10 mg by mouth once daily.    ondansetron (ZOFRAN-ODT) 4 MG TbDL     riociguat (ADEMPAS) 0.5 mg Tab tablet Take 1 tablet (0.5 mg total) by mouth 3 (three) times daily.    spironolactone (ALDACTONE) 25 MG tablet Take 25 mg by mouth once daily.      traMADol (ULTRAM-ER) 200 MG Tb24 TAKE ONE TABLET BY MOUTH ONCE A DAY AS NEEDED FOR PAIN THANK YOU!    TREPROSTINIL SODIUM (TREPROSTINIL, REMODULIN, PUMP FOR HOME USE) Pt currently on 89.2  ng/kg/min=45 mL/day dosing weight 70.05 kg    warfarin (COUMADIN) 5 MG tablet Take 5 mg by mouth every Mon, Wed, Fri.    chlordiazepoxide-clidinium 5-2.5 mg (LIBRAX) 5-2.5 mg Cap Take 1 capsule by mouth 3 (three) times daily with meals. TAKE TABLET AS PRN.    promethazine (PHENERGAN) 25 MG tablet promethazine 25 mg tablet    sodium chloride 0.9% 0.9 % SolP 100 mL with treprostinil 1 mg/mL Soln 9,000,000 ng Inject 5,005.5 ng/min into the vein continuous.     Family History     Problem Relation (Age of Onset)    Arthritis Mother, Father    Diabetes Father    Heart disease Father    Hypertension Father        Tobacco Use    Smoking status: Never Smoker    Smokeless tobacco: Never Used   Substance and Sexual Activity    Alcohol use: No     Comment: rarely    Drug use: No    Sexual activity: Not on file     Review of Systems   Constitutional: Negative.    HENT: Negative.    Eyes: Negative.    Respiratory: Negative.    Cardiovascular: Negative.    Gastrointestinal: Negative.    Endocrine: Negative.    Genitourinary: Negative.    Musculoskeletal:        L hip pain with any movement   Allergic/Immunologic: Negative.    Neurological: Negative.    Hematological: Negative.    Psychiatric/Behavioral: Negative.      Objective:     Vital Signs (Most Recent):  Temp: 98.7 °F (37.1 °C) (03/23/19 1335)  Pulse: 90 (03/23/19 1547)  Resp: 17 (03/23/19 1547)  BP: (!) 106/58 (03/23/19 1547)  SpO2: (!) 88 % (03/23/19 1547) Vital Signs (24h Range):  Temp:  [98.7 °F (37.1 °C)-99.1 °F (37.3 °C)] 98.7 °F (37.1 °C)  Pulse:  [80-90] 90  Resp:  [17-21] 17  SpO2:  [84 %-88 %] 88 %  BP: ()/(56-75) 106/58     Patient Vitals for the past 72 hrs (Last 3 readings):   Weight   03/23/19 1335 57.2 kg (126 lb)     Body mass index is 22.32 kg/m².    No intake or output data in the 24 hours ending 03/23/19 1609    Physical Exam   Constitutional: She is oriented to person, place, and time. She appears well-developed and well-nourished.   HENT:    Head: Normocephalic.   Eyes: Pupils are equal, round, and reactive to light.   Neck: Normal range of motion. Neck supple. No JVD present.   Maguire site CDI   Cardiovascular: Normal rate and regular rhythm.   Pulmonary/Chest: Effort normal and breath sounds normal.   Abdominal: Soft. Bowel sounds are normal.   Musculoskeletal: Normal range of motion.   Neurological: She is alert and oriented to person, place, and time.   Skin: Skin is warm and dry.   Psychiatric: She has a normal mood and affect. Her behavior is normal.   Nursing note and vitals reviewed.      Significant Labs:  CBC:  Recent Labs   Lab 03/23/19  1020   WBC 6.60   RBC 5.00   HGB 15.2   HCT 45.7      MCV 91   MCH 30.4   MCHC 33.2     BNP:  No results for input(s): BNP in the last 168 hours.    Invalid input(s): BNPTRIAGELBLO  CMP:  Recent Labs   Lab 03/23/19  1020   GLU 91   CALCIUM 9.1   ALBUMIN 4.3   PROT 7.5      K 4.3   CO2 26      BUN 21*   CREATININE 1.10   ALKPHOS 50   ALT 24   AST 27   BILITOT 1.3      Coagulation:   Recent Labs   Lab 03/23/19  1418   INR 2.2*     LDH:  No results for input(s): LDH in the last 72 hours.  Microbiology:  Microbiology Results (last 7 days)     ** No results found for the last 168 hours. **        I have reviewed all pertinent labs within the past 24 hours.    Diagnostic Results:  I have reviewed and interpreted all pertinent imaging results/findings within the past 24 hours.    Assessment/Plan:     * Closed fracture of neck of left femur    -Pain control with tramadol and fentanyl for now.   -Bedrest.  -NPO for now pending Ortho Cx.      PAH (pulmonary artery hypertension)    -Euvolemic on exam. Continue PO Lasix.  -Continue riociguat, bosentan, remodulin(maguire site CDI).  - to manage remodulin infusion.  -Hold warfarin.     SVT (supraventricular tachycardia)    -Continue amio.     Chronic respiratory failure with hypoxia    - on 5L O2 at home.   - Goal sat >/=85%.         Joe  CARRINGTON Arellano [id number]  Heart Transplant  [facility]

## 2019-03-23 NOTE — HPI
73 y.o. 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 Patient presented to [facility] for hip pain and inability to bear weight on the left leg after a trip and fall yesterday.  Patient tripped on a cord for her pulm HTN pump falling directly onto the left hip. Patient denies head trauma or upper body injury. She denies numbness or tingling. Currently lying on stretcher with  at bedside in [place].

## 2019-03-23 NOTE — ASSESSMENT & PLAN NOTE
Continue PH meds as indicated in HPI  Currently euvolemic so would continue lasix 20mg po daily  I/o/dw/2gm na/1500cc  Admit to HTS

## 2019-03-23 NOTE — ED NOTES
Leyda Arellano with Heart transplant team. Pt is NPO after midnight. He will fix order. Verbal order that pt is able to eat. Pt eating her meal at this time.

## 2019-03-23 NOTE — ED NOTES
"Answered call bell. Pt states "it's too hot in here." No thermostat in room. Door opened. Will consult charge nurse. Pt also asking to eat. Pt NPO at this time, but will consult Marie for orders.   "

## 2019-03-23 NOTE — ASSESSMENT & PLAN NOTE
Pain control with narcotics as needed  Orthopedics consult  Immobilize left leg until surgery  dvt prophylaxis

## 2019-03-24 ENCOUNTER — ANESTHESIA (OUTPATIENT)
Dept: SURGERY | Facility: HOSPITAL | Age: 74
DRG: 469 | End: 2019-03-24
Payer: MEDICARE

## 2019-03-24 ENCOUNTER — ANESTHESIA EVENT (OUTPATIENT)
Dept: SURGERY | Facility: HOSPITAL | Age: 74
DRG: 469 | End: 2019-03-24
Payer: MEDICARE

## 2019-03-24 LAB
ANION GAP SERPL CALC-SCNC: 8 MMOL/L (ref 8–16)
BASOPHILS # BLD AUTO: 0.04 K/UL (ref 0–0.2)
BASOPHILS NFR BLD: 0.7 % (ref 0–1.9)
BUN SERPL-MCNC: 21 MG/DL (ref 8–23)
CALCIUM SERPL-MCNC: 8.4 MG/DL (ref 8.7–10.5)
CHLORIDE SERPL-SCNC: 103 MMOL/L (ref 95–110)
CO2 SERPL-SCNC: 27 MMOL/L (ref 23–29)
CREAT SERPL-MCNC: 1.2 MG/DL (ref 0.5–1.4)
DIFFERENTIAL METHOD: ABNORMAL
EOSINOPHIL # BLD AUTO: 0.1 K/UL (ref 0–0.5)
EOSINOPHIL NFR BLD: 2.2 % (ref 0–8)
ERYTHROCYTE [DISTWIDTH] IN BLOOD BY AUTOMATED COUNT: 13.9 % (ref 11.5–14.5)
EST. GFR  (AFRICAN AMERICAN): 52.2 ML/MIN/1.73 M^2
EST. GFR  (NON AFRICAN AMERICAN): 45.3 ML/MIN/1.73 M^2
GLUCOSE SERPL-MCNC: 92 MG/DL (ref 70–110)
HCT VFR BLD AUTO: 42.7 % (ref 37–48.5)
HGB BLD-MCNC: 14.1 G/DL (ref 12–16)
IMM GRANULOCYTES # BLD AUTO: 0.04 K/UL (ref 0–0.04)
IMM GRANULOCYTES NFR BLD AUTO: 0.7 % (ref 0–0.5)
INR PPP: 1.4 (ref 0.8–1.2)
INR PPP: 1.9 (ref 0.8–1.2)
LYMPHOCYTES # BLD AUTO: 0.8 K/UL (ref 1–4.8)
LYMPHOCYTES NFR BLD: 14.4 % (ref 18–48)
MAGNESIUM SERPL-MCNC: 2.2 MG/DL (ref 1.6–2.6)
MCH RBC QN AUTO: 30.7 PG (ref 27–31)
MCHC RBC AUTO-ENTMCNC: 33 G/DL (ref 32–36)
MCV RBC AUTO: 93 FL (ref 82–98)
MONOCYTES # BLD AUTO: 0.5 K/UL (ref 0.3–1)
MONOCYTES NFR BLD: 10 % (ref 4–15)
NEUTROPHILS # BLD AUTO: 3.9 K/UL (ref 1.8–7.7)
NEUTROPHILS NFR BLD: 72 % (ref 38–73)
NRBC BLD-RTO: 0 /100 WBC
PHOSPHATE SERPL-MCNC: 3.2 MG/DL (ref 2.7–4.5)
PLATELET # BLD AUTO: 202 K/UL (ref 150–350)
PMV BLD AUTO: 9.7 FL (ref 9.2–12.9)
POTASSIUM SERPL-SCNC: 3.7 MMOL/L (ref 3.5–5.1)
PROTHROMBIN TIME: 14.1 SEC (ref 9–12.5)
PROTHROMBIN TIME: 18.6 SEC (ref 9–12.5)
RBC # BLD AUTO: 4.59 M/UL (ref 4–5.4)
SODIUM SERPL-SCNC: 138 MMOL/L (ref 136–145)
WBC # BLD AUTO: 5.42 K/UL (ref 3.9–12.7)

## 2019-03-24 PROCEDURE — A4216 STERILE WATER/SALINE, 10 ML: HCPCS | Performed by: NURSE PRACTITIONER

## 2019-03-24 PROCEDURE — 85025 COMPLETE CBC W/AUTO DIFF WBC: CPT

## 2019-03-24 PROCEDURE — 83735 ASSAY OF MAGNESIUM: CPT

## 2019-03-24 PROCEDURE — 25000003 PHARM REV CODE 250: Performed by: STUDENT IN AN ORGANIZED HEALTH CARE EDUCATION/TRAINING PROGRAM

## 2019-03-24 PROCEDURE — 25000003 PHARM REV CODE 250: Performed by: NURSE PRACTITIONER

## 2019-03-24 PROCEDURE — 25000242 PHARM REV CODE 250 ALT 637 W/ HCPCS: Performed by: STUDENT IN AN ORGANIZED HEALTH CARE EDUCATION/TRAINING PROGRAM

## 2019-03-24 PROCEDURE — 63600175 PHARM REV CODE 636 W HCPCS: Performed by: STUDENT IN AN ORGANIZED HEALTH CARE EDUCATION/TRAINING PROGRAM

## 2019-03-24 PROCEDURE — 20600001 HC STEP DOWN PRIVATE ROOM

## 2019-03-24 PROCEDURE — 85610 PROTHROMBIN TIME: CPT | Mod: 91

## 2019-03-24 PROCEDURE — 85610 PROTHROMBIN TIME: CPT

## 2019-03-24 PROCEDURE — 80048 BASIC METABOLIC PNL TOTAL CA: CPT

## 2019-03-24 PROCEDURE — 36415 COLL VENOUS BLD VENIPUNCTURE: CPT

## 2019-03-24 PROCEDURE — 99233 SBSQ HOSP IP/OBS HIGH 50: CPT | Mod: ,,, | Performed by: INTERNAL MEDICINE

## 2019-03-24 PROCEDURE — 84100 ASSAY OF PHOSPHORUS: CPT

## 2019-03-24 PROCEDURE — 99233 PR SUBSEQUENT HOSPITAL CARE,LEVL III: ICD-10-PCS | Mod: ,,, | Performed by: INTERNAL MEDICINE

## 2019-03-24 PROCEDURE — 63600175 PHARM REV CODE 636 W HCPCS: Performed by: NURSE PRACTITIONER

## 2019-03-24 RX ORDER — CHLORDIAZEPOXIDE HYDROCHLORIDE AND CLIDINIUM BROMIDE 5; 2.5 MG/1; MG/1
1 CAPSULE ORAL 3 TIMES DAILY PRN
Status: DISCONTINUED | OUTPATIENT
Start: 2019-03-24 | End: 2019-03-26

## 2019-03-24 RX ORDER — FLUTICASONE PROPIONATE 50 MCG
2 SPRAY, SUSPENSION (ML) NASAL DAILY
Status: DISCONTINUED | OUTPATIENT
Start: 2019-03-24 | End: 2019-03-24

## 2019-03-24 RX ORDER — ACETAMINOPHEN 500 MG
1000 TABLET ORAL 3 TIMES DAILY
Status: DISCONTINUED | OUTPATIENT
Start: 2019-03-24 | End: 2019-03-25

## 2019-03-24 RX ORDER — FLUTICASONE PROPIONATE 50 MCG
2 SPRAY, SUSPENSION (ML) NASAL NIGHTLY
Status: DISCONTINUED | OUTPATIENT
Start: 2019-03-24 | End: 2019-04-05 | Stop reason: HOSPADM

## 2019-03-24 RX ORDER — POTASSIUM CHLORIDE 20 MEQ/1
40 TABLET, EXTENDED RELEASE ORAL ONCE
Status: COMPLETED | OUTPATIENT
Start: 2019-03-24 | End: 2019-03-24

## 2019-03-24 RX ORDER — HYDROCODONE BITARTRATE AND ACETAMINOPHEN 500; 5 MG/1; MG/1
TABLET ORAL
Status: DISCONTINUED | OUTPATIENT
Start: 2019-03-24 | End: 2019-03-25

## 2019-03-24 RX ADMIN — TRAMADOL HYDROCHLORIDE 100 MG: 50 TABLET, FILM COATED ORAL at 04:03

## 2019-03-24 RX ADMIN — ONDANSETRON 4 MG: 4 TABLET, ORALLY DISINTEGRATING ORAL at 02:03

## 2019-03-24 RX ADMIN — FLUTICASONE PROPIONATE 100 MCG: 50 SPRAY, METERED NASAL at 09:03

## 2019-03-24 RX ADMIN — TRAMADOL HYDROCHLORIDE 100 MG: 50 TABLET, FILM COATED ORAL at 12:03

## 2019-03-24 RX ADMIN — FERROUS GLUCONATE TAB 324 MG (37.5 MG ELEMENTAL IRON) 324 MG: 324 (37.5 FE) TAB at 09:03

## 2019-03-24 RX ADMIN — CETIRIZINE HYDROCHLORIDE 5 MG: 5 TABLET ORAL at 09:03

## 2019-03-24 RX ADMIN — RIOCIGUAT 0.5 MG: 0.5 TABLET, FILM COATED ORAL at 09:03

## 2019-03-24 RX ADMIN — RIOCIGUAT 0.5 MG: 0.5 TABLET, FILM COATED ORAL at 03:03

## 2019-03-24 RX ADMIN — SPIRONOLACTONE 25 MG: 25 TABLET ORAL at 09:03

## 2019-03-24 RX ADMIN — Medication 3 ML: at 02:03

## 2019-03-24 RX ADMIN — FENTANYL CITRATE 12.5 MCG: 50 INJECTION, SOLUTION INTRAMUSCULAR; INTRAVENOUS at 10:03

## 2019-03-24 RX ADMIN — FOLIC ACID 1 MG: 1 TABLET ORAL at 09:03

## 2019-03-24 RX ADMIN — FUROSEMIDE 20 MG: 20 TABLET ORAL at 12:03

## 2019-03-24 RX ADMIN — CHLORDIAZEPOXIDE HYDROCHLORIDE AND CLIDINIUM BROMIDE 1 CAPSULE: 5; 2.5 CAPSULE ORAL at 11:03

## 2019-03-24 RX ADMIN — PHYTONADIONE 5 MG: 10 INJECTION, EMULSION INTRAMUSCULAR; INTRAVENOUS; SUBCUTANEOUS at 12:03

## 2019-03-24 RX ADMIN — AMIODARONE HYDROCHLORIDE 200 MG: 200 TABLET ORAL at 09:03

## 2019-03-24 RX ADMIN — POTASSIUM CHLORIDE 40 MEQ: 1500 TABLET, EXTENDED RELEASE ORAL at 09:03

## 2019-03-24 RX ADMIN — CALCIUM 1000 MG: 500 TABLET ORAL at 09:03

## 2019-03-24 RX ADMIN — BOSENTAN 125 MG: 125 TABLET, FILM COATED ORAL at 09:03

## 2019-03-24 RX ADMIN — ACETAMINOPHEN 1000 MG: 500 TABLET ORAL at 09:03

## 2019-03-24 RX ADMIN — ACETAMINOPHEN 1000 MG: 500 TABLET ORAL at 05:03

## 2019-03-24 NOTE — PROGRESS NOTES
Verified home remodulin dose with dosing sheet provided by . Pt is running at 45ml/24 hr; 200,000 concentration; dosing weight 70.05 kg. Pt  has all needed supplies to mix cassette and prefers to stay at home concentration. Pt will go to OR in am for hip repair.

## 2019-03-24 NOTE — PROGRESS NOTES
Notified Dr. Cassidy of the following:  Patient complaining of pain to L leg - tramadol not due until 1900. Patient hesitant to take IV fentanyl due to pain medication sensitivity and associated nausea.   MD ordered Acetaminophen 1 gram TID (scheduled). Will carry out orders. Will continue to monitor.

## 2019-03-24 NOTE — SUBJECTIVE & OBJECTIVE
Past Medical History:   Diagnosis Date    Allergy     Anticoagulant long-term use     Arthritis     Heart murmur     Pneumonia     Pulmonary hypertension     Tachycardia        Past Surgical History:   Procedure Laterality Date    ABLATION N/A 6/17/2013    Performed by Jose J Hinojosa MD at [facility] CATH LAB    BACK SURGERY      HEART CATH-RIGHT Right 7/10/2017    Performed by Alice Tim MD at [facility] CATH LAB    INSERTION-CATHETER-ROBERTSON Left 6/24/2014    Performed by González Hernandez MD at [facility] OR 2ND FLR    REPLACEMENT, CATHETER, DIALYSIS, OVER GUIDEWIRE, USING EXISTING VENOUS ACCESS N/A 1/17/2019    Performed by Phoenix Hand MD at [facility] CATH LAB       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    Multaq [dronedarone]        Current Facility-Administered Medications   Medication    amiodarone tablet 200 mg    bosentan tablet 125 mg    calcium carbonate (OS-MARIELLE) tablet 500 mg    cetirizine tablet 5 mg    chlordiazepoxide-clidinium 5-2.5 mg per capsule 1 capsule    fentaNYL injection 12.5 mcg    ferrous gluconate tablet 324 mg    folic acid tablet 1 mg    furosemide tablet 20 mg    ondansetron disintegrating tablet 4 mg    promethazine tablet 25 mg    riociguat (ADEMPAS) tablet 0.5 mg    sodium chloride 0.9% flush 3 mL    spironolactone tablet 25 mg    traMADol tablet 100 mg    treprostinil (REMODULIN) 12,000,000 ng in sodium chloride 0.9% 100 mL infusion    VELETRI/REMODULIN CASSETTE    VELETRI/REMODULIN TUBING     Family History     Problem Relation (Age of Onset)    Arthritis Mother, Father    Diabetes Father    Heart disease Father    Hypertension Father        Tobacco Use    Smoking status: Never Smoker    Smokeless tobacco: Never Used   Substance and Sexual Activity    Alcohol use: No     Comment: rarely    Drug use: No    Sexual activity: Not on file     ROS See ER Provider ROS  Objective:     Vital Signs (Most  "Recent):  Temp: 98.9 °F (37.2 °C) (03/23/19 1717)  Pulse: 75 (03/24/19 0330)  Resp: 16 (03/24/19 0330)  BP: (!) 91/50 (03/24/19 0330)  SpO2: (!) 86 % (03/24/19 0330) Vital Signs (24h Range):  Temp:  [98.7 °F (37.1 °C)-99.1 °F (37.3 °C)] 98.9 °F (37.2 °C)  Pulse:  [75-90] 75  Resp:  [15-24] 16  SpO2:  [84 %-88 %] 86 %  BP: ()/(49-75) 91/50     Weight: 57.2 kg (126 lb)  Height: 5' 3" (160 cm)  Body mass index is 22.32 kg/m².      Intake/Output Summary (Last 24 hours) at 3/24/2019 0435  Last data filed at 3/23/2019 2300  Gross per 24 hour   Intake 180 ml   Output 550 ml   Net -370 ml       Ortho/SPM Exam  NAD  NCAT  Patient on NC O2  Dunbar central line dressing c/d/i    MSK  LLE  Skin intact  No edema/erythema/signs of infection  TTP L hip  Compartments soft  Full active and passive ankle ROM. ROM hip and knee limited 2/2 hip pain  SILT Sa/Thompson/DP/SP/T  Motor intact EHL/FHL/TA/Gastroc  2+ DP, 2+ PT    All other joints (shoulder/elbow/wrist/hip/knee/ankle) were examined and had full ROM and were non-tender to palpation.        Significant Labs: All pertinent labs within the past 24 hours have been reviewed.  INR 2.2  Recent Labs   Lab 03/23/19  1020   WBC 6.60   RBC 5.00   HGB 15.2   HCT 45.7      MCV 91   MCH 30.4   MCHC 33.2           Significant Imaging: I have reviewed all pertinent imaging results/findings.   Minimally displaced garden 3 left subcapital femoral neck fx  "

## 2019-03-24 NOTE — CONSULTS
[facility]  Orthopedics  Consult Note    Patient Name: Molly Smith  MRN: [id number]  Admission Date: 3/23/2019  Hospital Length of Stay: 1 days  Attending Provider: Sheila Vuong MD  Primary Care Provider: Roberto Braun MD    Patient information was obtained from patient, spouse/SO and ER records.     Inpatient consult to Orthopedic Surgery  Consult performed by: Delano Estrada MD  Consult ordered by: Marie Roe PA-C        Subjective:     Principal Problem:Closed fracture of neck of left femur    Chief Complaint:   Chief Complaint   Patient presents with    Extremity Weakness     patient arrives as a transfer from [facility] for evaluation of left femur fracture sustained last night after a trip and fall - patient has excellent pedal pulses to affected extremity        HPI: 73 y.o. WF with adult congenital heart disease with prior dx of sinus venosus defect, anomalous pulmonary venous drainage with PAH diagnosed in past 10 years who is currently on IV remodulin, adempas and tracleer, no steroid use who was transfered from [facility] for higher level of care and orthopedic consult for left femoral neck fracture. Patient presented to [facility] in the morning on 3/23 for hip pain and inability to bear weight on the left leg after a trip and fall on 3/22 at approximately 9:30 pm.  Patient tripped on a cord for her pulm HTN pump falling directly onto the left hip. Initially she was able to bear weight and did ambulate the evening of the fall. However, on waking this morning she was no longer able to ambulate secondary to pain. Patient denies head trauma or upper body injury. She denies numbness or tingling. Of note patient is anticoagulated on Warfarin 2/2 afib. She ambulates without assistive device at baseline.        Past Medical History:   Diagnosis Date    Allergy     Anticoagulant long-term use     Arthritis     Heart murmur     Pneumonia      Pulmonary hypertension     Tachycardia        Past Surgical History:   Procedure Laterality Date    ABLATION N/A 6/17/2013    Performed by [facility] J [facility]josa [facility] at [facility] CATH LAB    BACK SURGERY      HEART CATH-RIGHT Right 7/10/2017    Performed by Alice Tim MD at [facility] CATH LAB    INSERTION-CATHETER-ROBERTSON Left 6/24/2014    Performed by González Hernandez MD at [facility] OR 2ND FLR    REPLACEMENT, CATHETER, DIALYSIS, OVER GUIDEWIRE, USING EXISTING VENOUS ACCESS N/A 1/17/2019    Performed by Phoenix Hand MD at [facility] CATH LAB       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    Multaq [dronedarone]        Current Facility-Administered Medications   Medication    amiodarone tablet 200 mg    bosentan tablet 125 mg    calcium carbonate (OS-MARIELLE) tablet 500 mg    cetirizine tablet 5 mg    chlordiazepoxide-clidinium 5-2.5 mg per capsule 1 capsule    fentaNYL injection 12.5 mcg    ferrous gluconate tablet 324 mg    folic acid tablet 1 mg    furosemide tablet 20 mg    ondansetron disintegrating tablet 4 mg    promethazine tablet 25 mg    riociguat (ADEMPAS) tablet 0.5 mg    sodium chloride 0.9% flush 3 mL    spironolactone tablet 25 mg    traMADol tablet 100 mg    treprostinil (REMODULIN) 12,000,000 ng in sodium chloride 0.9% 100 mL infusion    VELETRI/REMODULIN CASSETTE    VELETRI/REMODULIN TUBING     Family History     Problem Relation (Age of Onset)    Arthritis Mother, Father    Diabetes Father    Heart disease Father    Hypertension Father        Tobacco Use    Smoking status: Never Smoker    Smokeless tobacco: Never Used   Substance and Sexual Activity    Alcohol use: No     Comment: rarely    Drug use: No    Sexual activity: Not on file     ROS See ER Provider ROS  Objective:     Vital Signs (Most Recent):  Temp: 98.9 °F (37.2 °C) (03/23/19 1717)  Pulse: 75 (03/24/19 0330)  Resp: 16 (03/24/19 0330)  BP: (!) 91/50 (03/24/19  "0330)  SpO2: (!) 86 % (03/24/19 0330) Vital Signs (24h Range):  Temp:  [98.7 °F (37.1 °C)-99.1 °F (37.3 °C)] 98.9 °F (37.2 °C)  Pulse:  [75-90] 75  Resp:  [15-24] 16  SpO2:  [84 %-88 %] 86 %  BP: ()/(49-75) 91/50     Weight: 57.2 kg (126 lb)  Height: 5' 3" (160 cm)  Body mass index is 22.32 kg/m².      Intake/Output Summary (Last 24 hours) at 3/24/2019 0435  Last data filed at 3/23/2019 2300  Gross per 24 hour   Intake 180 ml   Output 550 ml   Net -370 ml       Ortho/SPM Exam  NAD  NCAT  Patient on NC O2  Dunbar central line dressing c/d/i    MSK  LLE  Skin intact  No edema/erythema/signs of infection  TTP L hip  Compartments soft  Full active and passive ankle ROM. ROM hip and knee limited 2/2 hip pain  SILT Sa/Thompson/DP/SP/T  Motor intact EHL/FHL/TA/Gastroc  2+ DP, 2+ PT    All other joints (shoulder/elbow/wrist/hip/knee/ankle) were examined and had full ROM and were non-tender to palpation.        Significant Labs: All pertinent labs within the past 24 hours have been reviewed.  INR 2.2  Recent Labs   Lab 03/23/19  1020   WBC 6.60   RBC 5.00   HGB 15.2   HCT 45.7      MCV 91   MCH 30.4   MCHC 33.2           Significant Imaging: I have reviewed all pertinent imaging results/findings.   Minimally displaced garden 3 left subcapital femoral neck fx    Assessment/Plan:     * Closed fracture of neck of left femur  Pt is a 72 yo F w/ severe pulmonary HTN and L fem neck fx.    -Admitted to heart transplant service  -Discussed with anesthesia and heart transplant teams regarding safest options of providing anesthesia for this patient given severe pulmonary HTN and elevated INR.   -Possible L hip hemiarthroplasty this morning pending discussion between staff physician and family. Patient may benefit from regional anesthesia and therefore may delay case until Monday  -All risks, benefits, alternatives to surgery explained. All questions answered. Patient expresses understanding and would like to proceed with " surgery. Operative site marked.   -NWB LLE  -NPO for possible surgery this AM             Delano Estrada MD  Orthopedics  [facility]

## 2019-03-24 NOTE — ASSESSMENT & PLAN NOTE
Pt is a 72 yo F w/ severe pulmonary HTN and L fem neck fx     -Admitted to heart transplant service  -Discussed with anesthesia and heart transplant teams regarding safest options of providing anesthesia for this patient given severe pulmonary HTN and elevated INR.   -Possible L hip hemiarthroplasty this morning pending discussion between staff physician and family. Patient may benefit from regional anesthesia and therefore may delay case until Monday  -All risks, benefits, alternatives to surgery explained. All questions answered. Patient expresses understanding and would like to proceed with surgery. Operative site marked.   -NWB LLE  -NPO for possible surgery this AM

## 2019-03-24 NOTE — HPI
73 y.o. WF with adult congenital heart disease with prior dx of sinus venosus defect, anomalous pulmonary venous drainage with PAH diagnosed in past 10 years who is currently on IV remodulin, adempas and tracleer, no steroid use who was transfered from [facility] for higher level of care and orthopedic consult for left femoral neck fracture. Patient presented to [facility] in the morning on 3/23 for hip pain and inability to bear weight on the left leg after a trip and fall on 3/22 at approximately 9:30 pm.  Patient tripped on a cord for her pulm HTN pump falling directly onto the left hip. Initially she was able to bear weight and did ambulate the evening of the fall. However, on waking this morning she was no longer able to ambulate secondary to pain. Patient denies head trauma or upper body injury. She denies numbness or tingling. Of note patient is anticoagulated on Warfarin 2/2 afib. She ambulates without assistive device at baseline.

## 2019-03-24 NOTE — PROGRESS NOTES
Pt arrived from ED via stretcher. Pt AAOx4, VS WNL (baseline) including 02 sats 85% on 4lnc. Pt has home remodulin infusing to [facility] ting, nursing communication that  is to manage remodulin and due to be changed tonight. Pt admits to pain 6/10 to left hip and can't tolerate any movement to left leg d't pain.  Initiated tele and ordered kassandra hose and scds. Pt  at side. Oriented pt to room, call bell within reach. Will continue to monitor pt.

## 2019-03-24 NOTE — SUBJECTIVE & OBJECTIVE
"Principal Problem:Closed fracture of neck of left femur    Principal Orthopedic Problem: L femoral neck fx    Interval History: Pt seen and examined at bedside. MIRTA. Pt reports pain controlled. Discussed that we will be talking with her again today regarding plan for fixation of L fem neck fracture.    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    Multaq [dronedarone]        Current Facility-Administered Medications   Medication    0.9%  NaCl infusion (for blood administration)    amiodarone tablet 200 mg    bosentan tablet 125 mg    calcium carbonate (OS-MARIELLE) tablet 500 mg    cetirizine tablet 5 mg    chlordiazepoxide-clidinium 5-2.5 mg per capsule 1 capsule    fentaNYL injection 12.5 mcg    ferrous gluconate tablet 324 mg    fluticasone 50 mcg/actuation nasal spray 100 mcg    folic acid tablet 1 mg    furosemide tablet 20 mg    ondansetron disintegrating tablet 4 mg    promethazine tablet 25 mg    riociguat (ADEMPAS) tablet 0.5 mg    sodium chloride 0.9% flush 3 mL    spironolactone tablet 25 mg    traMADol tablet 100 mg    treprostinil (REMODULIN) 12,000,000 ng in sodium chloride 0.9% 100 mL infusion    VELETRI/REMODULIN CASSETTE    VELETRI/REMODULIN TUBING     Objective:     Vital Signs (Most Recent):  Temp: 98.9 °F (37.2 °C) (03/23/19 1717)  Pulse: 75 (03/24/19 0330)  Resp: 16 (03/24/19 0330)  BP: (!) 91/50 (03/24/19 0330)  SpO2: (!) 86 % (03/24/19 0330) Vital Signs (24h Range):  Temp:  [98.7 °F (37.1 °C)-99.1 °F (37.3 °C)] 98.9 °F (37.2 °C)  Pulse:  [75-90] 75  Resp:  [15-24] 16  SpO2:  [84 %-88 %] 86 %  BP: ()/(49-75) 91/50     Weight: 57.2 kg (126 lb)  Height: 5' 3" (160 cm)  Body mass index is 22.32 kg/m².      Intake/Output Summary (Last 24 hours) at 3/24/2019 0700  Last data filed at 3/23/2019 2300  Gross per 24 hour   Intake 180 ml   Output 550 ml   Net -370 ml     NAD  Normal respiratory effort on NC O2    Ortho/SPM " Exam  Pt lying in bed this morning in NAD  SILT Sa/Thompson/DP/SP/T  Motor intact EHL/FHL/TA/Gastroc  2+ DP, 2+ PT    Significant Labs: All pertinent labs within the past 24 hours have been reviewed.    Significant Imaging: I have reviewed all pertinent imaging results/findings.

## 2019-03-24 NOTE — SUBJECTIVE & OBJECTIVE
Interval History: NAEON.    Continuous Infusions:   treprostinil (REMODULIN) infusion      veletri/remodulin cassette      veletri/remodulin tubing       Scheduled Meds:   amiodarone  200 mg Oral Daily    bosentan  125 mg Oral Q12H    calcium carbonate  1,000 mg Oral Daily    cetirizine  5 mg Oral Daily    chlordiazepoxide-clidinium 5-2.5 mg  1 capsule Oral TID WM    ferrous gluconate  324 mg Oral Daily with breakfast    fluticasone  2 spray Each Nare Daily    folic acid  1 mg Oral Daily    furosemide  20 mg Oral After lunch    riociguat  0.5 mg Oral TID    sodium chloride 0.9%  3 mL Intravenous Q8H    spironolactone  25 mg Oral Daily     PRN Meds:sodium chloride, fentaNYL, ondansetron, promethazine, traMADol    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    Multaq [dronedarone]      Objective:     Vital Signs (Most Recent):  Temp: 98.9 °F (37.2 °C) (03/23/19 1717)  Pulse: 75 (03/24/19 0330)  Resp: 16 (03/24/19 0330)  BP: (!) 91/50 (03/24/19 0330)  SpO2: (!) 86 % (03/24/19 0330) Vital Signs (24h Range):  Temp:  [98.7 °F (37.1 °C)-99.1 °F (37.3 °C)] 98.9 °F (37.2 °C)  Pulse:  [75-90] 75  Resp:  [15-24] 16  SpO2:  [84 %-88 %] 86 %  BP: ()/(49-75) 91/50     Patient Vitals for the past 72 hrs (Last 3 readings):   Weight   03/23/19 1335 57.2 kg (126 lb)     Body mass index is 22.32 kg/m².      Intake/Output Summary (Last 24 hours) at 3/24/2019 0651  Last data filed at 3/23/2019 2300  Gross per 24 hour   Intake 180 ml   Output 550 ml   Net -370 ml       Hemodynamic Parameters:       Physical Exam   Constitutional: She is oriented to person, place, and time. She appears well-developed and well-nourished. No distress.   HENT:   Head: Normocephalic and atraumatic.   Eyes: EOM are normal.   Neck: Neck supple. No JVD present.   Cardiovascular: Normal rate and regular rhythm.   Pulmonary/Chest: Effort normal and breath sounds normal. No respiratory  distress.   Abdominal: Soft. Bowel sounds are normal. She exhibits no distension.   Musculoskeletal: She exhibits no edema.   Neurological: She is alert and oriented to person, place, and time.   Skin: Skin is warm and dry. Capillary refill takes less than 2 seconds. No erythema.   Psychiatric: She has a normal mood and affect. Her behavior is normal. Thought content normal.       Significant Labs:  CBC:  Recent Labs   Lab 03/23/19  1020   WBC 6.60   RBC 5.00   HGB 15.2   HCT 45.7      MCV 91   MCH 30.4   MCHC 33.2     BNP:  No results for input(s): BNP in the last 168 hours.    Invalid input(s): BNPTRIAGELBLO  CMP:  Recent Labs   Lab 03/23/19  1020   GLU 91   CALCIUM 9.1   ALBUMIN 4.3   PROT 7.5      K 4.3   CO2 26      BUN 21*   CREATININE 1.10   ALKPHOS 50   ALT 24   AST 27   BILITOT 1.3      Coagulation:   Recent Labs   Lab 03/23/19  1418   INR 2.2*     LDH:  No results for input(s): LDH in the last 72 hours.  Microbiology:  Microbiology Results (last 7 days)     ** No results found for the last 168 hours. **          I have reviewed all pertinent labs within the past 24 hours.    Estimated Creatinine Clearance: 38.2 mL/min (based on SCr of 1.1 mg/dL).    Diagnostic Results:  I have reviewed all pertinent imaging results/findings within the past 24 hours.

## 2019-03-24 NOTE — PROGRESS NOTES
Notified Dr. Ulloa of the following:   Patient states she does not need scheduled Librax, she takes it for diarrhea and abdominal cramps.   MD stated OK to switch order to 3 x a day PRN. Will carry out orders. Will continue to monitor.

## 2019-03-24 NOTE — ANESTHESIA PREPROCEDURE EVALUATION
[facility]  Anesthesia Pre-Operative Evaluation         Patient Name: Molly Smith  YOB: 1945  MRN: [id number]    SUBJECTIVE:     Pre-operative evaluation for Procedure(s) (LRB):  HEMIARTHROPLASTY, HIP - left  - matthew - peg board (Left)     03/24/2019    Molly Smith is a 73 y.o. 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    Patient's O2 requirements have increased to 6L over the last year. She additionally endorses a hx of a lumbar laminectomy in the 80s, she is unsure what levels. Additionally, pts INR was 2.2 3/23    Per HTS, patient is high risk for general anesthesia and regional is recommended; should patient need general they recommend cardiac staff. Plan currently may be to delay case until Monday, 3/25 for regional availability. General, regional, epidural, a line placement all discussed with patient and patient  at bedside 3/24, consents signed.      LDA: R FA 20G    Prev airway: None documented, all prior MACs    Drips:    treprostinil (REMODULIN) infusion      veletri/remodulin cassette      veletri/remodulin tubing           Patient Active Problem List   Diagnosis    Atrial flutter    Palpitations    Pseudogout    Anticoagulant long-term use    Swelling of joint of right wrist    Chronic pulmonary heart disease    Chronic respiratory failure with hypoxia    Partial anomalous pulmonary venous connection (PAPVC)    Sinus venosus defect    PAH (pulmonary artery hypertension)    SVT (supraventricular tachycardia)    Diarrhea     Adult congenital heart disease    Shortness of breath    NSTEMI (non-ST elevated myocardial infarction)    Elevated troponin    Myositis ossificans traumatica    Long term current use of amiodarone    Pulmonary HTN    Age-related osteoporosis without current pathological fracture    Closed left hip fracture    Closed fracture of neck of left femur       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    Multaq [dronedarone]        Current Inpatient Medications:   amiodarone  200 mg Oral Daily    bosentan  125 mg Oral Q12H    calcium carbonate  1,000 mg Oral Daily    cetirizine  5 mg Oral Daily    chlordiazepoxide-clidinium 5-2.5 mg  1 capsule Oral TID WM    ferrous gluconate  324 mg Oral Daily with breakfast    folic acid  1 mg Oral Daily    furosemide  20 mg Oral After lunch    riociguat  0.5 mg Oral TID    sodium chloride 0.9%  3 mL Intravenous Q8H    spironolactone  25 mg Oral Daily       No current facility-administered medications on file prior to encounter.      Current Outpatient Medications on File Prior to Encounter   Medication Sig Dispense Refill    amiodarone (PACERONE) 200 MG Tab Take 1 tablet (200 mg total) by mouth once daily. 30 tablet 11    bosentan (TRACLEER) 125 MG Tab Take by mouth 2 (two) times daily.      CALCIUM CARBONATE (CALCIUM 600 ORAL) Take 2 tablets by mouth once daily.        ferrous gluconate (FERGON) 324 MG tablet Take 1 tablet (324 mg total) by mouth daily with breakfast.      folic acid (FOLVITE) 1 MG tablet Take 1 mg by mouth once daily.      furosemide (LASIX) 20 MG tablet Take 1 tablet (20 mg total) by mouth once daily. (Patient taking differently: Take 20 mg by mouth after lunch. ) 30 tablet 11    loratadine (CLARITIN) 10 mg tablet Take 10 mg by mouth once daily.      ondansetron (ZOFRAN-ODT) 4 MG TbDL   6    riociguat (ADEMPAS) 0.5 mg Tab tablet Take 1 tablet (0.5 mg total) by mouth 3 (three)  times daily.      spironolactone (ALDACTONE) 25 MG tablet Take 25 mg by mouth once daily.        traMADol (ULTRAM-ER) 200 MG Tb24 TAKE ONE TABLET BY MOUTH ONCE A DAY AS NEEDED FOR PAIN THANK YOU! 30 tablet 5    TREPROSTINIL SODIUM (TREPROSTINIL, REMODULIN, PUMP FOR HOME USE) Pt currently on 89.2 ng/kg/min=45 mL/day dosing weight 70.05 kg 60 mL 11    warfarin (COUMADIN) 5 MG tablet Take 5 mg by mouth every Mon, Wed, Fri.      chlordiazepoxide-clidinium 5-2.5 mg (LIBRAX) 5-2.5 mg Cap Take 1 capsule by mouth 3 (three) times daily with meals. TAKE TABLET AS PRN.      promethazine (PHENERGAN) 25 MG tablet promethazine 25 mg tablet      sodium chloride 0.9% 0.9 % SolP 100 mL with treprostinil 1 mg/mL Soln 9,000,000 ng Inject 5,005.5 ng/min into the vein continuous. 1 ampule 11       Past Surgical History:   Procedure Laterality Date    ABLATION N/A 6/17/2013    Performed by Jose J Hinojosa MD at [facility] CATH LAB    BACK SURGERY      HEART CATH-RIGHT Right 7/10/2017    Performed by Alice Tim MD at [facility] CATH LAB    INSERTION-CATHETER-ROBERTSON Left 6/24/2014    Performed by Gonzláez Hernandez MD at [facility] OR 2ND FLR    REPLACEMENT, CATHETER, DIALYSIS, OVER GUIDEWIRE, USING EXISTING VENOUS ACCESS N/A 1/17/2019    Performed by Phoenix Hand MD at [facility] CATH LAB       Social History     Socioeconomic History    Marital status:      Spouse name: Not on file    Number of children: Not on file    Years of education: Not on file    Highest education level: Not on file   Occupational History    Not on file   Social Needs    Financial resource strain: Not on file    Food insecurity:     Worry: Not on file     Inability: Not on file    Transportation needs:     Medical: Not on file     Non-medical: Not on file   Tobacco Use    Smoking status: Never Smoker    Smokeless tobacco: Never Used   Substance and Sexual Activity    Alcohol use: No     Comment: rarely    Drug use: No    Sexual activity: Not  on file   Lifestyle    Physical activity:     Days per week: Not on file     Minutes per session: Not on file    Stress: Not on file   Relationships    Social connections:     Talks on phone: Not on file     Gets together: Not on file     Attends Gnosticist service: Not on file     Active member of club or organization: Not on file     Attends meetings of clubs or organizations: Not on file     Relationship status: Not on file    Intimate partner violence:     Fear of current or ex partner: Not on file     Emotionally abused: Not on file     Physically abused: Not on file     Forced sexual activity: Not on file   Other Topics Concern    Not on file   Social History Narrative    Not on file       OBJECTIVE:     Vital Signs Range (Last 24H):  Temp:  [37.1 °C (98.7 °F)-37.3 °C (99.1 °F)]   Pulse:  [75-90]   Resp:  [15-24]   BP: ()/(49-75)   SpO2:  [84 %-88 %]       CBC:   Recent Labs     03/23/19  1020   WBC 6.60   RBC 5.00   HGB 15.2   HCT 45.7      MCV 91   MCH 30.4   MCHC 33.2       CMP:   Recent Labs     03/23/19  1020 03/23/19  1448     --    K 4.3  --      --    CO2 26  --    BUN 21*  --    CREATININE 1.10  --    GLU 91  --    MG  --  2.1   PHOS  --  2.8   CALCIUM 9.1  --    ALBUMIN 4.3  --    PROT 7.5  --    ALKPHOS 50  --    ALT 24  --    AST 27  --    BILITOT 1.3  --        INR:  Recent Labs     03/23/19  1418   INR 2.2*       Diagnostic Studies: No relevant studies.    EKG: No recent studies available.    2D ECHO:  Results for orders placed or performed during the hospital encounter of 03/23/18   2D echo with color flow doppler   Result Value Ref Range    QEF 60 55 - 65    Diastolic Dysfunction Yes (A)     Est. PA Systolic Pressure 89.72 (A)     Pericardial Effusion SMALL (A)     Mitral Valve Mobility NORMAL     Tricuspid Valve Regurgitation MILD          ASSESSMENT/PLAN:         Anesthesia Evaluation    I have reviewed the Patient Summary Reports.    I have reviewed the Nursing  Notes.   I have reviewed the Medications.     Review of Systems  Anesthesia Hx:  No problems with previous Anesthesia  History of prior surgery of interest to airway management or planning:   Hematology/Oncology:  Hematology Normal   Oncology Normal     EENT/Dental:EENT/Dental Normal   Cardiovascular:   Exercise tolerance: poor Dysrhythmias ECG has been reviewed. Pulmonary hypertension   Pulmonary:  Pulmonary Normal    Renal/:  Renal/ Normal     Hepatic/GI:  Hepatic/GI Normal    Musculoskeletal:  Musculoskeletal Normal L femoral neck fx   Neurological:  Neurology Normal    Endocrine:  Endocrine Normal    Dermatological:  Skin Normal    Psych:  Psychiatric Normal           Physical Exam  General:  Well nourished    Airway/Jaw/Neck:  Airway Findings: Mouth Opening: Normal Tongue: Normal  NC in place  General Airway Assessment: Adult  Mallampati: II  Improves to I with phonation.  TM Distance: 4-6 cm  Jaw/Neck Findings:  Neck ROM: Normal ROM     Eyes/Ears/Nose:  Eyes/Ears/Nose Findings:    Dental:  Dental Findings: In tact   Chest/Lungs:  Chest/Lungs Findings: Clear to auscultation, Normal Respiratory Rate     Heart/Vascular:  Heart Findings: Rate: Normal  Rhythm: Regular Rhythm  Sounds: Normal  Heart Murmur  Vascular Findings:        Mental Status:  Mental Status Findings:  Cooperative, Alert and Oriented         Anesthesia Plan  Type of Anesthesia, risks & benefits discussed:  Anesthesia Type:  general, regional, epidural  Patient's Preference:   Intra-op Monitoring Plan: standard ASA monitors  Intra-op Monitoring Plan Comments:   Post Op Pain Control Plan: IV/PO Opioids PRN, multimodal analgesia and peripheral nerve block  Post Op Pain Control Plan Comments:   Induction:   IV  Beta Blocker:  Patient is not currently on a Beta-Blocker (No further documentation required).       Informed Consent: Patient understands risks and agrees with Anesthesia plan.  Questions answered. Anesthesia consent signed with  patient.  ASA Score: 4     Day of Surgery Review of History & Physical:    H&P update referred to the surgeon.     Anesthesia Plan Notes: Discussed pre-op awake arterial line, central line, SIFI catheter.  Will plan for epidural placement with sedation and dosing epidural in OR slowly to achieve surgical anesthesia - patient and patient's  voiced understanding and al questions answered.          Ready For Surgery From Anesthesia Perspective.

## 2019-03-24 NOTE — ED NOTES
Pt resting comfortably and independently repositioned in stretcher with bed locked in lowest position for safety. NAD and patient denies pain at this time. Respirations even and unlabored and visible chest rise noted. Patient offered bathroom assistance and denies need at this time. Pt instructed to call if assistance is needed. Pt on continuous cardiac, BP, and O2 monitoring. No needs at this time. Will continue to monitor. Call light within reach. Family at bedside.

## 2019-03-24 NOTE — PROGRESS NOTES
[facility]  Orthopedics  Progress Note    Patient Name: Molly Smith  MRN: [id number]  Admission Date: 3/23/2019  Hospital Length of Stay: 1 days  Attending Provider: Sheila Vuong MD  Primary Care Provider: Roberto Braun MD  Follow-up For: Procedure(s) (LRB):  HEMIARTHROPLASTY, HIP - left  - matthew - peg board (Left)    Post-Operative Day:    Subjective:     Principal Problem:Closed fracture of neck of left femur    Principal Orthopedic Problem: L femoral neck fx    Interval History: Pt seen and examined at bedside. NAEO. Pt reports pain controlled. Discussed that we will be talking with her again today regarding plan for fixation of L fem neck fracture.    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    Multaq [dronedarone]        Current Facility-Administered Medications   Medication    0.9%  NaCl infusion (for blood administration)    amiodarone tablet 200 mg    bosentan tablet 125 mg    calcium carbonate (OS-MARIELLE) tablet 500 mg    cetirizine tablet 5 mg    chlordiazepoxide-clidinium 5-2.5 mg per capsule 1 capsule    fentaNYL injection 12.5 mcg    ferrous gluconate tablet 324 mg    fluticasone 50 mcg/actuation nasal spray 100 mcg    folic acid tablet 1 mg    furosemide tablet 20 mg    ondansetron disintegrating tablet 4 mg    promethazine tablet 25 mg    riociguat (ADEMPAS) tablet 0.5 mg    sodium chloride 0.9% flush 3 mL    spironolactone tablet 25 mg    traMADol tablet 100 mg    treprostinil (REMODULIN) 12,000,000 ng in sodium chloride 0.9% 100 mL infusion    VELETRI/REMODULIN CASSETTE    VELETRI/REMODULIN TUBING     Objective:     Vital Signs (Most Recent):  Temp: 98.9 °F (37.2 °C) (03/23/19 1717)  Pulse: 75 (03/24/19 0330)  Resp: 16 (03/24/19 0330)  BP: (!) 91/50 (03/24/19 0330)  SpO2: (!) 86 % (03/24/19 0330) Vital Signs (24h Range):  Temp:  [98.7 °F (37.1 °C)-99.1 °F (37.3 °C)] 98.9 °F (37.2 °C)  Pulse:   "[75-90] 75  Resp:  [15-24] 16  SpO2:  [84 %-88 %] 86 %  BP: ()/(49-75) 91/50     Weight: 57.2 kg (126 lb)  Height: 5' 3" (160 cm)  Body mass index is 22.32 kg/m².      Intake/Output Summary (Last 24 hours) at 3/24/2019 0700  Last data filed at 3/23/2019 2300  Gross per 24 hour   Intake 180 ml   Output 550 ml   Net -370 ml     NAD  Normal respiratory effort on NC O2    Ortho/SPM Exam  Pt lying in bed this morning in NAD  SILT Sa/Thompson/DP/SP/T  Motor intact EHL/FHL/TA/Gastroc  2+ DP, 2+ PT    Significant Labs: All pertinent labs within the past 24 hours have been reviewed.    Significant Imaging: I have reviewed all pertinent imaging results/findings.    Assessment/Plan:     * Closed fracture of neck of left femur  Pt is a 74 yo F w/ severe pulmonary HTN\    -Admitted to heart transplant service  -Discussed with anesthesia and heart transplant teams regarding safest options of providing anesthesia for this patient given severe pulmonary HTN and elevated INR.   -Plan for L hip hemiarthroplasty this morning  -All risks, benefits, alternatives to surgery explained. All questions answered. Patient expresses understanding and would like to proceed with surgery. Operative site marked.   -NWB LLE  -NPO for surgery this AM    Closed left hip fracture  Pt is a 74 yo F w/ severe pulmonary HTN and L fem neck fx     -Admitted to heart transplant service  -Discussed with anesthesia and heart transplant teams regarding safest options of providing anesthesia for this patient given severe pulmonary HTN and elevated INR.   -Possible L hip hemiarthroplasty this morning pending discussion between staff physician and family. Patient may benefit from regional anesthesia and therefore may delay case until Monday  -All risks, benefits, alternatives to surgery explained. All questions answered. Patient expresses understanding and would like to proceed with surgery. Operative site marked.   -NWB LLE  -NPO for possible surgery this AM "               Delano Estrada MD  Orthopedics  [facility]

## 2019-03-24 NOTE — PROGRESS NOTES
[facility]  Heart Transplant  Progress Note    Patient Name: Molly Smith  MRN: [id number]  Admission Date: 3/23/2019  Hospital Length of Stay: 1 days  Attending Physician: Sheila Vuong MD  Primary Care Provider: Roberto Braun MD  Principal Problem:Closed fracture of neck of left femur    Subjective:     Interval History: NAEON. Patient continues with L hip pain, reluctant to ask for IV PRN analgesia which is available. States would like to c/w tramadol for now. D/w Orthopedic Sx team - plan is for regional and epidural anesthesia tomorrow on 3/25 if INR < 1.2.     Continuous Infusions:   treprostinil (REMODULIN) infusion      veletri/remodulin cassette      veletri/remodulin tubing       Scheduled Meds:   amiodarone  200 mg Oral Daily    bosentan  125 mg Oral Q12H    calcium carbonate  1,000 mg Oral Daily    cetirizine  5 mg Oral Daily    chlordiazepoxide-clidinium 5-2.5 mg  1 capsule Oral TID WM    ferrous gluconate  324 mg Oral Daily with breakfast    fluticasone  2 spray Each Nare Daily    folic acid  1 mg Oral Daily    furosemide  20 mg Oral After lunch    riociguat  0.5 mg Oral TID    sodium chloride 0.9%  3 mL Intravenous Q8H    spironolactone  25 mg Oral Daily     PRN Meds:sodium chloride, fentaNYL, ondansetron, promethazine, traMADol    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    Multaq [dronedarone]      Objective:     Vital Signs (Most Recent):  Temp: 98.9 °F (37.2 °C) (03/23/19 1717)  Pulse: 75 (03/24/19 0330)  Resp: 16 (03/24/19 0330)  BP: (!) 91/50 (03/24/19 0330)  SpO2: (!) 86 % (03/24/19 0330) Vital Signs (24h Range):  Temp:  [98.7 °F (37.1 °C)-99.1 °F (37.3 °C)] 98.9 °F (37.2 °C)  Pulse:  [75-90] 75  Resp:  [15-24] 16  SpO2:  [84 %-88 %] 86 %  BP: ()/(49-75) 91/50     Patient Vitals for the past 72 hrs (Last 3 readings):   Weight   03/23/19 1335 57.2 kg (126 lb)     Body mass index is 22.32  kg/m².      Intake/Output Summary (Last 24 hours) at 3/24/2019 0651  Last data filed at 3/23/2019 2300  Gross per 24 hour   Intake 180 ml   Output 550 ml   Net -370 ml       Hemodynamic Parameters:       Physical Exam   Constitutional: She is oriented to person, place, and time. She appears well-developed and well-nourished. No distress.   HENT:   Head: Normocephalic and atraumatic.   Eyes: EOM are normal.   Neck: Neck supple. No JVD present.   Cardiovascular: Normal rate and regular rhythm.   Pulmonary/Chest: Effort normal and breath sounds normal. No respiratory distress.   Abdominal: Soft. Bowel sounds are normal. She exhibits no distension.   Musculoskeletal: She exhibits no edema.   Neurological: She is alert and oriented to person, place, and time.   Skin: Skin is warm and dry. Capillary refill takes less than 2 seconds. No erythema.   Psychiatric: She has a normal mood and affect. Her behavior is normal. Thought content normal.       Significant Labs:  CBC:  Recent Labs   Lab 03/23/19  1020   WBC 6.60   RBC 5.00   HGB 15.2   HCT 45.7      MCV 91   MCH 30.4   MCHC 33.2     BNP:  No results for input(s): BNP in the last 168 hours.    Invalid input(s): BNPTRIAGELBLO  CMP:  Recent Labs   Lab 03/23/19  1020   GLU 91   CALCIUM 9.1   ALBUMIN 4.3   PROT 7.5      K 4.3   CO2 26      BUN 21*   CREATININE 1.10   ALKPHOS 50   ALT 24   AST 27   BILITOT 1.3      Coagulation:   Recent Labs   Lab 03/23/19  1418   INR 2.2*     LDH:  No results for input(s): LDH in the last 72 hours.  Microbiology:  Microbiology Results (last 7 days)     ** No results found for the last 168 hours. **          I have reviewed all pertinent labs within the past 24 hours.    Estimated Creatinine Clearance: 38.2 mL/min (based on SCr of 1.1 mg/dL).    Diagnostic Results:  I have reviewed all pertinent imaging results/findings within the past 24 hours.    Assessment and Plan:     73 y.o. WF with adult congenital heart disease with  prior dx of sinus venosus defect, anomalous pulmonary venous drainage with PAH diagnosed in past 10 years (probably for much longer per old CXR) who is currently on IV remodulin (89.2ng/kg/min) adempas and tracleer, hx of Rosemonas bacteremia s/p central line removal, who was transfered for higher level of care and orthopedic consult for left femoral neck fracture. Patient presented to [facility] for hip pain and inability to bear weight on the left leg after a trip and fall yesterday.  Patient tripped on a cord for her pulm HTN pump falling directly onto the left hip. Patient denies head trauma or upper body injury. She denies numbness or tingling. Currently lying on stretcher with  at bedside in nad.        * Closed fracture of neck of left femur  -Pain control with tramadol and fentanyl for now (Fenantyl preferred in setting of PH).   -Bedrest.  -Orthopedic Sx and Anesthesia plan to take to OR tomorrow 3/25 - will get regional and epidural anesthesia only as long as INR < 1.2 (as a result will give vitamin K 5 mg PO x 1 now, follow INR tonight)  -Ok for cardiac diet today, NPO > MN      SVT (supraventricular tachycardia), Hx afib/flutter  -Continue amio.  -Hold warfarin and reverse INR < 1.2 with vit K po as above    PAH (pulmonary artery hypertension)  -Euvolemic on exam. Continue PO Lasix.  -Continue riociguat, bosentan, remodulin([place] site CDI).  - to manage remodulin infusion.  -Hold warfarin and reverse INR < 1.2 with vit K po as above    Chronic respiratory failure with hypoxia  - on 5L O2 at home.   - Goal sat >/=85%.        Rosibel Ulloa MD  Heart Transplant  [facility]

## 2019-03-24 NOTE — ASSESSMENT & PLAN NOTE
Pt is a 72 yo F w/ severe pulmonary HTN\    -Admitted to heart transplant service  -Discussed with anesthesia and heart transplant teams regarding safest options of providing anesthesia for this patient given severe pulmonary HTN and elevated INR.   -Plan for L hip hemiarthroplasty this morning  -All risks, benefits, alternatives to surgery explained. All questions answered. Patient expresses understanding and would like to proceed with surgery. Operative site marked.   -NWB LLE  -NPO for surgery this AM

## 2019-03-24 NOTE — PLAN OF CARE
Problem: Adult Inpatient Plan of Care  Goal: Plan of Care Review  - Patient is AAOx4 on bedrest for L femur fracture. Patient's  at bedside attentive to patient's needs. Patient educated to use call bell for assistance, verbalized understanding.   - Afebrile, WBC 5.42   - Telemetry monitoring SR 80's   - Patient is on 5 L O2 NC - sats > 85 %   - Remodulin infusing to R subclavian maguire - gauze dressing in place (no occlusive dressing due to pt reported allergy) - patient's  is managing cassette changes q 48 hours per nursing communication order   - Tong to gravity with clear yellow urine output 250 cc so far this shift   - Patient received PRN Librax once for abdominal cramps    - No weight bearing to LLE   - Patient reports sensitivity to pain medication - PRN tramadol administered twice, PRN IV fentanyl administered once   - Patient complained of nausea once - PRN Zofran administered per order   - INR 1.9 - coumadin on hold - vitamin K administered per order - repeat INR to be collected at 2000   - NPO after midnight for fixation of L femur fracture tomorrow   - See flow sheet for complete assessment details

## 2019-03-25 LAB
ALBUMIN SERPL BCP-MCNC: 3.3 G/DL (ref 3.5–5.2)
ALP SERPL-CCNC: 41 U/L (ref 55–135)
ALT SERPL W/O P-5'-P-CCNC: 12 U/L (ref 10–44)
ANION GAP SERPL CALC-SCNC: 8 MMOL/L (ref 8–16)
ANION GAP SERPL CALC-SCNC: 9 MMOL/L (ref 8–16)
AST SERPL-CCNC: 14 U/L (ref 10–40)
BASOPHILS # BLD AUTO: 0.05 K/UL (ref 0–0.2)
BASOPHILS # BLD AUTO: 0.07 K/UL (ref 0–0.2)
BASOPHILS NFR BLD: 0.6 % (ref 0–1.9)
BASOPHILS NFR BLD: 1.4 % (ref 0–1.9)
BILIRUB SERPL-MCNC: 1.7 MG/DL (ref 0.1–1)
BUN SERPL-MCNC: 15 MG/DL (ref 8–23)
BUN SERPL-MCNC: 21 MG/DL (ref 8–23)
CALCIUM SERPL-MCNC: 7.5 MG/DL (ref 8.7–10.5)
CALCIUM SERPL-MCNC: 8.4 MG/DL (ref 8.7–10.5)
CHLORIDE SERPL-SCNC: 104 MMOL/L (ref 95–110)
CHLORIDE SERPL-SCNC: 107 MMOL/L (ref 95–110)
CO2 SERPL-SCNC: 21 MMOL/L (ref 23–29)
CO2 SERPL-SCNC: 24 MMOL/L (ref 23–29)
CREAT SERPL-MCNC: 0.9 MG/DL (ref 0.5–1.4)
CREAT SERPL-MCNC: 1 MG/DL (ref 0.5–1.4)
DIFFERENTIAL METHOD: ABNORMAL
DIFFERENTIAL METHOD: ABNORMAL
EOSINOPHIL # BLD AUTO: 0 K/UL (ref 0–0.5)
EOSINOPHIL # BLD AUTO: 0.2 K/UL (ref 0–0.5)
EOSINOPHIL NFR BLD: 0.2 % (ref 0–8)
EOSINOPHIL NFR BLD: 3.3 % (ref 0–8)
ERYTHROCYTE [DISTWIDTH] IN BLOOD BY AUTOMATED COUNT: 13.6 % (ref 11.5–14.5)
ERYTHROCYTE [DISTWIDTH] IN BLOOD BY AUTOMATED COUNT: 13.6 % (ref 11.5–14.5)
EST. GFR  (AFRICAN AMERICAN): >60 ML/MIN/1.73 M^2
EST. GFR  (AFRICAN AMERICAN): >60 ML/MIN/1.73 M^2
EST. GFR  (NON AFRICAN AMERICAN): 56.4 ML/MIN/1.73 M^2
EST. GFR  (NON AFRICAN AMERICAN): >60 ML/MIN/1.73 M^2
GLUCOSE SERPL-MCNC: 167 MG/DL (ref 70–110)
GLUCOSE SERPL-MCNC: 92 MG/DL (ref 70–110)
HCT VFR BLD AUTO: 38 % (ref 37–48.5)
HCT VFR BLD AUTO: 44.1 % (ref 37–48.5)
HGB BLD-MCNC: 12.5 G/DL (ref 12–16)
HGB BLD-MCNC: 14.4 G/DL (ref 12–16)
IMM GRANULOCYTES # BLD AUTO: 0.01 K/UL (ref 0–0.04)
IMM GRANULOCYTES # BLD AUTO: 0.06 K/UL (ref 0–0.04)
IMM GRANULOCYTES NFR BLD AUTO: 0.2 % (ref 0–0.5)
IMM GRANULOCYTES NFR BLD AUTO: 0.7 % (ref 0–0.5)
INR PPP: 1.2 (ref 0.8–1.2)
INR PPP: 1.2 (ref 0.8–1.2)
INR PPP: 1.3 (ref 0.8–1.2)
LYMPHOCYTES # BLD AUTO: 0.3 K/UL (ref 1–4.8)
LYMPHOCYTES # BLD AUTO: 0.9 K/UL (ref 1–4.8)
LYMPHOCYTES NFR BLD: 17.5 % (ref 18–48)
LYMPHOCYTES NFR BLD: 3.8 % (ref 18–48)
MAGNESIUM SERPL-MCNC: 2.1 MG/DL (ref 1.6–2.6)
MCH RBC QN AUTO: 30.4 PG (ref 27–31)
MCH RBC QN AUTO: 30.4 PG (ref 27–31)
MCHC RBC AUTO-ENTMCNC: 32.7 G/DL (ref 32–36)
MCHC RBC AUTO-ENTMCNC: 32.9 G/DL (ref 32–36)
MCV RBC AUTO: 93 FL (ref 82–98)
MCV RBC AUTO: 93 FL (ref 82–98)
MONOCYTES # BLD AUTO: 0.5 K/UL (ref 0.3–1)
MONOCYTES # BLD AUTO: 0.5 K/UL (ref 0.3–1)
MONOCYTES NFR BLD: 11 % (ref 4–15)
MONOCYTES NFR BLD: 5.6 % (ref 4–15)
NEUTROPHILS # BLD AUTO: 3.3 K/UL (ref 1.8–7.7)
NEUTROPHILS # BLD AUTO: 7.6 K/UL (ref 1.8–7.7)
NEUTROPHILS NFR BLD: 66.6 % (ref 38–73)
NEUTROPHILS NFR BLD: 89.1 % (ref 38–73)
NRBC BLD-RTO: 0 /100 WBC
NRBC BLD-RTO: 0 /100 WBC
PHOSPHATE SERPL-MCNC: 3.7 MG/DL (ref 2.7–4.5)
PLATELET # BLD AUTO: 157 K/UL (ref 150–350)
PLATELET # BLD AUTO: 192 K/UL (ref 150–350)
PMV BLD AUTO: 9.4 FL (ref 9.2–12.9)
PMV BLD AUTO: 9.7 FL (ref 9.2–12.9)
POTASSIUM SERPL-SCNC: 3.8 MMOL/L (ref 3.5–5.1)
POTASSIUM SERPL-SCNC: 4.3 MMOL/L (ref 3.5–5.1)
PROT SERPL-MCNC: 6.2 G/DL (ref 6–8.4)
PROTHROMBIN TIME: 12.1 SEC (ref 9–12.5)
PROTHROMBIN TIME: 12.1 SEC (ref 9–12.5)
PROTHROMBIN TIME: 12.9 SEC (ref 9–12.5)
RBC # BLD AUTO: 4.11 M/UL (ref 4–5.4)
RBC # BLD AUTO: 4.74 M/UL (ref 4–5.4)
SODIUM SERPL-SCNC: 136 MMOL/L (ref 136–145)
SODIUM SERPL-SCNC: 137 MMOL/L (ref 136–145)
WBC # BLD AUTO: 4.92 K/UL (ref 3.9–12.7)
WBC # BLD AUTO: 8.5 K/UL (ref 3.9–12.7)

## 2019-03-25 PROCEDURE — 25000003 PHARM REV CODE 250: Performed by: NURSE PRACTITIONER

## 2019-03-25 PROCEDURE — 36620 INSERTION CATHETER ARTERY: CPT | Mod: 59,,, | Performed by: ANESTHESIOLOGY

## 2019-03-25 PROCEDURE — C1776 JOINT DEVICE (IMPLANTABLE): HCPCS | Performed by: ORTHOPAEDIC SURGERY

## 2019-03-25 PROCEDURE — 76942 ECHO GUIDE FOR BIOPSY: CPT | Performed by: STUDENT IN AN ORGANIZED HEALTH CARE EDUCATION/TRAINING PROGRAM

## 2019-03-25 PROCEDURE — 27236 TREAT THIGH FRACTURE: CPT | Mod: LT,GC,, | Performed by: ORTHOPAEDIC SURGERY

## 2019-03-25 PROCEDURE — 63600175 PHARM REV CODE 636 W HCPCS: Performed by: STUDENT IN AN ORGANIZED HEALTH CARE EDUCATION/TRAINING PROGRAM

## 2019-03-25 PROCEDURE — 71000033 HC RECOVERY, INTIAL HOUR: Performed by: ORTHOPAEDIC SURGERY

## 2019-03-25 PROCEDURE — D9220A PRA ANESTHESIA: Mod: CRNA,,, | Performed by: NURSE ANESTHETIST, CERTIFIED REGISTERED

## 2019-03-25 PROCEDURE — 83735 ASSAY OF MAGNESIUM: CPT

## 2019-03-25 PROCEDURE — 36556 INSERT NON-TUNNEL CV CATH: CPT | Mod: 59,,, | Performed by: ANESTHESIOLOGY

## 2019-03-25 PROCEDURE — 99233 PR SUBSEQUENT HOSPITAL CARE,LEVL III: ICD-10-PCS | Mod: 57,GC,, | Performed by: ORTHOPAEDIC SURGERY

## 2019-03-25 PROCEDURE — D9220A PRA ANESTHESIA: Mod: ANES,,, | Performed by: ANESTHESIOLOGY

## 2019-03-25 PROCEDURE — 99233 SBSQ HOSP IP/OBS HIGH 50: CPT | Mod: ,,, | Performed by: INTERNAL MEDICINE

## 2019-03-25 PROCEDURE — 25000003 PHARM REV CODE 250: Performed by: ORTHOPAEDIC SURGERY

## 2019-03-25 PROCEDURE — 25000003 PHARM REV CODE 250: Performed by: ANESTHESIOLOGY

## 2019-03-25 PROCEDURE — 85610 PROTHROMBIN TIME: CPT | Mod: 91

## 2019-03-25 PROCEDURE — 37000008 HC ANESTHESIA 1ST 15 MINUTES: Performed by: ORTHOPAEDIC SURGERY

## 2019-03-25 PROCEDURE — 99233 SBSQ HOSP IP/OBS HIGH 50: CPT | Mod: 57,GC,, | Performed by: ORTHOPAEDIC SURGERY

## 2019-03-25 PROCEDURE — 64447 NJX AA&/STRD FEMORAL NRV IMG: CPT | Mod: 59,LT,, | Performed by: ANESTHESIOLOGY

## 2019-03-25 PROCEDURE — 37000009 HC ANESTHESIA EA ADD 15 MINS: Performed by: ORTHOPAEDIC SURGERY

## 2019-03-25 PROCEDURE — D9220A PRA ANESTHESIA: ICD-10-PCS | Mod: CRNA,,, | Performed by: NURSE ANESTHETIST, CERTIFIED REGISTERED

## 2019-03-25 PROCEDURE — 88304 TISSUE EXAM BY PATHOLOGIST: CPT | Performed by: PATHOLOGY

## 2019-03-25 PROCEDURE — 36620 PR INSERT CATH,ART,PERCUT,SHORTTERM: ICD-10-PCS | Mod: 59,,, | Performed by: ANESTHESIOLOGY

## 2019-03-25 PROCEDURE — D9220A PRA ANESTHESIA: ICD-10-PCS | Mod: ANES,,, | Performed by: ANESTHESIOLOGY

## 2019-03-25 PROCEDURE — 88304 TISSUE EXAM BY PATHOLOGIST: CPT | Mod: 26,,, | Performed by: PATHOLOGY

## 2019-03-25 PROCEDURE — 85610 PROTHROMBIN TIME: CPT

## 2019-03-25 PROCEDURE — 36415 COLL VENOUS BLD VENIPUNCTURE: CPT

## 2019-03-25 PROCEDURE — 25000003 PHARM REV CODE 250: Performed by: NURSE ANESTHETIST, CERTIFIED REGISTERED

## 2019-03-25 PROCEDURE — 64448 NJX AA&/STRD FEM NRV NFS IMG: CPT | Performed by: ANESTHESIOLOGY

## 2019-03-25 PROCEDURE — 76942 ECHO GUIDE FOR BIOPSY: CPT | Mod: 26,,, | Performed by: ANESTHESIOLOGY

## 2019-03-25 PROCEDURE — 36000710: Performed by: ORTHOPAEDIC SURGERY

## 2019-03-25 PROCEDURE — 64447 PR NERVE BLOCK INJ, ANES/STEROID, FEMORAL, INCL IMAG GUIDANCE: ICD-10-PCS | Mod: 59,LT,, | Performed by: ANESTHESIOLOGY

## 2019-03-25 PROCEDURE — 88304 TISSUE SPECIMEN TO PATHOLOGY - SURGERY: ICD-10-PCS | Mod: 26,,, | Performed by: PATHOLOGY

## 2019-03-25 PROCEDURE — A4216 STERILE WATER/SALINE, 10 ML: HCPCS | Performed by: NURSE PRACTITIONER

## 2019-03-25 PROCEDURE — 94799 UNLISTED PULMONARY SVC/PX: CPT

## 2019-03-25 PROCEDURE — 63600175 PHARM REV CODE 636 W HCPCS: Performed by: ORTHOPAEDIC SURGERY

## 2019-03-25 PROCEDURE — 36620 INSERTION CATHETER ARTERY: CPT | Performed by: ANESTHESIOLOGY

## 2019-03-25 PROCEDURE — 63600175 PHARM REV CODE 636 W HCPCS: Performed by: NURSE ANESTHETIST, CERTIFIED REGISTERED

## 2019-03-25 PROCEDURE — 27800517 HC TRAY,EPIDURAL-CONTINUOUS: Performed by: STUDENT IN AN ORGANIZED HEALTH CARE EDUCATION/TRAINING PROGRAM

## 2019-03-25 PROCEDURE — 27236 PR FEMORAL FX, OPEN TX: ICD-10-PCS | Mod: LT,GC,, | Performed by: ORTHOPAEDIC SURGERY

## 2019-03-25 PROCEDURE — 36000711: Performed by: ORTHOPAEDIC SURGERY

## 2019-03-25 PROCEDURE — 27201037 HC PRESSURE MONITORING SET UP

## 2019-03-25 PROCEDURE — 64999 SUPRAINGUINAL FASCIA ILIACA CATHETER: ICD-10-PCS | Mod: 59,LT,, | Performed by: ANESTHESIOLOGY

## 2019-03-25 PROCEDURE — 36556 INSERT NON-TUNNEL CV CATH: CPT | Performed by: ANESTHESIOLOGY

## 2019-03-25 PROCEDURE — 63600175 PHARM REV CODE 636 W HCPCS: Performed by: ANESTHESIOLOGY

## 2019-03-25 PROCEDURE — 99233 PR SUBSEQUENT HOSPITAL CARE,LEVL III: ICD-10-PCS | Mod: ,,, | Performed by: INTERNAL MEDICINE

## 2019-03-25 PROCEDURE — 64447 NJX AA&/STRD FEMORAL NRV IMG: CPT | Performed by: STUDENT IN AN ORGANIZED HEALTH CARE EDUCATION/TRAINING PROGRAM

## 2019-03-25 PROCEDURE — 88311 DECALCIFY TISSUE: CPT | Mod: 26,,, | Performed by: PATHOLOGY

## 2019-03-25 PROCEDURE — 76942 PR U/S GUIDANCE FOR NEEDLE GUIDANCE: ICD-10-PCS | Mod: 26,,, | Performed by: ANESTHESIOLOGY

## 2019-03-25 PROCEDURE — 85025 COMPLETE CBC W/AUTO DIFF WBC: CPT | Mod: 91

## 2019-03-25 PROCEDURE — S0020 INJECTION, BUPIVICAINE HYDRO: HCPCS | Performed by: STUDENT IN AN ORGANIZED HEALTH CARE EDUCATION/TRAINING PROGRAM

## 2019-03-25 PROCEDURE — 80048 BASIC METABOLIC PNL TOTAL CA: CPT

## 2019-03-25 PROCEDURE — 20000000 HC ICU ROOM

## 2019-03-25 PROCEDURE — 71000039 HC RECOVERY, EACH ADD'L HOUR: Performed by: ORTHOPAEDIC SURGERY

## 2019-03-25 PROCEDURE — 88311 TISSUE SPECIMEN TO PATHOLOGY - SURGERY: ICD-10-PCS | Mod: 26,,, | Performed by: PATHOLOGY

## 2019-03-25 PROCEDURE — 62273 INJECT EPIDURAL PATCH: CPT | Performed by: ANESTHESIOLOGY

## 2019-03-25 PROCEDURE — 27000221 HC OXYGEN, UP TO 24 HOURS

## 2019-03-25 PROCEDURE — 80053 COMPREHEN METABOLIC PANEL: CPT

## 2019-03-25 PROCEDURE — 36556 PR INSERT NON-TUNNEL CV CATH 5+ YRS OLD: ICD-10-PCS | Mod: 59,,, | Performed by: ANESTHESIOLOGY

## 2019-03-25 PROCEDURE — 25000003 PHARM REV CODE 250: Performed by: STUDENT IN AN ORGANIZED HEALTH CARE EDUCATION/TRAINING PROGRAM

## 2019-03-25 PROCEDURE — 64999 UNLISTED PX NERVOUS SYSTEM: CPT | Mod: 59,LT,, | Performed by: ANESTHESIOLOGY

## 2019-03-25 PROCEDURE — 94761 N-INVAS EAR/PLS OXIMETRY MLT: CPT

## 2019-03-25 PROCEDURE — 84100 ASSAY OF PHOSPHORUS: CPT

## 2019-03-25 PROCEDURE — 27201423 OPTIME MED/SURG SUP & DEVICES STERILE SUPPLY: Performed by: ORTHOPAEDIC SURGERY

## 2019-03-25 PROCEDURE — A4216 STERILE WATER/SALINE, 10 ML: HCPCS | Performed by: NURSE ANESTHETIST, CERTIFIED REGISTERED

## 2019-03-25 PROCEDURE — 27200750 HC INSULATED NEEDLE/ STIMUPLEX: Performed by: STUDENT IN AN ORGANIZED HEALTH CARE EDUCATION/TRAINING PROGRAM

## 2019-03-25 DEVICE — HEAD FEM ENDO UNI MOD 48MM: Type: IMPLANTABLE DEVICE | Site: HIP | Status: FUNCTIONAL

## 2019-03-25 DEVICE — SLEEVE FEM C-TAPER UNI +5MM: Type: IMPLANTABLE DEVICE | Site: HIP | Status: FUNCTIONAL

## 2019-03-25 DEVICE — STEM FEMORAL SZ 9 132 DEG TI: Type: IMPLANTABLE DEVICE | Site: HIP | Status: FUNCTIONAL

## 2019-03-25 RX ORDER — TRANEXAMIC ACID 100 MG/ML
INJECTION, SOLUTION INTRAVENOUS
Status: DISCONTINUED | OUTPATIENT
Start: 2019-03-25 | End: 2019-03-25

## 2019-03-25 RX ORDER — VASOPRESSIN 20 [USP'U]/ML
INJECTION, SOLUTION INTRAMUSCULAR; SUBCUTANEOUS
Status: DISCONTINUED | OUTPATIENT
Start: 2019-03-25 | End: 2019-03-25

## 2019-03-25 RX ORDER — MORPHINE SULFATE 2 MG/ML
2 INJECTION, SOLUTION INTRAMUSCULAR; INTRAVENOUS
Status: DISCONTINUED | OUTPATIENT
Start: 2019-03-25 | End: 2019-03-27

## 2019-03-25 RX ORDER — SODIUM CHLORIDE 9 MG/ML
INJECTION, SOLUTION INTRAVENOUS CONTINUOUS PRN
Status: DISCONTINUED | OUTPATIENT
Start: 2019-03-25 | End: 2019-03-25

## 2019-03-25 RX ORDER — LIDOCAINE HYDROCHLORIDE AND EPINEPHRINE 20; 10 MG/ML; UG/ML
INJECTION, SOLUTION INFILTRATION; PERINEURAL
Status: COMPLETED | OUTPATIENT
Start: 2019-03-25 | End: 2019-03-25

## 2019-03-25 RX ORDER — FENTANYL CITRATE 50 UG/ML
25 INJECTION, SOLUTION INTRAMUSCULAR; INTRAVENOUS EVERY 5 MIN PRN
Status: DISCONTINUED | OUTPATIENT
Start: 2019-03-25 | End: 2019-03-25

## 2019-03-25 RX ORDER — VANCOMYCIN HCL IN 5 % DEXTROSE 1G/250ML
1000 PLASTIC BAG, INJECTION (ML) INTRAVENOUS ONCE
Status: COMPLETED | OUTPATIENT
Start: 2019-03-25 | End: 2019-03-25

## 2019-03-25 RX ORDER — BUPIVACAINE HYDROCHLORIDE AND EPINEPHRINE 5; 5 MG/ML; UG/ML
INJECTION, SOLUTION EPIDURAL; INTRACAUDAL; PERINEURAL
Status: DISCONTINUED | OUTPATIENT
Start: 2019-03-25 | End: 2019-03-25

## 2019-03-25 RX ORDER — CEFAZOLIN SODIUM 1 G/3ML
INJECTION, POWDER, FOR SOLUTION INTRAMUSCULAR; INTRAVENOUS
Status: DISCONTINUED | OUTPATIENT
Start: 2019-03-25 | End: 2019-03-25

## 2019-03-25 RX ORDER — ROPIVACAINE HYDROCHLORIDE 2 MG/ML
2 INJECTION, SOLUTION EPIDURAL; INFILTRATION; PERINEURAL CONTINUOUS
Status: DISCONTINUED | OUTPATIENT
Start: 2019-03-25 | End: 2019-04-04

## 2019-03-25 RX ORDER — ROPIVACAINE HYDROCHLORIDE 5 MG/ML
INJECTION, SOLUTION EPIDURAL; INFILTRATION; PERINEURAL
Status: COMPLETED | OUTPATIENT
Start: 2019-03-25 | End: 2019-03-25

## 2019-03-25 RX ORDER — PREGABALIN 75 MG/1
75 CAPSULE ORAL NIGHTLY
Status: DISCONTINUED | OUTPATIENT
Start: 2019-03-25 | End: 2019-04-04

## 2019-03-25 RX ORDER — GLYCOPYRROLATE 0.2 MG/ML
INJECTION INTRAMUSCULAR; INTRAVENOUS
Status: DISCONTINUED | OUTPATIENT
Start: 2019-03-25 | End: 2019-03-25

## 2019-03-25 RX ORDER — VANCOMYCIN HYDROCHLORIDE 1 G/20ML
INJECTION, POWDER, LYOPHILIZED, FOR SOLUTION INTRAVENOUS
Status: DISCONTINUED | OUTPATIENT
Start: 2019-03-25 | End: 2019-03-25 | Stop reason: HOSPADM

## 2019-03-25 RX ORDER — SODIUM CHLORIDE 0.9 % (FLUSH) 0.9 %
5 SYRINGE (ML) INJECTION
Status: DISCONTINUED | OUTPATIENT
Start: 2019-03-25 | End: 2019-04-05 | Stop reason: HOSPADM

## 2019-03-25 RX ORDER — OXYCODONE HYDROCHLORIDE 5 MG/1
5 TABLET ORAL
Status: DISCONTINUED | OUTPATIENT
Start: 2019-03-25 | End: 2019-03-28

## 2019-03-25 RX ORDER — ACETAMINOPHEN 500 MG
1000 TABLET ORAL EVERY 6 HOURS
Status: DISCONTINUED | OUTPATIENT
Start: 2019-03-25 | End: 2019-03-27

## 2019-03-25 RX ORDER — OXYCODONE HYDROCHLORIDE 10 MG/1
10 TABLET ORAL
Status: DISCONTINUED | OUTPATIENT
Start: 2019-03-25 | End: 2019-03-28

## 2019-03-25 RX ORDER — MIDAZOLAM HYDROCHLORIDE 1 MG/ML
0.5 INJECTION INTRAMUSCULAR; INTRAVENOUS
Status: DISCONTINUED | OUTPATIENT
Start: 2019-03-25 | End: 2019-03-25

## 2019-03-25 RX ORDER — EPINEPHRINE 1 MG/ML
INJECTION, SOLUTION INTRACARDIAC; INTRAMUSCULAR; INTRAVENOUS; SUBCUTANEOUS
Status: DISCONTINUED | OUTPATIENT
Start: 2019-03-25 | End: 2019-03-25

## 2019-03-25 RX ORDER — ENOXAPARIN SODIUM 100 MG/ML
40 INJECTION SUBCUTANEOUS EVERY 24 HOURS
Status: DISCONTINUED | OUTPATIENT
Start: 2019-03-25 | End: 2019-03-28

## 2019-03-25 RX ORDER — ONDANSETRON 2 MG/ML
INJECTION INTRAMUSCULAR; INTRAVENOUS
Status: DISCONTINUED | OUTPATIENT
Start: 2019-03-25 | End: 2019-03-25

## 2019-03-25 RX ORDER — VASOPRESSIN 20 [USP'U]/ML
INJECTION, SOLUTION INTRAMUSCULAR; SUBCUTANEOUS
Status: DISPENSED
Start: 2019-03-25 | End: 2019-03-26

## 2019-03-25 RX ORDER — DEXMEDETOMIDINE HYDROCHLORIDE 100 UG/ML
INJECTION, SOLUTION INTRAVENOUS
Status: DISCONTINUED | OUTPATIENT
Start: 2019-03-25 | End: 2019-03-25

## 2019-03-25 RX ORDER — ACETAMINOPHEN 10 MG/ML
1000 INJECTION, SOLUTION INTRAVENOUS ONCE
Status: DISCONTINUED | OUTPATIENT
Start: 2019-03-25 | End: 2019-03-25

## 2019-03-25 RX ORDER — ONDANSETRON 2 MG/ML
4 INJECTION INTRAMUSCULAR; INTRAVENOUS EVERY 12 HOURS PRN
Status: DISCONTINUED | OUTPATIENT
Start: 2019-03-25 | End: 2019-03-27

## 2019-03-25 RX ORDER — MUPIROCIN 20 MG/G
1 OINTMENT TOPICAL 2 TIMES DAILY
Status: DISCONTINUED | OUTPATIENT
Start: 2019-03-25 | End: 2019-03-30

## 2019-03-25 RX ORDER — BUPIVACAINE HYDROCHLORIDE 7.5 MG/ML
INJECTION, SOLUTION EPIDURAL; RETROBULBAR
Status: COMPLETED | OUTPATIENT
Start: 2019-03-25 | End: 2019-03-25

## 2019-03-25 RX ORDER — CEFAZOLIN SODIUM 1 G/3ML
2 INJECTION, POWDER, FOR SOLUTION INTRAMUSCULAR; INTRAVENOUS
Status: COMPLETED | OUTPATIENT
Start: 2019-03-25 | End: 2019-03-26

## 2019-03-25 RX ORDER — ACETAMINOPHEN 500 MG
1000 TABLET ORAL EVERY 6 HOURS
Status: DISCONTINUED | OUTPATIENT
Start: 2019-03-25 | End: 2019-03-25

## 2019-03-25 RX ORDER — ACETAMINOPHEN 10 MG/ML
INJECTION, SOLUTION INTRAVENOUS
Status: DISCONTINUED | OUTPATIENT
Start: 2019-03-25 | End: 2019-03-25

## 2019-03-25 RX ORDER — PHENYLEPHRINE HCL IN 0.9% NACL 1 MG/10 ML
SYRINGE (ML) INTRAVENOUS
Status: DISCONTINUED
Start: 2019-03-25 | End: 2019-03-25 | Stop reason: WASHOUT

## 2019-03-25 RX ADMIN — Medication 3 ML: at 02:03

## 2019-03-25 RX ADMIN — EPINEPHRINE 10 MCG: 1 INJECTION, SOLUTION INTRAMUSCULAR; SUBCUTANEOUS at 11:03

## 2019-03-25 RX ADMIN — ENOXAPARIN SODIUM 40 MG: 100 INJECTION SUBCUTANEOUS at 05:03

## 2019-03-25 RX ADMIN — PREGABALIN 75 MG: 75 CAPSULE ORAL at 10:03

## 2019-03-25 RX ADMIN — ONDANSETRON 4 MG: 2 INJECTION INTRAMUSCULAR; INTRAVENOUS at 01:03

## 2019-03-25 RX ADMIN — ROPIVACAINE HYDROCHLORIDE 2 ML/HR: 2 INJECTION, SOLUTION EPIDURAL; INFILTRATION at 08:03

## 2019-03-25 RX ADMIN — TRANEXAMIC ACID 500 MG: 100 INJECTION, SOLUTION INTRAVENOUS at 11:03

## 2019-03-25 RX ADMIN — CEFAZOLIN 2 G: 330 INJECTION, POWDER, FOR SOLUTION INTRAMUSCULAR; INTRAVENOUS at 11:03

## 2019-03-25 RX ADMIN — EPINEPHRINE 10 MCG: 1 INJECTION, SOLUTION INTRAMUSCULAR; SUBCUTANEOUS at 12:03

## 2019-03-25 RX ADMIN — ONDANSETRON 4 MG: 2 INJECTION INTRAMUSCULAR; INTRAVENOUS at 10:03

## 2019-03-25 RX ADMIN — LIDOCAINE HYDROCHLORIDE AND EPINEPHRINE 20 ML: 20; 10 INJECTION, SOLUTION INFILTRATION; PERINEURAL at 10:03

## 2019-03-25 RX ADMIN — RIOCIGUAT 0.5 MG: 0.5 TABLET, FILM COATED ORAL at 10:03

## 2019-03-25 RX ADMIN — RIOCIGUAT 0.5 MG: 0.5 TABLET, FILM COATED ORAL at 07:03

## 2019-03-25 RX ADMIN — EPINEPHRINE 0.02 MCG/KG/MIN: 1 INJECTION INTRAMUSCULAR; INTRAVENOUS; SUBCUTANEOUS at 03:03

## 2019-03-25 RX ADMIN — SPIRONOLACTONE 25 MG: 25 TABLET ORAL at 07:03

## 2019-03-25 RX ADMIN — MIDAZOLAM HYDROCHLORIDE 1 MG: 1 INJECTION, SOLUTION INTRAMUSCULAR; INTRAVENOUS at 10:03

## 2019-03-25 RX ADMIN — AMIODARONE HYDROCHLORIDE 200 MG: 200 TABLET ORAL at 07:03

## 2019-03-25 RX ADMIN — BOSENTAN 125 MG: 125 TABLET, FILM COATED ORAL at 09:03

## 2019-03-25 RX ADMIN — EPINEPHRINE 0.02 MCG/KG/MIN: 1 INJECTION INTRAMUSCULAR; INTRAVENOUS; SUBCUTANEOUS at 12:03

## 2019-03-25 RX ADMIN — MORPHINE SULFATE 2 MG: 2 INJECTION, SOLUTION INTRAMUSCULAR; INTRAVENOUS at 09:03

## 2019-03-25 RX ADMIN — FENTANYL CITRATE 25 MCG: 50 INJECTION INTRAMUSCULAR; INTRAVENOUS at 10:03

## 2019-03-25 RX ADMIN — DEXMEDETOMIDINE HYDROCHLORIDE 28 MCG: 100 INJECTION, SOLUTION, CONCENTRATE INTRAVENOUS at 11:03

## 2019-03-25 RX ADMIN — SODIUM CHLORIDE: 0.9 INJECTION, SOLUTION INTRAVENOUS at 10:03

## 2019-03-25 RX ADMIN — DEXMEDETOMIDINE HYDROCHLORIDE 0.5 MCG/KG/HR: 100 INJECTION, SOLUTION, CONCENTRATE INTRAVENOUS at 10:03

## 2019-03-25 RX ADMIN — DEXMEDETOMIDINE HYDROCHLORIDE 28 MCG: 100 INJECTION, SOLUTION, CONCENTRATE INTRAVENOUS at 10:03

## 2019-03-25 RX ADMIN — MIDAZOLAM HYDROCHLORIDE 0.5 MG: 1 INJECTION, SOLUTION INTRAMUSCULAR; INTRAVENOUS at 09:03

## 2019-03-25 RX ADMIN — PROMETHAZINE HYDROCHLORIDE 6.25 MG: 25 INJECTION INTRAMUSCULAR; INTRAVENOUS at 03:03

## 2019-03-25 RX ADMIN — VASOPRESSIN 1 UNITS: 20 INJECTION INTRAVENOUS at 01:03

## 2019-03-25 RX ADMIN — ACETAMINOPHEN 1000 MG: 500 TABLET ORAL at 05:03

## 2019-03-25 RX ADMIN — MIDAZOLAM HYDROCHLORIDE 1 MG: 1 INJECTION, SOLUTION INTRAMUSCULAR; INTRAVENOUS at 11:03

## 2019-03-25 RX ADMIN — ACETAMINOPHEN 1000 MG: 10 INJECTION, SOLUTION INTRAVENOUS at 12:03

## 2019-03-25 RX ADMIN — TRANEXAMIC ACID 500 MG: 100 INJECTION, SOLUTION INTRAVENOUS at 12:03

## 2019-03-25 RX ADMIN — VASOPRESSIN 1 UNITS: 20 INJECTION INTRAVENOUS at 12:03

## 2019-03-25 RX ADMIN — SODIUM CHLORIDE, SODIUM GLUCONATE, SODIUM ACETATE, POTASSIUM CHLORIDE, MAGNESIUM CHLORIDE, SODIUM PHOSPHATE, DIBASIC, AND POTASSIUM PHOSPHATE: .53; .5; .37; .037; .03; .012; .00082 INJECTION, SOLUTION INTRAVENOUS at 11:03

## 2019-03-25 RX ADMIN — ROPIVACAINE HYDROCHLORIDE 20 ML: 5 INJECTION, SOLUTION EPIDURAL; INFILTRATION; PERINEURAL at 09:03

## 2019-03-25 RX ADMIN — OXYCODONE HYDROCHLORIDE 10 MG: 10 TABLET ORAL at 05:03

## 2019-03-25 RX ADMIN — MORPHINE SULFATE 2 MG: 2 INJECTION, SOLUTION INTRAMUSCULAR; INTRAVENOUS at 10:03

## 2019-03-25 RX ADMIN — CEFAZOLIN 2 G: 330 INJECTION, POWDER, FOR SOLUTION INTRAMUSCULAR; INTRAVENOUS at 08:03

## 2019-03-25 RX ADMIN — BUPIVACAINE HYDROCHLORIDE 5 ML: 7.5 INJECTION, SOLUTION EPIDURAL; RETROBULBAR at 09:03

## 2019-03-25 RX ADMIN — ROPIVACAINE HYDROCHLORIDE 2 ML/HR: 2 INJECTION, SOLUTION EPIDURAL; INFILTRATION at 01:03

## 2019-03-25 RX ADMIN — LIDOCAINE HYDROCHLORIDE AND EPINEPHRINE 5 ML: 20; 10 INJECTION, SOLUTION INFILTRATION; PERINEURAL at 11:03

## 2019-03-25 RX ADMIN — VANCOMYCIN HYDROCHLORIDE 1000 MG: 1 INJECTION, POWDER, LYOPHILIZED, FOR SOLUTION INTRAVENOUS at 08:03

## 2019-03-25 RX ADMIN — OXYCODONE HYDROCHLORIDE 10 MG: 10 TABLET ORAL at 10:03

## 2019-03-25 RX ADMIN — MIDAZOLAM HYDROCHLORIDE 1 MG: 1 INJECTION, SOLUTION INTRAMUSCULAR; INTRAVENOUS at 12:03

## 2019-03-25 RX ADMIN — MIDAZOLAM HYDROCHLORIDE 1 MG: 1 INJECTION, SOLUTION INTRAMUSCULAR; INTRAVENOUS at 09:03

## 2019-03-25 RX ADMIN — MORPHINE SULFATE 2 MG: 2 INJECTION, SOLUTION INTRAMUSCULAR; INTRAVENOUS at 05:03

## 2019-03-25 RX ADMIN — BOSENTAN 125 MG: 125 TABLET, FILM COATED ORAL at 07:03

## 2019-03-25 RX ADMIN — GLYCOPYRROLATE 0.2 MG: 0.2 INJECTION, SOLUTION INTRAMUSCULAR; INTRAVENOUS at 11:03

## 2019-03-25 RX ADMIN — ONDANSETRON 4 MG: 2 INJECTION INTRAMUSCULAR; INTRAVENOUS at 02:03

## 2019-03-25 NOTE — ASSESSMENT & PLAN NOTE
Pt is a 72 yo F with pulmonary HTN, chronic respiratory failure, congenital heart disease on coumadin, and closed left femoral neck fracture    -Discussed with anesthesia and heart transplant teams regarding safest options of providing anesthesia for this patient given severe pulmonary HTN and elevated INR.   -Plan for L hip hemiarthroplasty this morning  - Hgb 14.4, 2u pRBC held for procedure  -Correcting INR with vitamin K, 1.3 this morning  -NWB LLE  -NPO for surgery this AM  -All risks, benefits, alternatives to surgery explained. All questions answered. Patient expresses understanding and would like to proceed with surgery. Operative site marked.

## 2019-03-25 NOTE — ANESTHESIA PROCEDURE NOTES
Central Line    Diagnosis: pulmonary hypertension  Patient location during procedure: pre-op  Procedure start time: 3/25/2019 9:58 AM  Timeout: 3/25/2019 9:45 AM  Procedure end time: 3/25/2019 10:15 AM  Staffing  Anesthesiologist: Sonia Saez MD  Performed: anesthesiologist   Anesthesiologist was present at the time of the procedure.  Preanesthetic Checklist  Completed: patient identified, site marked, surgical consent, pre-op evaluation, timeout performed, IV checked, risks and benefits discussed, monitors and equipment checked and anesthesia consent given  Indication   Indication: vascular access, med administration     Anesthesia   local infiltration    Central Line   Skin Prep: skin prepped with ChloraPrep, skin prep agent completely dried prior to procedure  maximum sterile barriers used during central venous catheter insertion  hand hygiene performed prior to central venous catheter insertion  Location: left, internal jugular.   Catheter type: double lumen  Catheter Size: 8 Fr  Inserted Catheter Length: 16 cm  Ultrasound: vascular probe with ultrasound  Vessel Caliber: medium, patent  Needle advanced into vessel with real time Ultrasound guidance.  Guidewire confirmed in vessel.   Manometry: Venous cannualation confirmed by visual estimation of blood vessel pressure using manometry.  Insertion Attempts: 1   Securement:line sutured, chlorhexidine patch, sterile dressing applied and blood return through all ports    Post-Procedure     Guidewire Guidewire removed intact.

## 2019-03-25 NOTE — PROGRESS NOTES
Notified Dr Matthews with orthopedic sx of pt repeat inr 1.4 past vitamin k with goal of <1.2. Md referred this Rn to  jossue.  Jossue stated no intervention at this time and will reassess pt and labs in the AM. Will continue to monitor pt

## 2019-03-25 NOTE — ASSESSMENT & PLAN NOTE
-Euvolemic on exam. Continue PO Lasix.  -Continue riociguat, bosentan, remodulin([facility] CDI).  - to manage remodulin infusion.  -Hold warfarin.

## 2019-03-25 NOTE — PROGRESS NOTES
[facility]  Heart Transplant  Progress Note    Patient Name: Molly Smith  MRN: [id number]  Admission Date: 3/23/2019  Hospital Length of Stay: 2 days  Attending Physician: Sheila Vuong MD  Primary Care Provider: Roberto Braun MD  Principal Problem:Closed fracture of neck of left femur    Subjective:     Interval History: No complaints this am. Ready for surgery.    Continuous Infusions:   treprostinil (REMODULIN) infusion      veletri/remodulin cassette      veletri/remodulin tubing       Scheduled Meds:   acetaminophen  1,000 mg Oral TID    amiodarone  200 mg Oral Daily    bosentan  125 mg Oral Q12H    calcium carbonate  1,000 mg Oral Daily    cetirizine  5 mg Oral Daily    ferrous gluconate  324 mg Oral Daily with breakfast    fluticasone  2 spray Each Nare QHS    folic acid  1 mg Oral Daily    furosemide  20 mg Oral After lunch    riociguat  0.5 mg Oral TID    sodium chloride 0.9%  3 mL Intravenous Q8H    spironolactone  25 mg Oral Daily    vancomycin in dextrose 5 %  1,000 mg Intravenous Once     PRN Meds:sodium chloride, chlordiazepoxide-clidinium 5-2.5 mg, fentaNYL, fentaNYL, midazolam, ondansetron, promethazine, traMADol    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    Multaq [dronedarone]      Objective:     Vital Signs (Most Recent):  Temp: 98.7 °F (37.1 °C) (03/25/19 0840)  Pulse: 87 (03/25/19 0929)  Resp: (!) 22 (03/25/19 0929)  BP: 110/63 (03/25/19 0929)  SpO2: (!) 89 % (03/25/19 0929) Vital Signs (24h Range):  Temp:  [98.5 °F (36.9 °C)-99 °F (37.2 °C)] 98.7 °F (37.1 °C)  Pulse:  [75-94] 87  Resp:  [15-24] 22  SpO2:  [86 %-91 %] 89 %  BP: ()/(62-86) 110/63     Patient Vitals for the past 72 hrs (Last 3 readings):   Weight   03/23/19 1335 57.2 kg (126 lb)     Body mass index is 22.32 kg/m².      Intake/Output Summary (Last 24 hours) at 3/25/2019 0933  Last data filed at 3/25/2019 0600  Gross per 24  hour   Intake 360 ml   Output 1275 ml   Net -915 ml          Physical Exam   Constitutional: She is oriented to person, place, and time. She appears well-developed and well-nourished. No distress.   HENT:   Head: Normocephalic and atraumatic.   Eyes: EOM are normal.   Neck: Neck supple. No JVD present.   Cardiovascular: Normal rate and regular rhythm.   Pulmonary/Chest: Effort normal and breath sounds normal. No respiratory distress.   Abdominal: Soft. Bowel sounds are normal. She exhibits no distension.   Musculoskeletal: She exhibits no edema.   Neurological: She is alert and oriented to person, place, and time.   Skin: Skin is warm and dry. Capillary refill takes less than 2 seconds. No erythema.   Psychiatric: She has a normal mood and affect. Her behavior is normal. Thought content normal.       Significant Labs:  CBC:  Recent Labs   Lab 03/23/19  1020 03/24/19  0629 03/25/19  0153   WBC 6.60 5.42 4.92   RBC 5.00 4.59 4.74   HGB 15.2 14.1 14.4   HCT 45.7 42.7 44.1    202 192   MCV 91 93 93   MCH 30.4 30.7 30.4   MCHC 33.2 33.0 32.7     BNP:  No results for input(s): BNP in the last 168 hours.    Invalid input(s): BNPTRIAGELBLO  CMP:  Recent Labs   Lab 03/23/19  1020 03/24/19  0629 03/25/19  0153   GLU 91 92 92   CALCIUM 9.1 8.4* 8.4*   ALBUMIN 4.3  --  3.3*   PROT 7.5  --  6.2    138 136   K 4.3 3.7 4.3   CO2 26 27 24    103 104   BUN 21* 21 21   CREATININE 1.10 1.2 1.0   ALKPHOS 50  --  41*   ALT 24  --  12   AST 27  --  14   BILITOT 1.3  --  1.7*      Coagulation:   Recent Labs   Lab 03/24/19  1935 03/25/19  0153 03/25/19  0613   INR 1.4* 1.3* 1.2     LDH:  No results for input(s): LDH in the last 72 hours.  Microbiology:  Microbiology Results (last 7 days)     ** No results found for the last 168 hours. **          I have reviewed all pertinent labs within the past 24 hours.    Estimated Creatinine Clearance: 42.1 mL/min (based on SCr of 1 mg/dL).    Diagnostic Results:  I have reviewed  all pertinent imaging results/findings within the past 24 hours.    Assessment and Plan:     73 y.o. 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 Patient presented to [facility] for hip pain and inability to bear weight on the left leg after a trip and fall yesterday.  Patient tripped on a cord for her pulm HTN pump falling directly onto the left hip. Patient denies head trauma or upper body injury. She denies numbness or tingling. Currently lying on stretcher with  at bedside in nad.        * Closed fracture of neck of left femur  -Pain control with tramadol and fentanyl for now.   -Bedrest.  -Plan for OR today with orthopedic mallory.      PAH (pulmonary artery hypertension)  -Euvolemic on exam. Continue PO Lasix.  -Continue riociguat, bosentan, remodulin([facility] CDI).  - to manage remodulin infusion.  -Hold warfarin.    SVT (supraventricular tachycardia)  -Continue amio.    Chronic respiratory failure with hypoxia  - on 5L O2 at home.   - Goal sat >/=85%.      Joe Arellano NP  Heart Transplant  [facility]

## 2019-03-25 NOTE — SUBJECTIVE & OBJECTIVE
"Principal Problem:Closed fracture of neck of left femur    Principal Orthopedic Problem: L femoral neck fx    Interval History: Pt seen and examined at bedside. NAEO. Pt reports pain controlled. NPO since midnight.    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    Multaq [dronedarone]        Current Facility-Administered Medications   Medication    0.9%  NaCl infusion (for blood administration)    acetaminophen tablet 1,000 mg    amiodarone tablet 200 mg    bosentan tablet 125 mg    calcium carbonate (OS-MARIELLE) tablet 500 mg    cetirizine tablet 5 mg    chlordiazepoxide-clidinium 5-2.5 mg per capsule 1 capsule    fentaNYL injection 12.5 mcg    ferrous gluconate tablet 324 mg    fluticasone 50 mcg/actuation nasal spray 100 mcg    folic acid tablet 1 mg    furosemide tablet 20 mg    ondansetron disintegrating tablet 4 mg    promethazine tablet 25 mg    riociguat (ADEMPAS) tablet 0.5 mg    sodium chloride 0.9% flush 3 mL    spironolactone tablet 25 mg    traMADol tablet 100 mg    treprostinil (REMODULIN) 12,000,000 ng in sodium chloride 0.9% 100 mL infusion    VELETRI/REMODULIN CASSETTE    VELETRI/REMODULIN TUBING     Objective:     Vital Signs (Most Recent):  Temp: 98.5 °F (36.9 °C) (03/24/19 1649)  Pulse: 83 (03/25/19 0400)  Resp: 20 (03/25/19 0400)  BP: 116/76 (03/25/19 0400)  SpO2: (!) 91 % (03/25/19 0400) Vital Signs (24h Range):  Temp:  [97.6 °F (36.4 °C)-99 °F (37.2 °C)] 98.5 °F (36.9 °C)  Pulse:  [75-94] 83  Resp:  [15-20] 20  SpO2:  [86 %-91 %] 91 %  BP: ()/(59-76) 116/76     Weight: 57.2 kg (126 lb)  Height: 5' 3" (160 cm)  Body mass index is 22.32 kg/m².      Intake/Output Summary (Last 24 hours) at 3/25/2019 0623  Last data filed at 3/25/2019 0600  Gross per 24 hour   Intake 360 ml   Output 1275 ml   Net -915 ml     NAD  Normal respiratory effort on 5 L NC O2    Ortho/SPM Exam    Pt lying in bed this morning in NAD  SILT " Sa/Thompson/DP/SP/T  Motor intact EHL/FHL/TA/Gastroc  2+ DP, 2+ PT    Significant Labs:   BMP:   Recent Labs   Lab 03/25/19  0153   GLU 92      K 4.3      CO2 24   BUN 21   CREATININE 1.0   CALCIUM 8.4*   MG 2.1     CBC:   Recent Labs   Lab 03/23/19  1020 03/24/19  0629 03/25/19  0153   WBC 6.60 5.42 4.92   HGB 15.2 14.1 14.4   HCT 45.7 42.7 44.1    202 192     Coagulation:   Recent Labs   Lab 03/24/19  0629 03/24/19  1935 03/25/19  0153   LABPROT 18.6* 14.1* 12.9*   INR 1.9* 1.4* 1.3*       Significant Imaging: I have reviewed all pertinent imaging results/findings.

## 2019-03-25 NOTE — OP NOTE
OP NOTE    Date of Sx: 03/25/2019    Preop Dx: Left femoral neck fracture    Postop Dx: Left femoral neck fracture    Procedure: Left hip hemiarthroplasty for femoral neck fracture    Surgeon: Kemal Esposito M.D.    Asst:  Munira Landin M.D    Anesthesia: Spinal epidural plus regional    EBL:  300cc    IVF:  1000cc crystalloid    Implants: [employer] pressfit 9, 132 degree, 48mm Unitrax head, +5  Implant Name Type Inv. Item Serial No.  Lot No. LRB No. Used   [employer] Beaded Cable and Sleeve Set    GENARO [id number] Left 1   HEAD FEM ENDO UNI MOD 48MM - [id number]  HEAD FEM ENDO UNI MOD 48MM  GEN[employer] SALES GUNNAR. [id number] Left 1   STEM FEMORAL SZ 9 132 DEG TI - [id number]  STEM FEMORAL SZ 9 132 DEG TI  GENARO Sqoot GUNNAR. [id number] Left 1   SLEEVE FEM C-TAPER UNI +5MM - [id number]  SLEEVE FEM C-TAPER UNI +5MM  GENARO [employer] GUNNAR. [id number] Left 1         Specimens: Femoral head    Findings: Stable to 90 IR with flexion and adduction    Dispo:  To PACU awake/stable      Indications for Procedure:    The patient is a 73-year-old female who sustained a ground level fall, resulting in a left femoral neck fracture.  She has multiple medical comorbid conditions including chronic respiratory failure and pulmonary hypertension.  She was on Coumadin for AFib and we chose to wait for INR to come down so that spinal epidural anesthesia could be used rather than general anesthesia.  She appeared to have good bone quality but was not a good candidate for total hip arthroplasty, so press fit hemiarthroplasty was chosen.  She had minimal displacement of her fracture but not a true valgus impacted form, and therefore replacement was chosen as a single surgery to get her up in mobile again.  The risks, benefits and alternatives to surgery were discussed with the patient and family at great length prior to going to the operating room.  All questions were answered and informed consent was obtained.       Procedure in Detail:    Patient was marked in the preoperative holding area and brought to the operating room.  A super inguinal fascia iliac a catheter was placed on the patient's hospital bed and a spinal epidural catheter was also placed.  An arterial line had been placed in the preoperative holding area.  Preoperative antibiotics were administered.  The patient was moved to the operating table the placed in the lateral decubitus position with the left hip up and all bony prominences well padded.  The left hip and lower extremity were prepped and draped in sterile fashion.  A timeout was undertaken to confirm patient, side, site, procedure, and the administration of preoperative antibiotics.  All agreed and we proceeded.    A standard posterior approach was made.  The gluteal fascia and iliotibial band were incised in line with the skin incision.  The gluteus jose luis was split bluntly and the Charnley retractor placed.  The short external rotators were identified and the piriformis and conjoint tendons incised at their insertions and tagged with number 2 Ethibond suture.  A posterior capsulotomy was performed and the corners tagged with number 1 vicryl suture.  The femoral head was removed with a power corkscrew and measured.  A 48mm head was trialled and found to be the appropriate size.  Attention was then turned to the femur.    The femoral neck cut was made at the appropriate height.  The box osteotome was used, followed by the canal finder, then the lateralizer, then sequential canal reaming.  The femur was broached to size 8.  At this point I felt that a size 9 would be the appropriate Press-Fit size but was concerned about some rather thin bone in the area of the calcar and proceeded to place a subtrochanteric caval.  I incised to the center portion of the vastus lateralis, passed the cable passer, placed the cable, tensioned it and then crimped and cut it.  I then broached to size 9 and trialed and  ranged with good leg lengths and stability.    The size 8 pressfit stem was placed with the appropriate anteversion and canal fit.  Trialling was again undertaken and a 48mm head with +5 sleeve were placed.  The hip had good clinical leg lengths and stability with the final implants.      A 17/500cc betadine soak was undertaken.  The hip was again irrigated with normal saline via pulse lavage.  The posterior capsule and short external rotators were repaired to the greater trochanter through drill holes after placement of 1 g of vancomycin powder.  The vastus lateralis was repaired with 1.  Vicryl suture.  The gluteal fascia and iliotibial band were repaired with number 1 vicryl in interrupted fashion.  The next layer of fascia repaired with 0 vicryl suture, the subcutaneous tissue with 2-0 vicryl suture, and the skin with Stratafix and dermabond.   An Aquacel dressing and knee immobilizer were placed.      Instrument and sponge counts were reported correct at the end of the case.  There were no complications.  The patient was returned to the supine position on the hospital bed, extubated and taken to the recovery room in stable condition.      Plan for the patient:  Immediate PT and OT with weight bearing as tolerated and posterior hip precautions.    Kemal Esposito MD

## 2019-03-25 NOTE — ASSESSMENT & PLAN NOTE
-Pain control with tramadol and fentanyl for now.   -Bedrest.  -Plan for OR today with orthopedic mallory.

## 2019-03-25 NOTE — PROGRESS NOTES
[facility]  Orthopedics  Progress Note    Patient Name: Molly Smith  MRN: [id number]  Admission Date: 3/23/2019  Hospital Length of Stay: 2 days  Attending Provider: Sheila Vuong MD  Primary Care Provider: Roberto Braun MD  Follow-up For: Procedure(s) (LRB):  HEMIARTHROPLASTY, HIP - left  - matthew - peg board (Left)    Post-Operative Day:    Subjective:     Principal Problem:Closed fracture of neck of left femur    Principal Orthopedic Problem: L femoral neck fx    Interval History: Pt seen and examined at bedside. NAEO. Pt reports pain controlled. NPO since midnight.    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    Multaq [dronedarone]        Current Facility-Administered Medications   Medication    0.9%  NaCl infusion (for blood administration)    acetaminophen tablet 1,000 mg    amiodarone tablet 200 mg    bosentan tablet 125 mg    calcium carbonate (OS-MARIELLE) tablet 500 mg    cetirizine tablet 5 mg    chlordiazepoxide-clidinium 5-2.5 mg per capsule 1 capsule    fentaNYL injection 12.5 mcg    ferrous gluconate tablet 324 mg    fluticasone 50 mcg/actuation nasal spray 100 mcg    folic acid tablet 1 mg    furosemide tablet 20 mg    ondansetron disintegrating tablet 4 mg    promethazine tablet 25 mg    riociguat (ADEMPAS) tablet 0.5 mg    sodium chloride 0.9% flush 3 mL    spironolactone tablet 25 mg    traMADol tablet 100 mg    treprostinil (REMODULIN) 12,000,000 ng in sodium chloride 0.9% 100 mL infusion    VELETRI/REMODULIN CASSETTE    VELETRI/REMODULIN TUBING     Objective:     Vital Signs (Most Recent):  Temp: 98.5 °F (36.9 °C) (03/24/19 1649)  Pulse: 83 (03/25/19 0400)  Resp: 20 (03/25/19 0400)  BP: 116/76 (03/25/19 0400)  SpO2: (!) 91 % (03/25/19 0400) Vital Signs (24h Range):  Temp:  [97.6 °F (36.4 °C)-99 °F (37.2 °C)] 98.5 °F (36.9 °C)  Pulse:  [75-94] 83  Resp:  [15-20] 20  SpO2:  [86 %-91 %] 91 %  BP:  "()/(59-76) 116/76     Weight: 57.2 kg (126 lb)  Height: 5' 3" (160 cm)  Body mass index is 22.32 kg/m².      Intake/Output Summary (Last 24 hours) at 3/25/2019 0623  Last data filed at 3/25/2019 0600  Gross per 24 hour   Intake 360 ml   Output 1275 ml   Net -915 ml     NAD  Normal respiratory effort on 5 L NC O2    Ortho/SPM Exam    Pt lying in bed this morning in NAD  SILT Sa/Thompson/DP/SP/T  Motor intact EHL/FHL/TA/Gastroc  2+ DP, 2+ PT    Significant Labs:   BMP:   Recent Labs   Lab 03/25/19  0153   GLU 92      K 4.3      CO2 24   BUN 21   CREATININE 1.0   CALCIUM 8.4*   MG 2.1     CBC:   Recent Labs   Lab 03/23/19  1020 03/24/19  0629 03/25/19  0153   WBC 6.60 5.42 4.92   HGB 15.2 14.1 14.4   HCT 45.7 42.7 44.1    202 192     Coagulation:   Recent Labs   Lab 03/24/19  0629 03/24/19  1935 03/25/19  0153   LABPROT 18.6* 14.1* 12.9*   INR 1.9* 1.4* 1.3*       Significant Imaging: I have reviewed all pertinent imaging results/findings.    Assessment/Plan:     * Closed fracture of neck of left femur  Pt is a 74 yo F with pulmonary HTN, chronic respiratory failure, congenital heart disease on coumadin, and closed left femoral neck fracture    -Discussed with anesthesia and heart transplant teams regarding safest options of providing anesthesia for this patient given severe pulmonary HTN and elevated INR.   -Plan for L hip hemiarthroplasty this morning  - Hgb 14.4, 2u pRBC held for procedure  -Correcting INR with vitamin K, 1.3 this morning  -NWB LLE  -NPO for surgery this AM  -All risks, benefits, alternatives to surgery explained. All questions answered. Patient expresses understanding and would like to proceed with surgery. Operative site marked.     Closed left hip fracture             Munira Landin MD  Orthopedics  [facility]  "

## 2019-03-25 NOTE — NURSING
Transported to [facility] via bed  accompanied by [facility] staff, tele notified of patient location.

## 2019-03-25 NOTE — CARE UPDATE
INR evaluated. 1.4. Discussed with nurse who graciously contacted transplant. At this time, transplant wishes to observe and see labs in am. This seems reasonable as it's trending down. Need INR 1.2 or less. INR ordered for two hours from now. Will assess.

## 2019-03-25 NOTE — ANESTHESIA PROCEDURE NOTES
Arterial    Diagnosis: pulmonary hypertension    Patient location during procedure: pre-op  Procedure start time: 3/25/2019 9:20 AM  Timeout: 3/25/2019 9:17 AM  Procedure end time: 3/25/2019 9:23 AM  Staffing  Anesthesiologist: Sonia Saez MD  Performed: anesthesiologist   Anesthesiologist was present at the time of the procedure.  Preanesthetic Checklist  Completed: patient identified, site marked, surgical consent, pre-op evaluation, timeout performed, IV checked, risks and benefits discussed, monitors and equipment checked and anesthesia consent givenArterial  Skin Prep: chlorhexidine gluconate  Local Infiltration: lidocaine  Orientation: right  Location: radial  Catheter Size: 20 G  Catheter placement by Anatomical landmarks. Heme positive aspiration all ports.Insertion Attempts: 1  Assessment  Dressing: secured with tape and tegaderm

## 2019-03-25 NOTE — SUBJECTIVE & OBJECTIVE
Interval History: No complaints this am. Ready for surgery.    Continuous Infusions:   treprostinil (REMODULIN) infusion      veletri/remodulin cassette      veletri/remodulin tubing       Scheduled Meds:   acetaminophen  1,000 mg Oral TID    amiodarone  200 mg Oral Daily    bosentan  125 mg Oral Q12H    calcium carbonate  1,000 mg Oral Daily    cetirizine  5 mg Oral Daily    ferrous gluconate  324 mg Oral Daily with breakfast    fluticasone  2 spray Each Nare QHS    folic acid  1 mg Oral Daily    furosemide  20 mg Oral After lunch    riociguat  0.5 mg Oral TID    sodium chloride 0.9%  3 mL Intravenous Q8H    spironolactone  25 mg Oral Daily    vancomycin in dextrose 5 %  1,000 mg Intravenous Once     PRN Meds:sodium chloride, chlordiazepoxide-clidinium 5-2.5 mg, fentaNYL, fentaNYL, midazolam, ondansetron, promethazine, traMADol    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    Multaq [dronedarone]      Objective:     Vital Signs (Most Recent):  Temp: 98.7 °F (37.1 °C) (03/25/19 0840)  Pulse: 87 (03/25/19 0929)  Resp: (!) 22 (03/25/19 0929)  BP: 110/63 (03/25/19 0929)  SpO2: (!) 89 % (03/25/19 0929) Vital Signs (24h Range):  Temp:  [98.5 °F (36.9 °C)-99 °F (37.2 °C)] 98.7 °F (37.1 °C)  Pulse:  [75-94] 87  Resp:  [15-24] 22  SpO2:  [86 %-91 %] 89 %  BP: ()/(62-86) 110/63     Patient Vitals for the past 72 hrs (Last 3 readings):   Weight   03/23/19 1335 57.2 kg (126 lb)     Body mass index is 22.32 kg/m².      Intake/Output Summary (Last 24 hours) at 3/25/2019 0933  Last data filed at 3/25/2019 0600  Gross per 24 hour   Intake 360 ml   Output 1275 ml   Net -915 ml          Physical Exam   Constitutional: She is oriented to person, place, and time. She appears well-developed and well-nourished. No distress.   HENT:   Head: Normocephalic and atraumatic.   Eyes: EOM are normal.   Neck: Neck supple. No JVD present.   Cardiovascular: Normal rate  and regular rhythm.   Pulmonary/Chest: Effort normal and breath sounds normal. No respiratory distress.   Abdominal: Soft. Bowel sounds are normal. She exhibits no distension.   Musculoskeletal: She exhibits no edema.   Neurological: She is alert and oriented to person, place, and time.   Skin: Skin is warm and dry. Capillary refill takes less than 2 seconds. No erythema.   Psychiatric: She has a normal mood and affect. Her behavior is normal. Thought content normal.       Significant Labs:  CBC:  Recent Labs   Lab 03/23/19  1020 03/24/19  0629 03/25/19  0153   WBC 6.60 5.42 4.92   RBC 5.00 4.59 4.74   HGB 15.2 14.1 14.4   HCT 45.7 42.7 44.1    202 192   MCV 91 93 93   MCH 30.4 30.7 30.4   MCHC 33.2 33.0 32.7     BNP:  No results for input(s): BNP in the last 168 hours.    Invalid input(s): BNPTRIAGELBLO  CMP:  Recent Labs   Lab 03/23/19  1020 03/24/19  0629 03/25/19  0153   GLU 91 92 92   CALCIUM 9.1 8.4* 8.4*   ALBUMIN 4.3  --  3.3*   PROT 7.5  --  6.2    138 136   K 4.3 3.7 4.3   CO2 26 27 24    103 104   BUN 21* 21 21   CREATININE 1.10 1.2 1.0   ALKPHOS 50  --  41*   ALT 24  --  12   AST 27  --  14   BILITOT 1.3  --  1.7*      Coagulation:   Recent Labs   Lab 03/24/19  1935 03/25/19  0153 03/25/19  0613   INR 1.4* 1.3* 1.2     LDH:  No results for input(s): LDH in the last 72 hours.  Microbiology:  Microbiology Results (last 7 days)     ** No results found for the last 168 hours. **          I have reviewed all pertinent labs within the past 24 hours.    Estimated Creatinine Clearance: 42.1 mL/min (based on SCr of 1 mg/dL).    Diagnostic Results:  I have reviewed all pertinent imaging results/findings within the past 24 hours.

## 2019-03-25 NOTE — ANESTHESIA PROCEDURE NOTES
Suprainguinal Fascia Iliaca catheter     Patient location during procedure: pre-op   Block not for primary anesthetic.  Reason for block: at surgeon's request and post-op pain management   Post-op Pain Location: left hip pain  Start time: 3/25/2019 9:17 AM  Timeout: 3/25/2019 9:14 AM   End time: 3/25/2019 9:32 AM  Staffing  Anesthesiologist: Asim Yan MD  Resident/CRNA: Rolan Diallo MD  Performed: resident/CRNA   Preanesthetic Checklist  Completed: patient identified, site marked, surgical consent, pre-op evaluation, timeout performed, IV checked, risks and benefits discussed and monitors and equipment checked  Peripheral Block  Patient position: supine  Prep: ChloraPrep  Patient monitoring: cardiac monitor, heart rate, continuous pulse ox, continuous capnometry and frequent blood pressure checks  Block type: fascia iliaca (Supra Inguinal )  Laterality: left  Injection technique: continuous  Needle  Needle type: Stimuplex   Needle gauge: 22 G  Needle length: 2 in  Needle localization: ultrasound guidance and anatomical landmarks   -ultrasound image captured on disc.  Assessment  Injection assessment: local visualized surrounding nerve, negative parasthesia and negative aspiration  Paresthesia pain: none  Heart rate change: no  Slow fractionated injection: yes  Additional Notes  VSS. See [facility] nurse for vitals. Patient tolerated the block well.    20 cc of 0.5% ropivacaine with epi added to 20 cc of NS

## 2019-03-25 NOTE — ANESTHESIA PROCEDURE NOTES
Epidural    Patient location during procedure: OR   Reason for block: primary anesthetic   Reason for block: at surgeon's request  Diagnosis: hip fracture   Start time: 3/25/2019 10:35 AM  Timeout: 3/25/2019 10:32 AM  End time: 3/25/2019 10:45 AM  Surgery related to: hip arthroplasty  Staffing  Anesthesiologist: Sonia Saez MD  Performed: anesthesiologist   Preanesthetic Checklist  Completed: patient identified, site marked, surgical consent, pre-op evaluation, timeout performed, IV checked, risks and benefits discussed, monitors and equipment checked, anesthesia consent given, hand hygiene performed and patient being monitored  Preparation  Patient position: right lateral decubitus  Prep: ChloraPrep  Patient monitoring: Pulse Ox and Blood Pressure  Epidural  Skin Anesthetic: lidocaine 1%  Skin Wheal: 2 mL  Administration type: continuous  Approach: midline  Interspace: L3-4    Injection technique: CHEYENNE saline  Needle and Epidural Catheter  Needle type: Tuohy   Needle gauge: 17  Needle length: 3.5 inches  Needle insertion depth: 5 cm  Catheter type: springwound  Catheter size: 19 G  Catheter at skin depth: 10 cm  Test dose: 3 mL of lidocaine 1.5% with Epi 1-to-200,000  Additional Documentation: no signs/symptoms of IV or SA injection  Needle localization: anatomical landmarks

## 2019-03-26 LAB
ALBUMIN SERPL BCP-MCNC: 2.8 G/DL (ref 3.5–5.2)
ALBUMIN SERPL BCP-MCNC: 3 G/DL (ref 3.5–5.2)
ALP SERPL-CCNC: 36 U/L (ref 55–135)
ALP SERPL-CCNC: 37 U/L (ref 55–135)
ALT SERPL W/O P-5'-P-CCNC: 7 U/L (ref 10–44)
ALT SERPL W/O P-5'-P-CCNC: 9 U/L (ref 10–44)
ANION GAP SERPL CALC-SCNC: 11 MMOL/L (ref 8–16)
ANION GAP SERPL CALC-SCNC: 8 MMOL/L (ref 8–16)
AST SERPL-CCNC: 17 U/L (ref 10–40)
AST SERPL-CCNC: 18 U/L (ref 10–40)
BACTERIA #/AREA URNS AUTO: ABNORMAL /HPF
BASOPHILS # BLD AUTO: 0.03 K/UL (ref 0–0.2)
BASOPHILS # BLD AUTO: 0.05 K/UL (ref 0–0.2)
BASOPHILS NFR BLD: 0.3 % (ref 0–1.9)
BASOPHILS NFR BLD: 0.6 % (ref 0–1.9)
BILIRUB SERPL-MCNC: 1.1 MG/DL (ref 0.1–1)
BILIRUB SERPL-MCNC: 1.4 MG/DL (ref 0.1–1)
BILIRUB UR QL STRIP: NEGATIVE
BUN SERPL-MCNC: 19 MG/DL (ref 8–23)
BUN SERPL-MCNC: 20 MG/DL (ref 8–23)
CALCIUM SERPL-MCNC: 7.8 MG/DL (ref 8.7–10.5)
CALCIUM SERPL-MCNC: 7.9 MG/DL (ref 8.7–10.5)
CHLORIDE SERPL-SCNC: 104 MMOL/L (ref 95–110)
CHLORIDE SERPL-SCNC: 105 MMOL/L (ref 95–110)
CLARITY UR REFRACT.AUTO: ABNORMAL
CO2 SERPL-SCNC: 19 MMOL/L (ref 23–29)
CO2 SERPL-SCNC: 21 MMOL/L (ref 23–29)
COLOR UR AUTO: YELLOW
CREAT SERPL-MCNC: 1.3 MG/DL (ref 0.5–1.4)
CREAT SERPL-MCNC: 1.3 MG/DL (ref 0.5–1.4)
DIFFERENTIAL METHOD: ABNORMAL
DIFFERENTIAL METHOD: ABNORMAL
EOSINOPHIL # BLD AUTO: 0 K/UL (ref 0–0.5)
EOSINOPHIL # BLD AUTO: 0 K/UL (ref 0–0.5)
EOSINOPHIL NFR BLD: 0 % (ref 0–8)
EOSINOPHIL NFR BLD: 0 % (ref 0–8)
ERYTHROCYTE [DISTWIDTH] IN BLOOD BY AUTOMATED COUNT: 13.5 % (ref 11.5–14.5)
ERYTHROCYTE [DISTWIDTH] IN BLOOD BY AUTOMATED COUNT: 13.8 % (ref 11.5–14.5)
EST. GFR  (AFRICAN AMERICAN): 47.4 ML/MIN/1.73 M^2
EST. GFR  (AFRICAN AMERICAN): 47.4 ML/MIN/1.73 M^2
EST. GFR  (NON AFRICAN AMERICAN): 41.1 ML/MIN/1.73 M^2
EST. GFR  (NON AFRICAN AMERICAN): 41.1 ML/MIN/1.73 M^2
GLUCOSE SERPL-MCNC: 141 MG/DL (ref 70–110)
GLUCOSE SERPL-MCNC: 183 MG/DL (ref 70–110)
GLUCOSE UR QL STRIP: NEGATIVE
HCT VFR BLD AUTO: 36.4 % (ref 37–48.5)
HCT VFR BLD AUTO: 36.5 % (ref 37–48.5)
HGB BLD-MCNC: 11.9 G/DL (ref 12–16)
HGB BLD-MCNC: 12.2 G/DL (ref 12–16)
HGB UR QL STRIP: ABNORMAL
HYALINE CASTS UR QL AUTO: 0 /LPF
IMM GRANULOCYTES # BLD AUTO: 0.03 K/UL (ref 0–0.04)
IMM GRANULOCYTES # BLD AUTO: 0.03 K/UL (ref 0–0.04)
IMM GRANULOCYTES NFR BLD AUTO: 0.3 % (ref 0–0.5)
IMM GRANULOCYTES NFR BLD AUTO: 0.3 % (ref 0–0.5)
INR PPP: 1.1 (ref 0.8–1.2)
KETONES UR QL STRIP: ABNORMAL
LEUKOCYTE ESTERASE UR QL STRIP: NEGATIVE
LYMPHOCYTES # BLD AUTO: 0.4 K/UL (ref 1–4.8)
LYMPHOCYTES # BLD AUTO: 0.5 K/UL (ref 1–4.8)
LYMPHOCYTES NFR BLD: 4.4 % (ref 18–48)
LYMPHOCYTES NFR BLD: 5.5 % (ref 18–48)
MAGNESIUM SERPL-MCNC: 2.5 MG/DL (ref 1.6–2.6)
MCH RBC QN AUTO: 30.1 PG (ref 27–31)
MCH RBC QN AUTO: 31.1 PG (ref 27–31)
MCHC RBC AUTO-ENTMCNC: 32.6 G/DL (ref 32–36)
MCHC RBC AUTO-ENTMCNC: 33.5 G/DL (ref 32–36)
MCV RBC AUTO: 92 FL (ref 82–98)
MCV RBC AUTO: 93 FL (ref 82–98)
MICROSCOPIC COMMENT: ABNORMAL
MONOCYTES # BLD AUTO: 0.7 K/UL (ref 0.3–1)
MONOCYTES # BLD AUTO: 0.7 K/UL (ref 0.3–1)
MONOCYTES NFR BLD: 7.5 % (ref 4–15)
MONOCYTES NFR BLD: 8.4 % (ref 4–15)
NEUTROPHILS # BLD AUTO: 7.3 K/UL (ref 1.8–7.7)
NEUTROPHILS # BLD AUTO: 8 K/UL (ref 1.8–7.7)
NEUTROPHILS NFR BLD: 85.2 % (ref 38–73)
NEUTROPHILS NFR BLD: 87.5 % (ref 38–73)
NITRITE UR QL STRIP: NEGATIVE
NRBC BLD-RTO: 0 /100 WBC
NRBC BLD-RTO: 0 /100 WBC
PH UR STRIP: 5 [PH] (ref 5–8)
PHOSPHATE SERPL-MCNC: 2.7 MG/DL (ref 2.7–4.5)
PLATELET # BLD AUTO: 186 K/UL (ref 150–350)
PLATELET # BLD AUTO: 216 K/UL (ref 150–350)
PMV BLD AUTO: 9.2 FL (ref 9.2–12.9)
PMV BLD AUTO: 9.4 FL (ref 9.2–12.9)
POTASSIUM SERPL-SCNC: 3.5 MMOL/L (ref 3.5–5.1)
POTASSIUM SERPL-SCNC: 3.9 MMOL/L (ref 3.5–5.1)
PROT SERPL-MCNC: 5.8 G/DL (ref 6–8.4)
PROT SERPL-MCNC: 5.8 G/DL (ref 6–8.4)
PROT UR QL STRIP: ABNORMAL
PROTHROMBIN TIME: 11.4 SEC (ref 9–12.5)
RBC # BLD AUTO: 3.92 M/UL (ref 4–5.4)
RBC # BLD AUTO: 3.95 M/UL (ref 4–5.4)
RBC #/AREA URNS AUTO: 8 /HPF (ref 0–4)
SODIUM SERPL-SCNC: 133 MMOL/L (ref 136–145)
SODIUM SERPL-SCNC: 135 MMOL/L (ref 136–145)
SP GR UR STRIP: 1.02 (ref 1–1.03)
URN SPEC COLLECT METH UR: ABNORMAL
WBC # BLD AUTO: 8.61 K/UL (ref 3.9–12.7)
WBC # BLD AUTO: 9.15 K/UL (ref 3.9–12.7)
WBC #/AREA URNS AUTO: 2 /HPF (ref 0–5)

## 2019-03-26 PROCEDURE — 25000003 PHARM REV CODE 250: Performed by: NURSE PRACTITIONER

## 2019-03-26 PROCEDURE — 80053 COMPREHEN METABOLIC PANEL: CPT | Mod: 91

## 2019-03-26 PROCEDURE — 63600175 PHARM REV CODE 636 W HCPCS: Performed by: ORTHOPAEDIC SURGERY

## 2019-03-26 PROCEDURE — 25000003 PHARM REV CODE 250: Performed by: ORTHOPAEDIC SURGERY

## 2019-03-26 PROCEDURE — 20000000 HC ICU ROOM

## 2019-03-26 PROCEDURE — 99231 SBSQ HOSP IP/OBS SF/LOW 25: CPT | Mod: ,,, | Performed by: ANESTHESIOLOGY

## 2019-03-26 PROCEDURE — 99231 PR SUBSEQUENT HOSPITAL CARE,LEVL I: ICD-10-PCS | Mod: ,,, | Performed by: ANESTHESIOLOGY

## 2019-03-26 PROCEDURE — 83735 ASSAY OF MAGNESIUM: CPT

## 2019-03-26 PROCEDURE — 84100 ASSAY OF PHOSPHORUS: CPT

## 2019-03-26 PROCEDURE — 94761 N-INVAS EAR/PLS OXIMETRY MLT: CPT

## 2019-03-26 PROCEDURE — 99291 PR CRITICAL CARE, E/M 30-74 MINUTES: ICD-10-PCS | Mod: ,,, | Performed by: PHYSICIAN ASSISTANT

## 2019-03-26 PROCEDURE — 97163 PT EVAL HIGH COMPLEX 45 MIN: CPT

## 2019-03-26 PROCEDURE — 81001 URINALYSIS AUTO W/SCOPE: CPT

## 2019-03-26 PROCEDURE — A4216 STERILE WATER/SALINE, 10 ML: HCPCS | Performed by: ORTHOPAEDIC SURGERY

## 2019-03-26 PROCEDURE — 99291 CRITICAL CARE FIRST HOUR: CPT | Mod: ,,, | Performed by: PHYSICIAN ASSISTANT

## 2019-03-26 PROCEDURE — 63600175 PHARM REV CODE 636 W HCPCS: Performed by: INTERNAL MEDICINE

## 2019-03-26 PROCEDURE — 63600175 PHARM REV CODE 636 W HCPCS: Performed by: STUDENT IN AN ORGANIZED HEALTH CARE EDUCATION/TRAINING PROGRAM

## 2019-03-26 PROCEDURE — 97530 THERAPEUTIC ACTIVITIES: CPT

## 2019-03-26 PROCEDURE — 25000242 PHARM REV CODE 250 ALT 637 W/ HCPCS: Performed by: ORTHOPAEDIC SURGERY

## 2019-03-26 PROCEDURE — 85025 COMPLETE CBC W/AUTO DIFF WBC: CPT | Mod: 91

## 2019-03-26 PROCEDURE — 80053 COMPREHEN METABOLIC PANEL: CPT

## 2019-03-26 PROCEDURE — 87040 BLOOD CULTURE FOR BACTERIA: CPT | Mod: 59

## 2019-03-26 PROCEDURE — 25000003 PHARM REV CODE 250: Performed by: INTERNAL MEDICINE

## 2019-03-26 PROCEDURE — 97166 OT EVAL MOD COMPLEX 45 MIN: CPT

## 2019-03-26 PROCEDURE — 87632 RESP VIRUS 6-11 TARGETS: CPT

## 2019-03-26 PROCEDURE — 85610 PROTHROMBIN TIME: CPT

## 2019-03-26 PROCEDURE — 63600175 PHARM REV CODE 636 W HCPCS: Performed by: NURSE PRACTITIONER

## 2019-03-26 PROCEDURE — 25000003 PHARM REV CODE 250: Performed by: PHYSICIAN ASSISTANT

## 2019-03-26 RX ORDER — SODIUM CHLORIDE 9 MG/ML
INJECTION, SOLUTION INTRAVENOUS CONTINUOUS
Status: ACTIVE | OUTPATIENT
Start: 2019-03-26 | End: 2019-03-26

## 2019-03-26 RX ORDER — WARFARIN 7.5 MG/1
7.5 TABLET ORAL DAILY
Status: DISCONTINUED | OUTPATIENT
Start: 2019-03-26 | End: 2019-04-02

## 2019-03-26 RX ORDER — CEFEPIME HYDROCHLORIDE 1 G/1
1 INJECTION, POWDER, FOR SOLUTION INTRAMUSCULAR; INTRAVENOUS EVERY 24 HOURS
Status: DISCONTINUED | OUTPATIENT
Start: 2019-03-26 | End: 2019-03-27

## 2019-03-26 RX ADMIN — CALCIUM 1000 MG: 500 TABLET ORAL at 09:03

## 2019-03-26 RX ADMIN — MORPHINE SULFATE 2 MG: 2 INJECTION, SOLUTION INTRAMUSCULAR; INTRAVENOUS at 11:03

## 2019-03-26 RX ADMIN — EPINEPHRINE 0.1 MCG/KG/MIN: 1 INJECTION INTRAMUSCULAR; INTRAVENOUS; SUBCUTANEOUS at 09:03

## 2019-03-26 RX ADMIN — MORPHINE SULFATE 2 MG: 2 INJECTION, SOLUTION INTRAMUSCULAR; INTRAVENOUS at 06:03

## 2019-03-26 RX ADMIN — ACETAMINOPHEN 1000 MG: 500 TABLET ORAL at 06:03

## 2019-03-26 RX ADMIN — WARFARIN SODIUM 7.5 MG: 7.5 TABLET ORAL at 06:03

## 2019-03-26 RX ADMIN — CETIRIZINE HYDROCHLORIDE 5 MG: 5 TABLET ORAL at 09:03

## 2019-03-26 RX ADMIN — FERROUS GLUCONATE TAB 324 MG (37.5 MG ELEMENTAL IRON) 324 MG: 324 (37.5 FE) TAB at 09:03

## 2019-03-26 RX ADMIN — RIOCIGUAT 0.5 MG: 0.5 TABLET, FILM COATED ORAL at 09:03

## 2019-03-26 RX ADMIN — BOSENTAN 125 MG: 125 TABLET, FILM COATED ORAL at 11:03

## 2019-03-26 RX ADMIN — FOLIC ACID 1 MG: 1 TABLET ORAL at 09:03

## 2019-03-26 RX ADMIN — SPIRONOLACTONE 25 MG: 25 TABLET ORAL at 09:03

## 2019-03-26 RX ADMIN — AMIODARONE HYDROCHLORIDE 200 MG: 200 TABLET ORAL at 09:03

## 2019-03-26 RX ADMIN — FUROSEMIDE 20 MG: 20 TABLET ORAL at 01:03

## 2019-03-26 RX ADMIN — RIOCIGUAT 0.5 MG: 0.5 TABLET, FILM COATED ORAL at 11:03

## 2019-03-26 RX ADMIN — ROPIVACAINE HYDROCHLORIDE 2 ML/HR: 2 INJECTION, SOLUTION EPIDURAL; INFILTRATION at 04:03

## 2019-03-26 RX ADMIN — Medication 3 ML: at 09:03

## 2019-03-26 RX ADMIN — CEFAZOLIN 2 G: 330 INJECTION, POWDER, FOR SOLUTION INTRAMUSCULAR; INTRAVENOUS at 02:03

## 2019-03-26 RX ADMIN — SODIUM CHLORIDE: 0.9 INJECTION, SOLUTION INTRAVENOUS at 03:03

## 2019-03-26 RX ADMIN — MORPHINE SULFATE 2 MG: 2 INJECTION, SOLUTION INTRAMUSCULAR; INTRAVENOUS at 12:03

## 2019-03-26 RX ADMIN — OXYCODONE HYDROCHLORIDE 5 MG: 5 TABLET ORAL at 09:03

## 2019-03-26 RX ADMIN — BOSENTAN 125 MG: 125 TABLET, FILM COATED ORAL at 10:03

## 2019-03-26 RX ADMIN — MUPIROCIN 1 G: 20 OINTMENT TOPICAL at 09:03

## 2019-03-26 RX ADMIN — ACETAMINOPHEN 1000 MG: 500 TABLET ORAL at 12:03

## 2019-03-26 RX ADMIN — MORPHINE SULFATE 2 MG: 2 INJECTION, SOLUTION INTRAMUSCULAR; INTRAVENOUS at 05:03

## 2019-03-26 RX ADMIN — OXYCODONE HYDROCHLORIDE 10 MG: 10 TABLET ORAL at 04:03

## 2019-03-26 RX ADMIN — ACETAMINOPHEN 1000 MG: 500 TABLET ORAL at 01:03

## 2019-03-26 RX ADMIN — ONDANSETRON 4 MG: 2 INJECTION INTRAMUSCULAR; INTRAVENOUS at 10:03

## 2019-03-26 RX ADMIN — ENOXAPARIN SODIUM 40 MG: 100 INJECTION SUBCUTANEOUS at 06:03

## 2019-03-26 RX ADMIN — VANCOMYCIN HYDROCHLORIDE 750 MG: 750 INJECTION, POWDER, LYOPHILIZED, FOR SOLUTION INTRAVENOUS at 03:03

## 2019-03-26 RX ADMIN — RIOCIGUAT 0.5 MG: 0.5 TABLET, FILM COATED ORAL at 03:03

## 2019-03-26 RX ADMIN — OXYCODONE HYDROCHLORIDE 10 MG: 10 TABLET ORAL at 10:03

## 2019-03-26 RX ADMIN — MORPHINE SULFATE 2 MG: 2 INJECTION, SOLUTION INTRAMUSCULAR; INTRAVENOUS at 09:03

## 2019-03-26 RX ADMIN — MORPHINE SULFATE 2 MG: 2 INJECTION, SOLUTION INTRAMUSCULAR; INTRAVENOUS at 02:03

## 2019-03-26 RX ADMIN — PREGABALIN 75 MG: 75 CAPSULE ORAL at 09:03

## 2019-03-26 RX ADMIN — ROPIVACAINE HYDROCHLORIDE 2 ML/HR: 2 INJECTION, SOLUTION EPIDURAL; INFILTRATION at 11:03

## 2019-03-26 RX ADMIN — FLUTICASONE PROPIONATE 100 MCG: 50 SPRAY, METERED NASAL at 10:03

## 2019-03-26 RX ADMIN — CEFEPIME 1 G: 1 INJECTION, POWDER, FOR SOLUTION INTRAMUSCULAR; INTRAVENOUS at 03:03

## 2019-03-26 RX ADMIN — OXYCODONE HYDROCHLORIDE 10 MG: 10 TABLET ORAL at 06:03

## 2019-03-26 NOTE — PROGRESS NOTES
[facility]  Orthopedics  Progress Note    Patient Name: Molly Smith  MRN: [id number]  Admission Date: 3/23/2019  Hospital Length of Stay: 3 days  Attending Provider: Alice Tim MD  Primary Care Provider: Roberto Braun MD  Follow-up For: Procedure(s) (LRB):  HEMIARTHROPLASTY, HIP - left (Left)    Post-Operative Day: 1 Day Post-Op  Subjective:     Principal Problem:Closed fracture of neck of left femur    Principal Orthopedic Problem: L femoral neck fx    Interval History: Pt seen and examined at bedside. NAEO. Alert and oriented. Reports some pain at fracture site.     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    Multaq [dronedarone]        Current Facility-Administered Medications   Medication    acetaminophen tablet 1,000 mg    amiodarone tablet 200 mg    bosentan tablet 125 mg    calcium carbonate (OS-MARIELLE) tablet 500 mg    cetirizine tablet 5 mg    chlordiazepoxide-clidinium 5-2.5 mg per capsule 1 capsule    enoxaparin injection 40 mg    EPINEPHrine (ADRENALIN) 5 mg in sodium chloride 0.9% 250 mL infusion    ferrous gluconate tablet 324 mg    fluticasone 50 mcg/actuation nasal spray 100 mcg    folic acid tablet 1 mg    furosemide tablet 20 mg    morphine injection 2 mg    morphine injection 2 mg    mupirocin 2 % ointment 1 g    ondansetron injection 4 mg    oxyCODONE immediate release tablet 10 mg    oxyCODONE immediate release tablet 5 mg    pregabalin capsule 75 mg    promethazine (PHENERGAN) 6.25 mg in dextrose 5 % 50 mL IVPB    riociguat (ADEMPAS) tablet 0.5 mg    ropivacaine (PF) 2 mg/ml (0.2%) infusion    sodium chloride 0.9% flush 3 mL    sodium chloride 0.9% flush 5 mL    spironolactone tablet 25 mg    treprostinil (REMODULIN) 12,000,000 ng in sodium chloride 0.9% 100 mL infusion    VELETRI/REMODULIN CASSETTE    VELETRI/REMODULIN TUBING     Objective:     Vital Signs (Most Recent):  Temp:  "97.9 °F (36.6 °C) (03/26/19 0300)  Pulse: 76 (03/26/19 0600)  Resp: 20 (03/26/19 0600)  BP: (!) 100/56 (03/26/19 0600)  SpO2: (!) 89 % (03/26/19 0600) Vital Signs (24h Range):  Temp:  [97.2 °F (36.2 °C)-98.7 °F (37.1 °C)] 97.9 °F (36.6 °C)  Pulse:  [64-90] 76  Resp:  [18-32] 20  SpO2:  [80 %-94 %] 89 %  BP: ()/(37-86) 100/56  Arterial Line BP: ()/(35-61) 112/51     Weight: 57.2 kg (126 lb)  Height: 5' 3" (160 cm)  Body mass index is 22.32 kg/m².      Intake/Output Summary (Last 24 hours) at 3/26/2019 0624  Last data filed at 3/26/2019 0600  Gross per 24 hour   Intake 876.26 ml   Output 1225 ml   Net -348.74 ml     NAD  Normal respiratory effort on 5 L NC O2    Ortho/SPM Exam    Pt lying in bed this morning in NAD  Aquacel dressing c/d/i  SILT Sa/Thompson/DP/SP/T  Motor intact EHL/FHL/TA/Gastroc  2+ DP, 2+ PT    Significant Labs:   BMP:   Recent Labs   Lab 03/26/19  0258   *   *   K 3.9      CO2 19*   BUN 19   CREATININE 1.3   CALCIUM 7.8*   MG 2.5     CBC:   Recent Labs   Lab 03/25/19  0153 03/25/19  1437 03/26/19  0258   WBC 4.92 8.50 9.15   HGB 14.4 12.5 11.9*   HCT 44.1 38.0 36.5*    157 216     Coagulation:   Recent Labs   Lab 03/25/19  0613 03/25/19  1437 03/26/19  0258   LABPROT 12.1 12.1 11.4   INR 1.2 1.2 1.1       Significant Imaging: I have reviewed all pertinent imaging results/findings.    Assessment/Plan:     * Closed fracture of neck of left femur  Pt is a 72 yo F with pulmonary HTN, chronic respiratory failure, congenital heart disease on coumadin, and closed left femoral neck fracture, s/p left patrick 3/25    - Mildly hypotensive, epi .02   - Antibiotics: post-op Ancef  - Weight bearing status: WBAT LLE  - Labs: reviewed, Hgb 11.9  - DVT Prophylaxis: mechanical, lovenox  - Lines/Drains: PIV, A line, sargent, central line, Dunbar, Remodulin pump  - Pain control: PNC per anesthesia  - PT/OT: mobilize with PT/OT today, must be seen by PT 7x/week - please notify orthopedics " if patient is not seen    Dispo: stepdown from CCU per heart transplant team          Munira Landin MD  Orthopedics  [facility]    Attg Note:  Patient seen and examined.  I agree with the resident's assessment and plan.  Did require some additional pressors and will remain in the CICU.  Care per ICU team.      Kemal Esposito MD

## 2019-03-26 NOTE — PT/OT/SLP EVAL
Occupational Therapy   Evaluation    Name: Molly Smith  MRN: [id number]  Admitting Diagnosis:  Closed fracture of neck of left femur 1 Day Post-Op  Pt s/p fall and sustained left femoral neck fracture. Pt is now s/p left hip hemiarthroplasty 3/25/19.  Pt with significant hx of pulmonary HTN and CRF.  Pt on home oxygen and home Veletri.     Recommendations:     Discharge Recommendations:  home     Assessment:     Molly Smith is a 73 y.o. female with a medical diagnosis of Closed fracture of neck of left femur.   Performance deficits affecting function: weakness, impaired sensation, impaired functional mobilty, impaired self care skills, impaired endurance.  Pt tolerated session fair. Pt cooperative and motivated but increased pain and decreased oxygenation saturation during session. Pt is not safe to return home at this time and may need further inpatient therapy prior to d/c home pending medical status and progress.     Rehab Prognosis: Good; patient would benefit from acute skilled OT services to address these deficits and reach maximum level of function.       Plan:     Patient to be seen 4 x/week to address the above listed problems via self-care/home management, therapeutic exercises, therapeutic activities  · Plan of Care Expires:    · Plan of Care Reviewed with: patient, spouse    Subjective     Pt agreeable to therapy.     Occupational Profile:  Living Environment: Pt lives with spouse in one story home with one step within the home with grab bar.   Previous level of function: pt was independent PTA.Pt on home oxygen 5 LPM and home Veletri. Pt was able to drive and perform mobility/ADL skills.   Equipment Used at Home: none     Assistance upon Discharge: spouse is retired MD; t also has 2 supportive daughters.     Pain/Comfort:  · Pain Rating 1: (pt reports 0/10 at rest and pain in left hip with mobility. Pt unable to rate pain. )  · Pain Addressed 1: Pre-medicate for activity, Distraction,  Reposition    Patients cultural, spiritual, Anglican conflicts given the current situation:  none stated     Objective:     Communicated with: nsg  prior to session.    Pt found supine in bed with 6 LPM oxygen in place. Saturation 82% at rest and declined to 78-76% with activity. Once supine, oxygenation saturation returned to 82%.    General Precautions: Standard,     Orthopedic Precautions:LLE weight bearing as tolerated, LLE posterior precautions   Braces: Knee immobilizer     Occupational Performance:    Bed Mobility:    · Patient completed Rolling/Turning to Left with  total assistance  · Patient completed Rolling/Turning to Right with total assistance  · Patient completed Supine to Sit with total assistance  · Patient completed Sit to Supine with total assistance        Activities of Daily Living  Feeding: set-up  G/H: MAX A seated EOB  UE dressing: TOTAL A   LE dressing: TOTAL A   Toileting: TOTAL A     Cognitive/Visual Perceptual  Pt awake, alert and following commands. Pt pleasant and cooperative. Pt keeping eyes closed most of session but able to open on command.       Physical Exam:  Pt is right hand dominant and demo WFL UE strength/ROM, coordination and sensation. Pt with hand deformities but remained functional with use of hands.   AMPAC 6 Click ADL:  AMPAC Total Score: 9    Treatment & Education:  Pt tolerated sitting EOB with fluctuating sitting balance from MIN A to MAX A . Pt tolerated sitting x 10 min.  Education including verbal and written re: posterior hip precautions and WBAT precautions. Left knee immobilizer remained in place during this session.  Education provided re: safety with functional mobility/ADL skills and OT POC. Pt and fly verb understanding.   Education:    Patient left supine with all lines intact, call button in reach, nsg notified and spouse and daughters present    GOALS:   Multidisciplinary Problems     Occupational Therapy Goals        Problem: Occupational Therapy Goal     Goal Priority Disciplines Outcome Interventions   Occupational Therapy Goal     OT, PT/OT     Description:  Goals to be met by: 7 days 4/2/19     Patient will increase functional independence with ADLs by performing:    Pt to complete g/h skills seated with set-up  Pt to complete UE dressing with MIN A   Pt to demo Fair sitting balance to allow for progression with ADL training.  Pt to t/f to chair with MAX A                       History:     Past Medical History:   Diagnosis Date    Allergy     Anticoagulant long-term use     Arthritis     Heart murmur     Pneumonia     Pulmonary hypertension     Tachycardia        Past Surgical History:   Procedure Laterality Date    ABLATION N/A 6/17/2013    Performed by [facility] J [facility]inojosa MD at [facility] CATH LAB    BACK SURGERY      HEART CATH-RIGHT Right 7/10/2017    Performed by Alice Tim MD at [facility] CATH LAB    INSERTION-CATHETER-ROBERTSON Left 6/24/2014    Performed by González Hernandez MD at [facility] OR 2ND FLR    REPLACEMENT, CATHETER, DIALYSIS, OVER GUIDEWIRE, USING EXISTING VENOUS ACCESS N/A 1/17/2019    Performed by Phoenix Hand MD at [facility] CATH LAB       Time Tracking:     OT Date of Treatment: 03/26/19  OT Start Time: 1000  OT Stop Time: 1038  OT Total Time (min): 38 min    Billable Minutes:Evaluation 30    JOHANNE Salcedo  3/26/2019

## 2019-03-26 NOTE — PLAN OF CARE
IV Remodulin Variables:    --Verified the following information while meeting with the patient and her family/ along with pump inspection and [facility] dosing sheet.      HOME Concentration Variables:  Vial conc. = 1mg/ml   mixes 20mg of Remodulin with 80mg of diluent  Final Concentration = 200,000ng/ml  CADD rate = 45mls/24hrs    DOSE = 89.2ng/kg/min  DW = 70.05kg    The patient's  feels strongly to continue managing her CADD pump and mixes q 48hrs to avoid aspiration and priming of the line with a concentration change that would have to pccur if patient was switched to the hospital concentration.   Given the clinical presentation of hypotension, fever, pain management if was decided to continue the patient on her HOME concentration having the  mix q 48hrs. Next change due tomorrow night 3/27.    Dr. Tim and VITO PAULSON are aware.     PRN, Continuous IV Remodulin order placed for Hospital concentration in the event the  can NOT mix.     If the patient has to be switched to Hospital Concentration , TSU OC or Claire ([id number]) should be called to assist with management of switching concentration/aspiration/kpriming of the line.     Hospital Concentration (ONLY AVAILABLE if  cant mix Home Concentration) would be as follows:  Concentration = 120,000ng/ml, CADD rate 75mls/24hrs

## 2019-03-26 NOTE — PROGRESS NOTES
Admit Note     Met with pt's daughters to assess patients needs due to pt sleeping.  Patient is a 73 y.o.  female, admitted closed fracture of neck of left femur. Pt is on home remodulin and 02 5LPM due to pulmonary hypertension.  Pt's daughter reports the pt tripped on 02 or IV tubing.     Patient admitted from emergency on 3/23/2019 .  At this time, patient presents as sleeping.   At this time, patients caregiver presents as alert and oriented x 4, good eye contact, calm and communicative.  Pt's spouse has also been in attendance,however he is currently in the [facility] resting.       Household/Family Systems (as reported by patients caregiver)     Patient resides with patient's spouse, at     [address] [place] [id number].        Support system includes spouse, four adult children and sister.    Patient does not have dependents that are need of being cared for.   One of the pt's daughters lives down the street, two children live in [place] and one lives in [place].      Prior to admission the patients primary caregiver is self.   Pt's cell:  [phone]  Emergency contacts:  Rufus Smith(spouse) [phone]  Pt's daughter reports the pt's  spouse is an ER doctor who works about 10 shifts per month. Pt's daughter also reports her father is hard of hearing over the phone.   May (daughter, works full time, lives down the street from pt) [phone]  Andra Palafox (sister, lives in [place]) [phone]      During admission, patient's caregiver plans to stay in patient's room.  Confirmed patient and patients caregivers do have access to reliable transportation.    Cognitive Status/Learning     Patients caregiver reports patients reading ability as college and states patient does not have difficulty with reading, writing, seeing, hearing, comprehension, learning and memory. Pt wears glasses.  Patients caregiver reports patient learns best by one on one.       Needed: No.   Highest education level: Attended College/Technical School    Vocation/Disability (as reported by patients caregiver)    Working for Income: No  If no, reason not working: Disability  Patient is disabled due to pulmonary hypertension since .  Prior to disability the pt was a .      Adherence     Patients caregiver reports patient has a high level of adherence to patients health care regimen.  Adherence counseling and education provided.  Patient's caregiver verbalizes understanding.    Substance Use    Patients caregiver reports patients substance usage as the following:    Tobacco: none.  Alcohol: none.  Illicit Drugs/Non-prescribed Medications: none.  Patients caregiver states clear understanding of the potential impact of substance use.  Substance abstinence/cessation counseling, education and resources provided and reviewed.     Services Utilizing/ADLS (as reported by patients caregiver)    Infusion Service: Prior to admission, patient utilizing? yes, IV Remodulin with [facility].   Home Health: Prior to admission, patient utilizing? no  DME: Prior to admission, yes, home 02 and two portable concentrators.   Pulmonary/Cardiac Rehab: Prior to admission, no  Pt has gone to pulmonary rehab in the past  Dialysis:  Prior to admission, no  Transplant Specialty Pharmacy:  Prior to admission, no.    Prior to admission, patients caregiver reports patient was mostly independent with ADLS and was driving a little .  Patients caregiver reports patient is not able to care for self at this time due to compromised medical condition (as documented in medical record) and physical weakness..  Patients caregiver reports patient indicates a willingness to care for self once medically cleared to do so.    Insurance/Medications    Insured by   Payor/Plan Subscr  Sex Relation Sub. Ins. ID Effective Group Num   1. MEDICARE - ME* MARBELLA QUINONES 1945 Female  [id number]                                     [address]   2. STANDARD LIFE* MARBELLA QUINONES 1945 Female  [id number] 2/[birth year]/02                                    [address] [address]      Primary Insurance (for UNOS reporting): Public Insurance - Medicare FFS (Fee For Service)  Secondary Insurance (for UNOS reporting): Private Insurance    Patients caregiver reports patient is able to obtain and afford medications at this time and at time of discharge.    Living Will/Healthcare Power of     Patients caregiver reports patient does not have a LW and/or HCPA.   provided education regarding LW and HCPA and the completion of forms.    Coping/Mental Health (as reported by patients caregiver)    Patient is coping adequately with the aid of  family members. Patients caregiver is coping adequately with the aid of  family members.   Pt's family reports the pt does not have any mental health difficulties and has good family support.  Worker and the pt's  daughters discussed pt's possible discharge needs.  Worker provided emotional support.     Discharge Planning (as reported by patients caregiver)    At time of discharge, patient's children are not sure of discharge plan.    Patients family will transport patient.  Per rounds today, expected discharge date has not been medically determined at this time. Patients caretaker verbalizes understanding and is involved in treatment planning and discharge process.    Additional Concerns    Patient is being followed for needs, education, resources, information, emotional support, supportive counseling, and for supportive and skilled discharge plan of care.  providing ongoing psychosocial support, education, resources and d/c planning as needed.  SW remains available.  remains available. Patient's caregiver verbalizes understanding and agreement with information reviewed,  availability and how to access available resources as needed.

## 2019-03-26 NOTE — PROGRESS NOTES
Attempted to call report,awaiting return call from Dainlo NIEVES, pt made ready for  transport with tele to [phone] via stretcher per RN and PCT, pt resting quietly,VSS, resp unlabored, pt remains on Remodulin and Epinephrine, Hickmann,IV and TLC intact, sargent intact draining clear thompson urine to gravity, left hip dressing dry and intact, c/o pain 4/10 and tolerable, stable at present.

## 2019-03-26 NOTE — PLAN OF CARE
Problem: Physical Therapy Goal  Goal: Physical Therapy Goal  Goals to be met by: 2019     Patient will increase functional independence with mobility by performin. Supine to sit with Moderate Assistance  2. Rolling to Left and Right with Minimal Assistance.  3. Sit to stand transfer with Moderate Assistance  4. Sitting at edge of bed x10 minutes with Stand-by Assistance    Outcome: Ongoing (interventions implemented as appropriate)  Pt evaluated and appropriate goals established.

## 2019-03-26 NOTE — ANESTHESIA POST-OP PAIN MANAGEMENT
Acute Pain Service Progress Note    Molly Smith is a 73 y.o., female, [id number].    Surgery:  L Hip Jonny-Arthroplasty     Post Op Day #: 1    Catheter type: perineural  Suprainguinal Fascia Iliaca    Infusion type: Ropivacaine 0.2%  2ml/hr basal    Problem List:    Active Hospital Problems    Diagnosis  POA    *Closed fracture of neck of left femur [S72.002A]  Yes    Closed left hip fracture [S72.002A]  Yes    SVT (supraventricular tachycardia) [I47.1]  Yes    PAH (pulmonary artery hypertension) [I27.21]  Yes    Chronic respiratory failure with hypoxia [J96.11]  Yes      Resolved Hospital Problems   No resolved problems to display.       Subjective:     General appearance of Resting in bed    Pain with rest: 6    Numbers   Pain with movement: 6    Numbers   Side Effects    1. Pruritis No    2. Nausea No    3. Motor Blockade No, 1=Ability to bend knees and ankles    4. Sedation No, 2=slightly drowsy, easily aroused    Objective:     Catheter site clean, dry, intact         Vitals   Vitals:    03/26/19 0701   BP: (!) 111/55   Pulse: 87   Resp: (!) 26   Temp: 37.6 °C (99.6 °F)        Labs    Admission on 03/23/2019   No results displayed because visit has over 200 results.           Meds   Current Facility-Administered Medications   Medication Dose Route Frequency Provider Last Rate Last Dose    acetaminophen tablet 1,000 mg  1,000 mg Oral Q6H Joe Adan, NP   1,000 mg at 03/26/19 0103    amiodarone tablet 200 mg  200 mg Oral Daily Joe Adan, NP   200 mg at 03/25/19 0750    bosentan tablet 125 mg  125 mg Oral Q12H Joe Adan, NP   125 mg at 03/25/19 2100    calcium carbonate (OS-MARIELLE) tablet 500 mg  1,000 mg Oral Daily Joe Adan, NP   1,000 mg at 03/24/19 0905    cetirizine tablet 5 mg  5 mg Oral Daily Joe Adan, NP   5 mg at 03/24/19 0905    chlordiazepoxide-clidinium 5-2.5 mg per capsule 1 capsule  1 capsule Oral TID PRN Munira Landin MD   1 capsule at  03/24/19 1148    enoxaparin injection 40 mg  40 mg Subcutaneous Daily Munira Landin MD   40 mg at 03/25/19 1723    EPINEPHrine (ADRENALIN) 5 mg in sodium chloride 0.9% 250 mL infusion  0.02 mcg/kg/min Intravenous Continuous Joe Arellano NP 5.1 mL/hr at 03/26/19 0705 0.03 mcg/kg/min at 03/26/19 0705    ferrous gluconate tablet 324 mg  324 mg Oral Daily with breakfast Joe Arellano NP   324 mg at 03/24/19 0905    fluticasone 50 mcg/actuation nasal spray 100 mcg  2 spray Each Nare QHS Munira Landin MD   100 mcg at 03/24/19 2123    folic acid tablet 1 mg  1 mg Oral Daily Joe Arellano NP   1 mg at 03/24/19 0905    furosemide tablet 20 mg  20 mg Oral After lunch Joe Arellano NP   20 mg at 03/24/19 1249    morphine injection 2 mg  2 mg Intravenous Q1H PRN Munira Landin MD   2 mg at 03/25/19 2248    morphine injection 2 mg  2 mg Intravenous Q1H PRN Munira Landin MD   2 mg at 03/26/19 0654    mupirocin 2 % ointment 1 g  1 g Nasal BID Munira Landin MD        ondansetron injection 4 mg  4 mg Intravenous Q12H PRN Munira Landin MD   4 mg at 03/25/19 2201    oxyCODONE immediate release tablet 10 mg  10 mg Oral Q3H PRN Munira Landin MD   10 mg at 03/26/19 0654    oxyCODONE immediate release tablet 5 mg  5 mg Oral Q3H PRN Munira Landin MD        pregabalin capsule 75 mg  75 mg Oral QHS Munira Landin MD   75 mg at 03/25/19 2238    promethazine (PHENERGAN) 6.25 mg in dextrose 5 % 50 mL IVPB  6.25 mg Intravenous Q6H PRN Munira Landin  mL/hr at 03/25/19 1525 6.25 mg at 03/25/19 1525    riociguat (ADEMPAS) tablet 0.5 mg  0.5 mg Oral TID Joe Arellano NP   0.5 mg at 03/25/19 4940    ropivacaine (PF) 2 mg/ml (0.2%) infusion  2 mL/hr Perineural Continuous Munira Landin MD 2 mL/hr at 03/26/19 0705 2 mL/hr at 03/26/19 0705    sodium chloride 0.9% flush 3 mL  3 mL  Intravenous Q8H Munira Landin MD   3 mL at 03/25/19 1404    sodium chloride 0.9% flush 5 mL  5 mL Intravenous PRN Munira Landin MD        spironolactone tablet 25 mg  25 mg Oral Daily Joe Arellano, NP   25 mg at 03/25/19 0756    treprostinil (REMODULIN) 12,000,000 ng in sodium chloride 0.9% 100 mL infusion  89.2 ng/kg/min (Order-Specific) Intravenous Continuous Munira Landin MD        VELETRI/REMODULIN CASSETTE   Intravenous Continuous Munira Landin MD        VELETRI/REMODULIN TUBING   Intravenous Continuous Munira Landni MD            Anticoagulant dose Lovenox SubQ 40mg q24    Assessment:     Pain control adequate    Plan:    -- Will give 10ml bolus of ropivacaine through SIFI catheter followed by re-assessment later today   -- Continue to work with PT/OT     Evaluator Mingo Leyva

## 2019-03-26 NOTE — SUBJECTIVE & OBJECTIVE
Interval History: C/O worsening pain this AM. Anesthesia at bedside, bolused ropivacaine in epidural. PT/OT today. Will attempt epi wean if BP stable this afternoon, likely transfer out of the ICU tomorrow.     LINES:  - Tunneled maguire in R subclavian for remodulin  - R Radial A line- 1 day  - L IJ- 1 day      Continuous Infusions:   epinephrine infusion 0.03 mcg/kg/min (03/26/19 1000)    ropivacaine (PF) 2 mg/ml (0.2%) 2 mL/hr (03/26/19 1000)    treprostinil (REMODULIN) infusion      veletri/remodulin cassette      veletri/remodulin tubing       Scheduled Meds:   acetaminophen  1,000 mg Oral Q6H    amiodarone  200 mg Oral Daily    bosentan  125 mg Oral Q12H    calcium carbonate  1,000 mg Oral Daily    cetirizine  5 mg Oral Daily    enoxaparin  40 mg Subcutaneous Daily    ferrous gluconate  324 mg Oral Daily with breakfast    fluticasone  2 spray Each Nare QHS    folic acid  1 mg Oral Daily    furosemide  20 mg Oral After lunch    mupirocin  1 g Nasal BID    pregabalin  75 mg Oral QHS    riociguat  0.5 mg Oral TID    sodium chloride 0.9%  3 mL Intravenous Q8H    spironolactone  25 mg Oral Daily     PRN Meds:chlordiazepoxide-clidinium 5-2.5 mg, morphine, morphine, ondansetron, oxyCODONE, oxyCODONE, promethazine (PHENERGAN) IVPB, sodium chloride 0.9%    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    Multaq [dronedarone]      Objective:     Vital Signs (Most Recent):  Temp: 99.6 °F (37.6 °C) (03/26/19 0701)  Pulse: 93 (03/26/19 1000)  Resp: (!) 24 (03/26/19 1000)  BP: (!) 96/53 (03/26/19 1000)  SpO2: (!) 85 % (03/26/19 1000) Vital Signs (24h Range):  Temp:  [97.2 °F (36.2 °C)-99.6 °F (37.6 °C)] 99.6 °F (37.6 °C)  Pulse:  [64-93] 93  Resp:  [18-32] 24  SpO2:  [80 %-94 %] 85 %  BP: ()/(37-60) 96/53  Arterial Line BP: ()/(35-61) 111/46     Patient Vitals for the past 72 hrs (Last 3 readings):   Weight   03/23/19 1335 57.2 kg (126 lb)      Body mass index is 22.32 kg/m².      Intake/Output Summary (Last 24 hours) at 3/26/2019 1103  Last data filed at 3/26/2019 1000  Gross per 24 hour   Intake 935.08 ml   Output 1360 ml   Net -424.92 ml          Physical Exam   Constitutional: She is oriented to person, place, and time. She appears well-developed and well-nourished.   In pain     HENT:   Head: Normocephalic and atraumatic.   Eyes: EOM are normal.   Neck: Neck supple. No JVD present.   Cardiovascular: Normal rate and regular rhythm.   Pulmonary/Chest: Effort normal and breath sounds normal. No respiratory distress.   Abdominal: Soft. Bowel sounds are normal. She exhibits no distension.   Musculoskeletal: She exhibits no edema.   Neurological: She is alert and oriented to person, place, and time.   Skin: Skin is warm and dry. Capillary refill takes less than 2 seconds. No erythema.   Psychiatric: She has a normal mood and affect. Her behavior is normal. Thought content normal.       Significant Labs:  CBC:  Recent Labs   Lab 03/25/19  0153 03/25/19  1437 03/26/19  0258   WBC 4.92 8.50 9.15   RBC 4.74 4.11 3.95*   HGB 14.4 12.5 11.9*   HCT 44.1 38.0 36.5*    157 216   MCV 93 93 92   MCH 30.4 30.4 30.1   MCHC 32.7 32.9 32.6     BNP:  No results for input(s): BNP in the last 168 hours.    Invalid input(s): BNPTRIAGELBLO  CMP:  Recent Labs   Lab 03/23/19  1020  03/25/19  0153 03/25/19  1437 03/26/19  0258   GLU 91   < > 92 167* 141*   CALCIUM 9.1   < > 8.4* 7.5* 7.8*   ALBUMIN 4.3  --  3.3*  --  3.0*   PROT 7.5  --  6.2  --  5.8*      < > 136 137 135*   K 4.3   < > 4.3 3.8 3.9   CO2 26   < > 24 21* 19*      < > 104 107 105   BUN 21*   < > 21 15 19   CREATININE 1.10   < > 1.0 0.9 1.3   ALKPHOS 50  --  41*  --  36*   ALT 24  --  12  --  9*   AST 27  --  14  --  17   BILITOT 1.3  --  1.7*  --  1.1*    < > = values in this interval not displayed.      Coagulation:   Recent Labs   Lab 03/25/19  0613 03/25/19  1437 03/26/19  0258   INR 1.2  1.2 1.1     LDH:  No results for input(s): LDH in the last 72 hours.  Microbiology:  Microbiology Results (last 7 days)     ** No results found for the last 168 hours. **          I have reviewed all pertinent labs within the past 24 hours.    Estimated Creatinine Clearance: 32.4 mL/min (based on SCr of 1.3 mg/dL).    Diagnostic Results:  I have reviewed all pertinent imaging results/findings within the past 24 hours.

## 2019-03-26 NOTE — ASSESSMENT & PLAN NOTE
- S/P s/p left patrick 3/25, received post-op Ancef  - Weight bearing status: WBAT LLE  - Pain control: PNC per anesthesia  - PT/OT: mobilize with PT/OT today, must be seen by PT 7x/week - please notify orthopedics if patient is not seen

## 2019-03-26 NOTE — ANESTHESIA POSTPROCEDURE EVALUATION
Anesthesia Post Evaluation    Patient: Molly Smith    Procedure(s) Performed: Procedure(s) (LRB):  HEMIARTHROPLASTY, HIP - left (Left)    Final Anesthesia Type: epidural  Patient location during evaluation: ICU  Patient participation: Yes- Able to Participate  Level of consciousness: awake and alert  Post-procedure vital signs: reviewed and stable  Pain management: adequate  Airway patency: patent  PONV status at discharge: No PONV  Anesthetic complications: no      Cardiovascular status: blood pressure returned to baseline  Respiratory status: unassisted and nasal cannula  Hydration status: euvolemic  Follow-up not needed.          Vitals Value Taken Time   /54 3/26/2019  8:02 AM   Temp 37.6 °C (99.6 °F) 3/26/2019  7:01 AM   Pulse 88 3/26/2019  8:44 AM   Resp 23 3/26/2019  8:44 AM   SpO2 83 % 3/26/2019  8:44 AM   Vitals shown include unvalidated device data.      Event Time     Out of Recovery 03/25/2019 21:01:58          Pain/Jair Score: Pain Rating Prior to Med Admin: 4 (3/26/2019  7:05 AM)  Pain Rating Post Med Admin: 2 (3/26/2019  7:24 AM)  Jair Score: 9 (3/25/2019  9:02 PM)

## 2019-03-26 NOTE — SUBJECTIVE & OBJECTIVE
Principal Problem:Closed fracture of neck of left femur    Principal Orthopedic Problem: L femoral neck fx    Interval History: Pt seen and examined at bedside. NAEO. Alert and oriented. Reports some pain at fracture site.     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    Multaq [dronedarone]        Current Facility-Administered Medications   Medication    acetaminophen tablet 1,000 mg    amiodarone tablet 200 mg    bosentan tablet 125 mg    calcium carbonate (OS-MARIELLE) tablet 500 mg    cetirizine tablet 5 mg    chlordiazepoxide-clidinium 5-2.5 mg per capsule 1 capsule    enoxaparin injection 40 mg    EPINEPHrine (ADRENALIN) 5 mg in sodium chloride 0.9% 250 mL infusion    ferrous gluconate tablet 324 mg    fluticasone 50 mcg/actuation nasal spray 100 mcg    folic acid tablet 1 mg    furosemide tablet 20 mg    morphine injection 2 mg    morphine injection 2 mg    mupirocin 2 % ointment 1 g    ondansetron injection 4 mg    oxyCODONE immediate release tablet 10 mg    oxyCODONE immediate release tablet 5 mg    pregabalin capsule 75 mg    promethazine (PHENERGAN) 6.25 mg in dextrose 5 % 50 mL IVPB    riociguat (ADEMPAS) tablet 0.5 mg    ropivacaine (PF) 2 mg/ml (0.2%) infusion    sodium chloride 0.9% flush 3 mL    sodium chloride 0.9% flush 5 mL    spironolactone tablet 25 mg    treprostinil (REMODULIN) 12,000,000 ng in sodium chloride 0.9% 100 mL infusion    VELETRI/REMODULIN CASSETTE    VELETRI/REMODULIN TUBING     Objective:     Vital Signs (Most Recent):  Temp: 97.9 °F (36.6 °C) (03/26/19 0300)  Pulse: 76 (03/26/19 0600)  Resp: 20 (03/26/19 0600)  BP: (!) 100/56 (03/26/19 0600)  SpO2: (!) 89 % (03/26/19 0600) Vital Signs (24h Range):  Temp:  [97.2 °F (36.2 °C)-98.7 °F (37.1 °C)] 97.9 °F (36.6 °C)  Pulse:  [64-90] 76  Resp:  [18-32] 20  SpO2:  [80 %-94 %] 89 %  BP: ()/(37-86) 100/56  Arterial Line BP: ()/(35-61) 112/51  "    Weight: 57.2 kg (126 lb)  Height: 5' 3" (160 cm)  Body mass index is 22.32 kg/m².      Intake/Output Summary (Last 24 hours) at 3/26/2019 0624  Last data filed at 3/26/2019 0600  Gross per 24 hour   Intake 876.26 ml   Output 1225 ml   Net -348.74 ml     NAD  Normal respiratory effort on 5 L NC O2    Ortho/SPM Exam    Pt lying in bed this morning in NAD  Aquacel dressing c/d/i  SILT Sa/Thompson/DP/SP/T  Motor intact EHL/FHL/TA/Gastroc  2+ DP, 2+ PT    Significant Labs:   BMP:   Recent Labs   Lab 03/26/19  0258   *   *   K 3.9      CO2 19*   BUN 19   CREATININE 1.3   CALCIUM 7.8*   MG 2.5     CBC:   Recent Labs   Lab 03/25/19  0153 03/25/19  1437 03/26/19  0258   WBC 4.92 8.50 9.15   HGB 14.4 12.5 11.9*   HCT 44.1 38.0 36.5*    157 216     Coagulation:   Recent Labs   Lab 03/25/19  0613 03/25/19  1437 03/26/19  0258   LABPROT 12.1 12.1 11.4   INR 1.2 1.2 1.1       Significant Imaging: I have reviewed all pertinent imaging results/findings.  "

## 2019-03-26 NOTE — ASSESSMENT & PLAN NOTE
Pt is a 74 yo F with pulmonary HTN, chronic respiratory failure, congenital heart disease on coumadin, and closed left femoral neck fracture    - Mildly hypotensive, epi .02   - Antibiotics: post-op Ancef  - Weight bearing status: WBAT LLE  - Labs: reviewed, Hgb 11.9  - DVT Prophylaxis: mechanical, lovenox  - Lines/Drains: PIV, A line, sargent, central line, Dunbar, Remodulin pump  - Pain control: PNC per anesthesia  - PT/OT: mobilize with PT/OT today, must be seen by PT 7x/week - please notify orthopedics if patient is not seen

## 2019-03-26 NOTE — PROGRESS NOTES
[facility]  Heart Transplant  Progress Note    Patient Name: Molly Smith  MRN: [id number]  Admission Date: 3/23/2019  Hospital Length of Stay: 3 days  Attending Physician: Alice Tim MD  Primary Care Provider: Roberto Braun MD  Principal Problem:Closed fracture of neck of left femur    Subjective:     Interval History: C/O worsening pain this AM. Anesthesia at bedside, bolused ropivacaine in epidural. PT/OT today. Will attempt epi wean if BP stable this afternoon, likely transfer out of the ICU tomorrow.     LINES:  - Tunneled maguire in R subclavian for remodulin  - R Radial A line- 1 day  - L IJ- 1 day      Continuous Infusions:   epinephrine infusion 0.03 mcg/kg/min (03/26/19 1000)    ropivacaine (PF) 2 mg/ml (0.2%) 2 mL/hr (03/26/19 1000)    treprostinil (REMODULIN) infusion      veletri/remodulin cassette      veletri/remodulin tubing       Scheduled Meds:   acetaminophen  1,000 mg Oral Q6H    amiodarone  200 mg Oral Daily    bosentan  125 mg Oral Q12H    calcium carbonate  1,000 mg Oral Daily    cetirizine  5 mg Oral Daily    enoxaparin  40 mg Subcutaneous Daily    ferrous gluconate  324 mg Oral Daily with breakfast    fluticasone  2 spray Each Nare QHS    folic acid  1 mg Oral Daily    furosemide  20 mg Oral After lunch    mupirocin  1 g Nasal BID    pregabalin  75 mg Oral QHS    riociguat  0.5 mg Oral TID    sodium chloride 0.9%  3 mL Intravenous Q8H    spironolactone  25 mg Oral Daily     PRN Meds:chlordiazepoxide-clidinium 5-2.5 mg, morphine, morphine, ondansetron, oxyCODONE, oxyCODONE, promethazine (PHENERGAN) IVPB, sodium chloride 0.9%    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    Multaq [dronedarone]      Objective:     Vital Signs (Most Recent):  Temp: 99.6 °F (37.6 °C) (03/26/19 0701)  Pulse: 93 (03/26/19 1000)  Resp: (!) 24 (03/26/19 1000)  BP: (!) 96/53 (03/26/19 1000)  SpO2: (!) 85 %  (03/26/19 1000) Vital Signs (24h Range):  Temp:  [97.2 °F (36.2 °C)-99.6 °F (37.6 °C)] 99.6 °F (37.6 °C)  Pulse:  [64-93] 93  Resp:  [18-32] 24  SpO2:  [80 %-94 %] 85 %  BP: ()/(37-60) 96/53  Arterial Line BP: ()/(35-61) 111/46     Patient Vitals for the past 72 hrs (Last 3 readings):   Weight   03/23/19 1335 57.2 kg (126 lb)     Body mass index is 22.32 kg/m².      Intake/Output Summary (Last 24 hours) at 3/26/2019 1103  Last data filed at 3/26/2019 1000  Gross per 24 hour   Intake 935.08 ml   Output 1360 ml   Net -424.92 ml          Physical Exam   Constitutional: She is oriented to person, place, and time. She appears well-developed and well-nourished.   In pain     HENT:   Head: Normocephalic and atraumatic.   Eyes: EOM are normal.   Neck: Neck supple. No JVD present.   Cardiovascular: Normal rate and regular rhythm.   Pulmonary/Chest: Effort normal and breath sounds normal. No respiratory distress.   Abdominal: Soft. Bowel sounds are normal. She exhibits no distension.   Musculoskeletal: She exhibits no edema.   Neurological: She is alert and oriented to person, place, and time.   Skin: Skin is warm and dry. Capillary refill takes less than 2 seconds. No erythema.   Psychiatric: She has a normal mood and affect. Her behavior is normal. Thought content normal.       Significant Labs:  CBC:  Recent Labs   Lab 03/25/19  0153 03/25/19  1437 03/26/19  0258   WBC 4.92 8.50 9.15   RBC 4.74 4.11 3.95*   HGB 14.4 12.5 11.9*   HCT 44.1 38.0 36.5*    157 216   MCV 93 93 92   MCH 30.4 30.4 30.1   MCHC 32.7 32.9 32.6     BNP:  No results for input(s): BNP in the last 168 hours.    Invalid input(s): BNPTRIAGELBLO  CMP:  Recent Labs   Lab 03/23/19  1020  03/25/19  0153 03/25/19  1437 03/26/19  0258   GLU 91   < > 92 167* 141*   CALCIUM 9.1   < > 8.4* 7.5* 7.8*   ALBUMIN 4.3  --  3.3*  --  3.0*   PROT 7.5  --  6.2  --  5.8*      < > 136 137 135*   K 4.3   < > 4.3 3.8 3.9   CO2 26   < > 24 21* 19*   CL  104   < > 104 107 105   BUN 21*   < > 21 15 19   CREATININE 1.10   < > 1.0 0.9 1.3   ALKPHOS 50  --  41*  --  36*   ALT 24  --  12  --  9*   AST 27  --  14  --  17   BILITOT 1.3  --  1.7*  --  1.1*    < > = values in this interval not displayed.      Coagulation:   Recent Labs   Lab 03/25/19  0613 03/25/19  1437 03/26/19  0258   INR 1.2 1.2 1.1     LDH:  No results for input(s): LDH in the last 72 hours.  Microbiology:  Microbiology Results (last 7 days)     ** No results found for the last 168 hours. **          I have reviewed all pertinent labs within the past 24 hours.    Estimated Creatinine Clearance: 32.4 mL/min (based on SCr of 1.3 mg/dL).    Diagnostic Results:  I have reviewed all pertinent imaging results/findings within the past 24 hours.    Assessment and Plan:     73 y.o. 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 Patient presented to [facility] for hip pain and inability to bear weight on the left leg after a trip and fall yesterday.  Patient tripped on a cord for her pulm HTN pump falling directly onto the left hip. Patient denies head trauma or upper body injury. She denies numbness or tingling. Currently lying on stretcher with  at bedside in nad.        * Closed fracture of neck of left femur  - S/P s/p left patrick 3/25, received post-op Ancef  - Weight bearing status: WBAT LLE  - Pain control: PNC per anesthesia  - PT/OT: mobilize with PT/OT today, must be seen by PT 7x/week - please notify orthopedics if patient is not seen    SVT (supraventricular tachycardia)  -Continue amio.    PAH (pulmonary artery hypertension)  -  Euvolemic on exam. Continue PO Lasix.  - Continue riociguat, bosentan, remodulin([facility]  -  to manage remodulin infusion.  - Ok to restart warfarin    Chronic respiratory failure with hypoxia  - on 5L O2 at home.   - Goal sat >/=85%        Jayne Larsen PA-C  Heart Transplant  [facility]    Uninterrupted Critical Care/Counseling Time (not including procedures):  45 minutes

## 2019-03-26 NOTE — ASSESSMENT & PLAN NOTE
- Euvolemic on exam. Continue PO Lasix.  - Continue riociguat, bosentan, remodulin([facility] CDI).  -  to manage remodulin infusion.  - Ok to restart warfarin

## 2019-03-26 NOTE — PROGRESS NOTES
Pt's O2 sats 83-84% on NC 6L. Pt is sitting up comfortably, eating breakfast with no c/o of SOB. REILLY Larsen notified. HTS team at bedside, verbalized goal for O2 sats >80%.

## 2019-03-26 NOTE — PLAN OF CARE
Problem: Occupational Therapy Goal  Goal: Occupational Therapy Goal  Goals to be met by: 7 days 4/2/19     Patient will increase functional independence with ADLs by performing:    Pt to complete g/h skills seated with set-up  Pt to complete UE dressing with MIN A   Pt to demo Fair sitting balance to allow for progression with ADL training.  Pt to t/f to chair with MAX A     Goals and POC established

## 2019-03-27 PROBLEM — I50.810 RVF (RIGHT VENTRICULAR FAILURE): Status: ACTIVE | Noted: 2019-03-27

## 2019-03-27 LAB
ALBUMIN SERPL BCP-MCNC: 2.8 G/DL (ref 3.5–5.2)
ALP SERPL-CCNC: 43 U/L (ref 55–135)
ALT SERPL W/O P-5'-P-CCNC: 6 U/L (ref 10–44)
ANION GAP SERPL CALC-SCNC: 10 MMOL/L (ref 8–16)
AST SERPL-CCNC: 22 U/L (ref 10–40)
BASOPHILS # BLD AUTO: 0.06 K/UL (ref 0–0.2)
BASOPHILS NFR BLD: 0.6 % (ref 0–1.9)
BILIRUB SERPL-MCNC: 1.1 MG/DL (ref 0.1–1)
BLD PROD TYP BPU: NORMAL
BLD PROD TYP BPU: NORMAL
BLOOD UNIT EXPIRATION DATE: NORMAL
BLOOD UNIT EXPIRATION DATE: NORMAL
BLOOD UNIT TYPE CODE: 9500
BLOOD UNIT TYPE CODE: 9500
BLOOD UNIT TYPE: NORMAL
BLOOD UNIT TYPE: NORMAL
BUN SERPL-MCNC: 17 MG/DL (ref 8–23)
CALCIUM SERPL-MCNC: 7.8 MG/DL (ref 8.7–10.5)
CHLORIDE SERPL-SCNC: 107 MMOL/L (ref 95–110)
CO2 SERPL-SCNC: 19 MMOL/L (ref 23–29)
CODING SYSTEM: NORMAL
CODING SYSTEM: NORMAL
CREAT SERPL-MCNC: 1 MG/DL (ref 0.5–1.4)
DIFFERENTIAL METHOD: ABNORMAL
DISPENSE STATUS: NORMAL
DISPENSE STATUS: NORMAL
EOSINOPHIL # BLD AUTO: 0 K/UL (ref 0–0.5)
EOSINOPHIL NFR BLD: 0.1 % (ref 0–8)
ERYTHROCYTE [DISTWIDTH] IN BLOOD BY AUTOMATED COUNT: 13.9 % (ref 11.5–14.5)
EST. GFR  (AFRICAN AMERICAN): >60 ML/MIN/1.73 M^2
EST. GFR  (NON AFRICAN AMERICAN): 56.4 ML/MIN/1.73 M^2
GLUCOSE SERPL-MCNC: 138 MG/DL (ref 70–110)
HCT VFR BLD AUTO: 35.9 % (ref 37–48.5)
HGB BLD-MCNC: 11.6 G/DL (ref 12–16)
IMM GRANULOCYTES # BLD AUTO: 0.04 K/UL (ref 0–0.04)
IMM GRANULOCYTES NFR BLD AUTO: 0.4 % (ref 0–0.5)
INR PPP: 1.1 (ref 0.8–1.2)
LYMPHOCYTES # BLD AUTO: 0.5 K/UL (ref 1–4.8)
LYMPHOCYTES NFR BLD: 4.8 % (ref 18–48)
MAGNESIUM SERPL-MCNC: 2.1 MG/DL (ref 1.6–2.6)
MCH RBC QN AUTO: 30.4 PG (ref 27–31)
MCHC RBC AUTO-ENTMCNC: 32.3 G/DL (ref 32–36)
MCV RBC AUTO: 94 FL (ref 82–98)
MONOCYTES # BLD AUTO: 0.9 K/UL (ref 0.3–1)
MONOCYTES NFR BLD: 8.6 % (ref 4–15)
NEUTROPHILS # BLD AUTO: 8.9 K/UL (ref 1.8–7.7)
NEUTROPHILS NFR BLD: 85.5 % (ref 38–73)
NRBC BLD-RTO: 0 /100 WBC
PHOSPHATE SERPL-MCNC: 2 MG/DL (ref 2.7–4.5)
PLATELET # BLD AUTO: 216 K/UL (ref 150–350)
PMV BLD AUTO: 9.4 FL (ref 9.2–12.9)
POTASSIUM SERPL-SCNC: 3.5 MMOL/L (ref 3.5–5.1)
PROT SERPL-MCNC: 5.9 G/DL (ref 6–8.4)
PROTHROMBIN TIME: 11.5 SEC (ref 9–12.5)
RBC # BLD AUTO: 3.82 M/UL (ref 4–5.4)
SODIUM SERPL-SCNC: 136 MMOL/L (ref 136–145)
TRANS ERYTHROCYTES VOL PATIENT: NORMAL ML
TRANS ERYTHROCYTES VOL PATIENT: NORMAL ML
WBC # BLD AUTO: 10.35 K/UL (ref 3.9–12.7)

## 2019-03-27 PROCEDURE — A4216 STERILE WATER/SALINE, 10 ML: HCPCS | Performed by: ORTHOPAEDIC SURGERY

## 2019-03-27 PROCEDURE — 99223 1ST HOSP IP/OBS HIGH 75: CPT | Mod: ,,, | Performed by: INTERNAL MEDICINE

## 2019-03-27 PROCEDURE — 25000003 PHARM REV CODE 250: Performed by: INTERNAL MEDICINE

## 2019-03-27 PROCEDURE — 85610 PROTHROMBIN TIME: CPT

## 2019-03-27 PROCEDURE — 25000003 PHARM REV CODE 250: Performed by: ORTHOPAEDIC SURGERY

## 2019-03-27 PROCEDURE — 25000003 PHARM REV CODE 250: Performed by: STUDENT IN AN ORGANIZED HEALTH CARE EDUCATION/TRAINING PROGRAM

## 2019-03-27 PROCEDURE — 99291 CRITICAL CARE FIRST HOUR: CPT | Mod: ,,, | Performed by: INTERNAL MEDICINE

## 2019-03-27 PROCEDURE — 63600175 PHARM REV CODE 636 W HCPCS: Performed by: ORTHOPAEDIC SURGERY

## 2019-03-27 PROCEDURE — 94761 N-INVAS EAR/PLS OXIMETRY MLT: CPT

## 2019-03-27 PROCEDURE — 99223 PR INITIAL HOSPITAL CARE,LEVL III: ICD-10-PCS | Mod: ,,, | Performed by: INTERNAL MEDICINE

## 2019-03-27 PROCEDURE — 99291 PR CRITICAL CARE, E/M 30-74 MINUTES: ICD-10-PCS | Mod: ,,, | Performed by: INTERNAL MEDICINE

## 2019-03-27 PROCEDURE — 20000000 HC ICU ROOM

## 2019-03-27 PROCEDURE — 27000221 HC OXYGEN, UP TO 24 HOURS

## 2019-03-27 PROCEDURE — 97110 THERAPEUTIC EXERCISES: CPT

## 2019-03-27 PROCEDURE — 85025 COMPLETE CBC W/AUTO DIFF WBC: CPT

## 2019-03-27 PROCEDURE — 97535 SELF CARE MNGMENT TRAINING: CPT

## 2019-03-27 PROCEDURE — 25000003 PHARM REV CODE 250: Performed by: NURSE PRACTITIONER

## 2019-03-27 PROCEDURE — 63600175 PHARM REV CODE 636 W HCPCS: Performed by: NURSE PRACTITIONER

## 2019-03-27 PROCEDURE — 84100 ASSAY OF PHOSPHORUS: CPT

## 2019-03-27 PROCEDURE — 80053 COMPREHEN METABOLIC PANEL: CPT

## 2019-03-27 PROCEDURE — 63600175 PHARM REV CODE 636 W HCPCS: Performed by: STUDENT IN AN ORGANIZED HEALTH CARE EDUCATION/TRAINING PROGRAM

## 2019-03-27 PROCEDURE — 63600175 PHARM REV CODE 636 W HCPCS: Performed by: INTERNAL MEDICINE

## 2019-03-27 PROCEDURE — 83735 ASSAY OF MAGNESIUM: CPT

## 2019-03-27 PROCEDURE — 97116 GAIT TRAINING THERAPY: CPT

## 2019-03-27 RX ORDER — POTASSIUM CHLORIDE 20 MEQ/1
40 TABLET, EXTENDED RELEASE ORAL ONCE
Status: COMPLETED | OUTPATIENT
Start: 2019-03-27 | End: 2019-03-27

## 2019-03-27 RX ORDER — POLYETHYLENE GLYCOL 3350 17 G/17G
17 POWDER, FOR SOLUTION ORAL DAILY
Status: DISCONTINUED | OUTPATIENT
Start: 2019-03-27 | End: 2019-04-05 | Stop reason: HOSPADM

## 2019-03-27 RX ORDER — ACETAMINOPHEN 500 MG
1000 TABLET ORAL EVERY 6 HOURS PRN
Status: DISCONTINUED | OUTPATIENT
Start: 2019-03-27 | End: 2019-03-28

## 2019-03-27 RX ORDER — MORPHINE SULFATE 2 MG/ML
2 INJECTION, SOLUTION INTRAMUSCULAR; INTRAVENOUS
Status: DISCONTINUED | OUTPATIENT
Start: 2019-03-27 | End: 2019-03-28

## 2019-03-27 RX ORDER — ONDANSETRON 4 MG/1
4 TABLET, ORALLY DISINTEGRATING ORAL EVERY 6 HOURS PRN
Status: DISCONTINUED | OUTPATIENT
Start: 2019-03-27 | End: 2019-03-28

## 2019-03-27 RX ORDER — AMOXICILLIN 250 MG
2 CAPSULE ORAL 2 TIMES DAILY
Status: DISCONTINUED | OUTPATIENT
Start: 2019-03-27 | End: 2019-04-05 | Stop reason: HOSPADM

## 2019-03-27 RX ORDER — ACETAMINOPHEN 500 MG
1000 TABLET ORAL EVERY 6 HOURS PRN
Status: DISCONTINUED | OUTPATIENT
Start: 2019-03-27 | End: 2019-03-27

## 2019-03-27 RX ORDER — CEFEPIME HYDROCHLORIDE 2 G/1
2 INJECTION, POWDER, FOR SOLUTION INTRAVENOUS
Status: DISCONTINUED | OUTPATIENT
Start: 2019-03-27 | End: 2019-03-29

## 2019-03-27 RX ADMIN — Medication 3 ML: at 02:03

## 2019-03-27 RX ADMIN — BOSENTAN 125 MG: 125 TABLET, FILM COATED ORAL at 11:03

## 2019-03-27 RX ADMIN — AMIODARONE HYDROCHLORIDE 200 MG: 200 TABLET ORAL at 09:03

## 2019-03-27 RX ADMIN — BOSENTAN 125 MG: 125 TABLET, FILM COATED ORAL at 09:03

## 2019-03-27 RX ADMIN — CALCIUM 1000 MG: 500 TABLET ORAL at 09:03

## 2019-03-27 RX ADMIN — FOLIC ACID 1 MG: 1 TABLET ORAL at 09:03

## 2019-03-27 RX ADMIN — POLYETHYLENE GLYCOL 3350 17 G: 17 POWDER, FOR SOLUTION ORAL at 11:03

## 2019-03-27 RX ADMIN — DIBASIC SODIUM PHOSPHATE, MONOBASIC POTASSIUM PHOSPHATE AND MONOBASIC SODIUM PHOSPHATE 2 TABLET: 852; 155; 130 TABLET ORAL at 01:03

## 2019-03-27 RX ADMIN — STANDARDIZED SENNA CONCENTRATE AND DOCUSATE SODIUM 2 TABLET: 8.6; 5 TABLET, FILM COATED ORAL at 11:03

## 2019-03-27 RX ADMIN — FERROUS GLUCONATE TAB 324 MG (37.5 MG ELEMENTAL IRON) 324 MG: 324 (37.5 FE) TAB at 08:03

## 2019-03-27 RX ADMIN — ACETAMINOPHEN 1000 MG: 500 TABLET ORAL at 01:03

## 2019-03-27 RX ADMIN — POTASSIUM CHLORIDE 40 MEQ: 1500 TABLET, EXTENDED RELEASE ORAL at 06:03

## 2019-03-27 RX ADMIN — MUPIROCIN 1 G: 20 OINTMENT TOPICAL at 09:03

## 2019-03-27 RX ADMIN — OXYCODONE HYDROCHLORIDE 5 MG: 5 TABLET ORAL at 09:03

## 2019-03-27 RX ADMIN — ONDANSETRON 4 MG: 2 INJECTION INTRAMUSCULAR; INTRAVENOUS at 09:03

## 2019-03-27 RX ADMIN — VANCOMYCIN HYDROCHLORIDE 750 MG: 750 INJECTION, POWDER, LYOPHILIZED, FOR SOLUTION INTRAVENOUS at 03:03

## 2019-03-27 RX ADMIN — ROPIVACAINE HYDROCHLORIDE 2 ML/HR: 2 INJECTION, SOLUTION EPIDURAL; INFILTRATION at 09:03

## 2019-03-27 RX ADMIN — ENOXAPARIN SODIUM 40 MG: 100 INJECTION SUBCUTANEOUS at 05:03

## 2019-03-27 RX ADMIN — WARFARIN SODIUM 7.5 MG: 7.5 TABLET ORAL at 05:03

## 2019-03-27 RX ADMIN — PREGABALIN 75 MG: 75 CAPSULE ORAL at 09:03

## 2019-03-27 RX ADMIN — RIOCIGUAT 0.5 MG: 0.5 TABLET, FILM COATED ORAL at 11:03

## 2019-03-27 RX ADMIN — CEFEPIME 2 G: 2 INJECTION, POWDER, FOR SOLUTION INTRAVENOUS at 09:03

## 2019-03-27 RX ADMIN — EPINEPHRINE 0.05 MCG/KG/MIN: 1 INJECTION INTRAMUSCULAR; INTRAVENOUS; SUBCUTANEOUS at 11:03

## 2019-03-27 RX ADMIN — ACETAMINOPHEN 1000 MG: 500 TABLET ORAL at 06:03

## 2019-03-27 RX ADMIN — ROPIVACAINE HYDROCHLORIDE 2 ML/HR: 2 INJECTION, SOLUTION EPIDURAL; INFILTRATION at 06:03

## 2019-03-27 RX ADMIN — OXYCODONE HYDROCHLORIDE 5 MG: 5 TABLET ORAL at 08:03

## 2019-03-27 RX ADMIN — DIBASIC SODIUM PHOSPHATE, MONOBASIC POTASSIUM PHOSPHATE AND MONOBASIC SODIUM PHOSPHATE 2 TABLET: 852; 155; 130 TABLET ORAL at 05:03

## 2019-03-27 RX ADMIN — DIBASIC SODIUM PHOSPHATE, MONOBASIC POTASSIUM PHOSPHATE AND MONOBASIC SODIUM PHOSPHATE 2 TABLET: 852; 155; 130 TABLET ORAL at 11:03

## 2019-03-27 RX ADMIN — ACETAMINOPHEN 1000 MG: 500 TABLET ORAL at 12:03

## 2019-03-27 RX ADMIN — FLUTICASONE PROPIONATE 100 MCG: 50 SPRAY, METERED NASAL at 09:03

## 2019-03-27 RX ADMIN — CETIRIZINE HYDROCHLORIDE 5 MG: 5 TABLET ORAL at 09:03

## 2019-03-27 RX ADMIN — FUROSEMIDE 20 MG: 20 TABLET ORAL at 01:03

## 2019-03-27 RX ADMIN — RIOCIGUAT 0.5 MG: 0.5 TABLET, FILM COATED ORAL at 04:03

## 2019-03-27 RX ADMIN — RIOCIGUAT 0.5 MG: 0.5 TABLET, FILM COATED ORAL at 09:03

## 2019-03-27 RX ADMIN — CEFEPIME 2 G: 2 INJECTION, POWDER, FOR SOLUTION INTRAVENOUS at 08:03

## 2019-03-27 RX ADMIN — ROPIVACAINE HYDROCHLORIDE 2 ML/HR: 2 INJECTION, SOLUTION EPIDURAL; INFILTRATION at 12:03

## 2019-03-27 RX ADMIN — STANDARDIZED SENNA CONCENTRATE AND DOCUSATE SODIUM 2 TABLET: 8.6; 5 TABLET, FILM COATED ORAL at 09:03

## 2019-03-27 NOTE — HPI
73 y.o. 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 Patient presented to [facility] for hip pain and inability to bear weight on the left leg after a trip and fall yesterday.  Patient tripped on a cord for her pulm HTN pump falling directly onto the left hip. Patient denies head trauma or upper body injury.      Pt now s/p left hip hemiarthroplasty on 3/25 for femoral neck fracture.  Pt developed post op fever on 3/26 and was started on empiric Vanc + Cefepime. Blood Cxs taken.  Respiratory viral panel sent.

## 2019-03-27 NOTE — PLAN OF CARE
Problem: Adult Inpatient Plan of Care  Goal: Plan of Care Review  Outcome: Ongoing (interventions implemented as appropriate)    See vital signs and assessments in flowsheets. See below for updates on today's progress.     Pulmonary: 5L NC, O2 sats >80%. No c/o of SOB, breath sounds coarse/diminshed.    Cardiovascular: HR 80-90s, BP 100s/50s, MAP >60.     Neurological: AAO x4, follows commands.     Gastrointestinal: No BM today; cardiac diet.     Genitourinary: Tong C/D/I, total urine output ~600cc, thompson urine.    Skin/Bath: Left hip dressing C/D/I, no drainage/bleeding/ hematoma.       Infusions: Epi, Remodulin, Ropivicaine     Patient progressing towards goals as tolerated, plan of care communicated and reviewed with Molly Smith and family. All concerns addressed. Will continue to monitor.

## 2019-03-27 NOTE — ADDENDUM NOTE
Addendum  created 03/27/19 1134 by Asim Yan MD    Charge Capture section accepted, Cosign clinical note with attestation

## 2019-03-27 NOTE — PLAN OF CARE
Problem: Occupational Therapy Goal  Goal: Occupational Therapy Goal  Goals to be met by: 7 days 4/2/19     Patient will increase functional independence with ADLs by performing:    Pt to complete g/h skills seated with set-up  Pt to complete UE dressing with MIN A   Pt to demo Fair sitting balance to allow for progression with ADL training.  Pt to t/f to chair with MAX A      Goals remain appropriate.

## 2019-03-27 NOTE — PROGRESS NOTES
[facility]  Orthopedics  Progress Note    Patient Name: Molly Smith  MRN: [id number]  Admission Date: 3/23/2019  Hospital Length of Stay: 4 days  Attending Provider: Alice Tim MD  Primary Care Provider: Roberto Braun MD  Follow-up For: Procedure(s) (LRB):  HEMIARTHROPLASTY, HIP - left (Left)    Post-Operative Day: 2 Days Post-Op  Subjective:     Principal Problem:Closed fracture of neck of left femur    Principal Orthopedic Problem: L femoral neck fx    Interval History: Pt seen and examined at bedside. Sleepy, but easily roused. Pain controlled. Epi increased yesterday to 3.4. Worked with PT/OT, sat on EOB 8 min. Febrile to 103 yesterday evening.    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    Multaq [dronedarone]        Current Facility-Administered Medications   Medication    acetaminophen tablet 1,000 mg    amiodarone tablet 200 mg    bosentan tablet 125 mg    calcium carbonate (OS-MARIELLE) tablet 500 mg    ceFEPIme injection 1 g    cetirizine tablet 5 mg    enoxaparin injection 40 mg    EPINEPHrine (ADRENALIN) 5 mg in sodium chloride 0.9% 250 mL infusion    ferrous gluconate tablet 324 mg    fluticasone 50 mcg/actuation nasal spray 100 mcg    folic acid tablet 1 mg    furosemide tablet 20 mg    morphine injection 2 mg    morphine injection 2 mg    mupirocin 2 % ointment 1 g    ondansetron injection 4 mg    oxyCODONE immediate release tablet 10 mg    oxyCODONE immediate release tablet 5 mg    potassium chloride SA CR tablet 40 mEq    pregabalin capsule 75 mg    promethazine (PHENERGAN) 6.25 mg in dextrose 5 % 50 mL IVPB    riociguat (ADEMPAS) tablet 0.5 mg    ropivacaine (PF) 2 mg/ml (0.2%) infusion    sodium chloride 0.9% flush 3 mL    sodium chloride 0.9% flush 5 mL    spironolactone tablet 25 mg    treprostinil (REMODULIN) 12,000,000 ng in sodium chloride 0.9% 100 mL infusion    vancomycin 750 mg  "in dextrose 5 % 250 mL IVPB (ready to mix system)    vasopressin (PITRESSIN) 0.2 Units/mL in dextrose 5 % 100 mL infusion    VELETRI/REMODULIN CASSETTE    VELETRI/REMODULIN TUBING    warfarin (COUMADIN) tablet 7.5 mg     Objective:     Vital Signs (Most Recent):  Temp: 98.5 °F (36.9 °C) (03/27/19 0300)  Pulse: 84 (03/27/19 0400)  Resp: 20 (03/27/19 0400)  BP: (!) 113/56 (03/27/19 0400)  SpO2: (!) 94 % (03/27/19 0400) Vital Signs (24h Range):  Temp:  [98.2 °F (36.8 °C)-103 °F (39.4 °C)] 98.5 °F (36.9 °C)  Pulse:  [] 84  Resp:  [17-39] 20  SpO2:  [74 %-95 %] 94 %  BP: ()/(52-60) 113/56  Arterial Line BP: ()/(38-70) 120/48     Weight: 57.2 kg (126 lb)  Height: 5' 3" (160 cm)  Body mass index is 22.32 kg/m².      Intake/Output Summary (Last 24 hours) at 3/27/2019 0535  Last data filed at 3/27/2019 0400  Gross per 24 hour   Intake 1140.84 ml   Output 2135 ml   Net -994.16 ml     NAD  Normal respiratory effort on 5 L NC O2    Ortho/SPM Exam    Pt lying in bed this morning in NAD  Aquacel dressing c/d/i  SILT Sa/Thompson/DP/SP/T  Motor intact EHL/FHL/TA/Gastroc  2+ DP, 2+ PT    Significant Labs:   BMP:   Recent Labs   Lab 03/27/19  0210   *      K 3.5      CO2 19*   BUN 17   CREATININE 1.0   CALCIUM 7.8*   MG 2.1     CBC:   Recent Labs   Lab 03/26/19  0258 03/26/19  1229 03/27/19  0210   WBC 9.15 8.61 10.35   HGB 11.9* 12.2 11.6*   HCT 36.5* 36.4* 35.9*    186 216     Coagulation:   Recent Labs   Lab 03/25/19  1437 03/26/19  0258 03/27/19  0210   LABPROT 12.1 11.4 11.5   INR 1.2 1.1 1.1       Significant Imaging: I have reviewed all pertinent imaging results/findings.    Assessment/Plan:     * Closed fracture of neck of left femur  Pt is a 74 yo F with pulmonary HTN, chronic respiratory failure, congenital heart disease on coumadin, and closed left femoral neck fracture s/p left hip patrick 3/25    - Epi drip  - Antibiotics: empiric vanc/cefepime for fever  - Weight bearing status: " WBAT LLE  - Labs: reviewed, Hgb 11.6  - DVT Prophylaxis: mechanical, lovenox, warfarin per primary  - Lines/Drains: PIV, A line, central line, Dunbar, Remodulin pump; d/c sargent  - Pain control: PNC per anesthesia  - PT/OT: mobilize with PT/OT today, must be seen by PT 7x/week - please notify orthopedics if patient is not seen      Closed left hip fracture               Munira Landin MD  Orthopedics  [facility]    Attg Note:  Patient seen and examined.  I agree with the resident's assessment and plan.  Looking more energetic today.  Continue to work on mobilization.    Kemal Esposito MD

## 2019-03-27 NOTE — PLAN OF CARE
Problem: Adult Inpatient Plan of Care  Goal: Plan of Care Review  No acute events throughout night, remains on Epi, Remoudulin and Ropivacaine. Sleepy tonight but easily arouse able. O2 remains @ 5L/M via NC. Sats 90-92. VS and assessment per flow sheet, patient progressing towards goals as tolerated, plan of care reviewed with Molly Smith and family, all concerns addressed, will continue to monitor.

## 2019-03-27 NOTE — SUBJECTIVE & OBJECTIVE
Principal Problem:Closed fracture of neck of left femur    Principal Orthopedic Problem: L femoral neck fx    Interval History: Pt seen and examined at bedside. Sleepy, but easily roused. Pain controlled. Epi increased yesterday to 3.4. Worked with PT/OT, sat on EOB 8 min. Febrile to 103 yesterday evening.    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    Multaq [dronedarone]        Current Facility-Administered Medications   Medication    acetaminophen tablet 1,000 mg    amiodarone tablet 200 mg    bosentan tablet 125 mg    calcium carbonate (OS-MARIELLE) tablet 500 mg    ceFEPIme injection 1 g    cetirizine tablet 5 mg    enoxaparin injection 40 mg    EPINEPHrine (ADRENALIN) 5 mg in sodium chloride 0.9% 250 mL infusion    ferrous gluconate tablet 324 mg    fluticasone 50 mcg/actuation nasal spray 100 mcg    folic acid tablet 1 mg    furosemide tablet 20 mg    morphine injection 2 mg    morphine injection 2 mg    mupirocin 2 % ointment 1 g    ondansetron injection 4 mg    oxyCODONE immediate release tablet 10 mg    oxyCODONE immediate release tablet 5 mg    potassium chloride SA CR tablet 40 mEq    pregabalin capsule 75 mg    promethazine (PHENERGAN) 6.25 mg in dextrose 5 % 50 mL IVPB    riociguat (ADEMPAS) tablet 0.5 mg    ropivacaine (PF) 2 mg/ml (0.2%) infusion    sodium chloride 0.9% flush 3 mL    sodium chloride 0.9% flush 5 mL    spironolactone tablet 25 mg    treprostinil (REMODULIN) 12,000,000 ng in sodium chloride 0.9% 100 mL infusion    vancomycin 750 mg in dextrose 5 % 250 mL IVPB (ready to mix system)    vasopressin (PITRESSIN) 0.2 Units/mL in dextrose 5 % 100 mL infusion    VELETRI/REMODULIN CASSETTE    VELETRI/REMODULIN TUBING    warfarin (COUMADIN) tablet 7.5 mg     Objective:     Vital Signs (Most Recent):  Temp: 98.5 °F (36.9 °C) (03/27/19 0300)  Pulse: 84 (03/27/19 0400)  Resp: 20 (03/27/19 0400)  BP: (!) 113/56  "(03/27/19 0400)  SpO2: (!) 94 % (03/27/19 0400) Vital Signs (24h Range):  Temp:  [98.2 °F (36.8 °C)-103 °F (39.4 °C)] 98.5 °F (36.9 °C)  Pulse:  [] 84  Resp:  [17-39] 20  SpO2:  [74 %-95 %] 94 %  BP: ()/(52-60) 113/56  Arterial Line BP: ()/(38-70) 120/48     Weight: 57.2 kg (126 lb)  Height: 5' 3" (160 cm)  Body mass index is 22.32 kg/m².      Intake/Output Summary (Last 24 hours) at 3/27/2019 0535  Last data filed at 3/27/2019 0400  Gross per 24 hour   Intake 1140.84 ml   Output 2135 ml   Net -994.16 ml     NAD  Normal respiratory effort on 5 L NC O2    Ortho/SPM Exam    Pt lying in bed this morning in NAD  Aquacel dressing c/d/i  SILT Sa/Thompson/DP/SP/T  Motor intact EHL/FHL/TA/Gastroc  2+ DP, 2+ PT    Significant Labs:   BMP:   Recent Labs   Lab 03/27/19  0210   *      K 3.5      CO2 19*   BUN 17   CREATININE 1.0   CALCIUM 7.8*   MG 2.1     CBC:   Recent Labs   Lab 03/26/19  0258 03/26/19  1229 03/27/19  0210   WBC 9.15 8.61 10.35   HGB 11.9* 12.2 11.6*   HCT 36.5* 36.4* 35.9*    186 216     Coagulation:   Recent Labs   Lab 03/25/19  1437 03/26/19  0258 03/27/19  0210   LABPROT 12.1 11.4 11.5   INR 1.2 1.1 1.1       Significant Imaging: I have reviewed all pertinent imaging results/findings.  "

## 2019-03-27 NOTE — PLAN OF CARE
Problem: Physical Therapy Goal  Goal: Physical Therapy Goal  Goals to be met by: 2019     Patient will increase functional independence with mobility by performin. Supine to sit with Moderate Assistance  2. Rolling to Left and Right with Minimal Assistance.  3. Sit to stand transfer with Moderate Assistance- met 3/27/2019  Sit to stand transfer with contact guard assistance   4. Sitting at edge of bed x10 minutes with Stand-by Assistance: met 3/27/2019   Sitting at edge of bed x15 mins with supervision      Outcome: Ongoing (interventions implemented as appropriate)  Pt is progressing toward goals. All goals remain appropriate.

## 2019-03-27 NOTE — ANESTHESIA POST-OP PAIN MANAGEMENT
Acute Pain Service Progress Note    Molly Smith is a 73 y.o., female, [id number].    Surgery:  L Hip Jonny-Arthroplasty     Post Op Day #: 2    Catheter type: perineural  Suprainguinal Fascia Iliaca    Infusion type: Ropivacaine 0.2%  2ml/hr basal w/ IB 15 cc/hr    Problem List:    There are no hospital problems to display for this patient.      Subjective:     General appearance of Resting in bed , sleeping   Pain with rest: 1    Numbers   Pain with movement: 6    Numbers   Side Effects    1. Pruritis No    2. Nausea No    3. Motor Blockade No, 1=Ability to bend knees and ankles    4. Sedation No, 2=slightly drowsy, easily aroused    Objective:     Catheter site clean, dry, intact         Vitals   There were no vitals filed for this visit.     Labs    Admission on 03/23/2019   No results displayed because visit has over 200 results.      Admission on 03/23/2019, Discharged on 03/23/2019   Component Date Value Ref Range Status    WBC 03/23/2019 6.60  3.90 - 12.70 K/uL Final    RBC 03/23/2019 5.00  4.00 - 5.40 M/uL Final    Hemoglobin 03/23/2019 15.2  12.0 - 16.0 g/dL Final    Hematocrit 03/23/2019 45.7  37.0 - 48.5 % Final    MCV 03/23/2019 91  82 - 98 fL Final    MCH 03/23/2019 30.4  27.0 - 31.0 pg Final    MCHC 03/23/2019 33.2  32.0 - 36.0 g/dL Final    RDW 03/23/2019 14.4  11.5 - 14.5 % Final    Platelets 03/23/2019 229  150 - 350 K/uL Final    MPV 03/23/2019 7.3* 9.2 - 12.9 fL Final    Gran # (ANC) 03/23/2019 5.4  1.8 - 7.7 K/uL Final    Lymph # 03/23/2019 0.6* 1.0 - 4.8 K/uL Final    Mono # 03/23/2019 0.5  0.3 - 1.0 K/uL Final    Eos # 03/23/2019 0.0  0.0 - 0.5 K/uL Final    Baso # 03/23/2019 0.10  0.00 - 0.20 K/uL Final    nRBC 03/23/2019 0  0 /100 WBC Final    Gran% 03/23/2019 81.9* 38.0 - 73.0 % Final    Lymph% 03/23/2019 9.4* 18.0 - 48.0 % Final    Mono% 03/23/2019 7.6  4.0 - 15.0 % Final    Eosinophil% 03/23/2019 0.3  0.0 - 8.0 % Final    Basophil% 03/23/2019 0.8  0.0 - 1.9 % Final     Differential Method 03/23/2019 Automated   Final    Sodium 03/23/2019 139  136 - 145 mmol/L Final    Potassium 03/23/2019 4.3  3.5 - 5.1 mmol/L Final    Chloride 03/23/2019 104  95 - 110 mmol/L Final    CO2 03/23/2019 26  23 - 29 mmol/L Final    Glucose 03/23/2019 91  74 - 106 mg/dL Final    BUN, Bld 03/23/2019 21* 7 - 17 mg/dL Final    Creatinine 03/23/2019 1.10  0.70 - 1.20 mg/dL Final    Calcium 03/23/2019 9.1  8.4 - 10.2 mg/dL Final    Total Protein 03/23/2019 7.5  6.3 - 8.2 g/dL Final    Albumin 03/23/2019 4.3  3.5 - 5.2 g/dL Final    Total Bilirubin 03/23/2019 1.3  0.2 - 1.3 mg/dL Final    Alkaline Phosphatase 03/23/2019 50  38 - 145 U/L Final    AST 03/23/2019 27  14 - 36 U/L Final    ALT 03/23/2019 24  10 - 44 U/L Final    eGFR if  03/23/2019 58* >60 mL/min/1.73 m^2 Final    eGFR if non African American 03/23/2019 50* >60 mL/min/1.73 m^2 Final        Meds   No current facility-administered medications for this visit.      No current outpatient medications on file.     Facility-Administered Medications Ordered in Other Visits   Medication Dose Route Frequency Provider Last Rate Last Dose    acetaminophen tablet 1,000 mg  1,000 mg Oral Q6H Joe Adan, NP   1,000 mg at 03/27/19 0608    amiodarone tablet 200 mg  200 mg Oral Daily Joe Adan, NP   200 mg at 03/26/19 0914    bosentan tablet 125 mg  125 mg Oral Q12H Joe Adan, NP   125 mg at 03/26/19 2325    calcium carbonate (OS-MARIELLE) tablet 500 mg  1,000 mg Oral Daily Joe Adan, NP   1,000 mg at 03/26/19 0917    ceFEPIme injection 2 g  2 g Intravenous Q12H Alice Tim MD        cetirizine tablet 5 mg  5 mg Oral Daily Joe Adan, NP   5 mg at 03/26/19 0916    enoxaparin injection 40 mg  40 mg Subcutaneous Daily Munira Landin MD   40 mg at 03/26/19 1837    EPINEPHrine (ADRENALIN) 5 mg in sodium chloride 0.9% 250 mL infusion  0.02 mcg/kg/min Intravenous Continuous Joe  Adan, NP 12 mL/hr at 03/27/19 0600 0.07 mcg/kg/min at 03/27/19 0600    ferrous gluconate tablet 324 mg  324 mg Oral Daily with breakfast Joe Arellano NP   324 mg at 03/27/19 0818    fluticasone 50 mcg/actuation nasal spray 100 mcg  2 spray Each Nare QHS Munira Landin MD   100 mcg at 03/26/19 2237    folic acid tablet 1 mg  1 mg Oral Daily Joe Arellano NP   1 mg at 03/26/19 0917    furosemide tablet 20 mg  20 mg Oral After lunch Joe Arellano NP   20 mg at 03/26/19 1321    morphine injection 2 mg  2 mg Intravenous Q1H PRN Munira Landin MD   2 mg at 03/25/19 2248    morphine injection 2 mg  2 mg Intravenous Q1H PRN Munira Landin MD   2 mg at 03/26/19 1101    mupirocin 2 % ointment 1 g  1 g Nasal BID Munira Landin MD   1 g at 03/26/19 2132    ondansetron injection 4 mg  4 mg Intravenous Q12H PRN Munira Landin MD   4 mg at 03/26/19 1031    oxyCODONE immediate release tablet 10 mg  10 mg Oral Q3H PRN Munira Landin MD   10 mg at 03/26/19 1652    oxyCODONE immediate release tablet 5 mg  5 mg Oral Q3H PRN Munira Landin MD   5 mg at 03/26/19 2131    pregabalin capsule 75 mg  75 mg Oral QHS Munira Landin MD   75 mg at 03/26/19 2142    promethazine (PHENERGAN) 6.25 mg in dextrose 5 % 50 mL IVPB  6.25 mg Intravenous Q6H PRN Munira Landin  mL/hr at 03/25/19 1525 6.25 mg at 03/25/19 1525    riociguat (ADEMPAS) tablet 0.5 mg  0.5 mg Oral TID Joe Arellano NP   0.5 mg at 03/26/19 2325    ropivacaine (PF) 2 mg/ml (0.2%) infusion  2 mL/hr Perineural Continuous Reid H MD Keiry 2 mL/hr at 03/27/19 0637 2 mL/hr at 03/27/19 0637    sodium chloride 0.9% flush 3 mL  3 mL Intravenous Q8H Munira Landin MD   3 mL at 03/26/19 2133    sodium chloride 0.9% flush 5 mL  5 mL Intravenous PRN Munira Landin MD        treprostinil (REMODULIN) 12,000,000 ng in sodium  chloride 0.9% 100 mL infusion  89.2 ng/kg/min (Order-Specific) Intravenous Continuous PRN Alice Tim MD        vancomycin 750 mg in dextrose 5 % 250 mL IVPB (ready to mix system)  750 mg Intravenous Q24H Alice Tim  mL/hr at 03/26/19 1515 750 mg at 03/26/19 1515    vasopressin (PITRESSIN) 0.2 Units/mL in dextrose 5 % 100 mL infusion  0.01 Units/min Intravenous Continuous Jayne Larsen PA-C        VELETRI/REMODULIN CASSETTE   Intravenous Continuous Munira May Landin MD        VELETRI/REMODULIN TUBING   Intravenous Continuous Muniraher May Landin MD        warfarin (COUMADIN) tablet 7.5 mg  7.5 mg Oral Daily Alice Tim MD   7.5 mg at 03/26/19 1837        Anticoagulant dose Lovenox SubQ 40mg q24    Assessment:     Pain control adequate    Plan:    Pain well controlled, continue current treatment. Patient with improved pain control following increase of intermittent bolus yesterday.    Evaluator Reid Ricci

## 2019-03-27 NOTE — SUBJECTIVE & OBJECTIVE
Interval History: Sleepy this am. Sister at bedside.     LINES:  - Tunneled maguire in R subclavian for remodulin  - R Radial A line  - L IJ      Continuous Infusions:   epinephrine infusion 0.07 mcg/kg/min (03/27/19 0600)    ropivacaine (PF) 2 mg/ml (0.2%) 2 mL/hr (03/27/19 0637)    treprostinil (REMODULIN) infusion      vasopressin (PITRESSIN) infusion      veletri/remodulin cassette      veletri/remodulin tubing       Scheduled Meds:   acetaminophen  1,000 mg Oral Q6H    amiodarone  200 mg Oral Daily    bosentan  125 mg Oral Q12H    calcium carbonate  1,000 mg Oral Daily    ceFEPime (MAXIPIME) IVPB  2 g Intravenous Q12H    cetirizine  5 mg Oral Daily    enoxaparin  40 mg Subcutaneous Daily    ferrous gluconate  324 mg Oral Daily with breakfast    fluticasone  2 spray Each Nare QHS    folic acid  1 mg Oral Daily    furosemide  20 mg Oral After lunch    mupirocin  1 g Nasal BID    pregabalin  75 mg Oral QHS    riociguat  0.5 mg Oral TID    sodium chloride 0.9%  3 mL Intravenous Q8H    vancomycin (VANCOCIN) IVPB  750 mg Intravenous Q24H    warfarin  7.5 mg Oral Daily     PRN Meds:morphine, morphine, ondansetron, oxyCODONE, oxyCODONE, promethazine (PHENERGAN) IVPB, sodium chloride 0.9%, treprostinil (REMODULIN) infusion    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    Multaq [dronedarone]      Objective:     Vital Signs (Most Recent):  Temp: 98.3 °F (36.8 °C) (03/27/19 0600)  Pulse: 93 (03/27/19 0630)  Resp: (!) 23 (03/27/19 0630)  BP: (!) 113/56 (03/27/19 0400)  SpO2: (!) 87 % (03/27/19 0630) Vital Signs (24h Range):  Temp:  [98.2 °F (36.8 °C)-103 °F (39.4 °C)] 98.3 °F (36.8 °C)  Pulse:  [] 93  Resp:  [17-39] 23  SpO2:  [74 %-95 %] 87 %  BP: ()/(52-60) 113/56  Arterial Line BP: ()/(38-70) 107/42     No data found.  Body mass index is 22.32 kg/m².      Intake/Output Summary (Last 24 hours) at 3/27/2019 0843  Last data  filed at 3/27/2019 0600  Gross per 24 hour   Intake 1370.39 ml   Output 2320 ml   Net -949.61 ml     CVP:  [6 mmHg] 6 mmHg    Physical Exam   Constitutional: She is oriented to person, place, and time. She appears well-developed and well-nourished. She is sleeping.   HENT:   Head: Normocephalic and atraumatic.   Eyes: EOM are normal.   Neck: Neck supple. No JVD present.   Cardiovascular: Normal rate and regular rhythm.   Pulmonary/Chest: Effort normal and breath sounds normal. No respiratory distress.   Abdominal: Soft. Bowel sounds are normal. She exhibits no distension.   Musculoskeletal: She exhibits no edema.   Neurological: She is oriented to person, place, and time.   Skin: Skin is warm and dry. Capillary refill takes less than 2 seconds. No erythema.   Psychiatric: She has a normal mood and affect. Her behavior is normal. Thought content normal.     Significant Labs:  CBC:  Recent Labs   Lab 03/26/19  0258 03/26/19  1229 03/27/19  0210   WBC 9.15 8.61 10.35   RBC 3.95* 3.92* 3.82*   HGB 11.9* 12.2 11.6*   HCT 36.5* 36.4* 35.9*    186 216   MCV 92 93 94   MCH 30.1 31.1* 30.4   MCHC 32.6 33.5 32.3     BNP:  No results for input(s): BNP in the last 168 hours.    Invalid input(s): BNPTRIAGELBLO  CMP:  Recent Labs   Lab 03/26/19  0258 03/26/19  1404 03/27/19  0210   * 183* 138*   CALCIUM 7.8* 7.9* 7.8*   ALBUMIN 3.0* 2.8* 2.8*   PROT 5.8* 5.8* 5.9*   * 133* 136   K 3.9 3.5 3.5   CO2 19* 21* 19*    104 107   BUN 19 20 17   CREATININE 1.3 1.3 1.0   ALKPHOS 36* 37* 43*   ALT 9* 7* 6*   AST 17 18 22   BILITOT 1.1* 1.4* 1.1*      Coagulation:   Recent Labs   Lab 03/25/19  1437 03/26/19  0258 03/27/19  0210   INR 1.2 1.1 1.1     LDH:  No results for input(s): LDH in the last 72 hours.  Microbiology:  Microbiology Results (last 7 days)     Procedure Component Value Units Date/Time    Blood culture [[id number]] Collected:  03/26/19 1420    Order Status:  Completed Specimen:  Blood from Peripheral,  Antecubital, Right Updated:  03/26/19 2145     Blood Culture, Routine No Growth to date    Blood culture [[id number]] Collected:  03/26/19 1440    Order Status:  Completed Specimen:  Blood from Peripheral, Antecubital, Left Updated:  03/26/19 2145     Blood Culture, Routine No Growth to date    Respiratory Viral Panel by PCR [facility]; Nasal Swab [[id number]] Collected:  03/26/19 1846    Order Status:  Sent Specimen:  Respiratory Updated:  03/26/19 1856        I have reviewed all pertinent labs within the past 24 hours.    Estimated Creatinine Clearance: 42.1 mL/min (based on SCr of 1 mg/dL).    Diagnostic Results:  I have reviewed all pertinent imaging results/findings within the past 24 hours.

## 2019-03-27 NOTE — PLAN OF CARE
Problem: Adult Inpatient Plan of Care  Goal: Plan of Care Review  Outcome: Ongoing (interventions implemented as appropriate)  No acute events throughout day. See vital signs and assessments in flowsheets. See below for updates on today's progress.     Pulmonary: Lung sounds diminished. SATs 90-95 on 5 L nasal cannula. No complaints of shortness of breath. Patient on remodulin infusion for pulmonary artery hypertension (infusion managed by her ).    Cardiovascular: NSR. Sinus tach when systolic blood pressure drops below 90. Blood pressure goal today 90 systolic, goal maintained with epi. MAPs between 55-65. Pulses intact throughout.     Neurological: Awake, alert and oriented x4. Afebrile with one elevated temp of 100.2 this afternoon. Treated with tylenol. PERRL. Follows commands.     Gastrointestinal: Abdomen rounded but soft, non-tender. No BM today. Patient started on bowel regimen.     Genitourinary: Tong catheter in place. Output approximately 30 cc/hr. Dark yellow.     Endocrine: NA.     Skin/Bath: Skin intact, IV site dressings covered appropriately. Patient refused foam pressure point padding. Moves frequently in the bed with assistance.  Date of last CHG bath given: last evening on 3/27.     Infusions: Epi, Remodulin, Ropivicaine.     Patient progressing towards goals as tolerated, plan of care communicated and reviewed with Molly Smith and family. All concerns addressed. Will continue to monitor.

## 2019-03-27 NOTE — CONSULTS
[facility]  Infectious Disease  Consult Note    Patient Name: Molly Smith  MRN: [id number]  Admission Date: 3/23/2019  Hospital Length of Stay: 4 days  Attending Physician: Alice Tim MD  Primary Care Provider: Roberto Braun MD     Isolation Status: No active isolations    Patient information was obtained from patient, past medical records and ER records.      Consults  Assessment/Plan:     Fever  73 y.o. 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 Patient presented to [facility] for hip pain and inability to bear weight on the left leg after a trip and fall yesterday.  Patient tripped on a cord for her pulm HTN pump falling directly onto the left hip. Patient denies head trauma or upper body injury.      Pt now s/p left hip hemiarthroplasty on 3/25 for femoral neck fracture.  Pt developed post op fever on 3/26 and was started on empiric Vanc + Cefepime. Blood Cxs taken.  Respiratory viral panel sent.  No obvious signs of infection.        -Continue pt on empiric Vanc +Cefepime  -Recommend US of Lower Extremities for DVT in setting of trauma and recent sx.  -ID will continue to follow      Thank you for your consult. I will follow-up with patient. Please contact us if you have any additional questions.    Terry Junior PA-C  Infectious Disease  [facility]    Subjective:     Principal Problem: Closed fracture of neck of left femur    HPI: 73 y.o. 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 Patient presented to [facility] for hip pain and inability to bear weight on the left leg after a trip and fall yesterday.  Patient tripped on a cord for her pulm HTN pump falling directly onto the left hip. Patient denies head trauma or upper body injury.      Pt now s/p left hip hemiarthroplasty on 3/25 for femoral neck fracture.  Pt developed post op fever on 3/26 and was started on empiric Vanc + Cefepime. Blood Cxs taken.  Respiratory viral panel sent.        Past Medical History:   Diagnosis Date    Allergy     Anticoagulant long-term use     Arthritis     Heart murmur     Pneumonia     Pulmonary hypertension     Tachycardia        Past Surgical History:   Procedure Laterality Date    ABLATION N/A 6/17/2013    Performed by Jose J Hinojosa MD at [facility] CATH LAB    BACK SURGERY      HEART CATH-RIGHT Right 7/10/2017    Performed by Alice Tim MD at [facility] CATH LAB    INSERTION-CATHETER-ROBERTSON Left 6/24/2014    Performed by González Hernandez MD at [facility] OR 2ND FLR    REPLACEMENT, CATHETER, DIALYSIS, OVER GUIDEWIRE, USING EXISTING VENOUS ACCESS N/A 1/17/2019    Performed by Phoenix Hand MD at [facility] CATH LAB       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    Multaq [dronedarone]        Medications:  Medications Prior to Admission   Medication Sig    amiodarone (PACERONE) 200 MG Tab Take 1 tablet (200 mg total) by mouth once daily.    bosentan (TRACLEER) 125 MG Tab Take by mouth 2 (two) times daily.    CALCIUM CARBONATE (CALCIUM 600 ORAL) Take 2 tablets by mouth once daily.      ferrous gluconate (FERGON) 324 MG tablet Take 1 tablet (324 mg total) by mouth daily with breakfast.    folic acid (FOLVITE) 1 MG tablet Take 1 mg by mouth once daily.    furosemide (LASIX) 20 MG tablet Take  1 tablet (20 mg total) by mouth once daily. (Patient taking differently: Take 20 mg by mouth after lunch. )    loratadine (CLARITIN) 10 mg tablet Take 10 mg by mouth once daily.    ondansetron (ZOFRAN-ODT) 4 MG TbDL     riociguat (ADEMPAS) 0.5 mg Tab tablet Take 1 tablet (0.5 mg total) by mouth 3 (three) times daily.    spironolactone (ALDACTONE) 25 MG tablet Take 25 mg by mouth once daily.      traMADol (ULTRAM-ER) 200 MG Tb24 TAKE ONE TABLET BY MOUTH ONCE A DAY AS NEEDED FOR PAIN THANK YOU!    TREPROSTINIL SODIUM (TREPROSTINIL, REMODULIN, PUMP FOR HOME USE) Pt currently on 89.2 ng/kg/min=45 mL/day dosing weight 70.05 kg    warfarin (COUMADIN) 5 MG tablet Take 5 mg by mouth every Mon, Wed, Fri.    chlordiazepoxide-clidinium 5-2.5 mg (LIBRAX) 5-2.5 mg Cap Take 1 capsule by mouth 3 (three) times daily with meals. TAKE TABLET AS PRN.    promethazine (PHENERGAN) 25 MG tablet promethazine 25 mg tablet    sodium chloride 0.9% 0.9 % SolP 100 mL with treprostinil 1 mg/mL Soln 9,000,000 ng Inject 5,005.5 ng/min into the vein continuous.     Antibiotics (From admission, onward)    Start     Stop Route Frequency Ordered    03/27/19 0800  ceFEPIme injection 2 g      -- IV Every 12 hours (non-standard times) 03/27/19 0756    03/26/19 1530  vancomycin 750 mg in dextrose 5 % 250 mL IVPB (ready to mix system)      -- IV Every 24 hours (non-standard times) 03/26/19 1421    03/25/19 2100  mupirocin 2 % ointment 1 g      03/30 2059 Nasl 2 times daily 03/25/19 1406        Antifungals (From admission, onward)    None        Antivirals (From admission, onward)    None             There is no immunization history on file for this patient.    Family History     Problem Relation (Age of Onset)    Arthritis Mother, Father    Diabetes Father    Heart disease Father    Hypertension Father        Social History     Socioeconomic History    Marital status:      Spouse name: None    Number of children: None    Years of  education: None    Highest education level: None   Occupational History    None   Social Needs    Financial resource strain: None    Food insecurity:     Worry: None     Inability: None    Transportation needs:     Medical: None     Non-medical: None   Tobacco Use    Smoking status: Never Smoker    Smokeless tobacco: Never Used   Substance and Sexual Activity    Alcohol use: No     Comment: rarely    Drug use: No    Sexual activity: None   Lifestyle    Physical activity:     Days per week: None     Minutes per session: None    Stress: None   Relationships    Social connections:     Talks on phone: None     Gets together: None     Attends Orthodox service: None     Active member of club or organization: None     Attends meetings of clubs or organizations: None     Relationship status: None    Intimate partner violence:     Fear of current or ex partner: None     Emotionally abused: None     Physically abused: None     Forced sexual activity: None   Other Topics Concern    None   Social History Narrative    None     Review of Systems   Constitutional: Negative for activity change, chills, fatigue and fever.   Respiratory: Negative for cough and shortness of breath.    Gastrointestinal: Negative for abdominal pain, diarrhea, nausea and vomiting.   Genitourinary: Negative for difficulty urinating, dysuria and flank pain.   Musculoskeletal: Negative for arthralgias, back pain and joint swelling.   Skin: Positive for wound.     Objective:     Vital Signs (Most Recent):  Temp: 98.5 °F (36.9 °C) (03/27/19 0700)  Pulse: 97 (03/27/19 1000)  Resp: 20 (03/27/19 1000)  BP: (!) 105/55 (03/27/19 0900)  SpO2: (!) 88 % (03/27/19 1000) Vital Signs (24h Range):  Temp:  [98.2 °F (36.8 °C)-103 °F (39.4 °C)] 98.5 °F (36.9 °C)  Pulse:  [] 97  Resp:  [16-39] 20  SpO2:  [76 %-95 %] 88 %  BP: ()/(52-59) 105/55  Arterial Line BP: ()/(38-63) 106/48     Weight: 57.2 kg (126 lb)  Body mass index is 22.32  kg/m².    Estimated Creatinine Clearance: 42.1 mL/min (based on SCr of 1 mg/dL).    Physical Exam   Constitutional: She is oriented to person, place, and time. She appears well-developed and well-nourished.   HENT:   Head: Normocephalic and atraumatic.   Eyes: EOM are normal.   Neck: Neck supple. No JVD present.   Cardiovascular: Normal rate and regular rhythm.   Pulmonary/Chest: Effort normal and breath sounds normal. No respiratory distress.   Abdominal: Soft. Bowel sounds are normal. She exhibits no distension.   Musculoskeletal: She exhibits no edema.   Neurological: She is alert and oriented to person, place, and time.   Skin: Skin is warm and dry. Capillary refill takes less than 2 seconds. No erythema.   Psychiatric: She has a normal mood and affect. Her behavior is normal. Thought content normal.       Significant Labs:   BMP:   Recent Labs   Lab 03/27/19  0210   *      K 3.5      CO2 19*   BUN 17   CREATININE 1.0   CALCIUM 7.8*   MG 2.1     CBC:   Recent Labs   Lab 03/26/19  0258 03/26/19  1229 03/27/19  0210   WBC 9.15 8.61 10.35   HGB 11.9* 12.2 11.6*   HCT 36.5* 36.4* 35.9*    186 216     CMP:   Recent Labs   Lab 03/26/19  0258 03/26/19  1404 03/27/19  0210   * 133* 136   K 3.9 3.5 3.5    104 107   CO2 19* 21* 19*   * 183* 138*   BUN 19 20 17   CREATININE 1.3 1.3 1.0   CALCIUM 7.8* 7.9* 7.8*   PROT 5.8* 5.8* 5.9*   ALBUMIN 3.0* 2.8* 2.8*   BILITOT 1.1* 1.4* 1.1*   ALKPHOS 36* 37* 43*   AST 17 18 22   ALT 9* 7* 6*   ANIONGAP 11 8 10   EGFRNONAA 41.1* 41.1* 56.4*     Microbiology Results (last 7 days)     Procedure Component Value Units Date/Time    Blood culture [[id number]] Collected:  03/26/19 1420    Order Status:  Completed Specimen:  Blood from Peripheral, Antecubital, Right Updated:  03/26/19 2141     Blood Culture, Routine No Growth to date    Blood culture [[id number]] Collected:  03/26/19 1440    Order Status:  Completed Specimen:  Blood from Peripheral,  Antecubital, Left Updated:  03/26/19 2145     Blood Culture, Routine No Growth to date    Respiratory Viral Panel by PCR [facility]; Nasal Swab [[id number]] Collected:  03/26/19 1846    Order Status:  Sent Specimen:  Respiratory Updated:  03/26/19 1856        Wound Culture: No results for input(s): LABAERO in the last 4320 hours.  All pertinent labs within the past 24 hours have been reviewed.  Recent Lab Results       03/27/19  0210   03/26/19  1451   03/26/19  1440   03/26/19  1420   03/26/19  1404        Immature Grans (Abs) 0.04  Comment:  Mild elevation in immature granulocytes is non specific and   can be seen in a variety of conditions including stress response,   acute inflammation, trauma and pregnancy. Correlation with other   laboratory and clinical findings is essential.               Albumin 2.8       2.8     Alkaline Phosphatase 43       37     ALT 6       7     Anion Gap 10       8     Appearance, UA   Hazy           AST 22       18     Bacteria, UA   None           Baso # 0.06             Basophil% 0.6             Bilirubin (UA)   Negative           Total Bilirubin 1.1  Comment:  For infants and newborns, interpretation of results should be based  on gestational age, weight and in agreement with clinical  observations.  Premature Infant recommended reference ranges:  Up to 24 hours.............<8.0 mg/dL  Up to 48 hours............<12.0 mg/dL  3-5 days..................<15.0 mg/dL  6-29 days.................<15.0 mg/dL         1.4  Comment:  For infants and newborns, interpretation of results should be based  on gestational age, weight and in agreement with clinical  observations.  Premature Infant recommended reference ranges:  Up to 24 hours.............<8.0 mg/dL  Up to 48 hours............<12.0 mg/dL  3-5 days..................<15.0 mg/dL  6-29 days.................<15.0 mg/dL       Blood Culture, Routine     No Growth to date[P] No Growth to date[P]       BUN, Bld 17       20     Calcium 7.8        7.9     Chloride 107       104     CO2 19       21     Color, UA   Yellow           Creatinine 1.0       1.3     Differential Method Automated             eGFR if  >60.0       47.4     eGFR if non  56.4  Comment:  Calculation used to obtain the estimated glomerular filtration  rate (eGFR) is the CKD-EPI equation.          41.1  Comment:  Calculation used to obtain the estimated glomerular filtration  rate (eGFR) is the CKD-EPI equation.        Eos # 0.0             Eosinophil% 0.1             Glucose 138       183     Glucose, UA   Negative           Gran # (ANC) 8.9             Gran% 85.5             Hematocrit 35.9             Hemoglobin 11.6             Hyaline Casts, UA   0           Immature Granulocytes 0.4             Coumadin Monitoring INR 1.1  Comment:  Coumadin Therapy:  2.0 - 3.0 for INR for all indicators except mechanical heart valves  and antiphospholipid syndromes which should use 2.5 - 3.5.               Ketones, UA   Trace           Leukocytes, UA   Negative           Lymph # 0.5             Lymph% 4.8             Magnesium 2.1             MCH 30.4             MCHC 32.3             MCV 94             Microscopic Comment   SEE COMMENT  Comment:  Other formed elements not mentioned in the report are not   present in the microscopic examination.              Mono # 0.9             Mono% 8.6             MPV 9.4             Nitrite, UA   Negative           nRBC 0             Occult Blood UA   1+           pH, UA   5.0           Phosphorus 2.0             Platelets 216             Potassium 3.5       3.5     Total Protein 5.9       5.8     Protein, UA   1+  Comment:  Recommend a 24 hour urine protein or a urine   protein/creatinine ratio if globulin induced proteinuria is  clinically suspected.             Protime 11.5             RBC 3.82             RBC, UA   8           RDW 13.9             Sodium 136       133     Specific [place], UA   1.025           Specimen UA    Urine, Clean Catch           WBC, UA   2           WBC 10.35                              03/26/19  1229        Immature Grans (Abs) 0.03  Comment:  Mild elevation in immature granulocytes is non specific and   can be seen in a variety of conditions including stress response,   acute inflammation, trauma and pregnancy. Correlation with other   laboratory and clinical findings is essential.       Albumin       Alkaline Phosphatase       ALT       Anion Gap       Appearance, UA       AST       Bacteria, UA       Baso # 0.05     Basophil% 0.6     Bilirubin (UA)       Total Bilirubin       Blood Culture, Routine       BUN, Bld       Calcium       Chloride       CO2       Color, UA       Creatinine       Differential Method Automated     eGFR if        eGFR if non        Eos # 0.0     Eosinophil% 0.0     Glucose       Glucose, UA       Gran # (ANC) 7.3     Gran% 85.2     Hematocrit 36.4     Hemoglobin 12.2     Hyaline Casts, UA       Immature Granulocytes 0.3     Coumadin Monitoring INR       Ketones, UA       Leukocytes, UA       Lymph # 0.5     Lymph% 5.5     Magnesium       MCH 31.1     MCHC 33.5     MCV 93     Microscopic Comment       Mono # 0.7     Mono% 8.4     MPV 9.2     Nitrite, UA       nRBC 0     Occult Blood UA       pH, UA       Phosphorus       Platelets 186     Potassium       Total Protein       Protein, UA       Protime       RBC 3.92     RBC, UA       RDW 13.8     Sodium       Specific Gravity, UA       Specimen UA       WBC, UA       WBC 8.61           Significant Imaging: I have reviewed all pertinent imaging results/findings within the past 24 hours.

## 2019-03-27 NOTE — SUBJECTIVE & OBJECTIVE
Past Medical History:   Diagnosis Date    Allergy     Anticoagulant long-term use     Arthritis     Heart murmur     Pneumonia     Pulmonary hypertension     Tachycardia        Past Surgical History:   Procedure Laterality Date    ABLATION N/A 6/17/2013    Performed by Jose J Hinojosa MD at [facility] C[facility]    BACK SURGERY      HEART CATH-RIGHT Right 7/10/2017    Performed by Alice Tim MD at [facility] C[facility]    INSERTION-CATHETER-ROBERTSON Left 6/24/2014    Performed by González Hernandez MD at [facility] OR 2ND FLR    REPLACEMENT, CATHETER, DIALYSIS, OVER GUIDEWIRE, USING EXISTING VENOUS ACCESS N/A 1/17/2019    Performed by Phoenix Hand MD at [facility] CATH LAB       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    Multaq [dronedarone]        Medications:  Medications Prior to Admission   Medication Sig    amiodarone (PACERONE) 200 MG Tab Take 1 tablet (200 mg total) by mouth once daily.    bosentan (TRACLEER) 125 MG Tab Take by mouth 2 (two) times daily.    CALCIUM CARBONATE (CALCIUM 600 ORAL) Take 2 tablets by mouth once daily.      ferrous gluconate (FERGON) 324 MG tablet Take 1 tablet (324 mg total) by mouth daily with breakfast.    folic acid (FOLVITE) 1 MG tablet Take 1 mg by mouth once daily.    furosemide (LASIX) 20 MG tablet Take 1 tablet (20 mg total) by mouth once daily. (Patient taking differently: Take 20 mg by mouth after lunch. )    loratadine (CLARITIN) 10 mg tablet Take 10 mg by mouth once daily.    ondansetron (ZOFRAN-ODT) 4 MG TbDL     riociguat (ADEMPAS) 0.5 mg Tab tablet Take 1 tablet (0.5 mg total) by mouth 3 (three) times daily.    spironolactone (ALDACTONE) 25 MG tablet Take 25 mg by mouth once daily.      traMADol (ULTRAM-ER) 200 MG Tb24 TAKE ONE TABLET BY MOUTH ONCE A DAY AS NEEDED FOR PAIN THANK YOU!    TREPROSTINIL SODIUM (TREPROSTINIL, REMODULIN, PUMP FOR HOME USE) Pt currently on 89.2 ng/kg/min=45 mL/day dosing  weight 70.05 kg    warfarin (COUMADIN) 5 MG tablet Take 5 mg by mouth every Mon, Wed, Fri.    chlordiazepoxide-clidinium 5-2.5 mg (LIBRAX) 5-2.5 mg Cap Take 1 capsule by mouth 3 (three) times daily with meals. TAKE TABLET AS PRN.    promethazine (PHENERGAN) 25 MG tablet promethazine 25 mg tablet    sodium chloride 0.9% 0.9 % SolP 100 mL with treprostinil 1 mg/mL Soln 9,000,000 ng Inject 5,005.5 ng/min into the vein continuous.     Antibiotics (From admission, onward)    Start     Stop Route Frequency Ordered    03/27/19 0800  ceFEPIme injection 2 g      -- IV Every 12 hours (non-standard times) 03/27/19 0756    03/26/19 1530  vancomycin 750 mg in dextrose 5 % 250 mL IVPB (ready to mix system)      -- IV Every 24 hours (non-standard times) 03/26/19 1421    03/25/19 2100  mupirocin 2 % ointment 1 g      03/30 2059 Nasl 2 times daily 03/25/19 1406        Antifungals (From admission, onward)    None        Antivirals (From admission, onward)    None             There is no immunization history on file for this patient.    Family History     Problem Relation (Age of Onset)    Arthritis Mother, Father    Diabetes Father    Heart disease Father    Hypertension Father        Social History     Socioeconomic History    Marital status:      Spouse name: None    Number of children: None    Years of education: None    Highest education level: None   Occupational History    None   Social Needs    Financial resource strain: None    Food insecurity:     Worry: None     Inability: None    Transportation needs:     Medical: None     Non-medical: None   Tobacco Use    Smoking status: Never Smoker    Smokeless tobacco: Never Used   Substance and Sexual Activity    Alcohol use: No     Comment: rarely    Drug use: No    Sexual activity: None   Lifestyle    Physical activity:     Days per week: None     Minutes per session: None    Stress: None   Relationships    Social connections:     Talks on phone: None      Gets together: None     Attends Sabianism service: None     Active member of club or organization: None     Attends meetings of clubs or organizations: None     Relationship status: None    Intimate partner violence:     Fear of current or ex partner: None     Emotionally abused: None     Physically abused: None     Forced sexual activity: None   Other Topics Concern    None   Social History Narrative    None     Review of Systems   Constitutional: Negative for activity change, chills, fatigue and fever.   Respiratory: Negative for cough and shortness of breath.    Gastrointestinal: Negative for abdominal pain, diarrhea, nausea and vomiting.   Genitourinary: Negative for difficulty urinating, dysuria and flank pain.   Musculoskeletal: Negative for arthralgias, back pain and joint swelling.   Skin: Positive for wound.     Objective:     Vital Signs (Most Recent):  Temp: 98.5 °F (36.9 °C) (03/27/19 0700)  Pulse: 97 (03/27/19 1000)  Resp: 20 (03/27/19 1000)  BP: (!) 105/55 (03/27/19 0900)  SpO2: (!) 88 % (03/27/19 1000) Vital Signs (24h Range):  Temp:  [98.2 °F (36.8 °C)-103 °F (39.4 °C)] 98.5 °F (36.9 °C)  Pulse:  [] 97  Resp:  [16-39] 20  SpO2:  [76 %-95 %] 88 %  BP: ()/(52-59) 105/55  Arterial Line BP: ()/(38-63) 106/48     Weight: 57.2 kg (126 lb)  Body mass index is 22.32 kg/m².    Estimated Creatinine Clearance: 42.1 mL/min (based on SCr of 1 mg/dL).    Physical Exam   Constitutional: She is oriented to person, place, and time. She appears well-developed and well-nourished.   HENT:   Head: Normocephalic and atraumatic.   Eyes: EOM are normal.   Neck: Neck supple. No JVD present.   Cardiovascular: Normal rate and regular rhythm.   Pulmonary/Chest: Effort normal and breath sounds normal. No respiratory distress.   Abdominal: Soft. Bowel sounds are normal. She exhibits no distension.   Musculoskeletal: She exhibits no edema.   Neurological: She is alert and oriented to person, place, and time.    Skin: Skin is warm and dry. Capillary refill takes less than 2 seconds. No erythema.   Psychiatric: She has a normal mood and affect. Her behavior is normal. Thought content normal.       Significant Labs:   BMP:   Recent Labs   Lab 03/27/19  0210   *      K 3.5      CO2 19*   BUN 17   CREATININE 1.0   CALCIUM 7.8*   MG 2.1     CBC:   Recent Labs   Lab 03/26/19  0258 03/26/19  1229 03/27/19  0210   WBC 9.15 8.61 10.35   HGB 11.9* 12.2 11.6*   HCT 36.5* 36.4* 35.9*    186 216     CMP:   Recent Labs   Lab 03/26/19  0258 03/26/19  1404 03/27/19  0210   * 133* 136   K 3.9 3.5 3.5    104 107   CO2 19* 21* 19*   * 183* 138*   BUN 19 20 17   CREATININE 1.3 1.3 1.0   CALCIUM 7.8* 7.9* 7.8*   PROT 5.8* 5.8* 5.9*   ALBUMIN 3.0* 2.8* 2.8*   BILITOT 1.1* 1.4* 1.1*   ALKPHOS 36* 37* 43*   AST 17 18 22   ALT 9* 7* 6*   ANIONGAP 11 8 10   EGFRNONAA 41.1* 41.1* 56.4*     Microbiology Results (last 7 days)     Procedure Component Value Units Date/Time    Blood culture [[id number]] Collected:  03/26/19 1420    Order Status:  Completed Specimen:  Blood from Peripheral, Antecubital, Right Updated:  03/26/19 2145     Blood Culture, Routine No Growth to date    Blood culture [[id number]] Collected:  03/26/19 1440    Order Status:  Completed Specimen:  Blood from Peripheral, Antecubital, Left Updated:  03/26/19 2145     Blood Culture, Routine No Growth to date    Respiratory Viral Panel by PCR [facility]; Nasal Swab [[id number]] Collected:  03/26/19 1846    Order Status:  Sent Specimen:  Respiratory Updated:  03/26/19 1856        Wound Culture: No results for input(s): LABAERO in the last 4320 hours.  All pertinent labs within the past 24 hours have been reviewed.  Recent Lab Results       03/27/19  0210   03/26/19  1451   03/26/19  1440   03/26/19  1420   03/26/19  1404        Immature Grans (Abs) 0.04  Comment:  Mild elevation in immature granulocytes is non specific and   can be seen in a  variety of conditions including stress response,   acute inflammation, trauma and pregnancy. Correlation with other   laboratory and clinical findings is essential.               Albumin 2.8       2.8     Alkaline Phosphatase 43       37     ALT 6       7     Anion Gap 10       8     Appearance, UA   Hazy           AST 22       18     Bacteria, UA   None           Baso # 0.06             Basophil% 0.6             Bilirubin (UA)   Negative           Total Bilirubin 1.1  Comment:  For infants and newborns, interpretation of results should be based  on gestational age, weight and in agreement with clinical  observations.  Premature Infant recommended reference ranges:  Up to 24 hours.............<8.0 mg/dL  Up to 48 hours............<12.0 mg/dL  3-5 days..................<15.0 mg/dL  6-29 days.................<15.0 mg/dL         1.4  Comment:  For infants and newborns, interpretation of results should be based  on gestational age, weight and in agreement with clinical  observations.  Premature Infant recommended reference ranges:  Up to 24 hours.............<8.0 mg/dL  Up to 48 hours............<12.0 mg/dL  3-5 days..................<15.0 mg/dL  6-29 days.................<15.0 mg/dL       Blood Culture, Routine     No Growth to date[P] No Growth to date[P]       BUN, Bld 17       20     Calcium 7.8       7.9     Chloride 107       104     CO2 19       21     Color, UA   Yellow           Creatinine 1.0       1.3     Differential Method Automated             eGFR if  >60.0       47.4     eGFR if non  56.4  Comment:  Calculation used to obtain the estimated glomerular filtration  rate (eGFR) is the CKD-EPI equation.          41.1  Comment:  Calculation used to obtain the estimated glomerular filtration  rate (eGFR) is the CKD-EPI equation.        Eos # 0.0             Eosinophil% 0.1             Glucose 138       183     Glucose, UA   Negative           Gran # (ANC) 8.9             Gran%  85.5             Hematocrit 35.9             Hemoglobin 11.6             Hyaline Casts, UA   0           Immature Granulocytes 0.4             Coumadin Monitoring INR 1.1  Comment:  Coumadin Therapy:  2.0 - 3.0 for INR for all indicators except mechanical heart valves  and antiphospholipid syndromes which should use 2.5 - 3.5.               Ketones, UA   Trace           Leukocytes, UA   Negative           Lymph # 0.5             Lymph% 4.8             Magnesium 2.1             MCH 30.4             MCHC 32.3             MCV 94             Microscopic Comment   SEE COMMENT  Comment:  Other formed elements not mentioned in the report are not   present in the microscopic examination.              Mono # 0.9             Mono% 8.6             MPV 9.4             Nitrite, UA   Negative           nRBC 0             Occult Blood UA   1+           pH, UA   5.0           Phosphorus 2.0             Platelets 216             Potassium 3.5       3.5     Total Protein 5.9       5.8     Protein, UA   1+  Comment:  Recommend a 24 hour urine protein or a urine   protein/creatinine ratio if globulin induced proteinuria is  clinically suspected.             Protime 11.5             RBC 3.82             RBC, UA   8           RDW 13.9             Sodium 136       133     Specific [place], UA   1.025           Specimen UA   Urine, Clean Catch           WBC, UA   2           WBC 10.35                              03/26/19  1229        Immature Grans (Abs) 0.03  Comment:  Mild elevation in immature granulocytes is non specific and   can be seen in a variety of conditions including stress response,   acute inflammation, trauma and pregnancy. Correlation with other   laboratory and clinical findings is essential.       Albumin       Alkaline Phosphatase       ALT       Anion Gap       Appearance, UA       AST       Bacteria, UA       Baso # 0.05     Basophil% 0.6     Bilirubin (UA)       Total Bilirubin       Blood Culture, Routine       BUN,  Bld       Calcium       Chloride       CO2       Color, UA       Creatinine       Differential Method Automated     eGFR if        eGFR if non        Eos # 0.0     Eosinophil% 0.0     Glucose       Glucose, UA       Gran # (ANC) 7.3     Gran% 85.2     Hematocrit 36.4     Hemoglobin 12.2     Hyaline Casts, UA       Immature Granulocytes 0.3     Coumadin Monitoring INR       Ketones, UA       Leukocytes, UA       Lymph # 0.5     Lymph% 5.5     Magnesium       MCH 31.1     MCHC 33.5     MCV 93     Microscopic Comment       Mono # 0.7     Mono% 8.4     MPV 9.2     Nitrite, UA       nRBC 0     Occult Blood UA       pH, UA       Phosphorus       Platelets 186     Potassium       Total Protein       Protein, UA       Protime       RBC 3.92     RBC, UA       RDW 13.8     Sodium       Specific Gravity, UA       Specimen UA       WBC, UA       WBC 8.61           Significant Imaging: I have reviewed all pertinent imaging results/findings within the past 24 hours.

## 2019-03-27 NOTE — PT/OT/SLP PROGRESS
Physical Therapy Treatment    Patient Name:  Molly Smith   MRN:  [id number]    Recommendations:     Discharge Recommendations:  nursing facility, skilled   Discharge Equipment Recommendations: walker, rolling(TBD)   Barriers to discharge: Decreased caregiver support    Assessment:     Molly Smith is a 73 y.o. female admitted with a medical diagnosis of Closed fracture of neck of left femur.  She presents with the following impairments/functional limitations:  weakness, impaired endurance, impaired functional mobilty, gait instability, impaired balance, impaired self care skills, impaired cardiopulmonary response to activity, pain, decreased ROM, orthopedic precautions. Pt tolerated activity with improved mobility reflected by pt tolerance to standing with support using RW. Pt initiated gait training with 2 steps forward and backward. Pt unable to clear feet from floor, decreased tolerance 2/2 fatigue in UE. Pt demo'd significant increase in alertness and participation, as well as vitals. Pt BP much more stable, SpO2 87% throughout session. Upon discharge, pt would benefit from continued skilled therapy intervention at skilled nursing facility to progress toward more independent mobility. At this time, pt is progressing well toward goals and would continue to benefit from acute skilled therapy intervention to address deficits and progress toward prior level of function.       Rehab Prognosis: Good; patient would benefit from acute skilled PT services to address these deficits and reach maximum level of function.    Recent Surgery: Procedure(s) (LRB):  HEMIARTHROPLASTY, HIP - left (Left) 2 Days Post-Op    Plan:     During this hospitalization, patient to be seen 5 x/week to address the identified rehab impairments via gait training, therapeutic activities, therapeutic exercises, neuromuscular re-education and progress toward the following goals:    · Plan of Care Expires:  04/26/19    Subjective     Chief  Complaint: Pt c/o persistent nausea throughout session, believes it was from eating breakfast   Patient/Family Comments/goals: to get better and return home   Pain/Comfort:  · Pain Rating 1: 4/10(in standing )  · Location - Side 1: Left  · Location - Orientation 1: generalized  · Location 1: hip  · Pain Addressed 1: Pre-medicate for activity, Reposition, Distraction  · Pain Rating Post-Intervention 1: 0/10(at rest )      Objective:     Communicated with RN prior to session.  Patient found HOB elevated with arterial line, blood pressure cuff, pulse ox (continuous), telemetry, sargent catheter, PCA, peripheral IV, oxygen, central line(remodulin pump ) upon PT entry to room.     General Precautions: Standard, airborne   Orthopedic Precautions:LLE weight bearing as tolerated, LLE posterior precautions   Braces: Knee immobilizer     Functional Mobility:  · Bed Mobility:     · Supine to Sit: maximal assistance  · Sit to Supine: maximal assistance  · Transfers:     · Sit to Stand:  moderate assistance with rolling walker  · Gait: Pt ambulated 2 feet forward and backward with RW and minimum assistance. Pt unable to clear B feet from floor, demo'd sliding feet forward and backward. Pt required moderate assistance for walker management, max cuing for motor sequencing, lateral weight shift, and step initiation. Pt able to partially weight bear through L LE. Pt fatigued quickly, reporting fatigue and pain in R UE with significant UE support using RW. Pt returned to sitting with moderate assistance to maintain WBAT and posterior hip precautions.       AM-PAC 6 CLICK MOBILITY  Turning over in bed (including adjusting bedclothes, sheets and blankets)?: 2  Sitting down on and standing up from a chair with arms (e.g., wheelchair, bedside commode, etc.): 2  Moving from lying on back to sitting on the side of the bed?: 2  Moving to and from a bed to a chair (including a wheelchair)?: 2  Need to walk in hospital room?: 1  Climbing 3-5  steps with a railing?: 1  Basic Mobility Total Score: 10       Therapeutic Activities and Exercises:   Pt educated on role of PT/POC. Pt verbalized understanding.   While sitting EOB, pt performed 10x B LAQ, with active assist through L LE.   Following return to supine, pt and family educated on exercises to perform with family assistance. PT demonstrated supine (hob slightly elevated) glut bridges, B knee/hip flexion extension with active assistance, L LE restricted within precaution range, B hip abduction with active assistance, and ankle DF/PF.  videoed exercises, pt performed 10x each exercise bilaterally.     Patient left HOB elevated with all lines intact, call button in reach, RN  notified and family  present..    GOALS:   Multidisciplinary Problems     Physical Therapy Goals        Problem: Physical Therapy Goal    Goal Priority Disciplines Outcome Goal Variances Interventions   Physical Therapy Goal     PT, PT/OT Ongoing (interventions implemented as appropriate)     Description:  Goals to be met by: 2019     Patient will increase functional independence with mobility by performin. Supine to sit with Moderate Assistance  2. Rolling to Left and Right with Minimal Assistance.  3. Sit to stand transfer with Moderate Assistance- met 3/27/2019  Sit to stand transfer with contact guard assistance   4. Sitting at edge of bed x10 minutes with Stand-by Assistance: met 3/27/2019   Sitting at edge of bed x15 mins with supervision                        Time Tracking:     PT Received On: 19  PT Start Time: 959     PT Stop Time:   PT Total Time (min): 48 min     Billable Minutes: Gait Training 30 mins  and Therapeutic Exercise 18 mins     Treatment Type: Treatment  PT/PTA: PT           Sydnie Kahn, PT  2019

## 2019-03-27 NOTE — ASSESSMENT & PLAN NOTE
- Euvolemic on exam. Continue PO Lasix.  - Continue riociguat, bosentan, remodulin([facility]).  -  to manage remodulin infusion.  - Ok to restart warfarin

## 2019-03-27 NOTE — PT/OT/SLP PROGRESS
Occupational Therapy   Treatment    Name: Molly Smith  MRN: [id number]  Admitting Diagnosis:  Closed fracture of neck of left femur  2 Days Post-Op    Recommendations:     Discharge Recommendations: nursing facility, skilled    Assessment:     Molly Smith is a 73 y.o. female with a medical diagnosis of Closed fracture of neck of left femur.  . Performance deficits affecting function are weakness, impaired functional mobilty, gait instability, impaired self care skills, impaired endurance, impaired balance, pain. Pt tolerated session well with good effort and performance. Pt with improved tolerance for activity.     Rehab Prognosis:  Good; patient would benefit from acute skilled OT services to address these deficits and reach maximum level of function.       Plan:     Patient to be seen 4 x/week to address the above listed problems via self-care/home management, therapeutic activities, therapeutic exercises  · Plan of Care Expires:    · Plan of Care Reviewed with: patient, spouse, daughter    Subjective     Pain/Comfort:  · Pain Rating 1: 0/10  · Location - Side 1: Left  · Pain Addressed 1: Pre-medicate for activity, Distraction, Reposition  · Pain Rating Post-Intervention 1: 4/10    Objective:     Communicated with: esme prior to session.      General Precautions: Standard, fall   Orthopedic Precautions:LLE weight bearing as tolerated, LLE posterior precautions   Braces: Knee immobilizer     Occupational Performance:     Bed Mobility:    Supine<>sit with MAX A     Functional Mobility/Transfers:  · Sit>stand with MOD A with RW with cues for hand placement and LE placement with transition back to sitting     Activities of Daily Living:  · Feeding: independent  · G/H: set-up seated  · UE dressing: MAX A   · LE dressing: TOTAL A       AMPAC 6 Click ADL: 11    Treatment & Education:  Pt tolerated sitting EOB approx 10 min with Fair+ sitting balance. Standing trial completed and able to take few steps with RW with  cues for walker use.  Education provided and pt completed UE AROM exs 10 reps x  to increase endurance/strength/ROM.   Education also provided re: safety with functional mobility/ADL skills, UE HEP for 10 reps 3 x day, functional progress and OT POC.     Patient left supine in bed with PT present, fly present and nsg notified.   GOALS:   Multidisciplinary Problems     Occupational Therapy Goals        Problem: Occupational Therapy Goal    Goal Priority Disciplines Outcome Interventions   Occupational Therapy Goal     OT, PT/OT     Description:  Goals to be met by: 7 days 4/2/19     Patient will increase functional independence with ADLs by performing:    Pt to complete g/h skills seated with set-up  Pt to complete UE dressing with MIN A   Pt to demo Fair sitting balance to allow for progression with ADL training.  Pt to t/f to chair with MAX A                       Time Tracking:     OT Date of Treatment: 03/27/19  OT Start Time: 1000  OT Stop Time: 1025  OT Total Time (min): 25 min    Billable Minutes:Self Care/Home Management 10  Therapeutic Exercise 15    JOHANNE Salcedo  3/27/2019

## 2019-03-27 NOTE — CONSULTS
[facility]  Infectious Disease  Consult Note    Patient Name: Molly Smith  MRN: [id number]  Admission Date: 3/23/2019  Hospital Length of Stay: 4 days  Attending Physician: Alice Tim MD  Primary Care Provider: Roberto Braun MD     Isolation Status: No active isolations      Inpatient consult to Infectious Diseases  Consult performed by: Terry Junior PA-C  Consult ordered by: Jayne Larsen PA-C      ID consult received. Chart being reviewed. Full consult note with recommendations to follow.      Thank you,  Terry Junior PA-C  [id number]

## 2019-03-27 NOTE — PROGRESS NOTES
[facility]  Heart Transplant  Progress Note    Patient Name: Molly Smith  MRN: [id number]  Admission Date: 3/23/2019  Hospital Length of Stay: 4 days  Attending Physician: Alice Tim MD  Primary Care Provider: Roberto Braun MD  Principal Problem:Closed fracture of neck of left femur    Subjective:     Interval History: Sleepy this am. Sister at bedside.     LINES:  - Tunneled maguire in R subclavian for remodulin  - R Radial A line  - L IJ      Continuous Infusions:   epinephrine infusion 0.07 mcg/kg/min (03/27/19 0600)    ropivacaine (PF) 2 mg/ml (0.2%) 2 mL/hr (03/27/19 0637)    treprostinil (REMODULIN) infusion      vasopressin (PITRESSIN) infusion      veletri/remodulin cassette      veletri/remodulin tubing       Scheduled Meds:   acetaminophen  1,000 mg Oral Q6H    amiodarone  200 mg Oral Daily    bosentan  125 mg Oral Q12H    calcium carbonate  1,000 mg Oral Daily    ceFEPime (MAXIPIME) IVPB  2 g Intravenous Q12H    cetirizine  5 mg Oral Daily    enoxaparin  40 mg Subcutaneous Daily    ferrous gluconate  324 mg Oral Daily with breakfast    fluticasone  2 spray Each Nare QHS    folic acid  1 mg Oral Daily    furosemide  20 mg Oral After lunch    mupirocin  1 g Nasal BID    pregabalin  75 mg Oral QHS    riociguat  0.5 mg Oral TID    sodium chloride 0.9%  3 mL Intravenous Q8H    vancomycin (VANCOCIN) IVPB  750 mg Intravenous Q24H    warfarin  7.5 mg Oral Daily     PRN Meds:morphine, morphine, ondansetron, oxyCODONE, oxyCODONE, promethazine (PHENERGAN) IVPB, sodium chloride 0.9%, treprostinil (REMODULIN) infusion    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    Multaq [dronedarone]      Objective:     Vital Signs (Most Recent):  Temp: 98.3 °F (36.8 °C) (03/27/19 0600)  Pulse: 93 (03/27/19 0630)  Resp: (!) 23 (03/27/19 0630)  BP: (!) 113/56 (03/27/19 0400)  SpO2: (!) 87 % (03/27/19 0630) Vital Signs (24h  Range):  Temp:  [98.2 °F (36.8 °C)-103 °F (39.4 °C)] 98.3 °F (36.8 °C)  Pulse:  [] 93  Resp:  [17-39] 23  SpO2:  [74 %-95 %] 87 %  BP: ()/(52-60) 113/56  Arterial Line BP: ()/(38-70) 107/42     No data found.  Body mass index is 22.32 kg/m².      Intake/Output Summary (Last 24 hours) at 3/27/2019 0843  Last data filed at 3/27/2019 0600  Gross per 24 hour   Intake 1370.39 ml   Output 2320 ml   Net -949.61 ml     CVP:  [6 mmHg] 6 mmHg    Physical Exam   Constitutional: She is oriented to person, place, and time. She appears well-developed and well-nourished. She is sleeping.   HENT:   Head: Normocephalic and atraumatic.   Eyes: EOM are normal.   Neck: Neck supple. No JVD present.   Cardiovascular: Normal rate and regular rhythm.   Pulmonary/Chest: Effort normal and breath sounds normal. No respiratory distress.   Abdominal: Soft. Bowel sounds are normal. She exhibits no distension.   Musculoskeletal: She exhibits no edema.   Neurological: She is oriented to person, place, and time.   Skin: Skin is warm and dry. Capillary refill takes less than 2 seconds. No erythema.   Psychiatric: She has a normal mood and affect. Her behavior is normal. Thought content normal.     Significant Labs:  CBC:  Recent Labs   Lab 03/26/19  0258 03/26/19  1229 03/27/19  0210   WBC 9.15 8.61 10.35   RBC 3.95* 3.92* 3.82*   HGB 11.9* 12.2 11.6*   HCT 36.5* 36.4* 35.9*    186 216   MCV 92 93 94   MCH 30.1 31.1* 30.4   MCHC 32.6 33.5 32.3     BNP:  No results for input(s): BNP in the last 168 hours.    Invalid input(s): BNPTRIAGELBLO  CMP:  Recent Labs   Lab 03/26/19  0258 03/26/19  1404 03/27/19  0210   * 183* 138*   CALCIUM 7.8* 7.9* 7.8*   ALBUMIN 3.0* 2.8* 2.8*   PROT 5.8* 5.8* 5.9*   * 133* 136   K 3.9 3.5 3.5   CO2 19* 21* 19*    104 107   BUN 19 20 17   CREATININE 1.3 1.3 1.0   ALKPHOS 36* 37* 43*   ALT 9* 7* 6*   AST 17 18 22   BILITOT 1.1* 1.4* 1.1*      Coagulation:   Recent Labs   Lab  03/25/19  1437 03/26/19  0258 03/27/19  0210   INR 1.2 1.1 1.1     LDH:  No results for input(s): LDH in the last 72 hours.  Microbiology:  Microbiology Results (last 7 days)     Procedure Component Value Units Date/Time    Blood culture [[id number]] Collected:  03/26/19 1420    Order Status:  Completed Specimen:  Blood from Peripheral, Antecubital, Right Updated:  03/26/19 2145     Blood Culture, Routine No Growth to date    Blood culture [[id number]] Collected:  03/26/19 1440    Order Status:  Completed Specimen:  Blood from Peripheral, Antecubital, Left Updated:  03/26/19 2145     Blood Culture, Routine No Growth to date    Respiratory Viral Panel by PCR [facility]; Nasal Swab [[id number]] Collected:  03/26/19 1846    Order Status:  Sent Specimen:  Respiratory Updated:  03/26/19 1856        I have reviewed all pertinent labs within the past 24 hours.    Estimated Creatinine Clearance: 42.1 mL/min (based on SCr of 1 mg/dL).    Diagnostic Results:  I have reviewed all pertinent imaging results/findings within the past 24 hours.    Assessment and Plan:     73 y.o. 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 [place] pain and inability to bear weight on the left leg after a trip and fall yesterday.  Patient tripped on a cord for her pulm HTN pump falling directly onto the left hip. Patient denies head trauma or upper body injury. She denies numbness or tingling. Currently lying on stretcher with  at bedside in nad.      * Closed fracture of neck of left femur  - S/P s/p left patrick 3/25, received post-op Ancef  -  Weight bearing status: WBAT LLE  - Pain control: PNC per anesthesia  - Remains on cefepime/vanc for post op fever. Cultures NGTD.   - PT/OT: mobilize with PT/OT today, must be seen by PT 7x/week - please notify orthopedics if patient is not seen    RVF (right ventricular failure)  -Continue PH mgt as below.  -Wean epi as tolerated(aim for MAP >/=55).  -Hold aldactone.    PAH (pulmonary artery hypertension)  - Euvolemic on exam. Continue PO Lasix.  - Continue riociguat, bosentan, remodulin([place] site CDI).  -  to manage remodulin infusion.  - Ok to restart warfarin    SVT (supraventricular tachycardia)  -Continue amio.    Chronic respiratory failure with hypoxia  - on 5L O2 at home.   - Goal sat >/=85%  Uninterrupted Critical Care/Counseling Time (not including procedures): 60mn      Joe Arellano, NP  Heart Transplant  [facility]

## 2019-03-27 NOTE — ASSESSMENT & PLAN NOTE
73 y.o. 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 Patient presented to [facility] for hip pain and inability to bear weight on the left leg after a trip and fall yesterday.  Patient tripped on a cord for her pulm HTN pump falling directly onto the left hip. Patient denies head trauma or upper body injury.      Pt now s/p left hip hemiarthroplasty on 3/25 for femoral neck fracture.  Pt developed post op fever on 3/26 and was started on empiric Vanc + Cefepime. Blood Cxs taken.  Respiratory viral panel sent.  No obvious signs of infection.        -Continue pt on empiric Vanc +Cefepime  -Recommend US of Lower Extremities for DVT in setting of trauma and recent sx.  -ID will continue to follow

## 2019-03-27 NOTE — ASSESSMENT & PLAN NOTE
Pt is a 72 yo F with pulmonary HTN, chronic respiratory failure, congenital heart disease on coumadin, and closed left femoral neck fracture s/p left hip patrick 3/25    - Epi drip at 3.4  - Antibiotics: empiric vanc/cefepime for fever  - Weight bearing status: WBAT LLE  - Labs: reviewed, Hgb 11.6  - DVT Prophylaxis: mechanical, lovenox, warfarin per primary  - Lines/Drains: PIV, A line, central line, [place], Remodulin pump; d/c sargent  - Pain control: PNC per anesthesia  - PT/OT: mobilize with PT/OT today, must be seen by PT 7x/week - please notify orthopedics if patient is not seen

## 2019-03-28 ENCOUNTER — CLINICAL SUPPORT (OUTPATIENT)
Dept: CARDIOLOGY | Facility: CLINIC | Age: 74
DRG: 469 | End: 2019-03-28
Payer: MEDICARE

## 2019-03-28 PROBLEM — K59.00 CONSTIPATION: Status: ACTIVE | Noted: 2019-03-28

## 2019-03-28 LAB
ALBUMIN SERPL BCP-MCNC: 2.4 G/DL (ref 3.5–5.2)
ALP SERPL-CCNC: 52 U/L (ref 55–135)
ALT SERPL W/O P-5'-P-CCNC: 5 U/L (ref 10–44)
ANION GAP SERPL CALC-SCNC: 6 MMOL/L (ref 8–16)
ANION GAP SERPL CALC-SCNC: 7 MMOL/L (ref 8–16)
AST SERPL-CCNC: 20 U/L (ref 10–40)
BASOPHILS # BLD AUTO: 0.05 K/UL (ref 0–0.2)
BASOPHILS NFR BLD: 0.7 % (ref 0–1.9)
BILIRUB SERPL-MCNC: 0.6 MG/DL (ref 0.1–1)
BUN SERPL-MCNC: 14 MG/DL (ref 8–23)
BUN SERPL-MCNC: 15 MG/DL (ref 8–23)
CALCIUM SERPL-MCNC: 7.4 MG/DL (ref 8.7–10.5)
CALCIUM SERPL-MCNC: 8 MG/DL (ref 8.7–10.5)
CHLORIDE SERPL-SCNC: 106 MMOL/L (ref 95–110)
CHLORIDE SERPL-SCNC: 107 MMOL/L (ref 95–110)
CO2 SERPL-SCNC: 21 MMOL/L (ref 23–29)
CO2 SERPL-SCNC: 22 MMOL/L (ref 23–29)
CREAT SERPL-MCNC: 0.8 MG/DL (ref 0.5–1.4)
CREAT SERPL-MCNC: 0.8 MG/DL (ref 0.5–1.4)
DIFFERENTIAL METHOD: ABNORMAL
ENTEROVIRUS: NOT DETECTED
EOSINOPHIL # BLD AUTO: 0.1 K/UL (ref 0–0.5)
EOSINOPHIL NFR BLD: 1 % (ref 0–8)
ERYTHROCYTE [DISTWIDTH] IN BLOOD BY AUTOMATED COUNT: 13.9 % (ref 11.5–14.5)
EST. GFR  (AFRICAN AMERICAN): >60 ML/MIN/1.73 M^2
EST. GFR  (AFRICAN AMERICAN): >60 ML/MIN/1.73 M^2
EST. GFR  (NON AFRICAN AMERICAN): >60 ML/MIN/1.73 M^2
EST. GFR  (NON AFRICAN AMERICAN): >60 ML/MIN/1.73 M^2
GLUCOSE SERPL-MCNC: 115 MG/DL (ref 70–110)
GLUCOSE SERPL-MCNC: 97 MG/DL (ref 70–110)
HCT VFR BLD AUTO: 32.1 % (ref 37–48.5)
HGB BLD-MCNC: 10.8 G/DL (ref 12–16)
HUMAN BOCAVIRUS: NOT DETECTED
HUMAN CORONAVIRUS, COMMON COLD VIRUS: NOT DETECTED
IMM GRANULOCYTES # BLD AUTO: 0.03 K/UL (ref 0–0.04)
IMM GRANULOCYTES NFR BLD AUTO: 0.4 % (ref 0–0.5)
INFLUENZA A - H1N1-09: NOT DETECTED
INR PPP: 1.3 (ref 0.8–1.2)
LYMPHOCYTES # BLD AUTO: 0.7 K/UL (ref 1–4.8)
LYMPHOCYTES NFR BLD: 10.4 % (ref 18–48)
MAGNESIUM SERPL-MCNC: 1.9 MG/DL (ref 1.6–2.6)
MAGNESIUM SERPL-MCNC: 2 MG/DL (ref 1.6–2.6)
MCH RBC QN AUTO: 30.5 PG (ref 27–31)
MCHC RBC AUTO-ENTMCNC: 33.6 G/DL (ref 32–36)
MCV RBC AUTO: 91 FL (ref 82–98)
MONOCYTES # BLD AUTO: 0.5 K/UL (ref 0.3–1)
MONOCYTES NFR BLD: 7 % (ref 4–15)
NEUTROPHILS # BLD AUTO: 5.7 K/UL (ref 1.8–7.7)
NEUTROPHILS NFR BLD: 80.5 % (ref 38–73)
NRBC BLD-RTO: 0 /100 WBC
PARAINFLUENZA: NOT DETECTED
PHOSPHATE SERPL-MCNC: 2 MG/DL (ref 2.7–4.5)
PLATELET # BLD AUTO: 157 K/UL (ref 150–350)
PMV BLD AUTO: 9.7 FL (ref 9.2–12.9)
POTASSIUM SERPL-SCNC: 3.7 MMOL/L (ref 3.5–5.1)
POTASSIUM SERPL-SCNC: 4.1 MMOL/L (ref 3.5–5.1)
PROT SERPL-MCNC: 5.5 G/DL (ref 6–8.4)
PROTHROMBIN TIME: 13.1 SEC (ref 9–12.5)
RBC # BLD AUTO: 3.54 M/UL (ref 4–5.4)
RVP - ADENOVIRUS: NOT DETECTED
RVP - HUMAN METAPNEUMOVIRUS (HMPV): NOT DETECTED
RVP - INFLUENZA A: NOT DETECTED
RVP - INFLUENZA B: NOT DETECTED
RVP - RESPIRATORY SYNCTIAL VIRUS (RSV) A: NOT DETECTED
RVP - RESPIRATORY VIRAL PANEL, SOURCE: NORMAL
RVP - RHINOVIRUS: NOT DETECTED
SODIUM SERPL-SCNC: 134 MMOL/L (ref 136–145)
SODIUM SERPL-SCNC: 135 MMOL/L (ref 136–145)
VANCOMYCIN TROUGH SERPL-MCNC: 8.5 UG/ML (ref 10–22)
WBC # BLD AUTO: 7.05 K/UL (ref 3.9–12.7)

## 2019-03-28 PROCEDURE — 27000221 HC OXYGEN, UP TO 24 HOURS

## 2019-03-28 PROCEDURE — 20000000 HC ICU ROOM

## 2019-03-28 PROCEDURE — 80202 ASSAY OF VANCOMYCIN: CPT

## 2019-03-28 PROCEDURE — 93010 EKG 12-LEAD: ICD-10-PCS | Mod: ,,, | Performed by: INTERNAL MEDICINE

## 2019-03-28 PROCEDURE — 25000003 PHARM REV CODE 250: Performed by: STUDENT IN AN ORGANIZED HEALTH CARE EDUCATION/TRAINING PROGRAM

## 2019-03-28 PROCEDURE — 97116 GAIT TRAINING THERAPY: CPT

## 2019-03-28 PROCEDURE — 84100 ASSAY OF PHOSPHORUS: CPT

## 2019-03-28 PROCEDURE — 99233 PR SUBSEQUENT HOSPITAL CARE,LEVL III: ICD-10-PCS | Mod: ,,, | Performed by: INTERNAL MEDICINE

## 2019-03-28 PROCEDURE — 63600175 PHARM REV CODE 636 W HCPCS: Performed by: ORTHOPAEDIC SURGERY

## 2019-03-28 PROCEDURE — 99900035 HC TECH TIME PER 15 MIN (STAT)

## 2019-03-28 PROCEDURE — 99292 PR CRITICAL CARE, ADDL 30 MIN: ICD-10-PCS | Mod: ,,, | Performed by: INTERNAL MEDICINE

## 2019-03-28 PROCEDURE — 93005 ELECTROCARDIOGRAM TRACING: CPT

## 2019-03-28 PROCEDURE — 93010 ELECTROCARDIOGRAM REPORT: CPT | Mod: ,,, | Performed by: INTERNAL MEDICINE

## 2019-03-28 PROCEDURE — 93970 EXTREMITY STUDY: CPT | Mod: 26,,, | Performed by: INTERNAL MEDICINE

## 2019-03-28 PROCEDURE — 99292 CRITICAL CARE ADDL 30 MIN: CPT | Mod: ,,, | Performed by: INTERNAL MEDICINE

## 2019-03-28 PROCEDURE — 63600367 HC NITRIC OXIDE PER HOUR

## 2019-03-28 PROCEDURE — 63600175 PHARM REV CODE 636 W HCPCS: Performed by: STUDENT IN AN ORGANIZED HEALTH CARE EDUCATION/TRAINING PROGRAM

## 2019-03-28 PROCEDURE — 99233 SBSQ HOSP IP/OBS HIGH 50: CPT | Mod: ,,, | Performed by: INTERNAL MEDICINE

## 2019-03-28 PROCEDURE — 63600175 PHARM REV CODE 636 W HCPCS: Performed by: INTERNAL MEDICINE

## 2019-03-28 PROCEDURE — 97535 SELF CARE MNGMENT TRAINING: CPT

## 2019-03-28 PROCEDURE — 93010 ELECTROCARDIOGRAM REPORT: CPT | Mod: 76,,, | Performed by: INTERNAL MEDICINE

## 2019-03-28 PROCEDURE — 83735 ASSAY OF MAGNESIUM: CPT

## 2019-03-28 PROCEDURE — 25000003 PHARM REV CODE 250: Performed by: INTERNAL MEDICINE

## 2019-03-28 PROCEDURE — 93970 CV US DOPPLER VENOUS LEGS BILATERAL (CUPID ONLY): ICD-10-PCS | Mod: 26,,, | Performed by: INTERNAL MEDICINE

## 2019-03-28 PROCEDURE — 80048 BASIC METABOLIC PNL TOTAL CA: CPT

## 2019-03-28 PROCEDURE — 83735 ASSAY OF MAGNESIUM: CPT | Mod: 91

## 2019-03-28 PROCEDURE — 99291 PR CRITICAL CARE, E/M 30-74 MINUTES: ICD-10-PCS | Mod: ,,, | Performed by: NURSE PRACTITIONER

## 2019-03-28 PROCEDURE — 93970 EXTREMITY STUDY: CPT | Mod: 50

## 2019-03-28 PROCEDURE — 25000003 PHARM REV CODE 250: Performed by: NURSE PRACTITIONER

## 2019-03-28 PROCEDURE — 97530 THERAPEUTIC ACTIVITIES: CPT

## 2019-03-28 PROCEDURE — 25000003 PHARM REV CODE 250: Performed by: ORTHOPAEDIC SURGERY

## 2019-03-28 PROCEDURE — 94761 N-INVAS EAR/PLS OXIMETRY MLT: CPT

## 2019-03-28 PROCEDURE — 85025 COMPLETE CBC W/AUTO DIFF WBC: CPT

## 2019-03-28 PROCEDURE — 80053 COMPREHEN METABOLIC PANEL: CPT

## 2019-03-28 PROCEDURE — 99291 CRITICAL CARE FIRST HOUR: CPT | Mod: ,,, | Performed by: NURSE PRACTITIONER

## 2019-03-28 PROCEDURE — 85610 PROTHROMBIN TIME: CPT

## 2019-03-28 RX ORDER — ACETAMINOPHEN 500 MG
1000 TABLET ORAL EVERY 8 HOURS
Status: COMPLETED | OUTPATIENT
Start: 2019-03-28 | End: 2019-03-30

## 2019-03-28 RX ORDER — HYDROXYZINE PAMOATE 25 MG/1
25 CAPSULE ORAL ONCE
Status: COMPLETED | OUTPATIENT
Start: 2019-03-29 | End: 2019-03-28

## 2019-03-28 RX ORDER — METHOCARBAMOL 500 MG/1
500 TABLET, FILM COATED ORAL 4 TIMES DAILY
Status: DISCONTINUED | OUTPATIENT
Start: 2019-03-28 | End: 2019-03-28

## 2019-03-28 RX ORDER — DIGOXIN 125 MCG
0.5 TABLET ORAL ONCE
Status: DISCONTINUED | OUTPATIENT
Start: 2019-03-29 | End: 2019-03-30

## 2019-03-28 RX ORDER — DIGOXIN 0.25 MG/ML
500 INJECTION INTRAMUSCULAR; INTRAVENOUS ONCE
Status: COMPLETED | OUTPATIENT
Start: 2019-03-29 | End: 2019-03-28

## 2019-03-28 RX ORDER — TRAMADOL HYDROCHLORIDE 50 MG/1
50 TABLET ORAL EVERY 4 HOURS PRN
Status: DISCONTINUED | OUTPATIENT
Start: 2019-03-28 | End: 2019-04-05 | Stop reason: HOSPADM

## 2019-03-28 RX ORDER — ENOXAPARIN SODIUM 100 MG/ML
60 INJECTION SUBCUTANEOUS EVERY 12 HOURS
Status: DISCONTINUED | OUTPATIENT
Start: 2019-03-28 | End: 2019-04-02

## 2019-03-28 RX ORDER — POTASSIUM CHLORIDE 20 MEQ/1
40 TABLET, EXTENDED RELEASE ORAL ONCE
Status: COMPLETED | OUTPATIENT
Start: 2019-03-28 | End: 2019-03-28

## 2019-03-28 RX ORDER — LACTULOSE 10 G/15ML
20 SOLUTION ORAL ONCE
Status: COMPLETED | OUTPATIENT
Start: 2019-03-28 | End: 2019-03-28

## 2019-03-28 RX ORDER — ONDANSETRON 8 MG/1
8 TABLET, ORALLY DISINTEGRATING ORAL EVERY 6 HOURS PRN
Status: DISPENSED | OUTPATIENT
Start: 2019-03-28 | End: 2019-04-01

## 2019-03-28 RX ADMIN — RIOCIGUAT 0.5 MG: 0.5 TABLET, FILM COATED ORAL at 04:03

## 2019-03-28 RX ADMIN — CALCIUM 1000 MG: 500 TABLET ORAL at 08:03

## 2019-03-28 RX ADMIN — HYDROXYZINE PAMOATE 25 MG: 25 CAPSULE ORAL at 11:03

## 2019-03-28 RX ADMIN — OXYCODONE HYDROCHLORIDE 5 MG: 5 TABLET ORAL at 09:03

## 2019-03-28 RX ADMIN — LACTULOSE 20 G: 20 SOLUTION ORAL at 02:03

## 2019-03-28 RX ADMIN — ACETAMINOPHEN 1000 MG: 500 TABLET ORAL at 10:03

## 2019-03-28 RX ADMIN — STANDARDIZED SENNA CONCENTRATE AND DOCUSATE SODIUM 2 TABLET: 8.6; 5 TABLET, FILM COATED ORAL at 10:03

## 2019-03-28 RX ADMIN — ROPIVACAINE HYDROCHLORIDE 2 ML/HR: 2 INJECTION, SOLUTION EPIDURAL; INFILTRATION at 12:03

## 2019-03-28 RX ADMIN — PROMETHAZINE HYDROCHLORIDE 6.25 MG: 25 INJECTION INTRAMUSCULAR; INTRAVENOUS at 01:03

## 2019-03-28 RX ADMIN — ROPIVACAINE HYDROCHLORIDE 2 ML/HR: 2 INJECTION, SOLUTION EPIDURAL; INFILTRATION at 07:03

## 2019-03-28 RX ADMIN — TRAMADOL HYDROCHLORIDE 50 MG: 50 TABLET, COATED ORAL at 04:03

## 2019-03-28 RX ADMIN — RIOCIGUAT 0.5 MG: 0.5 TABLET, FILM COATED ORAL at 10:03

## 2019-03-28 RX ADMIN — OXYCODONE HYDROCHLORIDE 5 MG: 5 TABLET ORAL at 12:03

## 2019-03-28 RX ADMIN — AMIODARONE HYDROCHLORIDE 200 MG: 200 TABLET ORAL at 08:03

## 2019-03-28 RX ADMIN — FUROSEMIDE 20 MG: 20 TABLET ORAL at 12:03

## 2019-03-28 RX ADMIN — STANDARDIZED SENNA CONCENTRATE AND DOCUSATE SODIUM 2 TABLET: 8.6; 5 TABLET, FILM COATED ORAL at 08:03

## 2019-03-28 RX ADMIN — ENOXAPARIN SODIUM 40 MG: 100 INJECTION SUBCUTANEOUS at 04:03

## 2019-03-28 RX ADMIN — OXYCODONE HYDROCHLORIDE 5 MG: 5 TABLET ORAL at 08:03

## 2019-03-28 RX ADMIN — POLYETHYLENE GLYCOL 3350 17 G: 17 POWDER, FOR SOLUTION ORAL at 08:03

## 2019-03-28 RX ADMIN — CEFEPIME 2 G: 2 INJECTION, POWDER, FOR SOLUTION INTRAVENOUS at 08:03

## 2019-03-28 RX ADMIN — VANCOMYCIN HYDROCHLORIDE 750 MG: 750 INJECTION, POWDER, LYOPHILIZED, FOR SOLUTION INTRAVENOUS at 04:03

## 2019-03-28 RX ADMIN — EPINEPHRINE 0.02 MCG/KG/MIN: 1 INJECTION INTRAMUSCULAR; INTRAVENOUS; SUBCUTANEOUS at 07:03

## 2019-03-28 RX ADMIN — DIGOXIN 500 MCG: 0.25 INJECTION INTRAMUSCULAR; INTRAVENOUS at 11:03

## 2019-03-28 RX ADMIN — LACTULOSE 20 G: 20 SOLUTION ORAL at 10:03

## 2019-03-28 RX ADMIN — MUPIROCIN 1 G: 20 OINTMENT TOPICAL at 08:03

## 2019-03-28 RX ADMIN — FOLIC ACID 1 MG: 1 TABLET ORAL at 08:03

## 2019-03-28 RX ADMIN — ENOXAPARIN SODIUM 60 MG: 100 INJECTION SUBCUTANEOUS at 10:03

## 2019-03-28 RX ADMIN — CETIRIZINE HYDROCHLORIDE 5 MG: 5 TABLET ORAL at 08:03

## 2019-03-28 RX ADMIN — POTASSIUM CHLORIDE 40 MEQ: 1500 TABLET, EXTENDED RELEASE ORAL at 06:03

## 2019-03-28 RX ADMIN — ROPIVACAINE HYDROCHLORIDE 2 ML/HR: 2 INJECTION, SOLUTION EPIDURAL; INFILTRATION at 05:03

## 2019-03-28 RX ADMIN — ONDANSETRON 4 MG: 4 TABLET, ORALLY DISINTEGRATING ORAL at 11:03

## 2019-03-28 RX ADMIN — ACETAMINOPHEN 1000 MG: 500 TABLET ORAL at 02:03

## 2019-03-28 RX ADMIN — FERROUS GLUCONATE TAB 324 MG (37.5 MG ELEMENTAL IRON) 324 MG: 324 (37.5 FE) TAB at 08:03

## 2019-03-28 RX ADMIN — BOSENTAN 125 MG: 125 TABLET, FILM COATED ORAL at 08:03

## 2019-03-28 RX ADMIN — BOSENTAN 125 MG: 125 TABLET, FILM COATED ORAL at 10:03

## 2019-03-28 RX ADMIN — FLUTICASONE PROPIONATE 100 MCG: 50 SPRAY, METERED NASAL at 10:03

## 2019-03-28 RX ADMIN — PREGABALIN 75 MG: 75 CAPSULE ORAL at 10:03

## 2019-03-28 RX ADMIN — MUPIROCIN 1 G: 20 OINTMENT TOPICAL at 10:03

## 2019-03-28 RX ADMIN — WARFARIN SODIUM 7.5 MG: 7.5 TABLET ORAL at 05:03

## 2019-03-28 RX ADMIN — RIOCIGUAT 0.5 MG: 0.5 TABLET, FILM COATED ORAL at 08:03

## 2019-03-28 RX ADMIN — OXYCODONE HYDROCHLORIDE 5 MG: 5 TABLET ORAL at 03:03

## 2019-03-28 NOTE — SUBJECTIVE & OBJECTIVE
Interval History: Pt without complaints.  Afebrile w/o leukocytosis.  Blood cxs NGTD    Review of Systems   Constitutional: Negative for activity change, chills, fatigue and fever.   Respiratory: Negative for cough and shortness of breath.    Gastrointestinal: Negative for abdominal pain, diarrhea, nausea and vomiting.   Genitourinary: Negative for difficulty urinating, dysuria and flank pain.   Musculoskeletal: Negative for arthralgias, back pain and joint swelling.   Skin: Positive for wound.     Objective:     Vital Signs (Most Recent):  Temp: 98.6 °F (37 °C) (03/28/19 0815)  Pulse: 100 (03/28/19 1510)  Resp: (!) 25 (03/28/19 1510)  BP: (!) 120/59 (03/28/19 1510)  SpO2: (!) 85 % (03/28/19 1510) Vital Signs (24h Range):  Temp:  [98.5 °F (36.9 °C)-99.2 °F (37.3 °C)] 98.6 °F (37 °C)  Pulse:  [] 100  Resp:  [16-31] 25  SpO2:  [80 %-92 %] 85 %  BP: ()/(50-69) 120/59  Arterial Line BP: ()/(36-65) 108/44     Weight: 57.2 kg (126 lb)  Body mass index is 22.32 kg/m².    Estimated Creatinine Clearance: 52.6 mL/min (based on SCr of 0.8 mg/dL).    Physical Exam   Constitutional: She is oriented to person, place, and time. She appears well-developed and well-nourished.   HENT:   Head: Normocephalic and atraumatic.   Eyes: EOM are normal.   Neck: Neck supple. No JVD present.   Cardiovascular: Normal rate and regular rhythm.   Pulmonary/Chest: Effort normal and breath sounds normal. No respiratory distress.   Abdominal: Soft. Bowel sounds are normal. She exhibits no distension.   Musculoskeletal: She exhibits no edema.   Neurological: She is alert and oriented to person, place, and time.   Skin: Skin is warm and dry. Capillary refill takes less than 2 seconds. No erythema.   Psychiatric: She has a normal mood and affect. Her behavior is normal. Thought content normal.       Significant Labs:   Blood Culture:   Recent Labs   Lab 11/22/18  1255 11/22/18  1317 03/26/19  1420 03/26/19  1440   LABBLOO No growth  after 5 days. No growth after 5 days. No Growth to date  No Growth to date No Growth to date  No Growth to date     BMP:   Recent Labs   Lab 03/28/19  0317   *   *   K 3.7      CO2 21*   BUN 15   CREATININE 0.8   CALCIUM 7.4*   MG 2.0     CBC:   Recent Labs   Lab 03/27/19  0210 03/28/19 0317   WBC 10.35 7.05   HGB 11.6* 10.8*   HCT 35.9* 32.1*    157     CMP:   Recent Labs   Lab 03/27/19  0210 03/28/19  0317    134*   K 3.5 3.7    107   CO2 19* 21*   * 115*   BUN 17 15   CREATININE 1.0 0.8   CALCIUM 7.8* 7.4*   PROT 5.9* 5.5*   ALBUMIN 2.8* 2.4*   BILITOT 1.1* 0.6   ALKPHOS 43* 52*   AST 22 20   ALT 6* 5*   ANIONGAP 10 6*   EGFRNONAA 56.4* >60.0     Wound Culture: No results for input(s): LABAERO in the last 4320 hours.  All pertinent labs within the past 24 hours have been reviewed.  Recent Lab Results       03/28/19  1448   03/28/19 0317        Immature Grans (Abs)   0.03  Comment:  Mild elevation in immature granulocytes is non specific and   can be seen in a variety of conditions including stress response,   acute inflammation, trauma and pregnancy. Correlation with other   laboratory and clinical findings is essential.       Albumin   2.4     Alkaline Phosphatase   52     ALT   5     Anion Gap   6     AST   20     Baso #   0.05     Basophil%   0.7     Total Bilirubin   0.6  Comment:  For infants and newborns, interpretation of results should be based  on gestational age, weight and in agreement with clinical  observations.  Premature Infant recommended reference ranges:  Up to 24 hours.............<8.0 mg/dL  Up to 48 hours............<12.0 mg/dL  3-5 days..................<15.0 mg/dL  6-29 days.................<15.0 mg/dL       BUN, Bld   15     Calcium   7.4     Chloride   107     CO2   21     Creatinine   0.8     Differential Method   Automated     eGFR if    >60.0     eGFR if non    >60.0  Comment:  Calculation used to obtain  the estimated glomerular filtration  rate (eGFR) is the CKD-EPI equation.        Eos #   0.1     Eosinophil%   1.0     Glucose   115     Gran # (ANC)   5.7     Gran%   80.5     Hematocrit   32.1     Hemoglobin   10.8     Immature Granulocytes   0.4     Coumadin Monitoring INR   1.3  Comment:  Coumadin Therapy:  2.0 - 3.0 for INR for all indicators except mechanical heart valves  and antiphospholipid syndromes which should use 2.5 - 3.5.       Lymph #   0.7     Lymph%   10.4     Magnesium   2.0     MCH   30.5     MCHC   33.6     MCV   91     Mono #   0.5     Mono%   7.0     MPV   9.7     nRBC   0     Phosphorus   2.0     Platelets   157     Potassium   3.7     Total Protein   5.5     Protime   13.1     RBC   3.54     RDW   13.9     Sodium   134     Vancomycin-Trough 8.5       WBC   7.05           Significant Imaging: I have reviewed all pertinent imaging results/findings within the past 24 hours.

## 2019-03-28 NOTE — PHYSICIAN QUERY
PT Name: Molly Smith  MR #: [id number]     PHYSICIAN QUERY -  ELECTROLYTE CLARIFICATION      CDS/: Lakia Schmitz RN               Contact information:[email or web address]  This form is a permanent document in the medical record.     Query Date: March 28, 2019    By submitting this query, we are merely seeking further clarification of documentation to reflect the severity of illness of your patient. Please utilize your independent clinical judgment when addressing the question(s) below.    The Medical record reflects the following:     Indicators   Supporting Clinical Findings Location in Medical Record   x Lab Value(s) Calcium 9.1  Potassium 4.3  Calcium 8.4  Potassium 3.7  Calcium 7.5   Alkaline phosphatase 41  Potassium 3.8  Calcium 7.8   Alkaline phosphatase  36 potassium 3.5  Calcium 7.8  Phosphorus 2.0  Alkaline phosphatase 43  Calcium 7.4  Phosphorus 2.0  Alkaline phosphatase 52 potassium3.7   Labs 3/23  Labs 3/24  Labs 3/25  Labs 3/26  Labs 3/27  Labs 3/28   x Treatment                                 Medication Calcium gluconate oral  Calcium carbonate oral  Furosemide oral   Potassium phos di & mono-sod phos mono oral  Potassium chloride SA CR oral  Spironolactone oral    MAR 3/24  MAR 3/24-present  MAR 3/24  MAR 3/27  MAR 3/24, 27-present  MAR 3/24-27    Other       Provider, please specify the diagnosis or diagnoses that correspond(s) to the above indicators. Danilo all that apply:    [x   ] Hypocalcemia   [   ] Hypokalemia   [   ] Hypophosphatemia   [   ] Other electrolyte disturbance (please specify): _______   [   ]  Clinically Undetermined       Please document in your progress notes daily for the duration of treatment until resolved, and include in your discharge summary.

## 2019-03-28 NOTE — ADDENDUM NOTE
Addendum  created 03/28/19 1548 by Asim Yan MD    Charge Capture section accepted, Cosign clinical note with attestation

## 2019-03-28 NOTE — PROGRESS NOTES
[facility]  Heart Transplant  Progress Note    Patient Name: Molly Smith  MRN: [id number]  Admission Date: 3/23/2019  Hospital Length of Stay: 5 days  Attending Physician: Alice Tim MD  Primary Care Provider: Roberto Braun MD  Principal Problem:Closed fracture of neck of left femur    Subjective:     Interval History: In pain this morning and slightly confused. Family at bedside. Epi weaned off this morning.     LINES:  - Tunneled maguire in R subclavian for remodulin  - R Radial A line  - L IJ    Continuous Infusions:   epinephrine infusion Stopped (03/28/19 0700)    ropivacaine (PF) 2 mg/ml (0.2%) 2 mL/hr (03/28/19 1000)    treprostinil (REMODULIN) infusion      veletri/remodulin cassette      veletri/remodulin tubing       Scheduled Meds:   amiodarone  200 mg Oral Daily    bosentan  125 mg Oral Q12H    calcium carbonate  1,000 mg Oral Daily    ceFEPime (MAXIPIME) IVPB  2 g Intravenous Q12H    cetirizine  5 mg Oral Daily    enoxaparin  40 mg Subcutaneous Daily    ferrous gluconate  324 mg Oral Daily with breakfast    fluticasone  2 spray Each Nare QHS    folic acid  1 mg Oral Daily    furosemide  20 mg Oral After lunch    mupirocin  1 g Nasal BID    polyethylene glycol  17 g Oral Daily    pregabalin  75 mg Oral QHS    riociguat  0.5 mg Oral TID    senna-docusate 8.6-50 mg  2 tablet Oral BID    sodium chloride 0.9%  3 mL Intravenous Q8H    vancomycin (VANCOCIN) IVPB  750 mg Intravenous Q24H    warfarin  7.5 mg Oral Daily     PRN Meds:acetaminophen, morphine, ondansetron, oxyCODONE, oxyCODONE, promethazine (PHENERGAN) IVPB, sodium chloride 0.9%, treprostinil (REMODULIN) infusion    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    Multaq [dronedarone]      Objective:     Vital Signs (Most Recent):  Temp: 98.6 °F (37 °C) (03/28/19 0815)  Pulse: 109 (03/28/19 1000)  Resp: (!) 24 (03/28/19 1000)  BP: (!) 99/58  (03/28/19 1000)  SpO2: (!) 80 % (03/28/19 1000) Vital Signs (24h Range):  Temp:  [98.5 °F (36.9 °C)-100.2 °F (37.9 °C)] 98.6 °F (37 °C)  Pulse:  [] 109  Resp:  [16-31] 24  SpO2:  [80 %-92 %] 80 %  BP: ()/(50-69) 99/58  Arterial Line BP: ()/(36-57) 95/40     No data found.  Body mass index is 22.32 kg/m².      Intake/Output Summary (Last 24 hours) at 3/28/2019 1042  Last data filed at 3/28/2019 1000  Gross per 24 hour   Intake 847.68 ml   Output 835 ml   Net 12.68 ml        Physical Exam   Constitutional: She is oriented to person, place, and time. She appears well-developed and well-nourished. She is sleeping.   HENT:   Head: Normocephalic and atraumatic.   Eyes: EOM are normal.   Neck: Neck supple. No JVD present.   Cardiovascular: Normal rate and regular rhythm.   Pulmonary/Chest: Effort normal and breath sounds normal. No respiratory distress.   Abdominal: Soft. Bowel sounds are normal. She exhibits no distension.   Musculoskeletal: She exhibits no edema.   Neurological: She is oriented to person, place, and time.   Skin: Skin is warm and dry. Capillary refill takes less than 2 seconds. No erythema.   Psychiatric: She has a normal mood and affect. Her behavior is normal. Thought content normal.     Significant Labs:  CBC:  Recent Labs   Lab 03/26/19  1229 03/27/19  0210 03/28/19  0317   WBC 8.61 10.35 7.05   RBC 3.92* 3.82* 3.54*   HGB 12.2 11.6* 10.8*   HCT 36.4* 35.9* 32.1*    216 157   MCV 93 94 91   MCH 31.1* 30.4 30.5   MCHC 33.5 32.3 33.6     BNP:  No results for input(s): BNP in the last 168 hours.    Invalid input(s): BNPTRIAGELBLO  CMP:  Recent Labs   Lab 03/26/19  1404 03/27/19  0210 03/28/19  0317   * 138* 115*   CALCIUM 7.9* 7.8* 7.4*   ALBUMIN 2.8* 2.8* 2.4*   PROT 5.8* 5.9* 5.5*   * 136 134*   K 3.5 3.5 3.7   CO2 21* 19* 21*    107 107   BUN 20 17 15   CREATININE 1.3 1.0 0.8   ALKPHOS 37* 43* 52*   ALT 7* 6* 5*   AST 18 22 20   BILITOT 1.4* 1.1* 0.6       Coagulation:   Recent Labs   Lab 03/26/19  0258 03/27/19  0210 03/28/19  0317   INR 1.1 1.1 1.3*     LDH:  No results for input(s): LDH in the last 72 hours.  Microbiology:  Microbiology Results (last 7 days)     Procedure Component Value Units Date/Time    Blood culture [[id number]] Collected:  03/26/19 1440    Order Status:  Completed Specimen:  Blood from Peripheral, Antecubital, Left Updated:  03/27/19 1612     Blood Culture, Routine No Growth to date     Blood Culture, Routine No Growth to date    Blood culture [[id number]] Collected:  03/26/19 1420    Order Status:  Completed Specimen:  Blood from Peripheral, Antecubital, Right Updated:  03/27/19 1612     Blood Culture, Routine No Growth to date     Blood Culture, Routine No Growth to date    Respiratory Viral Panel by PCR [facility]; Nasal Swab [[id number]] Collected:  03/26/19 1846    Order Status:  Sent Specimen:  Respiratory Updated:  03/26/19 1856        I have reviewed all pertinent labs within the past 24 hours.    Estimated Creatinine Clearance: 52.6 mL/min (based on SCr of 0.8 mg/dL).    Diagnostic Results:  I have reviewed all pertinent imaging results/findings within the past 24 hours.    Assessment and Plan:     73 y.o. 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 Patient presented to [facility] for hip pain and inability to bear weight on the left leg after a trip and fall yesterday.  Patient tripped on a cord for her pulm HTN pump falling directly onto the left hip. Patient denies head trauma or upper body injury. She denies numbness or tingling. Currently lying on stretcher with   at bedside in nad.        * Closed fracture of neck of left femur  - S/P s/p left patrick 3/25, received post-op Ancef  - Weight bearing status: WBAT LLE  - Pain control: PNC per anesthesia(appreciate their help).   - PT/OT: must be seen by PT 7x/week - please notify orthopedics if patient is not seen    RVF (right ventricular failure)  -Continue PH mgt as below.  -Wean epi as tolerated(aim for MAP >/=55).  -Hold aldactone.    PAH (pulmonary artery hypertension)  - Euvolemic on exam. Continue PO Lasix.  - Continue riociguat, bosentan, remodulin([place] site CDI).  -  to manage remodulin infusion.  - Ok to restart warfarin    Constipation  -Continue senna/colace/miralax.  -Will add lactulose if needed.    SVT (supraventricular tachycardia)  -Continue amio.    Fever  -Fevers improved.  -Appreciate Id Cx.   -Continue broad spectrum Abx.  -Will get U/s of BLE      Chronic respiratory failure with hypoxia  - on 5L O2 at home.   - Goal sat >/=85%  Uninterrupted Critical Care/Counseling Time (not including procedures): 60mn      Joe Arellano, NP  Heart Transplant  [facility]

## 2019-03-28 NOTE — PLAN OF CARE
Problem: Adult Inpatient Plan of Care  Goal: Plan of Care Review  No acute events throughout night, weaning Epi as tolerated. Remoudulin and Ropivacaine continued.  Transfer if Epi is weaned. PT/OT for mobility. VS and assessment per flow sheet, patient progressing towards goals as tolerated, plan of care reviewed with Molly Smith and family, all concerns addressed, will continue to monitor.

## 2019-03-28 NOTE — CONSULTS
[facility]  Infectious Disease  Consult Note    Patient Name: Molly Smith  MRN: [id number]  Admission Date: 3/23/2019  Hospital Length of Stay: 5 days  Attending Physician: Alice Tim MD  Primary Care Provider: Roberto Braun MD     Isolation Status: No active isolations    Patient information was obtained from patient, past medical records and ER records.      Consults  Assessment/Plan:     Fever  73 y.o. 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 Patient presented to [facility] for hip pain and inability to bear weight on the left leg after a trip and fall yesterday.  Patient tripped on a cord for her pulm HTN pump falling directly onto the left hip. Patient denies head trauma or upper body injury.      Pt now s/p left hip hemiarthroplasty on 3/25 for femoral neck fracture.  Pt developed post op fever on 3/26 and was started on empiric Vanc + Cefepime. Blood Cxs taken.  Respiratory viral panel sent.  No obvious signs of infection.  Will de-escalate abx.  Pt afebrile w/o leukocytosis.      -Discontinue Vancomycin  -Continue pt Cefepime  -Recommend US of Lower Extremities for DVT in setting of trauma and recent sx.  -ID will continue to follow        Thank you for your consult. I will follow-up with patient. Please contact us if you have any additional questions.    Terry Junior PA-C  Infectious Disease  [facility]    Subjective:     Principal Problem: Closed fracture of neck of left femur    HPI: 73 y.o. 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 Patient presented to [facility] for hip pain and inability to bear weight on the left leg after a trip and fall yesterday.  Patient tripped on a cord for her pulm HTN pump falling directly onto the left hip. Patient denies head trauma or upper body injury.      Pt now s/p left hip hemiarthroplasty on 3/25 for femoral neck fracture.  Pt developed post op fever on 3/26 and was started on empiric Vanc + Cefepime. Blood Cxs taken.  Respiratory viral panel sent.        Interval History: Pt without complaints.  Afebrile w/o leukocytosis.  Blood cxs NGTD    Review of Systems   Constitutional: Negative for activity change, chills, fatigue and fever.   Respiratory: Negative for cough and shortness of breath.    Gastrointestinal: Negative for abdominal pain, diarrhea, nausea and vomiting.   Genitourinary: Negative for difficulty urinating, dysuria and flank pain.   Musculoskeletal: Negative for arthralgias, back pain and joint swelling.   Skin: Positive for wound.     Objective:     Vital Signs (Most Recent):  Temp: 98.6 °F (37 °C) (03/28/19 0815)  Pulse: 100 (03/28/19 1510)  Resp: (!) 25 (03/28/19 1510)  BP: (!) 120/59 (03/28/19 1510)  SpO2: (!) 85 % (03/28/19 1510) Vital Signs (24h Range):  Temp:  [98.5 °F (36.9 °C)-99.2 °F (37.3 °C)] 98.6 °F (37 °C)  Pulse:  [] 100  Resp:  [16-31] 25  SpO2:  [80 %-92 %] 85 %  BP: ()/(50-69) 120/59  Arterial Line BP: ()/(36-65) 108/44     Weight: 57.2 kg (126 lb)  Body mass index is 22.32 kg/m².    Estimated Creatinine Clearance: 52.6 mL/min (based on SCr of 0.8 mg/dL).    Physical Exam   Constitutional: She is oriented to person, place, and time. She appears well-developed and well-nourished.   HENT:   Head: Normocephalic and atraumatic.   Eyes: EOM are normal.    Neck: Neck supple. No JVD present.   Cardiovascular: Normal rate and regular rhythm.   Pulmonary/Chest: Effort normal and breath sounds normal. No respiratory distress.   Abdominal: Soft. Bowel sounds are normal. She exhibits no distension.   Musculoskeletal: She exhibits no edema.   Neurological: She is alert and oriented to person, place, and time.   Skin: Skin is warm and dry. Capillary refill takes less than 2 seconds. No erythema.   Psychiatric: She has a normal mood and affect. Her behavior is normal. Thought content normal.       Significant Labs:   Blood Culture:   Recent Labs   Lab 11/22/18  1255 11/22/18  1317 03/26/19  1420 03/26/19  1440   LABBLOO No growth after 5 days. No growth after 5 days. No Growth to date  No Growth to date No Growth to date  No Growth to date     BMP:   Recent Labs   Lab 03/28/19  0317   *   *   K 3.7      CO2 21*   BUN 15   CREATININE 0.8   CALCIUM 7.4*   MG 2.0     CBC:   Recent Labs   Lab 03/27/19  0210 03/28/19  0317   WBC 10.35 7.05   HGB 11.6* 10.8*   HCT 35.9* 32.1*    157     CMP:   Recent Labs   Lab 03/27/19  0210 03/28/19  0317    134*   K 3.5 3.7    107   CO2 19* 21*   * 115*   BUN 17 15   CREATININE 1.0 0.8   CALCIUM 7.8* 7.4*   PROT 5.9* 5.5*   ALBUMIN 2.8* 2.4*   BILITOT 1.1* 0.6   ALKPHOS 43* 52*   AST 22 20   ALT 6* 5*   ANIONGAP 10 6*   EGFRNONAA 56.4* >60.0     Wound Culture: No results for input(s): LABAERO in the last 4320 hours.  All pertinent labs within the past 24 hours have been reviewed.  Recent Lab Results       03/28/19  1448   03/28/19  0317        Immature Grans (Abs)   0.03  Comment:  Mild elevation in immature granulocytes is non specific and   can be seen in a variety of conditions including stress response,   acute inflammation, trauma and pregnancy. Correlation with other   laboratory and clinical findings is essential.       Albumin   2.4     Alkaline Phosphatase   52     ALT   5     Anion Gap    6     AST   20     Baso #   0.05     Basophil%   0.7     Total Bilirubin   0.6  Comment:  For infants and newborns, interpretation of results should be based  on gestational age, weight and in agreement with clinical  observations.  Premature Infant recommended reference ranges:  Up to 24 hours.............<8.0 mg/dL  Up to 48 hours............<12.0 mg/dL  3-5 days..................<15.0 mg/dL  6-29 days.................<15.0 mg/dL       BUN, Bld   15     Calcium   7.4     Chloride   107     CO2   21     Creatinine   0.8     Differential Method   Automated     eGFR if    >60.0     eGFR if non    >60.0  Comment:  Calculation used to obtain the estimated glomerular filtration  rate (eGFR) is the CKD-EPI equation.        Eos #   0.1     Eosinophil%   1.0     Glucose   115     Gran # (ANC)   5.7     Gran%   80.5     Hematocrit   32.1     Hemoglobin   10.8     Immature Granulocytes   0.4     Coumadin Monitoring INR   1.3  Comment:  Coumadin Therapy:  2.0 - 3.0 for INR for all indicators except mechanical heart valves  and antiphospholipid syndromes which should use 2.5 - 3.5.       Lymph #   0.7     Lymph%   10.4     Magnesium   2.0     MCH   30.5     MCHC   33.6     MCV   91     Mono #   0.5     Mono%   7.0     MPV   9.7     nRBC   0     Phosphorus   2.0     Platelets   157     Potassium   3.7     Total Protein   5.5     Protime   13.1     RBC   3.54     RDW   13.9     Sodium   134     Vancomycin-Trough 8.5       WBC   7.05           Significant Imaging: I have reviewed all pertinent imaging results/findings within the past 24 hours.

## 2019-03-28 NOTE — PLAN OF CARE
Problem: Occupational Therapy Goal  Goal: Occupational Therapy Goal  Goals to be met by: 7 days 4/2/19     Patient will increase functional independence with ADLs by performing:    Pt to complete g/h skills seated with set-up--MET  Pt to complete UE dressing with MIN A --MET  Pt to demo Fair sitting balance to allow for progression with ADL training.--MET  Pt to t/f to chair with MAX A --MET  Pt to t/f to BSC with SBA  Pt to complete toileting MIN A   Pt to complete g/h skills with CGA in standing  Pt and fly to demo/verb understanding of AE for LE dressing given current hip precautions.      Goals and POC updated.

## 2019-03-28 NOTE — ASSESSMENT & PLAN NOTE
73 y.o. 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 Patient presented to [facility] for hip pain and inability to bear weight on the left leg after a trip and fall yesterday.  Patient tripped on a cord for her pulm HTN pump falling directly onto the left hip. Patient denies head trauma or upper body injury.      Pt now s/p left hip hemiarthroplasty on 3/25 for femoral neck fracture.  Pt developed post op fever on 3/26 and was started on empiric Vanc + Cefepime. Blood Cxs taken.  Respiratory viral panel sent.  No obvious signs of infection.  Will de-escalate abx.  Pt afebrile w/o leukocytosis.      -Discontinue Vancomycin  -Continue pt Cefepime  -Recommend US of Lower Extremities for DVT in setting of trauma and recent sx.  -ID will continue to follow

## 2019-03-28 NOTE — SUBJECTIVE & OBJECTIVE
Interval History: In pain this morning and slightly confused. Family at bedside. Epi weaned off this morning.     LINES:  - Tunneled maguire in R subclavian for remodulin  - R Radial A line  - L IJ    Continuous Infusions:   epinephrine infusion Stopped (03/28/19 0700)    ropivacaine (PF) 2 mg/ml (0.2%) 2 mL/hr (03/28/19 1000)    treprostinil (REMODULIN) infusion      veletri/remodulin cassette      veletri/remodulin tubing       Scheduled Meds:   amiodarone  200 mg Oral Daily    bosentan  125 mg Oral Q12H    calcium carbonate  1,000 mg Oral Daily    ceFEPime (MAXIPIME) IVPB  2 g Intravenous Q12H    cetirizine  5 mg Oral Daily    enoxaparin  40 mg Subcutaneous Daily    ferrous gluconate  324 mg Oral Daily with breakfast    fluticasone  2 spray Each Nare QHS    folic acid  1 mg Oral Daily    furosemide  20 mg Oral After lunch    mupirocin  1 g Nasal BID    polyethylene glycol  17 g Oral Daily    pregabalin  75 mg Oral QHS    riociguat  0.5 mg Oral TID    senna-docusate 8.6-50 mg  2 tablet Oral BID    sodium chloride 0.9%  3 mL Intravenous Q8H    vancomycin (VANCOCIN) IVPB  750 mg Intravenous Q24H    warfarin  7.5 mg Oral Daily     PRN Meds:acetaminophen, morphine, ondansetron, oxyCODONE, oxyCODONE, promethazine (PHENERGAN) IVPB, sodium chloride 0.9%, treprostinil (REMODULIN) infusion    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    Multaq [dronedarone]      Objective:     Vital Signs (Most Recent):  Temp: 98.6 °F (37 °C) (03/28/19 0815)  Pulse: 109 (03/28/19 1000)  Resp: (!) 24 (03/28/19 1000)  BP: (!) 99/58 (03/28/19 1000)  SpO2: (!) 80 % (03/28/19 1000) Vital Signs (24h Range):  Temp:  [98.5 °F (36.9 °C)-100.2 °F (37.9 °C)] 98.6 °F (37 °C)  Pulse:  [] 109  Resp:  [16-31] 24  SpO2:  [80 %-92 %] 80 %  BP: ()/(50-69) 99/58  Arterial Line BP: ()/(36-57) 95/40     No data found.  Body mass index is 22.32  kg/m².      Intake/Output Summary (Last 24 hours) at 3/28/2019 1042  Last data filed at 3/28/2019 1000  Gross per 24 hour   Intake 847.68 ml   Output 835 ml   Net 12.68 ml        Physical Exam   Constitutional: She is oriented to person, place, and time. She appears well-developed and well-nourished. She is sleeping.   HENT:   Head: Normocephalic and atraumatic.   Eyes: EOM are normal.   Neck: Neck supple. No JVD present.   Cardiovascular: Normal rate and regular rhythm.   Pulmonary/Chest: Effort normal and breath sounds normal. No respiratory distress.   Abdominal: Soft. Bowel sounds are normal. She exhibits no distension.   Musculoskeletal: She exhibits no edema.   Neurological: She is oriented to person, place, and time.   Skin: Skin is warm and dry. Capillary refill takes less than 2 seconds. No erythema.   Psychiatric: She has a normal mood and affect. Her behavior is normal. Thought content normal.     Significant Labs:  CBC:  Recent Labs   Lab 03/26/19  1229 03/27/19  0210 03/28/19  0317   WBC 8.61 10.35 7.05   RBC 3.92* 3.82* 3.54*   HGB 12.2 11.6* 10.8*   HCT 36.4* 35.9* 32.1*    216 157   MCV 93 94 91   MCH 31.1* 30.4 30.5   MCHC 33.5 32.3 33.6     BNP:  No results for input(s): BNP in the last 168 hours.    Invalid input(s): BNPTRIAGELBLO  CMP:  Recent Labs   Lab 03/26/19  1404 03/27/19  0210 03/28/19  0317   * 138* 115*   CALCIUM 7.9* 7.8* 7.4*   ALBUMIN 2.8* 2.8* 2.4*   PROT 5.8* 5.9* 5.5*   * 136 134*   K 3.5 3.5 3.7   CO2 21* 19* 21*    107 107   BUN 20 17 15   CREATININE 1.3 1.0 0.8   ALKPHOS 37* 43* 52*   ALT 7* 6* 5*   AST 18 22 20   BILITOT 1.4* 1.1* 0.6      Coagulation:   Recent Labs   Lab 03/26/19  0258 03/27/19  0210 03/28/19  0317   INR 1.1 1.1 1.3*     LDH:  No results for input(s): LDH in the last 72 hours.  Microbiology:  Microbiology Results (last 7 days)     Procedure Component Value Units Date/Time    Blood culture [[id number]] Collected:  03/26/19 1440    Order  Status:  Completed Specimen:  Blood from Peripheral, Antecubital, Left Updated:  03/27/19 1612     Blood Culture, Routine No Growth to date     Blood Culture, Routine No Growth to date    Blood culture [[id number]] Collected:  03/26/19 1420    Order Status:  Completed Specimen:  Blood from Peripheral, Antecubital, Right Updated:  03/27/19 1612     Blood Culture, Routine No Growth to date     Blood Culture, Routine No Growth to date    Respiratory Viral Panel by PCR [facility]; Nasal Swab [[id number]] Collected:  03/26/19 1846    Order Status:  Sent Specimen:  Respiratory Updated:  03/26/19 1856        I have reviewed all pertinent labs within the past 24 hours.    Estimated Creatinine Clearance: 52.6 mL/min (based on SCr of 0.8 mg/dL).    Diagnostic Results:  I have reviewed all pertinent imaging results/findings within the past 24 hours.

## 2019-03-28 NOTE — PLAN OF CARE
Problem: Physical Therapy Goal  Goal: Physical Therapy Goal  Goals to be met by: 2019     Patient will increase functional independence with mobility by performin. Supine to sit with Moderate Assistance  2. Rolling to Left and Right with Minimal Assistance.  3. Sit to stand transfer with Moderate Assistance- met 3/27/2019  Sit to stand transfer with contact guard assistance   4. Sitting at edge of bed x10 minutes with Stand-by Assistance: met 3/27/2019   Sitting at edge of bed x15 mins with supervision   5. Gait x 20 feet with RW with contact guard assistance        Outcome: Ongoing (interventions implemented as appropriate)  Pt is progressing toward goals. All goals remain appropriate.

## 2019-03-28 NOTE — ANESTHESIA POST-OP PAIN MANAGEMENT
Acute Pain Service Progress Note    Molly Smith is a 73 y.o., female, [id number].    Surgery:  L Hip Jonny-Arthroplasty     Post Op Day #: 3    Catheter type: perineural  Suprainguinal Fascia Iliaca    Infusion type: Ropivacaine 0.2%  2ml/hr basal w/ IB 15 cc/hr    Problem List:    There are no hospital problems to display for this patient.      Subjective:     General appearance of Resting in bed ,   Pain with rest: 4    Numbers   Pain with movement: 8    Numbers   Side Effects    1. Pruritis No    2. Nausea No    3. Motor Blockade No, 1=Ability to bend knees and ankles    4. Sedation No, 1=awake and alert     Objective:     Catheter site clean, dry, intact         Vitals   There were no vitals filed for this visit.     Labs    Admission on 03/23/2019   No results displayed because visit has over 200 results.      Admission on 03/23/2019, Discharged on 03/23/2019   Component Date Value Ref Range Status    WBC 03/23/2019 6.60  3.90 - 12.70 K/uL Final    RBC 03/23/2019 5.00  4.00 - 5.40 M/uL Final    Hemoglobin 03/23/2019 15.2  12.0 - 16.0 g/dL Final    Hematocrit 03/23/2019 45.7  37.0 - 48.5 % Final    MCV 03/23/2019 91  82 - 98 fL Final    MCH 03/23/2019 30.4  27.0 - 31.0 pg Final    MCHC 03/23/2019 33.2  32.0 - 36.0 g/dL Final    RDW 03/23/2019 14.4  11.5 - 14.5 % Final    Platelets 03/23/2019 229  150 - 350 K/uL Final    MPV 03/23/2019 7.3* 9.2 - 12.9 fL Final    Gran # (ANC) 03/23/2019 5.4  1.8 - 7.7 K/uL Final    Lymph # 03/23/2019 0.6* 1.0 - 4.8 K/uL Final    Mono # 03/23/2019 0.5  0.3 - 1.0 K/uL Final    Eos # 03/23/2019 0.0  0.0 - 0.5 K/uL Final    Baso # 03/23/2019 0.10  0.00 - 0.20 K/uL Final    nRBC 03/23/2019 0  0 /100 WBC Final    Gran% 03/23/2019 81.9* 38.0 - 73.0 % Final    Lymph% 03/23/2019 9.4* 18.0 - 48.0 % Final    Mono% 03/23/2019 7.6  4.0 - 15.0 % Final    Eosinophil% 03/23/2019 0.3  0.0 - 8.0 % Final    Basophil% 03/23/2019 0.8  0.0 - 1.9 % Final    Differential Method  03/23/2019 Automated   Final    Sodium 03/23/2019 139  136 - 145 mmol/L Final    Potassium 03/23/2019 4.3  3.5 - 5.1 mmol/L Final    Chloride 03/23/2019 104  95 - 110 mmol/L Final    CO2 03/23/2019 26  23 - 29 mmol/L Final    Glucose 03/23/2019 91  74 - 106 mg/dL Final    BUN, Bld 03/23/2019 21* 7 - 17 mg/dL Final    Creatinine 03/23/2019 1.10  0.70 - 1.20 mg/dL Final    Calcium 03/23/2019 9.1  8.4 - 10.2 mg/dL Final    Total Protein 03/23/2019 7.5  6.3 - 8.2 g/dL Final    Albumin 03/23/2019 4.3  3.5 - 5.2 g/dL Final    Total Bilirubin 03/23/2019 1.3  0.2 - 1.3 mg/dL Final    Alkaline Phosphatase 03/23/2019 50  38 - 145 U/L Final    AST 03/23/2019 27  14 - 36 U/L Final    ALT 03/23/2019 24  10 - 44 U/L Final    eGFR if  03/23/2019 58* >60 mL/min/1.73 m^2 Final    eGFR if non African American 03/23/2019 50* >60 mL/min/1.73 m^2 Final        Meds   No current facility-administered medications for this visit.      No current outpatient medications on file.     Facility-Administered Medications Ordered in Other Visits   Medication Dose Route Frequency Provider Last Rate Last Dose    acetaminophen tablet 1,000 mg  1,000 mg Oral Q6H PRN Joe Arellano, NP   1,000 mg at 03/27/19 1311    amiodarone tablet 200 mg  200 mg Oral Daily Joe Arellano, NP   200 mg at 03/27/19 0916    bosentan tablet 125 mg  125 mg Oral Q12H Joe Arellano, NP   125 mg at 03/27/19 2342    calcium carbonate (OS-MARIELLE) tablet 500 mg  1,000 mg Oral Daily Joe Arellano, NP   1,000 mg at 03/27/19 0916    ceFEPIme injection 2 g  2 g Intravenous Q12H Alice Tim MD   2 g at 03/27/19 2028    cetirizine tablet 5 mg  5 mg Oral Daily Joe Arellano NP   5 mg at 03/27/19 0916    enoxaparin injection 40 mg  40 mg Subcutaneous Daily Munira Landin MD   40 mg at 03/27/19 1724    EPINEPHrine (ADRENALIN) 5 mg in sodium chloride 0.9% 250 mL infusion  0.02 mcg/kg/min Intravenous Continuous Joe  CARRINGTON Arellano 1.7 mL/hr at 03/28/19 0600 0.01 mcg/kg/min at 03/28/19 0600    ferrous gluconate tablet 324 mg  324 mg Oral Daily with breakfast Joe Arellano NP   324 mg at 03/27/19 0818    fluticasone 50 mcg/actuation nasal spray 100 mcg  2 spray Each Nare QHS Munira Landin MD   100 mcg at 03/27/19 2136    folic acid tablet 1 mg  1 mg Oral Daily Joe Arellano NP   1 mg at 03/27/19 0916    furosemide tablet 20 mg  20 mg Oral After lunch Joe Arellano NP   20 mg at 03/27/19 1311    morphine injection 2 mg  2 mg Intravenous Q1H PRN Joe Arellano NP        mupirocin 2 % ointment 1 g  1 g Nasal BID Munira Landin MD   1 g at 03/27/19 2136    ondansetron disintegrating tablet 4 mg  4 mg Oral Q6H PRN Rosibel Ulloa MD        oxyCODONE immediate release tablet 10 mg  10 mg Oral Q3H PRN Munira Landin MD   10 mg at 03/26/19 1652    oxyCODONE immediate release tablet 5 mg  5 mg Oral Q3H PRN Munira Landin MD   5 mg at 03/28/19 0358    polyethylene glycol packet 17 g  17 g Oral Daily Joe Arellano NP   17 g at 03/27/19 1122    pregabalin capsule 75 mg  75 mg Oral QHS Munira Landin MD   75 mg at 03/27/19 2129    promethazine (PHENERGAN) 6.25 mg in dextrose 5 % 50 mL IVPB  6.25 mg Intravenous Q6H PRN Munira Landin  mL/hr at 03/25/19 1525 6.25 mg at 03/25/19 1525    riociguat (ADEMPAS) tablet 0.5 mg  0.5 mg Oral TID Joe Arellano NP   0.5 mg at 03/27/19 2315    ropivacaine (PF) 2 mg/ml (0.2%) infusion  2 mL/hr Perineural Continuous Reid H MD Keiry 2 mL/hr at 03/28/19 0600 2 mL/hr at 03/28/19 0600    senna-docusate 8.6-50 mg per tablet 2 tablet  2 tablet Oral BID Joe Arellano NP   2 tablet at 03/27/19 2130    sodium chloride 0.9% flush 3 mL  3 mL Intravenous Q8H Munria Landin MD   3 mL at 03/27/19 1434    sodium chloride 0.9% flush 5 mL  5 mL Intravenous PRN Munira Landin MD         treprostinil (REMODULIN) 12,000,000 ng in sodium chloride 0.9% 100 mL infusion  89.2 ng/kg/min (Order-Specific) Intravenous Continuous PRN Alice Tim MD        vancomycin 750 mg in dextrose 5 % 250 mL IVPB (ready to mix system)  750 mg Intravenous Q24H Alice Tim  mL/hr at 03/27/19 1545 750 mg at 03/27/19 1545    VELETRI/REMODULIN CASSETTE   Intravenous Continuous Munira Landin MD        VELETRI/REMODULIN TUBING   Intravenous Continuous Munira Landin MD        warfarin (COUMADIN) tablet 7.5 mg  7.5 mg Oral Daily Alice Tim MD   7.5 mg at 03/27/19 1723        Anticoagulant dose Lovenox SubQ 40mg q24    Assessment:     Pain control adequate    Plan:    Pain well controlled, continue current treatment. Additional bolus given, will reassess later today. Plan for step down later today per primary. Will plan to continue PNC on floor until closer to discharge date, likely some time next week.    Evaluator Reid Ricci

## 2019-03-28 NOTE — PHYSICIAN QUERY
PT Name: Molly Smith  MR #: [id number]    Physician Query Form - Perfusion Diagnosis Clarification     CDS/: Lakia Schmitz RN               Contact information:[email or web address]  This form is a permanent document in the medical record.     Query Date: March 28, 2019    By submitting this query, we are merely seeking further clarification of documentation. Please utilize your independent clinical judgment when addressing the question(s) below.    The medical record contains the following:    Indicators   Supporting Clinical Findings   Location in Medical Record   x Acute Illness (e.g. 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  Pulmonary HTN, SVT, chronic respiratory failure with hypoxia on 5L O2 at home    Closed L femoral neck fracture. post op fever on 3/26 and was started on empiric Vanc + Cefepime Transplant Heart provider H&P        Infectious Disease consult 3/27    Acidosis documented     x ABGs / Labs H&H= 15.2/45.7  GFR 50  Calcium 9.1  H&H= 14.1/42.7  GFR 45  Calcium 8.4  H&H= 14.4/44.1  GFR 56.4   Calcium 7.5 ]  Alkaline phosphatase 41  H&H= 11.9/36.5  GFR 41.1 Calcium 7.8    Alkaline phosphatase  36  H&H= 11.6/35.9  GFR 56.4 Calcium 7.8  Phosphorus 2.0  Alkaline phosphatase 43  H&H= 10.8/32.1  GFR >60  Calcium 7.4  Phosphorus 2.0  Alkaline phosphatase 52 Labs 3/23  Labs 3/24  Labs 3/25    Labs 3/26    Labs 3/27  Labs 3/28   x Vital Signs BP 87/51      RR 23  O2 sat 80%   BP 93/50    HR 94    RR 23  O2sat 89%   /60    RR 23  O2Sat 85%  BP 93/53    HR 99                O2Sat 83%   /55  HR 87    RR 26  O2Sat 82%   BP 90/56    HR  81   RR 20  O2Sat 90%   BP 68/37    HR  68   RR 30  BP  70/39   HR  73   RR 26  BP 88/49    HR 75    RR 24  O2Sat 85%  BP 98/56    HR 82    RR 20  O2Sat 85% Vital signs 3/28@0900  Vital signs 3/28@0000  Vital signs  3/27@2015  Vital signs 3/26@1700  Vital signs 3/26@0701  Vital signs 3/26@0300  Vital signs 3/25@1408  Vital signs 3/25@1345  Vital signs 3/24@0314  Vital signs 3/23@1450   x Hypotension or Low Blood Pressure documented Mildly hypotensive, epi .02     moved to ICU in setting of hypotension- increasing pressor requirements today with increase in analgesia- also with fever- blood cx, resp cx and ID c/s  Will cover empirically   Gentle IVF given low CVP Orthopedic surgery note 3/26  Transplant heart PN 3/26   x Altered Mental Status or Confusion In pain this morning and slightly confused.   Transplant Heart PN 3/28    Diaphoresis, Cold Extremities or Cyanosis      Oliguria     x Medication/Treatment:  -Vasopressors  -Inotropic Drugs  -IV Fluids  -Cardiac Assist Devices  -Hemodynamic Monitoring  -Blood/Blood Products Amiodarone oral  bosentan oral  Epinephrine infusion IV  iriociguat oral  treprostinil infusion   MAR 3/24-present  MAR 3/23-present  MAR 3/25-present  MAR 3/23-present  MAR 3/24-present   x Other: 2decho 1/29/19  · Normal left ventricular systolic function. The estimated ejection fraction is 65%  · Septal wall has abnormal motion. Systolic flattening of the interventricular septum consistent with right ventricle pressure overload.  · Biatrial enlargement.  · Grade I (mild) left ventricular diastolic dysfunction consistent with impaired relaxation.  · Normal left atrial pressure.  · Moderately reduced right ventricular systolic function.  · Severe right ventricular enlargement.  · Moderate tricuspid regurgitation.  · Moderate pulmonic regurgitation.  · The estimated PA systolic pressure is 89 mm Hg  · Pulmonary hypertension present.  · Normal central venous pressure (3 mm Hg). Cardiology consult 3/23     Provider, please specify diagnosis or diagnoses associated with above clinical findings.    [   ] Cardiogenic Shock   [   ] Hemorrhagic Shock   [   ] Hypovolemic Shock   [ x  ] Other Shock (please specify):  Vasodilatory   [   ] Shock Unspecified   [   ] Other Condition (please specify):   [  ] Clinically Undetermined         Please document in your progress notes daily for the duration of treatment until resolved and include in your discharge summary.

## 2019-03-28 NOTE — ASSESSMENT & PLAN NOTE
- S/P s/p left patrick 3/25, received post-op Ancef  - Weight bearing status: WBAT LLE  - Pain control: PNC per anesthesia(appreciate their help).   - PT/OT: must be seen by PT 7x/week - please notify orthopedics if patient is not seen

## 2019-03-28 NOTE — PHYSICIAN QUERY
"PT Name: Molly Smith  MR #: [id number]    Physician Query Form - Hematology Clarification      CDS/: Lakia Schmitz RN               Contact information:[email or web address]    This form is a permanent document in the medical record.      Query Date: March 28, 2019    By submitting this query, we are merely seeking further clarification of documentation. Please utilize your independent clinical judgment when addressing the question(s) below.    The Medical record contains the following:   Indicators  Supporting Clinical Findings Location in Medical Record    "Anemia" documented     x H & H = H&H=15.2/45.7  H&H=14.1/42.7  H&H=14.4/44.1  H&H=11.9/36.5  H&H=11.6/35.9  H&H=10.8/32.1 Labs 3/23  Labs 3/24  Labs 3/25  Labs 3/26  Labs 3/27  Labs 3/28   x BP =                     HR= BP 87/51      RR 23  O2 sat 80%   BP 93/50    HR 94    RR 23  O2sat 89%   /60    RR 23  O2Sat 85%  BP 93/53    HR 99                O2Sat 83%   /55  HR 87    RR 26  O2Sat 82%   BP 90/56    HR  81   RR 20  O2Sat 90%   BP 68/37    HR  68   RR 30  BP  70/39   HR  73   RR 26  BP 88/49    HR 75    RR 24  O2Sat 85%  BP 98/56    HR 82    RR 20  O2Sat 85% Vital signs 3/28@0900  Vital signs 3/28@0000  Vital signs 3/27@2015  Vital signs 3/26@1700  Vital signs 3/26@0701  Vital signs 3/26@0300  Vital signs 3/25@1408  Vital signs 3/25@1345  Vital signs 3/24@0314  Vital signs 3/23@1450    "GI bleeding" documented      Acute bleeding (Non GI site)      Transfusion(s)     x Treatment: Ferrous gluconate oral  Folic acid oral  Phytonadione oral MAR 3/24-present  MAR 3/24-present  MAR 3/24   x Other:  Warfarin oral  bosentan oral  iriociguat oral  treprostinil infusion    Left hip hemiarthroplasty for femoral neck fracture   cc MAR 3/26-present  MAR 3/23-present  MAR 3/23-present  MAR 3/24-present    OP note 3/25     Provider, please specify diagnosis or diagnoses associated with above clinical findings.    [ x ] " Acute blood loss anemia expected post-operatively   [  ] Acute blood loss anemia   [  ] Iron deficiency anemia   [  ] Chronic blood loss anemia   [  ] Precipitous drop in Hematocrit     [  ] Anemia of chronic disease ( Specify chronic disease)       [  ] Other (Specify):   [  ] Clinically Undetermined       [  ] Other Hematological Diagnosis (please specify):     [  ] Clinically Undetermined       Please document in your progress notes daily for the duration of treatment, until resolved, and include in your discharge summary.

## 2019-03-28 NOTE — PHYSICIAN QUERY
PT Name: Molly Smith  MR #: [id number]    Physician Query Form - CardioPulmonary Clarification      CDS/: Lakia Schmitz RN             Contact information: [email or web address]    This form is a permanent document in the medical record.    Query Date: March 28, 2019    By submitting this query, we are merely seeking further clarification of documentation. Please utilize your independent clinical judgment when addressing the question(s) below.    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  Pulmonary HTN, SVT, chronic respiratory failure with hypoxia on 5L O2 at home Transplant Heart provider H&P   x Echo and/or Heart Cath Findings 2decho 1/29/19  · Normal left ventricular systolic function. The estimated ejection fraction is 65%  · Septal wall has abnormal motion. Systolic flattening of the interventricular septum consistent with right ventricle pressure overload.  · Biatrial enlargement.  · Grade I (mild) left ventricular diastolic dysfunction consistent with impaired relaxation.  · Normal left atrial pressure.  · Moderately reduced right ventricular systolic function.  · Severe right ventricular enlargement.  · Moderate tricuspid regurgitation.  · Moderate pulmonic regurgitation.  · The estimated PA systolic pressure is 89 mm Hg  · Pulmonary hypertension present.  · Normal central venous pressure (3 mm Hg). Cardiology consult 3/23   x BiPAP/Intubation/Supplemental O2 Nasal Cannula Oxygen 5L/min (40%) Vital signs 3/26-present   x SOB, UNGER, Fatigue, Dizziness, LE Edema, Cyanosis, Chest Pain, Respiratory Distress, Hypoxia, etc. chronic respiratory failure with hypoxia on 5L O2 at home  Transplant heart provider H&P   x Treatment         Medication Warfarin oral  bosentan oral  iriociguat oral  treprostinil infusion MAR 3/26-present  MAR 3/23-present  MAR 3/23-present  MAR 3/24-present   x Other moved to ICU in setting of hypotension- increasing pressor requirements today with increase in analgesia- also with fever- blood cx, resp cx and ID c/s  Will cover empirically   Gentle IVF given low CVP Transplant heart PN 3/26   Provider, please specify the type of pulmonary hypertension:  [   ] Group 1:  Pulmonary Arterial Hypertension - includes Primary, Idiopathic, Inheritable, and Secondary (due to drugs, toxins, congenital heart diease, HIV infection, etc.)     [ x  ] Group 5:  Pulmonary Hypertension due to other, multifactorial, or unclear mechanisms     [   ] Pulmonary Hypertension, unspecified     [   ] Other Cardiopulmonary Condition (please specify):     [  ] Clinically Undetermined         Please document in your progress notes daily for the duration of treatment, until resolved, and include in your discharge summary.

## 2019-03-28 NOTE — PT/OT/SLP PROGRESS
Occupational Therapy   Treatment    Name: Molly Smith  MRN: [id number]  Admitting Diagnosis:  Closed fracture of neck of left femur  3 Days Post-Op    Recommendations:     Discharge Recommendations: nursing facility, skilled    Assessment:     Molly Smith is a 73 y.o. female with a medical diagnosis of Closed fracture of neck of left femur.  . Performance deficits affecting function are weakness, impaired functional mobilty, gait instability, impaired self care skills, impaired endurance, impaired balance, pain, impaired cognition.   Pt tolerated session well with good effort and good functional progress.     Rehab Prognosis:  Good; patient would benefit from acute skilled OT services to address these deficits and reach maximum level of function.       Plan:     Patient to be seen 4 x/week to address the above listed problems via self-care/home management, therapeutic exercises, therapeutic activities  · Plan of Care Expires:    · Plan of Care Reviewed with: patient, spouse, daughter    Subjective     Pain/Comfort:  · Pain Rating 1: (pt did not rate pain this date. )  · Location - Side 1: Left  · Location 1: leg  · Pain Addressed 1: Reposition, Distraction, Pre-medicate for activity    Objective:     Communicated with: nsg  prior to session.    Pt found supine in bed. Pt with too much pain first attempt. OT returned and pain more controlled and pt medicated for pain.     BP elevated during activity but remained stable and WFL . Oxygen saturation remained grossly 80-82% during session. Pt was nauseated at end of session, but symptoms resolved with time and seated rest. Nsg notified. Pt without c/o of dizziness.     General Precautions: Standard, fall   Orthopedic Precautions:LLE weight bearing as tolerated, LLE posterior precautions     Occupational Performance:     Bed Mobility:    Supine>sit with MAX A     Functional Mobility/Transfers:  Pt completed sit>stand with MIN A   Pt completed t/f to commode with  MIN A     Activities of Daily Living:  Feeding: independent  G/H: seated with set-up  UE dressing: MIN A   LE dressing: TOTAL A   Toileting: TOTAL A     AMPAC 6 Click ADL: 13    Treatment & Education:  --Pt able to engage trunk and UE's with supine>sit to allow for MAX A to achieve full transition. Pt demo Fair to Fair+ sitting balance. Pt completed toileting skills on BSC and t/f to chair this date. OT educated pt and fly and nsg for OOB to chair x 2 hours this date with nsg to assist pt back to bed.  --Further education provided to pt and fly re: posterior hip precautions and implications on functional activity.  --Pt noted to asking OT same questions and repeating herself during session. OT notified nsg of this slight change of mentation. Pt remained cooperative, pleasant and motivated throughout session.   --Education provided re: OT POC and safety with functional mobility/ADL skills.     Patient left seated in chair with fly present and nsg notified.     GOALS:   Multidisciplinary Problems     Occupational Therapy Goals        Problem: Occupational Therapy Goal    Goal Priority Disciplines Outcome Interventions   Occupational Therapy Goal     OT, PT/OT     Description:  Goals to be met by: 7 days 4/2/19     Patient will increase functional independence with ADLs by performing:    Pt to complete g/h skills seated with set-up--MET  Pt to complete UE dressing with MIN A --MET  Pt to demo Fair sitting balance to allow for progression with ADL training.--MET  Pt to t/f to chair with MAX A --MET  Pt to t/f to BSC with SBA  Pt to complete toileting MIN A   Pt to complete g/h skills with CGA in standing  Pt and fly to demo/verb understanding of AE for LE dressing given current hip precautions.                        Time Tracking:     OT Date of Treatment: 03/28/19  OT Start Time: 1030  OT Stop Time: 1115  OT Total Time (min): 45 min    Billable Minutes:Self Care/Home Management 15  Therapeutic Activity 15    Zuleyka UMANZOR  JOHANNE Maloney  3/28/2019

## 2019-03-28 NOTE — PLAN OF CARE
Problem: Adult Inpatient Plan of Care  Goal: Plan of Care Review  Outcome: Ongoing (interventions implemented as appropriate)  Pt AAOx4 and afebrile. PRN pain medication unable to control pain today. Anesthesia at bedside to check nerve block, bolus given. Pt also asked for home medication rather than oxy. Tramadol given prn this evening, no nausea noted. Pt up to bedside commode and chair with physical therapy, no SOB noted, pt complained of pain. Pt placed on bedpan to have BM, unable to have BM in bed. Lactulose given per order to have BM, pt up to bedside commode with nurse and tech. Very large BM on commode, immediately after pt became hypotensive and tachycardic along with acute onset SOB and drop in O2 sat. MD called to bedside. Charge nurse and nurse assisted pt back into bed. Pt O2 sat back to 80s, HR 100s. BP stable. While MD at bedside, pt went hypotensive, tachycardic and O2 sat dropped. EKG done, Echo done. Awaiting further orders from MD. Will continue to monitor.

## 2019-03-28 NOTE — PT/OT/SLP PROGRESS
Physical Therapy Treatment    Patient Name:  Molly Smith   MRN:  [id number]    Recommendations:     Discharge Recommendations:  nursing facility, skilled   Discharge Equipment Recommendations: walker, rolling, commode   Barriers to discharge: Decreased caregiver support    Assessment:     Molly Smith is a 73 y.o. female admitted with a medical diagnosis of Closed fracture of neck of left femur.  She presents with the following impairments/functional limitations:  weakness, impaired endurance, impaired self care skills, impaired functional mobilty, gait instability, impaired balance, pain, orthopedic precautions. Pt tolerated activity with improved mobility reflected by increased distance ambulated with decreased assistance required. Pt continues to be limited by pain, demonstrates great motivation. Pt with persistent c/o nausea throughout session. Upon discharge, pt would benefit from continued skilled therapy intervention at [facility] nursing facility to progress toward more independent mobility. At this time, pt would continue to benefit from acute skilled therapy intervention to address deficits and progress toward prior level of function.       Rehab Prognosis: Good; patient would benefit from acute skilled PT services to address these deficits and reach maximum level of function.    Recent Surgery: Procedure(s) (LRB):  HEMIARTHROPLASTY, HIP - left (Left) 3 Days Post-Op    Plan:     During this hospitalization, patient to be seen 5 x/week to address the identified rehab impairments via gait training, therapeutic activities, therapeutic exercises, neuromuscular re-education and progress toward the following goals:    · Plan of Care Expires:  04/26/19    Subjective     Chief Complaint: Pt c/o onset of nausea during session.   Patient/Family Comments/goals: to get better and return home   Pain/Comfort:  · Pain Rating 1: (pt did not quantify pain this date )      Objective:     Communicated with RN prior to  session.  Patient found HOB elevated with arterial line, blood pressure cuff, pulse ox (continuous), telemetry, peripheral IV, central line, oxygen, PCA(remodulin pump ) upon PT entry to room.     General Precautions: Standard, fall   Orthopedic Precautions:LLE weight bearing as tolerated, LLE posterior precautions   Braces:       Functional Mobility:  · Bed Mobility:     · Supine to Sit: maximal assistance  · Transfers:     · Sit to Stand:  From EOB and commode: minimum assistance with rolling walker  · Bed to BSC: contact guard assistance with RW. Pt required max cues for motor sequencing, management of RW.   · Gait: Pt took 4 steps to pivot from bed to BSC, then 8 steps to chair. Pt required CGA with use of RW. Pt required max cues for motor sequencing, management of RW, encouragement for step initiation. Pt demo'd very slow horacio, small step size, decreased food clearance, flexed trunk, increased UE support through RW. Pt encouraged to turn to R to prevent IR of L hip.        AM-PAC 6 CLICK MOBILITY  Turning over in bed (including adjusting bedclothes, sheets and blankets)?: 2  Sitting down on and standing up from a chair with arms (e.g., wheelchair, bedside commode, etc.): 3  Moving from lying on back to sitting on the side of the bed?: 2  Moving to and from a bed to a chair (including a wheelchair)?: 3  Need to walk in hospital room?: 3  Climbing 3-5 steps with a railing?: 2  Basic Mobility Total Score: 15       Therapeutic Activities and Exercises:   Pt educated on role of PT/POC. Pt verbalized understanding.   [facility] performed toileting with OT, then ambulated to chair. Pt encouraged to extend L knee and offload L LE when transferring to sitting to prevent excessive flexion and maintain WBAT status.   Pt encouraged to sit up in chair 2hrs. Pt agreeable.   RW ordered to pt room for hospital use.     Patient left up in chair with all lines intact, call button in reach and RN   notified..    GOALS:   Multidisciplinary Problems     Physical Therapy Goals        Problem: Physical Therapy Goal    Goal Priority Disciplines Outcome Goal Variances Interventions   Physical Therapy Goal     PT, PT/OT Ongoing (interventions implemented as appropriate)     Description:  Goals to be met by: 2019     Patient will increase functional independence with mobility by performin. Supine to sit with Moderate Assistance  2. Rolling to Left and Right with Minimal Assistance.  3. Sit to stand transfer with Moderate Assistance- met 3/27/2019  Sit to stand transfer with contact guard assistance   4. Sitting at edge of bed x10 minutes with Stand-by Assistance: met 3/27/2019   Sitting at edge of bed x15 mins with supervision   5. Gait x 20 feet with RW with contact guard assistance                         Time Tracking:     PT Received On: 19  PT Start Time: 1046     PT Stop Time: 1122  PT Total Time (min): 36 min     Billable Minutes: Gait Training 36 mins     Treatment Type: Treatment  PT/PTA: PT           Sydnie Kahn, PT  2019

## 2019-03-29 LAB
ALBUMIN SERPL BCP-MCNC: 2.3 G/DL (ref 3.5–5.2)
ALP SERPL-CCNC: 48 U/L (ref 55–135)
ALT SERPL W/O P-5'-P-CCNC: 6 U/L (ref 10–44)
ANION GAP SERPL CALC-SCNC: 6 MMOL/L (ref 8–16)
AST SERPL-CCNC: 17 U/L (ref 10–40)
BASOPHILS # BLD AUTO: 0.06 K/UL (ref 0–0.2)
BASOPHILS NFR BLD: 0.9 % (ref 0–1.9)
BILIRUB SERPL-MCNC: 0.6 MG/DL (ref 0.1–1)
BUN SERPL-MCNC: 15 MG/DL (ref 8–23)
CALCIUM SERPL-MCNC: 7.6 MG/DL (ref 8.7–10.5)
CHLORIDE SERPL-SCNC: 106 MMOL/L (ref 95–110)
CO2 SERPL-SCNC: 21 MMOL/L (ref 23–29)
CREAT SERPL-MCNC: 0.8 MG/DL (ref 0.5–1.4)
DIFFERENTIAL METHOD: ABNORMAL
EOSINOPHIL # BLD AUTO: 0.1 K/UL (ref 0–0.5)
EOSINOPHIL NFR BLD: 1.7 % (ref 0–8)
ERYTHROCYTE [DISTWIDTH] IN BLOOD BY AUTOMATED COUNT: 13.8 % (ref 11.5–14.5)
EST. GFR  (AFRICAN AMERICAN): >60 ML/MIN/1.73 M^2
EST. GFR  (NON AFRICAN AMERICAN): >60 ML/MIN/1.73 M^2
GLUCOSE SERPL-MCNC: 101 MG/DL (ref 70–110)
HCT VFR BLD AUTO: 34 % (ref 37–48.5)
HGB BLD-MCNC: 11 G/DL (ref 12–16)
IMM GRANULOCYTES # BLD AUTO: 0.02 K/UL (ref 0–0.04)
IMM GRANULOCYTES NFR BLD AUTO: 0.3 % (ref 0–0.5)
INR PPP: 1.5 (ref 0.8–1.2)
LYMPHOCYTES # BLD AUTO: 0.5 K/UL (ref 1–4.8)
LYMPHOCYTES NFR BLD: 7 % (ref 18–48)
MAGNESIUM SERPL-MCNC: 2 MG/DL (ref 1.6–2.6)
MCH RBC QN AUTO: 30.3 PG (ref 27–31)
MCHC RBC AUTO-ENTMCNC: 32.4 G/DL (ref 32–36)
MCV RBC AUTO: 94 FL (ref 82–98)
METHEMOGLOBIN: 0.3 % (ref 0–3)
MONOCYTES # BLD AUTO: 0.5 K/UL (ref 0.3–1)
MONOCYTES NFR BLD: 7.6 % (ref 4–15)
NEUTROPHILS # BLD AUTO: 5.3 K/UL (ref 1.8–7.7)
NEUTROPHILS NFR BLD: 82.5 % (ref 38–73)
NRBC BLD-RTO: 0 /100 WBC
PHOSPHATE SERPL-MCNC: 1.6 MG/DL (ref 2.7–4.5)
PLATELET # BLD AUTO: 161 K/UL (ref 150–350)
PMV BLD AUTO: 9.5 FL (ref 9.2–12.9)
POTASSIUM SERPL-SCNC: 4.3 MMOL/L (ref 3.5–5.1)
PROT SERPL-MCNC: 5.6 G/DL (ref 6–8.4)
PROTHROMBIN TIME: 14.6 SEC (ref 9–12.5)
RBC # BLD AUTO: 3.63 M/UL (ref 4–5.4)
SODIUM SERPL-SCNC: 133 MMOL/L (ref 136–145)
WBC # BLD AUTO: 6.45 K/UL (ref 3.9–12.7)

## 2019-03-29 PROCEDURE — 94761 N-INVAS EAR/PLS OXIMETRY MLT: CPT

## 2019-03-29 PROCEDURE — 99233 PR SUBSEQUENT HOSPITAL CARE,LEVL III: ICD-10-PCS | Mod: ,,, | Performed by: INTERNAL MEDICINE

## 2019-03-29 PROCEDURE — 99291 CRITICAL CARE FIRST HOUR: CPT | Mod: ,,, | Performed by: INTERNAL MEDICINE

## 2019-03-29 PROCEDURE — 63600175 PHARM REV CODE 636 W HCPCS: Performed by: STUDENT IN AN ORGANIZED HEALTH CARE EDUCATION/TRAINING PROGRAM

## 2019-03-29 PROCEDURE — 63600175 PHARM REV CODE 636 W HCPCS: Performed by: INTERNAL MEDICINE

## 2019-03-29 PROCEDURE — 99233 SBSQ HOSP IP/OBS HIGH 50: CPT | Mod: ,,, | Performed by: INTERNAL MEDICINE

## 2019-03-29 PROCEDURE — 63600367 HC NITRIC OXIDE PER HOUR

## 2019-03-29 PROCEDURE — 99291 CRITICAL CARE FIRST HOUR: CPT | Mod: ,,, | Performed by: NURSE PRACTITIONER

## 2019-03-29 PROCEDURE — 20000000 HC ICU ROOM

## 2019-03-29 PROCEDURE — 84100 ASSAY OF PHOSPHORUS: CPT

## 2019-03-29 PROCEDURE — 99900035 HC TECH TIME PER 15 MIN (STAT)

## 2019-03-29 PROCEDURE — 80053 COMPREHEN METABOLIC PANEL: CPT

## 2019-03-29 PROCEDURE — 83735 ASSAY OF MAGNESIUM: CPT

## 2019-03-29 PROCEDURE — 27000221 HC OXYGEN, UP TO 24 HOURS

## 2019-03-29 PROCEDURE — 27100092 HC HIGH FLOW DELIVERY CANNULA

## 2019-03-29 PROCEDURE — 25000003 PHARM REV CODE 250: Performed by: STUDENT IN AN ORGANIZED HEALTH CARE EDUCATION/TRAINING PROGRAM

## 2019-03-29 PROCEDURE — 63600175 PHARM REV CODE 636 W HCPCS: Performed by: NURSE PRACTITIONER

## 2019-03-29 PROCEDURE — 27100171 HC OXYGEN HIGH FLOW UP TO 24 HOURS

## 2019-03-29 PROCEDURE — 99291 PR CRITICAL CARE, E/M 30-74 MINUTES: ICD-10-PCS | Mod: ,,, | Performed by: INTERNAL MEDICINE

## 2019-03-29 PROCEDURE — C9113 INJ PANTOPRAZOLE SODIUM, VIA: HCPCS | Performed by: NURSE PRACTITIONER

## 2019-03-29 PROCEDURE — 99291 PR CRITICAL CARE, E/M 30-74 MINUTES: ICD-10-PCS | Mod: ,,, | Performed by: NURSE PRACTITIONER

## 2019-03-29 PROCEDURE — 85610 PROTHROMBIN TIME: CPT

## 2019-03-29 PROCEDURE — 25000003 PHARM REV CODE 250: Performed by: NURSE PRACTITIONER

## 2019-03-29 PROCEDURE — 93041 RHYTHM ECG TRACING: CPT

## 2019-03-29 PROCEDURE — 25000003 PHARM REV CODE 250: Performed by: INTERNAL MEDICINE

## 2019-03-29 PROCEDURE — 85025 COMPLETE CBC W/AUTO DIFF WBC: CPT

## 2019-03-29 RX ORDER — VERAPAMIL HYDROCHLORIDE 40 MG/1
40 TABLET ORAL ONCE
Status: DISCONTINUED | OUTPATIENT
Start: 2019-03-29 | End: 2019-03-30

## 2019-03-29 RX ORDER — ADENOSINE 3 MG/ML
INJECTION, SOLUTION INTRAVENOUS
Status: DISPENSED
Start: 2019-03-29 | End: 2019-03-30

## 2019-03-29 RX ORDER — PANTOPRAZOLE SODIUM 40 MG/10ML
40 INJECTION, POWDER, LYOPHILIZED, FOR SOLUTION INTRAVENOUS 2 TIMES DAILY
Status: DISCONTINUED | OUTPATIENT
Start: 2019-03-29 | End: 2019-03-31

## 2019-03-29 RX ORDER — SIMETHICONE 80 MG
1 TABLET,CHEWABLE ORAL
Status: DISCONTINUED | OUTPATIENT
Start: 2019-03-29 | End: 2019-04-05 | Stop reason: HOSPADM

## 2019-03-29 RX ADMIN — MUPIROCIN 1 G: 20 OINTMENT TOPICAL at 09:03

## 2019-03-29 RX ADMIN — ACETAMINOPHEN 1000 MG: 500 TABLET ORAL at 09:03

## 2019-03-29 RX ADMIN — BOSENTAN 125 MG: 125 TABLET, FILM COATED ORAL at 08:03

## 2019-03-29 RX ADMIN — ROPIVACAINE HYDROCHLORIDE 2 ML/HR: 2 INJECTION, SOLUTION EPIDURAL; INFILTRATION at 11:03

## 2019-03-29 RX ADMIN — RIOCIGUAT 0.5 MG: 0.5 TABLET, FILM COATED ORAL at 09:03

## 2019-03-29 RX ADMIN — ACETAMINOPHEN 1000 MG: 500 TABLET ORAL at 01:03

## 2019-03-29 RX ADMIN — TRAMADOL HYDROCHLORIDE 50 MG: 50 TABLET, COATED ORAL at 09:03

## 2019-03-29 RX ADMIN — CEFEPIME 2 G: 2 INJECTION, POWDER, FOR SOLUTION INTRAVENOUS at 09:03

## 2019-03-29 RX ADMIN — MAGNESIUM SULFATE HEPTAHYDRATE 1 G: 500 INJECTION, SOLUTION INTRAMUSCULAR; INTRAVENOUS at 08:03

## 2019-03-29 RX ADMIN — FLUTICASONE PROPIONATE 100 MCG: 50 SPRAY, METERED NASAL at 09:03

## 2019-03-29 RX ADMIN — ROPIVACAINE HYDROCHLORIDE 2 ML/HR: 2 INJECTION, SOLUTION EPIDURAL; INFILTRATION at 03:03

## 2019-03-29 RX ADMIN — SIMETHICONE CHEW TAB 80 MG 80 MG: 80 TABLET ORAL at 09:03

## 2019-03-29 RX ADMIN — CETIRIZINE HYDROCHLORIDE 5 MG: 5 TABLET ORAL at 01:03

## 2019-03-29 RX ADMIN — RIOCIGUAT 0.5 MG: 0.5 TABLET, FILM COATED ORAL at 08:03

## 2019-03-29 RX ADMIN — PANTOPRAZOLE SODIUM 40 MG: 40 INJECTION, POWDER, FOR SOLUTION INTRAVENOUS at 09:03

## 2019-03-29 RX ADMIN — ALUMINUM HYDROXIDE, MAGNESIUM HYDROXIDE, AND SIMETHICONE 50 ML: 200; 200; 20 SUSPENSION ORAL at 10:03

## 2019-03-29 RX ADMIN — FUROSEMIDE 20 MG: 20 TABLET ORAL at 01:03

## 2019-03-29 RX ADMIN — RIOCIGUAT 0.5 MG: 0.5 TABLET, FILM COATED ORAL at 03:03

## 2019-03-29 RX ADMIN — SIMETHICONE CHEW TAB 80 MG 80 MG: 80 TABLET ORAL at 10:03

## 2019-03-29 RX ADMIN — ENOXAPARIN SODIUM 40 MG: 100 INJECTION SUBCUTANEOUS at 08:03

## 2019-03-29 RX ADMIN — PANTOPRAZOLE SODIUM 40 MG: 40 INJECTION, POWDER, FOR SOLUTION INTRAVENOUS at 10:03

## 2019-03-29 RX ADMIN — CALCIUM 1000 MG: 500 TABLET ORAL at 08:03

## 2019-03-29 RX ADMIN — ENOXAPARIN SODIUM 60 MG: 100 INJECTION SUBCUTANEOUS at 09:03

## 2019-03-29 RX ADMIN — STANDARDIZED SENNA CONCENTRATE AND DOCUSATE SODIUM 2 TABLET: 8.6; 5 TABLET, FILM COATED ORAL at 08:03

## 2019-03-29 RX ADMIN — FERROUS GLUCONATE TAB 324 MG (37.5 MG ELEMENTAL IRON) 324 MG: 324 (37.5 FE) TAB at 08:03

## 2019-03-29 RX ADMIN — ACETAMINOPHEN 1000 MG: 500 TABLET ORAL at 05:03

## 2019-03-29 RX ADMIN — MUPIROCIN 1 G: 20 OINTMENT TOPICAL at 08:03

## 2019-03-29 RX ADMIN — POLYETHYLENE GLYCOL 3350 17 G: 17 POWDER, FOR SOLUTION ORAL at 08:03

## 2019-03-29 RX ADMIN — STANDARDIZED SENNA CONCENTRATE AND DOCUSATE SODIUM 2 TABLET: 8.6; 5 TABLET, FILM COATED ORAL at 09:03

## 2019-03-29 RX ADMIN — WARFARIN SODIUM 7.5 MG: 7.5 TABLET ORAL at 04:03

## 2019-03-29 RX ADMIN — AMIODARONE HYDROCHLORIDE 200 MG: 200 TABLET ORAL at 08:03

## 2019-03-29 RX ADMIN — FOLIC ACID 1 MG: 1 TABLET ORAL at 08:03

## 2019-03-29 RX ADMIN — ROPIVACAINE HYDROCHLORIDE 2 ML/HR: 2 INJECTION, SOLUTION EPIDURAL; INFILTRATION at 10:03

## 2019-03-29 RX ADMIN — BOSENTAN 125 MG: 125 TABLET, FILM COATED ORAL at 09:03

## 2019-03-29 NOTE — ASSESSMENT & PLAN NOTE
73 y.o. F with adult congenital heart disease with prior dx of sinus venosus defect, anomalous pulmonary venous drainage with PAH s currently on IV remodulin (89.2ng/kg/min) adempas and tracleer, lakisha of [place] bacteremia s/p central line removal, transferred for left femoral neck fracture. Patient presented to [facility] for hip pain and inability to bear weight on the left leg after a trip and fall yesterday.  Patient tripped on a cord for her pulm HTN pump falling directly onto the left hip. Patient denies head trauma or upper body injury.      Pt now s/p left hip hemiarthroplasty on 3/25 for femoral neck fracture.  Pt developed post op fever on 3/26 and was started on empiric Vanc + Cefepime. Blood Cxs taken-NGTD.  Respiratory viral panel sent-Negative.     No obvious signs of infection.  Will de-escalate abx.  Pt afebrile w/o leukocytosis.    -Discontinue Cefepime as no obvious signs of infection.  -ID will sign off.  Please re-consult with any new concerns.

## 2019-03-29 NOTE — CONSULTS
"[facility]  Cardiac Electrophysiology  Consult Note    Admission Date: 3/23/2019  Code Status: Full Code   Attending Provider: Alice Tim MD  Consulting Provider: Rosibel Ulloa MD  Principal Problem:Closed fracture of neck of left femur    Inpatient consult to Electrophysiology  Consult performed by: Rosibel Ulloa MD  Consult ordered by: Sue Rubio MD        Subjective:     Chief Complaint:  "Pt of Dr. Hinojosa, hx of Multifocal ATach, now having recurrent Atach HR 140s, symptomatic, on Amiodarone, and digoxin. 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 Post-op course c/b hypotension requiring Epinephrine gtt (3/25 - 3/28) and post -op fever of unclear etiology (on IV cefepime and vanc with negative culture data).     Overnight on 3/28into 3/29  patient had multiple transient episodes of symptomatic SVT 140s lasting between 10-15 minutes to  45 minutes for which EP is consulted.     First few episodes (3) occurred between 17:00 -18:00 on 3/28 and lasted between ~ 10-15 minutes. Patient was using bedside commode and had BM at the time when suddenly she felt presyncopal, weak, and SOB. Per nursing staff she actually lost consciousness and was moved to the bed immediately. During this time frame her O2 sats dropped into 70s on 5 L/min NC (which is her baseline requirement) and SBPs dropped into 80s. she was restarted on Epi 0.02 and Singh 10 ppm by primary team with subsequent improvement in vitals. Her SVT self resolved. Then again around 23:50 00:00 patient had recurrent episode of SVT 140s which lasted 45 minutes. " This time BPs remained stable however se desaturated into 70s again. Her Epi was discontinued and she was administered Digoxin 500 mcg IV x1; due to persistent nature of SVT and patient c/o anxiety she was also given dose of hydroxyzine 25 mg x1  (within 15 minutes of this she converted back to NSR). Patient has remained in NSR 90-100s with PACs per tele review since.    Patient has hx of SVT (since ~ 2013) suspected to be atrial tachycardia driven by hypoxic episodes per Dr Hinojosa whom she follows with. Underwent EPS in 2013 which was negative. Was previously on dronaderone 400 mg daily which was eventually discontinued due to side effect profile per patients  (reportedly had syncope). She was preferentially transitioned to diltiazem (initially 120 mg daily then uptitrated to 180 mg daily then 240 mg daily to try and relieve breakthrough episodes). Despite increasing doses of CCB she continued to have breakthrough episodes (lasting 1-7 minutes per chart review) and as a result was transitioned to amiodarone (~3/2018) currently on 200 mg daily and has been off diltiazem since.    Per chart review patient has very limited options regarding her atrial tachycardia - she is not a candidate for sotalol due to her PH and RV dysfunction; Tikosyn was discussed previously however avoided due to baseline QTc 500 at the time. RFA was also mentioned however not favored from a risk/benefit standpoint.     Past Medical History:   Diagnosis Date    Allergy     Anticoagulant long-term use     Arthritis     Heart murmur     Pneumonia     Pulmonary hypertension     Tachycardia        Past Surgical History:   Procedure Laterality Date    ABLATION N/A 6/17/2013    Performed by Jose J Hinojosa MD at [facility] CATH LAB    BACK SURGERY      HEART CATH-RIGHT Right 7/10/2017    Performed by Alice Tim MD at [facility] CATH LAB    HEMIARTHROPLASTY, HIP - left Left 3/25/2019    Performed by Kemal Esposito MD at [facility] OR [facility]  FLR    INSERTION-CATHETER-ROBERTSON Left 6/24/2014    Performed by González Hernandez MD at [facility] OR 2ND FLR    REPLACEMENT, CATHETER, DIALYSIS, OVER GUIDEWIRE, USING EXISTING VENOUS ACCESS N/A 1/17/2019    Performed by Phoenix Hand MD at [facility] CATH LAB       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    Multaq [dronedarone]        No current facility-administered medications on file prior to encounter.      Current Outpatient Medications on File Prior to Encounter   Medication Sig    amiodarone (PACERONE) 200 MG Tab Take 1 tablet (200 mg total) by mouth once daily.    bosentan (TRACLEER) 125 MG Tab Take by mouth 2 (two) times daily.    CALCIUM CARBONATE (CALCIUM 600 ORAL) Take 2 tablets by mouth once daily.      ferrous gluconate (FERGON) 324 MG tablet Take 1 tablet (324 mg total) by mouth daily with breakfast.    folic acid (FOLVITE) 1 MG tablet Take 1 mg by mouth once daily.    furosemide (LASIX) 20 MG tablet Take 1 tablet (20 mg total) by mouth once daily. (Patient taking differently: Take 20 mg by mouth after lunch. )    loratadine (CLARITIN) 10 mg tablet Take 10 mg by mouth once daily.    ondansetron (ZOFRAN-ODT) 4 MG TbDL     riociguat (ADEMPAS) 0.5 mg Tab tablet Take 1 tablet (0.5 mg total) by mouth 3 (three) times daily.    spironolactone (ALDACTONE) 25 MG tablet Take 25 mg by mouth once daily.      traMADol (ULTRAM-ER) 200 MG Tb24 TAKE ONE TABLET BY MOUTH ONCE A DAY AS NEEDED FOR PAIN THANK YOU!    TREPROSTINIL SODIUM (TREPROSTINIL, REMODULIN, PUMP FOR HOME USE) Pt currently on 89.2 ng/kg/min=45 mL/day dosing weight 70.05 kg    warfarin (COUMADIN) 5 MG tablet Take 5 mg by mouth every Mon, Wed, Fri.    chlordiazepoxide-clidinium 5-2.5 mg (LIBRAX) 5-2.5 mg Cap Take 1 capsule by mouth 3 (three) times daily with meals. TAKE TABLET AS PRN.    promethazine (PHENERGAN) 25 MG tablet promethazine 25 mg tablet    sodium chloride 0.9% 0.9 %  SolP 100 mL with treprostinil 1 mg/mL Soln 9,000,000 ng Inject 5,005.5 ng/min into the vein continuous.     Family History     Problem Relation (Age of Onset)    Arthritis Mother, Father    Diabetes Father    Heart disease Father    Hypertension Father        Tobacco Use    Smoking status: Never Smoker    Smokeless tobacco: Never Used   Substance and Sexual Activity    Alcohol use: No     Comment: rarely    Drug use: No    Sexual activity: Not on file     Review of Systems   All other systems reviewed and are negative.    Objective:     Vital Signs (Most Recent):  Temp: 99 °F (37.2 °C) (03/29/19 0300)  Pulse: 97 (03/29/19 0945)  Resp: 19 (03/29/19 0945)  BP: (!) 111/56 (03/29/19 0900)  SpO2: (!) 90 % (03/29/19 0945) Vital Signs (24h Range):  Temp:  [98.1 °F (36.7 °C)-99.2 °F (37.3 °C)] 99 °F (37.2 °C)  Pulse:  [] 97  Resp:  [16-34] 19  SpO2:  [73 %-96 %] 90 %  BP: ()/(50-74) 111/56  Arterial Line BP: ()/(43-65) 91/43       Weight: 57.2 kg (126 lb)  Body mass index is 22.32 kg/m².    SpO2: (!) 90 %  O2 Device (Oxygen Therapy): nasal cannula(per family pt not tolerating HFNC, changed to NC. )    Physical Exam   Constitutional: She is oriented to person, place, and time.   Chronically ill appearing, in no distress, non toxic appearance   HENT:   Head: Normocephalic and atraumatic.   Eyes: EOM are normal.   Neck: Neck supple. No JVD present.   Cardiovascular: An irregularly irregular rhythm present. Tachycardia present.   Pulmonary/Chest: Effort normal and breath sounds normal. No respiratory distress. She has no wheezes.   Tachypneic   Abdominal: Soft. Bowel sounds are normal.   Musculoskeletal: She exhibits no edema.   Neurological: She is alert and oriented to person, place, and time.   Skin: Skin is warm and dry. No erythema.   Psychiatric: She has a normal mood and affect. Judgment and thought content normal.       Significant Labs: All pertinent lab results from the last 24 hours have been  reviewed.    Significant Imaging: Reviewed in EPIC.            Assessment and Plan:     SVT (supraventricular tachycardia)  This is a 72 yo F with PMHx severe PAH and RV dysfunction c/b chronic hypoxemic resp failure (on O2 L) on IV remodulin, bosentan, riociguat admitted for L femoral neck fracture s/p L hip hemiarthroplasty (3/25) with post op course c/b hypotension gtt (3/25 - 3/28), post-op fever of unclear etiology (on IV abx) and now recurrent symptomatic SVT 140s for which EP is consulted.     Based on review of EKG and telemetry suspect patient went into AVNRT vs atrial tachycardia likely multifactorial etiology from post procedural pain, anxiety, ?infection (although cultures negative), pressor requirements (was on Epi gtt) and hypoxemia    Due to underlying severe PAH and RV dysfunction options limited in terms of treatment.    Also with hx of QT prolongation and current use of amiodarone which has long half life, the use of additional class III agents (eg Tikosyn) is prohibited.     Recommendations:  -- Continue amiodarone 200 mg daily.   -- Recommend adding verapamil 40 mg TID with holding parameters for SBP < 90. Will uptitrate as BPs allow while in house and transition to extended release formulation prior to discharge.  -- If patient has recurrent episode of SVT recommend utilizing adenosine IV push(es) (while hooked up to EKG) in attempt to identify / capture rhythm.     Thank you for your consult. I will follow-up with patient. Please contact us if you have any additional questions.    Rosibel Ulloa MD  Cardiac Electrophysiology  [facility]

## 2019-03-29 NOTE — SUBJECTIVE & OBJECTIVE
Interval History: Episode of tachycardia overnight. Releived with IV Dig and stoppage of Epi. This morning she only complains of bloating and GERD. Family at bedside.     LINES:  - Tunneled maguire in R subclavian for remodulin  - R Radial A line  - L IJ    Continuous Infusions:   epinephrine infusion Stopped (03/28/19 0700)    epinephrine infusion (NON-TITRATING) 0.02 mcg/kg/min (03/28/19 2300)    nitric oxide gas      ropivacaine (PF) 2 mg/ml (0.2%) 2 mL/hr (03/29/19 1100)    treprostinil (REMODULIN) infusion      veletri/remodulin cassette      veletri/remodulin tubing       Scheduled Meds:   acetaminophen  1,000 mg Oral Q8H    amiodarone  200 mg Oral Daily    bosentan  125 mg Oral Q12H    calcium carbonate  1,000 mg Oral Daily    ceFEPime (MAXIPIME) IVPB  2 g Intravenous Q12H    cetirizine  5 mg Oral Daily    digoxin  0.5 mg Oral Once    enoxaparin  60 mg Subcutaneous Q12H    ferrous gluconate  324 mg Oral Daily with breakfast    fluticasone  2 spray Each Nare QHS    folic acid  1 mg Oral Daily    furosemide  20 mg Oral After lunch    mupirocin  1 g Nasal BID    pantoprazole  40 mg Intravenous BID    polyethylene glycol  17 g Oral Daily    pregabalin  75 mg Oral QHS    riociguat  0.5 mg Oral TID    senna-docusate 8.6-50 mg  2 tablet Oral BID    simethicone  1 tablet Oral QID (PC + HS)    sodium chloride 0.9%  3 mL Intravenous Q8H    warfarin  7.5 mg Oral Daily     PRN Meds:ondansetron, promethazine (PHENERGAN) IVPB, sodium chloride 0.9%, traMADol, treprostinil (REMODULIN) infusion    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    Multaq [dronedarone]      Objective:     Vital Signs (Most Recent):  Temp: 99 °F (37.2 °C) (03/29/19 0300)  Pulse: 100 (03/29/19 1115)  Resp: (!) 22 (03/29/19 1115)  BP: 114/61 (03/29/19 1100)  SpO2: (!) 91 % (03/29/19 1115) Vital Signs (24h Range):  Temp:  [98.1 °F (36.7 °C)-99.2 °F (37.3 °C)] 99 °F  (37.2 °C)  Pulse:  [] 100  Resp:  [16-34] 22  SpO2:  [73 %-96 %] 91 %  BP: ()/(50-74) 114/61  Arterial Line BP: ()/(43-59) 91/43     No data found.  Body mass index is 22.32 kg/m².      Intake/Output Summary (Last 24 hours) at 3/29/2019 1153  Last data filed at 3/29/2019 1100  Gross per 24 hour   Intake 762.11 ml   Output 200 ml   Net 562.11 ml        Physical Exam   Constitutional: She is oriented to person, place, and time. She appears well-developed and well-nourished. She is sleeping.   HENT:   Head: Normocephalic and atraumatic.   Eyes: EOM are normal.   Neck: Neck supple. JVD present.   Cardiovascular: Normal rate and regular rhythm.   Pulmonary/Chest: Effort normal and breath sounds normal. No respiratory distress.   Abdominal: Soft. Bowel sounds are normal. She exhibits no distension.   Musculoskeletal: She exhibits no edema.   Neurological: She is oriented to person, place, and time.   Skin: Skin is warm and dry. Capillary refill takes less than 2 seconds. No erythema.   Psychiatric: She has a normal mood and affect. Her behavior is normal. Thought content normal.     Significant Labs:  CBC:  Recent Labs   Lab 03/27/19 0210 03/28/19 0317 03/29/19 0244   WBC 10.35 7.05 6.45   RBC 3.82* 3.54* 3.63*   HGB 11.6* 10.8* 11.0*   HCT 35.9* 32.1* 34.0*    157 161   MCV 94 91 94   MCH 30.4 30.5 30.3   MCHC 32.3 33.6 32.4     BNP:  No results for input(s): BNP in the last 168 hours.    Invalid input(s): BNPTRIAGELBLO  CMP:  Recent Labs   Lab 03/27/19 0210 03/28/19 0317 03/28/19 2224 03/29/19  0244   * 115* 97 101   CALCIUM 7.8* 7.4* 8.0* 7.6*   ALBUMIN 2.8* 2.4*  --  2.3*   PROT 5.9* 5.5*  --  5.6*    134* 135* 133*   K 3.5 3.7 4.1 4.3   CO2 19* 21* 22* 21*    107 106 106   BUN 17 15 14 15   CREATININE 1.0 0.8 0.8 0.8   ALKPHOS 43* 52*  --  48*   ALT 6* 5*  --  6*   AST 22 20  --  17   BILITOT 1.1* 0.6  --  0.6      Coagulation:   Recent Labs   Lab 03/27/19  8838  03/28/19  0317 03/29/19  0244   INR 1.1 1.3* 1.5*     LDH:  No results for input(s): LDH in the last 72 hours.  Microbiology:  Microbiology Results (last 7 days)     Procedure Component Value Units Date/Time    Blood culture [[id number]] Collected:  03/26/19 1420    Order Status:  Completed Specimen:  Blood from Peripheral, Antecubital, Right Updated:  03/28/19 1612     Blood Culture, Routine No Growth to date     Blood Culture, Routine No Growth to date     Blood Culture, Routine No Growth to date    Blood culture [[id number]] Collected:  03/26/19 1440    Order Status:  Completed Specimen:  Blood from Peripheral, Antecubital, Left Updated:  03/28/19 1612     Blood Culture, Routine No Growth to date     Blood Culture, Routine No Growth to date     Blood Culture, Routine No Growth to date    Respiratory Viral Panel by PCR [facility]; Nasal Swab [[id number]] Collected:  03/26/19 1846    Order Status:  Completed Specimen:  Respiratory Updated:  03/28/19 1302     Respiratory Virus Panel, source Nasal Swab     RVP - Adenovirus Not Detected     Comment: Detects Serotypes B and E. Detection of Serotype C may   be limited. If Adenovirus infection is suspected and a   Not Detected result is returned the sample should be   re-tested for Adenovirus using an independent method  (e.g. [employer] Adenovirus Quantitative Real-Time  PCR test.          Enterovirus Not Detected     Comment: Cross-reactivity has been observed between certain Rhinovirus  strains and the Enterovirus assay.          Human Bocavirus Not Detected     Human Coronavirus Not Detected     Comment: The Human Coronavirus assay detects Human coronavirus types  229E, OC43,NL63 and HKU1.          RVP - Human Metapneumovirus (hMPV) Not Detected     RVP - Influenza A Not Detected     Influenza A - V3Q0-99 Not Detected     RVP - Influenza B Not Detected     Parainfluenza Not Detected     Respiratory Syncytial VirusVirus (RSV) A Not Detected     Comment: The  Respiratory Syncytial Viral assay detects types A and B,  however it does not distinguish between the two.          RVP - Rhinovirus Not Detected     Comment: Cross-Reactivity has been observed between certain   Rhinovirus strains and the Enterovirus assay.  Target Enriched Mulitplex Polymerase Chain Reaction (TEM-PCR)  allows for the detection of multiple pathogens out of a single  reaction.  This test was developed and its performance   characteristics determined by [employer].  It has not   been cleared or approved by the U.S.Food and Drug Administration.  Results should be used in conjunction with clinical findings,   and should not form the sole basis for a diagnosis or treatment  decision.  TEM-PCR is a licensed technology of [employer].         Narrative:       Receiving Lab:->[facility]        I have reviewed all pertinent labs within the past 24 hours.    Estimated Creatinine Clearance: 52.6 mL/min (based on SCr of 0.8 mg/dL).    Diagnostic Results:  I have reviewed all pertinent imaging results/findings within the past 24 hours.

## 2019-03-29 NOTE — PROGRESS NOTES
[facility]  Orthopedics  Progress Note    Patient Name: Molly Smith  MRN: [id number]  Admission Date: 3/23/2019  Hospital Length of Stay: 6 days  Attending Provider: Alice Tim MD  Primary Care Provider: Roberto Braun MD  Follow-up For: Procedure(s) (LRB):  HEMIARTHROPLASTY, HIP - left (Left)    Post-Operative Day: 4 Days Post-Op  Subjective:     Principal Problem:Closed fracture of neck of left femur    Principal Orthopedic Problem: L femoral neck fx    Interval History: Pt seen and examined at bedside. More alert today. No pain left hip. Took a few steps from bed to chair with PT yesterday.      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    Multaq [dronedarone]        Current Facility-Administered Medications   Medication    acetaminophen tablet 1,000 mg    amiodarone tablet 200 mg    bosentan tablet 125 mg    calcium carbonate (OS-MARIELLE) tablet 500 mg    ceFEPIme injection 2 g    cetirizine tablet 5 mg    digoxin tablet 0.5 mg    enoxaparin injection 60 mg    EPINEPHrine (ADRENALIN) 5 mg in sodium chloride 0.9% 250 mL infusion    EPINEPHrine (ADRENALIN) 5 mg in sodium chloride 0.9% 250 mL infusion    ferrous gluconate tablet 324 mg    fluticasone 50 mcg/actuation nasal spray 100 mcg    folic acid tablet 1 mg    furosemide tablet 20 mg    mupirocin 2 % ointment 1 g    nitric oxide gas Gas 10 ppm    ondansetron disintegrating tablet 8 mg    pantoprazole injection 40 mg    polyethylene glycol packet 17 g    pregabalin capsule 75 mg    promethazine (PHENERGAN) 6.25 mg in dextrose 5 % 50 mL IVPB    riociguat (ADEMPAS) tablet 0.5 mg    ropivacaine (PF) 2 mg/ml (0.2%) infusion    senna-docusate 8.6-50 mg per tablet 2 tablet    simethicone chewable tablet 80 mg    sodium chloride 0.9% flush 3 mL    sodium chloride 0.9% flush 5 mL    traMADol tablet 50 mg    treprostinil (REMODULIN) 12,000,000 ng in sodium  "chloride 0.9% 100 mL infusion    VELETRI/REMODULIN CASSETTE    VELETRI/REMODULIN TUBING    warfarin (COUMADIN) tablet 7.5 mg     Objective:     Vital Signs (Most Recent):  Temp: 99 °F (37.2 °C) (03/29/19 0300)  Pulse: (!) 111 (03/29/19 1215)  Resp: (!) 33 (03/29/19 1215)  BP: (!) 100/56 (03/29/19 1200)  SpO2: (!) 91 % (03/29/19 1215) Vital Signs (24h Range):  Temp:  [98.1 °F (36.7 °C)-99.2 °F (37.3 °C)] 99 °F (37.2 °C)  Pulse:  [] 111  Resp:  [16-34] 33  SpO2:  [73 %-96 %] 91 %  BP: ()/(50-74) 100/56  Arterial Line BP: ()/(43-47) 91/43     Weight: 57.2 kg (126 lb)  Height: 5' 3" (160 cm)  Body mass index is 22.32 kg/m².      Intake/Output Summary (Last 24 hours) at 3/29/2019 1233  Last data filed at 3/29/2019 1100  Gross per 24 hour   Intake 760.11 ml   Output 200 ml   Net 560.11 ml     NAD  Normal respiratory effort on 5 L NC O2    Ortho/SPM Exam    Pt lying in bed this morning in NAD  Aquacel dressing c/d/i  SILT Sa/Thompson/DP/SP/T  Motor intact EHL/FHL/TA/Gastroc  2+ DP, 2+ PT    Significant Labs:   BMP:   Recent Labs   Lab 03/29/19 0244      *   K 4.3      CO2 21*   BUN 15   CREATININE 0.8   CALCIUM 7.6*   MG 2.0     CBC:   Recent Labs   Lab 03/28/19 0317 03/29/19 0244   WBC 7.05 6.45   HGB 10.8* 11.0*   HCT 32.1* 34.0*    161     Coagulation:   Recent Labs   Lab 03/28/19 0317 03/29/19 0244   LABPROT 13.1* 14.6*   INR 1.3* 1.5*       Significant Imaging: I have reviewed all pertinent imaging results/findings.    Assessment/Plan:     * Closed fracture of neck of left femur  Pt is a 72 yo F with pulmonary HTN, chronic respiratory failure, congenital heart disease on coumadin, and closed left femoral neck fracture s/p left hip patrick 3/25. Making some progress with physical therapy, limited by medical comorbidities.     - Antibiotics: empiric vanc/cefepime for fever  - Weight bearing status: WBAT LLE  - Labs: reviewed, Hgb 11.6  - DVT Prophylaxis: mechanical, lovenox, " warfarin per primary  - Lines/Drains: PIV, A line, central line, Dunbar, Remodulin pump; d/c sargent  - Pain control: PNC per anesthesia  - PT/OT: mobilize with PT/OT today, must be seen by PT 7x/week - please notify orthopedics if patient is not seen      Closed left hip fracture               Munira Landin MD  Orthopedics  [facility]    Attg Note:  Patient seen and examined.  I agree with the resident's assessment and plan.      Kemal Esposito MD

## 2019-03-29 NOTE — PROGRESS NOTES
[facility]  Infectious Disease  Progress Note    Patient Name: Molly Smith  MRN: [id number]  Admission Date: 3/23/2019  Length of Stay: 6 days  Attending Physician: Alice Tim MD  Primary Care Provider: Roberto Braun MD    Isolation Status: No active isolations  Assessment/Plan:      Fever  73 y.o. 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 Patient presented to [facility] for hip pain and inability to bear weight on the left leg after a trip and fall yesterday.  Patient tripped on a cord for her pulm HTN pump falling directly onto the left hip. Patient denies head trauma or upper body injury.      Pt now s/p left hip hemiarthroplasty on 3/25 for femoral neck fracture.  Pt developed post op fever on 3/26 and was started on empiric Vanc + Cefepime. Blood Cxs taken-NGTD.  Respiratory viral panel sent-Negative.     No obvious signs of infection.  Will de-escalate abx.  Pt afebrile w/o leukocytosis.    -Discontinue Cefepime as no obvious signs of infection.  -ID will sign off.  Please re-consult with any new concerns.        Thank you for your consult. I will sign off. Please contact us if you have any additional questions.    Terry Junior PA-C  Infectious Disease  [facility]    Subjective:     Principal Problem:Closed fracture of neck of left femur    HPI: 73 y.o. F with adult congenital heart disease with prior dx of sinus venosus defect, anomalous pulmonary venous drainage with PAH diagnosed in past 10 years (probably for much longer per old CXR) who is currently on IV remodulin (89.2ng/kg/min) adempas and tracleer, hx of Rosemonas bacteremia s/p central line removal, who was transfered for higher level of  care and orthopedic consult for left femoral neck fracture. Patient presented to [facility] for hip pain and inability to bear weight on the left leg after a trip and fall yesterday.  Patient tripped on a cord for her pulm HTN pump falling directly onto the left hip. Patient denies head trauma or upper body injury.      Pt now s/p left hip hemiarthroplasty on 3/25 for femoral neck fracture.  Pt developed post op fever on 3/26 and was started on empiric Vanc + Cefepime. Blood Cxs taken.  Respiratory viral panel sent.      Interval History: Pt only complaint is fullness.  Denies F or Chills. Pt tachycardic overnight.   Cxs no growth.  Respiratory panel no growth.    Review of Systems   Constitutional: Negative for activity change, chills, fatigue and fever.   Respiratory: Negative for cough and shortness of breath.    Gastrointestinal: Positive for nausea. Negative for abdominal pain, diarrhea and vomiting.   Genitourinary: Negative for difficulty urinating, dysuria and flank pain.   Musculoskeletal: Negative for arthralgias, back pain and joint swelling.   Skin: Positive for wound.     Objective:     Vital Signs (Most Recent):  Temp: 99 °F (37.2 °C) (03/29/19 0300)  Pulse: (!) 111 (03/29/19 1215)  Resp: (!) 33 (03/29/19 1215)  BP: (!) 100/56 (03/29/19 1200)  SpO2: (!) 91 % (03/29/19 1215) Vital Signs (24h Range):  Temp:  [98.1 °F (36.7 °C)-99.2 °F (37.3 °C)] 99 °F (37.2 °C)  Pulse:  [] 111  Resp:  [16-34] 33  SpO2:  [73 %-96 %] 91 %  BP: ()/(50-74) 100/56  Arterial Line BP: ()/(43-47) 91/43     Weight: 57.2 kg (126 lb)  Body mass index is 22.32 kg/m².    Estimated Creatinine Clearance: 52.6 mL/min (based on SCr of 0.8 mg/dL).    Physical Exam   Constitutional: She is oriented to person, place, and time. She appears well-developed and well-nourished.   HENT:   Head: Normocephalic and atraumatic.   Eyes: EOM are normal.   Neck: Neck supple. No JVD present.   Cardiovascular: Normal rate and  regular rhythm.   Pulmonary/Chest: Effort normal and breath sounds normal. No respiratory distress.   Abdominal: Soft. Bowel sounds are normal. She exhibits no distension.   Musculoskeletal: She exhibits no edema.   Neurological: She is alert and oriented to person, place, and time.   Skin: Skin is warm and dry. Capillary refill takes less than 2 seconds. No erythema.   Psychiatric: She has a normal mood and affect. Her behavior is normal. Thought content normal.       Significant Labs:   Blood Culture:   Recent Labs   Lab 11/22/18  1255 11/22/18  1317 03/26/19  1420 03/26/19  1440   LABBLOO No growth after 5 days. No growth after 5 days. No Growth to date  No Growth to date  No Growth to date No Growth to date  No Growth to date  No Growth to date     BMP:   Recent Labs   Lab 03/29/19  0244      *   K 4.3      CO2 21*   BUN 15   CREATININE 0.8   CALCIUM 7.6*   MG 2.0     CBC:   Recent Labs   Lab 03/28/19  0317 03/29/19  0244   WBC 7.05 6.45   HGB 10.8* 11.0*   HCT 32.1* 34.0*    161     CMP:   Recent Labs   Lab 03/28/19  0317 03/28/19  2224 03/29/19  0244   * 135* 133*   K 3.7 4.1 4.3    106 106   CO2 21* 22* 21*   * 97 101   BUN 15 14 15   CREATININE 0.8 0.8 0.8   CALCIUM 7.4* 8.0* 7.6*   PROT 5.5*  --  5.6*   ALBUMIN 2.4*  --  2.3*   BILITOT 0.6  --  0.6   ALKPHOS 52*  --  48*   AST 20  --  17   ALT 5*  --  6*   ANIONGAP 6* 7* 6*   EGFRNONAA >60.0 >60.0 >60.0     Microbiology Results (last 7 days)     Procedure Component Value Units Date/Time    Blood culture [[id number]] Collected:  03/26/19 1420    Order Status:  Completed Specimen:  Blood from Peripheral, Antecubital, Right Updated:  03/28/19 1612     Blood Culture, Routine No Growth to date     Blood Culture, Routine No Growth to date     Blood Culture, Routine No Growth to date    Blood culture [[id number]] Collected:  03/26/19 1440    Order Status:  Completed Specimen:  Blood from Peripheral, Antecubital, Left  Updated:  03/28/19 1612     Blood Culture, Routine No Growth to date     Blood Culture, Routine No Growth to date     Blood Culture, Routine No Growth to date    Respiratory Viral Panel by PCR [facility]; Nasal Swab [[id number]] Collected:  03/26/19 1846    Order Status:  Completed Specimen:  Respiratory Updated:  03/28/19 1302     Respiratory Virus Panel, source Nasal Swab     RVP - Adenovirus Not Detected     Comment: Detects Serotypes B and E. Detection of Serotype C may   be limited. If Adenovirus infection is suspected and a   Not Detected result is returned the sample should be   re-tested for Adenovirus using an independent method  (e.g. [employer] Adenovirus Quantitative Real-Time  PCR test.          Enterovirus Not Detected     Comment: Cross-reactivity has been observed between certain Rhinovirus  strains and the Enterovirus assay.          Human Bocavirus Not Detected     Human Coronavirus Not Detected     Comment: The Human Coronavirus assay detects Human coronavirus types  229E, OC43,NL63 and HKU1.          RVP - Human Metapneumovirus (hMPV) Not Detected     RVP - Influenza A Not Detected     Influenza A - K1E1-52 Not Detected     RVP - Influenza B Not Detected     Parainfluenza Not Detected     Respiratory Syncytial VirusVirus (RSV) A Not Detected     Comment: The Respiratory Syncytial Viral assay detects types A and B,  however it does not distinguish between the two.          RVP - Rhinovirus Not Detected     Comment: Cross-Reactivity has been observed between certain   Rhinovirus strains and the Enterovirus assay.  Target Enriched Mulitplex Polymerase Chain Reaction (TEM-PCR)  allows for the detection of multiple pathogens out of a single  reaction.  This test was developed and its performance   characteristics determined by [employer].  It has not   been cleared or approved by the U.S.Food and Drug Administration.  Results should be used in conjunction with clinical findings,   and should  not form the sole basis for a diagnosis or treatment  decision.  TEM-PCR is a licensed technology of [employer].         Narrative:       Receiving Lab:[facility]        Wound Culture: No results for input(s): LABAERO in the last 4320 hours.  All pertinent labs within the past 24 hours have been reviewed.  Recent Lab Results       03/29/19  0345   03/29/19  0244   03/28/19  2224   03/28/19  1448        Immature Grans (Abs)   0.02  Comment:  Mild elevation in immature granulocytes is non specific and   can be seen in a variety of conditions including stress response,   acute inflammation, trauma and pregnancy. Correlation with other   laboratory and clinical findings is essential.           Albumin   2.3         Alkaline Phosphatase   48         ALT   6         Anion Gap   6 7       AST   17         Baso #   0.06         Basophil%   0.9         Total Bilirubin   0.6  Comment:  For infants and newborns, interpretation of results should be based  on gestational age, weight and in agreement with clinical  observations.  Premature Infant recommended reference ranges:  Up to 24 hours.............<8.0 mg/dL  Up to 48 hours............<12.0 mg/dL  3-5 days..................<15.0 mg/dL  6-29 days.................<15.0 mg/dL           BUN, Bld   15 14       Calcium   7.6 8.0       Chloride   106 106       CO2   21 22       Creatinine   0.8 0.8       Differential Method   Automated         eGFR if    >60.0 >60.0       eGFR if non    >60.0  Comment:  Calculation used to obtain the estimated glomerular filtration  rate (eGFR) is the CKD-EPI equation.    >60.0  Comment:  Calculation used to obtain the estimated glomerular filtration  rate (eGFR) is the CKD-EPI equation.          Eos #   0.1         Eosinophil%   1.7         Glucose   101 97       Gran # (ANC)   5.3         Gran%   82.5         Hematocrit   34.0         Hemoglobin   11.0         Immature Granulocytes   0.3          Coumadin Monitoring INR   1.5  Comment:  Coumadin Therapy:  2.0 - 3.0 for INR for all indicators except mechanical heart valves  and antiphospholipid syndromes which should use 2.5 - 3.5.           Lymph #   0.5         Lymph%   7.0         Magnesium   2.0 1.9       MCH   30.3         MCHC   32.4         MCV   94         Methemoglobin 0.3           Mono #   0.5         Mono%   7.6         MPV   9.5         nRBC   0         Phosphorus   1.6         Platelets   161         Potassium   4.3 4.1       Total Protein   5.6         Protime   14.6         RBC   3.63         RDW   13.8         Sodium   133 135       Vancomycin-Trough       8.5     WBC   6.45               Significant Imaging: I have reviewed all pertinent imaging results/findings within the past 24 hours.

## 2019-03-29 NOTE — PLAN OF CARE
Interval update:   Around 0000 while patient discussing goals of care with spouse / kids again went into Atrial tach Hr 140s, felt weak and SOB, stats 70s, although this time remained HD stable . Given Digoxin 500mcg x1 and vistaril 25mg x1 for anxiety. Respiratory to start HFNC. EKG with Atrial tach HR 140s, old RBBB    Nursing stated spouse (Physician) at bedside performing Valsalva and carotid massage, also upset with nursing staff and verbally abusive to staff. Dr. Tim updated by charge nurse.    Upon my arrival, spouse again verbally abuse towards myself and staff, security called to escort family to the waiting room. Patient assessed and calmed with immediate improvement of her HR (ST 100s) SBP 130s. Dr. Tim updated myself.     Obtain formal EP consult for Atach   Family updated in the waiting room.     Sue Rubio M.D.  Page # [phone]  Cardiovascular Fellow PGY-IV  [facility]

## 2019-03-29 NOTE — NURSING
Pt SOB with O2 sat 70 and .  Dr Rubio called to BS. Digoxin IV given and Vistaril po for anxiety. RT added high flow O2 @ 15L and Nitric @ 10ppm  . CXR done and EP consulted.   Spouse (MD) @ BS - verbally upset because HR has not changed. Spouse attempted Valsalva, carotid massage and requested ice and cold towels. Explained to spouse he can't treat pt. Charge nurse notified to come to BS. KENIA Brennan (charge) @  - spoke with Dr Rubio and Dr Barriga via phone. Spouse and charge nurse had words and security was called. Dr Rubio returned to BS and spoke with spouse - EP consulted.

## 2019-03-29 NOTE — ASSESSMENT & PLAN NOTE
- Continue PO Lasix. Will get CVP check today.  - Continue riociguat, bosentan, remodulin([facility] CDI).  -  to manage remodulin infusion.  - Restarted warfarin

## 2019-03-29 NOTE — PLAN OF CARE
Interval update:   Earlier this afternoon while patient on bedside commode had a large BM, following which developed SOB, became hypoxic stats 60s, hypotensive SBP 80s, tachycardic  (WCT), nursing immediately brought her back to bed.     Upon my evaluation patient feeling better but though she was going to pass out. Earlier today Epi dc. Hx of PHTN with profound RV failure, here for femoral fracture s/p repair. When into WCT ( likely atrial tach HR 140s) hemodynamically unstable SBP dropped 105 - > 80s, symptomatic with SOB. No improvement with valsalva or carotid massage. Spontaneously converted to Afib .     Stat bedside echo with dilated RV with reduced RV function, LVEF 65% similar findings to TTE 01/2019.     Plan:   - Start Singh 10ppm / Epi 0.02mcg  - Change Lovenox to therapeutic dosing 60mg BID with Coumadin 7.5mg (INR 1.3)   - Bedrest for the ngiht  - Repeat BMP / Mag   - if Atach will consult EP, given Digoxin   - Dr. Tim aware, agrees with plan       Sue Rubio M.D.  Page # [phone]  Cardiovascular Fellow PGY-IV  [facility]

## 2019-03-29 NOTE — PROGRESS NOTES
[facility]  Heart Transplant  Progress Note    Patient Name: Molly Smith  MRN: [id number]  Admission Date: 3/23/2019  Hospital Length of Stay: 6 days  Attending Physician: Alice Tim MD  Primary Care Provider: Roberto Braun MD  Principal Problem:Closed fracture of neck of left femur    Subjective:     Interval History: Episode of tachycardia overnight. Releived with IV Dig and stoppage of Epi. This morning she only complains of bloating and GERD. Family at bedside.     LINES:  - Tunneled maguire in R subclavian for remodulin  - R Radial A line  - L IJ    Continuous Infusions:   epinephrine infusion Stopped (03/28/19 0700)    epinephrine infusion (NON-TITRATING) 0.02 mcg/kg/min (03/28/19 2300)    nitric oxide gas      ropivacaine (PF) 2 mg/ml (0.2%) 2 mL/hr (03/29/19 1100)    treprostinil (REMODULIN) infusion      veletri/remodulin cassette      veletri/remodulin tubing       Scheduled Meds:   acetaminophen  1,000 mg Oral Q8H    amiodarone  200 mg Oral Daily    bosentan  125 mg Oral Q12H    calcium carbonate  1,000 mg Oral Daily    ceFEPime (MAXIPIME) IVPB  2 g Intravenous Q12H    cetirizine  5 mg Oral Daily    digoxin  0.5 mg Oral Once    enoxaparin  60 mg Subcutaneous Q12H    ferrous gluconate  324 mg Oral Daily with breakfast    fluticasone  2 spray Each Nare QHS    folic acid  1 mg Oral Daily    furosemide  20 mg Oral After lunch    mupirocin  1 g Nasal BID    pantoprazole  40 mg Intravenous BID    polyethylene glycol  17 g Oral Daily    pregabalin  75 mg Oral QHS    riociguat  0.5 mg Oral TID    senna-docusate 8.6-50 mg  2 tablet Oral BID    simethicone  1 tablet Oral QID (PC + HS)    sodium chloride 0.9%  3 mL Intravenous Q8H    warfarin  7.5 mg Oral Daily     PRN Meds:ondansetron, promethazine (PHENERGAN) IVPB, sodium chloride 0.9%, traMADol, treprostinil (REMODULIN) infusion    Review of patient's allergies indicates:   Allergen Reactions     Vancomycin      RED 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    Multaq [dronedarone]      Objective:     Vital Signs (Most Recent):  Temp: 99 °F (37.2 °C) (03/29/19 0300)  Pulse: 100 (03/29/19 1115)  Resp: (!) 22 (03/29/19 1115)  BP: 114/61 (03/29/19 1100)  SpO2: (!) 91 % (03/29/19 1115) Vital Signs (24h Range):  Temp:  [98.1 °F (36.7 °C)-99.2 °F (37.3 °C)] 99 °F (37.2 °C)  Pulse:  [] 100  Resp:  [16-34] 22  SpO2:  [73 %-96 %] 91 %  BP: ()/(50-74) 114/61  Arterial Line BP: ()/(43-59) 91/43     No data found.  Body mass index is 22.32 kg/m².      Intake/Output Summary (Last 24 hours) at 3/29/2019 1153  Last data filed at 3/29/2019 1100  Gross per 24 hour   Intake 762.11 ml   Output 200 ml   Net 562.11 ml        Physical Exam   Constitutional: She is oriented to person, place, and time. She appears well-developed and well-nourished. She is sleeping.   HENT:   Head: Normocephalic and atraumatic.   Eyes: EOM are normal.   Neck: Neck supple. JVD present.   Cardiovascular: Normal rate and regular rhythm.   Pulmonary/Chest: Effort normal and breath sounds normal. No respiratory distress.   Abdominal: Soft. Bowel sounds are normal. She exhibits no distension.   Musculoskeletal: She exhibits no edema.   Neurological: She is oriented to person, place, and time.   Skin: Skin is warm and dry. Capillary refill takes less than 2 seconds. No erythema.   Psychiatric: She has a normal mood and affect. Her behavior is normal. Thought content normal.     Significant Labs:  CBC:  Recent Labs   Lab 03/27/19  0210 03/28/19  0317 03/29/19  0244   WBC 10.35 7.05 6.45   RBC 3.82* 3.54* 3.63*   HGB 11.6* 10.8* 11.0*   HCT 35.9* 32.1* 34.0*    157 161   MCV 94 91 94   MCH 30.4 30.5 30.3   MCHC 32.3 33.6 32.4     BNP:  No results for input(s): BNP in the last 168 hours.    Invalid input(s): BNPTRIAGELBLO  CMP:  Recent Labs   Lab 03/27/19  0210 03/28/19  0317 03/28/19  2224 03/29/19  0244   * 115* 97 101   CALCIUM 7.8* 7.4* 8.0* 7.6*    ALBUMIN 2.8* 2.4*  --  2.3*   PROT 5.9* 5.5*  --  5.6*    134* 135* 133*   K 3.5 3.7 4.1 4.3   CO2 19* 21* 22* 21*    107 106 106   BUN 17 15 14 15   CREATININE 1.0 0.8 0.8 0.8   ALKPHOS 43* 52*  --  48*   ALT 6* 5*  --  6*   AST 22 20  --  17   BILITOT 1.1* 0.6  --  0.6      Coagulation:   Recent Labs   Lab 03/27/19  0210 03/28/19  0317 03/29/19  0244   INR 1.1 1.3* 1.5*     LDH:  No results for input(s): LDH in the last 72 hours.  Microbiology:  Microbiology Results (last 7 days)     Procedure Component Value Units Date/Time    Blood culture [[id number]] Collected:  03/26/19 1420    Order Status:  Completed Specimen:  Blood from Peripheral, Antecubital, Right Updated:  03/28/19 1612     Blood Culture, Routine No Growth to date     Blood Culture, Routine No Growth to date     Blood Culture, Routine No Growth to date    Blood culture [[id number]] Collected:  03/26/19 1440    Order Status:  Completed Specimen:  Blood from Peripheral, Antecubital, Left Updated:  03/28/19 1612     Blood Culture, Routine No Growth to date     Blood Culture, Routine No Growth to date     Blood Culture, Routine No Growth to date    Respiratory Viral Panel by PCR [facility]; Nasal Swab [[id number]] Collected:  03/26/19 1846    Order Status:  Completed Specimen:  Respiratory Updated:  03/28/19 1302     Respiratory Virus Panel, source Nasal Swab     RVP - Adenovirus Not Detected     Comment: Detects Serotypes B and E. Detection of Serotype C may   be limited. If Adenovirus infection is suspected and a   Not Detected result is returned the sample should be   re-tested for Adenovirus using an independent method  (e.g. [employer] Adenovirus Quantitative Real-Time  PCR test.          Enterovirus Not Detected     Comment: Cross-reactivity has been observed between certain Rhinovirus  strains and the Enterovirus assay.          Human Bocavirus Not Detected     Human Coronavirus Not Detected     Comment: The Human Coronavirus assay  detects Human coronavirus types  229E, OC43,NL63 and HKU1.          RVP - Human Metapneumovirus (hMPV) Not Detected     RVP - Influenza A Not Detected     Influenza A - N4A1-83 Not Detected     RVP - Influenza B Not Detected     Parainfluenza Not Detected     Respiratory Syncytial VirusVirus (RSV) A Not Detected     Comment: The Respiratory Syncytial Viral assay detects types A and B,  however it does not distinguish between the two.          RVP - Rhinovirus Not Detected     Comment: Cross-Reactivity has been observed between certain   Rhinovirus strains and the Enterovirus assay.  Target Enriched Mulitplex Polymerase Chain Reaction (TEM-PCR)  allows for the detection of multiple pathogens out of a single  reaction.  This test was developed and its performance   characteristics determined by [employer].  It has not   been cleared or approved by the U.S.Food and Drug Administration.  Results should be used in conjunction with clinical findings,   and should not form the sole basis for a diagnosis or treatment  decision.  TEM-PCR is a licensed technology of [employer].         Narrative:       Receiving Lab:->[facility]        I have reviewed all pertinent labs within the past 24 hours.    Estimated Creatinine Clearance: 52.6 mL/min (based on SCr of 0.8 mg/dL).    Diagnostic Results:  I have reviewed all pertinent imaging results/findings within the past 24 hours.    Assessment and Plan:     73 y.o. 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 Patient presented to [facility] for hip pain and inability to bear weight on the left  leg after a trip and fall yesterday.  Patient tripped on a cord for her pulm HTN pump falling directly onto the left hip. Patient denies head trauma or upper body injury. She denies numbness or tingling. Currently lying on stretcher with  at bedside in nad.        * Closed fracture of neck of left femur  - S/P s/p left patrick 3/25, received post-op Ancef  - Weight bearing status: WBAT LLE  - Pain control: PNC per anesthesia(appreciate their help).   - PT/OT: must be seen by PT 7x/week - please notify orthopedics if patient is not seen    RVF (right ventricular failure)  -Continue PH mgt as below.  -Epi weaned off.   -Hold aldactone.    PAH (pulmonary artery hypertension)  - Continue PO Lasix. Will get CVP check today.  - Continue riociguat, bosentan, remodulin([facility] CDI).  -  to manage remodulin infusion.  - Restarted warfarin    Constipation  -Resolved.   -Continue senna/colace/miralax.      SVT (supraventricular tachycardia)  -Continue amio.  -EP consulted.  -S/P Dig load.     Fever  -Fevers improved.  -Appreciate Id Cx.   -Stopped Vanc. Continues on Cefepime.  -U/s of BLE -.      Chronic respiratory failure with hypoxia  - on 5L O2 at home.   - Goal sat >/=85%  Uninterrupted Critical Care/Counseling Time (not including procedures): 60mn      Joe Arellano, NP  Heart Transplant  [facility]

## 2019-03-29 NOTE — PT/OT/SLP PROGRESS
Physical Therapy      Patient Name:  Molly Smith   MRN:  [id number]    Patient not seen today secondary to pt with SVT overnight with fluctuating medical status. Per MD, pt to remain in bed today. PT informed RN family is trained to assist pt with bed exercises and may proceed with bed level exercise if medically stable. Will follow-up as appropriate according to POC.    Sydnie Kahn, PT   3/29/2019

## 2019-03-29 NOTE — SUBJECTIVE & OBJECTIVE
Past Medical History:   Diagnosis Date    Allergy     Anticoagulant long-term use     Arthritis     Heart murmur     Pneumonia     Pulmonary hypertension     Tachycardia        Past Surgical History:   Procedure Laterality Date    ABLATION N/A 6/17/2013    Performed by Jose J Hinojosa MD at [facility] CATH LAB    BACK SURGERY      HEART CATH-RIGHT Right 7/10/2017    Performed by Alice Tim MD at [facility] CATH LAB    HEMIARTHROPLASTY, HIP - left Left 3/25/2019    Performed by Kemal Esposito MD at [facility] OR 2ND FLR    INSERTION-CATHETER-ROBERTSON Left 6/24/2014    Performed by González Hernandez MD at [facility] OR 2ND FLR    REPLACEMENT, CATHETER, DIALYSIS, OVER GUIDEWIRE, USING EXISTING VENOUS ACCESS N/A 1/17/2019    Performed by Phoenix Hand MD at [facility] CATH LAB       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    Multaq [dronedarone]        No current facility-administered medications on file prior to encounter.      Current Outpatient Medications on File Prior to Encounter   Medication Sig    amiodarone (PACERONE) 200 MG Tab Take 1 tablet (200 mg total) by mouth once daily.    bosentan (TRACLEER) 125 MG Tab Take by mouth 2 (two) times daily.    CALCIUM CARBONATE (CALCIUM 600 ORAL) Take 2 tablets by mouth once daily.      ferrous gluconate (FERGON) 324 MG tablet Take 1 tablet (324 mg total) by mouth daily with breakfast.    folic acid (FOLVITE) 1 MG tablet Take 1 mg by mouth once daily.    furosemide (LASIX) 20 MG tablet Take 1 tablet (20 mg total) by mouth once daily. (Patient taking differently: Take 20 mg by mouth after lunch. )    loratadine (CLARITIN) 10 mg tablet Take 10 mg by mouth once daily.    ondansetron (ZOFRAN-ODT) 4 MG TbDL     riociguat (ADEMPAS) 0.5 mg Tab tablet Take 1 tablet (0.5 mg total) by mouth 3 (three) times daily.    spironolactone (ALDACTONE) 25 MG tablet Take 25 mg by mouth once daily.      traMADol (ULTRAM-ER) 200  MG Tb24 TAKE ONE TABLET BY MOUTH ONCE A DAY AS NEEDED FOR PAIN THANK YOU!    TREPROSTINIL SODIUM (TREPROSTINIL, REMODULIN, PUMP FOR HOME USE) Pt currently on 89.2 ng/kg/min=45 mL/day dosing weight 70.05 kg    warfarin (COUMADIN) 5 MG tablet Take 5 mg by mouth every Mon, Wed, Fri.    chlordiazepoxide-clidinium 5-2.5 mg (LIBRAX) 5-2.5 mg Cap Take 1 capsule by mouth 3 (three) times daily with meals. TAKE TABLET AS PRN.    promethazine (PHENERGAN) 25 MG tablet promethazine 25 mg tablet    sodium chloride 0.9% 0.9 % SolP 100 mL with treprostinil 1 mg/mL Soln 9,000,000 ng Inject 5,005.5 ng/min into the vein continuous.     Family History     Problem Relation (Age of Onset)    Arthritis Mother, Father    Diabetes Father    Heart disease Father    Hypertension Father        Tobacco Use    Smoking status: Never Smoker    Smokeless tobacco: Never Used   Substance and Sexual Activity    Alcohol use: No     Comment: rarely    Drug use: No    Sexual activity: Not on file     Review of Systems   All other systems reviewed and are negative.    Objective:     Vital Signs (Most Recent):  Temp: 99 °F (37.2 °C) (03/29/19 0300)  Pulse: 97 (03/29/19 0945)  Resp: 19 (03/29/19 0945)  BP: (!) 111/56 (03/29/19 0900)  SpO2: (!) 90 % (03/29/19 0945) Vital Signs (24h Range):  Temp:  [98.1 °F (36.7 °C)-99.2 °F (37.3 °C)] 99 °F (37.2 °C)  Pulse:  [] 97  Resp:  [16-34] 19  SpO2:  [73 %-96 %] 90 %  BP: ()/(50-74) 111/56  Arterial Line BP: ()/(43-65) 91/43       Weight: 57.2 kg (126 lb)  Body mass index is 22.32 kg/m².    SpO2: (!) 90 %  O2 Device (Oxygen Therapy): nasal cannula(per family pt not tolerating HFNC, changed to NC. )    Physical Exam   Constitutional: She is oriented to person, place, and time.   Chronically ill appearing, in no distress, non toxic appearance   HENT:   Head: Normocephalic and atraumatic.   Eyes: EOM are normal.   Neck: Neck supple. No JVD present.   Cardiovascular: An irregularly irregular  rhythm present. Tachycardia present.   Pulmonary/Chest: Effort normal and breath sounds normal. No respiratory distress. She has no wheezes.   Tachypneic   Abdominal: Soft. Bowel sounds are normal.   Musculoskeletal: She exhibits no edema.   Neurological: She is alert and oriented to person, place, and time.   Skin: Skin is warm and dry. No erythema.   Psychiatric: She has a normal mood and affect. Judgment and thought content normal.       Significant Labs: All pertinent lab results from the last 24 hours have been reviewed.    Significant Imaging: Reviewed in EPIC.

## 2019-03-29 NOTE — PLAN OF CARE
Problem: Mobility Impairment  Goal: Optimal Mobility  Outcome: Ongoing (interventions implemented as appropriate)  Patient to stay in bed today, no out of bed PT. Episode of SVT last night. In bed ROM performed.

## 2019-03-29 NOTE — ADDENDUM NOTE
Addendum  created 03/29/19 1353 by Asim Yna MD    Charge Capture section accepted, Cosign clinical note with attestation

## 2019-03-29 NOTE — PLAN OF CARE
Problem: Adult Inpatient Plan of Care  Goal: Plan of Care Review  Events throughout night,  sustained and tolerated/ treated with Dig. Epi stopped per Dr Rubio.  O2 sats 72% with SOB - Nitric 10ppm added. VS and assessment per flow sheet, patient progressing towards goals as tolerated, plan of care reviewed with Molly Smith and family, all concerns addressed, will continue to monitor.

## 2019-03-29 NOTE — HPI
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 Post-op course c/b hypotension requiring Epinephrine gtt (3/25 - 3/28) and post -op fever of unclear etiology (on IV cefepime and vanc with negative culture data).     Overnight on 3/28into 3/29  patient had multiple transient episodes of symptomatic SVT 140s lasting between 10-15 minutes to  45 minutes for which EP is consulted.     First few episodes (3) occurred between 17:00 -18:00 on 3/28 and lasted between ~ 10-15 minutes. Patient was using bedside commode and had BM at the time when suddenly she felt presyncopal, weak, and SOB. Per nursing staff she actually lost consciousness and was moved to the bed immediately. During this time frame her O2 sats dropped into 70s on 5 L/min NC (which is her baseline requirement) and SBPs dropped into 80s. she was restarted on Epi 0.02 and Singh 10 ppm by primary team with subsequent improvement in vitals. Her SVT self resolved. Then again around 23:50 00:00 patient had recurrent episode of SVT 140s which lasted 45 minutes. This time BPs remained stable however se desaturated into 70s again. Her Epi was discontinued and she was administered Digoxin 500 mcg IV x1; due to persistent nature of SVT and patient c/o anxiety she was also given dose of hydroxyzine 25 mg x1  (within 15 minutes of this she converted back to NSR). Patient has remained in NSR 90-100s with PACs per tele review since.    Patient has hx of SVT (since ~ 2013) suspected to be atrial tachycardia driven by hypoxic episodes per Dr Hinojosa whom she follows with. Underwent EPS in 2013 which was negative. Was previously on dronaderone 400 mg daily which  was eventually discontinued due to side effect profile per patients  (reportedly had syncope). She was preferentially transitioned to diltiazem (initially 120 mg daily then uptitrated to 180 mg daily then 240 mg daily to try and relieve breakthrough episodes). Despite increasing doses of CCB she continued to have breakthrough episodes (lasting 1-7 minutes per chart review) and as a result was transitioned to amiodarone (~3/2018) currently on 200 mg daily and has been off diltiazem since.    Per chart review patient has very limited options regarding her atrial tachycardia - she is not a candidate for sotalol due to her PH and RV dysfunction; Tikosyn was discussed previously however avoided due to baseline QTc 500 at the time. RFA was also mentioned however not favored from a risk/benefit standpoint.

## 2019-03-29 NOTE — ANESTHESIA POST-OP PAIN MANAGEMENT
Acute Pain Service Progress Note    Molly Smith is a 73 y.o., female, [id number].    Surgery:  L Hip Jonny-Arthroplasty     Post Op Day #: 4    Catheter type: perineural  Suprainguinal Fascia Iliaca    Infusion type: Ropivacaine 0.2%  2ml/hr basal w/ IB 15 cc/hr    Problem List:    There are no hospital problems to display for this patient.      Subjective:     General appearance of Resting in bed ,   Pain with rest: 2    Numbers   Pain with movement: 8    Numbers   Side Effects    1. Pruritis No    2. Nausea No    3. Motor Blockade No, 1=Ability to bend knees and ankles    4. Sedation No, 1=awake and alert     Objective:     Catheter site clean, dry, intact         Vitals   There were no vitals filed for this visit.     Labs    Admission on 03/23/2019   No results displayed because visit has over 200 results.      Admission on 03/23/2019, Discharged on 03/23/2019   Component Date Value Ref Range Status    WBC 03/23/2019 6.60  3.90 - 12.70 K/uL Final    RBC 03/23/2019 5.00  4.00 - 5.40 M/uL Final    Hemoglobin 03/23/2019 15.2  12.0 - 16.0 g/dL Final    Hematocrit 03/23/2019 45.7  37.0 - 48.5 % Final    MCV 03/23/2019 91  82 - 98 fL Final    MCH 03/23/2019 30.4  27.0 - 31.0 pg Final    MCHC 03/23/2019 33.2  32.0 - 36.0 g/dL Final    RDW 03/23/2019 14.4  11.5 - 14.5 % Final    Platelets 03/23/2019 229  150 - 350 K/uL Final    MPV 03/23/2019 7.3* 9.2 - 12.9 fL Final    Gran # (ANC) 03/23/2019 5.4  1.8 - 7.7 K/uL Final    Lymph # 03/23/2019 0.6* 1.0 - 4.8 K/uL Final    Mono # 03/23/2019 0.5  0.3 - 1.0 K/uL Final    Eos # 03/23/2019 0.0  0.0 - 0.5 K/uL Final    Baso # 03/23/2019 0.10  0.00 - 0.20 K/uL Final    nRBC 03/23/2019 0  0 /100 WBC Final    Gran% 03/23/2019 81.9* 38.0 - 73.0 % Final    Lymph% 03/23/2019 9.4* 18.0 - 48.0 % Final    Mono% 03/23/2019 7.6  4.0 - 15.0 % Final    Eosinophil% 03/23/2019 0.3  0.0 - 8.0 % Final    Basophil% 03/23/2019 0.8  0.0 - 1.9 % Final    Differential Method  03/23/2019 Automated   Final    Sodium 03/23/2019 139  136 - 145 mmol/L Final    Potassium 03/23/2019 4.3  3.5 - 5.1 mmol/L Final    Chloride 03/23/2019 104  95 - 110 mmol/L Final    CO2 03/23/2019 26  23 - 29 mmol/L Final    Glucose 03/23/2019 91  74 - 106 mg/dL Final    BUN, Bld 03/23/2019 21* 7 - 17 mg/dL Final    Creatinine 03/23/2019 1.10  0.70 - 1.20 mg/dL Final    Calcium 03/23/2019 9.1  8.4 - 10.2 mg/dL Final    Total Protein 03/23/2019 7.5  6.3 - 8.2 g/dL Final    Albumin 03/23/2019 4.3  3.5 - 5.2 g/dL Final    Total Bilirubin 03/23/2019 1.3  0.2 - 1.3 mg/dL Final    Alkaline Phosphatase 03/23/2019 50  38 - 145 U/L Final    AST 03/23/2019 27  14 - 36 U/L Final    ALT 03/23/2019 24  10 - 44 U/L Final    eGFR if  03/23/2019 58* >60 mL/min/1.73 m^2 Final    eGFR if non African American 03/23/2019 50* >60 mL/min/1.73 m^2 Final        Meds   No current facility-administered medications for this visit.      No current outpatient medications on file.     Facility-Administered Medications Ordered in Other Visits   Medication Dose Route Frequency Provider Last Rate Last Dose    acetaminophen tablet 1,000 mg  1,000 mg Oral Q8H Reid H MD Keiry   1,000 mg at 03/29/19 0559    amiodarone tablet 200 mg  200 mg Oral Daily Joe Arellano, NP   200 mg at 03/28/19 0833    bosentan tablet 125 mg  125 mg Oral Q12H Joe Arellano NP   125 mg at 03/28/19 2220    calcium carbonate (OS-MARIELLE) tablet 500 mg  1,000 mg Oral Daily Joe Arellano NP   1,000 mg at 03/28/19 0849    ceFEPIme injection 2 g  2 g Intravenous Q12H Alice Tim MD   2 g at 03/28/19 2040    cetirizine tablet 5 mg  5 mg Oral Daily Joe Arellano NP   5 mg at 03/28/19 0835    digoxin tablet 0.5 mg  0.5 mg Oral Once Sue Rubio MD        enoxaparin injection 60 mg  60 mg Subcutaneous Q12H Sue Rubio MD   60 mg at 03/28/19 2221    EPINEPHrine (ADRENALIN) 5 mg in sodium chloride 0.9% 250 mL  infusion  0.02 mcg/kg/min Intravenous Continuous Joe Arellano NP   Stopped at 03/28/19 0700    EPINEPHrine (ADRENALIN) 5 mg in sodium chloride 0.9% 250 mL infusion  0.02 mcg/kg/min Intravenous Continuous Sue Rubio MD 3.4 mL/hr at 03/28/19 2300 0.02 mcg/kg/min at 03/28/19 2300    ferrous gluconate tablet 324 mg  324 mg Oral Daily with breakfast Joe Arellano NP   324 mg at 03/28/19 0834    fluticasone 50 mcg/actuation nasal spray 100 mcg  2 spray Each Nare QHS Munira Landin MD   100 mcg at 03/28/19 2223    folic acid tablet 1 mg  1 mg Oral Daily Joe Arellano NP   1 mg at 03/28/19 0833    furosemide tablet 20 mg  20 mg Oral After lunch Joe Arellano NP   20 mg at 03/28/19 1215    mupirocin 2 % ointment 1 g  1 g Nasal BID Munira Landin MD   1 g at 03/28/19 2224    nitric oxide gas Gas 10 ppm  10 ppm Inhalation Continuous Sue Rubio MD   10 ppm at 03/28/19 1928    ondansetron disintegrating tablet 8 mg  8 mg Oral Q6H PRN Joe Arellano NP        polyethylene glycol packet 17 g  17 g Oral Daily Joe Arellano NP   17 g at 03/28/19 0849    pregabalin capsule 75 mg  75 mg Oral QHS Munira Landin MD   75 mg at 03/28/19 2229    promethazine (PHENERGAN) 6.25 mg in dextrose 5 % 50 mL IVPB  6.25 mg Intravenous Q6H PRN Munira Landin  mL/hr at 03/28/19 1358 6.25 mg at 03/28/19 1358    riociguat (ADEMPAS) tablet 0.5 mg  0.5 mg Oral TID Joe Arellano NP   0.5 mg at 03/28/19 2220    ropivacaine (PF) 2 mg/ml (0.2%) infusion  2 mL/hr Perineural Continuous Reid MILY Ricci MD 2 mL/hr at 03/29/19 0600 2 mL/hr at 03/29/19 0600    senna-docusate 8.6-50 mg per tablet 2 tablet  2 tablet Oral BID Joe Arellano NP   2 tablet at 03/28/19 2220    sodium chloride 0.9% flush 3 mL  3 mL Intravenous Q8H Munira Landin MD   3 mL at 03/27/19 1434    sodium chloride 0.9% flush 5 mL  5 mL Intravenous PRN Munira Olvera  MD Mushtaq        traMADol tablet 50 mg  50 mg Oral Q4H PRN Reid Ricci MD   50 mg at 03/28/19 1600    treprostinil (REMODULIN) 12,000,000 ng in sodium chloride 0.9% 100 mL infusion  89.2 ng/kg/min (Order-Specific) Intravenous Continuous PRN Alice Tim MD        VELETRI/REMODULIN CASSETTE   Intravenous Continuous Munira May Landin MD        VELETRI/REMODULIN TUBING   Intravenous Continuous Munira May Landin MD        warfarin (COUMADIN) tablet 7.5 mg  7.5 mg Oral Daily Alice Tim MD   7.5 mg at 03/28/19 1705        Anticoagulant dose Lovenox SubQ 40mg q24    Assessment:     Pain control adequate    Plan:    Pain well controlled, continue current treatment. Continue PNC to limit opioid use - tramadol 50 mg PRN for breakthrough pain.     Evaluator Reid Ricci

## 2019-03-29 NOTE — ASSESSMENT & PLAN NOTE
72 yo F with PMHx significant for severe PAH and RV dysfunction c/b chronic hypoxemic resp failure (on O2 L) on IV remodulin, bosentan, riociguat admitted for L femoral neck fracture s/p L hip hemiarthroplasty (3/25) with post op course c/b hypotension gtt (3/25 - 3/28), post-op fever of unclear etiology (on IV abx) and now recurrent symptomatic SVT 140s for which EP is consulted.      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     Also with hx of QT prolongation and current use of amiodarone which has long half life, the use of additional class III agents (eg Tikosyn) is prohibited.      Recommendations:  -- Continue amiodarone 200 mg daily.   -- Recommend adding verapamil 40 mg TID with holding parameters for SBP < 90  -- If patient has recurrent episode of SVT recommend utilizing adenosine IV push(es) (while hooked up to EKG) in attempt to identify / capture rhythm.

## 2019-03-29 NOTE — ASSESSMENT & PLAN NOTE
Pt is a 72 yo F with pulmonary HTN, chronic respiratory failure, congenital heart disease on coumadin, and closed left femoral neck fracture s/p left hip patrick 3/25. Making some progress with physical therapy, limited by medical comorbidities.     - Antibiotics: empiric vanc/cefepime for fever  - Weight bearing status: WBAT LLE  - Labs: reviewed, Hgb 11.6  - DVT Prophylaxis: mechanical, lovenox, warfarin per primary  - Lines/Drains: PIV, A line, central line, Dunbar, Remodulin pump; d/c sargent  - Pain control: PNC per anesthesia  - PT/OT: mobilize with PT/OT today, must be seen by PT 7x/week - please notify orthopedics if patient is not seen

## 2019-03-29 NOTE — SUBJECTIVE & OBJECTIVE
Interval History: Pt only complaint is fullness.  Denies F or Chills. Pt tachycardic overnight.   Cxs no growth.  Respiratory panel no growth.    Review of Systems   Constitutional: Negative for activity change, chills, fatigue and fever.   Respiratory: Negative for cough and shortness of breath.    Gastrointestinal: Positive for nausea. Negative for abdominal pain, diarrhea and vomiting.   Genitourinary: Negative for difficulty urinating, dysuria and flank pain.   Musculoskeletal: Negative for arthralgias, back pain and joint swelling.   Skin: Positive for wound.     Objective:     Vital Signs (Most Recent):  Temp: 99 °F (37.2 °C) (03/29/19 0300)  Pulse: (!) 111 (03/29/19 1215)  Resp: (!) 33 (03/29/19 1215)  BP: (!) 100/56 (03/29/19 1200)  SpO2: (!) 91 % (03/29/19 1215) Vital Signs (24h Range):  Temp:  [98.1 °F (36.7 °C)-99.2 °F (37.3 °C)] 99 °F (37.2 °C)  Pulse:  [] 111  Resp:  [16-34] 33  SpO2:  [73 %-96 %] 91 %  BP: ()/(50-74) 100/56  Arterial Line BP: ()/(43-47) 91/43     Weight: 57.2 kg (126 lb)  Body mass index is 22.32 kg/m².    Estimated Creatinine Clearance: 52.6 mL/min (based on SCr of 0.8 mg/dL).    Physical Exam   Constitutional: She is oriented to person, place, and time. She appears well-developed and well-nourished.   HENT:   Head: Normocephalic and atraumatic.   Eyes: EOM are normal.   Neck: Neck supple. No JVD present.   Cardiovascular: Normal rate and regular rhythm.   Pulmonary/Chest: Effort normal and breath sounds normal. No respiratory distress.   Abdominal: Soft. Bowel sounds are normal. She exhibits no distension.   Musculoskeletal: She exhibits no edema.   Neurological: She is alert and oriented to person, place, and time.   Skin: Skin is warm and dry. Capillary refill takes less than 2 seconds. No erythema.   Psychiatric: She has a normal mood and affect. Her behavior is normal. Thought content normal.       Significant Labs:   Blood Culture:   Recent Labs   Lab  11/22/18  1255 11/22/18  1317 03/26/19  1420 03/26/19  1440   LABBLOO No growth after 5 days. No growth after 5 days. No Growth to date  No Growth to date  No Growth to date No Growth to date  No Growth to date  No Growth to date     BMP:   Recent Labs   Lab 03/29/19  0244      *   K 4.3      CO2 21*   BUN 15   CREATININE 0.8   CALCIUM 7.6*   MG 2.0     CBC:   Recent Labs   Lab 03/28/19  0317 03/29/19  0244   WBC 7.05 6.45   HGB 10.8* 11.0*   HCT 32.1* 34.0*    161     CMP:   Recent Labs   Lab 03/28/19 0317 03/28/19  2224 03/29/19  0244   * 135* 133*   K 3.7 4.1 4.3    106 106   CO2 21* 22* 21*   * 97 101   BUN 15 14 15   CREATININE 0.8 0.8 0.8   CALCIUM 7.4* 8.0* 7.6*   PROT 5.5*  --  5.6*   ALBUMIN 2.4*  --  2.3*   BILITOT 0.6  --  0.6   ALKPHOS 52*  --  48*   AST 20  --  17   ALT 5*  --  6*   ANIONGAP 6* 7* 6*   EGFRNONAA >60.0 >60.0 >60.0     Microbiology Results (last 7 days)     Procedure Component Value Units Date/Time    Blood culture [[id number]] Collected:  03/26/19 1420    Order Status:  Completed Specimen:  Blood from Peripheral, Antecubital, Right Updated:  03/28/19 1612     Blood Culture, Routine No Growth to date     Blood Culture, Routine No Growth to date     Blood Culture, Routine No Growth to date    Blood culture [[id number]] Collected:  03/26/19 1440    Order Status:  Completed Specimen:  Blood from Peripheral, Antecubital, Left Updated:  03/28/19 1612     Blood Culture, Routine No Growth to date     Blood Culture, Routine No Growth to date     Blood Culture, Routine No Growth to date    Respiratory Viral Panel by PCR [facility]; Nasal Swab [[id number]] Collected:  03/26/19 1846    Order Status:  Completed Specimen:  Respiratory Updated:  03/28/19 1302     Respiratory Virus Panel, source Nasal Swab     RVP - Adenovirus Not Detected     Comment: Detects Serotypes B and E. Detection of Serotype C may   be limited. If Adenovirus infection is suspected and  a   Not Detected result is returned the sample should be   re-tested for Adenovirus using an independent method  (e.g. [employer] Adenovirus Quantitative Real-Time  PCR test.          Enterovirus Not Detected     Comment: Cross-reactivity has been observed between certain Rhinovirus  strains and the Enterovirus assay.          Human Bocavirus Not Detected     Human Coronavirus Not Detected     Comment: The Human Coronavirus assay detects Human coronavirus types  229E, OC43,NL63 and HKU1.          RVP - Human Metapneumovirus (hMPV) Not Detected     RVP - Influenza A Not Detected     Influenza A - [id number] Not Detected     RVP - Influenza B Not Detected     Parainfluenza Not Detected     Respiratory Syncytial VirusVirus (RSV) A Not Detected     Comment: The Respiratory Syncytial Viral assay detects types A and B,  however it does not distinguish between the two.          RVP - Rhinovirus Not Detected     Comment: Cross-Reactivity has been observed between certain   Rhinovirus strains and the Enterovirus assay.  Target Enriched Mulitplex Polymerase Chain Reaction (TEM-PCR)  allows for the detection of multiple pathogens out of a single  reaction.  This test was developed and its performance   characteristics determined by [employer].  It has not   been cleared or approved by the U.[place].Food and Drug Administration.  Results should be used in conjunction with clinical findings,   and should not form the sole basis for a diagnosis or treatment  decision.  TEM-PCR is a licensed technology of [employer].         Narrative:       Receiving Lab:->[facility]        Wound Culture: No results for input(s): LABAERO in the last 4320 hours.  All pertinent labs within the past 24 hours have been reviewed.  Recent Lab Results       03/29/19  0345   03/29/19  0244   03/28/19  2224   03/28/19  1448        Immature Grans (Abs)   0.02  Comment:  Mild elevation in immature granulocytes is non specific and   can be  seen in a variety of conditions including stress response,   acute inflammation, trauma and pregnancy. Correlation with other   laboratory and clinical findings is essential.           Albumin   2.3         Alkaline Phosphatase   48         ALT   6         Anion Gap   6 7       AST   17         Baso #   0.06         Basophil%   0.9         Total Bilirubin   0.6  Comment:  For infants and newborns, interpretation of results should be based  on gestational age, weight and in agreement with clinical  observations.  Premature Infant recommended reference ranges:  Up to 24 hours.............<8.0 mg/dL  Up to 48 hours............<12.0 mg/dL  3-5 days..................<15.0 mg/dL  6-29 days.................<15.0 mg/dL           BUN, Bld   15 14       Calcium   7.6 8.0       Chloride   106 106       CO2   21 22       Creatinine   0.8 0.8       Differential Method   Automated         eGFR if    >60.0 >60.0       eGFR if non    >60.0  Comment:  Calculation used to obtain the estimated glomerular filtration  rate (eGFR) is the CKD-EPI equation.    >60.0  Comment:  Calculation used to obtain the estimated glomerular filtration  rate (eGFR) is the CKD-EPI equation.          Eos #   0.1         Eosinophil%   1.7         Glucose   101 97       Gran # (ANC)   5.3         Gran%   82.5         Hematocrit   34.0         Hemoglobin   11.0         Immature Granulocytes   0.3         Coumadin Monitoring INR   1.5  Comment:  Coumadin Therapy:  2.0 - 3.0 for INR for all indicators except mechanical heart valves  and antiphospholipid syndromes which should use 2.5 - 3.5.           Lymph #   0.5         Lymph%   7.0         Magnesium   2.0 1.9       MCH   30.3         MCHC   32.4         MCV   94         Methemoglobin 0.3           Mono #   0.5         Mono%   7.6         MPV   9.5         nRBC   0         Phosphorus   1.6         Platelets   161         Potassium   4.3 4.1       Total Protein   5.6          Protime   14.6         RBC   3.63         RDW   13.8         Sodium   133 135       Vancomycin-Trough       8.5     WBC   6.45               Significant Imaging: I have reviewed all pertinent imaging results/findings within the past 24 hours.

## 2019-03-29 NOTE — SUBJECTIVE & OBJECTIVE
Principal Problem:Closed fracture of neck of left femur    Principal Orthopedic Problem: L femoral neck fx    Interval History: Pt seen and examined at bedside. More alert today. No pain left hip. Took a few steps from bed to chair with PT yesterday.      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    Multaq [dronedarone]        Current Facility-Administered Medications   Medication    acetaminophen tablet 1,000 mg    amiodarone tablet 200 mg    bosentan tablet 125 mg    calcium carbonate (OS-MARIELLE) tablet 500 mg    ceFEPIme injection 2 g    cetirizine tablet 5 mg    digoxin tablet 0.5 mg    enoxaparin injection 60 mg    EPINEPHrine (ADRENALIN) 5 mg in sodium chloride 0.9% 250 mL infusion    EPINEPHrine (ADRENALIN) 5 mg in sodium chloride 0.9% 250 mL infusion    ferrous gluconate tablet 324 mg    fluticasone 50 mcg/actuation nasal spray 100 mcg    folic acid tablet 1 mg    furosemide tablet 20 mg    mupirocin 2 % ointment 1 g    nitric oxide gas Gas 10 ppm    ondansetron disintegrating tablet 8 mg    pantoprazole injection 40 mg    polyethylene glycol packet 17 g    pregabalin capsule 75 mg    promethazine (PHENERGAN) 6.25 mg in dextrose 5 % 50 mL IVPB    riociguat (ADEMPAS) tablet 0.5 mg    ropivacaine (PF) 2 mg/ml (0.2%) infusion    senna-docusate 8.6-50 mg per tablet 2 tablet    simethicone chewable tablet 80 mg    sodium chloride 0.9% flush 3 mL    sodium chloride 0.9% flush 5 mL    traMADol tablet 50 mg    treprostinil (REMODULIN) 12,000,000 ng in sodium chloride 0.9% 100 mL infusion    VELETRI/REMODULIN CASSETTE    VELETRI/REMODULIN TUBING    warfarin (COUMADIN) tablet 7.5 mg     Objective:     Vital Signs (Most Recent):  Temp: 99 °F (37.2 °C) (03/29/19 0300)  Pulse: (!) 111 (03/29/19 1215)  Resp: (!) 33 (03/29/19 1215)  BP: (!) 100/56 (03/29/19 1200)  SpO2: (!) 91 % (03/29/19 1215) Vital Signs (24h Range):  Temp:  [98.1 °F  "(36.7 °C)-99.2 °F (37.3 °C)] 99 °F (37.2 °C)  Pulse:  [] 111  Resp:  [16-34] 33  SpO2:  [73 %-96 %] 91 %  BP: ()/(50-74) 100/56  Arterial Line BP: ()/(43-47) 91/43     Weight: 57.2 kg (126 lb)  Height: 5' 3" (160 cm)  Body mass index is 22.32 kg/m².      Intake/Output Summary (Last 24 hours) at 3/29/2019 1233  Last data filed at 3/29/2019 1100  Gross per 24 hour   Intake 760.11 ml   Output 200 ml   Net 560.11 ml     NAD  Normal respiratory effort on 5 L NC O2    Ortho/SPM Exam    Pt lying in bed this morning in NAD  Aquacel dressing c/d/i  SILT Sa/Thompson/DP/SP/T  Motor intact EHL/FHL/TA/Gastroc  2+ DP, 2+ PT    Significant Labs:   BMP:   Recent Labs   Lab 03/29/19 0244      *   K 4.3      CO2 21*   BUN 15   CREATININE 0.8   CALCIUM 7.6*   MG 2.0     CBC:   Recent Labs   Lab 03/28/19 0317 03/29/19 0244   WBC 7.05 6.45   HGB 10.8* 11.0*   HCT 32.1* 34.0*    161     Coagulation:   Recent Labs   Lab 03/28/19 0317 03/29/19  0244   LABPROT 13.1* 14.6*   INR 1.3* 1.5*       Significant Imaging: I have reviewed all pertinent imaging results/findings.  "

## 2019-03-30 PROBLEM — K59.00 CONSTIPATION: Status: RESOLVED | Noted: 2019-03-28 | Resolved: 2019-03-30

## 2019-03-30 LAB
ALBUMIN SERPL BCP-MCNC: 2.1 G/DL (ref 3.5–5.2)
ALP SERPL-CCNC: 55 U/L (ref 55–135)
ALT SERPL W/O P-5'-P-CCNC: 6 U/L (ref 10–44)
ANION GAP SERPL CALC-SCNC: 8 MMOL/L (ref 8–16)
AST SERPL-CCNC: 18 U/L (ref 10–40)
BASOPHILS # BLD AUTO: 0.03 K/UL (ref 0–0.2)
BASOPHILS NFR BLD: 0.5 % (ref 0–1.9)
BILIRUB SERPL-MCNC: 0.5 MG/DL (ref 0.1–1)
BUN SERPL-MCNC: 16 MG/DL (ref 8–23)
CALCIUM SERPL-MCNC: 8.1 MG/DL (ref 8.7–10.5)
CHLORIDE SERPL-SCNC: 108 MMOL/L (ref 95–110)
CO2 SERPL-SCNC: 21 MMOL/L (ref 23–29)
CREAT SERPL-MCNC: 0.8 MG/DL (ref 0.5–1.4)
DIFFERENTIAL METHOD: ABNORMAL
EOSINOPHIL # BLD AUTO: 0.2 K/UL (ref 0–0.5)
EOSINOPHIL NFR BLD: 3.1 % (ref 0–8)
ERYTHROCYTE [DISTWIDTH] IN BLOOD BY AUTOMATED COUNT: 13.8 % (ref 11.5–14.5)
EST. GFR  (AFRICAN AMERICAN): >60 ML/MIN/1.73 M^2
EST. GFR  (NON AFRICAN AMERICAN): >60 ML/MIN/1.73 M^2
GLUCOSE SERPL-MCNC: 98 MG/DL (ref 70–110)
HCT VFR BLD AUTO: 35.3 % (ref 37–48.5)
HGB BLD-MCNC: 11.4 G/DL (ref 12–16)
IMM GRANULOCYTES # BLD AUTO: 0.02 K/UL (ref 0–0.04)
IMM GRANULOCYTES NFR BLD AUTO: 0.3 % (ref 0–0.5)
INR PPP: 1.7 (ref 0.8–1.2)
LYMPHOCYTES # BLD AUTO: 0.6 K/UL (ref 1–4.8)
LYMPHOCYTES NFR BLD: 9.9 % (ref 18–48)
MAGNESIUM SERPL-MCNC: 2.2 MG/DL (ref 1.6–2.6)
MCH RBC QN AUTO: 30.5 PG (ref 27–31)
MCHC RBC AUTO-ENTMCNC: 32.3 G/DL (ref 32–36)
MCV RBC AUTO: 94 FL (ref 82–98)
METHEMOGLOBIN: 0.2 % (ref 0–3)
MONOCYTES # BLD AUTO: 0.4 K/UL (ref 0.3–1)
MONOCYTES NFR BLD: 7.2 % (ref 4–15)
NEUTROPHILS # BLD AUTO: 4.6 K/UL (ref 1.8–7.7)
NEUTROPHILS NFR BLD: 79 % (ref 38–73)
NRBC BLD-RTO: 0 /100 WBC
PHOSPHATE SERPL-MCNC: 1.4 MG/DL (ref 2.7–4.5)
PLATELET # BLD AUTO: 221 K/UL (ref 150–350)
PMV BLD AUTO: 9.8 FL (ref 9.2–12.9)
POTASSIUM SERPL-SCNC: 4 MMOL/L (ref 3.5–5.1)
PROT SERPL-MCNC: 5.5 G/DL (ref 6–8.4)
PROTHROMBIN TIME: 16.9 SEC (ref 9–12.5)
RBC # BLD AUTO: 3.74 M/UL (ref 4–5.4)
SODIUM SERPL-SCNC: 137 MMOL/L (ref 136–145)
WBC # BLD AUTO: 5.83 K/UL (ref 3.9–12.7)

## 2019-03-30 PROCEDURE — 27100171 HC OXYGEN HIGH FLOW UP TO 24 HOURS

## 2019-03-30 PROCEDURE — 63600367 HC NITRIC OXIDE PER HOUR

## 2019-03-30 PROCEDURE — 99900035 HC TECH TIME PER 15 MIN (STAT)

## 2019-03-30 PROCEDURE — 27100092 HC HIGH FLOW DELIVERY CANNULA

## 2019-03-30 PROCEDURE — 25000003 PHARM REV CODE 250: Performed by: ORTHOPAEDIC SURGERY

## 2019-03-30 PROCEDURE — 99233 PR SUBSEQUENT HOSPITAL CARE,LEVL III: ICD-10-PCS | Mod: ,,, | Performed by: INTERNAL MEDICINE

## 2019-03-30 PROCEDURE — 83735 ASSAY OF MAGNESIUM: CPT

## 2019-03-30 PROCEDURE — 99291 CRITICAL CARE FIRST HOUR: CPT | Mod: GC,,, | Performed by: INTERNAL MEDICINE

## 2019-03-30 PROCEDURE — 63600175 PHARM REV CODE 636 W HCPCS: Performed by: NURSE PRACTITIONER

## 2019-03-30 PROCEDURE — 85610 PROTHROMBIN TIME: CPT

## 2019-03-30 PROCEDURE — 25000003 PHARM REV CODE 250: Performed by: ANESTHESIOLOGY

## 2019-03-30 PROCEDURE — 97530 THERAPEUTIC ACTIVITIES: CPT

## 2019-03-30 PROCEDURE — 25000003 PHARM REV CODE 250: Performed by: NURSE PRACTITIONER

## 2019-03-30 PROCEDURE — 63600175 PHARM REV CODE 636 W HCPCS: Performed by: STUDENT IN AN ORGANIZED HEALTH CARE EDUCATION/TRAINING PROGRAM

## 2019-03-30 PROCEDURE — 80053 COMPREHEN METABOLIC PANEL: CPT

## 2019-03-30 PROCEDURE — 85025 COMPLETE CBC W/AUTO DIFF WBC: CPT

## 2019-03-30 PROCEDURE — 84100 ASSAY OF PHOSPHORUS: CPT

## 2019-03-30 PROCEDURE — C9113 INJ PANTOPRAZOLE SODIUM, VIA: HCPCS | Performed by: NURSE PRACTITIONER

## 2019-03-30 PROCEDURE — 25000003 PHARM REV CODE 250: Performed by: STUDENT IN AN ORGANIZED HEALTH CARE EDUCATION/TRAINING PROGRAM

## 2019-03-30 PROCEDURE — 99291 PR CRITICAL CARE, E/M 30-74 MINUTES: ICD-10-PCS | Mod: GC,,, | Performed by: INTERNAL MEDICINE

## 2019-03-30 PROCEDURE — 99233 SBSQ HOSP IP/OBS HIGH 50: CPT | Mod: ,,, | Performed by: INTERNAL MEDICINE

## 2019-03-30 PROCEDURE — 25000003 PHARM REV CODE 250: Performed by: INTERNAL MEDICINE

## 2019-03-30 PROCEDURE — 20000000 HC ICU ROOM

## 2019-03-30 PROCEDURE — 94761 N-INVAS EAR/PLS OXIMETRY MLT: CPT

## 2019-03-30 PROCEDURE — 27000221 HC OXYGEN, UP TO 24 HOURS

## 2019-03-30 RX ORDER — ACETAMINOPHEN 500 MG
1000 TABLET ORAL EVERY 8 HOURS
Status: DISPENSED | OUTPATIENT
Start: 2019-03-30 | End: 2019-04-01

## 2019-03-30 RX ORDER — LIDOCAINE 50 MG/G
1 PATCH TOPICAL
Status: DISCONTINUED | OUTPATIENT
Start: 2019-03-30 | End: 2019-04-05 | Stop reason: HOSPADM

## 2019-03-30 RX ADMIN — PREGABALIN 75 MG: 75 CAPSULE ORAL at 08:03

## 2019-03-30 RX ADMIN — FLUTICASONE PROPIONATE 100 MCG: 50 SPRAY, METERED NASAL at 08:03

## 2019-03-30 RX ADMIN — TRAMADOL HYDROCHLORIDE 50 MG: 50 TABLET, COATED ORAL at 12:03

## 2019-03-30 RX ADMIN — LIDOCAINE 1 PATCH: 50 PATCH TOPICAL at 06:03

## 2019-03-30 RX ADMIN — WARFARIN SODIUM 7.5 MG: 7.5 TABLET ORAL at 04:03

## 2019-03-30 RX ADMIN — ROPIVACAINE HYDROCHLORIDE 2 ML/HR: 2 INJECTION, SOLUTION EPIDURAL; INFILTRATION at 06:03

## 2019-03-30 RX ADMIN — TRAMADOL HYDROCHLORIDE 50 MG: 50 TABLET, COATED ORAL at 06:03

## 2019-03-30 RX ADMIN — ACETAMINOPHEN 1000 MG: 500 TABLET ORAL at 08:03

## 2019-03-30 RX ADMIN — SIMETHICONE CHEW TAB 80 MG 80 MG: 80 TABLET ORAL at 02:03

## 2019-03-30 RX ADMIN — PREGABALIN 75 MG: 75 CAPSULE ORAL at 12:03

## 2019-03-30 RX ADMIN — STANDARDIZED SENNA CONCENTRATE AND DOCUSATE SODIUM 2 TABLET: 8.6; 5 TABLET, FILM COATED ORAL at 08:03

## 2019-03-30 RX ADMIN — TRAMADOL HYDROCHLORIDE 50 MG: 50 TABLET, COATED ORAL at 09:03

## 2019-03-30 RX ADMIN — BOSENTAN 125 MG: 125 TABLET, FILM COATED ORAL at 09:03

## 2019-03-30 RX ADMIN — ENOXAPARIN SODIUM 60 MG: 100 INJECTION SUBCUTANEOUS at 08:03

## 2019-03-30 RX ADMIN — BOSENTAN 125 MG: 125 TABLET, FILM COATED ORAL at 08:03

## 2019-03-30 RX ADMIN — TRAMADOL HYDROCHLORIDE 50 MG: 50 TABLET, COATED ORAL at 07:03

## 2019-03-30 RX ADMIN — SIMETHICONE CHEW TAB 80 MG 80 MG: 80 TABLET ORAL at 08:03

## 2019-03-30 RX ADMIN — FOLIC ACID 1 MG: 1 TABLET ORAL at 08:03

## 2019-03-30 RX ADMIN — PANTOPRAZOLE SODIUM 40 MG: 40 INJECTION, POWDER, FOR SOLUTION INTRAVENOUS at 08:03

## 2019-03-30 RX ADMIN — CETIRIZINE HYDROCHLORIDE 5 MG: 5 TABLET ORAL at 08:03

## 2019-03-30 RX ADMIN — FUROSEMIDE 20 MG: 20 TABLET ORAL at 12:03

## 2019-03-30 RX ADMIN — RIOCIGUAT 0.5 MG: 0.5 TABLET, FILM COATED ORAL at 08:03

## 2019-03-30 RX ADMIN — AMIODARONE HYDROCHLORIDE 200 MG: 200 TABLET ORAL at 08:03

## 2019-03-30 RX ADMIN — ACETAMINOPHEN 1000 MG: 500 TABLET ORAL at 06:03

## 2019-03-30 RX ADMIN — RIOCIGUAT 0.5 MG: 0.5 TABLET, FILM COATED ORAL at 02:03

## 2019-03-30 RX ADMIN — FERROUS GLUCONATE TAB 324 MG (37.5 MG ELEMENTAL IRON) 324 MG: 324 (37.5 FE) TAB at 08:03

## 2019-03-30 RX ADMIN — CALCIUM 1000 MG: 500 TABLET ORAL at 08:03

## 2019-03-30 RX ADMIN — ONDANSETRON 8 MG: 8 TABLET, ORALLY DISINTEGRATING ORAL at 07:03

## 2019-03-30 RX ADMIN — SIMETHICONE CHEW TAB 80 MG 80 MG: 80 TABLET ORAL at 06:03

## 2019-03-30 RX ADMIN — MUPIROCIN 1 G: 20 OINTMENT TOPICAL at 08:03

## 2019-03-30 RX ADMIN — SIMETHICONE CHEW TAB 80 MG 80 MG: 80 TABLET ORAL at 07:03

## 2019-03-30 NOTE — PLAN OF CARE
Interval update:     Nursing called patient back into Atach HR 140s with hypoxia stats 70s, remains HD stable SbP 110. Patient with increasing SOB. Patient seen and evaluated with family at bedside, EKG completed consistent with WCT (HR 140s,  with old RBBB). Attempted carotid / valsalva without improvement.     Started on HFNC, stats improved 70s -> 90%, with resolution of Atach into ST HR <110, with symptomatic improvement.     Will continue to monitor, did not receive Adenosine / Verapamil.   Dr. Tim updated      Sue Rubio M.D.  Page # [phone]  Cardiovascular Fellow PGY-IV  [facility]

## 2019-03-30 NOTE — SUBJECTIVE & OBJECTIVE
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 Vagal maneuvers attempted. At the same time patient was found to be hypoxic requiring higher oxygen supplementation. This AM, patient appears to be in sinus tachycardia.     Review of Systems   Constitution: Positive for malaise/fatigue. Negative for chills and fever.   HENT: Negative for congestion.    Cardiovascular: Positive for dyspnea on exertion and palpitations. Negative for chest pain, irregular heartbeat, leg swelling, near-syncope, orthopnea, paroxysmal nocturnal dyspnea and syncope.   Respiratory: Positive for shortness of breath. Negative for cough, sputum production and wheezing.    Gastrointestinal: Negative for abdominal pain, nausea and vomiting.   Neurological: Negative for dizziness, focal weakness, headaches and weakness.     Objective:     Vital Signs (Most Recent):  Temp: 98.5 °F (36.9 °C) (03/30/19 0300)  Pulse: 98 (03/30/19 1030)  Resp: (!) 24 (03/30/19 1030)  BP: 110/61 (03/30/19 1000)  SpO2: (!) 91 % (03/30/19 1030) Vital Signs (24h Range):  Temp:  [98.5 °F (36.9 °C)-99.1 °F (37.3 °C)] 98.5 °F (36.9 °C)  Pulse:  [] 98  Resp:  [0-40] 24  SpO2:  [78 %-97 %] 91 %  BP: ()/(51-87) 110/61     Weight: 57.2 kg (126 lb)  Body mass index is 22.32 kg/m².     SpO2: (!) 91 %  O2 Device (Oxygen Therapy): High Flow nasal Cannula    Physical Exam   HENT:   Mouth/Throat: Oropharynx is clear and moist.   Eyes: Pupils are equal, round, and reactive to light.   Neck: Neck supple.   Cardiovascular: Intact distal pulses.   tachycardic   Pulmonary/Chest: Effort normal.   Abdominal: Soft.   Musculoskeletal: She exhibits no edema.   Neurological: She is alert.   Skin: Skin is warm.   Psychiatric: She has a normal mood and affect. Her behavior is normal.   Vitals reviewed.      Significant Labs: All pertinent lab results from the last 24 hours have been reviewed.    Significant Imaging: Echocardiogram:   Transthoracic echo (TTE)  complete (Cupid Only):   Results for orders placed or performed during the hospital encounter of 01/29/19   Transthoracic echo (TTE) complete (Cupid Only)   Result Value Ref Range    Ascending aorta 2.67 cm    STJ 2.40 cm    AV mean gradient 2.82 mmHg    Ao peak yehuda 1.17 m/s    Ao VTI 19.71 cm    IVRT 0.06 msec    IVS 0.75 0.6 - 1.1 cm    LA size 4.61 cm    Left Atrium Major Axis 4.89 cm    Left Atrium Minor Axis 4.96 cm    LVIDD 4.31 3.5 - 6.0 cm    LVIDS 2.60 2.1 - 4.0 cm    LVOT diameter 1.87 cm    LVOT peak VTI 17.33 cm    PW 0.71 0.6 - 1.1 cm    MV Peak A Yehuda 1.09 m/s    E wave decelartion time 195.77 msec    MV Peak E Yehuda 0.76 m/s    RA Major Axis 5.01 cm    RA Width 5.81 cm    RVDD 6.60 cm    Sinus 2.74 cm    TAPSE 1.53 cm    TR Max Yehuda 4.64 m/s    TDI LATERAL 0.09     TDI SEPTAL 0.07     LA WIDTH 4.05 cm    LV Diastolic Volume 83.53 mL    LV Systolic Volume 24.50 mL    RV S' 8.90 m/s    LVOT peak yehuda [id number] m/s    LV LATERAL E/E' RATIO 8.44     LV SEPTAL E/E' RATIO 10.86     FS 40 %    LA volume 78.16 cm3    LV mass 93.81 g    Left Ventricle Relative Wall Thickness 0.33 cm    AV valve area 2.41 cm2    AV Velocity Ratio 0.82     AV index (prosthetic) 0.88     E/A ratio 0.70     Mean e' 0.08     LVOT area 2.75 cm2    LVOT stroke volume 47.57 cm3    AV peak gradient 5.48 mmHg    E/E' ratio 9.50     LV Systolic Volume Index 15.1 mL/m2    LV Diastolic Volume Index 51.45 mL/m2    LA Volume Index 48.1 mL/m2    LV Mass Index 57.8 g/m2    Triscuspid Valve Regurgitation Peak Gradient 86.12 mmHg    BSA 1.63 m2    Right Atrial Pressure (from IVC) 3 mmHg    TV rest pulmonary artery pressure 89 mmHg

## 2019-03-30 NOTE — SUBJECTIVE & OBJECTIVE
"Principal Problem:Closed fracture of neck of left femur    Principal Orthopedic Problem: L femoral neck fx    Interval History: Pt seen and examined at bedside. Pain well controlled. One episode SVT yesterday. PT held.     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    Multaq [dronedarone]        Current Facility-Administered Medications   Medication    amiodarone tablet 200 mg    bosentan tablet 125 mg    calcium carbonate (OS-MARIELLE) tablet 500 mg    cetirizine tablet 5 mg    enoxaparin injection 60 mg    ferrous gluconate tablet 324 mg    fluticasone 50 mcg/actuation nasal spray 100 mcg    folic acid tablet 1 mg    furosemide tablet 20 mg    mupirocin 2 % ointment 1 g    nitric oxide gas Gas 10 ppm    ondansetron disintegrating tablet 8 mg    pantoprazole injection 40 mg    polyethylene glycol packet 17 g    pregabalin capsule 75 mg    promethazine (PHENERGAN) 6.25 mg in dextrose 5 % 50 mL IVPB    riociguat (ADEMPAS) tablet 0.5 mg    ropivacaine (PF) 2 mg/ml (0.2%) infusion    senna-docusate 8.6-50 mg per tablet 2 tablet    simethicone chewable tablet 80 mg    sodium chloride 0.9% flush 3 mL    sodium chloride 0.9% flush 5 mL    traMADol tablet 50 mg    treprostinil (REMODULIN) 12,000,000 ng in sodium chloride 0.9% 100 mL infusion    VELETRI/REMODULIN CASSETTE    VELETRI/REMODULIN TUBING    warfarin (COUMADIN) tablet 7.5 mg     Objective:     Vital Signs (Most Recent):  Temp: 98.5 °F (36.9 °C) (03/30/19 0300)  Pulse: 101 (03/30/19 0730)  Resp: (!) 23 (03/30/19 0730)  BP: 114/66 (03/30/19 0702)  SpO2: (!) 93 % (03/30/19 0730) Vital Signs (24h Range):  Temp:  [98.5 °F (36.9 °C)-99.1 °F (37.3 °C)] 98.5 °F (36.9 °C)  Pulse:  [] 101  Resp:  [0-40] 23  SpO2:  [78 %-97 %] 93 %  BP: ()/(51-87) 114/66     Weight: 57.2 kg (126 lb)  Height: 5' 3" (160 cm)  Body mass index is 22.32 kg/m².      Intake/Output Summary (Last 24 hours) at " 3/30/2019 0751  Last data filed at 3/30/2019 0600  Gross per 24 hour   Intake 394 ml   Output 651 ml   Net -257 ml     NAD  Normal respiratory effort on 5 L NC O2    Ortho/SPM Exam    Pt lying in bed this morning in NAD  Aquacel dressing c/d/i  SILT Sa/Thompson/DP/SP/T  Motor intact EHL/FHL/TA/Gastroc  2+ DP, 2+ PT    Significant Labs:   BMP:   Recent Labs   Lab 03/30/19  0243   GLU 98      K 4.0      CO2 21*   BUN 16   CREATININE 0.8   CALCIUM 8.1*   MG 2.2     CBC:   Recent Labs   Lab 03/29/19  0244 03/30/19  0243   WBC 6.45 5.83   HGB 11.0* 11.4*   HCT 34.0* 35.3*    221     Coagulation:   Recent Labs   Lab 03/29/19  0244 03/30/19  0243   LABPROT 14.6* 16.9*   INR 1.5* 1.7*       Significant Imaging: I have reviewed all pertinent imaging results/findings.

## 2019-03-30 NOTE — PT/OT/SLP PROGRESS
"Physical Therapy Treatment    Patient Name:  Molly Smith   MRN:  [id number]  Admitting Diagnosis: Closed fracture of neck of left femur  Recent Surgery: Procedure(s) (LRB):  HEMIARTHROPLASTY, HIP - left (Left) 5 Days Post-Op    Recommendations:     Discharge Recommendations:  nursing facility, skilled   Discharge Equipment Recommendations: commode, walker, rolling   Barriers to discharge: -  Pt requiring increased assistance at current time.     Plan:     During this hospitalization, patient to be seen 5 x/week to address the above listed problems via gait training, therapeutic activities, therapeutic exercises, neuromuscular re-education  · Plan of Care Expires:  04/26/19   Plan of Care Reviewed with: patient, spouse    This Plan of care has been discussed with the patient who was involved in its development and understands and is in agreement with the identified goals and treatment plan    Subjective     Communicated with RN (Kota) prior to session.     Patient comments: "I just can't control it.  I keep going!"  Pain/Comfort:  · Pain Rating 1: 0/10  · Pain Rating Post-Intervention 1: 0/10    Objective:     Patient found with: arterial line, blood pressure cuff, peripheral IV, pulse ox (continuous), SCD, telemetry(high flow Nitric via NC)  waffle  Patient found sup in bed upon PT entry to room, agreeable to treatment.  RN and  present in the room.    General Precautions: Standard, fall   Orthopedic Precautions:LLE weight bearing as tolerated, LLE posterior precautions   Braces:         BED MOBILITY (vc's for hand placement sequencing of task):        Rolling to the R:  NT.       Rolling to the L:  Min A.        Sup > sit at the EOB:  Mod A for trunk and to guide LE's.       Sit > sup:  Mod/max A for trunk and LE's.       Scooting hips to EOB with min/CGA                SITTING AT THE EDGE OF THE BED    Assistance Level Required: SBA for safety with B UE support   Postural deviations noted: flexed trunk, " rounded shoulders, PPT, forward head   Encouraged: upright posture        TRANSFERS  (vc's for hand placement, sequencing of task and safety)   Patient completed Sit <> Stand Transfer from EOB  with CGA for balance with rolling walker xmultiple trial(s)   Patient completed Stand <> Sit Transfer to EOB with CGA for balance with rolling walker        GAIT: alongside the EOB   Patient ambulated: 4-6 steps laterally to the R and then to the L x2 trials   Patient required: CGA for balance   Patient used:  Rolling Walker   Gait Pattern observed: step to   Gait Deviation(s): decreased horacio, increased time in double stance, decreased velocity of limb motion, decreased step length, decreased toe-to-floor clearance, decreased weight-shifting ability and FFP    Comments: vc's and tc's for upright posture, placement of AD, directional guidance, B step length and safety      THERAPEUTIC ACTIVITIES/EXERCISES  Pt performs L LE ex's per post THR protocol x15 reps    EDUCATION  Patient provided with daily orientation and goals of this PT session. PTA reviews L post hip prec with pt being able to recall 1/3 prec total.  They were educated to call for assistance and to transfer with hospital staff only.  Also, pt was educated on the effects of prolonged immobility and the importance of performing OOB activity and exercises to promote healing and reduce recovery time      Whiteboard updated with correct mobility information. RN/PCT notified.  Pt safe to transfer OOB/BTB with RN/PCT: Use RW with min A of 1 person.    Patient left supine, with  all lines intact, call button in reach and , RN and MD's present    AM-PAC 6 CLICK MOBILITY  Turning over in bed (including adjusting bedclothes, sheets and blankets)?: 3  Sitting down on and standing up from a chair with arms (e.g., wheelchair, bedside commode, etc.): 3  Moving from lying on back to sitting on the side of the bed?: 2  Moving to and from a bed to a chair (including a  wheelchair)?: 3  Need to walk in hospital room?: 3  Climbing 3-5 steps with a railing?: 2  Basic Mobility Total Score: 16     Assessment:     Molly Smith is a 73 y.o. female admitted with a medical diagnosis of Closed fracture of neck of left femur.  She presents with the following impairments/functional limitations:  weakness, impaired endurance, impaired self care skills, impaired functional mobilty, gait instability, impaired balance, decreased lower extremity function, orthopedic precautions.  Increased time required for tx session 2* pt with multiple incidents of bowel incontinence. Pt requiring assistance and verbal cues for bed mob, transfers, standing, gait 2* weakness.   In light of pt's current functional level and deficits, it is anticipated that pt will need to participate in an intense rehab program consisting of PT and OT in order to achieve full rehab potential to return to previous level of function and roles.  Pt will cont to benefit from skilled PT intervention to address deficits and improve functional mobility.     Rehab Prognosis:  Good; patient would benefit from acute skilled PT services to address these deficits and reach maximum level of function.      GOALS:   Multidisciplinary Problems     Physical Therapy Goals        Problem: Physical Therapy Goal    Goal Priority Disciplines Outcome Goal Variances Interventions   Physical Therapy Goal     PT, PT/OT Ongoing (interventions implemented as appropriate)     Description:  Goals to be met by: 2019     Patient will increase functional independence with mobility by performin. Supine to sit with Moderate Assistance  2. Rolling to Left and Right with Minimal Assistance.  3. Sit to stand transfer with Moderate Assistance- met 3/27/2019  Sit to stand transfer with contact guard assistance   4. Sitting at edge of bed x10 minutes with Stand-by Assistance: met 3/27/2019   Sitting at edge of bed x15 mins with supervision   5. Gait x 20  feet with RW with contact guard assistance                         Time Tracking:     PT Received On: 03/30/19  PT Start Time: 0852     PT Stop Time: 0955  PT Total Time (min): 63 min     Billable Minutes: Therapeutic Activity 63    Treatment Type: Treatment  PT/PTA: PTA     PTA Visit Number: 1       Emily Troncoso PTA.  Pager [phone]    3/30/2019    .

## 2019-03-30 NOTE — PLAN OF CARE
Problem: Physical Therapy Goal  Goal: Physical Therapy Goal  Goals to be met by: 2019     Patient will increase functional independence with mobility by performin. Supine to sit with Moderate Assistance  2. Rolling to Left and Right with Minimal Assistance.  3. Sit to stand transfer with Moderate Assistance- met 3/27/2019  Sit to stand transfer with contact guard assistance   4. Sitting at edge of bed x10 minutes with Stand-by Assistance: met 3/27/2019   Sitting at edge of bed x15 mins with supervision   5. Gait x 20 feet with RW with contact guard assistance          Discharge Recommendations: SNF    Pt safe to transfer OOB/BTB with RN/PCT: Use RW with min A of 1 person.    Goals remain appropriate.     Emily Troncoso, PTA.   [phone]   3/30/2019

## 2019-03-30 NOTE — ASSESSMENT & PLAN NOTE
-EP consulted Dr. Nina, recommend Verapamil, and adenosine during event to evaluate underlying rhythm. Do not recommend digoxin with can cause multifocal atrial arrhythmias. Appreciate assistance  -with RV failure do not want to introduce CCB / BB unless absolutely necessary   -Likely hypoxic induced   -Continue amio.

## 2019-03-30 NOTE — PROGRESS NOTES
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 Vagal maneuvers attempted. At the same time patient was found to be hypoxic requiring higher oxygen supplementation. This AM, patient appears to be in sinus tachycardia.     Review of Systems   Constitution: Positive for malaise/fatigue. Negative for chills and fever.   HENT: Negative for congestion.    Cardiovascular: Positive for dyspnea on exertion and palpitations. Negative for chest pain, irregular heartbeat, leg swelling, near-syncope, orthopnea, paroxysmal nocturnal dyspnea and syncope.   Respiratory: Positive for shortness of breath. Negative for cough, sputum production and wheezing.    Gastrointestinal: Negative for abdominal pain, nausea and vomiting.   Neurological: Negative for dizziness, focal weakness, headaches and weakness.     Objective:     Vital Signs (Most Recent):  Temp: 98.5 °F (36.9 °C) (03/30/19 0300)  Pulse: 98 (03/30/19 1030)  Resp: (!) 24 (03/30/19 1030)  BP: 110/61 (03/30/19 1000)  SpO2: (!) 91 % (03/30/19 1030) Vital Signs (24h Range):  Temp:  [98.5 °F (36.9 °C)-99.1 °F (37.3 °C)] 98.5 °F (36.9 °C)  Pulse:  [] 98  Resp:  [0-40] 24  SpO2:  [78 %-97 %] 91 %  BP: ()/(51-87) 110/61     Weight: 57.2 kg (126 lb)  Body mass index is 22.32 kg/m².     SpO2: (!) 91 %  O2 Device (Oxygen Therapy): High Flow nasal Cannula    Physical Exam   HENT:   Mouth/Throat: Oropharynx is clear and moist.   Eyes: Pupils are equal, round, and reactive to light.   Neck: Neck supple.   Cardiovascular: Intact distal pulses.   tachycardic   Pulmonary/Chest: Effort normal.   Abdominal: Soft.   Musculoskeletal: She exhibits no edema.   Neurological: She  is alert.   Skin: Skin is warm.   Psychiatric: She has a normal mood and affect. Her behavior is normal.   Vitals reviewed.      Significant Labs: All pertinent lab results from the last 24 hours have been reviewed.    Significant Imaging: Echocardiogram:   Transthoracic echo (TTE) complete (Cupid Only):   Results for orders placed or performed during the hospital encounter of 01/29/19   Transthoracic echo (TTE) complete (Cupid Only)   Result Value Ref Range    Ascending aorta 2.67 cm    STJ 2.40 cm    AV mean gradient 2.82 mmHg    Ao peak yehuda 1.17 m/s    Ao VTI 19.71 cm    IVRT 0.06 msec    IVS 0.75 0.6 - 1.1 cm    LA size 4.61 cm    Left Atrium Major Axis 4.89 cm    Left Atrium Minor Axis 4.96 cm    LVIDD 4.31 3.5 - 6.0 cm    LVIDS 2.60 2.1 - 4.0 cm    LVOT diameter 1.87 cm    LVOT peak VTI 17.33 cm    PW 0.71 0.6 - 1.1 cm    MV Peak A Yehuda 1.09 m/s    E wave decelartion time 195.77 msec    MV Peak E Yehuda 0.76 m/s    RA Major Axis 5.01 cm    RA Width 5.81 cm    RVDD 6.60 cm    Sinus 2.74 cm    TAPSE 1.53 cm    TR Max Yehuda 4.64 m/s    TDI LATERAL 0.09     TDI SEPTAL 0.07     LA WIDTH 4.05 cm    LV Diastolic Volume 83.53 mL    LV Systolic Volume 24.50 mL    RV S' 8.90 m/s    LVOT peak yehuda 0.425274919423569 m/s    LV LATERAL E/E' RATIO 8.44     LV SEPTAL E/E' RATIO 10.86     FS 40 %    LA volume 78.16 cm3    LV mass 93.81 g    Left Ventricle Relative Wall Thickness 0.33 cm    AV valve area 2.41 cm2    AV Velocity Ratio 0.82     AV index (prosthetic) 0.88     E/A ratio 0.70     Mean e' 0.08     LVOT area 2.75 cm2    LVOT stroke volume 47.57 cm3    AV peak gradient 5.48 mmHg    E/E' ratio 9.50     LV Systolic Volume Index 15.1 mL/m2    LV Diastolic Volume Index 51.45 mL/m2    LA Volume Index 48.1 mL/m2    LV Mass Index 57.8 g/m2    Triscuspid Valve Regurgitation Peak Gradient 86.12 mmHg    BSA 1.63 m2    Right Atrial Pressure (from IVC) 3 mmHg    TV rest pulmonary artery pressure 89 mmHg     Assessment and Plan:     SVT  (supraventricular tachycardia)  74 yo F with PMHx significant for severe PAH and RV dysfunction c/b chronic hypoxemic resp failure (on O2 L) on IV remodulin, bosentan, riociguat admitted for L femoral neck fracture s/p L hip hemiarthroplasty (3/25) with post op course c/b hypotension gtt (3/25 - 3/28), post-op fever of unclear etiology (on IV abx) and now recurrent symptomatic SVT 140s for which EP is consulted.      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     Also with hx of QT prolongation and current use of amiodarone which has long half life, the use of additional class III agents (eg Tikosyn) is prohibited.      Recommendations:  -- Continue amiodarone 200 mg daily   -- Recommend adding verapamil 40 mg TID with holding parameters for SBP < 90  -- Would not recommend starting digoxin as it can cause MAT with heart block and other dysrhythmias; not much of a role for ivadrabine in regulating HR  -- If patient has recurrent episode of SVT recommend utilizing adenosine IV push(es) (while hooked up to EKG) in attempt to identify / capture rhythm  -- Will need to medically optimize patient before taking her to the EP lab for ablation of slow pathway      Bebe Cassidy MD  Cardiac Electrophysiology  [facility]

## 2019-03-30 NOTE — ASSESSMENT & PLAN NOTE
-Continue PH mgt as below.  -Wean off oxygen as tolerated  -Epi weaned off.   -Singh wean off today  -Hold aldactone.  -Possible downgrade to floors tomorrow  -Poor prognosis

## 2019-03-30 NOTE — PROGRESS NOTES
[facility]  Orthopedics  Progress Note    Patient Name: Molly Smith  MRN: [id number]  Admission Date: 3/23/2019  Hospital Length of Stay: 7 days  Attending Provider: Alice Tim MD  Primary Care Provider: Roebrto Braun MD  Follow-up For: Procedure(s) (LRB):  HEMIARTHROPLASTY, HIP - left (Left)    Post-Operative Day: 5 Days Post-Op  Subjective:     Principal Problem:Closed fracture of neck of left femur    Principal Orthopedic Problem: L femoral neck fx    Interval History: Pt seen and examined at bedside. Pain well controlled. One episode SVT yesterday. PT held.     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    Multaq [dronedarone]        Current Facility-Administered Medications   Medication    amiodarone tablet 200 mg    bosentan tablet 125 mg    calcium carbonate (OS-MARIELLE) tablet 500 mg    cetirizine tablet 5 mg    enoxaparin injection 60 mg    ferrous gluconate tablet 324 mg    fluticasone 50 mcg/actuation nasal spray 100 mcg    folic acid tablet 1 mg    furosemide tablet 20 mg    mupirocin 2 % ointment 1 g    nitric oxide gas Gas 10 ppm    ondansetron disintegrating tablet 8 mg    pantoprazole injection 40 mg    polyethylene glycol packet 17 g    pregabalin capsule 75 mg    promethazine (PHENERGAN) 6.25 mg in dextrose 5 % 50 mL IVPB    riociguat (ADEMPAS) tablet 0.5 mg    ropivacaine (PF) 2 mg/ml (0.2%) infusion    senna-docusate 8.6-50 mg per tablet 2 tablet    simethicone chewable tablet 80 mg    sodium chloride 0.9% flush 3 mL    sodium chloride 0.9% flush 5 mL    traMADol tablet 50 mg    treprostinil (REMODULIN) 12,000,000 ng in sodium chloride 0.9% 100 mL infusion    VELETRI/REMODULIN CASSETTE    VELETRI/REMODULIN TUBING    warfarin (COUMADIN) tablet 7.5 mg     Objective:     Vital Signs (Most Recent):  Temp: 98.5 °F (36.9 °C) (03/30/19 0300)  Pulse: 101 (03/30/19 0730)  Resp: (!) 23 (03/30/19  "0730)  BP: 114/66 (03/30/19 0702)  SpO2: (!) 93 % (03/30/19 0730) Vital Signs (24h Range):  Temp:  [98.5 °F (36.9 °C)-99.1 °F (37.3 °C)] 98.5 °F (36.9 °C)  Pulse:  [] 101  Resp:  [0-40] 23  SpO2:  [78 %-97 %] 93 %  BP: ()/(51-87) 114/66     Weight: 57.2 kg (126 lb)  Height: 5' 3" (160 cm)  Body mass index is 22.32 kg/m².      Intake/Output Summary (Last 24 hours) at 3/30/2019 0751  Last data filed at 3/30/2019 0600  Gross per 24 hour   Intake 394 ml   Output 651 ml   Net -257 ml     NAD  Normal respiratory effort on 5 L NC O2    Ortho/SPM Exam    Pt lying in bed this morning in NAD  Aquacel dressing c/d/i  SILT Sa/Thompson/DP/SP/T  Motor intact EHL/FHL/TA/Gastroc  2+ DP, 2+ PT    Significant Labs:   BMP:   Recent Labs   Lab 03/30/19  0243   GLU 98      K 4.0      CO2 21*   BUN 16   CREATININE 0.8   CALCIUM 8.1*   MG 2.2     CBC:   Recent Labs   Lab 03/29/19  0244 03/30/19  0243   WBC 6.45 5.83   HGB 11.0* 11.4*   HCT 34.0* 35.3*    221     Coagulation:   Recent Labs   Lab 03/29/19  0244 03/30/19  0243   LABPROT 14.6* 16.9*   INR 1.5* 1.7*       Significant Imaging: I have reviewed all pertinent imaging results/findings.    Assessment/Plan:     * Closed fracture of neck of left femur  Pt is a 74 yo F with pulmonary HTN, chronic respiratory failure, congenital heart disease on coumadin, and closed left femoral neck fracture s/p left hip patrick 3/25. Making some progress with physical therapy, limited by medical comorbidities.     - Antibiotics: empiric vanc/cefepime for fever  - Weight bearing status: WBAT LLE  - Labs: reviewed, Hgb 11.4  - DVT Prophylaxis: mechanical, lovenox, warfarin per primary  - Lines/Drains: PIV, A line, central line, Dunbar, Remodulin pump  - Pain control: multimodal per anesthesia  - PT/OT: mobilize with PT/OT    Closed left hip fracture               Linda Paez MD  Orthopedics  [facility]  "

## 2019-03-30 NOTE — PLAN OF CARE
Problem: Adult Inpatient Plan of Care  Goal: Plan of Care Review  No acute events throughout night, remains on Roumadulin and Ropivacaine continued. Brief SVT. Dr Rubio @ BS - self converted. O2 increased to 10L high flow with 10ppm. DNR noted. VS and assessment per flow sheet, patient progressing towards goals as tolerated, plan of care reviewed with Molly Smith and family, all concerns addressed, will continue to monitor.

## 2019-03-30 NOTE — SUBJECTIVE & OBJECTIVE
Interval History:   Overnight again with Atrial tachy HR 140s likely hypoxic drive, resolved with increase oxygen supplement, remained HD stable, did not resolve with valsalva or carotid massage     Continuous Infusions:   nitric oxide gas      ropivacaine (PF) 2 mg/ml (0.2%) 2 mL/hr (03/30/19 1200)    treprostinil (REMODULIN) infusion      veletri/remodulin cassette      veletri/remodulin tubing       Scheduled Meds:   amiodarone  200 mg Oral Daily    bosentan  125 mg Oral Q12H    calcium carbonate  1,000 mg Oral Daily    cetirizine  5 mg Oral Daily    enoxaparin  60 mg Subcutaneous Q12H    ferrous gluconate  324 mg Oral Daily with breakfast    fluticasone  2 spray Each Nare QHS    folic acid  1 mg Oral Daily    furosemide  20 mg Oral After lunch    mupirocin  1 g Nasal BID    pantoprazole  40 mg Intravenous BID    polyethylene glycol  17 g Oral Daily    pregabalin  75 mg Oral QHS    riociguat  0.5 mg Oral TID    senna-docusate 8.6-50 mg  2 tablet Oral BID    simethicone  1 tablet Oral QID (PC + HS)    sodium chloride 0.9%  3 mL Intravenous Q8H    warfarin  7.5 mg Oral Daily     PRN Meds:ondansetron, promethazine (PHENERGAN) IVPB, sodium chloride 0.9%, traMADol, treprostinil (REMODULIN) infusion    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    Multaq [dronedarone]      Objective:     Vital Signs (Most Recent):  Temp: 98.5 °F (36.9 °C) (03/30/19 0300)  Pulse: 97 (03/30/19 1115)  Resp: (!) 24 (03/30/19 1115)  BP: (!) 107/55 (03/30/19 1100)  SpO2: (!) 93 % (03/30/19 1115) Vital Signs (24h Range):  Temp:  [98.5 °F (36.9 °C)-99.1 °F (37.3 °C)] 98.5 °F (36.9 °C)  Pulse:  [] 97  Resp:  [0-40] 24  SpO2:  [78 %-97 %] 93 %  BP: ()/(51-87) 107/55     No data found.  Body mass index is 22.32 kg/m².      Intake/Output Summary (Last 24 hours) at 3/30/2019 1217  Last data filed at 3/30/2019 1200  Gross per 24 hour   Intake 641 ml   Output  401 ml   Net 240 ml       Hemodynamic Parameters:       Telemetry: ST Hr 105s, 1x Atach last night    Physical Exam   Constitutional: She is oriented to person, place, and time. She appears well-developed and well-nourished. She is sleeping. She appears ill. She appears distressed.       HENT:   Head: Normocephalic and atraumatic.   Eyes: EOM are normal.   Neck: Neck supple. JVD present.   Cardiovascular: Normal rate and regular rhythm.   Pulmonary/Chest: Effort normal and breath sounds normal. No respiratory distress.   Abdominal: Soft. Bowel sounds are normal. She exhibits no distension.   Musculoskeletal: She exhibits no edema.   Neurological: She is oriented to person, place, and time.   Skin: Skin is warm and dry. Capillary refill takes less than 2 seconds. No erythema.   Psychiatric: She has a normal mood and affect. Her behavior is normal. Thought content normal.       Significant Labs:  CBC:  Recent Labs   Lab 03/28/19 0317 03/29/19  0244 03/30/19  0243   WBC 7.05 6.45 5.83   RBC 3.54* 3.63* 3.74*   HGB 10.8* 11.0* 11.4*   HCT 32.1* 34.0* 35.3*    161 221   MCV 91 94 94   MCH 30.5 30.3 30.5   MCHC 33.6 32.4 32.3     BNP:  No results for input(s): BNP in the last 168 hours.    Invalid input(s): BNPTRIAGELBLO  CMP:  Recent Labs   Lab 03/28/19 0317 03/28/19 2224 03/29/19  0244 03/30/19  0243   * 97 101 98   CALCIUM 7.4* 8.0* 7.6* 8.1*   ALBUMIN 2.4*  --  2.3* 2.1*   PROT 5.5*  --  5.6* 5.5*   * 135* 133* 137   K 3.7 4.1 4.3 4.0   CO2 21* 22* 21* 21*    106 106 108   BUN 15 14 15 16   CREATININE 0.8 0.8 0.8 0.8   ALKPHOS 52*  --  48* 55   ALT 5*  --  6* 6*   AST 20  --  17 18   BILITOT 0.6  --  0.6 0.5      Coagulation:   Recent Labs   Lab 03/28/19  0317 03/29/19  0244 03/30/19  0243   INR 1.3* 1.5* 1.7*     LDH:  No results for input(s): LDH in the last 72 hours.  Microbiology:  Microbiology Results (last 7 days)     Procedure Component Value Units Date/Time    Blood culture  [[id number]] Collected:  03/26/19 1420    Order Status:  Completed Specimen:  Blood from Peripheral, Antecubital, Right Updated:  03/29/19 1612     Blood Culture, Routine No Growth to date     Blood Culture, Routine No Growth to date     Blood Culture, Routine No Growth to date     Blood Culture, Routine No Growth to date    Blood culture [[id number]] Collected:  03/26/19 1440    Order Status:  Completed Specimen:  Blood from Peripheral, Antecubital, Left Updated:  03/29/19 1612     Blood Culture, Routine No Growth to date     Blood Culture, Routine No Growth to date     Blood Culture, Routine No Growth to date     Blood Culture, Routine No Growth to date    Respiratory Viral Panel by PCR [facility]; Nasal Swab [[id number]] Collected:  03/26/19 1846    Order Status:  Completed Specimen:  Respiratory Updated:  03/28/19 1302     Respiratory Virus Panel, source Nasal Swab     RVP - Adenovirus Not Detected     Comment: Detects Serotypes B and E. Detection of Serotype C may   be limited. If Adenovirus infection is suspected and a   Not Detected result is returned the sample should be   re-tested for Adenovirus using an independent method  (e.g. [employer] Adenovirus Quantitative Real-Time  PCR test.          Enterovirus Not Detected     Comment: Cross-reactivity has been observed between certain Rhinovirus  strains and the Enterovirus assay.          Human Bocavirus Not Detected     Human Coronavirus Not Detected     Comment: The Human Coronavirus assay detects Human coronavirus types  229E, OC43,NL63 and HKU1.          RVP - Human Metapneumovirus (hMPV) Not Detected     RVP - Influenza A Not Detected     Influenza A - Y2N9-27 Not Detected     RVP - Influenza B Not Detected     Parainfluenza Not Detected     Respiratory Syncytial VirusVirus (RSV) A Not Detected     Comment: The Respiratory Syncytial Viral assay detects types A and B,  however it does not distinguish between the two.          RVP - Rhinovirus Not Detected      Comment: Cross-Reactivity has been observed between certain   Rhinovirus strains and the Enterovirus assay.  Target Enriched Mulitplex Polymerase Chain Reaction (TEM-PCR)  allows for the detection of multiple pathogens out of a single  reaction.  This test was developed and its performance   characteristics determined by [employer].  It has not   been cleared or approved by the U.S.Food and Drug Administration.  Results should be used in conjunction with clinical findings,   and should not form the sole basis for a diagnosis or treatment  decision.  TEM-PCR is a licensed technology of [employer].         Narrative:       Receiving Lab:->[facility]          I have reviewed all pertinent labs within the past 24 hours.    Estimated Creatinine Clearance: 52.6 mL/min (based on SCr of 0.8 mg/dL).    Diagnostic Results:  I have reviewed and interpreted all pertinent imaging results/findings within the past 24 hours.

## 2019-03-30 NOTE — ASSESSMENT & PLAN NOTE
- S/P s/p left patrick 3/25, received post-op Ancef  - Weight bearing status: WBAT LLE  - Pain control: PNC per anesthesia(appreciate their help).    - Orthopedics following appreciate assistance   - PT/OT: must be seen by PT 7x/week - please notify orthopedics if patient is not seen

## 2019-03-30 NOTE — ANESTHESIA POST-OP PAIN MANAGEMENT
Acute Pain Service Progress Note    Molly Smith is a 73 y.o., female, [id number].    Surgery:  L Hip Jonny-Arthroplasty     Post Op Day #: 5    Catheter type: perineural  Suprainguinal Fascia Iliaca    Infusion type: Ropivacaine 0.2%  2ml/hr basal w/ IB 15 cc/hr    Problem List:    Active Hospital Problems    Diagnosis  POA    *Closed fracture of neck of left femur [S72.002A]  Yes    Constipation [K59.00]  Yes    Encounter for assessment for deep vein thrombosis (DVT) [Z76.89]  Not Applicable    RVF (right ventricular failure) [I50.9]  Yes    Hypotension [I95.9]  Yes    Closed left hip fracture [S72.002A]  Yes    SVT (supraventricular tachycardia) [I47.1]  Yes    Fever [R50.9]  No    PAH (pulmonary artery hypertension) [I27.21]  Yes    Chronic respiratory failure with hypoxia [J96.11]  Yes      Resolved Hospital Problems   No resolved problems to display.       Subjective:     General appearance of Resting in bed ,   Pain with rest: 2    Numbers   Pain with movement: 5    Numbers   Side Effects    1. Pruritis No    2. Nausea No    3. Motor Blockade No, 1=Ability to bend knees and ankles    4. Sedation No, 1=awake and alert     Objective:     Catheter site clean, dry, intact         Vitals   Vitals:    03/30/19 0915   BP:    Pulse: 101   Resp: (!) 32   Temp:         Labs    Admission on 03/23/2019   No results displayed because visit has over 200 results.           Meds   Current Facility-Administered Medications   Medication Dose Route Frequency Provider Last Rate Last Dose    amiodarone tablet 200 mg  200 mg Oral Daily Joe Adan, NP   200 mg at 03/30/19 0822    bosentan tablet 125 mg  125 mg Oral Q12H Joe Adan, NP   125 mg at 03/30/19 0900    calcium carbonate (OS-MARIELLE) tablet 500 mg  1,000 mg Oral Daily Joe Adan, NP   1,000 mg at 03/30/19 0822    cetirizine tablet 5 mg  5 mg Oral Daily Joe Adan, NP   5 mg at 03/30/19 0822    enoxaparin injection 60 mg  60 mg  Subcutaneous Q12H Rievelyne Rubio MD   60 mg at 03/30/19 0822    ferrous gluconate tablet 324 mg  324 mg Oral Daily with breakfast Joe Arellano NP   324 mg at 03/30/19 0822    fluticasone 50 mcg/actuation nasal spray 100 mcg  2 spray Each Nare QHS Munira Landin MD   100 mcg at 03/29/19 2117    folic acid tablet 1 mg  1 mg Oral Daily Joe Arellano NP   1 mg at 03/30/19 0821    furosemide tablet 20 mg  20 mg Oral After lunch Joe Arellano NP   20 mg at 03/29/19 1331    mupirocin 2 % ointment 1 g  1 g Nasal BID Munira Landin MD   1 g at 03/30/19 0823    nitric oxide gas Gas 5 ppm  5 ppm Inhalation Continuous Alice Tim MD   5 ppm at 03/30/19 1003    ondansetron disintegrating tablet 8 mg  8 mg Oral Q6H PRN Joe Arellano NP   8 mg at 03/30/19 0727    pantoprazole injection 40 mg  40 mg Intravenous BID Joe Arellano NP   40 mg at 03/30/19 0823    polyethylene glycol packet 17 g  17 g Oral Daily Joe Arellano NP   17 g at 03/29/19 0832    pregabalin capsule 75 mg  75 mg Oral QHS Munira Landin MD   75 mg at 03/30/19 0042    promethazine (PHENERGAN) 6.25 mg in dextrose 5 % 50 mL IVPB  6.25 mg Intravenous Q6H PRN Munira Landin  mL/hr at 03/28/19 1358 6.25 mg at 03/28/19 1358    riociguat (ADEMPAS) tablet 0.5 mg  0.5 mg Oral TID Joe Arellano NP   0.5 mg at 03/30/19 0821    ropivacaine (PF) 2 mg/ml (0.2%) infusion  2 mL/hr Perineural Continuous Reid H MD Keiry 2 mL/hr at 03/30/19 0900 2 mL/hr at 03/30/19 0900    senna-docusate 8.6-50 mg per tablet 2 tablet  2 tablet Oral BID Joe Arellano NP   2 tablet at 03/29/19 2118    simethicone chewable tablet 80 mg  1 tablet Oral QID (PC + HS) Joe Arellano NP   80 mg at 03/30/19 0756    sodium chloride 0.9% flush 3 mL  3 mL Intravenous Q8H Munira Landin MD   3 mL at 03/27/19 1434    sodium chloride 0.9% flush 5 mL  5 mL Intravenous PRN Munira Olvera  MD Mushtaq        traMADol tablet 50 mg  50 mg Oral Q4H PRN Reid MILY Ricci MD   50 mg at 03/30/19 0756    treprostinil (REMODULIN) 12,000,000 ng in sodium chloride 0.9% 100 mL infusion  89.2 ng/kg/min (Order-Specific) Intravenous Continuous PRN Alice Tim MD        VELETRI/REMODULIN CASSETTE   Intravenous Continuous Munira May Landin MD        VELETRI/REMODULIN TUBING   Intravenous Continuous Munira May Landin MD        warfarin (COUMADIN) tablet 7.5 mg  7.5 mg Oral Daily Alice Tim MD   7.5 mg at 03/29/19 1656        Anticoagulant dose Lovenox SubQ 40mg q24    Assessment:     Pain control adequate    Plan:    Pain well controlled, continue current treatment. Continue PNC to limit opioid use - tramadol 50 mg PRN for breakthrough pain.     Evaluator Faustino Betancourt      I have reviewed and concur with the resident's history, physical, assessment, and plan.  I have personally interviewed and examined the patient at bedside.  See below addendum for my evaluation and additional findings.    Patient doing well - dressing for PNC in tact - day #5 with PNC - no signs of infection - continue current therapy and would reassess if sign of infection -

## 2019-03-30 NOTE — ASSESSMENT & PLAN NOTE
Pt is a 74 yo F with pulmonary HTN, chronic respiratory failure, congenital heart disease on coumadin, and closed left femoral neck fracture s/p left hip patrick 3/25. Making some progress with physical therapy, limited by medical comorbidities.     - Antibiotics: empiric vanc/cefepime for fever  - Weight bearing status: WBAT LLE  - Labs: reviewed, Hgb 11.4  - DVT Prophylaxis: mechanical, lovenox, warfarin per primary  - Lines/Drains: PIV, A line, central line, [place], Remodulin pump  - Pain control: multimodal per anesthesia  - PT/OT: mobilize with PT/OT

## 2019-03-30 NOTE — ASSESSMENT & PLAN NOTE
- Continue PO Lasix.   - Continue riociguat, bosentan, remodulin([facility] CDI).  -  to manage remodulin infusion.  - Warfarin with Lovenox bridge (INR 1.7)

## 2019-03-30 NOTE — ADDENDUM NOTE
Addendum  created 03/30/19 1702 by Sonia Saez MD    Charge Capture section accepted, Sign clinical note

## 2019-03-30 NOTE — PROGRESS NOTES
[facility]  Heart Transplant  Progress Note    Patient Name: Molly Smith  MRN: [id number]  Admission Date: 3/23/2019  Hospital Length of Stay: 7 days  Attending Physician: Alice Tim MD  Primary Care Provider: Roberto Braun MD  Principal Problem:Closed fracture of neck of left femur    Subjective:     Interval History:   Overnight again with Atrial tachy HR 140s likely hypoxic drive, resolved with increase oxygen supplement, remained HD stable, did not resolve with valsalva or carotid massage     Continuous Infusions:   nitric oxide gas      ropivacaine (PF) 2 mg/ml (0.2%) 2 mL/hr (03/30/19 1200)    treprostinil (REMODULIN) infusion      veletri/remodulin cassette      veletri/remodulin tubing       Scheduled Meds:   amiodarone  200 mg Oral Daily    bosentan  125 mg Oral Q12H    calcium carbonate  1,000 mg Oral Daily    cetirizine  5 mg Oral Daily    enoxaparin  60 mg Subcutaneous Q12H    ferrous gluconate  324 mg Oral Daily with breakfast    fluticasone  2 spray Each Nare QHS    folic acid  1 mg Oral Daily    furosemide  20 mg Oral After lunch    mupirocin  1 g Nasal BID    pantoprazole  40 mg Intravenous BID    polyethylene glycol  17 g Oral Daily    pregabalin  75 mg Oral QHS    riociguat  0.5 mg Oral TID    senna-docusate 8.6-50 mg  2 tablet Oral BID    simethicone  1 tablet Oral QID (PC + HS)    sodium chloride 0.9%  3 mL Intravenous Q8H    warfarin  7.5 mg Oral Daily     PRN Meds:ondansetron, promethazine (PHENERGAN) IVPB, sodium chloride 0.9%, traMADol, treprostinil (REMODULIN) infusion    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    Multaq [dronedarone]      Objective:     Vital Signs (Most Recent):  Temp: 98.5 °F (36.9 °C) (03/30/19 0300)  Pulse: 97 (03/30/19 1115)  Resp: (!) 24 (03/30/19 1115)  BP: (!) 107/55 (03/30/19 1100)  SpO2: (!) 93 % (03/30/19 1115) Vital Signs (24h Range):  Temp:  [98.5  °F (36.9 °C)-99.1 °F (37.3 °C)] 98.5 °F (36.9 °C)  Pulse:  [] 97  Resp:  [0-40] 24  SpO2:  [78 %-97 %] 93 %  BP: ()/(51-87) 107/55     No data found.  Body mass index is 22.32 kg/m².      Intake/Output Summary (Last 24 hours) at 3/30/2019 1217  Last data filed at 3/30/2019 1200  Gross per 24 hour   Intake 641 ml   Output 401 ml   Net 240 ml       Hemodynamic Parameters:       Telemetry: ST Hr 105s, 1x Atach last night    Physical Exam   Constitutional: She is oriented to person, place, and time. She appears well-developed and well-nourished. She is sleeping. She appears ill. She appears distressed.       HENT:   Head: Normocephalic and atraumatic.   Eyes: EOM are normal.   Neck: Neck supple. JVD present.   Cardiovascular: Normal rate and regular rhythm.   Pulmonary/Chest: Effort normal and breath sounds normal. No respiratory distress.   Abdominal: Soft. Bowel sounds are normal. She exhibits no distension.   Musculoskeletal: She exhibits no edema.   Neurological: She is oriented to person, place, and time.   Skin: Skin is warm and dry. Capillary refill takes less than 2 seconds. No erythema.   Psychiatric: She has a normal mood and affect. Her behavior is normal. Thought content normal.       Significant Labs:  CBC:  Recent Labs   Lab 03/28/19 0317 03/29/19  0244 03/30/19  0243   WBC 7.05 6.45 5.83   RBC 3.54* 3.63* 3.74*   HGB 10.8* 11.0* 11.4*   HCT 32.1* 34.0* 35.3*    161 221   MCV 91 94 94   MCH 30.5 30.3 30.5   MCHC 33.6 32.4 32.3     BNP:  No results for input(s): BNP in the last 168 hours.    Invalid input(s): BNPTRIAGELBLO  CMP:  Recent Labs   Lab 03/28/19  0317 03/28/19  2224 03/29/19  0244 03/30/19  0243   * 97 101 98   CALCIUM 7.4* 8.0* 7.6* 8.1*   ALBUMIN 2.4*  --  2.3* 2.1*   PROT 5.5*  --  5.6* 5.5*   * 135* 133* 137   K 3.7 4.1 4.3 4.0   CO2 21* 22* 21* 21*    106 106 108   BUN 15 14 15 16   CREATININE 0.8 0.8 0.8 0.8   ALKPHOS 52*  --  48* 55   ALT 5*  --  6*  6*   AST 20  --  17 18   BILITOT 0.6  --  0.6 0.5      Coagulation:   Recent Labs   Lab 03/28/19  0317 03/29/19  0244 03/30/19  0243   INR 1.3* 1.5* 1.7*     LDH:  No results for input(s): LDH in the last 72 hours.  Microbiology:  Microbiology Results (last 7 days)     Procedure Component Value Units Date/Time    Blood culture [[id number]] Collected:  03/26/19 1420    Order Status:  Completed Specimen:  Blood from Peripheral, Antecubital, Right Updated:  03/29/19 1612     Blood Culture, Routine No Growth to date     Blood Culture, Routine No Growth to date     Blood Culture, Routine No Growth to date     Blood Culture, Routine No Growth to date    Blood culture [[id number]] Collected:  03/26/19 1440    Order Status:  Completed Specimen:  Blood from Peripheral, Antecubital, Left Updated:  03/29/19 1612     Blood Culture, Routine No Growth to date     Blood Culture, Routine No Growth to date     Blood Culture, Routine No Growth to date     Blood Culture, Routine No Growth to date    Respiratory Viral Panel by PCR [facility]; Nasal Swab [[id number]] Collected:  03/26/19 1846    Order Status:  Completed Specimen:  Respiratory Updated:  03/28/19 1302     Respiratory Virus Panel, source Nasal Swab     RVP - Adenovirus Not Detected     Comment: Detects Serotypes B and E. Detection of Serotype C may   be limited. If Adenovirus infection is suspected and a   Not Detected result is returned the sample should be   re-tested for Adenovirus using an independent method  (e.g. [employer] Adenovirus Quantitative Real-Time  PCR test.          Enterovirus Not Detected     Comment: Cross-reactivity has been observed between certain Rhinovirus  strains and the Enterovirus assay.          Human Bocavirus Not Detected     Human Coronavirus Not Detected     Comment: The Human Coronavirus assay detects Human coronavirus types  229E, OC43,NL63 and HKU1.          RVP - Human Metapneumovirus (hMPV) Not Detected     RVP - Influenza A Not  Detected     Influenza A - Y8Y1-51 Not Detected     RVP - Influenza B Not Detected     Parainfluenza Not Detected     Respiratory Syncytial VirusVirus (RSV) A Not Detected     Comment: The Respiratory Syncytial Viral assay detects types A and B,  however it does not distinguish between the two.          RVP - Rhinovirus Not Detected     Comment: Cross-Reactivity has been observed between certain   Rhinovirus strains and the Enterovirus assay.  Target Enriched Mulitplex Polymerase Chain Reaction (TEM-PCR)  allows for the detection of multiple pathogens out of a single  reaction.  This test was developed and its performance   characteristics determined by [employer].  It has not   been cleared or approved by the U.S.Food and Drug Administration.  Results should be used in conjunction with clinical findings,   and should not form the sole basis for a diagnosis or treatment  decision.  TEM-PCR is a licensed technology of [employer].         Narrative:       Receiving Lab:->[facility]          I have reviewed all pertinent labs within the past 24 hours.    Estimated Creatinine Clearance: 52.6 mL/min (based on SCr of 0.8 mg/dL).    Diagnostic Results:  I have reviewed and interpreted all pertinent imaging results/findings within the past 24 hours.    Assessment and Plan:     73 y.o. 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 Patient presented to [facility] for hip pain and inability to bear weight on the left leg after a trip and fall yesterday.  Patient tripped on a cord for her pulm HTN pump falling directly onto the left hip.  Patient denies head trauma or upper body injury. She denies numbness or tingling. Currently lying on stretcher with  at bedside in nad.        * Closed fracture of neck of left femur  - S/P s/p left patrick 3/25, received post-op Ancef  - Weight bearing status: WBAT LLE  - Pain control: PNC per anesthesia(appreciate their help).    - Orthopedics following appreciate assistance   - PT/OT: must be seen by PT 7x/week - please notify orthopedics if patient is not seen    RVF (right ventricular failure)  -Continue PH mgt as below.  -Wean off oxygen as tolerated  -Epi weaned off.   -Singh wean off today  -Hold aldactone.  -Possible downgrade to floors tomorrow  -Poor prognosis     SVT (supraventricular tachycardia)  -EP consulted Dr. Nina, recommend Verapamil, and adenosine during event to evaluate underlying rhythm. Do not recommend digoxin with can cause multifocal atrial arrhythmias. Appreciate assistance  -with RV failure do not want to introduce CCB / BB unless absolutely necessary   -Likely hypoxic induced   -Continue amio.      PAH (pulmonary artery hypertension)  - Continue PO Lasix.   - Continue riociguat, bosentan, remodulin([place] [facility] CDI).  -  to manage remodulin infusion.  - Warfarin with Lovenox bridge (INR 1.7)    Chronic respiratory failure with hypoxia  - on 5L O2 at home.   - Goal sat >/=85%        Sue Portillo MD  Heart Transplant  [facility]

## 2019-03-30 NOTE — NURSING
Dr Rubio notifed . BP maintained and O2 sat 80s. Dr Rubio came to BS - spoke with spouse. O2 increased to 10L high flow. See orders. Converted on her own.

## 2019-03-31 LAB
ALBUMIN SERPL BCP-MCNC: 2 G/DL (ref 3.5–5.2)
ALLENS TEST: ABNORMAL
ALP SERPL-CCNC: 50 U/L (ref 55–135)
ALT SERPL W/O P-5'-P-CCNC: 8 U/L (ref 10–44)
ANION GAP SERPL CALC-SCNC: 6 MMOL/L (ref 8–16)
AST SERPL-CCNC: 17 U/L (ref 10–40)
BACTERIA BLD CULT: NORMAL
BACTERIA BLD CULT: NORMAL
BASOPHILS # BLD AUTO: 0.04 K/UL (ref 0–0.2)
BASOPHILS NFR BLD: 0.8 % (ref 0–1.9)
BILIRUB SERPL-MCNC: 0.4 MG/DL (ref 0.1–1)
BUN SERPL-MCNC: 13 MG/DL (ref 8–23)
CALCIUM SERPL-MCNC: 8 MG/DL (ref 8.7–10.5)
CHLORIDE SERPL-SCNC: 108 MMOL/L (ref 95–110)
CO2 SERPL-SCNC: 23 MMOL/L (ref 23–29)
CREAT SERPL-MCNC: 0.8 MG/DL (ref 0.5–1.4)
DELSYS: ABNORMAL
DIFFERENTIAL METHOD: ABNORMAL
EOSINOPHIL # BLD AUTO: 0.2 K/UL (ref 0–0.5)
EOSINOPHIL NFR BLD: 3.5 % (ref 0–8)
ERYTHROCYTE [DISTWIDTH] IN BLOOD BY AUTOMATED COUNT: 13.8 % (ref 11.5–14.5)
EST. GFR  (AFRICAN AMERICAN): >60 ML/MIN/1.73 M^2
EST. GFR  (NON AFRICAN AMERICAN): >60 ML/MIN/1.73 M^2
GLUCOSE SERPL-MCNC: 91 MG/DL (ref 70–110)
HCO3 UR-SCNC: 20.1 MMOL/L (ref 24–28)
HCT VFR BLD AUTO: 33.9 % (ref 37–48.5)
HGB BLD-MCNC: 11.1 G/DL (ref 12–16)
IMM GRANULOCYTES # BLD AUTO: 0.02 K/UL (ref 0–0.04)
IMM GRANULOCYTES NFR BLD AUTO: 0.4 % (ref 0–0.5)
INR PPP: 1.7 (ref 0.8–1.2)
LYMPHOCYTES # BLD AUTO: 0.8 K/UL (ref 1–4.8)
LYMPHOCYTES NFR BLD: 15.8 % (ref 18–48)
MAGNESIUM SERPL-MCNC: 2 MG/DL (ref 1.6–2.6)
MCH RBC QN AUTO: 30.6 PG (ref 27–31)
MCHC RBC AUTO-ENTMCNC: 32.7 G/DL (ref 32–36)
MCV RBC AUTO: 93 FL (ref 82–98)
MONOCYTES # BLD AUTO: 0.4 K/UL (ref 0.3–1)
MONOCYTES NFR BLD: 7.7 % (ref 4–15)
NEUTROPHILS # BLD AUTO: 3.5 K/UL (ref 1.8–7.7)
NEUTROPHILS NFR BLD: 71.8 % (ref 38–73)
NRBC BLD-RTO: 0 /100 WBC
PCO2 BLDA: 30.2 MMHG (ref 35–45)
PH SMN: 7.43 [PH] (ref 7.35–7.45)
PHOSPHATE SERPL-MCNC: 2 MG/DL (ref 2.7–4.5)
PLATELET # BLD AUTO: 207 K/UL (ref 150–350)
PMV BLD AUTO: 9.3 FL (ref 9.2–12.9)
PO2 BLDA: 72 MMHG (ref 80–100)
POC BE: -4 MMOL/L
POC SATURATED O2: 95 % (ref 95–100)
POC TCO2: 21 MMOL/L (ref 23–27)
POTASSIUM SERPL-SCNC: 3.6 MMOL/L (ref 3.5–5.1)
PROT SERPL-MCNC: 5.1 G/DL (ref 6–8.4)
PROTHROMBIN TIME: 16.6 SEC (ref 9–12.5)
RBC # BLD AUTO: 3.63 M/UL (ref 4–5.4)
SAMPLE: ABNORMAL
SITE: ABNORMAL
SODIUM SERPL-SCNC: 137 MMOL/L (ref 136–145)
WBC # BLD AUTO: 4.82 K/UL (ref 3.9–12.7)

## 2019-03-31 PROCEDURE — 94761 N-INVAS EAR/PLS OXIMETRY MLT: CPT

## 2019-03-31 PROCEDURE — 27100171 HC OXYGEN HIGH FLOW UP TO 24 HOURS

## 2019-03-31 PROCEDURE — 63600175 PHARM REV CODE 636 W HCPCS: Performed by: STUDENT IN AN ORGANIZED HEALTH CARE EDUCATION/TRAINING PROGRAM

## 2019-03-31 PROCEDURE — 85610 PROTHROMBIN TIME: CPT

## 2019-03-31 PROCEDURE — 25000003 PHARM REV CODE 250: Performed by: STUDENT IN AN ORGANIZED HEALTH CARE EDUCATION/TRAINING PROGRAM

## 2019-03-31 PROCEDURE — 63600367 HC NITRIC OXIDE PER HOUR

## 2019-03-31 PROCEDURE — 80053 COMPREHEN METABOLIC PANEL: CPT

## 2019-03-31 PROCEDURE — 85025 COMPLETE CBC W/AUTO DIFF WBC: CPT

## 2019-03-31 PROCEDURE — 83735 ASSAY OF MAGNESIUM: CPT

## 2019-03-31 PROCEDURE — 27100092 HC HIGH FLOW DELIVERY CANNULA

## 2019-03-31 PROCEDURE — 25000003 PHARM REV CODE 250: Performed by: INTERNAL MEDICINE

## 2019-03-31 PROCEDURE — 99900035 HC TECH TIME PER 15 MIN (STAT)

## 2019-03-31 PROCEDURE — 84100 ASSAY OF PHOSPHORUS: CPT

## 2019-03-31 PROCEDURE — 82803 BLOOD GASES ANY COMBINATION: CPT

## 2019-03-31 PROCEDURE — 99233 SBSQ HOSP IP/OBS HIGH 50: CPT | Mod: ,,, | Performed by: INTERNAL MEDICINE

## 2019-03-31 PROCEDURE — 20000000 HC ICU ROOM

## 2019-03-31 PROCEDURE — A4216 STERILE WATER/SALINE, 10 ML: HCPCS | Performed by: ORTHOPAEDIC SURGERY

## 2019-03-31 PROCEDURE — 99291 PR CRITICAL CARE, E/M 30-74 MINUTES: ICD-10-PCS | Mod: GC,,, | Performed by: INTERNAL MEDICINE

## 2019-03-31 PROCEDURE — 36600 WITHDRAWAL OF ARTERIAL BLOOD: CPT

## 2019-03-31 PROCEDURE — 25000003 PHARM REV CODE 250: Performed by: ANESTHESIOLOGY

## 2019-03-31 PROCEDURE — 99291 CRITICAL CARE FIRST HOUR: CPT | Mod: GC,,, | Performed by: INTERNAL MEDICINE

## 2019-03-31 PROCEDURE — 94799 UNLISTED PULMONARY SVC/PX: CPT

## 2019-03-31 PROCEDURE — 25000003 PHARM REV CODE 250: Performed by: ORTHOPAEDIC SURGERY

## 2019-03-31 PROCEDURE — 99233 PR SUBSEQUENT HOSPITAL CARE,LEVL III: ICD-10-PCS | Mod: ,,, | Performed by: INTERNAL MEDICINE

## 2019-03-31 PROCEDURE — 25000003 PHARM REV CODE 250: Performed by: NURSE PRACTITIONER

## 2019-03-31 RX ORDER — POTASSIUM CHLORIDE 20 MEQ/1
40 TABLET, EXTENDED RELEASE ORAL ONCE
Status: COMPLETED | OUTPATIENT
Start: 2019-03-31 | End: 2019-03-31

## 2019-03-31 RX ORDER — PANTOPRAZOLE SODIUM 40 MG/1
40 TABLET, DELAYED RELEASE ORAL
Status: DISCONTINUED | OUTPATIENT
Start: 2019-03-31 | End: 2019-04-05 | Stop reason: HOSPADM

## 2019-03-31 RX ADMIN — PANTOPRAZOLE SODIUM 40 MG: 40 TABLET, DELAYED RELEASE ORAL at 08:03

## 2019-03-31 RX ADMIN — FUROSEMIDE 20 MG: 20 TABLET ORAL at 02:03

## 2019-03-31 RX ADMIN — ACETAMINOPHEN 1000 MG: 500 TABLET ORAL at 02:03

## 2019-03-31 RX ADMIN — SIMETHICONE CHEW TAB 80 MG 80 MG: 80 TABLET ORAL at 08:03

## 2019-03-31 RX ADMIN — FLUTICASONE PROPIONATE 100 MCG: 50 SPRAY, METERED NASAL at 08:03

## 2019-03-31 RX ADMIN — WARFARIN SODIUM 7.5 MG: 7.5 TABLET ORAL at 06:03

## 2019-03-31 RX ADMIN — ROPIVACAINE HYDROCHLORIDE 2 ML/HR: 2 INJECTION, SOLUTION EPIDURAL; INFILTRATION at 07:03

## 2019-03-31 RX ADMIN — ACETAMINOPHEN 1000 MG: 500 TABLET ORAL at 10:03

## 2019-03-31 RX ADMIN — POTASSIUM CHLORIDE 40 MEQ: 1500 TABLET, EXTENDED RELEASE ORAL at 06:03

## 2019-03-31 RX ADMIN — ENOXAPARIN SODIUM 60 MG: 100 INJECTION SUBCUTANEOUS at 08:03

## 2019-03-31 RX ADMIN — AMIODARONE HYDROCHLORIDE 200 MG: 200 TABLET ORAL at 08:03

## 2019-03-31 RX ADMIN — Medication 3 ML: at 03:03

## 2019-03-31 RX ADMIN — RIOCIGUAT 0.5 MG: 0.5 TABLET, FILM COATED ORAL at 08:03

## 2019-03-31 RX ADMIN — ACETAMINOPHEN 1000 MG: 500 TABLET ORAL at 05:03

## 2019-03-31 RX ADMIN — TRAMADOL HYDROCHLORIDE 50 MG: 50 TABLET, COATED ORAL at 03:03

## 2019-03-31 RX ADMIN — SIMETHICONE CHEW TAB 80 MG 80 MG: 80 TABLET ORAL at 09:03

## 2019-03-31 RX ADMIN — FOLIC ACID 1 MG: 1 TABLET ORAL at 08:03

## 2019-03-31 RX ADMIN — FERROUS GLUCONATE TAB 324 MG (37.5 MG ELEMENTAL IRON) 324 MG: 324 (37.5 FE) TAB at 08:03

## 2019-03-31 RX ADMIN — Medication 3 ML: at 09:03

## 2019-03-31 RX ADMIN — BOSENTAN 125 MG: 125 TABLET, FILM COATED ORAL at 08:03

## 2019-03-31 RX ADMIN — SIMETHICONE CHEW TAB 80 MG 80 MG: 80 TABLET ORAL at 02:03

## 2019-03-31 RX ADMIN — TRAMADOL HYDROCHLORIDE 50 MG: 50 TABLET, COATED ORAL at 09:03

## 2019-03-31 RX ADMIN — ONDANSETRON 8 MG: 8 TABLET, ORALLY DISINTEGRATING ORAL at 02:03

## 2019-03-31 RX ADMIN — PANTOPRAZOLE SODIUM 40 MG: 40 TABLET, DELAYED RELEASE ORAL at 04:03

## 2019-03-31 RX ADMIN — POTASSIUM CHLORIDE 40 MEQ: 1500 TABLET, EXTENDED RELEASE ORAL at 08:03

## 2019-03-31 RX ADMIN — PREGABALIN 75 MG: 75 CAPSULE ORAL at 08:03

## 2019-03-31 RX ADMIN — CETIRIZINE HYDROCHLORIDE 5 MG: 5 TABLET ORAL at 08:03

## 2019-03-31 RX ADMIN — CALCIUM 1000 MG: 500 TABLET ORAL at 02:03

## 2019-03-31 RX ADMIN — RIOCIGUAT 0.5 MG: 0.5 TABLET, FILM COATED ORAL at 03:03

## 2019-03-31 NOTE — ASSESSMENT & PLAN NOTE
-Continue PH mgt with remoduline/ tracleer / riociguat  -Wean off oxygen as tolerated, PTA 3 - 6 L  -Singh wean off today   -Hold aldactone.  -Possible downgrade to floors tomorrow, will awat Ropivicaine tomorrow, if no further pain and stable then can be downgraded

## 2019-03-31 NOTE — PROGRESS NOTES
[facility]  Heart Transplant  Progress Note    Patient Name: Molly Smith  MRN: [id number]  Admission Date: 3/23/2019  Hospital Length of Stay: 8 days  Attending Physician: Alice Tim MD  Primary Care Provider: Roberto Braun MD  Principal Problem:Closed fracture of neck of left femur    Subjective:     Interval History:   Up in chair today with family at bedside  No further tachycardia events lastnight  Having BM  Singh turn off today       Continuous Infusions:   ropivacaine (PF) 2 mg/ml (0.2%) 2 mL/hr (03/31/19 1100)    treprostinil (REMODULIN) infusion      veletri/remodulin cassette      veletri/remodulin tubing       Scheduled Meds:   acetaminophen  1,000 mg Oral Q8H    amiodarone  200 mg Oral Daily    bosentan  125 mg Oral Q12H    calcium carbonate  1,000 mg Oral Daily    cetirizine  5 mg Oral Daily    enoxaparin  60 mg Subcutaneous Q12H    ferrous gluconate  324 mg Oral Daily with breakfast    fluticasone  2 spray Each Nare QHS    folic acid  1 mg Oral Daily    furosemide  20 mg Oral After lunch    lidocaine  1 patch Transdermal Q24H    pantoprazole  40 mg Oral BID AC    polyethylene glycol  17 g Oral Daily    pregabalin  75 mg Oral QHS    riociguat  0.5 mg Oral TID    senna-docusate 8.6-50 mg  2 tablet Oral BID    simethicone  1 tablet Oral QID (PC + HS)    sodium chloride 0.9%  3 mL Intravenous Q8H    warfarin  7.5 mg Oral Daily     PRN Meds:ondansetron, promethazine (PHENERGAN) IVPB, sodium chloride 0.9%, traMADol, treprostinil (REMODULIN) infusion    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    Multaq [dronedarone]      Objective:     Vital Signs (Most Recent):  Temp: 98.3 °F (36.8 °C) (03/31/19 0000)  Pulse: 97 (03/31/19 1100)  Resp: (!) 24 (03/31/19 1100)  BP: 121/62 (03/31/19 1100)  SpO2: (!) 90 % (03/31/19 1100) Vital Signs (24h Range):  Temp:  [98.3 °F (36.8 °C)] 98.3 °F (36.8 °C)  Pulse:   [] 97  Resp:  [12-37] 24  SpO2:  [76 %-94 %] 90 %  BP: ()/(51-71) 121/62     No data found.  Body mass index is 22.32 kg/m².      Intake/Output Summary (Last 24 hours) at 3/31/2019 1201  Last data filed at 3/31/2019 1100  Gross per 24 hour   Intake 336 ml   Output --   Net 336 ml       Hemodynamic Parameters:       Telemetry:   NsR HR 90s, intermittent ST    Physical Exam   Constitutional: She is oriented to person, place, and time. She appears well-developed and well-nourished. She is sleeping. She appears ill. She appears distressed.       HENT:   Head: Normocephalic and atraumatic.   Eyes: EOM are normal.   Neck: Neck supple. No JVD present.   Cardiovascular: Normal rate and regular rhythm.   Pulmonary/Chest: Effort normal and breath sounds normal. No respiratory distress.   Abdominal: Soft. Bowel sounds are normal. She exhibits no distension.   Musculoskeletal: She exhibits no edema.   Neurological: She is oriented to person, place, and time.   Skin: Skin is warm and dry. Capillary refill takes less than 2 seconds. No erythema.   Psychiatric: She has a normal mood and affect. Her behavior is normal. Thought content normal.       Significant Labs:  CBC:  Recent Labs   Lab 03/29/19 0244 03/30/19 0243 03/31/19  0306   WBC 6.45 5.83 4.82   RBC 3.63* 3.74* 3.63*   HGB 11.0* 11.4* 11.1*   HCT 34.0* 35.3* 33.9*    221 207   MCV 94 94 93   MCH 30.3 30.5 30.6   MCHC 32.4 32.3 32.7     BNP:  No results for input(s): BNP in the last 168 hours.    Invalid input(s): BNPTRIAGELBLO  CMP:  Recent Labs   Lab 03/29/19 0244 03/30/19 0243 03/31/19  0306    98 91   CALCIUM 7.6* 8.1* 8.0*   ALBUMIN 2.3* 2.1* 2.0*   PROT 5.6* 5.5* 5.1*   * 137 137   K 4.3 4.0 3.6   CO2 21* 21* 23    108 108   BUN 15 16 13   CREATININE 0.8 0.8 0.8   ALKPHOS 48* 55 50*   ALT 6* 6* 8*   AST 17 18 17   BILITOT 0.6 0.5 0.4      Coagulation:   Recent Labs   Lab 03/29/19  0244 03/30/19  0243 03/31/19  0306   INR 1.5*  1.7* 1.7*     LDH:  No results for input(s): LDH in the last 72 hours.  Microbiology:  Microbiology Results (last 7 days)     Procedure Component Value Units Date/Time    Blood culture [[id number]] Collected:  03/26/19 1440    Order Status:  Completed Specimen:  Blood from Peripheral, Antecubital, Left Updated:  03/30/19 1612     Blood Culture, Routine No Growth to date     Blood Culture, Routine No Growth to date     Blood Culture, Routine No Growth to date     Blood Culture, Routine No Growth to date     Blood Culture, Routine No Growth to date    Blood culture [[id number]] Collected:  03/26/19 1420    Order Status:  Completed Specimen:  Blood from Peripheral, Antecubital, Right Updated:  03/30/19 1612     Blood Culture, Routine No Growth to date     Blood Culture, Routine No Growth to date     Blood Culture, Routine No Growth to date     Blood Culture, Routine No Growth to date     Blood Culture, Routine No Growth to date    Respiratory Viral Panel by PCR [facility]; Nasal Swab [[id number]] Collected:  03/26/19 1846    Order Status:  Completed Specimen:  Respiratory Updated:  03/28/19 1302     Respiratory Virus Panel, source Nasal Swab     RVP - Adenovirus Not Detected     Comment: Detects Serotypes B and E. Detection of Serotype C may   be limited. If Adenovirus infection is suspected and a   Not Detected result is returned the sample should be   re-tested for Adenovirus using an independent method  (e.g. [employer] Adenovirus Quantitative Real-Time  PCR test.          Enterovirus Not Detected     Comment: Cross-reactivity has been observed between certain Rhinovirus  strains and the Enterovirus assay.          Human Bocavirus Not Detected     Human Coronavirus Not Detected     Comment: The Human Coronavirus assay detects Human coronavirus types  229E, OC43,NL63 and HKU1.          RVP - Human Metapneumovirus (hMPV) Not Detected     RVP - Influenza A Not Detected     Influenza A - P7S3-26 Not Detected     RVP -  Influenza B Not Detected     Parainfluenza Not Detected     Respiratory Syncytial VirusVirus (RSV) A Not Detected     Comment: The Respiratory Syncytial Viral assay detects types A and B,  however it does not distinguish between the two.          RVP - Rhinovirus Not Detected     Comment: Cross-Reactivity has been observed between certain   Rhinovirus strains and the Enterovirus assay.  Target Enriched Mulitplex Polymerase Chain Reaction (TEM-PCR)  allows for the detection of multiple pathogens out of a single  reaction.  This test was developed and its performance   characteristics determined by [employer].  It has not   been cleared or approved by the U.S.Food and Drug Administration.  Results should be used in conjunction with clinical findings,   and should not form the sole basis for a diagnosis or treatment  decision.  TEM-PCR is a licensed technology of [employer].         Narrative:       Receiving Lab:->[facility]          I have reviewed all pertinent labs within the past 24 hours.    Estimated Creatinine Clearance: 52.6 mL/min (based on SCr of 0.8 mg/dL).    Diagnostic Results:  I have reviewed and interpreted all pertinent imaging results/findings within the past 24 hours.    Assessment and Plan:     * Closed fracture of neck of left femur  - S/P s/p left patrick 3/25, received post-op Ancef  - Weight bearing status: WBAT LLE  - Pain control: PNC per anesthesia(appreciate their help), Ropivacaine to dc in AM  - Orthopedics following appreciate assistance   - PT/OT: must be seen by PT 7x/week - please notify orthopedics if patient is not seen  - No interruptions at night, if patient is sleeping please allow unless urgent.     RVF (right ventricular failure)  -Continue PH mgt with remoduline/ tracleer / riociguat  -Wean off oxygen as tolerated, PTA 3 - 6 L  -Singh wean off today   -Hold aldactone.  -Possible downgrade to floors tomorrow, will awat Ropivicaine tomorrow, if no further pain and  stable then can be downgraded    SVT (supraventricular tachycardia)  -EP consulted Dr. iNna, recommend Verapamil, and adenosine during event to evaluate underlying rhythm. Do not recommend digoxin with can cause multifocal atrial arrhythmias. Appreciate assistance, signed off  -with RV failure do not want to introduce CCB / BB unless absolutely necessary   -Continue amio.       PAH (pulmonary artery hypertension)  - Continue PO Lasix.   - Continue riociguat, bosentan, remodulin(maguire site CDI).  -  to manage remodulin infusion.  - Warfarin with Lovenox bridge (INR 1.7)    Chronic respiratory failure with hypoxia  - on 5L O2 at home.   - Goal sat >/=85%        Sue Portillo MD  Heart Transplant  [facility]

## 2019-03-31 NOTE — PROGRESS NOTES
Pt weaned off nitric. Pt tolerated well, will wean O2 as tolerated, will continue to monitor. Dr. Rubio notified.

## 2019-03-31 NOTE — ASSESSMENT & PLAN NOTE
72 yo F with PMHx significant for severe PAH and RV dysfunction c/b chronic hypoxemic resp failure (on O2 L) on IV remodulin, bosentan, riociguat admitted for L femoral neck fracture s/p L hip hemiarthroplasty (3/25) with post op course c/b hypotension gtt (3/25 - 3/28), post-op fever of unclear etiology (on IV abx) and now recurrent symptomatic SVT 140s for which EP is consulted.      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     Also with hx of QT prolongation and current use of amiodarone which has long half life, the use of additional class III agents (eg Tikosyn) is prohibited.      Recommendations:  -- Continue amiodarone 200 mg daily   -- Recommend adding verapamil 40 mg tablet TID if SVT re-occurs and needed  -- Given patient's fragile state, not optimal status for EP ablation procedure

## 2019-03-31 NOTE — PROGRESS NOTES
[facility]  Orthopedics  Progress Note    Patient Name: Molly Smith  MRN: [id number]  Admission Date: 3/23/2019  Hospital Length of Stay: 8 days  Attending Provider: Alice Tim MD  Primary Care Provider: Roberto Braun MD  Follow-up For: Procedure(s) (LRB):  HEMIARTHROPLASTY, HIP - left (Left)    Post-Operative Day: 6 Days Post-Op  Subjective:     Principal Problem:Closed fracture of neck of left femur    Principal Orthopedic Problem: L femoral neck fx    Interval History: Pt seen and examined at bedside. Pain improved. To EOB with PT yesterday.     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    Multaq [dronedarone]        Current Facility-Administered Medications   Medication    acetaminophen tablet 1,000 mg    amiodarone tablet 200 mg    bosentan tablet 125 mg    calcium carbonate (OS-MARIELLE) tablet 500 mg    cetirizine tablet 5 mg    enoxaparin injection 60 mg    ferrous gluconate tablet 324 mg    fluticasone 50 mcg/actuation nasal spray 100 mcg    folic acid tablet 1 mg    furosemide tablet 20 mg    lidocaine 5 % patch 1 patch    ondansetron disintegrating tablet 8 mg    pantoprazole EC tablet 40 mg    polyethylene glycol packet 17 g    potassium chloride SA CR tablet 40 mEq    pregabalin capsule 75 mg    promethazine (PHENERGAN) 6.25 mg in dextrose 5 % 50 mL IVPB    riociguat (ADEMPAS) tablet 0.5 mg    ropivacaine (PF) 2 mg/ml (0.2%) infusion    senna-docusate 8.6-50 mg per tablet 2 tablet    simethicone chewable tablet 80 mg    sodium chloride 0.9% flush 3 mL    sodium chloride 0.9% flush 5 mL    traMADol tablet 50 mg    treprostinil (REMODULIN) 12,000,000 ng in sodium chloride 0.9% 100 mL infusion    VELETRI/REMODULIN CASSETTE    VELETRI/REMODULIN TUBING    warfarin (COUMADIN) tablet 7.5 mg     Objective:     Vital Signs (Most Recent):  Temp: 98.3 °F (36.8 °C) (03/31/19 0000)  Pulse: 96 (03/31/19  "0755)  Resp: (!) 24 (03/31/19 0755)  BP: 130/70 (03/31/19 0702)  SpO2: (!) 89 % (03/31/19 0755) Vital Signs (24h Range):  Temp:  [98.3 °F (36.8 °C)] 98.3 °F (36.8 °C)  Pulse:  [] 96  Resp:  [12-37] 24  SpO2:  [87 %-95 %] 89 %  BP: ()/(51-71) 130/70     Weight: 57.2 kg (126 lb)  Height: 5' 3" (160 cm)  Body mass index is 22.32 kg/m².      Intake/Output Summary (Last 24 hours) at 3/31/2019 0814  Last data filed at 3/31/2019 0600  Gross per 24 hour   Intake 869 ml   Output --   Net 869 ml     NAD  10L HFNC    Ortho/SPM Exam    Pt lying in bed this morning in NAD  Aquacel dressing c/d/i  SILT Sa/Thompson/DP/SP/T  Motor intact EHL/FHL/TA/Gastroc  2+ DP, 2+ PT    Significant Labs:   BMP:   Recent Labs   Lab 03/31/19  0306   GLU 91      K 3.6      CO2 23   BUN 13   CREATININE 0.8   CALCIUM 8.0*   MG 2.0     CBC:   Recent Labs   Lab 03/30/19  0243 03/31/19  0306   WBC 5.83 4.82   HGB 11.4* 11.1*   HCT 35.3* 33.9*    207     Coagulation:   Recent Labs   Lab 03/30/19  0243 03/31/19  0306   LABPROT 16.9* 16.6*   INR 1.7* 1.7*       Significant Imaging: I have reviewed all pertinent imaging results/findings.    Assessment/Plan:     * Closed fracture of neck of left femur  Pt is a 74 yo F with pulmonary HTN, chronic respiratory failure, congenital heart disease on coumadin, and closed left femoral neck fracture s/p left hip patrick 3/25. Making some progress with physical therapy, limited by medical comorbidities.     - Antibiotics: empiric vanc/cefepime for fever  - Weight bearing status: WBAT LLE  - Labs: hemoglobin stable, INR 1.7  - DVT Prophylaxis: mechanical, lovenox, warfarin per primary  - Lines/Drains: PIV, A line, central line, Dunbar, Remodulin pump  - Pain control: multimodal per anesthesia  - PT/OT: mobilize with PT/OT    Closed left hip fracture               Linda Paez MD  Orthopedics  [facility]  "

## 2019-03-31 NOTE — SUBJECTIVE & OBJECTIVE
Interval History:   Up in chair today with family at bedside  No further tachycardia events lastnight  Having BM  Singh turn off today       Continuous Infusions:   ropivacaine (PF) 2 mg/ml (0.2%) 2 mL/hr (03/31/19 1100)    treprostinil (REMODULIN) infusion      veletri/remodulin cassette      veletri/remodulin tubing       Scheduled Meds:   acetaminophen  1,000 mg Oral Q8H    amiodarone  200 mg Oral Daily    bosentan  125 mg Oral Q12H    calcium carbonate  1,000 mg Oral Daily    cetirizine  5 mg Oral Daily    enoxaparin  60 mg Subcutaneous Q12H    ferrous gluconate  324 mg Oral Daily with breakfast    fluticasone  2 spray Each Nare QHS    folic acid  1 mg Oral Daily    furosemide  20 mg Oral After lunch    lidocaine  1 patch Transdermal Q24H    pantoprazole  40 mg Oral BID AC    polyethylene glycol  17 g Oral Daily    pregabalin  75 mg Oral QHS    riociguat  0.5 mg Oral TID    senna-docusate 8.6-50 mg  2 tablet Oral BID    simethicone  1 tablet Oral QID (PC + HS)    sodium chloride 0.9%  3 mL Intravenous Q8H    warfarin  7.5 mg Oral Daily     PRN Meds:ondansetron, promethazine (PHENERGAN) IVPB, sodium chloride 0.9%, traMADol, treprostinil (REMODULIN) infusion    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    Multaq [dronedarone]      Objective:     Vital Signs (Most Recent):  Temp: 98.3 °F (36.8 °C) (03/31/19 0000)  Pulse: 97 (03/31/19 1100)  Resp: (!) 24 (03/31/19 1100)  BP: 121/62 (03/31/19 1100)  SpO2: (!) 90 % (03/31/19 1100) Vital Signs (24h Range):  Temp:  [98.3 °F (36.8 °C)] 98.3 °F (36.8 °C)  Pulse:  [] 97  Resp:  [12-37] 24  SpO2:  [76 %-94 %] 90 %  BP: ()/(51-71) 121/62     No data found.  Body mass index is 22.32 kg/m².      Intake/Output Summary (Last 24 hours) at 3/31/2019 1201  Last data filed at 3/31/2019 1100  Gross per 24 hour   Intake 336 ml   Output --   Net 336 ml       Hemodynamic Parameters:        Telemetry:   NsR HR 90s, intermittent ST    Physical Exam   Constitutional: She is oriented to person, place, and time. She appears well-developed and well-nourished. She is sleeping. She appears ill. She appears distressed.       HENT:   Head: Normocephalic and atraumatic.   Eyes: EOM are normal.   Neck: Neck supple. No JVD present.   Cardiovascular: Normal rate and regular rhythm.   Pulmonary/Chest: Effort normal and breath sounds normal. No respiratory distress.   Abdominal: Soft. Bowel sounds are normal. She exhibits no distension.   Musculoskeletal: She exhibits no edema.   Neurological: She is oriented to person, place, and time.   Skin: Skin is warm and dry. Capillary refill takes less than 2 seconds. No erythema.   Psychiatric: She has a normal mood and affect. Her behavior is normal. Thought content normal.       Significant Labs:  CBC:  Recent Labs   Lab 03/29/19 0244 03/30/19 0243 03/31/19  0306   WBC 6.45 5.83 4.82   RBC 3.63* 3.74* 3.63*   HGB 11.0* 11.4* 11.1*   HCT 34.0* 35.3* 33.9*    221 207   MCV 94 94 93   MCH 30.3 30.5 30.6   MCHC 32.4 32.3 32.7     BNP:  No results for input(s): BNP in the last 168 hours.    Invalid input(s): BNPTRIAGELBLO  CMP:  Recent Labs   Lab 03/29/19 0244 03/30/19 0243 03/31/19  0306    98 91   CALCIUM 7.6* 8.1* 8.0*   ALBUMIN 2.3* 2.1* 2.0*   PROT 5.6* 5.5* 5.1*   * 137 137   K 4.3 4.0 3.6   CO2 21* 21* 23    108 108   BUN 15 16 13   CREATININE 0.8 0.8 0.8   ALKPHOS 48* 55 50*   ALT 6* 6* 8*   AST 17 18 17   BILITOT 0.6 0.5 0.4      Coagulation:   Recent Labs   Lab 03/29/19 0244 03/30/19  0243 03/31/19  0306   INR 1.5* 1.7* 1.7*     LDH:  No results for input(s): LDH in the last 72 hours.  Microbiology:  Microbiology Results (last 7 days)     Procedure Component Value Units Date/Time    Blood culture [[id number]] Collected:  03/26/19 1440    Order Status:  Completed Specimen:  Blood from Peripheral, Antecubital, Left Updated:  03/30/19  1612     Blood Culture, Routine No Growth to date     Blood Culture, Routine No Growth to date     Blood Culture, Routine No Growth to date     Blood Culture, Routine No Growth to date     Blood Culture, Routine No Growth to date    Blood culture [[id number]] Collected:  03/26/19 1420    Order Status:  Completed Specimen:  Blood from Peripheral, Antecubital, Right Updated:  03/30/19 1612     Blood Culture, Routine No Growth to date     Blood Culture, Routine No Growth to date     Blood Culture, Routine No Growth to date     Blood Culture, Routine No Growth to date     Blood Culture, Routine No Growth to date    Respiratory Viral Panel by PCR [facility]; Nasal Swab [[id number]] Collected:  03/26/19 1846    Order Status:  Completed Specimen:  Respiratory Updated:  03/28/19 1302     Respiratory Virus Panel, source Nasal Swab     RVP - Adenovirus Not Detected     Comment: Detects Serotypes B and E. Detection of Serotype C may   be limited. If Adenovirus infection is suspected and a   Not Detected result is returned the sample should be   re-tested for Adenovirus using an independent method  (e.g. [employer] Adenovirus Quantitative Real-Time  PCR test.          Enterovirus Not Detected     Comment: Cross-reactivity has been observed between certain Rhinovirus  strains and the Enterovirus assay.          Human Bocavirus Not Detected     Human Coronavirus Not Detected     Comment: The Human Coronavirus assay detects Human coronavirus types  229E, OC43,NL63 and HKU1.          RVP - Human Metapneumovirus (hMPV) Not Detected     RVP - Influenza A Not Detected     Influenza A - R9E7-77 Not Detected     RVP - Influenza B Not Detected     Parainfluenza Not Detected     Respiratory Syncytial VirusVirus (RSV) A Not Detected     Comment: The Respiratory Syncytial Viral assay detects types A and B,  however it does not distinguish between the two.          RVP - Rhinovirus Not Detected     Comment: Cross-Reactivity has been  observed between certain   Rhinovirus strains and the Enterovirus assay.  Target Enriched Mulitplex Polymerase Chain Reaction (TEM-PCR)  allows for the detection of multiple pathogens out of a single  reaction.  This test was developed and its performance   characteristics determined by [employer].  It has not   been cleared or approved by the U.S.Food and Drug Administration.  Results should be used in conjunction with clinical findings,   and should not form the sole basis for a diagnosis or treatment  decision.  TEM-PCR is a licensed technology of [employer].         Narrative:       Receiving Lab:->[facility]          I have reviewed all pertinent labs within the past 24 hours.    Estimated Creatinine Clearance: 52.6 mL/min (based on SCr of 0.8 mg/dL).    Diagnostic Results:  I have reviewed and interpreted all pertinent imaging results/findings within the past 24 hours.

## 2019-03-31 NOTE — ASSESSMENT & PLAN NOTE
- S/P s/p left patrick 3/25, received post-op Ancef  - Weight bearing status: WBAT LLE  - Pain control: PNC per anesthesia(appreciate their help), Ropivacaine to dc in AM  - Orthopedics following appreciate assistance   - PT/OT: must be seen by PT 7x/week - please notify orthopedics if patient is not seen  - No interruptions at night, if patient is sleeping please allow unless urgent.

## 2019-03-31 NOTE — SIGNIFICANT EVENT
HTS SIGNIFICANT EVENT    I was called by RN due to Sudden hemiparesis on right and aphasia after pulling RIJ cordis line, sats dropped to 70s per RN. When I came patient on non-rebreather sating 98%, on 10L satting 95%.  ABG only significant for Pao2 in mid 70s.  Vitals stable.  CT Head ordered and negative.    Patient started to iomprove with return of function to R side and speaking while I was in room.    . Patient seen and examined.   Discussed with staff    BENEDICT Petit MD  [id number]  Cardiology Fellow, PGY 4

## 2019-03-31 NOTE — ASSESSMENT & PLAN NOTE
Pt is a 72 yo F with pulmonary HTN, chronic respiratory failure, congenital heart disease on coumadin, and closed left femoral neck fracture s/p left hip patrick 3/25. Making some progress with physical therapy, limited by medical comorbidities.     - Antibiotics: empiric vanc/cefepime for fever  - Weight bearing status: WBAT LLE  - Labs: hemoglobin stable, INR 1.7  - DVT Prophylaxis: mechanical, lovenox, warfarin per primary  - Lines/Drains: PIV, A line, central line, Dunbar, Remodulin pump  - Pain control: multimodal per anesthesia  - PT/OT: mobilize with PT/OT

## 2019-03-31 NOTE — ASSESSMENT & PLAN NOTE
-EP consulted Dr. Nina, recommend Verapamil, and adenosine during event to evaluate underlying rhythm. Do not recommend digoxin with can cause multifocal atrial arrhythmias. Appreciate assistance, signed off  -with RV failure do not want to introduce CCB / BB unless absolutely necessary   -Continue amio.

## 2019-03-31 NOTE — PROGRESS NOTES
[facility]  Cardiac Electrophysiology  Progress Note    Admission Date: 3/23/2019  Code Status: Partial Code   Attending Physician: Alice Tim MD   Expected Discharge Date: 4/5/2019  Principal Problem:Closed fracture of neck of left femur    Subjective:     Interval History: No acute events overnight.     Review of Systems   Constitution: Negative for chills and fever.   HENT: Negative for congestion.    Cardiovascular: Positive for dyspnea on exertion and palpitations. Negative for chest pain, irregular heartbeat, leg swelling, near-syncope, orthopnea and paroxysmal nocturnal dyspnea.   Respiratory: Positive for shortness of breath. Negative for cough, sputum production and wheezing.    Gastrointestinal: Negative for abdominal pain, nausea and vomiting.   Genitourinary: Negative for dysuria.   Neurological: Negative for dizziness, focal weakness, headaches, light-headedness and weakness.     Objective:     Vital Signs (Most Recent):  Temp: 98.3 °F (36.8 °C) (03/31/19 0000)  Pulse: 102 (03/31/19 1019)  Resp: (!) 22 (03/31/19 1019)  BP: 130/63 (03/31/19 0800)  SpO2: (!) 90 % (03/31/19 1019) Vital Signs (24h Range):  Temp:  [98.3 °F (36.8 °C)] 98.3 °F (36.8 °C)  Pulse:  [] 102  Resp:  [12-37] 22  SpO2:  [76 %-94 %] 90 %  BP: ()/(51-71) 130/63     Weight: 57.2 kg (126 lb)  Body mass index is 22.32 kg/m².     SpO2: (!) 90 %  O2 Device (Oxygen Therapy): High Flow nasal Cannula    Physical Exam   Constitutional: She is oriented to person, place, and time.   HENT:   Mouth/Throat: Oropharynx is clear and moist.   Wearing NC for inhaled nitric oxide   Eyes: Pupils are equal, round, and reactive to light.   Cardiovascular: Normal rate, regular rhythm and intact distal pulses.   Pulmonary/Chest: Effort normal.   Abdominal: Soft. Bowel sounds are normal.   Musculoskeletal: She exhibits no edema.   Neurological: She is alert and oriented to person, place, and time.   Skin: Skin is warm and  dry.   Psychiatric: She has a normal mood and affect. Her behavior is normal.   Vitals reviewed.      Significant Labs: All pertinent lab results from the last 24 hours have been reviewed.    Significant Imaging: Echocardiogram:   2D echo with color flow doppler:   Results for orders placed or performed during the hospital encounter of 03/23/18   2D echo with color flow doppler   Result Value Ref Range    QEF 60 55 - 65    Diastolic Dysfunction Yes (A)     Est. PA Systolic Pressure 89.72 (A)     Pericardial Effusion SMALL (A)     Mitral Valve Mobility NORMAL     Tricuspid Valve Regurgitation MILD      Assessment and Plan:     SVT (supraventricular tachycardia)  72 yo F with PMHx significant for severe PAH and RV dysfunction c/b chronic hypoxemic resp failure (on O2 L) on IV remodulin, bosentan, riociguat admitted for L femoral neck fracture s/p L hip hemiarthroplasty (3/25) with post op course c/b hypotension gtt (3/25 - 3/28), post-op fever of unclear etiology (on IV abx) and now recurrent symptomatic SVT 140s for which EP is consulted.      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     Also with hx of QT prolongation and current use of amiodarone which has long half life, the use of additional class III agents (eg Tikosyn) is prohibited.      Recommendations:  -- Continue amiodarone 200 mg daily   -- Recommend adding verapamil 40 mg tablet TID if SVT re-occurs and needed  -- Given patient's fragile state, not optimal status for EP ablation procedure      EP team will be signing off. Please call us with any questions.     Bebe Cassidy MD  Cardiac Electrophysiology  [facility]

## 2019-03-31 NOTE — ADDENDUM NOTE
Addendum  created 03/31/19 1143 by Sonia Saez MD    Charge Capture section accepted, Sign clinical note

## 2019-03-31 NOTE — SUBJECTIVE & OBJECTIVE
"Principal Problem:Closed fracture of neck of left femur    Principal Orthopedic Problem: L femoral neck fx    Interval History: Pt seen and examined at bedside. Pain improved. To EOB with PT yesterday.     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    Multaq [dronedarone]        Current Facility-Administered Medications   Medication    acetaminophen tablet 1,000 mg    amiodarone tablet 200 mg    bosentan tablet 125 mg    calcium carbonate (OS-MARIELLE) tablet 500 mg    cetirizine tablet 5 mg    enoxaparin injection 60 mg    ferrous gluconate tablet 324 mg    fluticasone 50 mcg/actuation nasal spray 100 mcg    folic acid tablet 1 mg    furosemide tablet 20 mg    lidocaine 5 % patch 1 patch    ondansetron disintegrating tablet 8 mg    pantoprazole EC tablet 40 mg    polyethylene glycol packet 17 g    potassium chloride SA CR tablet 40 mEq    pregabalin capsule 75 mg    promethazine (PHENERGAN) 6.25 mg in dextrose 5 % 50 mL IVPB    riociguat (ADEMPAS) tablet 0.5 mg    ropivacaine (PF) 2 mg/ml (0.2%) infusion    senna-docusate 8.6-50 mg per tablet 2 tablet    simethicone chewable tablet 80 mg    sodium chloride 0.9% flush 3 mL    sodium chloride 0.9% flush 5 mL    traMADol tablet 50 mg    treprostinil (REMODULIN) 12,000,000 ng in sodium chloride 0.9% 100 mL infusion    VELETRI/REMODULIN CASSETTE    VELETRI/REMODULIN TUBING    warfarin (COUMADIN) tablet 7.5 mg     Objective:     Vital Signs (Most Recent):  Temp: 98.3 °F (36.8 °C) (03/31/19 0000)  Pulse: 96 (03/31/19 0755)  Resp: (!) 24 (03/31/19 0755)  BP: 130/70 (03/31/19 0702)  SpO2: (!) 89 % (03/31/19 0755) Vital Signs (24h Range):  Temp:  [98.3 °F (36.8 °C)] 98.3 °F (36.8 °C)  Pulse:  [] 96  Resp:  [12-37] 24  SpO2:  [87 %-95 %] 89 %  BP: ()/(51-71) 130/70     Weight: 57.2 kg (126 lb)  Height: 5' 3" (160 cm)  Body mass index is 22.32 kg/m².      Intake/Output Summary (Last 24 " hours) at 3/31/2019 0814  Last data filed at 3/31/2019 0600  Gross per 24 hour   Intake 869 ml   Output --   Net 869 ml     NAD  10L HFNC    Ortho/SPM Exam    Pt lying in bed this morning in NAD  Aquacel dressing c/d/i  SILT Sa/Thompson/DP/SP/T  Motor intact EHL/FHL/TA/Gastroc  2+ DP, 2+ PT    Significant Labs:   BMP:   Recent Labs   Lab 03/31/19  0306   GLU 91      K 3.6      CO2 23   BUN 13   CREATININE 0.8   CALCIUM 8.0*   MG 2.0     CBC:   Recent Labs   Lab 03/30/19 0243 03/31/19  0306   WBC 5.83 4.82   HGB 11.4* 11.1*   HCT 35.3* 33.9*    207     Coagulation:   Recent Labs   Lab 03/30/19 0243 03/31/19  0306   LABPROT 16.9* 16.6*   INR 1.7* 1.7*       Significant Imaging: I have reviewed all pertinent imaging results/findings.

## 2019-03-31 NOTE — ANESTHESIA POST-OP PAIN MANAGEMENT
Acute Pain Service Progress Note    Molly Smith is a 73 y.o., female, [id number].    Surgery:  L Hip Jonny-Arthroplasty     Post Op Day #: 6     Catheter type: perineural  Suprainguinal Fascia Iliaca     Infusion type: Ropivacaine 0.2%  2ml/hr basal w/ IB 15 cc/hr    Problem List:    Active Hospital Problems    Diagnosis  POA    *Closed fracture of neck of left femur [S72.002A]  Yes    Encounter for assessment for deep vein thrombosis (DVT) [Z76.89]  Not Applicable    RVF (right ventricular failure) [I50.9]  Yes    Hypotension [I95.9]  Yes    Closed left hip fracture [S72.002A]  Yes    SVT (supraventricular tachycardia) [I47.1]  Yes    PAH (pulmonary artery hypertension) [I27.21]  Yes    Chronic respiratory failure with hypoxia [J96.11]  Yes      Resolved Hospital Problems    Diagnosis Date Resolved POA    Constipation [K59.00] 03/30/2019 Yes    Fever [R50.9] 03/30/2019 No       Subjective:     General appearance of: resting in bed   Pain with rest: 3    Numbers   Pain with movement: 5    Numbers   Side Effects    1. Pruritis No    2. Nausea No    3. Motor Blockade No, 1=Ability to bend knees and ankles    4. Sedation No, S=sleep, easy to arouse    Objective:     Catheter site clean, dry, intact      Vitals   Vitals:    03/31/19 0755   BP:    Pulse: 96   Resp: (!) 24   Temp:         Labs    Admission on 03/23/2019   No results displayed because visit has over 200 results.           Meds   Current Facility-Administered Medications   Medication Dose Route Frequency Provider Last Rate Last Dose    acetaminophen tablet 1,000 mg  1,000 mg Oral Q8H Sonia Saez MD   1,000 mg at 03/31/19 0549    amiodarone tablet 200 mg  200 mg Oral Daily Joe Adan, NP   200 mg at 03/30/19 0822    bosentan tablet 125 mg  125 mg Oral Q12H Joe Adan, NP   125 mg at 03/30/19 2047    calcium carbonate (OS-MARIELLE) tablet 500 mg  1,000 mg Oral Daily Joe Adan, NP   1,000 mg at 03/30/19 0822    cetirizine tablet  5 mg  5 mg Oral Daily Joe Arellano NP   5 mg at 03/30/19 0822    enoxaparin injection 60 mg  60 mg Subcutaneous Q12H Sue Rubio MD   60 mg at 03/30/19 2045    ferrous gluconate tablet 324 mg  324 mg Oral Daily with breakfast Joe Arellano NP   324 mg at 03/31/19 0819    fluticasone 50 mcg/actuation nasal spray 100 mcg  2 spray Each Nare QHS Munira Landin MD   100 mcg at 03/30/19 2044    folic acid tablet 1 mg  1 mg Oral Daily Joe Arellano NP   1 mg at 03/30/19 0821    furosemide tablet 20 mg  20 mg Oral After lunch Joe Arellano NP   20 mg at 03/30/19 1258    lidocaine 5 % patch 1 patch  1 patch Transdermal Q24H Sonia Saez MD   1 patch at 03/30/19 1834    ondansetron disintegrating tablet 8 mg  8 mg Oral Q6H PRN Joe Arellano NP   8 mg at 03/30/19 0727    pantoprazole EC tablet 40 mg  40 mg Oral BID AC Sue Rubio MD   40 mg at 03/31/19 0816    polyethylene glycol packet 17 g  17 g Oral Daily Joe Arellano NP   17 g at 03/29/19 0832    pregabalin capsule 75 mg  75 mg Oral QHS Munira Landin MD   75 mg at 03/30/19 2048    promethazine (PHENERGAN) 6.25 mg in dextrose 5 % 50 mL IVPB  6.25 mg Intravenous Q6H PRN Munira Landin  mL/hr at 03/28/19 1358 6.25 mg at 03/28/19 1358    riociguat (ADEMPAS) tablet 0.5 mg  0.5 mg Oral TID Joe Arellano NP   0.5 mg at 03/30/19 2047    ropivacaine (PF) 2 mg/ml (0.2%) infusion  2 mL/hr Perineural Continuous Reid H MD Keiry 2 mL/hr at 03/31/19 0708 2 mL/hr at 03/31/19 0708    senna-docusate 8.6-50 mg per tablet 2 tablet  2 tablet Oral BID Joe Arellano NP   2 tablet at 03/30/19 2047    simethicone chewable tablet 80 mg  1 tablet Oral QID (PC + HS) Joe Arellano NP   80 mg at 03/30/19 2046    sodium chloride 0.9% flush 3 mL  3 mL Intravenous Q8H Munira Landin MD   3 mL at 03/27/19 1434    sodium chloride 0.9% flush 5 mL  5 mL Intravenous PRN Munira  May Landin MD        traMADol tablet 50 mg  50 mg Oral Q4H PRN Reid Ricci MD   50 mg at 03/31/19 0348    treprostinil (REMODULIN) 12,000,000 ng in sodium chloride 0.9% 100 mL infusion  89.2 ng/kg/min (Order-Specific) Intravenous Continuous PRN Alice Tim MD        VELETRI/REMODULIN CASSETTE   Intravenous Continuous Munira May Landin MD        VELETRI/REMODULIN TUBING   Intravenous Continuous Munira May Landin MD        warfarin (COUMADIN) tablet 7.5 mg  7.5 mg Oral Daily Alice Tim MD   7.5 mg at 03/30/19 1647        Anticoagulant dose Lovenox SubQ 40mg q24    Assessment:     Pain control adequate    Plan:     Patient doing well, continue present treatment. Tramadol is effective; required x5 over past 24 hours. No changes at this time. Plan to pause catheter tomorrow morning and reassess pain level soon after. Possible d/c of catheter if pain remains under control.    Evaluator Norman Rodrigues        I have reviewed and concur with the resident's history, physical, assessment, and plan.  I have personally interviewed and examined the patient at bedside.  See below addendum for my evaluation and additional findings.      Patient doing well.  POD #6.  Continue multimodals.  Plan to pause tomorrow am and see how pain is managed.  Patient and family on board with plan but low threshold to restart of pain worsens.

## 2019-04-01 PROBLEM — R40.4 TRANSIENT ALTERATION OF AWARENESS: Status: ACTIVE | Noted: 2019-04-01

## 2019-04-01 LAB
ALBUMIN SERPL BCP-MCNC: 1.9 G/DL (ref 3.5–5.2)
ALP SERPL-CCNC: 47 U/L (ref 55–135)
ALT SERPL W/O P-5'-P-CCNC: 11 U/L (ref 10–44)
ANION GAP SERPL CALC-SCNC: 6 MMOL/L (ref 8–16)
AST SERPL-CCNC: 21 U/L (ref 10–40)
BASOPHILS # BLD AUTO: 0.03 K/UL (ref 0–0.2)
BASOPHILS NFR BLD: 0.5 % (ref 0–1.9)
BILIRUB SERPL-MCNC: 0.4 MG/DL (ref 0.1–1)
BUN SERPL-MCNC: 15 MG/DL (ref 8–23)
CALCIUM SERPL-MCNC: 7.8 MG/DL (ref 8.7–10.5)
CHLORIDE SERPL-SCNC: 110 MMOL/L (ref 95–110)
CO2 SERPL-SCNC: 21 MMOL/L (ref 23–29)
CREAT SERPL-MCNC: 0.7 MG/DL (ref 0.5–1.4)
DIFFERENTIAL METHOD: ABNORMAL
EOSINOPHIL # BLD AUTO: 0.2 K/UL (ref 0–0.5)
EOSINOPHIL NFR BLD: 3.1 % (ref 0–8)
ERYTHROCYTE [DISTWIDTH] IN BLOOD BY AUTOMATED COUNT: 13.9 % (ref 11.5–14.5)
EST. GFR  (AFRICAN AMERICAN): >60 ML/MIN/1.73 M^2
EST. GFR  (NON AFRICAN AMERICAN): >60 ML/MIN/1.73 M^2
GLUCOSE SERPL-MCNC: 87 MG/DL (ref 70–110)
HCT VFR BLD AUTO: 33.3 % (ref 37–48.5)
HGB BLD-MCNC: 10.8 G/DL (ref 12–16)
IMM GRANULOCYTES # BLD AUTO: 0.05 K/UL (ref 0–0.04)
IMM GRANULOCYTES NFR BLD AUTO: 0.8 % (ref 0–0.5)
INR PPP: 1.8 (ref 0.8–1.2)
LYMPHOCYTES # BLD AUTO: 0.8 K/UL (ref 1–4.8)
LYMPHOCYTES NFR BLD: 13.4 % (ref 18–48)
MAGNESIUM SERPL-MCNC: 1.8 MG/DL (ref 1.6–2.6)
MCH RBC QN AUTO: 30.3 PG (ref 27–31)
MCHC RBC AUTO-ENTMCNC: 32.4 G/DL (ref 32–36)
MCV RBC AUTO: 94 FL (ref 82–98)
MONOCYTES # BLD AUTO: 0.5 K/UL (ref 0.3–1)
MONOCYTES NFR BLD: 7.6 % (ref 4–15)
NEUTROPHILS # BLD AUTO: 4.5 K/UL (ref 1.8–7.7)
NEUTROPHILS NFR BLD: 74.6 % (ref 38–73)
NRBC BLD-RTO: 0 /100 WBC
PHOSPHATE SERPL-MCNC: 2.2 MG/DL (ref 2.7–4.5)
PLATELET # BLD AUTO: 243 K/UL (ref 150–350)
PMV BLD AUTO: 9.8 FL (ref 9.2–12.9)
POTASSIUM SERPL-SCNC: 4.3 MMOL/L (ref 3.5–5.1)
PROT SERPL-MCNC: 5 G/DL (ref 6–8.4)
PROTHROMBIN TIME: 17.4 SEC (ref 9–12.5)
RBC # BLD AUTO: 3.56 M/UL (ref 4–5.4)
SODIUM SERPL-SCNC: 137 MMOL/L (ref 136–145)
WBC # BLD AUTO: 6.05 K/UL (ref 3.9–12.7)

## 2019-04-01 PROCEDURE — 27100171 HC OXYGEN HIGH FLOW UP TO 24 HOURS

## 2019-04-01 PROCEDURE — 25000003 PHARM REV CODE 250: Performed by: ANESTHESIOLOGY

## 2019-04-01 PROCEDURE — 25000003 PHARM REV CODE 250: Performed by: NURSE PRACTITIONER

## 2019-04-01 PROCEDURE — A4216 STERILE WATER/SALINE, 10 ML: HCPCS | Performed by: ORTHOPAEDIC SURGERY

## 2019-04-01 PROCEDURE — 99223 1ST HOSP IP/OBS HIGH 75: CPT | Mod: GC,,, | Performed by: PSYCHIATRY & NEUROLOGY

## 2019-04-01 PROCEDURE — 27000221 HC OXYGEN, UP TO 24 HOURS

## 2019-04-01 PROCEDURE — 25000003 PHARM REV CODE 250: Performed by: INTERNAL MEDICINE

## 2019-04-01 PROCEDURE — 99291 CRITICAL CARE FIRST HOUR: CPT | Mod: ,,, | Performed by: PHYSICIAN ASSISTANT

## 2019-04-01 PROCEDURE — 85025 COMPLETE CBC W/AUTO DIFF WBC: CPT

## 2019-04-01 PROCEDURE — 63600175 PHARM REV CODE 636 W HCPCS: Performed by: PHYSICIAN ASSISTANT

## 2019-04-01 PROCEDURE — 84100 ASSAY OF PHOSPHORUS: CPT

## 2019-04-01 PROCEDURE — 25000003 PHARM REV CODE 250: Performed by: ORTHOPAEDIC SURGERY

## 2019-04-01 PROCEDURE — 63600175 PHARM REV CODE 636 W HCPCS: Performed by: STUDENT IN AN ORGANIZED HEALTH CARE EDUCATION/TRAINING PROGRAM

## 2019-04-01 PROCEDURE — 25000003 PHARM REV CODE 250: Performed by: STUDENT IN AN ORGANIZED HEALTH CARE EDUCATION/TRAINING PROGRAM

## 2019-04-01 PROCEDURE — 99291 PR CRITICAL CARE, E/M 30-74 MINUTES: ICD-10-PCS | Mod: ,,, | Performed by: PHYSICIAN ASSISTANT

## 2019-04-01 PROCEDURE — 83735 ASSAY OF MAGNESIUM: CPT

## 2019-04-01 PROCEDURE — 80053 COMPREHEN METABOLIC PANEL: CPT

## 2019-04-01 PROCEDURE — 99900035 HC TECH TIME PER 15 MIN (STAT)

## 2019-04-01 PROCEDURE — 85610 PROTHROMBIN TIME: CPT

## 2019-04-01 PROCEDURE — 99223 PR INITIAL HOSPITAL CARE,LEVL III: ICD-10-PCS | Mod: GC,,, | Performed by: PSYCHIATRY & NEUROLOGY

## 2019-04-01 PROCEDURE — 20000000 HC ICU ROOM

## 2019-04-01 PROCEDURE — 94761 N-INVAS EAR/PLS OXIMETRY MLT: CPT

## 2019-04-01 RX ORDER — MAGNESIUM SULFATE HEPTAHYDRATE 40 MG/ML
2 INJECTION, SOLUTION INTRAVENOUS ONCE
Status: COMPLETED | OUTPATIENT
Start: 2019-04-01 | End: 2019-04-01

## 2019-04-01 RX ORDER — FERROUS GLUCONATE 324(37.5)
324 TABLET ORAL
Status: DISCONTINUED | OUTPATIENT
Start: 2019-04-02 | End: 2019-04-05 | Stop reason: HOSPADM

## 2019-04-01 RX ADMIN — SIMETHICONE CHEW TAB 80 MG 80 MG: 80 TABLET ORAL at 08:04

## 2019-04-01 RX ADMIN — ACETAMINOPHEN 1000 MG: 500 TABLET ORAL at 01:04

## 2019-04-01 RX ADMIN — LIDOCAINE 1 PATCH: 50 PATCH TOPICAL at 08:04

## 2019-04-01 RX ADMIN — WARFARIN SODIUM 7.5 MG: 7.5 TABLET ORAL at 05:04

## 2019-04-01 RX ADMIN — ENOXAPARIN SODIUM 60 MG: 100 INJECTION SUBCUTANEOUS at 08:04

## 2019-04-01 RX ADMIN — SIMETHICONE CHEW TAB 80 MG 80 MG: 80 TABLET ORAL at 01:04

## 2019-04-01 RX ADMIN — PANTOPRAZOLE SODIUM 40 MG: 40 TABLET, DELAYED RELEASE ORAL at 06:04

## 2019-04-01 RX ADMIN — TRAMADOL HYDROCHLORIDE 50 MG: 50 TABLET, COATED ORAL at 09:04

## 2019-04-01 RX ADMIN — SIMETHICONE CHEW TAB 80 MG 80 MG: 80 TABLET ORAL at 09:04

## 2019-04-01 RX ADMIN — FOLIC ACID 1 MG: 1 TABLET ORAL at 09:04

## 2019-04-01 RX ADMIN — FLUTICASONE PROPIONATE 100 MCG: 50 SPRAY, METERED NASAL at 08:04

## 2019-04-01 RX ADMIN — ENOXAPARIN SODIUM 60 MG: 100 INJECTION SUBCUTANEOUS at 09:04

## 2019-04-01 RX ADMIN — RIOCIGUAT 0.5 MG: 0.5 TABLET, FILM COATED ORAL at 09:04

## 2019-04-01 RX ADMIN — RIOCIGUAT 0.5 MG: 0.5 TABLET, FILM COATED ORAL at 03:04

## 2019-04-01 RX ADMIN — MAGNESIUM SULFATE IN WATER 2 G: 40 INJECTION, SOLUTION INTRAVENOUS at 09:04

## 2019-04-01 RX ADMIN — CETIRIZINE HYDROCHLORIDE 5 MG: 5 TABLET ORAL at 09:04

## 2019-04-01 RX ADMIN — PANTOPRAZOLE SODIUM 40 MG: 40 TABLET, DELAYED RELEASE ORAL at 03:04

## 2019-04-01 RX ADMIN — Medication 3 ML: at 08:04

## 2019-04-01 RX ADMIN — PREGABALIN 75 MG: 75 CAPSULE ORAL at 08:04

## 2019-04-01 RX ADMIN — FERROUS GLUCONATE TAB 324 MG (37.5 MG ELEMENTAL IRON) 324 MG: 324 (37.5 FE) TAB at 09:04

## 2019-04-01 RX ADMIN — AMIODARONE HYDROCHLORIDE 200 MG: 200 TABLET ORAL at 09:04

## 2019-04-01 RX ADMIN — BOSENTAN 125 MG: 125 TABLET, FILM COATED ORAL at 09:04

## 2019-04-01 RX ADMIN — BOSENTAN 125 MG: 125 TABLET, FILM COATED ORAL at 08:04

## 2019-04-01 RX ADMIN — RIOCIGUAT 0.5 MG: 0.5 TABLET, FILM COATED ORAL at 08:04

## 2019-04-01 RX ADMIN — CALCIUM 1000 MG: 500 TABLET ORAL at 09:04

## 2019-04-01 RX ADMIN — FUROSEMIDE 20 MG: 20 TABLET ORAL at 01:04

## 2019-04-01 RX ADMIN — STANDARDIZED SENNA CONCENTRATE AND DOCUSATE SODIUM 2 TABLET: 8.6; 5 TABLET, FILM COATED ORAL at 08:04

## 2019-04-01 NOTE — PLAN OF CARE
Problem: Mobility Impairment  Goal: Optimal Mobility  Outcome: Ongoing (interventions implemented as appropriate)  Patient had left hip surgery, care plan on getting up out of bed. Patient up to bedside commode and to chair, with family at bedside.

## 2019-04-01 NOTE — ASSESSMENT & PLAN NOTE
Episode of loss of consciousness+/- right arm weakness yesterday after removal of left IJ. History is most consistent with vasovagal event, though differentials include minor air emboli with spontaneous resolution (though this was not appreciated on imaging). The episode of vision changes this morning is difficult to interpret. Her exam is remarkable for right arm drift and possible visual neglect which may explain some of her visual changes. Due to her medical history and somewhat focal neurologic exam, we feel imaging is indicated to evaluate for stroke. Differentials for these events include delirium due to her multiple medical comorbidities and recent surgery.    Recommendations:  -- MRI brain to rule out ischemic event - per primary team family declined this due to requirement to hold remodulin infusion.  -- Can get repeat CT head instead to compare to previous - cannot fully rule out stroke if normal  -- Continue current medical therapy - patient already anticoagulated

## 2019-04-01 NOTE — ASSESSMENT & PLAN NOTE
- Continue PO Lasix.   - Continue riociguat, bosentan, remodulin([facility] CDI).  -  to manage remodulin infusion.  - Warfarin with Lovenox bridge (INR 1.8)

## 2019-04-01 NOTE — PROGRESS NOTES
[facility]  Heart Transplant  Progress Note    Patient Name: Molly Smith  MRN: [id number]  Admission Date: 3/23/2019  Hospital Length of Stay: 9 days  Attending Physician: Alice Tim MD  Primary Care Provider: Roberto Braun MD  Principal Problem:Closed fracture of neck of left femur    Subjective:     Interval History: Events from overnight reviewed. Possible TIA when withdrawing RIJ, R sided symptoms have subsided, but now c/o bilateral vision blurring. Her family is at bedside. Vision continues to improve with time but will have patient assessed by vascular neurology today. Otherwise doing well, wants to drink some de-caffinated coffee.    Continuous Infusions:   ropivacaine (PF) 2 mg/ml (0.2%) Stopped (04/01/19 0649)    treprostinil (REMODULIN) infusion      veletri/remodulin cassette      veletri/remodulin tubing       Scheduled Meds:   acetaminophen  1,000 mg Oral Q8H    amiodarone  200 mg Oral Daily    bosentan  125 mg Oral Q12H    calcium carbonate  1,000 mg Oral Daily    cetirizine  5 mg Oral Daily    enoxaparin  60 mg Subcutaneous Q12H    ferrous gluconate  324 mg Oral Daily with breakfast    fluticasone  2 spray Each Nare QHS    folic acid  1 mg Oral Daily    furosemide  20 mg Oral After lunch    lidocaine  1 patch Transdermal Q24H    magnesium sulfate IVPB  2 g Intravenous Once    pantoprazole  40 mg Oral BID AC    polyethylene glycol  17 g Oral Daily    pregabalin  75 mg Oral QHS    riociguat  0.5 mg Oral TID    senna-docusate 8.6-50 mg  2 tablet Oral BID    simethicone  1 tablet Oral QID (PC + HS)    sodium chloride 0.9%  3 mL Intravenous Q8H    warfarin  7.5 mg Oral Daily     PRN Meds:ondansetron, promethazine (PHENERGAN) IVPB, sodium chloride 0.9%, traMADol, treprostinil (REMODULIN) infusion    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    Multaq [dronedarone]      Objective:     Vital  Signs (Most Recent):  Temp: 98.9 °F (37.2 °C) (04/01/19 0400)  Pulse: 91 (04/01/19 1100)  Resp: (!) 21 (04/01/19 1100)  BP: 116/60 (04/01/19 1100)  SpO2: (!) 91 % (04/01/19 1100) Vital Signs (24h Range):  Temp:  [98 °F (36.7 °C)-98.9 °F (37.2 °C)] 98.9 °F (37.2 °C)  Pulse:  [] 91  Resp:  [4-36] 21  SpO2:  [85 %-96 %] 91 %  BP: ()/(50-87) 116/60     No data found.  Body mass index is 22.32 kg/m².      Intake/Output Summary (Last 24 hours) at 4/1/2019 1122  Last data filed at 4/1/2019 0949  Gross per 24 hour   Intake 256 ml   Output 200 ml   Net 56 ml       Hemodynamic Parameters:       Telemetry:   NsR HR 90s, intermittent ST    Physical Exam   Constitutional: She is oriented to person, place, and time. She appears well-developed and well-nourished. She is sleeping. She appears ill. She appears distressed.       HENT:   Head: Normocephalic and atraumatic.   Eyes: EOM are normal.   Neck: Neck supple. No JVD present.   Cardiovascular: Normal rate and regular rhythm.   Pulmonary/Chest: Effort normal and breath sounds normal. No respiratory distress.   Abdominal: Soft. Bowel sounds are normal. She exhibits no distension.   Musculoskeletal: She exhibits no edema.   Neurological: She is alert and oriented to person, place, and time. She has normal strength.   CN 1-12 grossly intact. Can read book at arms length aloud.    Skin: Skin is warm and dry. Capillary refill takes less than 2 seconds. No erythema.   Psychiatric: She has a normal mood and affect. Her behavior is normal. Thought content normal.       Significant Labs:  CBC:  Recent Labs   Lab 03/30/19  0243 03/31/19  0306 04/01/19  0303   WBC 5.83 4.82 6.05   RBC 3.74* 3.63* 3.56*   HGB 11.4* 11.1* 10.8*   HCT 35.3* 33.9* 33.3*    207 243   MCV 94 93 94   MCH 30.5 30.6 30.3   MCHC 32.3 32.7 32.4     BNP:  No results for input(s): BNP in the last 168 hours.    Invalid input(s): BNPTRIAGELBLO  CMP:  Recent Labs   Lab 03/30/19  0243 03/31/19  0301  04/01/19  0303   GLU 98 91 87   CALCIUM 8.1* 8.0* 7.8*   ALBUMIN 2.1* 2.0* 1.9*   PROT 5.5* 5.1* 5.0*    137 137   K 4.0 3.6 4.3   CO2 21* 23 21*    108 110   BUN 16 13 15   CREATININE 0.8 0.8 0.7   ALKPHOS 55 50* 47*   ALT 6* 8* 11   AST 18 17 21   BILITOT 0.5 0.4 0.4      Coagulation:   Recent Labs   Lab 03/30/19  0243 03/31/19  0306 04/01/19  0303   INR 1.7* 1.7* 1.8*     LDH:  No results for input(s): LDH in the last 72 hours.  Microbiology:  Microbiology Results (last 7 days)     Procedure Component Value Units Date/Time    Blood culture [[id number]] Collected:  03/26/19 1420    Order Status:  Completed Specimen:  Blood from Peripheral, Antecubital, Right Updated:  03/31/19 1612     Blood Culture, Routine No growth after 5 days.    Blood culture [[id number]] Collected:  03/26/19 1440    Order Status:  Completed Specimen:  Blood from Peripheral, Antecubital, Left Updated:  03/31/19 1612     Blood Culture, Routine No growth after 5 days.    Respiratory Viral Panel by PCR [facility]; Nasal Swab [[id number]] Collected:  03/26/19 1846    Order Status:  Completed Specimen:  Respiratory Updated:  03/28/19 1302     Respiratory Virus Panel, source Nasal Swab     RVP - Adenovirus Not Detected     Comment: Detects Serotypes B and E. Detection of Serotype C may   be limited. If Adenovirus infection is suspected and a   Not Detected result is returned the sample should be   re-tested for Adenovirus using an independent method  (e.g. mygola Adenovirus Quantitative Real-Time  PCR test.          Enterovirus Not Detected     Comment: Cross-reactivity has been observed between certain Rhinovirus  strains and the Enterovirus assay.          Human Bocavirus Not Detected     Human Coronavirus Not Detected     Comment: The Human Coronavirus assay detects Human coronavirus types  229E, OC43,NL63 and HKU1.          RVP - Human Metapneumovirus (hMPV) Not Detected     RVP - Influenza A Not Detected     Influenza A -  Y3H5-18 Not Detected     RVP - Influenza B Not Detected     Parainfluenza Not Detected     Respiratory Syncytial VirusVirus (RSV) A Not Detected     Comment: The Respiratory Syncytial Viral assay detects types A and B,  however it does not distinguish between the two.          RVP - Rhinovirus Not Detected     Comment: Cross-Reactivity has been observed between certain   Rhinovirus strains and the Enterovirus assay.  Target Enriched Mulitplex Polymerase Chain Reaction (TEM-PCR)  allows for the detection of multiple pathogens out of a single  reaction.  This test was developed and its performance   characteristics determined by [employer].  It has not   been cleared or approved by the U.S.Food and Drug Administration.  Results should be used in conjunction with clinical findings,   and should not form the sole basis for a diagnosis or treatment  decision.  TEM-PCR is a licensed technology of [employer].         Narrative:       Receiving Lab:->[facility]          I have reviewed all pertinent labs within the past 24 hours.    Estimated Creatinine Clearance: 60.1 mL/min (based on SCr of 0.7 mg/dL).    Diagnostic Results:  I have reviewed and interpreted all pertinent imaging results/findings within the past 24 hours.    Assessment and Plan:     73 y.o. 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 Patient [facility] for hip pain and inability to bear weight on the left leg after a trip and fall yesterday.  Patient tripped on a cord for her pulm HTN pump falling directly onto the left hip. Patient denies head trauma or  upper body injury. She denies numbness or tingling. Currently lying on stretcher with  at bedside in nad.        * Closed fracture of neck of left femur  - S/P s/p left patrick 3/25, received post-op Ancef  - Weight bearing status: WBAT LLE  - Pain control: PNC per anesthesia(appreciate their help), Ropivacaine to dc in AM  - Orthopedics following appreciate assistance   - PT/OT: must be seen by PT 7x/week - please notify orthopedics if patient is not seen  - No interruptions at night, if patient is sleeping please allow unless urgent.     RVF (right ventricular failure)  - Continue PH mgt with remoduline/ tracleer / riociguat  - Wean off oxygen as tolerated, PTA 3 - 6 L  - I NO weaned off 3/31  - Hold aldactone.  - Possible downgrade to floors 4/2 after assessment of Neuro status  - now s/p Ropivicaine line removed by anesthesia    SVT (supraventricular tachycardia)  - EP consulted Dr. Nina, recommend Verapamil, and adenosine during event to evaluate underlying rhythm. Do not recommend digoxin with can cause multifocal atrial arrhythmias. Appreciate assistance, signed off  - with RV failure do not want to introduce CCB / BB unless absolutely necessary   - Continue amio.       PAH (pulmonary artery hypertension)  - Continue PO Lasix.   - Continue riociguat, bosentan, remodulin([facility] CDI).  -  to manage remodulin infusion.  - Warfarin with Lovenox bridge (INR 1.8)    Chronic respiratory failure with hypoxia  - on 5L O2 at home.   - Goal sat >/=85%    Transient alteration of awareness   - possible TIA with right sided aphasia after RIJ removed on 3/31    - aphasia/facial droop/right sided weakness has resolved   - CT head negative   - now with (improving) bilateral vision blurriness.   - will consult stroke neurology for further recs      Jayne Larsen PA-C  Heart Transplant  [facility]    Uninterrupted Critical Care/Counseling Time (not including procedures): 45  minutes

## 2019-04-01 NOTE — ADDENDUM NOTE
Addendum  created 04/01/19 1039 by Dahlia Parada MD    Charge Capture section accepted, Sign clinical note

## 2019-04-01 NOTE — PT/OT/SLP PROGRESS
Occupational Therapy      Patient Name:  Molly Smith   MRN:  [id number]    Patient not seen today secondary to pt had just returned to bed after nsg assist to BSC.  Pt with possible TIA last date and fly and nsg report new onset of visual changes this AM. OT unable to check status later this date; thus, OT to return next date.       JOHANNE Salcedo  4/1/2019

## 2019-04-01 NOTE — ASSESSMENT & PLAN NOTE
- EP consulted Dr. Nina, recommend Verapamil, and adenosine during event to evaluate underlying rhythm. Do not recommend digoxin with can cause multifocal atrial arrhythmias. Appreciate assistance, signed off  - with RV failure do not want to introduce CCB / BB unless absolutely necessary   - Continue amio.

## 2019-04-01 NOTE — ANESTHESIA POST-OP PAIN MANAGEMENT
Acute Pain Service Progress Note    Molly Smith is a 73 y.o., female, [id number].    Surgery:  L Hip Jonny-Arthroplasty    Post Op Day #: 7    Catheter type: perineural  Suprainguinal Fascia Iliaca    Infusion type: Ropivacaine 0.2%  2ml/hr basal w/ IB 15cc/hr    Problem List:    Active Hospital Problems    Diagnosis  POA    *Closed fracture of neck of left femur [S72.002A]  Yes    Encounter for assessment for deep vein thrombosis (DVT) [Z76.89]  Not Applicable    RVF (right ventricular failure) [I50.9]  Yes    Hypotension [I95.9]  Yes    Closed left hip fracture [S72.002A]  Yes    SVT (supraventricular tachycardia) [I47.1]  Yes    PAH (pulmonary artery hypertension) [I27.21]  Yes    Chronic respiratory failure with hypoxia [J96.11]  Yes      Resolved Hospital Problems    Diagnosis Date Resolved POA    Constipation [K59.00] 03/30/2019 Yes    Fever [R50.9] 03/30/2019 No       Subjective:     General appearance of resting in bed   Pain with rest: 1    Numbers   Pain with movement: 2    Numbers   Side Effects    1. Pruritis No    2. Nausea No    3. Motor Blockade No, 1=Ability to bend knees and ankles    4. Sedation No, S=sleep, easy to arouse    Objective:      Catheter site clean, dry, intact       Vitals   Vitals:    04/01/19 0600   BP: (!) 114/59   Pulse: 90   Resp: 16   Temp:         Labs    Admission on 03/23/2019   No results displayed because visit has over 200 results.           Meds   Current Facility-Administered Medications   Medication Dose Route Frequency Provider Last Rate Last Dose    acetaminophen tablet 1,000 mg  1,000 mg Oral Q8H Sonia Saez MD   1,000 mg at 03/31/19 2200    amiodarone tablet 200 mg  200 mg Oral Daily Joe Adan, NP   200 mg at 03/31/19 0831    bosentan tablet 125 mg  125 mg Oral Q12H Joe Arellano, NP   125 mg at 03/31/19 2055    calcium carbonate (OS-MARIELLE) tablet 500 mg  1,000 mg Oral Daily Joe Adan, NP   1,000 mg at 03/31/19 1421    cetirizine  tablet 5 mg  5 mg Oral Daily Joe Arellano NP   5 mg at 03/31/19 0831    enoxaparin injection 60 mg  60 mg Subcutaneous Q12H Sue Rubio MD   60 mg at 03/31/19 2056    ferrous gluconate tablet 324 mg  324 mg Oral Daily with breakfast Joe Arellano NP   324 mg at 03/31/19 0819    fluticasone 50 mcg/actuation nasal spray 100 mcg  2 spray Each Nare QHS Munira Landin MD   100 mcg at 03/31/19 2058    folic acid tablet 1 mg  1 mg Oral Daily Joe Arellano NP   1 mg at 03/31/19 0831    furosemide tablet 20 mg  20 mg Oral After lunch Joe Arellano NP   20 mg at 03/31/19 1422    lidocaine 5 % patch 1 patch  1 patch Transdermal Q24H Sonia Saez MD   1 patch at 03/30/19 1834    ondansetron disintegrating tablet 8 mg  8 mg Oral Q6H PRN Joe Arellano NP   8 mg at 03/31/19 1428    pantoprazole EC tablet 40 mg  40 mg Oral BID AC Sue Rubio MD   40 mg at 04/01/19 0601    polyethylene glycol packet 17 g  17 g Oral Daily Joe Arellano NP   17 g at 03/29/19 0832    pregabalin capsule 75 mg  75 mg Oral QHS Munira Landin MD   75 mg at 03/31/19 2056    promethazine (PHENERGAN) 6.25 mg in dextrose 5 % 50 mL IVPB  6.25 mg Intravenous Q6H PRN Munira Landin  mL/hr at 03/28/19 1358 6.25 mg at 03/28/19 1358    riociguat (ADEMPAS) tablet 0.5 mg  0.5 mg Oral TID Joe Arellano NP   0.5 mg at 03/31/19 2056    ropivacaine (PF) 2 mg/ml (0.2%) infusion  2 mL/hr Perineural Continuous Reid H MD Keiry   Stopped at 04/01/19 0649    senna-docusate 8.6-50 mg per tablet 2 tablet  2 tablet Oral BID Joe Arellano NP   2 tablet at 03/30/19 2047    simethicone chewable tablet 80 mg  1 tablet Oral QID (PC + HS) Joe Arellano NP   80 mg at 03/31/19 2058    sodium chloride 0.9% flush 3 mL  3 mL Intravenous Q8H Munira Landin MD   3 mL at 03/31/19 2116    sodium chloride 0.9% flush 5 mL  5 mL Intravenous PRN Munira Landin MD         traMADol tablet 50 mg  50 mg Oral Q4H PRN Reid Ricci MD   50 mg at 03/31/19 2105    treprostinil (REMODULIN) 12,000,000 ng in sodium chloride 0.9% 100 mL infusion  89.2 ng/kg/min (Order-Specific) Intravenous Continuous PRN Alice Tim MD        VELETRI/REMODULIN CASSETTE   Intravenous Continuous Munira Landin MD        VELETRI/REMODULIN TUBING   Intravenous Continuous Munira Landin MD        warfarin (COUMADIN) tablet 7.5 mg  7.5 mg Oral Daily Alice Tim MD   7.5 mg at 03/31/19 1802        Anticoagulant dose coumadin 7.5mg QHS     Assessment:     Pain control adequate    Plan:     Patient doing well, continue present treatment. Continue multimodals. Patient received tramadol x 2 last 24hrs. Catheter paused this am and subsequently pulled at 9:30am.     Evaluator Nery Chan   I have seen the patient, reviewed the Resident's assessment and plan. I have personally interviewed and examined the patient at bedside and: agree with the findings.   Pt doing well with multimodals; pain well controlled   Tolerating po well  Continue with multimodal analgesia with goal to minimize narcotic usage  Pain well controlled after pausing PNC; will d/c catheter and please keep patient on non opiod multimodal analgesics

## 2019-04-01 NOTE — CONSULTS
[facility]  Vascular Neurology  Comprehensive Stroke Center  Consult Note    Inpatient consult to Vascular (Stroke) Neurology  Consult performed by: Rajwinder Garcia MD  Consult ordered by: Jayne Larsen PA-C        Assessment/Plan:     Patient is a 73 y.o. year old female with:    Transient alteration of awareness  Episode of loss of consciousness+/- right arm weakness yesterday after removal of left IJ. History is most consistent with vasovagal event, though differentials include minor air emboli with spontaneous resolution (though this was not appreciated on imaging). The episode of vision changes this morning is difficult to interpret. Her exam is remarkable for right arm drift and possible visual neglect which may explain some of her visual changes. Due to her medical history and somewhat focal neurologic exam, we feel imaging is indicated to evaluate for stroke. Differentials for these events include delirium due to her multiple medical comorbidities and recent surgery.    Recommendations:  -- MRI brain to rule out ischemic event - per primary team family declined this due to requirement to hold remodulin infusion.  -- Can get repeat CT head instead to compare to previous - cannot fully rule out stroke if normal  -- Continue current medical therapy - patient already anticoagulated        STROKE DOCUMENTATION          NIH Scale:  Interval: baseline  1a. Level of Consciousness: 0-->Alert, keenly responsive  1b. LOC Questions: 0-->Answers both questions correctly  1c. LOC Commands: 0-->Performs both tasks correctly  2. Best Gaze: 0-->Normal  3. Visual: 1-->Partial hemianopia(Inconsistent)  4. Facial Palsy: 0-->Normal symmetrical movements  5a. Motor Arm, Left: 0-->No drift, limb holds 90 (or 45) degrees for full 10 secs  5b. Motor Arm, Right: 1-->Drift, limb holds 90 (or 45) degrees, but drifts down before full 10 secs, does not hit bed or other support  6a. Motor Leg, Left: 4-->No  movement(Not tested due to recent hip arthroplasty)  6b. Motor Leg, Right: 3-->No effort against gravity, leg falls to bed immediately  7. Limb Ataxia: 0-->Absent  8. Sensory: 0-->Normal, no sensory loss  9. Best Language: 0-->No aphasia, normal  10. Dysarthria: 0-->Normal  11. Extinction and Inattention (formerly Neglect): 1-->Visual, tactile, auditory, spatial, or personal inattention or extinction to bilateral simultaneous stimulation in one of the sensory modalities(Intermittent, mostly visual)  Total (NIH Stroke Scale): 10    Modified White Score: 3  Roz Coma Scale:15   ABCD2 Score:    [id number] Score:   HAS -BLED Score:   ICH Score:   Hunt & Rapp Classification:       Thrombolysis Candidate? No, Out of window       Interventional Revascularization Candidate?   Is the patient eligible for mechanical endovascular reperfusion (JACOB)?  No; No significant neurological deficit      Hemorrhagic change of an Ischemic Stroke: Does this patient have an ischemic stroke with hemorrhagic changes? No     Subjective:     History of Present Illness:  Mrs. Smith is a 73 year old woman with adult congenital heart disease including ASD, severe PAH (on IV remodulin), RV dysfunction, chronic hypoxemic respiratory failure (on home O2) who was admitted on 3/23 with a left femoral neck fracture. She underwent left arthroplasty on 3/25 and her postoperative course has been complicated by hypotension, FUO and SVT (140s).     Yesterday afternoon around 1615, her left IJ central line was removed by nursing (she was sitting upright). Nursing held pressure with minimal bleeding. A few seconds later her head fell backwards and she passed out. She was non-responsive to verbal, tactile or painful stimuli for a few minutes. She then became groggy, and seemed to have right arm weakness though was unable to fully follow commands. She was taken for CT head, and when she returned her mental status was improving but she remained lethargic  "and confused. This gradually improved overnight and she woke up feeling well.     Around 0830 this morning she reported vision changes while watching TV. She describes it as "only seeing color" without clear shapes. She couldn't see her daughter's face that was right in front of her. She also complained of right arm heaviness. About 1 hour later her vision improved but remained blurry. By the time of my evaluation around 1130 she said her vision was back to normal. She is still complaining of right arm heaviness.    She's never had symptoms like this before. She is on warfarin with lovenox bridge for PAH.        Past Medical History:   Diagnosis Date    Allergy     Anticoagulant long-term use     Arthritis     Heart murmur     Pneumonia     Pulmonary hypertension     Tachycardia      Past Surgical History:   Procedure Laterality Date    ABLATION N/A 6/17/2013    Performed by Jose J Hinojosa MD at [facility]    BACK SURGERY      HEART CATH-RIGHT Right 7/10/2017    Performed by Alice Tim MD at [facility] C[facility]    HEMIARTHROPLASTY, HIP - left Left 3/25/2019    Performed by Kemal Esposito MD at [facility] OR 2ND FLR    INSERTION-CATHETER-ROBERTSON Left 6/24/2014    Performed by González Hernandez MD at [facility] OR 2ND FLR    REPLACEMENT, CATHETER, DIALYSIS, OVER GUIDEWIRE, USING EXISTING VENOUS ACCESS N/A 1/17/2019    Performed by Phoenix Hand MD at [facility] CATH LAB     Family History   Problem Relation Age of Onset    Arthritis Mother     Arthritis Father     Diabetes Father     Heart disease Father     Hypertension Father      Social History     Tobacco Use    Smoking status: Never Smoker    Smokeless tobacco: Never Used   Substance Use Topics    Alcohol use: No     Comment: rarely    Drug use: No     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    Multaq [dronedarone]        Medications: I have reviewed the current medication administration " record.    Medications Prior to Admission   Medication Sig Dispense Refill Last Dose    amiodarone (PACERONE) 200 MG Tab Take 1 tablet (200 mg total) by mouth once daily. 30 tablet 11 3/23/2019    bosentan (TRACLEER) 125 MG Tab Take by mouth 2 (two) times daily.   3/23/2019    CALCIUM CARBONATE (CALCIUM 600 ORAL) Take 2 tablets by mouth once daily.     3/22/2019    ferrous gluconate (FERGON) 324 MG tablet Take 1 tablet (324 mg total) by mouth daily with breakfast.   3/22/2019    folic acid (FOLVITE) 1 MG tablet Take 1 mg by mouth once daily.   3/23/2019    furosemide (LASIX) 20 MG tablet Take 1 tablet (20 mg total) by mouth once daily. (Patient taking differently: Take 20 mg by mouth after lunch. ) 30 tablet 11 3/22/2019    loratadine (CLARITIN) 10 mg tablet Take 10 mg by mouth once daily.   3/23/2019    ondansetron (ZOFRAN-ODT) 4 MG TbDL   6 3/23/2019    riociguat (ADEMPAS) 0.5 mg Tab tablet Take 1 tablet (0.5 mg total) by mouth 3 (three) times daily.   3/23/2019    spironolactone (ALDACTONE) 25 MG tablet Take 25 mg by mouth once daily.     3/23/2019    traMADol (ULTRAM-ER) 200 MG Tb24 TAKE ONE TABLET BY MOUTH ONCE A DAY AS NEEDED FOR PAIN THANK YOU! 30 tablet 5 3/23/2019    TREPROSTINIL SODIUM (TREPROSTINIL, REMODULIN, PUMP FOR HOME USE) Pt currently on 89.2 ng/kg/min=45 mL/day dosing weight 70.05 kg 60 mL 11 3/23/2019    warfarin (COUMADIN) 5 MG tablet Take 5 mg by mouth every Mon, Wed, Fri.   3/22/2019    chlordiazepoxide-clidinium 5-2.5 mg (LIBRAX) 5-2.5 mg Cap Take 1 capsule by mouth 3 (three) times daily with meals. TAKE TABLET AS PRN.   Unknown    promethazine (PHENERGAN) 25 MG tablet promethazine 25 mg tablet   Unknown    sodium chloride 0.9% 0.9 % SolP 100 mL with treprostinil 1 mg/mL Soln 9,000,000 ng Inject 5,005.5 ng/min into the vein continuous. 1 ampule 11 Taking       Review of Systems   Constitutional: Negative for activity change, chills and fever.   HENT: Negative for sinus  pressure and trouble swallowing.    Eyes: Positive for visual disturbance. Negative for pain.   Respiratory: Negative for cough, chest tightness and shortness of breath.    Cardiovascular: Negative for chest pain and palpitations.   Gastrointestinal: Negative for abdominal pain, diarrhea, nausea and vomiting.   Genitourinary: Negative for difficulty urinating and dysuria.   Musculoskeletal: Negative for back pain and neck pain.   Skin: Negative for rash and wound.   Neurological: Positive for weakness and numbness. Negative for headaches.   Psychiatric/Behavioral: Positive for confusion. Negative for agitation.     Objective:     Vital Signs (Most Recent):  Temp: 98.9 °F (37.2 °C) (04/01/19 0400)  Pulse: 91 (04/01/19 1500)  Resp: (!) 27 (04/01/19 1500)  BP: 119/67 (04/01/19 1500)  SpO2: (!) 92 % (04/01/19 1500)    Vital Signs Range (Last 24H):  Temp:  [98 °F (36.7 °C)-98.9 °F (37.2 °C)]   Pulse:  []   Resp:  [4-36]   BP: ()/(50-87)   SpO2:  [89 %-96 %]     Physical Exam   Constitutional: She appears well-developed and well-nourished.   HENT:   Head: Normocephalic and atraumatic.   Cardiovascular: Normal rate.   Pulmonary/Chest: Effort normal. No respiratory distress.   Skin: Skin is warm and dry.   Nursing note and vitals reviewed.      Neurological Exam:   LOC: alert  Attention Span: Good   Language: No aphasia, occasional perseveration  Articulation: No dysarthria  Orientation: Person, Place, Time   Visual Fields: Inconsistent between exams - initially with left lower quadrant loss in left eye, and right hemifield loss in right eye. On repeat testing right upper quadrant loss in right eye  EOM (CN III, IV, VI): Full/intact  Pupils (CN II, III): PERRL  Facial Sensation (CN V): Normal  Facial Movement (CN VII): Symmetric facial expression    Motor: Effort somewhat limited. Antigravity in bilaterally upper extremities, 5/5 with elbow flexion/extension and wrist flexion/extension. Right leg with minimal  effort antigravity (~2/5 with hip flesion), 5/5 dorsi and plantar flexion. Left leg exam deferred due to recent surgery.   Cebellar: No evidence of appendicular or axial ataxia  Sensation: Intact to light touch. No extinction to simultaneous stimuli  Tone: Normal tone throughout      Laboratory:  CMP:   Recent Labs   Lab 04/01/19  0303   CALCIUM 7.8*   ALBUMIN 1.9*   PROT 5.0*      K 4.3   CO2 21*      BUN 15   CREATININE 0.7   ALKPHOS 47*   ALT 11   AST 21   BILITOT 0.4     CBC:   Recent Labs   Lab 04/01/19  0303   WBC 6.05   RBC 3.56*   HGB 10.8*   HCT 33.3*      MCV 94   MCH 30.3   MCHC 32.4     Lipid Panel: No results for input(s): CHOL, LDLCALC, HDL, TRIG in the last 168 hours.  Coagulation:   Recent Labs   Lab 04/01/19  0303   INR 1.8*     Hgb A1C: No results for input(s): HGBA1C in the last 168 hours.  TSH: No results for input(s): TSH in the last 168 hours.    Diagnostic Results:      Brain imaging:  CT head 3/31/19  No acute abnormalities    Vessel Imaging:      Cardiac Evaluation:   Echo 1/29/19  · Normal left ventricular systolic function. The estimated ejection fraction is 65%  · Septal wall has abnormal motion. Systolic flattening of the interventricular septum consistent with right ventricle pressure overload.  · Biatrial enlargement.  · Grade I (mild) left ventricular diastolic dysfunction consistent with impaired relaxation.  · Normal left atrial pressure.  · Moderately reduced right ventricular systolic function.  · Severe right ventricular enlargement.  · Moderate tricuspid regurgitation.  · Moderate pulmonic regurgitation.  · The estimated PA systolic pressure is 89 mm Hg  · Pulmonary hypertension present.  · Normal central venous pressure (3 mm Hg).      Rajwinder Lira MD  Comprehensive Stroke Center  Department of Vascular Neurology   [facility]

## 2019-04-01 NOTE — ASSESSMENT & PLAN NOTE
- possible TIA with right sided aphasia after RIJ removed on 3/31   - aphasia/facial droop/right sided weakness has resolved  - CT head negative  - now with (improving) bilateral vision blurriness.  - will consult stroke neurology for further recs

## 2019-04-01 NOTE — PLAN OF CARE
IV Remodulin:    In order to obtain MRI---IV Remodulin would need to be turned off just prior to testing and resumed ASAP testing is completed.     Prior to ordering make sure the patient and /or her  are ok with discontinuing IV Remodulin for the duration of the MRI      Call [facility] for assistance if after hours      Thanks  Claire  [phone]

## 2019-04-01 NOTE — SUBJECTIVE & OBJECTIVE
Past Medical History:   Diagnosis Date    Allergy     Anticoagulant long-term use     Arthritis     Heart murmur     Pneumonia     Pulmonary hypertension     Tachycardia      Past Surgical History:   Procedure Laterality Date    ABLATION N/A 6/17/2013    Performed by Jose J Hinojosa MD at [facility] CATH LAB    BACK SURGERY      HEART CATH-RIGHT Right 7/10/2017    Performed by Alice Tim MD at [facility] CATH LAB    HEMIARTHROPLASTY, HIP - left Left 3/25/2019    Performed by Kemal Esposito MD at [facility] OR 2ND FLR    INSERTION-CATHETER-ROBERTSON Left 6/24/2014    Performed by González Hernandez MD at [facility] OR 2ND FLR    REPLACEMENT, CATHETER, DIALYSIS, OVER GUIDEWIRE, USING EXISTING VENOUS ACCESS N/A 1/17/2019    Performed by Phoenix Hand MD at [facility] CATH LAB     Family History   Problem Relation Age of Onset    Arthritis Mother     Arthritis Father     Diabetes Father     Heart disease Father     Hypertension Father      Social History     Tobacco Use    Smoking status: Never Smoker    Smokeless tobacco: Never Used   Substance Use Topics    Alcohol use: No     Comment: rarely    Drug use: No     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    Multaq [dronedarone]        Medications: I have reviewed the current medication administration record.    Medications Prior to Admission   Medication Sig Dispense Refill Last Dose    amiodarone (PACERONE) 200 MG Tab Take 1 tablet (200 mg total) by mouth once daily. 30 tablet 11 3/23/2019    bosentan (TRACLEER) 125 MG Tab Take by mouth 2 (two) times daily.   3/23/2019    CALCIUM CARBONATE (CALCIUM 600 ORAL) Take 2 tablets by mouth once daily.     3/22/2019    ferrous gluconate (FERGON) 324 MG tablet Take 1 tablet (324 mg total) by mouth daily with breakfast.   3/22/2019    folic acid (FOLVITE) 1 MG tablet Take 1 mg by mouth once daily.   3/23/2019    furosemide (LASIX) 20 MG tablet Take 1 tablet  (20 mg total) by mouth once daily. (Patient taking differently: Take 20 mg by mouth after lunch. ) 30 tablet 11 3/22/2019    loratadine (CLARITIN) 10 mg tablet Take 10 mg by mouth once daily.   3/23/2019    ondansetron (ZOFRAN-ODT) 4 MG TbDL   6 3/23/2019    riociguat (ADEMPAS) 0.5 mg Tab tablet Take 1 tablet (0.5 mg total) by mouth 3 (three) times daily.   3/23/2019    spironolactone (ALDACTONE) 25 MG tablet Take 25 mg by mouth once daily.     3/23/2019    traMADol (ULTRAM-ER) 200 MG Tb24 TAKE ONE TABLET BY MOUTH ONCE A DAY AS NEEDED FOR PAIN THANK YOU! 30 tablet 5 3/23/2019    TREPROSTINIL SODIUM (TREPROSTINIL, REMODULIN, PUMP FOR HOME USE) Pt currently on 89.2 ng/kg/min=45 mL/day dosing weight 70.05 kg 60 mL 11 3/23/2019    warfarin (COUMADIN) 5 MG tablet Take 5 mg by mouth every Mon, Wed, Fri.   3/22/2019    chlordiazepoxide-clidinium 5-2.5 mg (LIBRAX) 5-2.5 mg Cap Take 1 capsule by mouth 3 (three) times daily with meals. TAKE TABLET AS PRN.   Unknown    promethazine (PHENERGAN) 25 MG tablet promethazine 25 mg tablet   Unknown    sodium chloride 0.9% 0.9 % SolP 100 mL with treprostinil 1 mg/mL Soln 9,000,000 ng Inject 5,005.5 ng/min into the vein continuous. 1 ampule 11 Taking       Review of Systems   Constitutional: Negative for activity change, chills and fever.   HENT: Negative for sinus pressure and trouble swallowing.    Eyes: Positive for visual disturbance. Negative for pain.   Respiratory: Negative for cough, chest tightness and shortness of breath.    Cardiovascular: Negative for chest pain and palpitations.   Gastrointestinal: Negative for abdominal pain, diarrhea, nausea and vomiting.   Genitourinary: Negative for difficulty urinating and dysuria.   Musculoskeletal: Negative for back pain and neck pain.   Skin: Negative for rash and wound.   Neurological: Positive for weakness and numbness. Negative for headaches.   Psychiatric/Behavioral: Positive for confusion. Negative for agitation.      Objective:     Vital Signs (Most Recent):  Temp: 98.9 °F (37.2 °C) (04/01/19 0400)  Pulse: 91 (04/01/19 1500)  Resp: (!) 27 (04/01/19 1500)  BP: 119/67 (04/01/19 1500)  SpO2: (!) 92 % (04/01/19 1500)    Vital Signs Range (Last 24H):  Temp:  [98 °F (36.7 °C)-98.9 °F (37.2 °C)]   Pulse:  []   Resp:  [4-36]   BP: ()/(50-87)   SpO2:  [89 %-96 %]     Physical Exam   Constitutional: She appears well-developed and well-nourished.   HENT:   Head: Normocephalic and atraumatic.   Cardiovascular: Normal rate.   Pulmonary/Chest: Effort normal. No respiratory distress.   Skin: Skin is warm and dry.   Nursing note and vitals reviewed.      Neurological Exam:   LOC: alert  Attention Span: Good   Language: No aphasia, occasional perseveration  Articulation: No dysarthria  Orientation: Person, Place, Time   Visual Fields: Inconsistent between exams - initially with left lower quadrant loss in left eye, and right hemifield loss in right eye. On repeat testing right upper quadrant loss in right eye  EOM (CN III, IV, VI): Full/intact  Pupils (CN II, III): PERRL  Facial Sensation (CN V): Normal  Facial Movement (CN VII): Symmetric facial expression    Motor: Effort somewhat limited. Antigravity in bilaterally upper extremities, 5/5 with elbow flexion/extension and wrist flexion/extension. Right leg with minimal effort antigravity (~2/5 with hip flesion), 5/5 dorsi and plantar flexion. Left leg exam deferred due to recent surgery.   Cebellar: No evidence of appendicular or axial ataxia  Sensation: Intact to light touch. No extinction to simultaneous stimuli  Tone: Normal tone throughout      Laboratory:  CMP:   Recent Labs   Lab 04/01/19  0303   CALCIUM 7.8*   ALBUMIN 1.9*   PROT 5.0*      K 4.3   CO2 21*      BUN 15   CREATININE 0.7   ALKPHOS 47*   ALT 11   AST 21   BILITOT 0.4     CBC:   Recent Labs   Lab 04/01/19  0303   WBC 6.05   RBC 3.56*   HGB 10.8*   HCT 33.3*      MCV 94   MCH 30.3   MCHC 32.4      Lipid Panel: No results for input(s): CHOL, LDLCALC, HDL, TRIG in the last 168 hours.  Coagulation:   Recent Labs   Lab 04/01/19  0303   INR 1.8*     Hgb A1C: No results for input(s): HGBA1C in the last 168 hours.  TSH: No results for input(s): TSH in the last 168 hours.    Diagnostic Results:      Brain imaging:  CT head 3/31/19  No acute abnormalities    Vessel Imaging:      Cardiac Evaluation:   Echo 1/29/19  · Normal left ventricular systolic function. The estimated ejection fraction is 65%  · Septal wall has abnormal motion. Systolic flattening of the interventricular septum consistent with right ventricle pressure overload.  · Biatrial enlargement.  · Grade I (mild) left ventricular diastolic dysfunction consistent with impaired relaxation.  · Normal left atrial pressure.  · Moderately reduced right ventricular systolic function.  · Severe right ventricular enlargement.  · Moderate tricuspid regurgitation.  · Moderate pulmonic regurgitation.  · The estimated PA systolic pressure is 89 mm Hg  · Pulmonary hypertension present.  · Normal central venous pressure (3 mm Hg).

## 2019-04-01 NOTE — HPI
"Mrs. Smith is a 73 year old woman with adult congenital heart disease including ASD, severe PAH (on IV remodulin), RV dysfunction, chronic hypoxemic respiratory failure (on home O2) who was admitted on 3/23 with a left femoral neck fracture. She underwent left arthroplasty on 3/25 and her postoperative course has been complicated by hypotension, FUO and SVT (140s).     Yesterday afternoon around 1615, her left IJ central line was removed by nursing (she was sitting upright). Nursing held pressure with minimal bleeding. A few seconds later her head fell backwards and she passed out. She was non-responsive to verbal, tactile or painful stimuli for a few minutes. She then became groggy, and seemed to have right arm weakness though was unable to fully follow commands. She was taken for CT head, and when she returned her mental status was improving but she remained lethargic and confused. This gradually improved overnight and she woke up feeling well.     Around 0830 this morning she reported vision changes while watching TV. She describes it as "only seeing color" without clear shapes. She couldn't see her daughter's face that was right in front of her. She also complained of right arm heaviness. About 1 hour later her vision improved but remained blurry. By the time of my evaluation around 1130 she said her vision was back to normal. She is still complaining of right arm heaviness.    She's never had symptoms like this before. She is on warfarin with lovenox bridge for PAH.  "

## 2019-04-01 NOTE — SUBJECTIVE & OBJECTIVE
Interval History: Events from overnight reviewed. Possible TIA when withdrawing RIJ, R sided symptoms have subsided, but now c/o bilateral vision blurring. Her family is at bedside. Vision continues to improve with time but will have patient assessed by vascular neurology today. Otherwise doing well, wants to drink some de-caffinated coffee.    Continuous Infusions:   ropivacaine (PF) 2 mg/ml (0.2%) Stopped (04/01/19 0649)    treprostinil (REMODULIN) infusion      veletri/remodulin cassette      veletri/remodulin tubing       Scheduled Meds:   acetaminophen  1,000 mg Oral Q8H    amiodarone  200 mg Oral Daily    bosentan  125 mg Oral Q12H    calcium carbonate  1,000 mg Oral Daily    cetirizine  5 mg Oral Daily    enoxaparin  60 mg Subcutaneous Q12H    ferrous gluconate  324 mg Oral Daily with breakfast    fluticasone  2 spray Each Nare QHS    folic acid  1 mg Oral Daily    furosemide  20 mg Oral After lunch    lidocaine  1 patch Transdermal Q24H    magnesium sulfate IVPB  2 g Intravenous Once    pantoprazole  40 mg Oral BID AC    polyethylene glycol  17 g Oral Daily    pregabalin  75 mg Oral QHS    riociguat  0.5 mg Oral TID    senna-docusate 8.6-50 mg  2 tablet Oral BID    simethicone  1 tablet Oral QID (PC + HS)    sodium chloride 0.9%  3 mL Intravenous Q8H    warfarin  7.5 mg Oral Daily     PRN Meds:ondansetron, promethazine (PHENERGAN) IVPB, sodium chloride 0.9%, traMADol, treprostinil (REMODULIN) infusion    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    Multaq [dronedarone]      Objective:     Vital Signs (Most Recent):  Temp: 98.9 °F (37.2 °C) (04/01/19 0400)  Pulse: 91 (04/01/19 1100)  Resp: (!) 21 (04/01/19 1100)  BP: 116/60 (04/01/19 1100)  SpO2: (!) 91 % (04/01/19 1100) Vital Signs (24h Range):  Temp:  [98 °F (36.7 °C)-98.9 °F (37.2 °C)] 98.9 °F (37.2 °C)  Pulse:  [] 91  Resp:  [4-36] 21  SpO2:  [85 %-96 %] 91 %  BP:  ()/(50-87) 116/60     No data found.  Body mass index is 22.32 kg/m².      Intake/Output Summary (Last 24 hours) at 4/1/2019 1122  Last data filed at 4/1/2019 0949  Gross per 24 hour   Intake 256 ml   Output 200 ml   Net 56 ml       Hemodynamic Parameters:       Telemetry:   NsR HR 90s, intermittent ST    Physical Exam   Constitutional: She is oriented to person, place, and time. She appears well-developed and well-nourished. She is sleeping. She appears ill. She appears distressed.       HENT:   Head: Normocephalic and atraumatic.   Eyes: EOM are normal.   Neck: Neck supple. No JVD present.   Cardiovascular: Normal rate and regular rhythm.   Pulmonary/Chest: Effort normal and breath sounds normal. No respiratory distress.   Abdominal: Soft. Bowel sounds are normal. She exhibits no distension.   Musculoskeletal: She exhibits no edema.   Neurological: She is alert and oriented to person, place, and time. She has normal strength.   CN 1-12 grossly intact. Can read book at arms length aloud.    Skin: Skin is warm and dry. Capillary refill takes less than 2 seconds. No erythema.   Psychiatric: She has a normal mood and affect. Her behavior is normal. Thought content normal.       Significant Labs:  CBC:  Recent Labs   Lab 03/30/19 0243 03/31/19 0306 04/01/19  0303   WBC 5.83 4.82 6.05   RBC 3.74* 3.63* 3.56*   HGB 11.4* 11.1* 10.8*   HCT 35.3* 33.9* 33.3*    207 243   MCV 94 93 94   MCH 30.5 30.6 30.3   MCHC 32.3 32.7 32.4     BNP:  No results for input(s): BNP in the last 168 hours.    Invalid input(s): BNPTRIAGELBLO  CMP:  Recent Labs   Lab 03/30/19 0243 03/31/19 0306 04/01/19  0303   GLU 98 91 87   CALCIUM 8.1* 8.0* 7.8*   ALBUMIN 2.1* 2.0* 1.9*   PROT 5.5* 5.1* 5.0*    137 137   K 4.0 3.6 4.3   CO2 21* 23 21*    108 110   BUN 16 13 15   CREATININE 0.8 0.8 0.7   ALKPHOS 55 50* 47*   ALT 6* 8* 11   AST 18 17 21   BILITOT 0.5 0.4 0.4      Coagulation:   Recent Labs   Lab 03/30/19  0245  03/31/19  0306 04/01/19  0303   INR 1.7* 1.7* 1.8*     LDH:  No results for input(s): LDH in the last 72 hours.  Microbiology:  Microbiology Results (last 7 days)     Procedure Component Value Units Date/Time    Blood culture [[id number]] Collected:  03/26/19 1420    Order Status:  Completed Specimen:  Blood from Peripheral, Antecubital, Right Updated:  03/31/19 1612     Blood Culture, Routine No growth after 5 days.    Blood culture [[id number]] Collected:  03/26/19 1440    Order Status:  Completed Specimen:  Blood from Peripheral, Antecubital, Left Updated:  03/31/19 1612     Blood Culture, Routine No growth after 5 days.    Respiratory Viral Panel by PCR [facility]; Nasal Swab [[id number]] Collected:  03/26/19 1846    Order Status:  Completed Specimen:  Respiratory Updated:  03/28/19 1302     Respiratory Virus Panel, source Nasal Swab     RVP - Adenovirus Not Detected     Comment: Detects Serotypes B and E. Detection of Serotype C may   be limited. If Adenovirus infection is suspected and a   Not Detected result is returned the sample should be   re-tested for Adenovirus using an independent method  (e.g. [employer] Adenovirus Quantitative Real-Time  PCR test.          Enterovirus Not Detected     Comment: Cross-reactivity has been observed between certain Rhinovirus  strains and the Enterovirus assay.          Human Bocavirus Not Detected     Human Coronavirus Not Detected     Comment: The Human Coronavirus assay detects Human coronavirus types  229E, OC43,NL63 and HKU1.          RVP - Human Metapneumovirus (hMPV) Not Detected     RVP - Influenza A Not Detected     Influenza A - X4D2-35 Not Detected     RVP - Influenza B Not Detected     Parainfluenza Not Detected     Respiratory Syncytial VirusVirus (RSV) A Not Detected     Comment: The Respiratory Syncytial Viral assay detects types A and B,  however it does not distinguish between the two.          RVP - Rhinovirus Not Detected     Comment: Cross-Reactivity  has been observed between certain   Rhinovirus strains and the Enterovirus assay.  Target Enriched Mulitplex Polymerase Chain Reaction (TEM-PCR)  allows for the detection of multiple pathogens out of a single  reaction.  This test was developed and its performance   characteristics determined by [employer].  It has not   been cleared or approved by the U.S.Food and Drug Administration.  Results should be used in conjunction with clinical findings,   and should not form the sole basis for a diagnosis or treatment  decision.  TEM-PCR is a licensed technology of [employer].         Narrative:       Receiving Lab:->[facility]          I have reviewed all pertinent labs within the past 24 hours.    Estimated Creatinine Clearance: 60.1 mL/min (based on SCr of 0.7 mg/dL).    Diagnostic Results:  I have reviewed and interpreted all pertinent imaging results/findings within the past 24 hours.

## 2019-04-01 NOTE — ASSESSMENT & PLAN NOTE
- Continue PH mgt with remoduline/ tracleer / riociguat  - Wean off oxygen as tolerated, PTA 3 - 6 L  - I NO weaned off 3/31  - Hold aldactone.  - Possible downgrade to floors 4/2 after assessment of Neuro status  - now s/p Ropivicaine line removed by anesthesia

## 2019-04-01 NOTE — PT/OT/SLP PROGRESS
Physical Therapy      Patient Name:  Molly Smith   MRN:  [id number]    Patient not seen today secondary to pt with possible TIA last night, pt with continued visual changes this AM. Pt awaiting consult from vascular neurology. PT to hold intervention until neurological changes have stabilized. Will follow-up as appropriate according to POC.    Sydnie Kahn, PT   4/1/2019

## 2019-04-02 PROBLEM — K59.03 DRUG-INDUCED CONSTIPATION: Status: ACTIVE | Noted: 2019-03-28

## 2019-04-02 LAB
ALBUMIN SERPL BCP-MCNC: 2.4 G/DL (ref 3.5–5.2)
ALP SERPL-CCNC: 57 U/L (ref 55–135)
ALT SERPL W/O P-5'-P-CCNC: 19 U/L (ref 10–44)
ANION GAP SERPL CALC-SCNC: 6 MMOL/L (ref 8–16)
AST SERPL-CCNC: 25 U/L (ref 10–40)
BASOPHILS # BLD AUTO: 0.06 K/UL (ref 0–0.2)
BASOPHILS NFR BLD: 1.1 % (ref 0–1.9)
BILIRUB SERPL-MCNC: 0.4 MG/DL (ref 0.1–1)
BUN SERPL-MCNC: 12 MG/DL (ref 8–23)
CALCIUM SERPL-MCNC: 8.6 MG/DL (ref 8.7–10.5)
CHLORIDE SERPL-SCNC: 107 MMOL/L (ref 95–110)
CO2 SERPL-SCNC: 25 MMOL/L (ref 23–29)
CREAT SERPL-MCNC: 0.8 MG/DL (ref 0.5–1.4)
DIFFERENTIAL METHOD: ABNORMAL
EOSINOPHIL # BLD AUTO: 0.2 K/UL (ref 0–0.5)
EOSINOPHIL NFR BLD: 4 % (ref 0–8)
ERYTHROCYTE [DISTWIDTH] IN BLOOD BY AUTOMATED COUNT: 15.1 % (ref 11.5–14.5)
EST. GFR  (AFRICAN AMERICAN): >60 ML/MIN/1.73 M^2
EST. GFR  (NON AFRICAN AMERICAN): >60 ML/MIN/1.73 M^2
GLUCOSE SERPL-MCNC: 100 MG/DL (ref 70–110)
HCT VFR BLD AUTO: 38.5 % (ref 37–48.5)
HGB BLD-MCNC: 12.3 G/DL (ref 12–16)
IMM GRANULOCYTES # BLD AUTO: 0.05 K/UL (ref 0–0.04)
IMM GRANULOCYTES NFR BLD AUTO: 0.9 % (ref 0–0.5)
INR PPP: 1.6 (ref 0.8–1.2)
LYMPHOCYTES # BLD AUTO: 1 K/UL (ref 1–4.8)
LYMPHOCYTES NFR BLD: 17.2 % (ref 18–48)
MAGNESIUM SERPL-MCNC: 1.9 MG/DL (ref 1.6–2.6)
MCH RBC QN AUTO: 30.4 PG (ref 27–31)
MCHC RBC AUTO-ENTMCNC: 31.9 G/DL (ref 32–36)
MCV RBC AUTO: 95 FL (ref 82–98)
MONOCYTES # BLD AUTO: 0.5 K/UL (ref 0.3–1)
MONOCYTES NFR BLD: 8.3 % (ref 4–15)
NEUTROPHILS # BLD AUTO: 3.8 K/UL (ref 1.8–7.7)
NEUTROPHILS NFR BLD: 68.5 % (ref 38–73)
NRBC BLD-RTO: 0 /100 WBC
PHOSPHATE SERPL-MCNC: 2.3 MG/DL (ref 2.7–4.5)
PLATELET # BLD AUTO: 273 K/UL (ref 150–350)
PMV BLD AUTO: 10.8 FL (ref 9.2–12.9)
POTASSIUM SERPL-SCNC: 3.7 MMOL/L (ref 3.5–5.1)
PROT SERPL-MCNC: 5.9 G/DL (ref 6–8.4)
PROTHROMBIN TIME: 16.2 SEC (ref 9–12.5)
RBC # BLD AUTO: 4.05 M/UL (ref 4–5.4)
SODIUM SERPL-SCNC: 138 MMOL/L (ref 136–145)
WBC # BLD AUTO: 5.52 K/UL (ref 3.9–12.7)

## 2019-04-02 PROCEDURE — 97116 GAIT TRAINING THERAPY: CPT

## 2019-04-02 PROCEDURE — 99233 PR SUBSEQUENT HOSPITAL CARE,LEVL III: ICD-10-PCS | Mod: GC,,, | Performed by: PSYCHIATRY & NEUROLOGY

## 2019-04-02 PROCEDURE — 25000003 PHARM REV CODE 250: Performed by: ORTHOPAEDIC SURGERY

## 2019-04-02 PROCEDURE — 99233 SBSQ HOSP IP/OBS HIGH 50: CPT | Mod: GC,,, | Performed by: PSYCHIATRY & NEUROLOGY

## 2019-04-02 PROCEDURE — 25000003 PHARM REV CODE 250: Performed by: INTERNAL MEDICINE

## 2019-04-02 PROCEDURE — 85025 COMPLETE CBC W/AUTO DIFF WBC: CPT

## 2019-04-02 PROCEDURE — 27100171 HC OXYGEN HIGH FLOW UP TO 24 HOURS

## 2019-04-02 PROCEDURE — 25000003 PHARM REV CODE 250: Performed by: STUDENT IN AN ORGANIZED HEALTH CARE EDUCATION/TRAINING PROGRAM

## 2019-04-02 PROCEDURE — 84100 ASSAY OF PHOSPHORUS: CPT

## 2019-04-02 PROCEDURE — 25000003 PHARM REV CODE 250: Performed by: NURSE PRACTITIONER

## 2019-04-02 PROCEDURE — 25000003 PHARM REV CODE 250: Performed by: PHYSICIAN ASSISTANT

## 2019-04-02 PROCEDURE — 94761 N-INVAS EAR/PLS OXIMETRY MLT: CPT

## 2019-04-02 PROCEDURE — 99233 SBSQ HOSP IP/OBS HIGH 50: CPT | Mod: ,,, | Performed by: INTERNAL MEDICINE

## 2019-04-02 PROCEDURE — 27100092 HC HIGH FLOW DELIVERY CANNULA

## 2019-04-02 PROCEDURE — 97535 SELF CARE MNGMENT TRAINING: CPT

## 2019-04-02 PROCEDURE — 97530 THERAPEUTIC ACTIVITIES: CPT

## 2019-04-02 PROCEDURE — 85610 PROTHROMBIN TIME: CPT

## 2019-04-02 PROCEDURE — 63600175 PHARM REV CODE 636 W HCPCS: Performed by: PHYSICIAN ASSISTANT

## 2019-04-02 PROCEDURE — 83735 ASSAY OF MAGNESIUM: CPT

## 2019-04-02 PROCEDURE — 20000000 HC ICU ROOM

## 2019-04-02 PROCEDURE — 63600175 PHARM REV CODE 636 W HCPCS: Performed by: INTERNAL MEDICINE

## 2019-04-02 PROCEDURE — 99233 PR SUBSEQUENT HOSPITAL CARE,LEVL III: ICD-10-PCS | Mod: ,,, | Performed by: INTERNAL MEDICINE

## 2019-04-02 PROCEDURE — 80053 COMPREHEN METABOLIC PANEL: CPT

## 2019-04-02 PROCEDURE — 36415 COLL VENOUS BLD VENIPUNCTURE: CPT

## 2019-04-02 PROCEDURE — A4216 STERILE WATER/SALINE, 10 ML: HCPCS | Performed by: ORTHOPAEDIC SURGERY

## 2019-04-02 RX ORDER — ACETAMINOPHEN 500 MG
1000 TABLET ORAL ONCE
Status: COMPLETED | OUTPATIENT
Start: 2019-04-02 | End: 2019-04-02

## 2019-04-02 RX ORDER — ENOXAPARIN SODIUM 100 MG/ML
30 INJECTION SUBCUTANEOUS
Status: DISCONTINUED | OUTPATIENT
Start: 2019-04-02 | End: 2019-04-05 | Stop reason: HOSPADM

## 2019-04-02 RX ORDER — FUROSEMIDE 40 MG/1
40 TABLET ORAL DAILY
Status: DISCONTINUED | OUTPATIENT
Start: 2019-04-02 | End: 2019-04-05 | Stop reason: HOSPADM

## 2019-04-02 RX ORDER — ENOXAPARIN SODIUM 100 MG/ML
60 INJECTION SUBCUTANEOUS
Status: DISCONTINUED | OUTPATIENT
Start: 2019-04-02 | End: 2019-04-02

## 2019-04-02 RX ORDER — WARFARIN SODIUM 5 MG/1
10 TABLET ORAL ONCE
Status: COMPLETED | OUTPATIENT
Start: 2019-04-02 | End: 2019-04-02

## 2019-04-02 RX ORDER — ACETAMINOPHEN 500 MG
1000 TABLET ORAL EVERY 8 HOURS
Status: DISCONTINUED | OUTPATIENT
Start: 2019-04-02 | End: 2019-04-05 | Stop reason: HOSPADM

## 2019-04-02 RX ORDER — POTASSIUM CHLORIDE 20 MEQ/1
20 TABLET, EXTENDED RELEASE ORAL ONCE
Status: COMPLETED | OUTPATIENT
Start: 2019-04-02 | End: 2019-04-02

## 2019-04-02 RX ADMIN — Medication 3 ML: at 09:04

## 2019-04-02 RX ADMIN — CALCIUM 1000 MG: 500 TABLET ORAL at 10:04

## 2019-04-02 RX ADMIN — FUROSEMIDE 40 MG: 40 TABLET ORAL at 10:04

## 2019-04-02 RX ADMIN — TRAMADOL HYDROCHLORIDE 50 MG: 50 TABLET, COATED ORAL at 08:04

## 2019-04-02 RX ADMIN — WARFARIN SODIUM 10 MG: 5 TABLET ORAL at 04:04

## 2019-04-02 RX ADMIN — ACETAMINOPHEN 1000 MG: 500 TABLET ORAL at 09:04

## 2019-04-02 RX ADMIN — ACETAMINOPHEN 1000 MG: 500 TABLET ORAL at 11:04

## 2019-04-02 RX ADMIN — ENOXAPARIN SODIUM 30 MG: 100 INJECTION SUBCUTANEOUS at 09:04

## 2019-04-02 RX ADMIN — Medication 3 ML: at 06:04

## 2019-04-02 RX ADMIN — FOLIC ACID 1 MG: 1 TABLET ORAL at 10:04

## 2019-04-02 RX ADMIN — PANTOPRAZOLE SODIUM 40 MG: 40 TABLET, DELAYED RELEASE ORAL at 04:04

## 2019-04-02 RX ADMIN — FLUTICASONE PROPIONATE 100 MCG: 50 SPRAY, METERED NASAL at 09:04

## 2019-04-02 RX ADMIN — PREGABALIN 75 MG: 75 CAPSULE ORAL at 09:04

## 2019-04-02 RX ADMIN — FERROUS GLUCONATE TAB 324 MG (37.5 MG ELEMENTAL IRON) 324 MG: 324 (37.5 FE) TAB at 10:04

## 2019-04-02 RX ADMIN — RIOCIGUAT 0.5 MG: 0.5 TABLET, FILM COATED ORAL at 05:04

## 2019-04-02 RX ADMIN — FUROSEMIDE 20 MG: 20 TABLET ORAL at 01:04

## 2019-04-02 RX ADMIN — ACETAMINOPHEN 1000 MG: 500 TABLET ORAL at 01:04

## 2019-04-02 RX ADMIN — ENOXAPARIN SODIUM 60 MG: 100 INJECTION SUBCUTANEOUS at 10:04

## 2019-04-02 RX ADMIN — BOSENTAN 125 MG: 125 TABLET, FILM COATED ORAL at 09:04

## 2019-04-02 RX ADMIN — BOSENTAN 125 MG: 125 TABLET, FILM COATED ORAL at 10:04

## 2019-04-02 RX ADMIN — RIOCIGUAT 0.5 MG: 0.5 TABLET, FILM COATED ORAL at 09:04

## 2019-04-02 RX ADMIN — Medication 3 ML: at 01:04

## 2019-04-02 RX ADMIN — POTASSIUM CHLORIDE 20 MEQ: 1500 TABLET, EXTENDED RELEASE ORAL at 10:04

## 2019-04-02 RX ADMIN — PANTOPRAZOLE SODIUM 40 MG: 40 TABLET, DELAYED RELEASE ORAL at 06:04

## 2019-04-02 RX ADMIN — AMIODARONE HYDROCHLORIDE 200 MG: 200 TABLET ORAL at 10:04

## 2019-04-02 RX ADMIN — RIOCIGUAT 0.5 MG: 0.5 TABLET, FILM COATED ORAL at 10:04

## 2019-04-02 RX ADMIN — SIMETHICONE CHEW TAB 80 MG 80 MG: 80 TABLET ORAL at 08:04

## 2019-04-02 RX ADMIN — CETIRIZINE HYDROCHLORIDE 5 MG: 5 TABLET ORAL at 01:04

## 2019-04-02 NOTE — PT/OT/SLP PROGRESS
Occupational Therapy   Treatment    Name: Molly Smith  MRN: [id number]  Admitting Diagnosis:  Closed fracture of neck of left femur  8 Days Post-Op    Recommendations:     Discharge Recommendations: home with home health      Assessment:     Molly Smith is a 73 y.o. female with a medical diagnosis of Closed fracture of neck of left femur.  Performance deficits affecting function are weakness, impaired functional mobilty, impaired endurance, gait instability, impaired balance, impaired self care skills, pain. Pt tolerated session well with good effort, performance and progress.     Rehab Prognosis:  Good; patient would benefit from acute skilled OT services to address these deficits and reach maximum level of function.       Plan:     Patient to be seen 4 x/week to address the above listed problems via self-care/home management, therapeutic activities, therapeutic exercises  · Plan of Care Expires:    · Plan of Care Reviewed with: family, patient    Subjective     Pain/Comfort:  · Pain Rating 1: 2/10  · Location - Side 1: Left  · Location 1: leg  · Pain Addressed 1: Pre-medicate for activity, Reposition    Objective:     Communicated with: nsesteban prior to session.    Pt found supine in bed. Pt was premedicated for pain.   Pt on 9 LPM high flow oxygen with saturation remaining 85% throughout activity.     General Precautions: Standard, fall   Orthopedic Precautions:LLE weight bearing as tolerated, LLE posterior precautions       Occupational Performance:     Bed Mobility:    · Patient completed Supine to Sit with minimum assistance     Functional Mobility/Transfers:  · Patient completed Sit <> Stand Transfer with minimum assistance  with  rolling walker   Pt completed sit>stand from b/s chair with MOD A due to decreased surface height.     Activities of Daily Living:  · Feeding: set-up  · G/H: seated with set-up  · UE dressing: MIN A  LE dressing: MAX A. OT introduced LE dressing skills with current hip  precautions. AE not available but OT provided education re: purpose and use of reacher for LE dressing. Pt/fly verb understanding.    [facility] 6 Click ADL: 15    Treatment & Education:  Pt demo Fair to Fair+ sitting balance. Pt able to complete functional mobility with RW in room and hallway this date with stable vital signs.   OT answered pt/fly questions re: optimal sitting surfaces at home, home modifications, LE AE items and implications of hip precautions on functional activity at home.  OT provided education re: safety with functional mobility/ADL skills and OT POC.     Patient left up in chair with all lines intact, call button in reach and nsg notifiedEducation:      GOALS:   Multidisciplinary Problems     Occupational Therapy Goals        Problem: Occupational Therapy Goal    Goal Priority Disciplines Outcome Interventions   Occupational Therapy Goal     OT, PT/OT     Description:  Goals to be met by: 7 days 4/9/19     Patient will increase functional independence with ADLs by performing:    Pt to complete g/h skills seated with set-up--MET  Pt to complete UE dressing with MIN A --MET  Pt to demo Fair sitting balance to allow for progression with ADL training.--MET  Pt to t/f to chair with MAX A --MET  Pt to t/f to BSC with SBA  Pt to complete toileting MIN A   Pt to complete g/h skills with CGA in standing  Pt and fly to demo/verb understanding of AE for LE dressing given current hip precautions.                         Time Tracking:     OT Date of Treatment: 04/02/19  OT Start Time: 1033  OT Stop Time: 1117  OT Total Time (min): 44 min    Billable Minutes:Self Care/Home Management 15  Therapeutic Activity 15    JOHANNE Salcedo  4/2/2019

## 2019-04-02 NOTE — PLAN OF CARE
Problem: Physical Therapy Goal  Goal: Physical Therapy Goal  Goals to be met by: 2019     Patient will increase functional independence with mobility by performin. Supine to sit with Moderate Assistance  2. Rolling to Left and Right with Minimal Assistance.  3. Sit to stand transfer with Moderate Assistance- met 3/27/2019  Sit to stand transfer with contact guard assistance   4. Sitting at edge of bed x10 minutes with Stand-by Assistance: met 3/27/2019   Sitting at edge of bed x15 mins with supervision   5. Gait x 20 feet with RW with contact guard assistance- met 2019  Gait x 50 feet with RW with stand by assistance.        Outcome: Ongoing (interventions implemented as appropriate)  Pt is progressing toward goals. All goals remain appropriate.

## 2019-04-02 NOTE — PROGRESS NOTES
[facility]  Heart Transplant  Progress Note    Patient Name: Molly Smith  MRN: [id number]  Admission Date: 3/23/2019  Hospital Length of Stay: 10 days  Attending Physician: Alice Tim MD  Primary Care Provider: Roberto Braun MD  Principal Problem:Closed fracture of neck of left femur    Subjective:     Interval History: repeat CT of head done overnight and negative.  Patient reports that pain is worse today than before.  Appreciate Vascular Neurology consultation.  Visual changes and right sided weakness have resolved.      Continuous Infusions:   ropivacaine (PF) 2 mg/ml (0.2%) Stopped (04/01/19 0649)    treprostinil (REMODULIN) infusion      veletri/remodulin cassette      veletri/remodulin tubing       Scheduled Meds:   acetaminophen  1,000 mg Oral Q8H    amiodarone  200 mg Oral Daily    bosentan  125 mg Oral Q12H    calcium carbonate  1,000 mg Oral Daily    cetirizine  5 mg Oral Daily    enoxaparin  30 mg Subcutaneous Q12H    ferrous gluconate  324 mg Oral Daily with breakfast    fluticasone  2 spray Each Nare QHS    folic acid  1 mg Oral Daily    furosemide  20 mg Oral After lunch    furosemide  40 mg Oral Daily    lidocaine  1 patch Transdermal Q24H    pantoprazole  40 mg Oral BID AC    polyethylene glycol  17 g Oral Daily    pregabalin  75 mg Oral QHS    riociguat  0.5 mg Oral TID    senna-docusate 8.6-50 mg  2 tablet Oral BID    simethicone  1 tablet Oral QID (PC + HS)    sodium chloride 0.9%  3 mL Intravenous Q8H    warfarin  7.5 mg Oral Daily     PRN Meds:promethazine (PHENERGAN) IVPB, sodium chloride 0.9%, traMADol, treprostinil (REMODULIN) infusion    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    Multaq [dronedarone]      Objective:     Vital Signs (Most Recent):  Temp: 98 °F (36.7 °C) (04/02/19 1105)  Pulse: (!) 118 (04/02/19 1105)  Resp: (!) 26 (04/02/19 1105)  BP: 117/65 (04/02/19  1105)  SpO2: (!) 84 % (04/02/19 1105) Vital Signs (24h Range):  Temp:  [98 °F (36.7 °C)-98.3 °F (36.8 °C)] 98 °F (36.7 °C)  Pulse:  [] 118  Resp:  [6-27] 26  SpO2:  [84 %-95 %] 84 %  BP: ()/(50-87) 117/65     No data found.  Body mass index is 22.32 kg/m².      Intake/Output Summary (Last 24 hours) at 4/2/2019 1209  Last data filed at 4/1/2019 2200  Gross per 24 hour   Intake 330 ml   Output 1000 ml   Net -670 ml       Hemodynamic Parameters:       Telemetry: reviewed and stable    Physical Exam  Constitutional: She is oriented to person, place, and time. Laying in bed NAD with family at bedside  Neck: Neck supple. No JVD present.   Cardiovascular: Normal rate and regular rhythm. III/VI holosystolic murmer appreciated best at the apex  Pulmonary/Chest: Effort normal and breath sounds normal. No respiratory distress. Faint crackles at the bases bilaterally  Abdominal: Soft. Bowel sounds are normal. She exhibits no distension.   Musculoskeletal: She exhibits no edema.   Neurological: She is alert and oriented to person, place, and time. She has normal strength. CN 1-12 grossly intact.  Skin: Skin is warm and dry. Capillary refill takes less than 2 seconds. No erythema.   Psychiatric: She has a normal mood and affect. Her behavior is normal. Thought content normal    Significant Labs:  CBC:  Recent Labs   Lab 03/31/19  0306 04/01/19  0303 04/02/19  0339   WBC 4.82 6.05 5.52   RBC 3.63* 3.56* 4.05   HGB 11.1* 10.8* 12.3   HCT 33.9* 33.3* 38.5    243 273   MCV 93 94 95   MCH 30.6 30.3 30.4   MCHC 32.7 32.4 31.9*     BNP:  No results for input(s): BNP in the last 168 hours.    Invalid input(s): BNPTRIAGELBLO  CMP:  Recent Labs   Lab 03/31/19  0306 04/01/19  0303 04/02/19  0632   GLU 91 87 100   CALCIUM 8.0* 7.8* 8.6*   ALBUMIN 2.0* 1.9* 2.4*   PROT 5.1* 5.0* 5.9*    137 138   K 3.6 4.3 3.7   CO2 23 21* 25    110 107   BUN 13 15 12   CREATININE 0.8 0.7 0.8   ALKPHOS 50* 47* 57   ALT 8* 11 19    AST 17 21 25   BILITOT 0.4 0.4 0.4      Coagulation:   Recent Labs   Lab 03/31/19  0306 04/01/19  0303 04/02/19  0632   INR 1.7* 1.8* 1.6*     LDH:  No results for input(s): LDH in the last 72 hours.  Microbiology:  Microbiology Results (last 7 days)     Procedure Component Value Units Date/Time    Blood culture [[id number]] Collected:  03/26/19 1420    Order Status:  Completed Specimen:  Blood from Peripheral, Antecubital, Right Updated:  03/31/19 1612     Blood Culture, Routine No growth after 5 days.    Blood culture [[id number]] Collected:  03/26/19 1440    Order Status:  Completed Specimen:  Blood from Peripheral, Antecubital, Left Updated:  03/31/19 1612     Blood Culture, Routine No growth after 5 days.    Respiratory Viral Panel by PCR [facility]ner; Nasal Swab [[id number]] Collected:  03/26/19 1846    Order Status:  Completed Specimen:  Respiratory Updated:  03/28/19 1302     Respiratory Virus Panel, source Nasal Swab     RVP - Adenovirus Not Detected     Comment: Detects Serotypes B and E. Detection of Serotype C may   be limited. If Adenovirus infection is suspected and a   Not Detected result is returned the sample should be   re-tested for Adenovirus using an independent method  (e.g. [employer] Adenovirus Quantitative Real-Time  PCR test.          Enterovirus Not Detected     Comment: Cross-reactivity has been observed between certain Rhinovirus  strains and the Enterovirus assay.          Human Bocavirus Not Detected     Human Coronavirus Not Detected     Comment: The Human Coronavirus assay detects Human coronavirus types  229E, OC43,NL63 and HKU1.          RVP - Human Metapneumovirus (hMPV) Not Detected     RVP - Influenza A Not Detected     Influenza A - M9H1-47 Not Detected     RVP - Influenza B Not Detected     Parainfluenza Not Detected     Respiratory Syncytial VirusVirus (RSV) A Not Detected     Comment: The Respiratory Syncytial Viral assay detects types A and B,  however it does not  distinguish between the two.          RVP - Rhinovirus Not Detected     Comment: Cross-Reactivity has been observed between certain   Rhinovirus strains and the Enterovirus assay.  Target Enriched Mulitplex Polymerase Chain Reaction (TEM-PCR)  allows for the detection of multiple pathogens out of a single  reaction.  This test was developed and its performance   characteristics determined by [employer].  It has not   been cleared or approved by the U.S.Food and Drug Administration.  Results should be used in conjunction with clinical findings,   and should not form the sole basis for a diagnosis or treatment  decision.  TEM-PCR is a licensed technology of [employer].         Narrative:       Receiving Lab:->[facility]          I have reviewed all pertinent labs within the past 24 hours.    Estimated Creatinine Clearance: 52.6 mL/min (based on SCr of 0.8 mg/dL).    Diagnostic Results:  I have reviewed all pertinent imaging results/findings within the past 24 hours.    Assessment and Plan:     73 y.o. 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 Patient presented to [facility] for hip pain and inability to bear weight on the left leg after a trip and fall yesterday.  Patient tripped on a cord for her pulm HTN pump falling directly onto the left hip. Patient denies head trauma or upper body injury. She denies numbness or tingling. Currently lying on stretcher with  at bedside in nad.        * Closed fracture of neck of left femur  - S/P s/p left patrick 3/25, received post-op Ancef  - Weight bearing status: WBAT LLE  - Pain  control:Ropivacaine to stopped yesterday.  Pain unctonrolled on Tramadol.  She does not want Oxy.  Will add scheduled Tylenol 1000mg Q8 hours  - Orthopedics following appreciate assistance   - PT/OT: must be seen by PT 7x/week - please notify orthopedics if patient is not seen      PAH (pulmonary artery hypertension)  - Continue PO Lasix. Adding 40mg Qam as home dose was 40mg in am and 20mg mid day  - Continue riociguat, bosentan, remodulin([facility] CDI).  -  to manage remodulin infusion.  - Warfarin with Lovenox bridge (decreased Lovenox to 30mg q12)- as per hip guideline recommendations.  Will give higher dose of Coumadin today and INR has leveled off with 7.5 mg QDaily.  Previous home dose as 10mg Sun, Tues, Sat and 5mg every other day  -Will transfer patient to TSU today    RVF (right ventricular failure)  - Continue PH mgt with remoduline/ tracleer / riociguat  - Wean off oxygen as tolerated, PTA 3 - 6 L  - I NO weaned off 3/31  - Hold aldactone.      Transient alteration of awareness  - possible TIA with right sided aphasia after RIJ removed on 3/31.  aphasia/facial droop/right sided weakness has resolved  - CT head negative; Repeat CT also negative  - symptoms resolved  - Appreciate Vascular neurology consultation: Suspect either transient vagal event or transient scattered air emboli given context immediately after IJ removal - however both VS record and CT immediately afterward would contradict this ddx; Continue current antithrombotic regimen    Chronic respiratory failure with hypoxia  - on 5L O2 at home.   - Goal sat >/=85%    SVT (supraventricular tachycardia)  - Appreciate EP consultation.  Will continue Amiodarone for now.  -Could add Verapamil 40mg TID; but holding on this due to RV failure  - Adenosine as needed  -Patient not optimal status for EP ablation procedure      Drug-induced constipation  -Resolved.   -Continue senna/colace/miralax.          REILLY Matta  Heart  Transplant  [facility]

## 2019-04-02 NOTE — PLAN OF CARE
Problem: Adult Inpatient Plan of Care  Goal: Plan of Care Review  No acute events throughout night, remains on Remodulin. CT brain neg. O2 10L high flow. VS and assessment per flow sheet, patient progressing towards goals as tolerated, plan of care reviewed with Molly Smith and family, all concerns addressed, will continue to monitor.

## 2019-04-02 NOTE — NURSING
To CT for repeat CT on bed with RN/RT/PCT. Portable hemodynamic monitoring and O2. Tolerated well and returned to [facility].

## 2019-04-02 NOTE — PROGRESS NOTES
[facility]  Orthopedics  Progress Note    Patient Name: Molly Smith  MRN: [id number]  Admission Date: 3/23/2019  Hospital Length of Stay: 10 days  Attending Provider: Alice Tim MD  Primary Care Provider: Roberto Braun MD  Follow-up For: Procedure(s) (LRB):  HEMIARTHROPLASTY, HIP - left (Left)    Post-Operative Day: 8 Days Post-Op  Subjective:     Principal Problem:Closed fracture of neck of left femur    Principal Orthopedic Problem: L femoral neck fx    Interval History: Pt seen and examined at bedside. Has been OOB to chair. Some achy pain in left hip since block was removed.    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    Multaq [dronedarone]        Current Facility-Administered Medications   Medication    amiodarone tablet 200 mg    bosentan tablet 125 mg    calcium carbonate (OS-MARIELLE) tablet 500 mg    cetirizine tablet 5 mg    enoxaparin injection 60 mg    ferrous gluconate Tab 324 mg    ferrous gluconate tablet 324 mg    fluticasone 50 mcg/actuation nasal spray 100 mcg    folic acid tablet 1 mg    furosemide tablet 20 mg    lidocaine 5 % patch 1 patch    pantoprazole EC tablet 40 mg    polyethylene glycol packet 17 g    pregabalin capsule 75 mg    promethazine (PHENERGAN) 6.25 mg in dextrose 5 % 50 mL IVPB    riociguat (ADEMPAS) tablet 0.5 mg    ropivacaine (PF) 2 mg/ml (0.2%) infusion    senna-docusate 8.6-50 mg per tablet 2 tablet    simethicone chewable tablet 80 mg    sodium chloride 0.9% flush 3 mL    sodium chloride 0.9% flush 5 mL    traMADol tablet 50 mg    treprostinil (REMODULIN) 12,000,000 ng in sodium chloride 0.9% 100 mL infusion    VELETRI/REMODULIN CASSETTE    VELETRI/REMODULIN TUBING    warfarin (COUMADIN) tablet 7.5 mg     Objective:     Vital Signs (Most Recent):  Temp: 98 °F (36.7 °C) (04/02/19 0300)  Pulse: 93 (04/02/19 0600)  Resp: 15 (04/02/19 0600)  BP: 126/70 (04/02/19  "0600)  SpO2: (!) 91 % (04/02/19 0600) Vital Signs (24h Range):  Temp:  [98 °F (36.7 °C)-98.3 °F (36.8 °C)] 98 °F (36.7 °C)  Pulse:  [] 93  Resp:  [6-36] 15  SpO2:  [89 %-95 %] 91 %  BP: ()/(50-87) 126/70     Weight: 57.2 kg (126 lb)  Height: 5' 3" (160 cm)  Body mass index is 22.32 kg/m².      Intake/Output Summary (Last 24 hours) at 4/2/2019 0707  Last data filed at 4/1/2019 2200  Gross per 24 hour   Intake 730 ml   Output 1000 ml   Net -270 ml     NAD  10L HFNC    Ortho/SPM Exam    Pt lying in bed this morning in NAD  Aquacel dressing c/d/i  SILT Sa/Thompson/DP/SP/T  Motor intact EHL/FHL/TA/Gastroc  2+ DP, 2+ PT    Significant Labs:   BMP:   Recent Labs   Lab 04/01/19  0303   GLU 87      K 4.3      CO2 21*   BUN 15   CREATININE 0.7   CALCIUM 7.8*   MG 1.8     CBC:   Recent Labs   Lab 04/01/19  0303 04/02/19  0339   WBC 6.05 5.52   HGB 10.8* 12.3   HCT 33.3* 38.5    273     Coagulation:   Recent Labs   Lab 04/01/19  0303 04/02/19  0632   LABPROT 17.4* 16.2*   INR 1.8* 1.6*       Significant Imaging: I have reviewed all pertinent imaging results/findings.    Assessment/Plan:     * Closed fracture of neck of left femur  Pt is a 72 yo F with pulmonary HTN, chronic respiratory failure, congenital heart disease on coumadin, and closed left femoral neck fracture s/p left hip patrick 3/25. Making some progress with physical therapy, limited by medical comorbidities.     - Antibiotics: empiric vanc/cefepime for fever  - Weight bearing status: WBAT LLE  - Labs: hemoglobin stable at 12.3, INR 1.6  - DVT Prophylaxis: mechanical, lovenox, warfarin per primary  - Lines/Drains: PIV, A line, central line, [place], Remodulin pump  - Pain control: multimodal per anesthesia  - PT/OT: mobilize with PT/OT    Closed left hip fracture               Munira Landin MD  Orthopedics  [facility]    Attg Note:  Patient seen and examined.  I agree with the resident's assessment and plan.  Patient " looks better and is ambulating better with PT/OT.    Kemal Esposito MD

## 2019-04-02 NOTE — ASSESSMENT & PLAN NOTE
- Continue PO Lasix. Adding 40mg Qam as home dose was 40mg in am and 20mg mid day  - Continue riociguat, bosentan, remodulin([facility]I).  -  to manage remodulin infusion.  - Warfarin with Lovenox bridge (decreased Lovenox to 30mg q12)- as per hip guideline recommendations.  Will give higher dose of Coumadin today and INR has leveled off with 7.5 mg QDaily.  Previous home dose as 10mg Sun, Tues, Sat and 5mg every other day  -Will transfer patient to TSU today

## 2019-04-02 NOTE — PT/OT/SLP PROGRESS
Physical Therapy Treatment    Patient Name:  Molly Smith   MRN:  [id number]    Recommendations:     Discharge Recommendations:  home with home health   Discharge Equipment Recommendations: walker, rolling, commode, shower chair   Barriers to discharge: Decreased caregiver support    Assessment:     Molly Smith is a 73 y.o. female admitted with a medical diagnosis of Closed fracture of neck of left femur.  She presents with the following impairments/functional limitations:  weakness, impaired endurance, impaired functional mobilty, gait instability, impaired balance, pain, decreased lower extremity function, impaired cardiopulmonary response to activity. Pt tolerated activity with improved mobility reflected by increased distance ambulated with decreased assistance required. Pt would continue to benefit from acute skilled therapy intervention to address deficits and progress toward prior level of function.       Rehab Prognosis: Good; patient would benefit from acute skilled PT services to address these deficits and reach maximum level of function.    Recent Surgery: Procedure(s) (LRB):  HEMIARTHROPLASTY, HIP - left (Left) 8 Days Post-Op    Plan:     During this hospitalization, patient to be seen 5 x/week to address the identified rehab impairments via gait training, therapeutic activities, therapeutic exercises, neuromuscular re-education and progress toward the following goals:    · Plan of Care Expires:  04/26/19    Subjective     Chief Complaint: Pt with no complaints this session   Patient/Family Comments/goals: to get better and return home   Pain/Comfort:  · Pain Rating 1: 2/10  · Location - Orientation 1: generalized  · Location 1: leg  · Pain Addressed 1: Pre-medicate for activity, Reposition  · Pain Rating Post-Intervention 1: (pt did not report)      Objective:     Communicated with RN prior to session.  Patient found HOB elevated with pulse ox (continuous), telemetry, oxygen, blood pressure  cuff(remodulin pump ) upon PT entry to room.     General Precautions: Standard, fall   Orthopedic Precautions:LLE weight bearing as tolerated, LLE posterior precautions   Braces: N/A     Functional Mobility:  · Bed Mobility:     · Supine to Sit: minimum assistance  · Transfers:     · Sit to Stand: From EOB:  minimum assistance with rolling walker; From chair: moderate assistance with RW.   · Gait: Pt ambulated 2x 25 feet with RW and CGA progressing to SBA. Pt demo'd decreased horacio, decreased step size. Pt initially demonstrating step to pattern with decreased WB through L LE. Pt improved step through pattern with cuing, reports able to weight bear 100% through L LE at times. Pt with seated rest break between bouts. Pt with no LOB, VSS throughout, SpO2 85%+ throughout.       AM-PAC 6 CLICK MOBILITY  Turning over in bed (including adjusting bedclothes, sheets and blankets)?: 3  Sitting down on and standing up from a chair with arms (e.g., wheelchair, bedside commode, etc.): 3  Moving from lying on back to sitting on the side of the bed?: 3  Moving to and from a bed to a chair (including a wheelchair)?: 3  Need to walk in hospital room?: 3  Climbing 3-5 steps with a railing?: 2  Basic Mobility Total Score: 17       Therapeutic Activities and Exercises:   Pt educated on role of PT/POC. Pt verbalized understanding.   Pt encouraged to continue exercises in bed with assistance from family.   Discussed ordering BSC and chair cushion with nursing.   All questions answered regarding posterior hip precautions and family training.  brought rollator, inquired about pt using that instead of RW. PT encouraged pt to hold using rollator until she can more consistently WB through L LE and is not relying on UE support as much. Pt and  agreeable.     Patient left up in chair with all lines intact, call button in reach and RN  present..    GOALS:   Multidisciplinary Problems     Physical Therapy Goals        Problem:  Physical Therapy Goal    Goal Priority Disciplines Outcome Goal Variances Interventions   Physical Therapy Goal     PT, PT/OT Ongoing (interventions implemented as appropriate)     Description:  Goals to be met by: 2019     Patient will increase functional independence with mobility by performin. Supine to sit with Moderate Assistance  2. Rolling to Left and Right with Minimal Assistance.  3. Sit to stand transfer with Moderate Assistance- met 3/27/2019  Sit to stand transfer with contact guard assistance   4. Sitting at edge of bed x10 minutes with Stand-by Assistance: met 3/27/2019   Sitting at edge of bed x15 mins with supervision   5. Gait x 20 feet with RW with contact guard assistance- met 2019  Gait x 50 feet with RW with stand by assistance.                          Time Tracking:     PT Received On: 19  PT Start Time: 1033     PT Stop Time: 1117  PT Total Time (min): 44 min     Billable Minutes: Gait Training 44 mins     Treatment Type: Treatment  PT/PTA: PT     PTA Visit Number: Rafael     Sydnie Femi, PT  2019

## 2019-04-02 NOTE — ASSESSMENT & PLAN NOTE
- S/P s/p left patrick 3/25, received post-op Ancef  - Weight bearing status: WBAT LLE  - Pain control:Ropivacaine to stopped yesterday.  Pain unctonrolled on Tramadol.  She does not want Oxy.  Will add scheduled Tylenol 1000mg Q8 hours  - Orthopedics following appreciate assistance   - PT/OT: must be seen by PT 7x/week - please notify orthopedics if patient is not seen

## 2019-04-02 NOTE — PROGRESS NOTES
Attempted to see pt earlier today for update. Pt was working with PT/OT. SW will make further attempts. Providing ongoing psychosocial and counseling support, education, resources, assistance and discharge planning as indicated. Following and available.

## 2019-04-02 NOTE — ASSESSMENT & PLAN NOTE
- Continue PH mgt with remoduline/ tracleer / riociguat  - Wean off oxygen as tolerated, PTA 3 - 6 L  - I NO weaned off 3/31  - Hold aldactone.

## 2019-04-02 NOTE — PLAN OF CARE
Problem: Adult Inpatient Plan of Care  Goal: Plan of Care Review  Recommendation: Continue regular diet with Boost Plus tid; assess cardiac diet education needs when appetite/intake has improved     Goals:   1. Meet >75% EEN and EPN daily   2. Prevent dry wt loss of >2% per week   3. Promote nutrition related labs wnl

## 2019-04-02 NOTE — PROGRESS NOTES
[facility]  Vascular Neurology  Comprehensive Stroke Center  Progress Note    Assessment/Plan:     Transient alteration of awareness  Episode of loss of consciousness+/- right arm weakness yesterday after removal of left IJ. History is most consistent with vasovagal event, though differentials include minor air emboli with spontaneous resolution (though this was not appreciated on imaging). The episode of vision changes this morning is difficult to interpret. Her exam is remarkable for right arm drift and possible visual neglect which may explain some of her visual changes. Due to her medical history and somewhat focal neurologic exam, we feel imaging is indicated to evaluate for stroke. Differentials for these events include delirium due to her multiple medical comorbidities and recent surgery.    No neurological deficits appreciate on exam this AM. Likely periprocedural event caused by transient scattered air emboli. Continue current antithrombotic regimen. No need for further neurological imaging.     Please call with further questions or concerns             4/2 - No neurological defefits appreciated on exam this morning. Family at bedside stating patient able to read and watch TV this morning with no difficulty/complaints. Patient stating her vision is back to normal. No appreciated unilateral weakness.     STROKE DOCUMENTATION        NIH Scale:  1a. Level of Consciousness: 0-->Alert, keenly responsive  1b. LOC Questions: 0-->Answers both questions correctly  1c. LOC Commands: 0-->Performs both tasks correctly  2. Best Gaze: 0-->Normal  3. Visual: 0-->No visual loss  4. Facial Palsy: 0-->Normal symmetrical movements  5a. Motor Arm, Left: 0-->No drift, limb holds 90 (or 45) degrees for full 10 secs  5b. Motor Arm, Right: 0-->No drift, limb holds 90 (or 45) degrees for full 10 secs  6a. Motor Leg, Left: 3-->No effort against gravity, leg falls to bed immediately  6b. Motor Leg, Right: 2-->Some  effort against gravity, leg falls to bed by 5 secs, but has some effort against gravity  7. Limb Ataxia: 0-->Absent  8. Sensory: 0-->Normal, no sensory loss  9. Best Language: 0-->No aphasia, normal  10. Dysarthria: 0-->Normal  11. Extinction and Inattention (formerly Neglect): 0-->No abnormality  Total (NIH Stroke Scale): 5       Modified Ventura Score: 3  [place] Coma Scale:    ABCD2 Score:    [id number] Score:   HAS -BLED Score:   ICH Score:   Hunt & Rapp Classification:      Hemorrhagic change of an Ischemic Stroke: Does this patient have an ischemic stroke with hemorrhagic changes? No     Neurologic Chief Complaint: right weakness with visual loss     Subjective:     Interval History: Patient is seen for follow-up neurological assessment and treatment recommendations:     No neurological defefits appreciated on exam this morning. Family at bedside stating patient able to read and watch TV this morning with no difficulty/complaints. Patient stating her vision is back to normal. No appreciated unilateral weakness.     HPI, Past Medical, Family, and Social History remains the same as documented in the initial encounter.     Review of Systems   Constitutional: Negative for fever.   Eyes: Negative for visual disturbance.   Gastrointestinal: Negative for nausea and vomiting.   Neurological: Negative for facial asymmetry, speech difficulty, weakness and numbness.   Psychiatric/Behavioral: Negative for agitation and confusion.     Scheduled Meds:   amiodarone  200 mg Oral Daily    bosentan  125 mg Oral Q12H    calcium carbonate  1,000 mg Oral Daily    cetirizine  5 mg Oral Daily    enoxaparin  60 mg Subcutaneous Q12H    ferrous gluconate  324 mg Oral Daily with breakfast    fluticasone  2 spray Each Nare QHS    folic acid  1 mg Oral Daily    furosemide  20 mg Oral After lunch    furosemide  40 mg Oral Daily    lidocaine  1 patch Transdermal Q24H    pantoprazole  40 mg Oral BID AC    polyethylene glycol   17 g Oral Daily    potassium chloride  20 mEq Oral Once    pregabalin  75 mg Oral QHS    riociguat  0.5 mg Oral TID    senna-docusate 8.6-50 mg  2 tablet Oral BID    simethicone  1 tablet Oral QID (PC + HS)    sodium chloride 0.9%  3 mL Intravenous Q8H    warfarin  7.5 mg Oral Daily     Continuous Infusions:   ropivacaine (PF) 2 mg/ml (0.2%) Stopped (04/01/19 0649)    treprostinil (REMODULIN) infusion      veletri/remodulin cassette      veletri/remodulin tubing       PRN Meds:promethazine (PHENERGAN) IVPB, sodium chloride 0.9%, traMADol, treprostinil (REMODULIN) infusion    Objective:     Vital Signs (Most Recent):  Temp: 98 °F (36.7 °C) (04/02/19 0705)  Pulse: 101 (04/02/19 0817)  Resp: (!) 21 (04/02/19 0817)  BP: 110/63 (04/02/19 0800)  SpO2: 95 % (04/02/19 0817)  BP Location: Left arm    Vital Signs Range (Last 24H):  Temp:  [98 °F (36.7 °C)-98.3 °F (36.8 °C)]   Pulse:  []   Resp:  [6-28]   BP: ()/(50-87)   SpO2:  [90 %-95 %]   BP Location: Left arm    Physical Exam   Constitutional: She is oriented to person, place, and time. She appears well-developed.   Eyes: Pupils are equal, round, and reactive to light. EOM are normal.   Cardiovascular: Normal rate.   Pulmonary/Chest: Effort normal.   Abdominal: Soft.   Neurological: She is alert and oriented to person, place, and time.   Skin: Skin is warm. Capillary refill takes less than 2 seconds.   Psychiatric: She has a normal mood and affect.       Neurological Exam:   LOC: alert  Attention Span: Good   Language: No aphasia  Articulation: No dysarthria  Orientation: Person, Place, Time   Visual Fields: Full  EOM (CN III, IV, VI): Full/intact  Pupils (CN II, III): PERRL  Facial Movement (CN VII): Symmetric facial expression    Motor: Arm left  Normal 5/5  Leg left  Normal 5/5  Arm right  Normal 5/5  Leg right Normal 5/5  Cebellar: No evidence of appendicular or axial ataxia  Sensation: Intact to light touch, temperature and  vibration    Laboratory:  CMP:   Recent Labs   Lab 04/02/19  0632   CALCIUM 8.6*   ALBUMIN 2.4*   PROT 5.9*      K 3.7   CO2 25      BUN 12   CREATININE 0.8   ALKPHOS 57   ALT 19   AST 25   BILITOT 0.4     BMP:   Recent Labs   Lab 04/02/19  0632      K 3.7      CO2 25   BUN 12   CREATININE 0.8   CALCIUM 8.6*     CBC:   Recent Labs   Lab 04/02/19  0339   WBC 5.52   RBC 4.05   HGB 12.3   HCT 38.5      MCV 95   MCH 30.4   MCHC 31.9*     Lipid Panel: No results for input(s): CHOL, LDLCALC, HDL, TRIG in the last 168 hours.  Coagulation:   Recent Labs   Lab 04/02/19  0632   INR 1.6*     Platelet Aggregation Study: No results for input(s): PLTAGG, PLTAGINTERP, PLTAGREGLACO, ADPPLTAGGREG in the last 168 hours.  Hgb A1C: No results for input(s): HGBA1C in the last 168 hours.  TSH: No results for input(s): TSH in the last 168 hours.    Diagnostic Results     Brain Imaging   Repeat CT head 4/1  No acute intracranial abnormalities    Previous question of abnormal finding over the left upper convexity in the extra-axial space is no longer identified consistent with resolution of motion related artifact. No extra-axial bleed identified.    CT head 4/1  Images degraded by motion. Possible motion related artifact versus very thin subdural bleed over the superior left convexity near the vertex.  Recommend repeat CT head examination.    This report was flagged in Epic as abnormal.    Vessel Imaging   NA    Cardiac Imaging   TTE 1/29/19  · Normal left ventricular systolic function. The estimated ejection fraction is 65%  · Septal wall has abnormal motion. Systolic flattening of the interventricular septum consistent with right ventricle pressure overload.  · Biatrial enlargement.  · Grade I (mild) left ventricular diastolic dysfunction consistent with impaired relaxation.  · Normal left atrial pressure.  · Moderately reduced right ventricular systolic function.  · Severe right ventricular  enlargement.  · Moderate tricuspid regurgitation.  · Moderate pulmonic regurgitation.  · The estimated PA systolic pressure is 89 mm Hg  · Pulmonary hypertension present.  · Normal central venous pressure (3 mm Hg).      Yelena Henao NP  [facility]  Department of Vascular Neurology   [facility]

## 2019-04-02 NOTE — SUBJECTIVE & OBJECTIVE
"Principal Problem:Closed fracture of neck of left femur    Principal Orthopedic Problem: L femoral neck fx    Interval History: Pt seen and examined at bedside. Has been OOB to chair. Some achy pain in left hip since block was removed.    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    Multaq [dronedarone]        Current Facility-Administered Medications   Medication    amiodarone tablet 200 mg    bosentan tablet 125 mg    calcium carbonate (OS-MARIELLE) tablet 500 mg    cetirizine tablet 5 mg    enoxaparin injection 60 mg    ferrous gluconate Tab 324 mg    ferrous gluconate tablet 324 mg    fluticasone 50 mcg/actuation nasal spray 100 mcg    folic acid tablet 1 mg    furosemide tablet 20 mg    lidocaine 5 % patch 1 patch    pantoprazole EC tablet 40 mg    polyethylene glycol packet 17 g    pregabalin capsule 75 mg    promethazine (PHENERGAN) 6.25 mg in dextrose 5 % 50 mL IVPB    riociguat (ADEMPAS) tablet 0.5 mg    ropivacaine (PF) 2 mg/ml (0.2%) infusion    senna-docusate 8.6-50 mg per tablet 2 tablet    simethicone chewable tablet 80 mg    sodium chloride 0.9% flush 3 mL    sodium chloride 0.9% flush 5 mL    traMADol tablet 50 mg    treprostinil (REMODULIN) 12,000,000 ng in sodium chloride 0.9% 100 mL infusion    VELETRI/REMODULIN CASSETTE    VELETRI/REMODULIN TUBING    warfarin (COUMADIN) tablet 7.5 mg     Objective:     Vital Signs (Most Recent):  Temp: 98 °F (36.7 °C) (04/02/19 0300)  Pulse: 93 (04/02/19 0600)  Resp: 15 (04/02/19 0600)  BP: 126/70 (04/02/19 0600)  SpO2: (!) 91 % (04/02/19 0600) Vital Signs (24h Range):  Temp:  [98 °F (36.7 °C)-98.3 °F (36.8 °C)] 98 °F (36.7 °C)  Pulse:  [] 93  Resp:  [6-36] 15  SpO2:  [89 %-95 %] 91 %  BP: ()/(50-87) 126/70     Weight: 57.2 kg (126 lb)  Height: 5' 3" (160 cm)  Body mass index is 22.32 kg/m².      Intake/Output Summary (Last 24 hours) at 4/2/2019 0707  Last data filed at " 4/1/2019 2200  Gross per 24 hour   Intake 730 ml   Output 1000 ml   Net -270 ml     NAD  10L HFNC    Ortho/SPM Exam    Pt lying in bed this morning in NAD  Aquacel dressing c/d/i  SILT Sa/Thompson/DP/SP/T  Motor intact EHL/FHL/TA/Gastroc  2+ DP, 2+ PT    Significant Labs:   BMP:   Recent Labs   Lab 04/01/19  0303   GLU 87      K 4.3      CO2 21*   BUN 15   CREATININE 0.7   CALCIUM 7.8*   MG 1.8     CBC:   Recent Labs   Lab 04/01/19 0303 04/02/19  0339   WBC 6.05 5.52   HGB 10.8* 12.3   HCT 33.3* 38.5    273     Coagulation:   Recent Labs   Lab 04/01/19 0303 04/02/19  0632   LABPROT 17.4* 16.2*   INR 1.8* 1.6*       Significant Imaging: I have reviewed all pertinent imaging results/findings.

## 2019-04-02 NOTE — SUBJECTIVE & OBJECTIVE
Interval History: repeat CT of head done overnight and negative.  Patient reports that pain is worse today than before.  Appreciate Vascular Neurology consultation.  Visual changes and right sided weakness have resolved.      Continuous Infusions:   ropivacaine (PF) 2 mg/ml (0.2%) Stopped (04/01/19 0649)    treprostinil (REMODULIN) infusion      veletri/remodulin cassette      veletri/remodulin tubing       Scheduled Meds:   acetaminophen  1,000 mg Oral Q8H    amiodarone  200 mg Oral Daily    bosentan  125 mg Oral Q12H    calcium carbonate  1,000 mg Oral Daily    cetirizine  5 mg Oral Daily    enoxaparin  30 mg Subcutaneous Q12H    ferrous gluconate  324 mg Oral Daily with breakfast    fluticasone  2 spray Each Nare QHS    folic acid  1 mg Oral Daily    furosemide  20 mg Oral After lunch    furosemide  40 mg Oral Daily    lidocaine  1 patch Transdermal Q24H    pantoprazole  40 mg Oral BID AC    polyethylene glycol  17 g Oral Daily    pregabalin  75 mg Oral QHS    riociguat  0.5 mg Oral TID    senna-docusate 8.6-50 mg  2 tablet Oral BID    simethicone  1 tablet Oral QID (PC + HS)    sodium chloride 0.9%  3 mL Intravenous Q8H    warfarin  7.5 mg Oral Daily     PRN Meds:promethazine (PHENERGAN) IVPB, sodium chloride 0.9%, traMADol, treprostinil (REMODULIN) infusion    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    Multaq [dronedarone]      Objective:     Vital Signs (Most Recent):  Temp: 98 °F (36.7 °C) (04/02/19 1105)  Pulse: (!) 118 (04/02/19 1105)  Resp: (!) 26 (04/02/19 1105)  BP: 117/65 (04/02/19 1105)  SpO2: (!) 84 % (04/02/19 1105) Vital Signs (24h Range):  Temp:  [98 °F (36.7 °C)-98.3 °F (36.8 °C)] 98 °F (36.7 °C)  Pulse:  [] 118  Resp:  [6-27] 26  SpO2:  [84 %-95 %] 84 %  BP: ()/(50-87) 117/65     No data found.  Body mass index is 22.32 kg/m².      Intake/Output Summary (Last 24 hours) at 4/2/2019 7947  Last data filed  at 4/1/2019 2200  Gross per 24 hour   Intake 330 ml   Output 1000 ml   Net -670 ml       Hemodynamic Parameters:       Telemetry: reviewed and stable    Physical Exam  Constitutional: She is oriented to person, place, and time. Laying in bed NAD with family at bedside  Neck: Neck supple. No JVD present.   Cardiovascular: Normal rate and regular rhythm. III/VI holosystolic murmer appreciated best at the apex  Pulmonary/Chest: Effort normal and breath sounds normal. No respiratory distress. Faint crackles at the bases bilaterally  Abdominal: Soft. Bowel sounds are normal. She exhibits no distension.   Musculoskeletal: She exhibits no edema.   Neurological: She is alert and oriented to person, place, and time. She has normal strength. CN 1-12 grossly intact.  Skin: Skin is warm and dry. Capillary refill takes less than 2 seconds. No erythema.   Psychiatric: She has a normal mood and affect. Her behavior is normal. Thought content normal    Significant Labs:  CBC:  Recent Labs   Lab 03/31/19  0306 04/01/19  0303 04/02/19  0339   WBC 4.82 6.05 5.52   RBC 3.63* 3.56* 4.05   HGB 11.1* 10.8* 12.3   HCT 33.9* 33.3* 38.5    243 273   MCV 93 94 95   MCH 30.6 30.3 30.4   MCHC 32.7 32.4 31.9*     BNP:  No results for input(s): BNP in the last 168 hours.    Invalid input(s): BNPTRIAGELBLO  CMP:  Recent Labs   Lab 03/31/19  0306 04/01/19  0303 04/02/19  0632   GLU 91 87 100   CALCIUM 8.0* 7.8* 8.6*   ALBUMIN 2.0* 1.9* 2.4*   PROT 5.1* 5.0* 5.9*    137 138   K 3.6 4.3 3.7   CO2 23 21* 25    110 107   BUN 13 15 12   CREATININE 0.8 0.7 0.8   ALKPHOS 50* 47* 57   ALT 8* 11 19   AST 17 21 25   BILITOT 0.4 0.4 0.4      Coagulation:   Recent Labs   Lab 03/31/19  0306 04/01/19  0303 04/02/19  0632   INR 1.7* 1.8* 1.6*     LDH:  No results for input(s): LDH in the last 72 hours.  Microbiology:  Microbiology Results (last 7 days)     Procedure Component Value Units Date/Time    Blood culture [[id number]] Collected:   03/26/19 1420    Order Status:  Completed Specimen:  Blood from Peripheral, Antecubital, Right Updated:  03/31/19 1612     Blood Culture, Routine No growth after 5 days.    Blood culture [[id number]] Collected:  03/26/19 1440    Order Status:  Completed Specimen:  Blood from Peripheral, Antecubital, Left Updated:  03/31/19 1612     Blood Culture, Routine No growth after 5 days.    Respiratory Viral Panel by PCR [facility]; Nasal Swab [[id number]] Collected:  03/26/19 1846    Order Status:  Completed Specimen:  Respiratory Updated:  03/28/19 1302     Respiratory Virus Panel, source Nasal Swab     RVP - Adenovirus Not Detected     Comment: Detects Serotypes B and E. Detection of Serotype C may   be limited. If Adenovirus infection is suspected and a   Not Detected result is returned the sample should be   re-tested for Adenovirus using an independent method  (e.g. [employer] Adenovirus Quantitative Real-Time  PCR test.          Enterovirus Not Detected     Comment: Cross-reactivity has been observed between certain Rhinovirus  strains and the Enterovirus assay.          Human Bocavirus Not Detected     Human Coronavirus Not Detected     Comment: The Human Coronavirus assay detects Human coronavirus types  229E, OC43,NL63 and HKU1.          RVP - Human Metapneumovirus (hMPV) Not Detected     RVP - Influenza A Not Detected     Influenza A - Q4Q0-56 Not Detected     RVP - Influenza B Not Detected     Parainfluenza Not Detected     Respiratory Syncytial VirusVirus (RSV) A Not Detected     Comment: The Respiratory Syncytial Viral assay detects types A and B,  however it does not distinguish between the two.          RVP - Rhinovirus Not Detected     Comment: Cross-Reactivity has been observed between certain   Rhinovirus strains and the Enterovirus assay.  Target Enriched Mulitplex Polymerase Chain Reaction (TEM-PCR)  allows for the detection of multiple pathogens out of a single  reaction.  This test was developed and  its performance   characteristics determined by [employer].  It has not   been cleared or approved by the U.S.Food and Drug Administration.  Results should be used in conjunction with clinical findings,   and should not form the sole basis for a diagnosis or treatment  decision.  TEM-PCR is a licensed technology of [employer].         Narrative:       Receiving Lab:->[facility]          I have reviewed all pertinent labs within the past 24 hours.    Estimated Creatinine Clearance: 52.6 mL/min (based on SCr of 0.8 mg/dL).    Diagnostic Results:  I have reviewed all pertinent imaging results/findings within the past 24 hours.

## 2019-04-02 NOTE — ASSESSMENT & PLAN NOTE
- possible TIA with right sided aphasia after RIJ removed on 3/31.  aphasia/facial droop/right sided weakness has resolved  - CT head negative; Repeat CT also negative  - symptoms resolved  - Appreciate Vascular neurology consultation: Suspect either transient vagal event or transient scattered air emboli given context immediately after IJ removal - however both VS record and CT immediately afterward would contradict this ddx; Continue current antithrombotic regimen

## 2019-04-02 NOTE — NURSING
To CT scan on bed with RN/RT/PCT. Portable hemodynamic monitoring and O2. Tolerated well and returned to [facility]ICU..

## 2019-04-02 NOTE — PROGRESS NOTES
"[facility]  Adult Nutrition  Progress Note    SUMMARY       Recommendations    Recommendation: Continue regular diet with Boost Plus tid; assess cardiac diet education needs when appetite/intake has improved    Goals:   1. Meet >75% EEN and EPN daily   2. Prevent dry wt loss of >2% per week   3. Promote nutrition related labs wnl    Nutrition Goal Status: new  Communication of RD Recs: other (comment)(POC)    Reason for Assessment    Reason For Assessment: length of stay  Diagnosis: other (see comments)(Closed fracture of neck of left femur)  Relevant Medical History: Tachycardia, Pulmonary HTN, Heart murmur, Arthritis, Long-term anticoagulant use    General Information Comments: Pt with other providers x 2 visit attempts, remote assessment completed. Wt fluctuations of +/- 5lbs noted in chart over the past year, likely fluid related; wt relatively stable overall. No N/V/D/C noted, LBM today. Poor PO intake noted.  Noted with stroke-like symptoms yesterday; symptoms have since resolved. Will assess for physical s/s of malnutrition at f/u.     Nutrition Discharge Planning: d/c on cardiac diet    Nutrition Risk Screen    Nutrition Risk Screen: no indicators present    Nutrition/Diet History    Spiritual, Cultural Beliefs, Latter-day Practices, Values that Affect Care: no    Anthropometrics    Temp: 98 °F (36.7 °C)  Height Method: Stated  Height: 5' 3" (160 cm)  Height (inches): 63 in  Weight Method: Bed Scale  Weight: 57.2 kg (126 lb)  Weight (lb): 126 lb  Ideal Body Weight (IBW), Female: 115 lb  % Ideal Body Weight, Female (lb): 109.57 lb  BMI (Calculated): 22.4  BMI Grade: 18.5-24.9 - normal     Lab/Procedures/Meds    Pertinent Labs: Reviewed  Lab Results   Component Value Date    CALCIUM 8.6 (L) 04/02/2019    PHOS 2.3 (L) 04/02/2019    ALBUMIN 2.4 (L) 04/02/2019   *Corrected Ca 9.88    Pertinent Meds: Reviewed  Scheduled Meds:   acetaminophen  1,000 mg Oral Q8H    amiodarone  200 mg Oral Daily "    bosentan  125 mg Oral Q12H    calcium carbonate  1,000 mg Oral Daily    cetirizine  5 mg Oral Daily    enoxaparin  30 mg Subcutaneous Q12H    ferrous gluconate  324 mg Oral Daily with breakfast    fluticasone  2 spray Each Nare QHS    folic acid  1 mg Oral Daily    furosemide  20 mg Oral After lunch    furosemide  40 mg Oral Daily    lidocaine  1 patch Transdermal Q24H    pantoprazole  40 mg Oral BID AC    polyethylene glycol  17 g Oral Daily    pregabalin  75 mg Oral QHS    riociguat  0.5 mg Oral TID    senna-docusate 8.6-50 mg  2 tablet Oral BID    simethicone  1 tablet Oral QID (PC + HS)    sodium chloride 0.9%  3 mL Intravenous Q8H    warfarin  10 mg Oral Once     Continuous Infusions:   treprostinil (REMODULIN) infusion       Estimated/Assessed Needs    Weight Used For Calorie Calculations: 57.2 kg (126 lb 1.7 oz)  Energy Calorie Requirements (kcal): 7769-0198  Energy Need Method: Kcal/kg(25-30 kcal/kg)  Protein Requirements: 57-69 gm/d (1-1.2 gm/kg)  Weight Used For Protein Calculations: 57.2 kg (126 lb 1.7 oz)  Fluid Requirements (mL): 1430(or per team)  Estimated Fluid Requirement Method: RDA Method    Nutrition Prescription Ordered    Current Diet Order: Regular  Oral Nutrition Supplement: Boost Plus tid    Evaluation of Received Nutrient/Fluid Intake    % Intake of Estimated Energy Needs: 0 - 25 %  % Meal Intake: 0 - 25 %    Nutrition Risk    Level of Risk/Frequency of Follow-up: high     Assessment and Plan    Nutrition Problem  Inadequate oral intake  Related to (etiology):   Poor appetite  Signs and Symptoms (as evidenced by):   PO intake of 0-25% of meals   Interventions:  Collaboration with Providers   Nutrition Diagnosis Status:   New    Monitor and Evaluation    Food and Nutrient Intake: energy intake, food and beverage intake  Food and Nutrient Adminstration: diet order  Anthropometric Measurements: weight, weight change  Biochemical Data, Medical Tests and Procedures:  electrolyte and renal panel, gastrointestinal profile, glucose/endocrine profile, inflammatory profile, lipid profile  Nutrition-Focused Physical Findings: overall appearance, extremities, muscles and bones, skin     Malnutrition Assessment     Teeth (Micronutrient): none     Nutrition Follow-Up    RD Follow-up?: Yes

## 2019-04-02 NOTE — ASSESSMENT & PLAN NOTE
Episode of loss of consciousness+/- right arm weakness yesterday after removal of left IJ. History is most consistent with vasovagal event, though differentials include minor air emboli with spontaneous resolution (though this was not appreciated on imaging). The episode of vision changes this morning is difficult to interpret. Her exam is remarkable for right arm drift and possible visual neglect which may explain some of her visual changes. Due to her medical history and somewhat focal neurologic exam, we feel imaging is indicated to evaluate for stroke. Differentials for these events include delirium due to her multiple medical comorbidities and recent surgery.    No neurological deficits appreciate on exam this AM. Likely periprocedural event caused by transient scattered air emboli. Continue current antithrombotic regimen. No need for further neurological imaging.     Please call with further questions or concerns

## 2019-04-02 NOTE — NURSING
See vital signs and assessments in flowsheets. See below for updates on today's progress.      Pulmonary:  High flow 10 liters, oxygen saturations 87-90% throughout shift. Goal >02: 80%     Cardiovascular: NSR, HR: 90's, SBP : 110-120's's,     Neurological: AAOX4, afebrile, moves all extremities, follows commands     Gastrointestinal:  BMx3, bowel sounds present x4 quadrants, bedside commode with assistance     Genitourinary: voided x2 pad.       Integumentary/Other: Left hip wound      Infusions:  KVO    PT/OT walked patient in [place] with walker.      POC: Continue to monitor patients hip surgery site. Step down orders in awaiting bed placement. Continue plan of care. Plan discussed with patient and family at bedside all questions and concerns were addressed. No further questions at this time.

## 2019-04-02 NOTE — PLAN OF CARE
Problem: Occupational Therapy Goal  Goal: Occupational Therapy Goal  Goals to be met by: 7 days 4/9/19     Patient will increase functional independence with ADLs by performing:    Pt to complete g/h skills seated with set-up--MET  Pt to complete UE dressing with MIN A --MET  Pt to demo Fair sitting balance to allow for progression with ADL training.--MET  Pt to t/f to chair with MAX A --MET  Pt to t/f to BSC with SBA  Pt to complete toileting MIN A   Pt to complete g/h skills with CGA in standing  Pt and fly to demo/verb understanding of AE for LE dressing given current hip precautions.       Goals remain appropriate.

## 2019-04-02 NOTE — ASSESSMENT & PLAN NOTE
- Appreciate EP consultation.  Will continue Amiodarone for now.  -Could add Verapamil 40mg TID; but holding on this due to RV failure  - Adenosine as needed  -Patient not optimal status for EP ablation procedure

## 2019-04-02 NOTE — ASSESSMENT & PLAN NOTE
Pt is a 74 yo F with pulmonary HTN, chronic respiratory failure, congenital heart disease on coumadin, and closed left femoral neck fracture s/p left hip patrick 3/25. Making some progress with physical therapy, limited by medical comorbidities.     - Antibiotics: empiric vanc/cefepime for fever  - Weight bearing status: WBAT LLE  - Labs: hemoglobin stable at 12.3, INR 1.6  - DVT Prophylaxis: mechanical, lovenox, warfarin per primary  - Lines/Drains: PIV, A line, central line, Dun[place], Remodulin pump  - Pain control: multimodal per anesthesia  - PT/OT: mobilize with PT/OT

## 2019-04-02 NOTE — SUBJECTIVE & OBJECTIVE
Neurologic Chief Complaint: right weakness with visual loss     Subjective:     Interval History: Patient is seen for follow-up neurological assessment and treatment recommendations:     No neurological defefits appreciated on exam this morning. Family at bedside stating patient able to read and watch TV this morning with no difficulty/complaints. Patient stating her vision is back to normal. No appreciated unilateral weakness.     HPI, Past Medical, Family, and Social History remains the same as documented in the initial encounter.     Review of Systems   Constitutional: Negative for fever.   Eyes: Negative for visual disturbance.   Gastrointestinal: Negative for nausea and vomiting.   Neurological: Negative for facial asymmetry, speech difficulty, weakness and numbness.   Psychiatric/Behavioral: Negative for agitation and confusion.     Scheduled Meds:   amiodarone  200 mg Oral Daily    bosentan  125 mg Oral Q12H    calcium carbonate  1,000 mg Oral Daily    cetirizine  5 mg Oral Daily    enoxaparin  60 mg Subcutaneous Q12H    ferrous gluconate  324 mg Oral Daily with breakfast    fluticasone  2 spray Each Nare QHS    folic acid  1 mg Oral Daily    furosemide  20 mg Oral After lunch    furosemide  40 mg Oral Daily    lidocaine  1 patch Transdermal Q24H    pantoprazole  40 mg Oral BID AC    polyethylene glycol  17 g Oral Daily    potassium chloride  20 mEq Oral Once    pregabalin  75 mg Oral QHS    riociguat  0.5 mg Oral TID    senna-docusate 8.6-50 mg  2 tablet Oral BID    simethicone  1 tablet Oral QID (PC + HS)    sodium chloride 0.9%  3 mL Intravenous Q8H    warfarin  7.5 mg Oral Daily     Continuous Infusions:   ropivacaine (PF) 2 mg/ml (0.2%) Stopped (04/01/19 0649)    treprostinil (REMODULIN) infusion      veletri/remodulin cassette      veletri/remodulin tubing       PRN Meds:promethazine (PHENERGAN) IVPB, sodium chloride 0.9%, traMADol, treprostinil (REMODULIN) infusion    Objective:      Vital Signs (Most Recent):  Temp: 98 °F (36.7 °C) (04/02/19 0705)  Pulse: 101 (04/02/19 0817)  Resp: (!) 21 (04/02/19 0817)  BP: 110/63 (04/02/19 0800)  SpO2: 95 % (04/02/19 0817)  BP Location: Left arm    Vital Signs Range (Last 24H):  Temp:  [98 °F (36.7 °C)-98.3 °F (36.8 °C)]   Pulse:  []   Resp:  [6-28]   BP: ()/(50-87)   SpO2:  [90 %-95 %]   BP Location: Left arm    Physical Exam   Constitutional: She is oriented to person, place, and time. She appears well-developed.   Eyes: Pupils are equal, round, and reactive to light. EOM are normal.   Cardiovascular: Normal rate.   Pulmonary/Chest: Effort normal.   Abdominal: Soft.   Neurological: She is alert and oriented to person, place, and time.   Skin: Skin is warm. Capillary refill takes less than 2 seconds.   Psychiatric: She has a normal mood and affect.       Neurological Exam:   LOC: alert  Attention Span: Good   Language: No aphasia  Articulation: No dysarthria  Orientation: Person, Place, Time   Visual Fields: Full  EOM (CN III, IV, VI): Full/intact  Pupils (CN II, III): PERRL  Facial Movement (CN VII): Symmetric facial expression    Motor: Arm left  Normal 5/5  Leg left  Normal 5/5  Arm right  Normal 5/5  Leg right Normal 5/5  Cebellar: No evidence of appendicular or axial ataxia  Sensation: Intact to light touch, temperature and vibration    Laboratory:  CMP:   Recent Labs   Lab 04/02/19  0632   CALCIUM 8.6*   ALBUMIN 2.4*   PROT 5.9*      K 3.7   CO2 25      BUN 12   CREATININE 0.8   ALKPHOS 57   ALT 19   AST 25   BILITOT 0.4     BMP:   Recent Labs   Lab 04/02/19  0632      K 3.7      CO2 25   BUN 12   CREATININE 0.8   CALCIUM 8.6*     CBC:   Recent Labs   Lab 04/02/19  0339   WBC 5.52   RBC 4.05   HGB 12.3   HCT 38.5      MCV 95   MCH 30.4   MCHC 31.9*     Lipid Panel: No results for input(s): CHOL, LDLCALC, HDL, TRIG in the last 168 hours.  Coagulation:   Recent Labs   Lab 04/02/19  0632   INR 1.6*      Platelet Aggregation Study: No results for input(s): PLTAGG, PLTAGINTERP, PLTAGREGLACO, ADPPLTAGGREG in the last 168 hours.  Hgb A1C: No results for input(s): HGBA1C in the last 168 hours.  TSH: No results for input(s): TSH in the last 168 hours.    Diagnostic Results     Brain Imaging   Repeat CT head 4/1  No acute intracranial abnormalities    Previous question of abnormal finding over the left upper convexity in the extra-axial space is no longer identified consistent with resolution of motion related artifact. No extra-axial bleed identified.    CT head 4/1  Images degraded by motion. Possible motion related artifact versus very thin subdural bleed over the superior left convexity near the vertex.  Recommend repeat CT head examination.    This report was flagged in Epic as abnormal.    Vessel Imaging   NA    Cardiac Imaging   TTE 1/29/19  · Normal left ventricular systolic function. The estimated ejection fraction is 65%  · Septal wall has abnormal motion. Systolic flattening of the interventricular septum consistent with right ventricle pressure overload.  · Biatrial enlargement.  · Grade I (mild) left ventricular diastolic dysfunction consistent with impaired relaxation.  · Normal left atrial pressure.  · Moderately reduced right ventricular systolic function.  · Severe right ventricular enlargement.  · Moderate tricuspid regurgitation.  · Moderate pulmonic regurgitation.  · The estimated PA systolic pressure is 89 mm Hg  · Pulmonary hypertension present.  · Normal central venous pressure (3 mm Hg).

## 2019-04-03 LAB
ALBUMIN SERPL BCP-MCNC: 2.2 G/DL (ref 3.5–5.2)
ALP SERPL-CCNC: 50 U/L (ref 55–135)
ALT SERPL W/O P-5'-P-CCNC: 19 U/L (ref 10–44)
ANION GAP SERPL CALC-SCNC: 8 MMOL/L (ref 8–16)
AST SERPL-CCNC: 23 U/L (ref 10–40)
BASOPHILS # BLD AUTO: 0.05 K/UL (ref 0–0.2)
BASOPHILS NFR BLD: 0.8 % (ref 0–1.9)
BILIRUB SERPL-MCNC: 0.3 MG/DL (ref 0.1–1)
BUN SERPL-MCNC: 14 MG/DL (ref 8–23)
CALCIUM SERPL-MCNC: 8.7 MG/DL (ref 8.7–10.5)
CHLORIDE SERPL-SCNC: 106 MMOL/L (ref 95–110)
CO2 SERPL-SCNC: 25 MMOL/L (ref 23–29)
CREAT SERPL-MCNC: 0.7 MG/DL (ref 0.5–1.4)
DIFFERENTIAL METHOD: ABNORMAL
EOSINOPHIL # BLD AUTO: 0.2 K/UL (ref 0–0.5)
EOSINOPHIL NFR BLD: 2.5 % (ref 0–8)
ERYTHROCYTE [DISTWIDTH] IN BLOOD BY AUTOMATED COUNT: 14.3 % (ref 11.5–14.5)
EST. GFR  (AFRICAN AMERICAN): >60 ML/MIN/1.73 M^2
EST. GFR  (NON AFRICAN AMERICAN): >60 ML/MIN/1.73 M^2
GLUCOSE SERPL-MCNC: 93 MG/DL (ref 70–110)
HCT VFR BLD AUTO: 36.1 % (ref 37–48.5)
HGB BLD-MCNC: 11.8 G/DL (ref 12–16)
IMM GRANULOCYTES # BLD AUTO: 0.06 K/UL (ref 0–0.04)
IMM GRANULOCYTES NFR BLD AUTO: 1 % (ref 0–0.5)
INR PPP: 1.6 (ref 0.8–1.2)
LYMPHOCYTES # BLD AUTO: 1 K/UL (ref 1–4.8)
LYMPHOCYTES NFR BLD: 16.7 % (ref 18–48)
MAGNESIUM SERPL-MCNC: 1.7 MG/DL (ref 1.6–2.6)
MCH RBC QN AUTO: 29.9 PG (ref 27–31)
MCHC RBC AUTO-ENTMCNC: 32.7 G/DL (ref 32–36)
MCV RBC AUTO: 91 FL (ref 82–98)
MONOCYTES # BLD AUTO: 0.6 K/UL (ref 0.3–1)
MONOCYTES NFR BLD: 9.9 % (ref 4–15)
NEUTROPHILS # BLD AUTO: 4.2 K/UL (ref 1.8–7.7)
NEUTROPHILS NFR BLD: 69.1 % (ref 38–73)
NRBC BLD-RTO: 0 /100 WBC
PHOSPHATE SERPL-MCNC: 3.7 MG/DL (ref 2.7–4.5)
PLATELET # BLD AUTO: 237 K/UL (ref 150–350)
PMV BLD AUTO: 9.4 FL (ref 9.2–12.9)
POTASSIUM SERPL-SCNC: 3.5 MMOL/L (ref 3.5–5.1)
PROT SERPL-MCNC: 5.3 G/DL (ref 6–8.4)
PROTHROMBIN TIME: 15.5 SEC (ref 9–12.5)
RBC # BLD AUTO: 3.95 M/UL (ref 4–5.4)
SODIUM SERPL-SCNC: 139 MMOL/L (ref 136–145)
WBC # BLD AUTO: 6.05 K/UL (ref 3.9–12.7)

## 2019-04-03 PROCEDURE — A4216 STERILE WATER/SALINE, 10 ML: HCPCS | Performed by: ORTHOPAEDIC SURGERY

## 2019-04-03 PROCEDURE — 20600001 HC STEP DOWN PRIVATE ROOM

## 2019-04-03 PROCEDURE — 83735 ASSAY OF MAGNESIUM: CPT

## 2019-04-03 PROCEDURE — 27000221 HC OXYGEN, UP TO 24 HOURS

## 2019-04-03 PROCEDURE — 97110 THERAPEUTIC EXERCISES: CPT

## 2019-04-03 PROCEDURE — 25000003 PHARM REV CODE 250: Performed by: STUDENT IN AN ORGANIZED HEALTH CARE EDUCATION/TRAINING PROGRAM

## 2019-04-03 PROCEDURE — 25000003 PHARM REV CODE 250: Performed by: INTERNAL MEDICINE

## 2019-04-03 PROCEDURE — 85610 PROTHROMBIN TIME: CPT

## 2019-04-03 PROCEDURE — 25000003 PHARM REV CODE 250: Performed by: PHYSICIAN ASSISTANT

## 2019-04-03 PROCEDURE — 85025 COMPLETE CBC W/AUTO DIFF WBC: CPT

## 2019-04-03 PROCEDURE — 94761 N-INVAS EAR/PLS OXIMETRY MLT: CPT

## 2019-04-03 PROCEDURE — 25000003 PHARM REV CODE 250: Performed by: ORTHOPAEDIC SURGERY

## 2019-04-03 PROCEDURE — 80053 COMPREHEN METABOLIC PANEL: CPT

## 2019-04-03 PROCEDURE — 97530 THERAPEUTIC ACTIVITIES: CPT

## 2019-04-03 PROCEDURE — 27100171 HC OXYGEN HIGH FLOW UP TO 24 HOURS

## 2019-04-03 PROCEDURE — 97116 GAIT TRAINING THERAPY: CPT

## 2019-04-03 PROCEDURE — 63600175 PHARM REV CODE 636 W HCPCS: Performed by: PHYSICIAN ASSISTANT

## 2019-04-03 PROCEDURE — 99233 SBSQ HOSP IP/OBS HIGH 50: CPT | Mod: ,,, | Performed by: INTERNAL MEDICINE

## 2019-04-03 PROCEDURE — 25000003 PHARM REV CODE 250: Performed by: NURSE PRACTITIONER

## 2019-04-03 PROCEDURE — 84100 ASSAY OF PHOSPHORUS: CPT

## 2019-04-03 PROCEDURE — 99233 PR SUBSEQUENT HOSPITAL CARE,LEVL III: ICD-10-PCS | Mod: ,,, | Performed by: INTERNAL MEDICINE

## 2019-04-03 RX ORDER — WARFARIN SODIUM 5 MG/1
10 TABLET ORAL ONCE
Status: COMPLETED | OUTPATIENT
Start: 2019-04-03 | End: 2019-04-03

## 2019-04-03 RX ADMIN — ACETAMINOPHEN 1000 MG: 500 TABLET ORAL at 02:04

## 2019-04-03 RX ADMIN — Medication 3 ML: at 02:04

## 2019-04-03 RX ADMIN — CETIRIZINE HYDROCHLORIDE 5 MG: 5 TABLET ORAL at 09:04

## 2019-04-03 RX ADMIN — FUROSEMIDE 20 MG: 20 TABLET ORAL at 02:04

## 2019-04-03 RX ADMIN — SIMETHICONE CHEW TAB 80 MG 80 MG: 80 TABLET ORAL at 06:04

## 2019-04-03 RX ADMIN — RIOCIGUAT 0.5 MG: 0.5 TABLET, FILM COATED ORAL at 11:04

## 2019-04-03 RX ADMIN — RIOCIGUAT 0.5 MG: 0.5 TABLET, FILM COATED ORAL at 09:04

## 2019-04-03 RX ADMIN — RIOCIGUAT 0.5 MG: 0.5 TABLET, FILM COATED ORAL at 02:04

## 2019-04-03 RX ADMIN — PANTOPRAZOLE SODIUM 40 MG: 40 TABLET, DELAYED RELEASE ORAL at 05:04

## 2019-04-03 RX ADMIN — WARFARIN SODIUM 10 MG: 5 TABLET ORAL at 05:04

## 2019-04-03 RX ADMIN — TRAMADOL HYDROCHLORIDE 50 MG: 50 TABLET, COATED ORAL at 02:04

## 2019-04-03 RX ADMIN — SIMETHICONE CHEW TAB 80 MG 80 MG: 80 TABLET ORAL at 02:04

## 2019-04-03 RX ADMIN — ACETAMINOPHEN 1000 MG: 500 TABLET ORAL at 06:04

## 2019-04-03 RX ADMIN — TRAMADOL HYDROCHLORIDE 50 MG: 50 TABLET, COATED ORAL at 09:04

## 2019-04-03 RX ADMIN — ENOXAPARIN SODIUM 30 MG: 100 INJECTION SUBCUTANEOUS at 09:04

## 2019-04-03 RX ADMIN — FERROUS GLUCONATE TAB 324 MG (37.5 MG ELEMENTAL IRON) 324 MG: 324 (37.5 FE) TAB at 09:04

## 2019-04-03 RX ADMIN — AMIODARONE HYDROCHLORIDE 200 MG: 200 TABLET ORAL at 09:04

## 2019-04-03 RX ADMIN — SIMETHICONE CHEW TAB 80 MG 80 MG: 80 TABLET ORAL at 09:04

## 2019-04-03 RX ADMIN — FUROSEMIDE 40 MG: 40 TABLET ORAL at 09:04

## 2019-04-03 RX ADMIN — PREGABALIN 75 MG: 75 CAPSULE ORAL at 09:04

## 2019-04-03 RX ADMIN — BOSENTAN 125 MG: 125 TABLET, FILM COATED ORAL at 09:04

## 2019-04-03 RX ADMIN — CALCIUM 1000 MG: 500 TABLET ORAL at 09:04

## 2019-04-03 RX ADMIN — PANTOPRAZOLE SODIUM 40 MG: 40 TABLET, DELAYED RELEASE ORAL at 06:04

## 2019-04-03 RX ADMIN — Medication 3 ML: at 09:04

## 2019-04-03 RX ADMIN — BOSENTAN 125 MG: 125 TABLET, FILM COATED ORAL at 11:04

## 2019-04-03 RX ADMIN — Medication 3 ML: at 06:04

## 2019-04-03 RX ADMIN — FOLIC ACID 1 MG: 1 TABLET ORAL at 09:04

## 2019-04-03 RX ADMIN — FLUTICASONE PROPIONATE 100 MCG: 50 SPRAY, METERED NASAL at 09:04

## 2019-04-03 NOTE — PROGRESS NOTES
[facility]  Heart Transplant  Progress Note    Patient Name: Molly Smith  MRN: [id number]  Admission Date: 3/23/2019  Hospital Length of Stay: 11 days  Attending Physician: Sheila Vuong MD  Primary Care Provider: Roberto Braun MD  Principal Problem:Closed fracture of neck of left femur    Subjective:     Interval History: Patient reports pain is better controlled.  She has no complaints this morning.  Daughter is at bedside.  She is eager to work with therapy and get out of bed    Continuous Infusions:   ropivacaine (PF) 2 mg/ml (0.2%) Stopped (04/01/19 0649)    treprostinil (REMODULIN) infusion      veletri/remodulin cassette      veletri/remodulin tubing       Scheduled Meds:   acetaminophen  1,000 mg Oral Q8H    amiodarone  200 mg Oral Daily    bosentan  125 mg Oral Q12H    calcium carbonate  1,000 mg Oral Daily    cetirizine  5 mg Oral Daily    enoxaparin  30 mg Subcutaneous Q12H    ferrous gluconate  324 mg Oral Daily with breakfast    fluticasone  2 spray Each Nare QHS    folic acid  1 mg Oral Daily    furosemide  20 mg Oral After lunch    furosemide  40 mg Oral Daily    lidocaine  1 patch Transdermal Q24H    pantoprazole  40 mg Oral BID AC    polyethylene glycol  17 g Oral Daily    pregabalin  75 mg Oral QHS    riociguat  0.5 mg Oral TID    senna-docusate 8.6-50 mg  2 tablet Oral BID    simethicone  1 tablet Oral QID (PC + HS)    sodium chloride 0.9%  3 mL Intravenous Q8H    warfarin  10 mg Oral Once     PRN Meds:promethazine (PHENERGAN) IVPB, sodium chloride 0.9%, traMADol, treprostinil (REMODULIN) infusion    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    Multaq [dronedarone]      Objective:     Vital Signs (Most Recent):  Temp: 97.8 °F (36.6 °C) (04/03/19 1118)  Pulse: 94 (04/03/19 1514)  Resp: 19 (04/03/19 1407)  BP: (!) 100/55 (04/03/19 1407)  SpO2: (!) 90 % (04/03/19 1407) Vital Signs (24h  Range):  Temp:  [97.8 °F (36.6 °C)-98.3 °F (36.8 °C)] 97.8 °F (36.6 °C)  Pulse:  [] 94  Resp:  [17-35] 19  SpO2:  [87 %-93 %] 90 %  BP: ()/(50-74) 100/55     Patient Vitals for the past 72 hrs (Last 3 readings):   Weight   04/03/19 1139 63.3 kg (139 lb 8.8 oz)   04/03/19 0600 65.3 kg (143 lb 15.4 oz)   04/02/19 1300 57.2 kg (126 lb)     Body mass index is 24.72 kg/m².      Intake/Output Summary (Last 24 hours) at 4/3/2019 1519  Last data filed at 4/3/2019 1139  Gross per 24 hour   Intake 620 ml   Output 1000 ml   Net -380 ml       Hemodynamic Parameters:       Telemetry: reviewed and stable    Physical Exam    Constitutional: She is oriented to person, place, and time. Laying in bed NAD with family at bedside  Neck: Neck supple. No JVD present.   Cardiovascular: Normal rate and regular rhythm. III/VI holosystolic murmer appreciated best at the apex  Pulmonary/Chest: Effort normal and breath sounds normal. No respiratory distress. Faint crackles at the bases bilaterally  Abdominal: Soft. Bowel sounds are normal. She exhibits no distension.   Musculoskeletal: She exhibits no edema.   Neurological: She is alert and oriented to person, place, and time. She has normal strength. CN 1-12 grossly intact.  Skin: Skin is warm and dry. Capillary refill takes less than 2 seconds. No erythema.   Psychiatric: She has a normal mood and affect. Her behavior is normal. Thought content normal    Significant Labs:  CBC:  Recent Labs   Lab 04/01/19  0303 04/02/19  0339 04/03/19  0315   WBC 6.05 5.52 6.05   RBC 3.56* 4.05 3.95*   HGB 10.8* 12.3 11.8*   HCT 33.3* 38.5 36.1*    273 237   MCV 94 95 91   MCH 30.3 30.4 29.9   MCHC 32.4 31.9* 32.7     BNP:  No results for input(s): BNP in the last 168 hours.    Invalid input(s): BNPTRIAGELBLO  CMP:  Recent Labs   Lab 04/01/19  0303 04/02/19  0632 04/03/19  0315   GLU 87 100 93   CALCIUM 7.8* 8.6* 8.7   ALBUMIN 1.9* 2.4* 2.2*   PROT 5.0* 5.9* 5.3*    138 139   K 4.3  3.7 3.5   CO2 21* 25 25    107 106   BUN 15 12 14   CREATININE 0.7 0.8 0.7   ALKPHOS 47* 57 50*   ALT 11 19 19   AST 21 25 23   BILITOT 0.4 0.4 0.3      Coagulation:   Recent Labs   Lab 04/01/19  0303 04/02/19  0632 04/03/19  0315   INR 1.8* 1.6* 1.6*     LDH:  No results for input(s): LDH in the last 72 hours.  Microbiology:  Microbiology Results (last 7 days)     Procedure Component Value Units Date/Time    Blood culture [[id number]] Collected:  03/26/19 1420    Order Status:  Completed Specimen:  Blood from Peripheral, Antecubital, Right Updated:  03/31/19 1612     Blood Culture, Routine No growth after 5 days.    Blood culture [[id number]] Collected:  03/26/19 1440    Order Status:  Completed Specimen:  Blood from Peripheral, Antecubital, Left Updated:  03/31/19 1612     Blood Culture, Routine No growth after 5 days.    Respiratory Viral Panel by PCR [facility]; Nasal Swab [[id number]] Collected:  03/26/19 1846    Order Status:  Completed Specimen:  Respiratory Updated:  03/28/19 1302     Respiratory Virus Panel, source Nasal Swab     RVP - Adenovirus Not Detected     Comment: Detects Serotypes B and E. Detection of Serotype C may   be limited. If Adenovirus infection is suspected and a   Not Detected result is returned the sample should be   re-tested for Adenovirus using an independent method  (e.g. [employer] Adenovirus Quantitative Real-Time  PCR test.          Enterovirus Not Detected     Comment: Cross-reactivity has been observed between certain Rhinovirus  strains and the Enterovirus assay.          Human Bocavirus Not Detected     Human Coronavirus Not Detected     Comment: The Human Coronavirus assay detects Human coronavirus types  229E, OC43,NL63 and HKU1.          RVP - Human Metapneumovirus (hMPV) Not Detected     RVP - Influenza A Not Detected     Influenza A - X8Z8-15 Not Detected     RVP - Influenza B Not Detected     Parainfluenza Not Detected     Respiratory Syncytial VirusVirus (RSV) A  Not Detected     Comment: The Respiratory Syncytial Viral assay detects types A and B,  however it does not distinguish between the two.          RVP - Rhinovirus Not Detected     Comment: Cross-Reactivity has been observed between certain   Rhinovirus strains and the Enterovirus assay.  Target Enriched Mulitplex Polymerase Chain Reaction (TEM-PCR)  allows for the detection of multiple pathogens out of a single  reaction.  This test was developed and its performance   characteristics determined by [employer].  It has not   been cleared or approved by the U.S.Food and Drug Administration.  Results should be used in conjunction with clinical findings,   and should not form the sole basis for a diagnosis or treatment  decision.  TEM-PCR is a licensed technology of [employer].         Narrative:       Receiving Lab:->[facility]          I have reviewed all pertinent labs within the past 24 hours.    Estimated Creatinine Clearance: 65.1 mL/min (based on SCr of 0.7 mg/dL).    Diagnostic Results:  I have reviewed all pertinent imaging results/findings within the past 24 hours.    Assessment and Plan:     73 y.o. 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 Patient presented to [facility] for hip pain and inability to bear weight on the left leg after a trip and fall yesterday.  Patient tripped on a cord for her pulm HTN pump falling directly onto the left hip. Patient denies head trauma or upper body injury. She denies numbness or tingling. Currently lying on stretcher with  at bedside in nad.        * Closed fracture of neck of  left femur s/p hemiarthroplasty 3/25  - S/P s/p left patrick 3/25, received post-op Ancef  - Weight bearing status: WBAT LLE  - Pain control:Ropivacaine to stopped.  Pain better controlled on Tramadol with the addition of tylenol.  She does not want Oxy.   - Orthopedics following appreciate assistance   - PT/OT: must be seen by PT 7x/week - please notify orthopedics if patient is not seen      PAH (pulmonary artery hypertension)  - Continue PO Lasix. At home dose of 40mg Qam in am and 20mg mid day  - Continue riociguat, bosentan, remodulin([place] site CDI).  -  to manage remodulin infusion.  - Warfarin with Lovenox bridge (decreased Lovenox to 30mg q12)- as per hip guideline recommendations.  Will give higher dose of Coumadin today and INR has leveled off with 7.5 mg QDaily.  Previous home dose as 10mg Sun, Tues, Sat and 5mg every other day  -Will transfer patient to [facility] today    RVF (right ventricular failure)  - Continue PH mgt with remoduline/ tracleer / riociguat  - Wean off oxygen as tolerated, PTA 3 - 6 L  - I NO weaned off 3/31  - Hold aldactone.      Transient alteration of awareness  - possible TIA with right sided aphasia after RIJ removed on 3/31.  aphasia/facial droop/right sided weakness has resolved  - CT head negative; Repeat CT also negative  - symptoms resolved  - Appreciate Vascular neurology consultation: Suspect either transient vagal event or transient scattered air emboli given context immediately after IJ removal - however both VS record and CT immediately afterward would contradict this ddx; Continue current antithrombotic regimen    Chronic respiratory failure with hypoxia  - on 5L O2 at home.   - Goal sat >/=85%    SVT (supraventricular tachycardia)  - Appreciate EP consultation.  Will continue Amiodarone for now.  -Could add Verapamil 40mg TID; but holding on this due to RV failure  - Adenosine as needed  -Patient not optimal status for EP ablation procedure      Drug-induced  constipation  -Resolved.   -Continue senna/colace/miralax.        REILLY Matta  Heart Transplant  [facility]

## 2019-04-03 NOTE — PLAN OF CARE
Problem: Adult Inpatient Plan of Care  Goal: Plan of Care Review  Outcome: Ongoing (interventions implemented as appropriate)    - AAOx4. VS stable, afebrile.  - 10 L High flow NC. Sats >90%.  - Tele monitor ongoing--NSR-ST.  - Received scheduled tylenol/prn tramadol for pain.  - R perm cath/ R AC 20g, CDI.  - Remodulin infusing via home pump--45ml/24hrs.  - Aquacel dressing to L hip. Foam dressing to sacrum.  - Hip precautions maintained. Up with PT today.   - Instructed to call for assistance.  - Bed low and locked, call bell within reach.  - Side rails x2. In NAD. Will continue to monitor.

## 2019-04-03 NOTE — ASSESSMENT & PLAN NOTE
- S/P s/p left patrick 3/25, received post-op Ancef  - Weight bearing status: WBAT LLE  - Pain control:Ropivacaine to stopped.  Pain better controlled on Tramadol with the addition of tylenol.  She does not want Oxy.   - Orthopedics following appreciate assistance   - PT/OT: must be seen by PT 7x/week - please notify orthopedics if patient is not seen

## 2019-04-03 NOTE — PT/OT/SLP PROGRESS
Physical Therapy Treatment    Patient Name:  Molly Smith   MRN:  [id number]    Recommendations:     Discharge Recommendations:  home with home health   Discharge Equipment Recommendations: walker, rolling, commode, shower chair   Barriers to discharge: Decreased caregiver support    Assessment:     Molly Smith is a 73 y.o. female admitted with a medical diagnosis of Closed fracture of neck of left femur.  She presents with the following impairments/functional limitations:  weakness, impaired endurance, impaired self care skills, gait instability, impaired functional mobilty, impaired balance, orthopedic precautions, impaired cardiopulmonary response to activity, decreased lower extremity function, decreased upper extremity function, pain .   Pt Progressing with PT Intervention. Pt Progressing with improving gait distance. Pt would continue to benefit from skilled PT to address overall functional mobility and goals. Goals remain appropriate.    Rehab Prognosis: Good; patient would benefit from acute skilled PT services to address these deficits and reach maximum level of function.    Recent Surgery: Procedure(s) (LRB):  HEMIARTHROPLASTY, HIP - left (Left) 9 Days Post-Op    Plan:     During this hospitalization, patient to be seen 5 x/week to address the identified rehab impairments via gait training, therapeutic activities, therapeutic exercises, neuromuscular re-education and progress toward the following goals:    · Plan of Care Expires:  04/26/19    Subjective     Chief Complaint: fatigue    Pain/Comfort:  · Pain Rating 1: (not rated)  · Location - Side 1: Left  · Location - Orientation 1: generalized  · Location 1: leg  · Pain Addressed 1: Pre-medicate for activity, Reposition  · Pain Rating Post-Intervention 1: (not rated)      Objective:     Communicated with RN prior to session.  Patient found supine with pulse ox (continuous), telemetry, Remodulin pump, oxygen at 9L upon PT entry to room.     General  Precautions: Standard, fall   Orthopedic Precautions:LLE weight bearing as tolerated, LLE posterior precautions   Braces: N/A     Functional Mobility:  · Bed Mobility:     · Supine to Sit: minimum assistance  · Transfers:     · Sit to Stand: From EOB:  minimum assistance with rolling walker; From chair: moderate assistance with RW.   · Gait: Pt ambulated 55  ft and 65  feet with RW and CGA/SBA. Pt demo'd decreased horacio, decreased step size.  Pt with seated rest break between bouts. Pt with no LOB,  SpO2 87% and greater throughout.        AM-PAC 6 CLICK MOBILITY  Turning over in bed (including adjusting bedclothes, sheets and blankets)?: 3  Sitting down on and standing up from a chair with arms (e.g., wheelchair, bedside commode, etc.): 3  Moving from lying on back to sitting on the side of the bed?: 3  Moving to and from a bed to a chair (including a wheelchair)?: 3  Need to walk in hospital room?: 3  Climbing 3-5 steps with a railing?: 2  Basic Mobility Total Score: 17       Therapeutic Activities and Exercises:   Discussed/educated patient on progress, safety, importance of OOB mobility for improving outcomes, d/c, PT POC   Patient performed therex X 15 reps B LE AROM AP,QS,GS, LAQ  Updated white board with appropriate PT mobility information for medical team notification  Donned an extra gown   post hip prec with pt being able to recall 2/3 prec total      Pt safe to ambulate in hallway with RN or PCT assistance   Bedside table in front of patient and area set up for function, convenience, and safety. RN aware of patient's mobility needs and status. Questions/concerns addressed within PTA scope of practice; patient with no further questions. Time was provided for active listening, discussion of health disposition, and discussion of safe discharge. Pt and family verbalized?agreement .  Patient left?up in chair, with head in midline, neutral pelvis &?heels floated for skin protection with?all lines intact and  call button in reach      GOALS:   Multidisciplinary Problems     Physical Therapy Goals        Problem: Physical Therapy Goal    Goal Priority Disciplines Outcome Goal Variances Interventions   Physical Therapy Goal     PT, PT/OT Ongoing (interventions implemented as appropriate)     Description:  Goals to be met by: 2019     Patient will increase functional independence with mobility by performin. Supine to sit with Moderate Assistance  2. Rolling to Left and Right with Minimal Assistance.  3. Sit to stand transfer with Moderate Assistance- met 3/27/2019  Sit to stand transfer with contact guard assistance   4. Sitting at edge of bed x10 minutes with Stand-by Assistance: met 3/27/2019   Sitting at edge of bed x15 mins with supervision   5. Gait x 20 feet with RW with contact guard assistance- met 2019  Gait x 50 feet with RW with stand by assistance.                          Time Tracking:     PT Received On: 19  PT Start Time: 1109     PT Stop Time: 1202  PT Total Time (min): 53 min     Billable Minutes: Gait Training 27, Therapeutic Activity 13 and Therapeutic Exercise 13    Treatment Type: Treatment  PT/PTA: PTA     PTA Visit Number: 2     Damian Wong, PTA  2019

## 2019-04-03 NOTE — PLAN OF CARE
Problem: Physical Therapy Goal  Goal: Physical Therapy Goal  Goals to be met by: 2019     Patient will increase functional independence with mobility by performin. Supine to sit with Moderate Assistance  2. Rolling to Left and Right with Minimal Assistance.  3. Sit to stand transfer with Moderate Assistance- met 3/27/2019  Sit to stand transfer with contact guard assistance   4. Sitting at edge of bed x10 minutes with Stand-by Assistance: met 3/27/2019   Sitting at edge of bed x15 mins with supervision   5. Gait x 20 feet with RW with contact guard assistance- met 2019  Gait x 50 feet with RW with stand by assistance.         Pt progressing towards goals. continue with PT POC.Goals remain appropriate.  Damian Wong PTA  4/3/2019

## 2019-04-03 NOTE — ASSESSMENT & PLAN NOTE
- Continue PO Lasix. At home dose of 40mg Qam in am and 20mg mid day  - Continue riociguat, bosentan, remodulin([facility] CDI).  -  to manage remodulin infusion.  - Warfarin with Lovenox bridge (decreased Lovenox to 30mg q12)- as per hip guideline recommendations.  Will give higher dose of Coumadin today and INR has leveled off with 7.5 mg QDaily.  Previous home dose as 10mg Sun, Tues, Sat and 5mg every other day  -Will transfer patient to [facility]SU today

## 2019-04-03 NOTE — PROGRESS NOTES
Follow up note:   spoke to the pt and her daughter for follow up.  The pt and her daughter are alert and oriented.  The pt reports need for in pt  physical therapy, however has concerns about hospital  length of stay and cost to pt. Pt would like to speak to the  Mychal about Medicare approval. Pt reports she is not able to go to in pt rehab due to IV medication needs.    Pt would like home health with physical therapy when discharged.  Pt's daughter reports extended family will be available to assist pt when she does go home.   Worker provided emotional support. Pt and daughter report no additional needs at this time.   Transplant  will follow.

## 2019-04-03 NOTE — SUBJECTIVE & OBJECTIVE
Interval History: Patient reports pain is better controlled.  She has no complaints this morning.  Daughter is at bedside.  She is eager to work with therapy and get out of bed    Continuous Infusions:   ropivacaine (PF) 2 mg/ml (0.2%) Stopped (04/01/19 0649)    treprostinil (REMODULIN) infusion      veletri/remodulin cassette      veletri/remodulin tubing       Scheduled Meds:   acetaminophen  1,000 mg Oral Q8H    amiodarone  200 mg Oral Daily    bosentan  125 mg Oral Q12H    calcium carbonate  1,000 mg Oral Daily    cetirizine  5 mg Oral Daily    enoxaparin  30 mg Subcutaneous Q12H    ferrous gluconate  324 mg Oral Daily with breakfast    fluticasone  2 spray Each Nare QHS    folic acid  1 mg Oral Daily    furosemide  20 mg Oral After lunch    furosemide  40 mg Oral Daily    lidocaine  1 patch Transdermal Q24H    pantoprazole  40 mg Oral BID AC    polyethylene glycol  17 g Oral Daily    pregabalin  75 mg Oral QHS    riociguat  0.5 mg Oral TID    senna-docusate 8.6-50 mg  2 tablet Oral BID    simethicone  1 tablet Oral QID (PC + HS)    sodium chloride 0.9%  3 mL Intravenous Q8H    warfarin  10 mg Oral Once     PRN Meds:promethazine (PHENERGAN) IVPB, sodium chloride 0.9%, traMADol, treprostinil (REMODULIN) infusion    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    Multaq [dronedarone]      Objective:     Vital Signs (Most Recent):  Temp: 97.8 °F (36.6 °C) (04/03/19 1118)  Pulse: 94 (04/03/19 1514)  Resp: 19 (04/03/19 1407)  BP: (!) 100/55 (04/03/19 1407)  SpO2: (!) 90 % (04/03/19 1407) Vital Signs (24h Range):  Temp:  [97.8 °F (36.6 °C)-98.3 °F (36.8 °C)] 97.8 °F (36.6 °C)  Pulse:  [] 94  Resp:  [17-35] 19  SpO2:  [87 %-93 %] 90 %  BP: ()/(50-74) 100/55     Patient Vitals for the past 72 hrs (Last 3 readings):   Weight   04/03/19 1139 63.3 kg (139 lb 8.8 oz)   04/03/19 0600 65.3 kg (143 lb 15.4 oz)   04/02/19 1300 57.2 kg  (126 lb)     Body mass index is 24.72 kg/m².      Intake/Output Summary (Last 24 hours) at 4/3/2019 1519  Last data filed at 4/3/2019 1139  Gross per 24 hour   Intake 620 ml   Output 1000 ml   Net -380 ml       Hemodynamic Parameters:       Telemetry: reviewed and stable    Physical Exam    Constitutional: She is oriented to person, place, and time. Laying in bed NAD with family at bedside  Neck: Neck supple. No JVD present.   Cardiovascular: Normal rate and regular rhythm. III/VI holosystolic murmer appreciated best at the apex  Pulmonary/Chest: Effort normal and breath sounds normal. No respiratory distress. Faint crackles at the bases bilaterally  Abdominal: Soft. Bowel sounds are normal. She exhibits no distension.   Musculoskeletal: She exhibits no edema.   Neurological: She is alert and oriented to person, place, and time. She has normal strength. CN 1-12 grossly intact.  Skin: Skin is warm and dry. Capillary refill takes less than 2 seconds. No erythema.   Psychiatric: She has a normal mood and affect. Her behavior is normal. Thought content normal    Significant Labs:  CBC:  Recent Labs   Lab 04/01/19  0303 04/02/19  0339 04/03/19  0315   WBC 6.05 5.52 6.05   RBC 3.56* 4.05 3.95*   HGB 10.8* 12.3 11.8*   HCT 33.3* 38.5 36.1*    273 237   MCV 94 95 91   MCH 30.3 30.4 29.9   MCHC 32.4 31.9* 32.7     BNP:  No results for input(s): BNP in the last 168 hours.    Invalid input(s): BNPTRIAGELBLO  CMP:  Recent Labs   Lab 04/01/19  0303 04/02/19  0632 04/03/19  0315   GLU 87 100 93   CALCIUM 7.8* 8.6* 8.7   ALBUMIN 1.9* 2.4* 2.2*   PROT 5.0* 5.9* 5.3*    138 139   K 4.3 3.7 3.5   CO2 21* 25 25    107 106   BUN 15 12 14   CREATININE 0.7 0.8 0.7   ALKPHOS 47* 57 50*   ALT 11 19 19   AST 21 25 23   BILITOT 0.4 0.4 0.3      Coagulation:   Recent Labs   Lab 04/01/19  0303 04/02/19  0632 04/03/19  0315   INR 1.8* 1.6* 1.6*     LDH:  No results for input(s): LDH in the last 72  hours.  Microbiology:  Microbiology Results (last 7 days)     Procedure Component Value Units Date/Time    Blood culture [[id number]] Collected:  03/26/19 1420    Order Status:  Completed Specimen:  Blood from Peripheral, Antecubital, Right Updated:  03/31/19 1612     Blood Culture, Routine No growth after 5 days.    Blood culture [[id number]] Collected:  03/26/19 1440    Order Status:  Completed Specimen:  Blood from Peripheral, Antecubital, Left Updated:  03/31/19 1612     Blood Culture, Routine No growth after 5 days.    Respiratory Viral Panel by PCR [facility]; Nasal Swab [[id number]] Collected:  03/26/19 1846    Order Status:  Completed Specimen:  Respiratory Updated:  03/28/19 1302     Respiratory Virus Panel, source Nasal Swab     RVP - Adenovirus Not Detected     Comment: Detects Serotypes B and E. Detection of Serotype C may   be limited. If Adenovirus infection is suspected and a   Not Detected result is returned the sample should be   re-tested for Adenovirus using an independent method  (e.g. [employer] Adenovirus Quantitative Real-Time  PCR test.          Enterovirus Not Detected     Comment: Cross-reactivity has been observed between certain Rhinovirus  strains and the Enterovirus assay.          Human Bocavirus Not Detected     Human Coronavirus Not Detected     Comment: The Human Coronavirus assay detects Human coronavirus types  229E, OC43,NL63 and HKU1.          RVP - Human Metapneumovirus (hMPV) Not Detected     RVP - Influenza A Not Detected     Influenza A - U4Z3-44 Not Detected     RVP - Influenza B Not Detected     Parainfluenza Not Detected     Respiratory Syncytial VirusVirus (RSV) A Not Detected     Comment: The Respiratory Syncytial Viral assay detects types A and B,  however it does not distinguish between the two.          RVP - Rhinovirus Not Detected     Comment: Cross-Reactivity has been observed between certain   Rhinovirus strains and the Enterovirus assay.  Target Enriched  Mulitplex Polymerase Chain Reaction (TEM-PCR)  allows for the detection of multiple pathogens out of a single  reaction.  This test was developed and its performance   characteristics determined by [employer].  It has not   been cleared or approved by the U.S.Food and Drug Administration.  Results should be used in conjunction with clinical findings,   and should not form the sole basis for a diagnosis or treatment  decision.  TEM-PCR is a licensed technology of [employer].         Narrative:       Receiving Lab:->[facility]          I have reviewed all pertinent labs within the past 24 hours.    Estimated Creatinine Clearance: 65.1 mL/min (based on SCr of 0.7 mg/dL).    Diagnostic Results:  I have reviewed all pertinent imaging results/findings within the past 24 hours.

## 2019-04-04 LAB
ALBUMIN SERPL BCP-MCNC: 2.5 G/DL (ref 3.5–5.2)
ALP SERPL-CCNC: 61 U/L (ref 55–135)
ALT SERPL W/O P-5'-P-CCNC: 16 U/L (ref 10–44)
ANION GAP SERPL CALC-SCNC: 6 MMOL/L (ref 8–16)
AST SERPL-CCNC: 18 U/L (ref 10–40)
BASOPHILS # BLD AUTO: 0.06 K/UL (ref 0–0.2)
BASOPHILS NFR BLD: 1 % (ref 0–1.9)
BILIRUB SERPL-MCNC: 0.3 MG/DL (ref 0.1–1)
BUN SERPL-MCNC: 15 MG/DL (ref 8–23)
CALCIUM SERPL-MCNC: 8.9 MG/DL (ref 8.7–10.5)
CHLORIDE SERPL-SCNC: 104 MMOL/L (ref 95–110)
CO2 SERPL-SCNC: 27 MMOL/L (ref 23–29)
CREAT SERPL-MCNC: 0.8 MG/DL (ref 0.5–1.4)
DIFFERENTIAL METHOD: ABNORMAL
EOSINOPHIL # BLD AUTO: 0.2 K/UL (ref 0–0.5)
EOSINOPHIL NFR BLD: 2.4 % (ref 0–8)
ERYTHROCYTE [DISTWIDTH] IN BLOOD BY AUTOMATED COUNT: 14.3 % (ref 11.5–14.5)
EST. GFR  (AFRICAN AMERICAN): >60 ML/MIN/1.73 M^2
EST. GFR  (NON AFRICAN AMERICAN): >60 ML/MIN/1.73 M^2
GLUCOSE SERPL-MCNC: 114 MG/DL (ref 70–110)
HCT VFR BLD AUTO: 35.9 % (ref 37–48.5)
HGB BLD-MCNC: 11.9 G/DL (ref 12–16)
IMM GRANULOCYTES # BLD AUTO: 0.06 K/UL (ref 0–0.04)
IMM GRANULOCYTES NFR BLD AUTO: 1 % (ref 0–0.5)
INR PPP: 1.7 (ref 0.8–1.2)
LYMPHOCYTES # BLD AUTO: 0.9 K/UL (ref 1–4.8)
LYMPHOCYTES NFR BLD: 15.2 % (ref 18–48)
MAGNESIUM SERPL-MCNC: 1.5 MG/DL (ref 1.6–2.6)
MCH RBC QN AUTO: 30 PG (ref 27–31)
MCHC RBC AUTO-ENTMCNC: 33.1 G/DL (ref 32–36)
MCV RBC AUTO: 90 FL (ref 82–98)
MONOCYTES # BLD AUTO: 0.6 K/UL (ref 0.3–1)
MONOCYTES NFR BLD: 9.1 % (ref 4–15)
NEUTROPHILS # BLD AUTO: 4.4 K/UL (ref 1.8–7.7)
NEUTROPHILS NFR BLD: 71.3 % (ref 38–73)
NRBC BLD-RTO: 0 /100 WBC
PHOSPHATE SERPL-MCNC: 4.5 MG/DL (ref 2.7–4.5)
PLATELET # BLD AUTO: 244 K/UL (ref 150–350)
PMV BLD AUTO: 9.3 FL (ref 9.2–12.9)
POTASSIUM SERPL-SCNC: 3 MMOL/L (ref 3.5–5.1)
PROT SERPL-MCNC: 5.6 G/DL (ref 6–8.4)
PROTHROMBIN TIME: 16.8 SEC (ref 9–12.5)
RBC # BLD AUTO: 3.97 M/UL (ref 4–5.4)
SODIUM SERPL-SCNC: 137 MMOL/L (ref 136–145)
WBC # BLD AUTO: 6.18 K/UL (ref 3.9–12.7)

## 2019-04-04 PROCEDURE — 20600001 HC STEP DOWN PRIVATE ROOM

## 2019-04-04 PROCEDURE — 80053 COMPREHEN METABOLIC PANEL: CPT

## 2019-04-04 PROCEDURE — 25000003 PHARM REV CODE 250: Performed by: STUDENT IN AN ORGANIZED HEALTH CARE EDUCATION/TRAINING PROGRAM

## 2019-04-04 PROCEDURE — A4216 STERILE WATER/SALINE, 10 ML: HCPCS | Performed by: PHYSICIAN ASSISTANT

## 2019-04-04 PROCEDURE — 25000003 PHARM REV CODE 250: Performed by: PHYSICIAN ASSISTANT

## 2019-04-04 PROCEDURE — 63600175 PHARM REV CODE 636 W HCPCS: Performed by: PHYSICIAN ASSISTANT

## 2019-04-04 PROCEDURE — 83735 ASSAY OF MAGNESIUM: CPT

## 2019-04-04 PROCEDURE — 84100 ASSAY OF PHOSPHORUS: CPT

## 2019-04-04 PROCEDURE — 97530 THERAPEUTIC ACTIVITIES: CPT

## 2019-04-04 PROCEDURE — 99233 PR SUBSEQUENT HOSPITAL CARE,LEVL III: ICD-10-PCS | Mod: ,,, | Performed by: INTERNAL MEDICINE

## 2019-04-04 PROCEDURE — 85610 PROTHROMBIN TIME: CPT

## 2019-04-04 PROCEDURE — 85025 COMPLETE CBC W/AUTO DIFF WBC: CPT

## 2019-04-04 PROCEDURE — 25000003 PHARM REV CODE 250: Performed by: INTERNAL MEDICINE

## 2019-04-04 PROCEDURE — 63600175 PHARM REV CODE 636 W HCPCS: Performed by: STUDENT IN AN ORGANIZED HEALTH CARE EDUCATION/TRAINING PROGRAM

## 2019-04-04 PROCEDURE — 36415 COLL VENOUS BLD VENIPUNCTURE: CPT

## 2019-04-04 PROCEDURE — 99233 SBSQ HOSP IP/OBS HIGH 50: CPT | Mod: ,,, | Performed by: INTERNAL MEDICINE

## 2019-04-04 RX ORDER — POTASSIUM CHLORIDE 20 MEQ/1
40 TABLET, EXTENDED RELEASE ORAL
Status: COMPLETED | OUTPATIENT
Start: 2019-04-04 | End: 2019-04-04

## 2019-04-04 RX ORDER — WARFARIN SODIUM 5 MG/1
10 TABLET ORAL ONCE
Status: COMPLETED | OUTPATIENT
Start: 2019-04-04 | End: 2019-04-04

## 2019-04-04 RX ADMIN — ENOXAPARIN SODIUM 30 MG: 100 INJECTION SUBCUTANEOUS at 09:04

## 2019-04-04 RX ADMIN — TRAMADOL HYDROCHLORIDE 50 MG: 50 TABLET, COATED ORAL at 09:04

## 2019-04-04 RX ADMIN — FERROUS GLUCONATE TAB 324 MG (37.5 MG ELEMENTAL IRON) 324 MG: 324 (37.5 FE) TAB at 09:04

## 2019-04-04 RX ADMIN — TRAMADOL HYDROCHLORIDE 50 MG: 50 TABLET, COATED ORAL at 05:04

## 2019-04-04 RX ADMIN — TRAMADOL HYDROCHLORIDE 50 MG: 50 TABLET, COATED ORAL at 01:04

## 2019-04-04 RX ADMIN — BOSENTAN 125 MG: 125 TABLET, FILM COATED ORAL at 09:04

## 2019-04-04 RX ADMIN — SIMETHICONE CHEW TAB 80 MG 80 MG: 80 TABLET ORAL at 09:04

## 2019-04-04 RX ADMIN — FOLIC ACID 1 MG: 1 TABLET ORAL at 09:04

## 2019-04-04 RX ADMIN — ACETAMINOPHEN 1000 MG: 500 TABLET ORAL at 01:04

## 2019-04-04 RX ADMIN — CALCIUM 1000 MG: 500 TABLET ORAL at 09:04

## 2019-04-04 RX ADMIN — RIOCIGUAT 0.5 MG: 0.5 TABLET, FILM COATED ORAL at 09:04

## 2019-04-04 RX ADMIN — Medication 3 ML: at 01:04

## 2019-04-04 RX ADMIN — FUROSEMIDE 20 MG: 20 TABLET ORAL at 01:04

## 2019-04-04 RX ADMIN — ACETAMINOPHEN 1000 MG: 500 TABLET ORAL at 11:04

## 2019-04-04 RX ADMIN — POTASSIUM CHLORIDE 40 MEQ: 1500 TABLET, EXTENDED RELEASE ORAL at 02:04

## 2019-04-04 RX ADMIN — AMIODARONE HYDROCHLORIDE 200 MG: 200 TABLET ORAL at 09:04

## 2019-04-04 RX ADMIN — FUROSEMIDE 40 MG: 40 TABLET ORAL at 09:04

## 2019-04-04 RX ADMIN — PANTOPRAZOLE SODIUM 40 MG: 40 TABLET, DELAYED RELEASE ORAL at 06:04

## 2019-04-04 RX ADMIN — WARFARIN SODIUM 10 MG: 5 TABLET ORAL at 05:04

## 2019-04-04 RX ADMIN — FLUTICASONE PROPIONATE 100 MCG: 50 SPRAY, METERED NASAL at 11:04

## 2019-04-04 RX ADMIN — PANTOPRAZOLE SODIUM 40 MG: 40 TABLET, DELAYED RELEASE ORAL at 03:04

## 2019-04-04 RX ADMIN — CETIRIZINE HYDROCHLORIDE 5 MG: 5 TABLET ORAL at 09:04

## 2019-04-04 RX ADMIN — POTASSIUM CHLORIDE 40 MEQ: 1500 TABLET, EXTENDED RELEASE ORAL at 06:04

## 2019-04-04 RX ADMIN — SIMETHICONE CHEW TAB 80 MG 80 MG: 80 TABLET ORAL at 01:04

## 2019-04-04 RX ADMIN — MAGNESIUM SULFATE HEPTAHYDRATE 3 G: 500 INJECTION, SOLUTION INTRAMUSCULAR; INTRAVENOUS at 02:04

## 2019-04-04 RX ADMIN — ACETAMINOPHEN 1000 MG: 500 TABLET ORAL at 06:04

## 2019-04-04 RX ADMIN — Medication 3 ML: at 09:04

## 2019-04-04 RX ADMIN — RIOCIGUAT 0.5 MG: 0.5 TABLET, FILM COATED ORAL at 03:04

## 2019-04-04 RX ADMIN — ENOXAPARIN SODIUM 30 MG: 100 INJECTION SUBCUTANEOUS at 10:04

## 2019-04-04 NOTE — PLAN OF CARE
[facility]    HOME HEALTH ORDERS  FACE TO FACE ENCOUNTER    Patient Name: Molly Smith  YOB: 1945    PCP: Roberto Braun MD   PCP Address: [facility] [address]  / [place] [address]  PCP Phone Number: [phone]  PCP Fax: [phone]    Encounter Date: 04/04/2019    Admit to Home Health    Diagnoses:  Active Hospital Problems    Diagnosis  POA    *Closed fracture of neck of left femur s/p hemiarthroplasty 3/25 [S72.002A]  Yes    Transient alteration of awareness [R40.4]  No    Drug-induced constipation [K59.03]  No    Encounter for assessment for deep vein thrombosis (DVT) [Z76.89]  Not Applicable    RVF (right ventricular failure) [I50.9]  Yes    Hypotension [I95.9]  Yes    Closed left hip fracture [S72.002A]  Yes    SVT (supraventricular tachycardia) [I47.1]  Yes    PAH (pulmonary artery hypertension) [I27.21]  Yes    Chronic respiratory failure with hypoxia [J96.11]  Yes      Resolved Hospital Problems    Diagnosis Date Resolved POA    Fever [R50.9] 03/30/2019 No       Future Appointments   Date Time Provider Department Center   4/8/2019 10:45 AM She[facility]varado [facility]           I have seen and examined this patient face to face today. My clinical findings that support the need for the home health skilled services and home bound status are the following:  Weakness/numbness causing balance and gait disturbance due to Fracture making it taxing to leave home.    Allergies:  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    Multaq [dronedarone]        Diet: regular diet    Activities: activity as tolerated    Home Medical Equipment:  Walker, 3-1 bedside commode, transfer tub bench    CONSULTS:    Physical Therapy to evaluate and treat. Evaluate for home safety and equipment needs; Establish/upgrade home exercise program. Perform / instruct on therapeutic exercises, gait training, transfer training, and  Range of Motion.    WOUND CARE ORDERS  Follow Home Health specific protocol.      Medications: Review discharge medications with patient and family and provide education.      Current Discharge Medication List      CONTINUE these medications which have NOT CHANGED    Details   amiodarone (PACERONE) 200 MG Tab Take 1 tablet (200 mg total) by mouth once daily.  Qty: 30 tablet, Refills: 11      bosentan (TRACLEER) 125 MG Tab Take by mouth 2 (two) times daily.      CALCIUM CARBONATE (CALCIUM 600 ORAL) Take 2 tablets by mouth once daily.        ferrous gluconate (FERGON) 324 MG tablet Take 1 tablet (324 mg total) by mouth daily with breakfast.      folic acid (FOLVITE) 1 MG tablet Take 1 mg by mouth once daily.      furosemide (LASIX) 20 MG tablet Take 1 tablet (20 mg total) by mouth once daily.  Qty: 30 tablet, Refills: 11    Associated Diagnoses: Pulmonary hypertension      loratadine (CLARITIN) 10 mg tablet Take 10 mg by mouth once daily.      ondansetron (ZOFRAN-ODT) 4 MG TbDL Refills: 6      riociguat (ADEMPAS) 0.5 mg Tab tablet Take 1 tablet (0.5 mg total) by mouth 3 (three) times daily.      spironolactone (ALDACTONE) 25 MG tablet Take 25 mg by mouth once daily.        traMADol (ULTRAM-ER) 200 MG Tb24 TAKE ONE TABLET BY MOUTH ONCE A DAY AS NEEDED FOR PAIN THANK YOU!  Qty: 30 tablet, Refills: 5    Comments: REFILL REQUEST @ 10:46 AM THURSDAY      TREPROSTINIL SODIUM (TREPROSTINIL, REMODULIN, PUMP FOR HOME USE) Pt currently on 89.2 ng/kg/min=45 mL/day dosing weight 70.05 kg  Qty: 60 mL, Refills: 11    Comments: Filled by [facility] pharmacy, disregard if e-scripted to retail.      warfarin (COUMADIN) 5 MG tablet Take 5 mg by mouth every Mon, Wed, Fri.      chlordiazepoxide-clidinium 5-2.5 mg (LIBRAX) 5-2.5 mg Cap Take 1 capsule by mouth 3 (three) times daily with meals. TAKE TABLET AS PRN.      promethazine (PHENERGAN) 25 MG tablet promethazine 25 mg tablet      sodium chloride 0.9% 0.9 % SolP 100 mL with treprostinil  1 mg/mL Soln 9,000,000 ng Inject 5,005.5 ng/min into the vein continuous.  Qty: 1 ampule, Refills: 11             I certify that this patient is confined to her home and needs physical therapy.

## 2019-04-04 NOTE — SUBJECTIVE & OBJECTIVE
Interval History: Patient reports increase pain over the last 24 hours.  However, she did not realize her Tramadol was scheduled prn and she had to ask for it.  We are trying to wean her oxygen.  She reports her SOB is at baseline.       Continuous Infusions:   treprostinil (REMODULIN) infusion      veletri/remodulin cassette      veletri/remodulin tubing       Scheduled Meds:   acetaminophen  1,000 mg Oral Q8H    amiodarone  200 mg Oral Daily    bosentan  125 mg Oral Q12H    calcium carbonate  1,000 mg Oral Daily    cetirizine  5 mg Oral Daily    enoxaparin  30 mg Subcutaneous Q12H    ferrous gluconate  324 mg Oral Daily with breakfast    fluticasone  2 spray Each Nare QHS    folic acid  1 mg Oral Daily    furosemide  20 mg Oral After lunch    furosemide  40 mg Oral Daily    lidocaine  1 patch Transdermal Q24H    pantoprazole  40 mg Oral BID AC    polyethylene glycol  17 g Oral Daily    riociguat  0.5 mg Oral TID    senna-docusate 8.6-50 mg  2 tablet Oral BID    simethicone  1 tablet Oral QID (PC + HS)    sodium chloride 0.9%  3 mL Intravenous Q8H    warfarin  10 mg Oral Once     PRN Meds:sodium chloride 0.9%, traMADol, treprostinil (REMODULIN) infusion    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    Multaq [dronedarone]      Objective:     Vital Signs (Most Recent):  Temp: 97.8 °F (36.6 °C) (04/04/19 1146)  Pulse: 95 (04/04/19 1146)  Resp: 20 (04/04/19 1146)  BP: 100/63 (04/04/19 1146)  SpO2: (!) 90 % (04/04/19 1146) Vital Signs (24h Range):  Temp:  [97.7 °F (36.5 °C)-98.2 °F (36.8 °C)] 97.8 °F (36.6 °C)  Pulse:  [] 95  Resp:  [17-35] 20  SpO2:  [88 %-95 %] 90 %  BP: ()/(55-63) 100/63     Patient Vitals for the past 72 hrs (Last 3 readings):   Weight   04/04/19 0612 69.3 kg (152 lb 12.5 oz)   04/03/19 1139 63.3 kg (139 lb 8.8 oz)   04/03/19 0600 65.3 kg (143 lb 15.4 oz)     Body mass index is 27.06 kg/m².      Intake/Output  Summary (Last 24 hours) at 4/4/2019 1211  Last data filed at 4/4/2019 0629  Gross per 24 hour   Intake 790 ml   Output 1600 ml   Net -810 ml       Hemodynamic Parameters:       Telemetry: reviewed and stable    Physical Exam    Constitutional: She is oriented to person, place, and time. Laying in bed NAD with family at bedside  Neck: Neck supple. No JVD present.   Cardiovascular: Normal rate and regular rhythm. III/VI holosystolic murmer appreciated best at the apex  Pulmonary/Chest: Effort normal and breath sounds normal. No respiratory distress. Faint crackles at the bases bilaterally  Abdominal: Soft. Bowel sounds are normal. She exhibits no distension.   Musculoskeletal: She exhibits no edema.   Neurological: She is alert and oriented to person, place, and time. She has normal strength. CN 1-12 grossly intact.  Skin: Skin is warm and dry. Capillary refill takes less than 2 seconds. No erythema.   Psychiatric: She has a normal mood and affect. Her behavior is normal. Thought content normal    Significant Labs:  CBC:  Recent Labs   Lab 04/02/19  0339 04/03/19 0315 04/04/19  0120   WBC 5.52 6.05 6.18   RBC 4.05 3.95* 3.97*   HGB 12.3 11.8* 11.9*   HCT 38.5 36.1* 35.9*    237 244   MCV 95 91 90   MCH 30.4 29.9 30.0   MCHC 31.9* 32.7 33.1     BNP:  No results for input(s): BNP in the last 168 hours.    Invalid input(s): BNPTRIAGELBLO  CMP:  Recent Labs   Lab 04/02/19  0632 04/03/19 0315 04/04/19  0120    93 114*   CALCIUM 8.6* 8.7 8.9   ALBUMIN 2.4* 2.2* 2.5*   PROT 5.9* 5.3* 5.6*    139 137   K 3.7 3.5 3.0*   CO2 25 25 27    106 104   BUN 12 14 15   CREATININE 0.8 0.7 0.8   ALKPHOS 57 50* 61   ALT 19 19 16   AST 25 23 18   BILITOT 0.4 0.3 0.3      Coagulation:   Recent Labs   Lab 04/02/19  0632 04/03/19 0315 04/04/19  0120   INR 1.6* 1.6* 1.7*     LDH:  No results for input(s): LDH in the last 72 hours.  Microbiology:  Microbiology Results (last 7 days)     Procedure Component Value  Units Date/Time    Blood culture [[id number]] Collected:  03/26/19 1420    Order Status:  Completed Specimen:  Blood from Peripheral, Antecubital, Right Updated:  03/31/19 1612     Blood Culture, Routine No growth after 5 days.    Blood culture [[id number]] Collected:  03/26/19 1440    Order Status:  Completed Specimen:  Blood from Peripheral, Antecubital, Left Updated:  03/31/19 1612     Blood Culture, Routine No growth after 5 days.    Respiratory Viral Panel by PCR [facility]; Nasal Swab [[id number]] Collected:  03/26/19 1846    Order Status:  Completed Specimen:  Respiratory Updated:  03/28/19 1302     Respiratory Virus Panel, source Nasal Swab     RVP - Adenovirus Not Detected     Comment: Detects Serotypes B and E. Detection of Serotype C may   be limited. If Adenovirus infection is suspected and a   Not Detected result is returned the sample should be   re-tested for Adenovirus using an independent method  (e.g. [facility] Adenovirus Quantitative Real-Time  PCR test.          Enterovirus Not Detected     Comment: Cross-reactivity has been observed between certain Rhinovirus  strains and the Enterovirus assay.          Human Bocavirus Not Detected     Human Coronavirus Not Detected     Comment: The Human Coronavirus assay detects Human coronavirus types  229E, OC43,NL63 and HKU1.          RVP - Human Metapneumovirus (hMPV) Not Detected     RVP - Influenza A Not Detected     Influenza A - L7X5-05 Not Detected     RVP - Influenza B Not Detected     Parainfluenza Not Detected     Respiratory Syncytial VirusVirus (RSV) A Not Detected     Comment: The Respiratory Syncytial Viral assay detects types A and B,  however it does not distinguish between the two.          RVP - Rhinovirus Not Detected     Comment: Cross-Reactivity has been observed between certain   Rhinovirus strains and the Enterovirus assay.  Target Enriched Mulitplex Polymerase Chain Reaction (TEM-PCR)  allows for the detection of multiple pathogens  out of a single  reaction.  This test was developed and its performance   characteristics determined by [employer].  It has not   been cleared or approved by the U.S.Food and Drug Administration.  Results should be used in conjunction with clinical findings,   and should not form the sole basis for a diagnosis or treatment  decision.  TEM-PCR is a licensed technology of [employer].         Narrative:       Receiving Lab:->[facility]          I have reviewed all pertinent labs within the past 24 hours.    Estimated Creatinine Clearance: 59.4 mL/min (based on SCr of 0.8 mg/dL).    Diagnostic Results:  I have reviewed all pertinent imaging results/findings within the past 24 hours.

## 2019-04-04 NOTE — PROGRESS NOTES
Update    Pt presents in room with sister Andra and brother in law Shawn. Pt states hopes to discharge home soon with home health. Pt states preference for [facility]. Pt states when she goes home she will not be left unattended: dgtr and massimo live nearby, sister and iglesia are also available, and of course pt's .    Faxed demographic sheet, orders, H&P, progress notes and MAR to [facility] [phone], fax [phone] for possible Friday discharge.    Pt reports coping well and denies further needs, questions, concerns at this time and none indicated. Providing ongoing psychosocial and counseling support, education, resources, assistance and discharge planning as indicated. Following and available.

## 2019-04-04 NOTE — PLAN OF CARE
Discharge Planning:    Met with patient and family to discuss discharge options.     PT/OT are recommending HH  DME--rolling walker, BSC, Shower chair--patient states  has all of the DME    Patient weaned down to baseline Oxygen @ NC 5lpm    Left surgical site dressing in place, has not been removed since Sx--per patient, no date on dressing. Area has redness, swelling, and slight warmth to touch. No drainage noted on dressing.     Requested REILLY Gleason to page ortho to remove surgical dressing, and place home health orders with Wound care/SN/PT/OT, along with mobility restrictions of the Left leg/hip.    If ortho clears patient for discharge, plan is for tomorrow 4/5 (JOHN)

## 2019-04-04 NOTE — PT/OT/SLP PROGRESS
Occupational Therapy   Treatment    Name: Molly Smith  MRN: [id number]  Admitting Diagnosis:  Closed fracture of neck of left femur  10 Days Post-Op    Recommendations:     Discharge Recommendations: home with home health  Discharge Equipment Recommendations:  walker, rolling(hip kit)  Barriers to discharge:  None    Assessment:     Molly Smith is a 73 y.o. female with a medical diagnosis of Closed fracture of neck of left femur. Performance deficits affecting function are impaired self care skills, impaired functional mobilty, decreased lower extremity function, orthopedic precautions. Pt tolerated session well with good insight into condition and good motivation to participate in OT treatment session. Pt has made progress with standing from chair to simulate toilet transfers with CGA. Education provided on AE for LB dressing with discussion of engagement in LB dressing next session. Pt is limited by posterior hip precautions, however reporting no pain. Pt would benefit from continued skilled acute OT services in order to in order to maximize independence and safety with ADLs and functional mobility to ensure safe return to PLOF in the least restrictive environment.     Rehab Prognosis:  Good; patient would benefit from acute skilled OT services to address these deficits and reach maximum level of function.       Plan:     Patient to be seen 4 x/week to address the above listed problems via self-care/home management, therapeutic activities, therapeutic exercises  · Plan of Care Expires:    · Plan of Care Reviewed with: patient(sister)    Subjective     Pain/Comfort:  · Pain Rating 1: 0/10  · Pain Rating Post-Intervention 1: 0/10    Objective:     Communicated with: RN prior to session.  Patient found up in chair with pulse ox (continuous), telemetry(remodulin pump) upon OT entry to room.    General Precautions: Standard, fall   Orthopedic Precautions:LLE weight bearing as tolerated, LLE posterior precautions    Braces: N/A     Occupational Performance:    Bed Mobility:   · N/A as pt found seated in chair at start of session     Transfers:   · Sit<>Stand Transfer: contact guard-standby assistance from/onto chair with RW  · x5 trials progressing from CGA>SBA  · Education and demonstration on safe transfers with posterior hip precautions     · Sustained standing x30 seconds each trial     Therapeutic Intervention & Education:  · Extensive education provided on posterior hip precautions with pt able to verbalize 4/4 without assist or cues at end of session  · Extensive education regarding use of AE for LB dressing with demonstration provided to complete task within precautions   · Cognition: Pt alert and oriented with good insight into condition   · Updated communication board with level of assist required (contact guard assistance x 1 person assistance & RW) & educated RN/patient that pt is appropriate for OOB mobility and standing ADLs3 with RN/PCT   · Educated on importance of EOB/OOB mobility, maintaining routine, sitting up in chair, and maximizing independence with ADLs during admission   · Family present during session    [facility] 6 Click ADL: 19    Patient left up in chair with all lines intact, call button in reach, RN notified and family presentEducation:      GOALS:   Multidisciplinary Problems     Occupational Therapy Goals        Problem: Occupational Therapy Goal    Goal Priority Disciplines Outcome Interventions   Occupational Therapy Goal     OT, PT/OT Ongoing (interventions implemented as appropriate)    Description:  Goals to be met by: 7 days 4/9/19     Patient will increase functional independence with ADLs by performing:    Pt to complete g/h skills seated with set-up--MET  Pt to complete UE dressing with MIN A --MET  Pt to demo Fair sitting balance to allow for progression with ADL training.--MET  Pt to t/f to chair with MAX A --MET  Pt to t/f to BSC with SBA  Pt to complete toileting MIN A   Pt to  complete g/h skills with CGA in standing  Pt and fly to demo/verb understanding of AE for LE dressing given current hip precautions.                         Time Tracking:     OT Date of Treatment: 04/04/19  OT Start Time: 1435  OT Stop Time: 1502  OT Total Time (min): 27 min    Billable Minutes:Therapeutic Activity 27 minutes    Ondina Stewart, OT  4/4/2019

## 2019-04-04 NOTE — ASSESSMENT & PLAN NOTE
- S/P s/p left patrick 3/25, received post-op Ancef  - Weight bearing status: WBAT LLE  - Pain control:Ropivacaine to stopped.  Pain better controlled on Tramadol with the addition of tylenol.  She does not want Oxy.   - Orthopedics following appreciate assistance;  Paged them about dressing and possible need for change  - PT/OT: must be seen by PT 7x/week - please notify orthopedics if patient is not seen

## 2019-04-04 NOTE — PLAN OF CARE
Problem: Adult Inpatient Plan of Care  Goal: Plan of Care Review  Outcome: Ongoing (interventions implemented as appropriate)  AOx4, VSS, denies pain. Tylenol held this shift d/t limit reach. Able to wean from 10L HF to 8L HF - will continue to wean as tolerated. SpO2 goal >80%. Surgical site remains CDI. Remodulin remains infusing to R SC (medication managed by  - d/t be changed 4/4 @2200) Replaced K and Mg this shift. Remains on tele - NS (ST low 100s w/ energy exertion). Remains free from falls. Bed remains locked and lowered. Personal items and call light remains w/in reach. NAD, WCTM.

## 2019-04-04 NOTE — PROGRESS NOTES
[facility]  Heart Transplant  Progress Note    Patient Name: Molly Smith  MRN: [id number]  Admission Date: 3/23/2019  Hospital Length of Stay: 12 days  Attending Physician: Sheila Vuong MD  Primary Care Provider: Roberto Braun MD  Principal Problem:Closed fracture of neck of left femur    Subjective:     Interval History: Patient reports increase pain over the last 24 hours.  However, she did not realize her Tramadol was scheduled prn and she had to ask for it.  We are trying to wean her oxygen.  She reports her SOB is at baseline.       Continuous Infusions:   treprostinil (REMODULIN) infusion      veletri/remodulin cassette      veletri/remodulin tubing       Scheduled Meds:   acetaminophen  1,000 mg Oral Q8H    amiodarone  200 mg Oral Daily    bosentan  125 mg Oral Q12H    calcium carbonate  1,000 mg Oral Daily    cetirizine  5 mg Oral Daily    enoxaparin  30 mg Subcutaneous Q12H    ferrous gluconate  324 mg Oral Daily with breakfast    fluticasone  2 spray Each Nare QHS    folic acid  1 mg Oral Daily    furosemide  20 mg Oral After lunch    furosemide  40 mg Oral Daily    lidocaine  1 patch Transdermal Q24H    pantoprazole  40 mg Oral BID AC    polyethylene glycol  17 g Oral Daily    riociguat  0.5 mg Oral TID    senna-docusate 8.6-50 mg  2 tablet Oral BID    simethicone  1 tablet Oral QID (PC + HS)    sodium chloride 0.9%  3 mL Intravenous Q8H    warfarin  10 mg Oral Once     PRN Meds:sodium chloride 0.9%, traMADol, treprostinil (REMODULIN) infusion    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    Multaq [dronedarone]      Objective:     Vital Signs (Most Recent):  Temp: 97.8 °F (36.6 °C) (04/04/19 1146)  Pulse: 95 (04/04/19 1146)  Resp: 20 (04/04/19 1146)  BP: 100/63 (04/04/19 1146)  SpO2: (!) 90 % (04/04/19 1146) Vital Signs (24h Range):  Temp:  [97.7 °F (36.5 °C)-98.2 °F (36.8 °C)] 97.8 °F (36.6  °C)  Pulse:  [] 95  Resp:  [17-35] 20  SpO2:  [88 %-95 %] 90 %  BP: ()/(55-63) 100/63     Patient Vitals for the past 72 hrs (Last 3 readings):   Weight   04/04/19 0612 69.3 kg (152 lb 12.5 oz)   04/03/19 1139 63.3 kg (139 lb 8.8 oz)   04/03/19 0600 65.3 kg (143 lb 15.4 oz)     Body mass index is 27.06 kg/m².      Intake/Output Summary (Last 24 hours) at 4/4/2019 1211  Last data filed at 4/4/2019 0629  Gross per 24 hour   Intake 790 ml   Output 1600 ml   Net -810 ml       Hemodynamic Parameters:       Telemetry: reviewed and stable    Physical Exam    Constitutional: She is oriented to person, place, and time. Laying in bed NAD with family at bedside  Neck: Neck supple. No JVD present.   Cardiovascular: Normal rate and regular rhythm. III/VI holosystolic murmer appreciated best at the apex  Pulmonary/Chest: Effort normal and breath sounds normal. No respiratory distress. Faint crackles at the bases bilaterally  Abdominal: Soft. Bowel sounds are normal. She exhibits no distension.   Musculoskeletal: She exhibits no edema.   Neurological: She is alert and oriented to person, place, and time. She has normal strength. CN 1-12 grossly intact.  Skin: Skin is warm and dry. Capillary refill takes less than 2 seconds. No erythema.   Psychiatric: She has a normal mood and affect. Her behavior is normal. Thought content normal    Significant Labs:  CBC:  Recent Labs   Lab 04/02/19  0339 04/03/19  0315 04/04/19  0120   WBC 5.52 6.05 6.18   RBC 4.05 3.95* 3.97*   HGB 12.3 11.8* 11.9*   HCT 38.5 36.1* 35.9*    237 244   MCV 95 91 90   MCH 30.4 29.9 30.0   MCHC 31.9* 32.7 33.1     BNP:  No results for input(s): BNP in the last 168 hours.    Invalid input(s): BNPTRIAGELBLO  CMP:  Recent Labs   Lab 04/02/19  0632 04/03/19  0315 04/04/19  0120    93 114*   CALCIUM 8.6* 8.7 8.9   ALBUMIN 2.4* 2.2* 2.5*   PROT 5.9* 5.3* 5.6*    139 137   K 3.7 3.5 3.0*   CO2 25 25 27    106 104   BUN 12 14 15    CREATININE 0.8 0.7 0.8   ALKPHOS 57 50* 61   ALT 19 19 16   AST 25 23 18   BILITOT 0.4 0.3 0.3      Coagulation:   Recent Labs   Lab 04/02/19  0632 04/03/19  0315 04/04/19  0120   INR 1.6* 1.6* 1.7*     LDH:  No results for input(s): LDH in the last 72 hours.  Microbiology:  Microbiology Results (last 7 days)     Procedure Component Value Units Date/Time    Blood culture [[id number]] Collected:  03/26/19 1420    Order Status:  Completed Specimen:  Blood from Peripheral, Antecubital, Right Updated:  03/31/19 1612     Blood Culture, Routine No growth after 5 days.    Blood culture [[id number]] Collected:  03/26/19 1440    Order Status:  Completed Specimen:  Blood from Peripheral, Antecubital, Left Updated:  03/31/19 1612     Blood Culture, Routine No growth after 5 days.    Respiratory Viral Panel by PCR [facility]; Nasal Swab [[id number]] Collected:  03/26/19 1846    Order Status:  Completed Specimen:  Respiratory Updated:  03/28/19 1302     Respiratory Virus Panel, source Nasal Swab     RVP - Adenovirus Not Detected     Comment: Detects Serotypes B and E. Detection of Serotype C may   be limited. If Adenovirus infection is suspected and a   Not Detected result is returned the sample should be   re-tested for Adenovirus using an independent method  (e.g. Spotjournal Adenovirus Quantitative Real-Time  PCR test.          Enterovirus Not Detected     Comment: Cross-reactivity has been observed between certain Rhinovirus  strains and the Enterovirus assay.          Human Bocavirus Not Detected     Human Coronavirus Not Detected     Comment: The Human Coronavirus assay detects Human coronavirus types  229E, OC43,NL63 and HKU1.          RVP - Human Metapneumovirus (hMPV) Not Detected     RVP - Influenza A Not Detected     Influenza A - [id number] Not Detected     RVP - Influenza B Not Detected     Parainfluenza Not Detected     Respiratory Syncytial VirusVirus (RSV) A Not Detected     Comment: The Respiratory Syncytial Viral  assay detects types A and B,  however it does not distinguish between the two.          RVP - Rhinovirus Not Detected     Comment: Cross-Reactivity has been observed between certain   Rhinovirus strains and the Enterovirus assay.  Target Enriched Mulitplex Polymerase Chain Reaction (TEM-PCR)  allows for the detection of multiple pathogens out of a single  reaction.  This test was developed and its performance   characteristics determined by [facility].  It has not   been cleared or approved by the U.S.Food and Drug Administration.  Results should be used in conjunction with clinical findings,   and should not form the sole basis for a diagnosis or treatment  decision.  TEM-PCR is a licensed technology of [employer].         Narrative:       Receiving Lab:->[facility]          I have reviewed all pertinent labs within the past 24 hours.    Estimated Creatinine Clearance: 59.4 mL/min (based on SCr of 0.8 mg/dL).    Diagnostic Results:  I have reviewed all pertinent imaging results/findings within the past 24 hours.    Assessment and Plan:     73 y.o. 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 Patient presented to [facility] for hip pain and inability to bear weight on the left leg after a trip and fall yesterday.  Patient tripped on a cord for her pulm HTN pump falling directly onto the left hip. Patient denies head trauma or upper body injury. She denies numbness or tingling. Currently lying on stretcher with  at bedside in nad.        * Closed fracture of neck of left femur s/p hemiarthroplasty 3/25  - S/P s/p left patrick  3/25, received post-op Ancef  - Weight bearing status: WBAT LLE  - Pain control:Ropivacaine to stopped.  Pain better controlled on Tramadol with the addition of tylenol.  She does not want Oxy.   - Orthopedics following appreciate assistance;  Paged them about dressing and possible need for change  - PT/OT: must be seen by PT 7x/week - please notify orthopedics if patient is not seen      PAH (pulmonary artery hypertension)  - Continue PO Lasix. At home dose of 40mg Qam in am and 20mg mid day  - Continue riociguat, bosentan, remodulin([place] site CDI).  -  to manage remodulin infusion.  - Warfarin with Lovenox bridge (decreased Lovenox to 30mg q12)- as per hip guideline recommendations.  Continue Coumadin. Previous home dose as 10mg Sun, Tues, Sat and 5mg every other day      RVF (right ventricular failure)  - Continue PH mgt with remoduline/ tracleer / riociguat  - Wean off oxygen as tolerated, PTA 3 - 6 L  - I NO weaned off 3/31  - Hold aldactone.      Transient alteration of awareness  - possible TIA with right sided aphasia after RIJ removed on 3/31.  aphasia/facial droop/right sided weakness has resolved  - CT head negative; Repeat CT also negative  - symptoms resolved  - Appreciate Vascular neurology consultation: Suspect either transient vagal event or transient scattered air emboli given context immediately after IJ removal - however both VS record and CT immediately afterward would contradict this ddx; Continue current antithrombotic regimen    Chronic respiratory failure with hypoxia  - on 5L O2 at home.   - Goal sat >/=85%    SVT (supraventricular tachycardia)  - Appreciate EP consultation.  Will continue Amiodarone for now.  -Could add Verapamil 40mg TID; but holding on this due to RV failure  - Adenosine as needed  -Patient not optimal status for EP ablation procedure      Drug-induced constipation  -Resolved.   -Continue senna/colace/miralax.          REILLY Matta  Heart  Transplant  [facility]

## 2019-04-04 NOTE — NURSING
Chem Lab results reported to Dr. Rubio w/ [facility].  K 3.0 -- order to give 40 mEq of K NOW and then 40 mEq again in two hours.   Mg 1.5 -- order for 3g Mg IVPB NOW

## 2019-04-04 NOTE — ASSESSMENT & PLAN NOTE
- Continue PO Lasix. At home dose of 40mg Qam in am and 20mg mid day  - Continue riociguat, bosentan, remodulin([facility] CDI).  -  to manage remodulin infusion.  - Warfarin with Lovenox bridge (decreased Lovenox to 30mg q12)- as per hip guideline recommendations.  Continue Coumadin. Previous home dose as 10mg Sun, Tues, Sat and 5mg every other day

## 2019-04-04 NOTE — PLAN OF CARE
Problem: Adult Inpatient Plan of Care  Goal: Plan of Care Review  Outcome: Ongoing (interventions implemented as appropriate)  Pt AAOx4, VSS, in NAD throughout shift.  Pt up with +1/standby assist to bedside commode.  Ambulates in room with walker.  Pt on home concentration of Remodulin, tolerating well, cassettes and dressing changed per pt's spouse.  Pt on 5L NC, SpO2 at goal.  Pt educated on tramadol PRN order, now requests tramadol q4hr.  Pt reporting decreased pain with tramadol dosing.  Pt tolerating all medications and interventions well.  RN maintaining fall risk precautions throughout shift.  Pt denies pain or other need at this time; RN will continue to monitor, assess, and alter plan of care as needed until report given to oncoming night shift nurse.

## 2019-04-04 NOTE — SUBJECTIVE & OBJECTIVE
"Principal Problem:Closed fracture of neck of left femur    Principal Orthopedic Problem: ***    Interval History: ***    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    Multaq [dronedarone]        Current Facility-Administered Medications   Medication    acetaminophen tablet 1,000 mg    amiodarone tablet 200 mg    bosentan tablet 125 mg    calcium carbonate (OS-MARIELLE) tablet 500 mg    cetirizine tablet 5 mg    enoxaparin injection 30 mg    ferrous gluconate Tab 324 mg    fluticasone 50 mcg/actuation nasal spray 100 mcg    folic acid tablet 1 mg    furosemide tablet 20 mg    furosemide tablet 40 mg    lidocaine 5 % patch 1 patch    pantoprazole EC tablet 40 mg    polyethylene glycol packet 17 g    riociguat (ADEMPAS) tablet 0.5 mg    senna-docusate 8.6-50 mg per tablet 2 tablet    simethicone chewable tablet 80 mg    sodium chloride 0.9% flush 3 mL    sodium chloride 0.9% flush 5 mL    traMADol tablet 50 mg    treprostinil (REMODULIN) 12,000,000 ng in sodium chloride 0.9% 100 mL infusion    VELETRI/REMODULIN CASSETTE    VELETRI/REMODULIN TUBING    warfarin (COUMADIN) tablet 10 mg     Objective:     Vital Signs (Most Recent):  Temp: 97.7 °F (36.5 °C) (04/04/19 0725)  Pulse: 87 (04/04/19 0725)  Resp: (!) 35 (04/04/19 0725)  BP: 96/61 (04/04/19 0725)  SpO2: (!) 88 % (04/04/19 0725) Vital Signs (24h Range):  Temp:  [97.7 °F (36.5 °C)-98.2 °F (36.8 °C)] 97.7 °F (36.5 °C)  Pulse:  [] 87  Resp:  [17-35] 35  SpO2:  [88 %-95 %] 88 %  BP: ()/(55-67) 96/61     Weight: 69.3 kg (152 lb 12.5 oz)  Height: 5' 3" (160 cm)  Body mass index is 27.06 kg/m².      Intake/Output Summary (Last 24 hours) at 4/4/2019 0833  Last data filed at 4/4/2019 0629  Gross per 24 hour   Intake 1210 ml   Output 1900 ml   Net -690 ml       Ortho/SPM Exam    Significant Labs: {Results:82104::"All pertinent labs within the past 24 hours have been " "reviewed."}    Significant Imaging: {Imaging Review:26810}  "

## 2019-04-05 VITALS
WEIGHT: 117.25 LBS | SYSTOLIC BLOOD PRESSURE: 99 MMHG | HEIGHT: 63 IN | HEART RATE: 93 BPM | DIASTOLIC BLOOD PRESSURE: 62 MMHG | OXYGEN SATURATION: 90 % | TEMPERATURE: 98 F | BODY MASS INDEX: 20.77 KG/M2 | RESPIRATION RATE: 19 BRPM

## 2019-04-05 LAB
ALBUMIN SERPL BCP-MCNC: 2.6 G/DL (ref 3.5–5.2)
ALP SERPL-CCNC: 56 U/L (ref 55–135)
ALT SERPL W/O P-5'-P-CCNC: 17 U/L (ref 10–44)
ANION GAP SERPL CALC-SCNC: 12 MMOL/L (ref 8–16)
AST SERPL-CCNC: 21 U/L (ref 10–40)
BASOPHILS # BLD AUTO: 0.05 K/UL (ref 0–0.2)
BASOPHILS NFR BLD: 0.7 % (ref 0–1.9)
BILIRUB SERPL-MCNC: 0.4 MG/DL (ref 0.1–1)
BUN SERPL-MCNC: 15 MG/DL (ref 8–23)
CALCIUM SERPL-MCNC: 8.9 MG/DL (ref 8.7–10.5)
CHLORIDE SERPL-SCNC: 105 MMOL/L (ref 95–110)
CO2 SERPL-SCNC: 22 MMOL/L (ref 23–29)
CREAT SERPL-MCNC: 0.7 MG/DL (ref 0.5–1.4)
DIFFERENTIAL METHOD: ABNORMAL
EOSINOPHIL # BLD AUTO: 0.2 K/UL (ref 0–0.5)
EOSINOPHIL NFR BLD: 3.3 % (ref 0–8)
ERYTHROCYTE [DISTWIDTH] IN BLOOD BY AUTOMATED COUNT: 14.4 % (ref 11.5–14.5)
EST. GFR  (AFRICAN AMERICAN): >60 ML/MIN/1.73 M^2
EST. GFR  (NON AFRICAN AMERICAN): >60 ML/MIN/1.73 M^2
GLUCOSE SERPL-MCNC: 89 MG/DL (ref 70–110)
HCT VFR BLD AUTO: 36.5 % (ref 37–48.5)
HGB BLD-MCNC: 12 G/DL (ref 12–16)
IMM GRANULOCYTES # BLD AUTO: 0.07 K/UL (ref 0–0.04)
IMM GRANULOCYTES NFR BLD AUTO: 1 % (ref 0–0.5)
INR PPP: 1.6 (ref 0.8–1.2)
LYMPHOCYTES # BLD AUTO: 0.8 K/UL (ref 1–4.8)
LYMPHOCYTES NFR BLD: 11.2 % (ref 18–48)
MAGNESIUM SERPL-MCNC: 2 MG/DL (ref 1.6–2.6)
MCH RBC QN AUTO: 30.2 PG (ref 27–31)
MCHC RBC AUTO-ENTMCNC: 32.9 G/DL (ref 32–36)
MCV RBC AUTO: 92 FL (ref 82–98)
MONOCYTES # BLD AUTO: 0.7 K/UL (ref 0.3–1)
MONOCYTES NFR BLD: 9.7 % (ref 4–15)
NEUTROPHILS # BLD AUTO: 5.1 K/UL (ref 1.8–7.7)
NEUTROPHILS NFR BLD: 74.1 % (ref 38–73)
NRBC BLD-RTO: 0 /100 WBC
PHOSPHATE SERPL-MCNC: 3.8 MG/DL (ref 2.7–4.5)
PLATELET # BLD AUTO: 247 K/UL (ref 150–350)
PMV BLD AUTO: 9.5 FL (ref 9.2–12.9)
POTASSIUM SERPL-SCNC: 4.2 MMOL/L (ref 3.5–5.1)
PROT SERPL-MCNC: 5.8 G/DL (ref 6–8.4)
PROTHROMBIN TIME: 16 SEC (ref 9–12.5)
RBC # BLD AUTO: 3.97 M/UL (ref 4–5.4)
SODIUM SERPL-SCNC: 139 MMOL/L (ref 136–145)
WBC # BLD AUTO: 6.94 K/UL (ref 3.9–12.7)

## 2019-04-05 PROCEDURE — 25000003 PHARM REV CODE 250: Performed by: PHYSICIAN ASSISTANT

## 2019-04-05 PROCEDURE — 83735 ASSAY OF MAGNESIUM: CPT

## 2019-04-05 PROCEDURE — 85025 COMPLETE CBC W/AUTO DIFF WBC: CPT

## 2019-04-05 PROCEDURE — 85610 PROTHROMBIN TIME: CPT

## 2019-04-05 PROCEDURE — 97530 THERAPEUTIC ACTIVITIES: CPT

## 2019-04-05 PROCEDURE — 97110 THERAPEUTIC EXERCISES: CPT

## 2019-04-05 PROCEDURE — 36415 COLL VENOUS BLD VENIPUNCTURE: CPT

## 2019-04-05 PROCEDURE — 63600175 PHARM REV CODE 636 W HCPCS: Performed by: PHYSICIAN ASSISTANT

## 2019-04-05 PROCEDURE — 97535 SELF CARE MNGMENT TRAINING: CPT

## 2019-04-05 PROCEDURE — 84100 ASSAY OF PHOSPHORUS: CPT

## 2019-04-05 PROCEDURE — A4216 STERILE WATER/SALINE, 10 ML: HCPCS | Performed by: PHYSICIAN ASSISTANT

## 2019-04-05 PROCEDURE — 80053 COMPREHEN METABOLIC PANEL: CPT

## 2019-04-05 RX ORDER — ENOXAPARIN SODIUM 100 MG/ML
30 INJECTION SUBCUTANEOUS EVERY 12 HOURS
Qty: 1.5 ML | Refills: 1 | Status: ON HOLD | OUTPATIENT
Start: 2019-04-05 | End: 2019-07-18 | Stop reason: HOSPADM

## 2019-04-05 RX ORDER — WARFARIN SODIUM 5 MG/1
10 TABLET ORAL DAILY
Status: DISCONTINUED | OUTPATIENT
Start: 2019-04-05 | End: 2019-04-05 | Stop reason: HOSPADM

## 2019-04-05 RX ORDER — WARFARIN 10 MG/1
TABLET ORAL
Qty: 30 TABLET | Refills: 11 | Status: SHIPPED | OUTPATIENT
Start: 2019-04-05 | End: 2020-04-30

## 2019-04-05 RX ORDER — ENOXAPARIN SODIUM 100 MG/ML
30 INJECTION SUBCUTANEOUS EVERY 12 HOURS
Qty: 5 SYRINGE | Refills: 1 | Status: SHIPPED | OUTPATIENT
Start: 2019-04-05 | End: 2019-04-05

## 2019-04-05 RX ADMIN — PANTOPRAZOLE SODIUM 40 MG: 40 TABLET, DELAYED RELEASE ORAL at 05:04

## 2019-04-05 RX ADMIN — ENOXAPARIN SODIUM 30 MG: 100 INJECTION SUBCUTANEOUS at 11:04

## 2019-04-05 RX ADMIN — RIOCIGUAT 0.5 MG: 0.5 TABLET, FILM COATED ORAL at 02:04

## 2019-04-05 RX ADMIN — CETIRIZINE HYDROCHLORIDE 5 MG: 5 TABLET ORAL at 09:04

## 2019-04-05 RX ADMIN — FERROUS GLUCONATE TAB 324 MG (37.5 MG ELEMENTAL IRON) 324 MG: 324 (37.5 FE) TAB at 09:04

## 2019-04-05 RX ADMIN — SIMETHICONE CHEW TAB 80 MG 80 MG: 80 TABLET ORAL at 09:04

## 2019-04-05 RX ADMIN — CALCIUM 1000 MG: 500 TABLET ORAL at 09:04

## 2019-04-05 RX ADMIN — AMIODARONE HYDROCHLORIDE 200 MG: 200 TABLET ORAL at 09:04

## 2019-04-05 RX ADMIN — Medication 3 ML: at 02:04

## 2019-04-05 RX ADMIN — FUROSEMIDE 20 MG: 20 TABLET ORAL at 02:04

## 2019-04-05 RX ADMIN — TRAMADOL HYDROCHLORIDE 50 MG: 50 TABLET, COATED ORAL at 05:04

## 2019-04-05 RX ADMIN — RIOCIGUAT 0.5 MG: 0.5 TABLET, FILM COATED ORAL at 09:04

## 2019-04-05 RX ADMIN — TRAMADOL HYDROCHLORIDE 50 MG: 50 TABLET, COATED ORAL at 02:04

## 2019-04-05 RX ADMIN — ACETAMINOPHEN 1000 MG: 500 TABLET ORAL at 02:04

## 2019-04-05 RX ADMIN — SIMETHICONE CHEW TAB 80 MG 80 MG: 80 TABLET ORAL at 02:04

## 2019-04-05 RX ADMIN — BOSENTAN 125 MG: 125 TABLET, FILM COATED ORAL at 09:04

## 2019-04-05 RX ADMIN — FOLIC ACID 1 MG: 1 TABLET ORAL at 09:04

## 2019-04-05 RX ADMIN — ACETAMINOPHEN 1000 MG: 500 TABLET ORAL at 09:04

## 2019-04-05 RX ADMIN — Medication 3 ML: at 09:04

## 2019-04-05 RX ADMIN — FUROSEMIDE 40 MG: 40 TABLET ORAL at 09:04

## 2019-04-05 RX ADMIN — TRAMADOL HYDROCHLORIDE 50 MG: 50 TABLET, COATED ORAL at 11:04

## 2019-04-05 NOTE — PROGRESS NOTES
Mrs. Smith and her  were counseled to continue her home warfarin dose of 10mg/day except 5mg on TuThSa at discharge while simultaneously taking enoxaparin 30mg BID (Hip fracture surgery dose). Long term AC is for PAH. She will f/u with her warfarin provider on Monday to recheck INR, dose adjust warfarin if necessary and determine continued need for lovenox (stop when INR >2)

## 2019-04-05 NOTE — NURSING
RN provided patient with verbal explanation and printed copy of AVS, pt verbalized understanding to all.  Pt verbalized understanding to updated home medication information.  IV removed without issue.  Pt discharged from TSU with all belongings in tow.  Pt denies pain or other need at time of discharge.

## 2019-04-05 NOTE — PLAN OF CARE
Problem: Occupational Therapy Goal  Goal: Occupational Therapy Goal  Goals to be met by: 7 days 4/9/19     Patient will increase functional independence with ADLs by performing:    Pt to complete g/h skills seated with set-up--MET  Pt to complete UE dressing with MIN A --MET  Pt to demo Fair sitting balance to allow for progression with ADL training.--MET  Pt to t/f to chair with MAX A --MET  Pt to t/f to BSC with SBA  Pt to complete toileting MIN A   Pt to complete g/h skills with CGA in standing  Pt and fly to demo/verb understanding of AE for LE dressing given current hip precautions.        Outcome: Outcome(s) achieved Date Met: 04/05/19  Pt with adequate goal attainment and no further acute OT needs at this time.

## 2019-04-05 NOTE — PLAN OF CARE
Problem: Adult Inpatient Plan of Care  Goal: Plan of Care Review  Outcome: Ongoing (interventions implemented as appropriate)  AAOx4, VSS, SpO- >80% on 5L NC,  remodulin cassette to R chest wall permcath; remains CDI infusing @ 45cc/24hrs.  changed cassette @ 2300 tonight. Changed every 48 hours  Left leg pain controlled with scheduled tylenol q8h + tramadol q4h PRN. Incision site remains with hydrocolloid surgical incision. Redness + warm to touch   Up to bedside commode with assistance + walker.  to remain at the bedside. Non skid socks worn, call light within reach. Will continue to monitor.

## 2019-04-05 NOTE — DISCHARGE SUMMARY
[facility]  Heart Transplant  Discharge Summary      Patient Name: Molly Smith  MRN: [id number]  Admission Date: 3/23/2019  Hospital Length of Stay: 13 days  Discharge Date and Time: 04/05/2019 12:23 PM  Attending Physician: Sheila Vuong MD   Discharging Provider: REILLY Matta  Primary Care Provider: Roberto Braun MD     HPI: 73 y.o. 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 [place] hip pain and inability to bear weight on the left leg after a trip and fall yesterday.  Patient tripped on a cord for her pulm HTN pump falling directly onto the left hip. Patient denies head trauma or upper body injury. She denies numbness or tingling. Currently lying on stretcher with  at bedside in nad    Procedure(s) (LRB):  HEMIARTHROPLASTY, HIP - left (Left)     Hospital Course: Patient admitted to the [facility] service and placed on 24 hour telemetry   Patient presented with closed fracture of left femur neck: orthopedics consulted and she is s/p Hemiarthroplasty on 3/25/19.  She received post op Ancef and did well postoperately.  Her pain was initally controlled with Ropivacaine; however, that was changed to Tylenol and Tramadol with good response.  She did not want to take oxycodone as it makes her nausous.  PT/OT worked with her throughout her hospital stay and she will be discharged with homehealth PT.  She has Tramadol at home (200mg extended release) and Tylenol prn for pain control.  She was instructed to keep post op bandage on for two weeks and she has scheduled follow up with Orthopedics  on 4/8/19   Patient had episodes of SVT for which EP was consulted.  They recommended continuing Amiodarone for now and consider added Verapamil if Rates were not controlled.  We did not add Verapamil secondary to hx of RV failure. Her rates and rhythm stabilized and she was discharged on her previous home dose of Amiodarone.   On 3/31 patient had episode of blurred vision associated with aphasia, facial droop and right  sided weakness after R IJ removed.  CT of head done and neurology consulted.  They suspected either transient vagal event or transient scattered air emboli given context immediately after IJ removal - however both VS record and CT immediately afterward would contradict this ddx.  Repeat CT 48 hours after event was also negative.  MRI was not done as patient and family did not want to stop Remodulin infusion for MRI.  Neurology recommended Continuing current antithrombotic regimen.    Throughout her hospital stay; she remained on her home dose of Remodulin, Bosentan and Riociguat, and on day of discharge she was on her home dose of oxygen.     Physical Exam on day of discharge  Temp: 98.3 °F (36.8 °C) (04/05/19 1155)  Pulse: 93 (04/05/19 1225)  Resp: 19 (04/05/19 1155)  BP: 99/62 (04/05/19 1155)  SpO2: (!) 90 % (04/05/19 1155)  Constitutional: She is oriented to person, place, and time. Laying in bed NAD with family at bedside  Neck: Neck supple. No JVD present.   Cardiovascular: Normal rate and regular rhythm. III/VI holosystolic murmer appreciated best at the apex  Pulmonary/Chest: Effort normal and breath sounds normal. No respiratory distress. Faint crackles at the bases bilaterally  Abdominal: Soft. Bowel sounds are normal. She exhibits no distension.   Musculoskeletal: She exhibits no edema.   Neurological: She is alert and oriented to person, place, and time. She has normal strength. CN 1-12 grossly intact.  Skin: Skin is warm and dry. Capillary refill takes less than 2 seconds. No erythema.    Psychiatric: She has a normal mood and affect. Her behavior is normal. Thought content normal      Consults (From admission, onward)        Status Ordering Provider     Inpatient consult to Cardiology  Once     Provider:  (Not yet assigned)    Completed GARY MATA     Inpatient consult to Electrophysiology  Once     Provider:  (Not yet assigned)    Completed HALI VALADEZ     Inpatient consult to Infectious Diseases  Once     Provider:  (Not yet assigned)    Completed IDALIA SRIVASTAVA     Inpatient consult to Orthopedic Surgery  Once     Provider:  (Not yet assigned)    Completed GARY MATA     Inpatient consult to Vascular (Stroke) Neurology  Once     Provider:  (Not yet assigned)    Completed IDALIA SRIVASTAVA          Significant Diagnostic Studies: See reports for full details  X-rays  US lower extremity: 3/23/19  CT Head: 3/31/19  CT head 4/1/19    Pending Diagnostic Studies:     None        Final Active Diagnoses:    Diagnosis Date Noted POA    PRINCIPAL PROBLEM:  Closed fracture of neck of left femur s/p hemiarthroplasty 3/25 [S72.002A] 03/23/2019 Yes    PAH (pulmonary artery hypertension) [I27.21] 07/05/2016 Yes    RVF (right ventricular failure) [I50.9] 03/27/2019 Yes    Transient alteration of awareness [R40.4] 04/01/2019 No    Chronic respiratory failure with hypoxia [J96.11] 06/16/2015 Yes    SVT (supraventricular tachycardia) [I47.1] 04/07/2017 Yes    Drug-induced constipation [K59.03] 03/28/2019 No    Encounter for assessment for deep vein thrombosis (DVT) [Z76.89]  Not Applicable    Hypotension [I95.9]  Yes    Closed left hip fracture [S72.002A] 03/23/2019 Yes      Problems Resolved During this Admission:    Diagnosis Date Noted Date Resolved POA    Fever [R50.9] 07/19/2016 03/30/2019 No      Discharged Condition: stable    Disposition:     Follow Up:  Follow-up Information     Shelia Alvarado PA-C On 4/8/2019.    Specialty:  Orthopedic Surgery  Why:  as scheduled  10:45  Contact information:  [address]  [facility] [id number]  [phone]                 Patient Instructions:      Diet Cardiac     Notify your health care provider if you experience any of the following:  temperature >100.4     Notify your health care provider if you experience any of the following:  severe uncontrolled pain     Notify your health care provider if you experience any of the following:  difficulty breathing or increased cough     Activity as tolerated   Order Comments: Weight bearing restrictions as instructed by orthopedics     Medications:  Reconciled Home Medications:      Medication List      START taking these medications    enoxaparin 30 mg/0.3 mL Syrg  Commonly known as:  LOVENOX  Inject 0.3 mLs (30 mg total) into the skin every 12 (twelve) hours.        CHANGE how you take these medications    furosemide 20 MG tablet  Commonly known as:  LASIX  Take 1 tablet (20 mg total) by mouth once daily.  What changed:  when to take this     warfarin 10 MG tablet  Commonly known as:  COUMADIN  Take 1 tablet (10mg) by mouth daily, except a half tablet (5mg) every Tues, Thurs, and Saturdays  What changed:    · how much to take  · how to take this  · when to take this  · additional instructions  · Another medication with the same name was removed. Continue taking this medication, and follow the directions you see here.        CONTINUE taking these medications    amiodarone 200 MG Tab  Commonly known as:  PACERONE  Take 1 tablet (200 mg total) by mouth once daily.     bosentan 125 MG Tab  Commonly known as:  TRACLEER  Take by mouth 2 (two) times daily.     CALCIUM 600 ORAL  Take 2 tablets by mouth once daily.     chlordiazepoxide-clidinium 5-2.5 mg 5-2.5 mg Cap  Commonly known as:  LIBRAX  Take 1 capsule by mouth 3 (three) times daily with meals. TAKE TABLET AS PRN.     ferrous gluconate 324 MG tablet  Commonly known as:  FERGON  Take 1 tablet (324 mg total) by mouth daily with breakfast.      folic acid 1 MG tablet  Commonly known as:  FOLVITE  Take 1 mg by mouth once daily.     loratadine 10 mg tablet  Commonly known as:  CLARITIN  Take 10 mg by mouth once daily.     ondansetron 4 MG Tbdl  Commonly known as:  ZOFRAN-ODT     promethazine 25 MG tablet  Commonly known as:  PHENERGAN  promethazine 25 mg tablet     riociguat 0.5 mg Tab tablet  Commonly known as:  ADEMPAS  Take 1 tablet (0.5 mg total) by mouth 3 (three) times daily.     sodium chloride 0.9% 0.9 % SolP 100 mL with treprostinil 1 mg/mL Soln 9,000,000 ng  Inject 5,005.5 ng/min into the vein continuous.     spironolactone 25 MG tablet  Commonly known as:  ALDACTONE  Take 25 mg by mouth once daily.     traMADol 200 MG Tb24  Commonly known as:  ULTRAM-ER  TAKE ONE TABLET BY MOUTH ONCE A DAY AS NEEDED FOR PAIN THANK YOU!     TREPROSTINIL (REMODULIN) PUMP FOR HOME USE  Pt currently on 89.2 ng/kg/min=45 mL/day dosing weight 70.05 kg            REILLY Matta  Heart Transplant  [facility]

## 2019-04-05 NOTE — PT/OT/SLP PROGRESS
Physical Therapy Treatment    Patient Name:  Molly Smith   MRN:  [id number]    Recommendations:     Discharge Recommendations:  home with home health   Discharge Equipment Recommendations: (hip kit)   Barriers to discharge: None    Assessment:     Molly Smith is a 73 y.o. female admitted with a medical diagnosis of Closed fracture of neck of left femur.  She presents with the following impairments/functional limitations:  weakness, impaired endurance, impaired self care skills, gait instability, impaired functional mobilty, impaired balance, decreased lower extremity function, orthopedic precautions . Pt with limited treatment session due to pt reports just returning back to bed.Pt would continue to benefit from skilled PT to address overall functional mobility and goals. Goals remain appropriate      Rehab Prognosis: Good; patient would benefit from acute skilled PT services to address these deficits and reach maximum level of function.    Recent Surgery: Procedure(s) (LRB):  HEMIARTHROPLASTY, HIP - left (Left) 11 Days Post-Op    Plan:     During this hospitalization, patient to be seen 5 x/week to address the identified rehab impairments via gait training, therapeutic activities, therapeutic exercises, neuromuscular re-education and progress toward the following goals:    · Plan of Care Expires:  04/26/19    Subjective     Chief Complaint: I just got back to bed, I am tired  Patient/Family Comments/goals: I am leaving today  Pain/Comfort:  · Pain Rating 1: 0/10  · Pain Rating Post-Intervention 1: 0/10      Objective:     Communicated with RN prior to session.  Patient found supine with peripheral IV, telemetry, oxygen(Remodulin pump) upon PT entry to room.     General Precautions: Standard, fall   Orthopedic Precautions:LLE weight bearing as tolerated, LLE posterior precautions   Braces: N/A     Functional Mobility:  · N/T due to pt reports just returning back to bed      AM-PAC 6 CLICK MOBILITY  Turning  over in bed (including adjusting bedclothes, sheets and blankets)?: 3  Sitting down on and standing up from a chair with arms (e.g., wheelchair, bedside commode, etc.): 3  Moving from lying on back to sitting on the side of the bed?: 3  Moving to and from a bed to a chair (including a wheelchair)?: 3  Need to walk in hospital room?: 3  Climbing 3-5 steps with a railing?: 2  Basic Mobility Total Score: 17       Therapeutic Activities and Exercises:   Discussed/educated patient on progress, safety, importance of OOB mobility for improving outcomes, d/c,HEP, PT POC   Patient performed therex  B LE AROM AP,QS,GS  Updated white board with appropriate PT mobility information for medical team notification  Donned an extra gown   post hip prec with pt being able to recall 2/3 prec total, pt educated on hip precautions      Pt safe to ambulate in hallway with RN or PCT assistance   Bedside table in front of patient and area set up for function, convenience, and safety. RN aware of patient's mobility needs and status. Questions/concerns addressed within PTA scope of practice; patient with no further questions. Time was provided for active listening, discussion of health disposition, and discussion of safe discharge. Pt and family verbalized?agreement .  Patient left?supine, with head in midline, neutral pelvis &?heels floated for skin protection with?all lines intact and call button in reach       GOALS:   Multidisciplinary Problems     Physical Therapy Goals        Problem: Physical Therapy Goal    Goal Priority Disciplines Outcome Goal Variances Interventions   Physical Therapy Goal     PT, PT/OT Ongoing (interventions implemented as appropriate)     Description:  Goals to be met by: 2019     Patient will increase functional independence with mobility by performin. Supine to sit with Moderate Assistance  2. Rolling to Left and Right with Minimal Assistance.  3. Sit to stand transfer with Moderate Assistance- met  3/27/2019  Sit to stand transfer with contact guard assistance   4. Sitting at edge of bed x10 minutes with Stand-by Assistance: met 3/27/2019   Sitting at edge of bed x15 mins with supervision   5. Gait x 20 feet with RW with contact guard assistance- met 4/2/2019  Gait x 50 feet with RW with stand by assistance.                          Time Tracking:     PT Received On: 04/05/19  PT Start Time: 1138     PT Stop Time: 1201  PT Total Time (min): 23 min     Billable Minutes: Therapeutic Activity 13 and Therapeutic Exercise 10    Treatment Type: Treatment  PT/PTA: PTA     PTA Visit Number: 3     Damian Wong, PTA  04/05/2019

## 2019-04-05 NOTE — PLAN OF CARE
Problem: Physical Therapy Goal  Goal: Physical Therapy Goal  Goals to be met by: 2019     Patient will increase functional independence with mobility by performin. Supine to sit with Moderate Assistance  2. Rolling to Left and Right with Minimal Assistance.  3. Sit to stand transfer with Moderate Assistance- met 3/27/2019  Sit to stand transfer with contact guard assistance   4. Sitting at edge of bed x10 minutes with Stand-by Assistance: met 3/27/2019   Sitting at edge of bed x15 mins with supervision   5. Gait x 20 feet with RW with contact guard assistance- met 2019  Gait x 50 feet with RW with stand by assistance.          continue with PT POC.Goals remain appropriate.  Damian Wong PTA  2019

## 2019-04-05 NOTE — PROGRESS NOTES
Discharge    Pt presents in room with family. Pt aaox4 with pleasant affect. Pt states in agreement with plan to discharge to home today with [facility] ph [phone], [place] [phone]. Orders confirmed received and pt will be seen tomorrow. Pt's dgtr Ana will transport. Pt states all needed DME was purchased by pt's . Pt reports coping well and denies further needs, questions, concerns at this time and none indicated. Providing psychosocial and counseling support, education, resources, assistance and discharge planning as indicated. SW remains available.

## 2019-04-05 NOTE — PHYSICIAN QUERY
PT Name: Molly Smith  MR #: [id number]     PHYSICIAN QUERY -  ELECTROLYTE CLARIFICATION      CDS/: Lakia Schmitz RN               Contact information:[email or web address]  This form is a permanent document in the medical record.     Query Date: April 5, 2019    By submitting this query, we are merely seeking further clarification of documentation to reflect the severity of illness of your patient. Please utilize your independent clinical judgment when addressing the question(s) below.    The Medical record reflects the following:     Indicators   Supporting Clinical Findings Location in Medical Record   x Lab Value(s)  Potassium 4.3  Magnesium 2.1  Potassium 3.7  Magnesium  2.2   Alkaline phosphatase 41  Potassium 3.8 Magnesium  2.1   Alkaline phosphatase  36 potassium 3.5  Magnesium 2.5   Phosphorus 2.0  Alkaline phosphatase 43 potassium 3.5   Phosphorus 2.0  Alkaline phosphatase 52 potassium 3.7    Potassium 4.3  Magnesium 2.0  Potassium 4.0  Magnesium 2.2  Potassium 3.6  Magnesium 2.0  Potassium 4.3  Magnesium 1.8  Potassium 3.7  Magnesium 1.9  Potassium 3.5  Magnesium 1.7  Potassium 3.0  Magnesium 1.5  Potassium 4/2  Magnesium 2.0   Labs 3/23  Labs 3/24  Labs 3/25  Labs 3/26  Labs 3/27  Labs 3/28  Labs 3/29  Labs 3/30  Labs 3/31  Labs 4/1  Labs 4/2  Labs 4/3  Labs 4/4  Labs 4/5   x Treatment                                 Medication Potassium phos di & mono-sod phos mono oral  Potassium chloride SA CR oral  Magnesium sulfate IVPB MAR 3/24, 3/27  MAR 3/27-28, 3/31, 4/2, 4/4  MAR 3/24, 3/27, 3/29, 4/1, 4/4      Other       Provider, please specify the diagnosis or diagnoses that correspond(s) to the above indicators. Danilo all that apply:    [   ] Hypokalemia   [   ] Hypomagnesemia   [   ] Hypophosphatemia   [ x  ] Other electrolyte disturbance (please specify): _______   [   ]  Clinically Undetermined       Please document in your progress notes daily for the duration of treatment until resolved,  and include in your discharge summary.

## 2019-04-05 NOTE — PT/OT/SLP PROGRESS
Occupational Therapy   Treatment & Discharge     Name: Molly Smith  MRN: [id number]  Admitting Diagnosis:  Closed fracture of neck of left femur  11 Days Post-Op    Recommendations:     Discharge Recommendations: home with home health  Discharge Equipment Recommendations:  (hip kit)  Barriers to discharge:  None    Assessment:     Molly Smith is a 73 y.o. female with a medical diagnosis of Closed fracture of neck of left femur. Pt with adequate goal attainment and no further acute OT needs at this time.    Plan:     · Patient d/c'ed 4/5/2019  · Plan of Care Expires: 04/05/19  · Plan of Care Reviewed with: patient    Subjective     Pain/Comfort:  · Pain Rating 1: 0/10  · Pain Rating Post-Intervention 1: 0/10    Objective:     Communicated with: RN prior to session.  Patient found supine with peripheral IV, telemetry, oxygen(remodulin pump) upon OT entry to room.    General Precautions: Standard, fall   Orthopedic Precautions:LLE weight bearing as tolerated, LLE posterior precautions   Braces: N/A     Occupational Performance:    Bed Mobility:     · Supine>Sit: supervision    · HOB slightly elevated   · Clearing BLE's off EOB then pivoting trunk  · Utilizing rails   · Scooting to EOB: supervision      Transfers:   · Sit<>Stand Transfer: standby assistance from/onto EOB with RW    · Step transfer EOB>chair: supervision utilizing RW    Activities of Daily Living:  · LB Dressing: setup assistance    · Utilizing reacher for LB dressing to assist with threading BLE's into underwear  · Utilizing sock aide to don socks   · Completed while seated at EOB     · Demonstration provided and cues for efficient use of AE    Therapeutic Intervention & Education:  · Cognition: Pt alert and oriented with good engagement in therapy session   · Updated communication board with level of assist required (standby assistance x 1 person assistance & RW) & educated RN/patient that pt is appropriate for OOB mobility, standing ADLs, and  transfer to chair with RN/PCT   · Educated on importance of EOB/OOB mobility, maintaining routine, sitting up in chair, and maximizing independence with ADLs during admission   · No family present during session    [facility] 6 Click ADL: 21    Patient left up in chair with all lines intact, call button in reach and RN notifiedEducation:      GOALS:   Multidisciplinary Problems     Occupational Therapy Goals     Not on file          Multidisciplinary Problems (Resolved)        Problem: Occupational Therapy Goal    Goal Priority Disciplines Outcome Interventions   Occupational Therapy Goal   (Resolved)     OT, PT/OT Outcome(s) achieved    Description:  Goals to be met by: 7 days 4/9/19     Patient will increase functional independence with ADLs by performing:    Pt to complete g/h skills seated with set-up--MET  Pt to complete UE dressing with MIN A --MET  Pt to demo Fair sitting balance to allow for progression with ADL training.--MET  Pt to t/f to chair with MAX A --MET  Pt to t/f to BSC with SBA  Pt to complete toileting MIN A   Pt to complete g/h skills with CGA in standing  Pt and fly to demo/verb understanding of AE for LE dressing given current hip precautions.                         Time Tracking:     OT Date of Treatment: 04/05/19  OT Start Time: 0750  OT Stop Time: 0813  OT Total Time (min): 23 min    Billable Minutes:Self Care/Home Management 23 minutes    Ondina Stewart OT  4/5/2019

## 2019-04-08 ENCOUNTER — TELEPHONE (OUTPATIENT)
Dept: ORTHOPEDICS | Facility: CLINIC | Age: 74
End: 2019-04-08

## 2019-04-08 ENCOUNTER — OFFICE VISIT (OUTPATIENT)
Dept: ORTHOPEDICS | Facility: CLINIC | Age: 74
End: 2019-04-08
Payer: MEDICARE

## 2019-04-08 VITALS
RESPIRATION RATE: 16 BRPM | SYSTOLIC BLOOD PRESSURE: 97 MMHG | TEMPERATURE: 98 F | HEART RATE: 88 BPM | DIASTOLIC BLOOD PRESSURE: 56 MMHG

## 2019-04-08 DIAGNOSIS — Z96.649 S/P HIP HEMIARTHROPLASTY: Primary | ICD-10-CM

## 2019-04-08 PROCEDURE — 99999 PR PBB SHADOW E&M-EST. PATIENT-LVL IV: ICD-10-PCS | Mod: PBBFAC,,, | Performed by: PHYSICIAN ASSISTANT

## 2019-04-08 PROCEDURE — 99024 PR POST-OP FOLLOW-UP VISIT: ICD-10-PCS | Mod: POP,,, | Performed by: PHYSICIAN ASSISTANT

## 2019-04-08 PROCEDURE — 99024 POSTOP FOLLOW-UP VISIT: CPT | Mod: POP,,, | Performed by: PHYSICIAN ASSISTANT

## 2019-04-08 PROCEDURE — 99999 PR PBB SHADOW E&M-EST. PATIENT-LVL IV: CPT | Mod: PBBFAC,,, | Performed by: PHYSICIAN ASSISTANT

## 2019-04-08 PROCEDURE — 99214 OFFICE O/P EST MOD 30 MIN: CPT | Mod: PBBFAC | Performed by: PHYSICIAN ASSISTANT

## 2019-04-08 NOTE — PROGRESS NOTES
Ms. Smith is 73 y.o. WF with adult congenital heart disease with prior dx of sinus venosus defect, anomalous pulmonary venous drainage with PAH diagnosed in past 10 years (probably for much longer per old CXR) who is currently on IV remodulin (89.2ng/kg/min) adempas and tracleer, hx of Rosemonas bacteremia s/p central line removal, who was transferred from [facility] to here for higher level of care and orthopedic consult for left femoral neck fracture that occurred secondary to af all from standing height. Patient has been treated with left hip hemiarthroplasty on 03/25/2019.    Ms. Smith is here today for a post-operative visit after a    Left hip hemiarthroplasty for femoral neck fracture  by Dr. Esposito on 03/25/2019.    Interval History:    she reports that she is doing ok.  Pain is somewhat controlled.  she is taking pain medication.  Tylenol  She is at home. She is recieving home health PT.   she denies fever, chills, and sweats since the time of the surgery.     Physical exam:  Dressing taken down.  Incision is clean, dry and intact. No signs of breakdown,     RADS: none done today    Assessment:  Post-op visit ( 2 weeks)    Plan:  Current care, treatment plan, precautions, activity level/ modifications, limitations, rehabilitation exercises and proposed future treatment were discussed with the patient. We discussed the need to monitor for changes in symptoms and condition and report them to the physician.  Discussed importance of compliance with all appointments and follow up examinations.   - The patient was advised to keep the incision clean and dry for the next 24 hours after which she may wash the area with antibacterial soap in the shower. Will not submerge until the incision is completely healed  -Patient was advised to monitor wound closely and multiple times daily for any problems. Call clinic immediately or report to ED for immediate medical attention for any complications including  reopening of wound, drainage, purulence, redness, streaking, odor, pain out of proportion, fever, chills, etc.   -PT/OT, HH, Patient is responsible to establish and continue care   -range of motion as tolerated with posterior hip precuations   - WBAT  - pain medication: no refill needed,    Pain medication refill policy provided to patient for review.   - Patient is to return to clinic in 4 weeks  At time x-ray of her hip is needed     If there are any questions prior to scheduled follow up, the patient was instructed to contact the office

## 2019-04-08 NOTE — TELEPHONE ENCOUNTER
Notified pt . Per RR pt will be seen 4/8/19. Patient states verbal understanding and has no further questions.

## 2019-04-08 NOTE — TELEPHONE ENCOUNTER
----- Message from Carine Boyle sent at 4/8/2019 10:04 AM CDT -----  Contact:   Pt will be 20 minutes late for appt, pt is on there way now, and is wanting to know if still will be seen @ [phone]

## 2019-04-15 RX ORDER — AMIODARONE HYDROCHLORIDE 200 MG/1
TABLET ORAL
Qty: 30 TABLET | Refills: 11 | Status: SHIPPED | OUTPATIENT
Start: 2019-04-15 | End: 2020-04-09

## 2019-04-16 ENCOUNTER — TELEPHONE (OUTPATIENT)
Dept: ADMINISTRATIVE | Facility: CLINIC | Age: 74
End: 2019-04-16

## 2019-04-16 ENCOUNTER — TELEPHONE (OUTPATIENT)
Dept: TRANSPLANT | Facility: CLINIC | Age: 74
End: 2019-04-16

## 2019-04-16 NOTE — TELEPHONE ENCOUNTER
Home Health SOC 04/06/2019 - 06/04/2019 with [facility] - Dr. Kemal Esposito. Patient received SN and PT services.

## 2019-04-16 NOTE — TELEPHONE ENCOUNTER
No . I only took it once. The hip is improving daily - doing rehab at home for another week and a half. Then hes going to try to get me on outpatient rehab.   My oxygen level seems to be running low- I guess because l was in bed for 2 weeks.  Sent from my iPad    On Apr 16, 2019, at 8:58 AM, Albertina Ruffin <[email or web address]> wrote:  Hi Ms Smith-  I wanted to check in to see how things were going. Did you happen to notify coumadin clinic regarding taking flagyl?  Thank you,  Albertina

## 2019-04-26 NOTE — ADDENDUM NOTE
Addendum  created 04/26/19 0858 by Sonia Saez MD    Diagnosis association updated, Intraprocedure Blocks edited, Sign clinical note

## 2019-05-07 ENCOUNTER — HOSPITAL ENCOUNTER (OUTPATIENT)
Dept: RADIOLOGY | Facility: HOSPITAL | Age: 74
Discharge: HOME | End: 2019-05-07
Attending: PHYSICIAN ASSISTANT
Payer: MEDICARE

## 2019-05-07 ENCOUNTER — OFFICE VISIT (OUTPATIENT)
Dept: ORTHOPEDICS | Facility: CLINIC | Age: 74
End: 2019-05-07
Payer: MEDICARE

## 2019-05-07 DIAGNOSIS — M81.0 AGE-RELATED OSTEOPOROSIS WITHOUT CURRENT PATHOLOGICAL FRACTURE: ICD-10-CM

## 2019-05-07 DIAGNOSIS — Z96.649 S/P HIP HEMIARTHROPLASTY: ICD-10-CM

## 2019-05-07 DIAGNOSIS — S72.002D CLOSED FRACTURE OF NECK OF LEFT FEMUR WITH ROUTINE HEALING, SUBSEQUENT ENCOUNTER: ICD-10-CM

## 2019-05-07 DIAGNOSIS — Z96.649 S/P HIP HEMIARTHROPLASTY: Primary | ICD-10-CM

## 2019-05-07 PROCEDURE — 99024 PR POST-OP FOLLOW-UP VISIT: ICD-10-PCS | Mod: POP,,, | Performed by: PHYSICIAN ASSISTANT

## 2019-05-07 PROCEDURE — 99999 PR PBB SHADOW E&M-EST. PATIENT-LVL IV: ICD-10-PCS | Mod: PBBFAC,,, | Performed by: PHYSICIAN ASSISTANT

## 2019-05-07 PROCEDURE — 73502 X-RAY EXAM HIP UNI 2-3 VIEWS: CPT | Mod: 26,LT,, | Performed by: RADIOLOGY

## 2019-05-07 PROCEDURE — 73502 XR HIP 2 VIEW LEFT: ICD-10-PCS | Mod: 26,LT,, | Performed by: RADIOLOGY

## 2019-05-07 PROCEDURE — 99214 OFFICE O/P EST MOD 30 MIN: CPT | Mod: PBBFAC,25 | Performed by: PHYSICIAN ASSISTANT

## 2019-05-07 PROCEDURE — 99024 POSTOP FOLLOW-UP VISIT: CPT | Mod: POP,,, | Performed by: PHYSICIAN ASSISTANT

## 2019-05-07 PROCEDURE — 99999 PR PBB SHADOW E&M-EST. PATIENT-LVL IV: CPT | Mod: PBBFAC,,, | Performed by: PHYSICIAN ASSISTANT

## 2019-05-07 PROCEDURE — 73502 X-RAY EXAM HIP UNI 2-3 VIEWS: CPT | Mod: TC,LT

## 2019-05-10 ENCOUNTER — TELEPHONE (OUTPATIENT)
Dept: ORTHOPEDICS | Facility: CLINIC | Age: 74
End: 2019-05-10

## 2019-05-10 NOTE — PROGRESS NOTES
Ms. Smith is 73 y.o. WF with adult congenital heart disease with prior dx of sinus venosus defect, anomalous pulmonary venous drainage with PAH diagnosed in past 10 years (probably for much longer per old CXR) who is currently on IV remodulin (89.2ng/kg/min) adempas and tracleer, hx of Rosemonas bacteremia s/p central line removal, who was transferred from [facility] to here for higher level of care and orthopedic consult for left femoral neck fracture that occurred secondary to af all from standing height. Patient has been treated with left hip hemiarthroplasty on 03/25/2019.    Ms. Smith is here today for a post-operative visit after a    Left hip hemiarthroplasty for femoral neck fracture  by Dr. Esposito on 03/25/2019.    Interval History:    she reports that she is doing ok.  Pain is somewhat controlled.  she is taking pain medication.  Tylenol  She is at home. She is recieving home health PT.   she denies fever, chills, and sweats since the time of the surgery.     Physical exam:  Dressing taken down.  Incision is clean, dry and intact. No signs of breakdown, mild TTP laterally, full range of motion of nee and ankle    RADS: Postoperative changes of left hip hemiarthroplasty again noted.  No detrimental change since 03/25/2019.    Assessment:  Post-op visit ( 6 weeks)    Plan:  Current care, treatment plan, precautions, activity level/ modifications, limitations, rehabilitation exercises and proposed future treatment were discussed with the patient. We discussed the need to monitor for changes in symptoms and condition and report them to the physician.  Discussed importance of compliance with all appointments and follow up examinations.   -she may wash the area with antibacterial soap in the shower. Will not submerge until the incision is completely healed  -Patient was advised to monitor wound closely and multiple times daily for any problems. Call clinic immediately or report to ED for immediate  medical attention for any complications including reopening of wound, drainage, purulence, redness, streaking, odor, pain out of proportion, fever, chills, etc.   -PT/OT, [facility]. , Patient is responsible to establish and continue care   -range of motion as tolerated with posterior hip precuations   - WBAT  - pain medication: no refill needed,    Pain medication refill policy provided to patient for review.   - Patient is to return to clinic in 6 weeks  At time x-ray of her hip is needed     If there are any questions prior to scheduled follow up, the patient was instructed to contact the office

## 2019-05-20 DIAGNOSIS — G56.20 ULNAR NEURITIS, UNSPECIFIED LATERALITY: Primary | ICD-10-CM

## 2019-05-21 RX ORDER — TRAMADOL HYDROCHLORIDE 300 MG/1
300 TABLET, EXTENDED RELEASE ORAL DAILY
Qty: 30 TABLET | Refills: 4 | Status: SHIPPED | OUTPATIENT
Start: 2019-05-21 | End: 2019-09-06 | Stop reason: SDUPTHER

## 2019-06-10 ENCOUNTER — LAB VISIT (OUTPATIENT)
Dept: LAB | Facility: HOSPITAL | Age: 74
End: 2019-06-10
Attending: INTERNAL MEDICINE
Payer: MEDICARE

## 2019-06-10 ENCOUNTER — OFFICE VISIT (OUTPATIENT)
Dept: TRANSPLANT | Facility: CLINIC | Age: 74
End: 2019-06-10
Payer: MEDICARE

## 2019-06-10 VITALS
DIASTOLIC BLOOD PRESSURE: 59 MMHG | HEIGHT: 64 IN | HEART RATE: 85 BPM | WEIGHT: 128.75 LBS | BODY MASS INDEX: 21.98 KG/M2 | SYSTOLIC BLOOD PRESSURE: 107 MMHG

## 2019-06-10 DIAGNOSIS — I50.810 RVF (RIGHT VENTRICULAR FAILURE): ICD-10-CM

## 2019-06-10 DIAGNOSIS — S72.002D CLOSED FRACTURE OF NECK OF LEFT FEMUR WITH ROUTINE HEALING, SUBSEQUENT ENCOUNTER: ICD-10-CM

## 2019-06-10 DIAGNOSIS — I27.20 PULMONARY HYPERTENSION: ICD-10-CM

## 2019-06-10 DIAGNOSIS — Z79.899 LONG TERM CURRENT USE OF AMIODARONE: ICD-10-CM

## 2019-06-10 DIAGNOSIS — Q24.9 ADULT CONGENITAL HEART DISEASE: ICD-10-CM

## 2019-06-10 DIAGNOSIS — Z79.01 ANTICOAGULANT LONG-TERM USE: Chronic | ICD-10-CM

## 2019-06-10 DIAGNOSIS — I48.3 TYPICAL ATRIAL FLUTTER: ICD-10-CM

## 2019-06-10 DIAGNOSIS — I27.21 PAH (PULMONARY ARTERY HYPERTENSION): Primary | ICD-10-CM

## 2019-06-10 DIAGNOSIS — J96.11 CHRONIC RESPIRATORY FAILURE WITH HYPOXIA: ICD-10-CM

## 2019-06-10 DIAGNOSIS — Q26.3 PARTIAL ANOMALOUS PULMONARY VENOUS CONNECTION (PAPVC): ICD-10-CM

## 2019-06-10 PROBLEM — S72.002A CLOSED LEFT HIP FRACTURE: Status: RESOLVED | Noted: 2019-03-23 | Resolved: 2019-06-10

## 2019-06-10 PROBLEM — R40.4 TRANSIENT ALTERATION OF AWARENESS: Status: RESOLVED | Noted: 2019-04-01 | Resolved: 2019-06-10

## 2019-06-10 PROBLEM — R79.89 ELEVATED TROPONIN: Status: RESOLVED | Noted: 2018-03-02 | Resolved: 2019-06-10

## 2019-06-10 LAB
ALBUMIN SERPL BCP-MCNC: 4.6 G/DL (ref 3.5–5.2)
ALP SERPL-CCNC: 51 U/L (ref 55–135)
ALT SERPL W/O P-5'-P-CCNC: 17 U/L (ref 10–44)
ANION GAP SERPL CALC-SCNC: 9 MMOL/L (ref 8–16)
AST SERPL-CCNC: 20 U/L (ref 10–40)
BASOPHILS # BLD AUTO: 0.08 K/UL (ref 0–0.2)
BASOPHILS NFR BLD: 1.3 % (ref 0–1.9)
BILIRUB SERPL-MCNC: 1 MG/DL (ref 0.1–1)
BNP SERPL-MCNC: 300 PG/ML (ref 0–99)
BUN SERPL-MCNC: 25 MG/DL (ref 8–23)
CALCIUM SERPL-MCNC: 9.9 MG/DL (ref 8.7–10.5)
CHLORIDE SERPL-SCNC: 102 MMOL/L (ref 95–110)
CO2 SERPL-SCNC: 27 MMOL/L (ref 23–29)
CREAT SERPL-MCNC: 1.2 MG/DL (ref 0.5–1.4)
DIFFERENTIAL METHOD: ABNORMAL
EOSINOPHIL # BLD AUTO: 0.1 K/UL (ref 0–0.5)
EOSINOPHIL NFR BLD: 1.3 % (ref 0–8)
ERYTHROCYTE [DISTWIDTH] IN BLOOD BY AUTOMATED COUNT: 13.9 % (ref 11.5–14.5)
EST. GFR  (AFRICAN AMERICAN): 52.2 ML/MIN/1.73 M^2
EST. GFR  (NON AFRICAN AMERICAN): 45.3 ML/MIN/1.73 M^2
GLUCOSE SERPL-MCNC: 83 MG/DL (ref 70–110)
HCT VFR BLD AUTO: 48 % (ref 37–48.5)
HGB BLD-MCNC: 15.1 G/DL (ref 12–16)
IMM GRANULOCYTES # BLD AUTO: 0.02 K/UL (ref 0–0.04)
IMM GRANULOCYTES NFR BLD AUTO: 0.3 % (ref 0–0.5)
LYMPHOCYTES # BLD AUTO: 1.1 K/UL (ref 1–4.8)
LYMPHOCYTES NFR BLD: 18.1 % (ref 18–48)
MCH RBC QN AUTO: 29.8 PG (ref 27–31)
MCHC RBC AUTO-ENTMCNC: 31.5 G/DL (ref 32–36)
MCV RBC AUTO: 95 FL (ref 82–98)
MONOCYTES # BLD AUTO: 0.5 K/UL (ref 0.3–1)
MONOCYTES NFR BLD: 8.6 % (ref 4–15)
NEUTROPHILS # BLD AUTO: 4.4 K/UL (ref 1.8–7.7)
NEUTROPHILS NFR BLD: 70.4 % (ref 38–73)
NRBC BLD-RTO: 0 /100 WBC
PLATELET # BLD AUTO: 307 K/UL (ref 150–350)
PMV BLD AUTO: 9.2 FL (ref 9.2–12.9)
POTASSIUM SERPL-SCNC: 4.7 MMOL/L (ref 3.5–5.1)
PROT SERPL-MCNC: 8.1 G/DL (ref 6–8.4)
RBC # BLD AUTO: 5.06 M/UL (ref 4–5.4)
SODIUM SERPL-SCNC: 138 MMOL/L (ref 136–145)
WBC # BLD AUTO: 6.26 K/UL (ref 3.9–12.7)

## 2019-06-10 PROCEDURE — 80053 COMPREHEN METABOLIC PANEL: CPT

## 2019-06-10 PROCEDURE — 83880 ASSAY OF NATRIURETIC PEPTIDE: CPT

## 2019-06-10 PROCEDURE — 99214 PR OFFICE/OUTPT VISIT, EST, LEVL IV, 30-39 MIN: ICD-10-PCS | Mod: S$PBB,,, | Performed by: INTERNAL MEDICINE

## 2019-06-10 PROCEDURE — 99213 OFFICE O/P EST LOW 20 MIN: CPT | Mod: PBBFAC | Performed by: INTERNAL MEDICINE

## 2019-06-10 PROCEDURE — 99999 PR PBB SHADOW E&M-EST. PATIENT-LVL III: CPT | Mod: PBBFAC,,, | Performed by: INTERNAL MEDICINE

## 2019-06-10 PROCEDURE — 99214 OFFICE O/P EST MOD 30 MIN: CPT | Mod: S$PBB,,, | Performed by: INTERNAL MEDICINE

## 2019-06-10 PROCEDURE — 85025 COMPLETE CBC W/AUTO DIFF WBC: CPT

## 2019-06-10 PROCEDURE — 99999 PR PBB SHADOW E&M-EST. PATIENT-LVL III: ICD-10-PCS | Mod: PBBFAC,,, | Performed by: INTERNAL MEDICINE

## 2019-06-10 PROCEDURE — 36415 COLL VENOUS BLD VENIPUNCTURE: CPT

## 2019-06-10 RX ORDER — TRIAMCINOLONE ACETONIDE 1 MG/G
CREAM TOPICAL
Refills: 1 | COMMUNITY
Start: 2019-05-30 | End: 2019-09-06

## 2019-06-10 RX ORDER — PREDNISONE 10 MG/1
10 TABLET ORAL
Refills: 0 | COMMUNITY
Start: 2019-06-03 | End: 2019-07-18

## 2019-06-10 NOTE — PROGRESS NOTES
Subjective:      HPI  Delightful 73 y.o. 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 She also has h/o Rosemonas bacteremia s/p central line removal, and IV abx per ID with replacement by nephrology.  Pt was admitted 10/4-10/9 with worsening fatigue, diarrhea since the morning of 10/4/17, nausea and low grade temperature at home (100.4 being the highest). Also had a single event of hypoxia (SpO2 50s) that resolved without intervention. During that hospitalization she was placed on 100% FiO2 via HFNC on admit secondary to hypoxia. Blood cultures were drawn and remodulin was moved to peripheral line. Blood cultures were drawn from [facility] as well. ID was consulted. He symptoms improved gradually throughout hospitalization and she was eventually weaned back to low flow NC. Cultures remained negative and remodulin was moved back to central access. They attributed fevers/hypoxia to possible viral infection. She ws eventually weaned down to 4L NC and discharged home in good condition. She was then admitted at [facility] 11/29/17 with hypotension w severe vomiting, fever 101.9, chills and diarrhea- she was over hydrated in the ER, GI w/u was (-), had recently increased her remodulin- was discharged in time to go to her son's wedding-  pt was then admitted 2/28-3/5 with  increased SOB and O2 requirements in setting of prolonged episode of A tach with associated neck pressure/pain. Troponin was positive and EKG had nonspecific ST changes. Pt was loaded with ASA and Plavix and started on Heparin bridge. Interventional Cards consulted for NSTEMI/LHC, but LHC 3/1/18 showed normal coronary arteries.  EP was consulted for A Tach.  There are very limited options in this patient-  previously discussed tikosyn but would avoid given baseline Qt. Not a candidate for sotalol. Given that pt has had ongoing intermittent atrial tach and does not tolerate it, will try amio in place of cardizem. Pt had no further episodes on tele. Pt will need monitoring of PFTs, TFTs, and LFTs while on Amio.   Pt was continued on home dose of IV Remodulin. We attempted to wean off Adempas, but with continued increasing oxygen requirements, restarted Adempas and this improved so she will continue on this for now. Also of note, pt has been taking bosentan TID for years, when this should be a BID dosed med. So we will have her change bosentan to BID dosing on d/c.      Since last visit, pt was admitted with L hip Fx in April- orthopedics consulted and she is s/p Hemiarthroplasty on 3/25/19.  She received post op Ancef and did well postoperately.  Her pain was initally controlled with Ropivacaine; however, that was changed to Tylenol and Tramadol with good response.  She did not want to take oxycodone as it makes her nausous.  PT/OT worked with her throughout her hospital stay and she will be discharged with homehealth PT.  She has Tramadol at home (200mg extended release) and Tylenol prn for pain control.  She was instructed to keep post op bandage on for two weeks and she has scheduled follow up with Orthopedics on 4/8/19              Patient had episodes of SVT for which EP was consulted.  They recommended continuing Amiodarone for now and consider added Verapamil if Rates were not controlled.  We did not add Verapamil secondary to hx of RV failure. Her rates and rhythm stabilized and she was discharged on her previous home dose of Amiodarone.              On 3/31 patient had episode of blurred vision associated with aphasia, facial droop and right-sided weakness after R IJ removed.  CT of head done and neurology consulted.  They suspected either transient vagal event  or transient scattered air emboli given context immediately after IJ removal - however both VS record and CT immediately afterward would contradict this ddx.  Repeat CT 48 hours after event was also negative.  MRI was not done as patient and family did not want to stop Remodulin infusion for MRI.  Neurology recommended Continuing current antithrombotic regimen.          Throughout her hospital stay; she remained on her home dose of Remodulin, Bosentan and Riociguat, and on day of discharge she was on her home dose of oxygen.     Since dc home, pt has been going to PT, has developed ulnar neuropathy and knee pain, likely from compensating for the hip pain. No swelling, reports while working with therapy just has to be careful with exercises in which she is asked to raise her arms.   No CP,      no f/c and line is clean    Does not have much swelling now, no cp/palps/LH.       Remodulin is at 89.2 ng/kg/min (unchanged from Flowsheet) and on Tracleer, adempas 0.5mg tid      6MWT: 223m (unchanged from August) 231m (195m (236m Abdiel, 225m, 283m May, 222m Mar, 375m Nov, 305 Sept, 274.5 July,  335.5May, 365  m prev 2 visits)                        O2 sat  86->81% 6L pull tank                                                          HR 87-->94                           BP  110/ 57  ->121/62                                                      Roberto 0.5 ->9    TTE 3/23/18    1 - Normal left ventricular systolic function (EF 60-65%).     2 - No wall motion abnormalities.     3 - Biatrial enlargement.     4 - Impaired LV relaxation, elevated LAP (grade 2 diastolic dysfunction).     5 - Right ventricular enlargement with hypertrophy, with moderately depressed systolic function.     6 - Pulmonary hypertension. The estimated PA systolic pressure is 90 mmHg.     7 - Small pericardial effusion.     8 - Intermediate central venous pressure.     TTE 3/1/18    1 - Normal left ventricular systolic function (EF 55-60%).     2 -  Indeterminate LV diastolic function.     3 - Right atrial enlargement.     4 - Right ventricular enlargement with severely depressed systolic function.     5 - Trivial mitral regurgitation.     6 - Moderate tricuspid regurgitation.     7 - No wall motion abnormalities.     8 - Pulmonary hypertension. The estimated PA systolic pressure is greater than 77 mmHg.     TTE 5/8/17:    1 - Right ventricular enlargement with hypertrophy, with severely depressed systolic function.     2 - Moderate to severe tricuspid regurgitation.     3 - Pulmonary hypertension. The estimated PA systolic pressure is 116 mmHg.     4 - Biatrial enlargement.     5 - Normal left ventricular systolic function (EF 60-65%); reduced LV stroke volume.  The right ventricle is enlarged measuring 5.8 cm at the base in the apical right ventricle-focused view. There is RVH. Global right ventricular systolic function appears severely depressed. There is abnormal septal motion consistent with   RV pressure/volume overload. Tricuspid annular plane systolic excursion (TAPSE) is 1.5 cm. The estimated PA systolic pressure is 116 mmHg.     [facility] 9/25/17:  CONDITION 1 (9/25/2017 11:20:07):  RA: 5/2 (1)  RV: 84/-5  RVEDP: 6     PW: 17/19 (16)  PA: 87/21 (47)  AO_SAT: 95  AO: 115/71 (86)  PA_SAT: 71  SPO2: 97  FICKCI: 3.2800  FICKCO: 5.5500    Review of Systems   Constitution: Negative for chills, fever, malaise/fatigue, weight gain and weight loss.   HENT: Negative.    Eyes: Negative.    Cardiovascular: Positive for dyspnea on exertion and palpitations. Negative for chest pain, leg swelling, near-syncope, orthopnea, paroxysmal nocturnal dyspnea and syncope.   Respiratory: Positive for cough. Negative for shortness of breath.    Endocrine: Negative.    Skin: Negative.    Musculoskeletal: Positive for arthritis and joint pain. Negative for back pain.   Gastrointestinal: Negative for bloating, abdominal pain, change in bowel habit and nausea.   Neurological: Negative  "for dizziness and light-headedness.   Psychiatric/Behavioral: Negative for depression.        Objective:   BP (!) 107/59 (BP Location: Left arm, Patient Position: Sitting, BP Method: Small (Automatic))   Pulse 85   Ht 5' 3.5" (1.613 m)   Wt 58.4 kg (128 lb 12 oz)   BMI 22.45 kg/m²       Physical Exam   Constitutional: She is oriented to person, place, and time. She appears well-developed and well-nourished.   HENT:   Head: Normocephalic and atraumatic.   Eyes: Right eye exhibits no discharge. Left eye exhibits no discharge.   Neck: Neck supple. No JVD present. No thyromegaly present.   Cardiovascular: Regular rhythm. Exam reveals no gallop and no friction rub.   No murmur heard.  Tachy, accentuated S2     Pulmonary/Chest: Effort normal and breath sounds normal. No respiratory distress. She has no wheezes. She has no rales.   few rhonchi   Abdominal: Soft. Bowel sounds are normal. She exhibits no distension. There is no tenderness.   Musculoskeletal: Normal range of motion. She exhibits edema. She exhibits no tenderness.   Mild edema in the left ankle (always more swollen,broke it years ago)   Neurological: She is alert and oriented to person, place, and time. No cranial nerve deficit. Coordination normal.   Skin: Skin is warm and dry. No rash noted.   Psychiatric: She has a normal mood and affect. Judgment and thought content normal.                                 Chemistry        Component Value Date/Time     06/10/2019 1256    K 4.7 06/10/2019 1256     06/10/2019 1256    CO2 27 06/10/2019 1256    BUN 25 (H) 06/10/2019 1256    CREATININE 1.2 06/10/2019 1256    GLU 83 06/10/2019 1256        Component Value Date/Time    CALCIUM 9.9 06/10/2019 1256    ALKPHOS 51 (L) 06/10/2019 1256    AST 20 06/10/2019 1256    ALT 17 06/10/2019 1256    BILITOT 1.0 06/10/2019 1256            Magnesium   Date Value Ref Range Status   04/05/2019 2.0 1.6 - 2.6 mg/dL Final       Lab Results   Component Value Date    WBC " 6.26 06/10/2019    HGB 15.1 06/10/2019    HCT 48.0 06/10/2019    MCV 95 06/10/2019     06/10/2019         BNP   Date Value Ref Range Status   06/10/2019 300 (H) 0 - 99 pg/mL Final     Comment:     Values of less than 100 pg/ml are consistent with non-CHF populations.   01/29/2019 220 (H) 0 - 99 pg/mL Final     Comment:     Values of less than 100 pg/ml are consistent with non-CHF populations.   11/13/2018 210 (H) 0 - 99 pg/mL Final     Comment:     Values of less than 100 pg/ml are consistent with non-CHF populations.                 Assessment:       1. Pulmonary hypertension- WHO Group 1,no 6mw distance today given hip Fx- without a doubt, pt suffers from R to left shunting- was unable to tolerate increasing remodulin  BNP  Mildly increased in her usual range - FC III on triple tx- echo has me worried as RV is enlarged and severely depressed, and now with small pericardial effusion- focus now should be on improving her QOL as much as possible   2. PFO (patent foramen ovale)    3. Palpitations/tachycardia- stale, followed by EP, on amio now  4. Chronic hypoxemic resp failure- stable on O2  5. PAPVR,sinus venosus ASD- decision made to treat conservatively     Plan:    -  No change to remodulin today    -given that tracleer has gone generic may need to transition to macitentan. Will have to wait and see what their OOP will be for the generic bostentan vs opsumit (asked them to call their insurance company)    - Keep salt intake to under 2000 mg sodium, fluids to under 2 L (64 oz)    Check weights every morning after getting out of bed and urinating. If  weight goes up 3# overnight or 5# in one week she should take 40 mg lasix in the pm and call us if fluid doesn't come off.    Interested in implantable pump once we are ready to start implanting them.      flu shot this fall    F/u 2-3 mo with walk, labs

## 2019-06-10 NOTE — PATIENT INSTRUCTIONS
Call the insurance company to see what your out of pocket will be for either generic bosentan versus macitentan/opsumit    PH on Facebook: A couple of our patients created a group on Facebook for people living in [place] with PH, it's called [place] PHriends.    Check it out!      PH Support group:    June 19, 2019     11:00 AM - 1:00 PM  [place]  [facility] [place].  Topic: Nutrition, Nutrition Bingo and Meal of Fortune Game.  Meeting sponsor is [employer]    RSVP to Marianna Patterson [phone] or [email or web address] by 6/17/19 Friday June 28, 2019   6:30 pm  Tips for Daily Living with PH  [employer]   [address] [place] [address]    Contact your [employer] [facility] , [place], by cellphone [phone], or email [email or web address] to RSVP           Sept 18 dinner- speaker Albertina Ruffin- time TBD  [employer]  [address] [place] [address]  Sponsored by [employer]

## 2019-06-17 ENCOUNTER — TELEPHONE (OUTPATIENT)
Dept: TRANSPLANT | Facility: CLINIC | Age: 74
End: 2019-06-17

## 2019-06-18 ENCOUNTER — OFFICE VISIT (OUTPATIENT)
Dept: ORTHOPEDICS | Facility: CLINIC | Age: 74
End: 2019-06-18
Payer: MEDICARE

## 2019-06-18 ENCOUNTER — HOSPITAL ENCOUNTER (OUTPATIENT)
Dept: RADIOLOGY | Facility: HOSPITAL | Age: 74
Discharge: HOME | End: 2019-06-18
Attending: PHYSICIAN ASSISTANT
Payer: MEDICARE

## 2019-06-18 VITALS — HEIGHT: 64 IN | WEIGHT: 125 LBS | BODY MASS INDEX: 21.34 KG/M2

## 2019-06-18 DIAGNOSIS — S72.002D CLOSED FRACTURE OF NECK OF LEFT FEMUR WITH ROUTINE HEALING, SUBSEQUENT ENCOUNTER: ICD-10-CM

## 2019-06-18 DIAGNOSIS — Z96.649 S/P HIP HEMIARTHROPLASTY: ICD-10-CM

## 2019-06-18 DIAGNOSIS — Z96.649 S/P HIP HEMIARTHROPLASTY: Primary | ICD-10-CM

## 2019-06-18 PROCEDURE — 73502 X-RAY EXAM HIP UNI 2-3 VIEWS: CPT | Mod: 26,LT,, | Performed by: RADIOLOGY

## 2019-06-18 PROCEDURE — 99024 PR POST-OP FOLLOW-UP VISIT: ICD-10-PCS | Mod: POP,,, | Performed by: PHYSICIAN ASSISTANT

## 2019-06-18 PROCEDURE — 73502 XR HIP 2 VIEW LEFT: ICD-10-PCS | Mod: 26,LT,, | Performed by: RADIOLOGY

## 2019-06-18 PROCEDURE — 99214 OFFICE O/P EST MOD 30 MIN: CPT | Mod: PBBFAC,25 | Performed by: PHYSICIAN ASSISTANT

## 2019-06-18 PROCEDURE — 99999 PR PBB SHADOW E&M-EST. PATIENT-LVL IV: ICD-10-PCS | Mod: PBBFAC,,, | Performed by: PHYSICIAN ASSISTANT

## 2019-06-18 PROCEDURE — 99024 POSTOP FOLLOW-UP VISIT: CPT | Mod: POP,,, | Performed by: PHYSICIAN ASSISTANT

## 2019-06-18 PROCEDURE — 73502 X-RAY EXAM HIP UNI 2-3 VIEWS: CPT | Mod: TC,LT

## 2019-06-18 PROCEDURE — 99999 PR PBB SHADOW E&M-EST. PATIENT-LVL IV: CPT | Mod: PBBFAC,,, | Performed by: PHYSICIAN ASSISTANT

## 2019-06-21 NOTE — PROGRESS NOTES
Ms. Smith is 73 y.o. WF with adult congenital heart disease with prior dx of sinus venosus defect, anomalous pulmonary venous drainage with PAH diagnosed in past 10 years (probably for much longer per old CXR) who is currently on IV remodulin (89.2ng/kg/min) adempas and tracleer, hx of Rosemonas bacteremia s/p central line removal, who was transferred from [facility] to here for higher level of care and orthopedic consult for left femoral neck fracture that occurred secondary to af all from standing height. Patient has been treated with left hip hemiarthroplasty on 03/25/2019.    Ms. Smith is here today for a post-operative visit after a    Left hip hemiarthroplasty for femoral neck fracture  by Dr. Esposito on 03/25/2019.    Interval History:    she reports that she is doing ok.  Pain is somewhat controlled.  she is taking pain medication.  Tylenol  She is at home. She is recieving home health PT.   Slight concern for some knee popping as well as groin pain.   she denies fever, chills, and sweats since the time of the surgery.     Physical exam:  Walked into clinic with walker accompanied by her , mild TTP laterally, full range of motion of knee and ankle, weakness noted.     RADS: Postoperative changes are again identified relating to a prior left sided hemiarthroplasty procedure.  Osseous structures demonstrate no evidence of superimposed recent fracture, lytic destructive process, or other significant detrimental interval change since 05/07/2019.  Right hip joint space appears normally maintained.    Assessment:  Post-op visit ( 12 weeks)    Plan:  Current care, treatment plan, precautions, activity level/ modifications, limitations, rehabilitation exercises and proposed future treatment were discussed with the patient. We discussed the need to monitor for changes in symptoms and condition and report them to the physician.  Discussed importance of compliance with all appointments and follow up  examinations.   -she may wash the area with antibacterial soap in the shower. Will not submerge until the incision is completely healed  -Patient was advised to monitor wound closely and multiple times daily for any problems. Call clinic immediately or report to ED for immediate medical attention for any complications including reopening of wound, drainage, purulence, redness, streaking, odor, pain out of proportion, fever, chills, etc.   -PT/OT, [facility] , Patient is responsible to establish and continue care   -range of motion as tolerated with posterior hip precuations   - WBAT  - pain medication: no refill needed,    Pain medication refill policy provided to patient for review.   - Patient is to return to clinic  At 1 year essence sooner if any issues or concerns.   At time x-ray of her hip is needed     If there are any questions prior to scheduled follow up, the patient was instructed to contact the office

## 2019-06-26 ENCOUNTER — TELEPHONE (OUTPATIENT)
Dept: TRANSPLANT | Facility: CLINIC | Age: 74
End: 2019-06-26

## 2019-07-10 ENCOUNTER — TELEPHONE (OUTPATIENT)
Dept: TRANSPLANT | Facility: CLINIC | Age: 74
End: 2019-07-10

## 2019-07-11 NOTE — TELEPHONE ENCOUNTER
Pt's Opsumit approved, through 12/31/19. Mary at [facility] notified, and notification also sent to Kelly at [facility].

## 2019-07-17 ENCOUNTER — HOSPITAL ENCOUNTER (INPATIENT)
Facility: HOSPITAL | Age: 74
LOS: 1 days | Discharge: HOME | DRG: 310 | End: 2019-07-18
Attending: EMERGENCY MEDICINE | Admitting: INTERNAL MEDICINE
Payer: MEDICARE

## 2019-07-17 DIAGNOSIS — I27.21 PAH (PULMONARY ARTERY HYPERTENSION): ICD-10-CM

## 2019-07-17 DIAGNOSIS — Z79.01 ANTICOAGULANT LONG-TERM USE: Chronic | ICD-10-CM

## 2019-07-17 DIAGNOSIS — R06.02 SHORTNESS OF BREATH: ICD-10-CM

## 2019-07-17 DIAGNOSIS — J96.11 CHRONIC RESPIRATORY FAILURE WITH HYPOXIA: ICD-10-CM

## 2019-07-17 DIAGNOSIS — R00.2 PALPITATIONS: ICD-10-CM

## 2019-07-17 DIAGNOSIS — I47.10 SVT (SUPRAVENTRICULAR TACHYCARDIA): Primary | ICD-10-CM

## 2019-07-17 PROCEDURE — 99291 PR CRITICAL CARE, E/M 30-74 MINUTES: ICD-10-PCS | Mod: ,,, | Performed by: EMERGENCY MEDICINE

## 2019-07-17 PROCEDURE — 93010 EKG 12-LEAD: ICD-10-PCS | Mod: ,,, | Performed by: INTERNAL MEDICINE

## 2019-07-17 PROCEDURE — 99291 CRITICAL CARE FIRST HOUR: CPT | Mod: ,,, | Performed by: EMERGENCY MEDICINE

## 2019-07-17 PROCEDURE — 99291 CRITICAL CARE FIRST HOUR: CPT | Mod: 25

## 2019-07-17 PROCEDURE — 93010 ELECTROCARDIOGRAM REPORT: CPT | Mod: ,,, | Performed by: INTERNAL MEDICINE

## 2019-07-17 PROCEDURE — 93005 ELECTROCARDIOGRAM TRACING: CPT

## 2019-07-18 VITALS
OXYGEN SATURATION: 89 % | RESPIRATION RATE: 19 BRPM | SYSTOLIC BLOOD PRESSURE: 92 MMHG | BODY MASS INDEX: 21.12 KG/M2 | HEART RATE: 75 BPM | DIASTOLIC BLOOD PRESSURE: 51 MMHG | HEIGHT: 63 IN | TEMPERATURE: 99 F | WEIGHT: 119.19 LBS

## 2019-07-18 LAB
ALBUMIN SERPL BCP-MCNC: 3.5 G/DL (ref 3.5–5.2)
ALBUMIN SERPL BCP-MCNC: 3.6 G/DL (ref 3.5–5.2)
ALP SERPL-CCNC: 37 U/L (ref 55–135)
ALP SERPL-CCNC: 40 U/L (ref 55–135)
ALT SERPL W/O P-5'-P-CCNC: 13 U/L (ref 10–44)
ALT SERPL W/O P-5'-P-CCNC: 14 U/L (ref 10–44)
ANION GAP SERPL CALC-SCNC: 8 MMOL/L (ref 8–16)
ANION GAP SERPL CALC-SCNC: 8 MMOL/L (ref 8–16)
AST SERPL-CCNC: 15 U/L (ref 10–40)
AST SERPL-CCNC: 19 U/L (ref 10–40)
BACTERIA #/AREA URNS AUTO: ABNORMAL /HPF
BASOPHILS # BLD AUTO: 0.04 K/UL (ref 0–0.2)
BASOPHILS # BLD AUTO: 0.06 K/UL (ref 0–0.2)
BASOPHILS NFR BLD: 0.7 % (ref 0–1.9)
BASOPHILS NFR BLD: 1.5 % (ref 0–1.9)
BILIRUB SERPL-MCNC: 0.7 MG/DL (ref 0.1–1)
BILIRUB SERPL-MCNC: 0.8 MG/DL (ref 0.1–1)
BILIRUB UR QL STRIP: NEGATIVE
BNP SERPL-MCNC: 240 PG/ML (ref 0–99)
BUN SERPL-MCNC: 24 MG/DL (ref 8–23)
BUN SERPL-MCNC: 28 MG/DL (ref 8–23)
CALCIUM SERPL-MCNC: 10.1 MG/DL (ref 8.7–10.5)
CALCIUM SERPL-MCNC: 9.9 MG/DL (ref 8.7–10.5)
CHLORIDE SERPL-SCNC: 107 MMOL/L (ref 95–110)
CHLORIDE SERPL-SCNC: 107 MMOL/L (ref 95–110)
CLARITY UR REFRACT.AUTO: ABNORMAL
CO2 SERPL-SCNC: 23 MMOL/L (ref 23–29)
CO2 SERPL-SCNC: 27 MMOL/L (ref 23–29)
COLOR UR AUTO: YELLOW
CREAT SERPL-MCNC: 1.1 MG/DL (ref 0.5–1.4)
CREAT SERPL-MCNC: 1.1 MG/DL (ref 0.5–1.4)
DIFFERENTIAL METHOD: ABNORMAL
DIFFERENTIAL METHOD: ABNORMAL
EOSINOPHIL # BLD AUTO: 0.1 K/UL (ref 0–0.5)
EOSINOPHIL # BLD AUTO: 0.1 K/UL (ref 0–0.5)
EOSINOPHIL NFR BLD: 1.2 % (ref 0–8)
EOSINOPHIL NFR BLD: 2.5 % (ref 0–8)
ERYTHROCYTE [DISTWIDTH] IN BLOOD BY AUTOMATED COUNT: 13.7 % (ref 11.5–14.5)
ERYTHROCYTE [DISTWIDTH] IN BLOOD BY AUTOMATED COUNT: 13.8 % (ref 11.5–14.5)
EST. GFR  (AFRICAN AMERICAN): 58 ML/MIN/1.73 M^2
EST. GFR  (AFRICAN AMERICAN): 58 ML/MIN/1.73 M^2
EST. GFR  (NON AFRICAN AMERICAN): 50.3 ML/MIN/1.73 M^2
EST. GFR  (NON AFRICAN AMERICAN): 50.3 ML/MIN/1.73 M^2
GLUCOSE SERPL-MCNC: 101 MG/DL (ref 70–110)
GLUCOSE SERPL-MCNC: 83 MG/DL (ref 70–110)
GLUCOSE UR QL STRIP: NEGATIVE
HCT VFR BLD AUTO: 39.5 % (ref 37–48.5)
HCT VFR BLD AUTO: 40.7 % (ref 37–48.5)
HGB BLD-MCNC: 12.4 G/DL (ref 12–16)
HGB BLD-MCNC: 12.7 G/DL (ref 12–16)
HGB UR QL STRIP: NEGATIVE
HYALINE CASTS UR QL AUTO: 1 /LPF
IMM GRANULOCYTES # BLD AUTO: 0.01 K/UL (ref 0–0.04)
IMM GRANULOCYTES # BLD AUTO: 0.03 K/UL (ref 0–0.04)
IMM GRANULOCYTES NFR BLD AUTO: 0.3 % (ref 0–0.5)
IMM GRANULOCYTES NFR BLD AUTO: 0.5 % (ref 0–0.5)
INR PPP: 1.5 (ref 0.8–1.2)
INR PPP: 1.7 (ref 0.8–1.2)
KETONES UR QL STRIP: NEGATIVE
LEUKOCYTE ESTERASE UR QL STRIP: NEGATIVE
LYMPHOCYTES # BLD AUTO: 0.8 K/UL (ref 1–4.8)
LYMPHOCYTES # BLD AUTO: 1 K/UL (ref 1–4.8)
LYMPHOCYTES NFR BLD: 14.6 % (ref 18–48)
LYMPHOCYTES NFR BLD: 24.7 % (ref 18–48)
MAGNESIUM SERPL-MCNC: 2.1 MG/DL (ref 1.6–2.6)
MCH RBC QN AUTO: 29.7 PG (ref 27–31)
MCH RBC QN AUTO: 30 PG (ref 27–31)
MCHC RBC AUTO-ENTMCNC: 31.2 G/DL (ref 32–36)
MCHC RBC AUTO-ENTMCNC: 31.4 G/DL (ref 32–36)
MCV RBC AUTO: 95 FL (ref 82–98)
MCV RBC AUTO: 95 FL (ref 82–98)
MICROSCOPIC COMMENT: ABNORMAL
MONOCYTES # BLD AUTO: 0.4 K/UL (ref 0.3–1)
MONOCYTES # BLD AUTO: 0.5 K/UL (ref 0.3–1)
MONOCYTES NFR BLD: 10.4 % (ref 4–15)
MONOCYTES NFR BLD: 7.9 % (ref 4–15)
NEUTROPHILS # BLD AUTO: 2.4 K/UL (ref 1.8–7.7)
NEUTROPHILS # BLD AUTO: 4.3 K/UL (ref 1.8–7.7)
NEUTROPHILS NFR BLD: 60.6 % (ref 38–73)
NEUTROPHILS NFR BLD: 75.1 % (ref 38–73)
NITRITE UR QL STRIP: NEGATIVE
NRBC BLD-RTO: 0 /100 WBC
NRBC BLD-RTO: 0 /100 WBC
PH UR STRIP: 6 [PH] (ref 5–8)
PHOSPHATE SERPL-MCNC: 4.6 MG/DL (ref 2.7–4.5)
PLATELET # BLD AUTO: 230 K/UL (ref 150–350)
PLATELET # BLD AUTO: 248 K/UL (ref 150–350)
PMV BLD AUTO: 9 FL (ref 9.2–12.9)
PMV BLD AUTO: 9.2 FL (ref 9.2–12.9)
POTASSIUM SERPL-SCNC: 3.7 MMOL/L (ref 3.5–5.1)
POTASSIUM SERPL-SCNC: 4.1 MMOL/L (ref 3.5–5.1)
PROT SERPL-MCNC: 6.1 G/DL (ref 6–8.4)
PROT SERPL-MCNC: 6.3 G/DL (ref 6–8.4)
PROT UR QL STRIP: ABNORMAL
PROTHROMBIN TIME: 14.7 SEC (ref 9–12.5)
PROTHROMBIN TIME: 16.9 SEC (ref 9–12.5)
RBC # BLD AUTO: 4.14 M/UL (ref 4–5.4)
RBC # BLD AUTO: 4.27 M/UL (ref 4–5.4)
RBC #/AREA URNS AUTO: 1 /HPF (ref 0–4)
SODIUM SERPL-SCNC: 138 MMOL/L (ref 136–145)
SODIUM SERPL-SCNC: 142 MMOL/L (ref 136–145)
SP GR UR STRIP: 1.02 (ref 1–1.03)
SQUAMOUS #/AREA URNS AUTO: 0 /HPF
TROPONIN I SERPL DL<=0.01 NG/ML-MCNC: 0.1 NG/ML (ref 0–0.03)
TROPONIN I SERPL DL<=0.01 NG/ML-MCNC: 0.1 NG/ML (ref 0–0.03)
URN SPEC COLLECT METH UR: ABNORMAL
WBC # BLD AUTO: 3.96 K/UL (ref 3.9–12.7)
WBC # BLD AUTO: 5.67 K/UL (ref 3.9–12.7)
WBC #/AREA URNS AUTO: 3 /HPF (ref 0–5)

## 2019-07-18 PROCEDURE — 25000003 PHARM REV CODE 250: Performed by: INTERNAL MEDICINE

## 2019-07-18 PROCEDURE — 83880 ASSAY OF NATRIURETIC PEPTIDE: CPT

## 2019-07-18 PROCEDURE — 20600001 HC STEP DOWN PRIVATE ROOM

## 2019-07-18 PROCEDURE — 80053 COMPREHEN METABOLIC PANEL: CPT

## 2019-07-18 PROCEDURE — 85610 PROTHROMBIN TIME: CPT

## 2019-07-18 PROCEDURE — 99223 1ST HOSP IP/OBS HIGH 75: CPT | Mod: ,,, | Performed by: INTERNAL MEDICINE

## 2019-07-18 PROCEDURE — 80053 COMPREHEN METABOLIC PANEL: CPT | Mod: 91

## 2019-07-18 PROCEDURE — 85610 PROTHROMBIN TIME: CPT | Mod: 91

## 2019-07-18 PROCEDURE — 99223 PR INITIAL HOSPITAL CARE,LEVL III: ICD-10-PCS | Mod: ,,, | Performed by: INTERNAL MEDICINE

## 2019-07-18 PROCEDURE — 99222 1ST HOSP IP/OBS MODERATE 55: CPT | Mod: ,,, | Performed by: INTERNAL MEDICINE

## 2019-07-18 PROCEDURE — 84100 ASSAY OF PHOSPHORUS: CPT

## 2019-07-18 PROCEDURE — 99222 PR INITIAL HOSPITAL CARE,LEVL II: ICD-10-PCS | Mod: ,,, | Performed by: INTERNAL MEDICINE

## 2019-07-18 PROCEDURE — 81001 URINALYSIS AUTO W/SCOPE: CPT

## 2019-07-18 PROCEDURE — 83735 ASSAY OF MAGNESIUM: CPT

## 2019-07-18 PROCEDURE — 84484 ASSAY OF TROPONIN QUANT: CPT | Mod: 91

## 2019-07-18 PROCEDURE — 84484 ASSAY OF TROPONIN QUANT: CPT

## 2019-07-18 PROCEDURE — 85025 COMPLETE CBC W/AUTO DIFF WBC: CPT | Mod: 91

## 2019-07-18 RX ORDER — BOSENTAN 125 MG/1
125 TABLET, FILM COATED ORAL EVERY 12 HOURS
Status: DISCONTINUED | OUTPATIENT
Start: 2019-07-18 | End: 2019-07-18 | Stop reason: HOSPADM

## 2019-07-18 RX ORDER — AMIODARONE HYDROCHLORIDE 200 MG/1
200 TABLET ORAL DAILY
Status: DISCONTINUED | OUTPATIENT
Start: 2019-07-18 | End: 2019-07-18 | Stop reason: HOSPADM

## 2019-07-18 RX ORDER — ACETAMINOPHEN 325 MG/1
650 TABLET ORAL EVERY 6 HOURS PRN
Status: DISCONTINUED | OUTPATIENT
Start: 2019-07-18 | End: 2019-07-18 | Stop reason: HOSPADM

## 2019-07-18 RX ORDER — WARFARIN SODIUM 5 MG/1
10 TABLET ORAL DAILY
Status: DISCONTINUED | OUTPATIENT
Start: 2019-07-18 | End: 2019-07-18 | Stop reason: HOSPADM

## 2019-07-18 RX ORDER — SPIRONOLACTONE 25 MG/1
25 TABLET ORAL DAILY
Status: DISCONTINUED | OUTPATIENT
Start: 2019-07-18 | End: 2019-07-18 | Stop reason: HOSPADM

## 2019-07-18 RX ORDER — ACETAMINOPHEN 325 MG/1
650 TABLET ORAL EVERY 6 HOURS PRN
Refills: 0 | COMMUNITY
Start: 2019-07-18 | End: 2020-09-22 | Stop reason: ALTCHOICE

## 2019-07-18 RX ORDER — BOSENTAN 125 MG/1
125 TABLET, FILM COATED ORAL EVERY 12 HOURS
Qty: 60 TABLET | Refills: 0 | Status: SHIPPED | OUTPATIENT
Start: 2019-07-18 | End: 2019-07-24 | Stop reason: ALTCHOICE

## 2019-07-18 RX ORDER — FUROSEMIDE 20 MG/1
20 TABLET ORAL DAILY
Status: DISCONTINUED | OUTPATIENT
Start: 2019-07-18 | End: 2019-07-18 | Stop reason: HOSPADM

## 2019-07-18 RX ORDER — SODIUM CHLORIDE 0.9 % (FLUSH) 0.9 %
10 SYRINGE (ML) INJECTION
Status: DISCONTINUED | OUTPATIENT
Start: 2019-07-18 | End: 2019-07-18 | Stop reason: HOSPADM

## 2019-07-18 RX ORDER — ONDANSETRON 2 MG/ML
4 INJECTION INTRAMUSCULAR; INTRAVENOUS EVERY 6 HOURS PRN
Status: DISCONTINUED | OUTPATIENT
Start: 2019-07-18 | End: 2019-07-18 | Stop reason: HOSPADM

## 2019-07-18 RX ORDER — FOLIC ACID 1 MG/1
1 TABLET ORAL DAILY
Status: DISCONTINUED | OUTPATIENT
Start: 2019-07-18 | End: 2019-07-18 | Stop reason: HOSPADM

## 2019-07-18 RX ORDER — TRAMADOL HYDROCHLORIDE 50 MG/1
50 TABLET ORAL EVERY 6 HOURS PRN
Status: DISCONTINUED | OUTPATIENT
Start: 2019-07-18 | End: 2019-07-18 | Stop reason: HOSPADM

## 2019-07-18 RX ADMIN — FOLIC ACID 1 MG: 1 TABLET ORAL at 09:07

## 2019-07-18 RX ADMIN — AMIODARONE HYDROCHLORIDE 200 MG: 200 TABLET ORAL at 09:07

## 2019-07-18 RX ADMIN — DOCUSATE SODIUM 50 MG: 50 CAPSULE, LIQUID FILLED ORAL at 04:07

## 2019-07-18 NOTE — ASSESSMENT & PLAN NOTE
Phoned [facility] and reviewed patient's dosing sheet - patient was using 20 cc remodulin (1mg/ml) in 80 cc diluent giving a concentration of 200,000 ng/ml at a rate of 45 yielding a dose of 89.2 ng/kg/ml with a dosing weight of 70.05  Orders placed for IV remodulin  Continue adempas TID

## 2019-07-18 NOTE — ED TRIAGE NOTES
Molly Smith, an 73 y.o. female presents to the ED with shortness of breath, weakness, and tachycardia earlier today.  Patient has pulmonary hypertension and changed her Remodulin cassette in the EMS hallway prior to room placement.  Patient's heart rate has converted to normal sinus rhythm on arrival to the ED and symptoms have abated somewhat.        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    Multaq [dronedarone]      Chief Complaint   Patient presents with    Shortness of Breath     Pt to ER via EMS c/o SOB and weakness today.  checked her vitals at home and HR was 140's and sats 82% . Ems placed 6 liters and sats now 90%. HR is now 90's. Hx of pulmonary HTN. Denies pain.     Fatigue     Past Medical History:   Diagnosis Date    Allergy     Anticoagulant long-term use     Arthritis     Heart murmur     Pneumonia     Pulmonary hypertension     Tachycardia

## 2019-07-18 NOTE — NURSING
Patient discharged home as ordered. IV removed prior to discharge, catheter intact. Educated patient and spouse on discharge handout including medications and upcoming appointments. Patient and spouse verbalized understanding and denied any further questions at the time. Patient left unit via wheelchair on home oxygen accompanied by this RN and spouse. Patient left with all personal belongings and discharge handout. Visi monitor and telemetry cleaned and placed in appropriate area.

## 2019-07-18 NOTE — NURSING
Pt arrived to unit floor.  Oriented pt to room. Educated pt on use of call bell.  Pt verbalize understanding to call when needed assistance.   remain at pt bedside.  VSS Admission documents completed. Will continue to monitor.

## 2019-07-18 NOTE — ED PROVIDER NOTES
Encounter Date: 7/17/2019    SCRIBE #1 NOTE: I, Bea Parada, am scribing for, and in the presence of,  Dr. KENIA Anderson. I have scribed the entire note.       History     Chief Complaint   Patient presents with    Shortness of Breath     Pt to ER via EMS c/o SOB and weakness today.  checked her vitals at home and HR was 140's and sats 82% . Ems placed 6 liters and sats now 90%. HR is now 90's. Hx of pulmonary HTN. Denies pain.     Fatigue     73 y.o. Female arrived in ED via EMS for shortness of breath. Patient's  checked her vitals and reported elevated heart rate and 82% saturation on home oxygen a few hours ago. Patient received 6 liters of oxygen from EMS and saturation now sits at 90%. Patient reports that she is feeling much better since receiving oxygen from EMS. Patient is not currently in pain. Patient does endorse that an elevated heart rate sometimes makes her feel short of breath. Patient denies fever, chills, leg swelling, recent colds or sick contact. Patient uses 4-5 liters of home oxygen and reports her normal heart rate is in the mid 80's. Patient has a past history of pulmonary hypertension.       The history is provided by the patient, the spouse and medical records.     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    Multaq [dronedarone]      Past Medical History:   Diagnosis Date    Allergy     Anticoagulant long-term use     Arthritis     Heart murmur     Pneumonia     Pulmonary hypertension     Tachycardia      Past Surgical History:   Procedure Laterality Date    ABLATION N/A 6/17/2013    Performed by Jose J Hinojosa MD at [facility] CATH LAB    BACK SURGERY      HEART CATH-RIGHT Right 7/10/2017    Performed by Alice Tim MD at [facility] CATH LAB    HEMIARTHROPLASTY, HIP - left Left 3/25/2019    Performed by Kemal Esposito MD at [facility] OR 2ND FLR    INSERTION-CATHETER-ROBERTSON Left 6/24/2014    Performed by González SUE  MD David at [facility] OR 2ND FLR    REPLACEMENT, CATHETER, DIALYSIS, OVER GUIDEWIRE, USING EXISTING VENOUS ACCESS N/A 1/17/2019    Performed by Phoenix Hand MD at [facility] CATH LAB     Family History   Problem Relation Age of Onset    Arthritis Mother     Arthritis Father     Diabetes Father     Heart disease Father     Hypertension Father      Social History     Tobacco Use    Smoking status: Never Smoker    Smokeless tobacco: Never Used   Substance Use Topics    Alcohol use: No     Comment: rarely    Drug use: No     Review of Systems   Respiratory: Positive for shortness of breath.    Cardiovascular: Negative for leg swelling.   All other systems reviewed and are negative.      Physical Exam     Initial Vitals [07/17/19 2246]   BP Pulse Resp Temp SpO2   101/65 90 (!) 24 98.9 °F (37.2 °C) (!) 90 %      MAP       --         Physical Exam    Nursing note and vitals reviewed.  Constitutional: She appears well-developed and well-nourished. She is not diaphoretic. No distress.   HENT:   Head: Normocephalic.   Mouth/Throat: Oropharynx is clear and moist.   Eyes: Conjunctivae are normal.   Neck: Normal range of motion. Neck supple.   Cardiovascular: Normal rate, regular rhythm, normal heart sounds and intact distal pulses. Exam reveals no gallop and no friction rub.    No murmur heard.  Pulmonary/Chest: Breath sounds normal. No respiratory distress.   Abdominal: Soft. Bowel sounds are normal. She exhibits no distension. There is no tenderness. There is no rebound and no guarding.   Musculoskeletal: Normal range of motion. She exhibits no tenderness.   Neurological: She is alert and oriented to person, place, and time. She has normal strength.   Skin: Skin is warm and dry. Capillary refill takes less than 2 seconds.         ED Course   Procedures  Labs Reviewed   CBC W/ AUTO DIFFERENTIAL - Abnormal; Notable for the following components:       Result Value    Mean Corpuscular Hemoglobin Conc 31.4 (*)     MPV  9.0 (*)     Lymph # 0.8 (*)     Gran% 75.1 (*)     Lymph% 14.6 (*)     All other components within normal limits   COMPREHENSIVE METABOLIC PANEL   TROPONIN I   B-TYPE NATRIURETIC PEPTIDE   PROTIME-INR          Imaging Results    None          Medical Decision Making:   History:   Old Medical Records: I decided to obtain old medical records.  Initial Assessment:   Patient emphasizes in terms of her code status that she is DNI, No Chest Compressions, but will allow one defibrillation.  Clinical Tests:   Lab Tests: Ordered and Reviewed  Radiological Study: Ordered and Reviewed  Medical Tests: Ordered and Reviewed         Medical Decision Making:    This is an emergent evaluation of a patient presenting to the ED.  Nursing notes were reviewed.  I personally reviewed, read, and interpreted the ECG and any monitoring strips.  ECG: normal sinus rhythm, RBBB old, no critical findings with intervals, normal rate, and no ischemia.  Compared with prior if available.    I reviewed radiology images personally along with interpretations.  I personally reviewed and interpreted the laboratory results.  Mildly elev tropnin  Communicated with another physician regarding patient's care: [facility]    Critical Care Time    Critical care time was provided for 30 minutes exclusive of other billable procedures and teaching time for the support of cardiac organ system where the potential for death, shock, or further decline was possible.  Critical care time can include documentation, discussion with consultants, developing a care plan, as well as direct patient care.    Axel Anderson MD        Scribe Attestation:   Scribe #1: I performed the above scribed service and the documentation accurately describes the services I performed. I attest to the accuracy of the note.               Clinical Impression:       ICD-10-CM ICD-9-CM   1. Shortness of breath R06.02 786.05               Admit, improved      Tres Anderson MD, DAJUAN, CPE,  [facility]  Department of Emergency Medicine                        Axel Anderson MD  07/18/19 0936

## 2019-07-18 NOTE — H&P
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 She feels that since her hip fracture she has never returned to her baseline state of health and the recovery is difficult. She has had a negative EPS in the past which did not reveal a terminable rhythm and was on cardizem for a while which was switched to amiodarone. Over the past week while in [place] she had several brief episodes of palpitations (1-7 minutes), but on the day of admission had an episode that lasted 45 minutes while coming out of the shower. She had tried to perform the valsalva but did not do it at first symptom onset because she was so short of breath and dizzy when it occurred. The palpitations spontaneously resolved, she has never identified any triggers or alleviating factors. She does not consume alcohol or caffeine. It has been noted that these episodes may be attributable to hypoxia.    The patient reports that at her baseline she can have oxygen desats to the 70's with ambulation and remain asymptomatic. She denies dyspnea on regular exertion while using oxygen. She reports no worsening orthopnea, denies PND or peripheral edema. She denies fever, chills, night sweats, sick contacts, abdominal pain, dysuria or diarrhea.  She denies chest pain/pressure.    Past Medical History:   Diagnosis Date     Allergy     Anticoagulant long-term use     Arthritis     Heart murmur     Pneumonia     Pulmonary hypertension     Tachycardia        Past Surgical History:   Procedure Laterality Date    ABLATION N/A 6/17/2013    Performed by Jose J Hinojosa MD at [facility] CATH LAB    BACK SURGERY      HEART CATH-RIGHT Right 7/10/2017    Performed by Alice Tim MD at [facility] CATH LAB    HEMIARTHROPLASTY, HIP - left Left 3/25/2019    Performed by Kemal Esposito MD at [facility] OR 2ND FLR    INSERTION-CATHETER-ROBERTSON Left 6/24/2014    Performed by González Hernandez MD at [facility] OR 2ND FLR    REPLACEMENT, CATHETER, DIALYSIS, OVER GUIDEWIRE, USING EXISTING VENOUS ACCESS N/A 1/17/2019    Performed by Phoenix Hand MD at [facility] CATH LAB       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    Multaq [dronedarone]        Current Facility-Administered Medications   Medication    acetaminophen tablet 650 mg    amiodarone tablet 200 mg    folic acid tablet 1 mg    furosemide tablet 20 mg    ondansetron injection 4 mg    riociguat (ADEMPAS) tablet 0.5 mg    sodium chloride 0.9% flush 10 mL    spironolactone tablet 25 mg    treprostinil (REMODULIN) 12,000,000 ng in sodium chloride 0.9% 100 mL infusion    warfarin (COUMADIN) tablet 10 mg     Current Outpatient Medications   Medication Sig    amiodarone (PACERONE) 200 MG Tab TAKE ONE TABLET BY MOUTH ONCE A DAY AS DIRECTED. THANK YOU!    CALCIUM CARBONATE (CALCIUM 600 ORAL) Take 2 tablets by mouth once daily.      chlordiazepoxide-clidinium 5-2.5 mg (LIBRAX) 5-2.5 mg Cap Take 1 capsule by mouth 3 (three) times daily with meals. TAKE TABLET AS PRN.    ferrous gluconate (FERGON) 324 MG tablet Take 1 tablet (324 mg total) by mouth daily with breakfast.    folic acid (FOLVITE) 1 MG tablet Take 1 mg by mouth once daily.    furosemide (LASIX) 20 MG tablet Take 1 tablet (20 mg total) by mouth once daily. (Patient taking  differently: Take 20 mg by mouth after lunch. )    riociguat (ADEMPAS) 0.5 mg Tab tablet Take 1 tablet (0.5 mg total) by mouth 3 (three) times daily.    sodium chloride 0.9% 0.9 % SolP 100 mL with treprostinil 1 mg/mL Soln 9,000,000 ng Inject 5,005.5 ng/min into the vein continuous.    spironolactone (ALDACTONE) 25 MG tablet Take 25 mg by mouth once daily.      traMADol (ULTRAM-ER) 300 MG Tb24 Take 1 tablet (300 mg total) by mouth once daily.    TREPROSTINIL SODIUM (TREPROSTINIL, REMODULIN, PUMP FOR HOME USE) Pt currently on 89.2 ng/kg/min=45 mL/day dosing weight 70.05 kg    warfarin (COUMADIN) 10 MG tablet Take 1 tablet (10mg) by mouth daily, except a half tablet (5mg) every Tues, Thurs, and Saturdays    enoxaparin (LOVENOX) 30 mg/0.3 mL Syrg Inject 0.3 mLs (30 mg total) into the skin every 12 (twelve) hours.    influenza (FLUZONE HIGH-DOSE) 180 mcg/0.5 mL vaccine Fluzone High-Dose [id number] (PF) 180 mcg/0.5 mL intramuscular syringe   ADM 0.5ML IM UTD    loratadine (CLARITIN) 10 mg tablet Take 10 mg by mouth once daily.    macitentan (OPSUMIT) 10 mg Tab Take 10 mg by mouth once daily.    ondansetron (ZOFRAN-ODT) 4 MG TbDL     promethazine (PHENERGAN) 25 MG tablet promethazine 25 mg tablet    triamcinolone acetonide 0.1% (KENALOG) 0.1 % cream Apply topically as needed.      Family History     Problem Relation (Age of Onset)    Arthritis Mother, Father    Diabetes Father    Heart disease Father    Hypertension Father        Tobacco Use    Smoking status: Never Smoker    Smokeless tobacco: Never Used   Substance and Sexual Activity    Alcohol use: No     Comment: rarely    Drug use: No    Sexual activity: Not on file     Review of Systems   All other systems reviewed and are negative.    Objective:     Vital Signs (Most Recent):  Temp: 98.9 °F (37.2 °C) (07/17/19 2246)  Pulse: 87 (07/18/19 0003)  Resp: 19 (07/18/19 0003)  BP: 100/60 (07/18/19 0003)  SpO2: (!) 91 % (07/18/19 0003) Vital Signs (24h  Range):  Temp:  [98.9 °F (37.2 °C)] 98.9 °F (37.2 °C)  Pulse:  [87-93] 87  Resp:  [16-24] 19  SpO2:  [90 %-93 %] 91 %  BP: (100-104)/(60-66) 100/60     Patient Vitals for the past 72 hrs (Last 3 readings):   Weight   07/17/19 2246 57.2 kg (126 lb)     Body mass index is 22.32 kg/m².    No intake or output data in the 24 hours ending 07/18/19 0104    Physical Exam   Constitutional: She is oriented to person, place, and time. She appears well-developed and well-nourished.   HENT:   Head: Normocephalic and atraumatic.   Eyes: Pupils are equal, round, and reactive to light. EOM are normal.   Neck: Normal range of motion. No JVD present. Carotid bruit is not present.   Cardiovascular: Normal rate, regular rhythm and intact distal pulses. Exam reveals no gallop, no S3, no S4, no distant heart sounds and no friction rub.   No murmur heard.  Loud S2   Pulmonary/Chest: Effort normal. No respiratory distress. She has no wheezes.   Bilateral crackles, panlobular   Abdominal: Soft. Bowel sounds are normal. She exhibits no distension. There is no tenderness.   Neurological: She is alert and oriented to person, place, and time.   Skin: Skin is warm. She is not diaphoretic. No erythema.   Psychiatric: She has a normal mood and affect. Her behavior is normal.       Significant Labs:  CBC:  Recent Labs   Lab 07/18/19  0010   WBC 5.67   RBC 4.14   HGB 12.4   HCT 39.5      MCV 95   MCH 30.0   MCHC 31.4*     BNP:  No results for input(s): BNP in the last 168 hours.    Invalid input(s): BNPTRIAGELBLO  CMP:  Recent Labs   Lab 07/18/19  0010      CALCIUM 9.9   ALBUMIN 3.6   PROT 6.3      K 3.7   CO2 23      BUN 28*   CREATININE 1.1   ALKPHOS 40*   ALT 14   AST 19   BILITOT 0.7      Coagulation:   Recent Labs   Lab 07/18/19  0010   INR 1.7*     LDH:  No results for input(s): LDH in the last 72 hours.  Microbiology:  Microbiology Results (last 7 days)     ** No results found for the last 168 hours. **          I  have reviewed all pertinent labs within the past 24 hours.    Diagnostic Results:  I have reviewed all pertinent imaging results/findings within the past 24 hours.    Assessment/Plan:     * SVT (supraventricular tachycardia)  Resolved prior to presentation, EKG with NSR, RVH pattern, RBBB, inferolateral ST depressions similar to prior  Cardiac monitoring  Instructed to valsalva within first 5 minutes of any recurrence  Continue amiodarone  Monitor O2 sats, may be driven by hypoxia  Consult EP    PAH (pulmonary artery hypertension)  Phoned [facility] and reviewed patient's dosing sheet - patient was using 20 cc remodulin (1mg/ml) in 80 cc diluent giving a concentration of 200,000 ng/ml at a rate of 45 yielding a dose of 89.2 ng/kg/ml with a dosing weight of 70.05  Orders placed for IV remodulin  Continue adempas TID    Partial code - patient agrees to defibrillation/electrical cardioversion but no CPR or intubation/mechanical ventilation    Michael Quijano MD  Heart Transplant  [facility]

## 2019-07-18 NOTE — ED NOTES
Telemetry Verification   Patient placed on Telemetry Box  Verified with War Room  Box # [id number]   Monitor Tech Cassine   Rate 89   Rhythm NSR

## 2019-07-18 NOTE — ASSESSMENT & PLAN NOTE
- INR's are monitored by [facility] with goal 2.0-3.0 (indication PH). Per patient, her home INR are usually therapeutic, but INR's done here are always < 2.0

## 2019-07-18 NOTE — ASSESSMENT & PLAN NOTE
- Resolved prior to presentation, EKG with NSR, RVH pattern, RBBB, inferolateral ST depressions similar to prior  - Cardiac monitoring  - Instructed to valsalva within first 5 minutes of any recurrence  - Continue amiodarone  - Monitor O2 sats, may be driven by hypoxia  - Consult EP

## 2019-07-18 NOTE — PROGRESS NOTES
Admit Note/Discharge Note     Met with patient and spouse to assess needs. Patient is a 73 y.o.  female, admitted for Supraventricular tachycardia and pulmonary artery hypertension.      Patient admitted from emergency  on 7/17/2019 .  At this time, patient presents as alert and oriented x 4, pleasant, good eye contact, calm and communicative.  At this time, patients caregiver presents as alert and oriented x 4, good eye contact, calm and communicative.    Household/Family Systems     Patient resides with patient's spouse, at     [address] [place] [id number].        Support system includes spouse, four adult children and sister.    Patient does not have dependents that are need of being cared for.   One of the pt's daughters lives close to pt, two children live in [place] and one lives in [place].      Patients primary caregiver is self with assistance from spouse when needed.   Pt's cell:  [phone]    Emergency contacts:  Rufus Smith (spouse) [phone]  May (daughter, works full time and lives close to pt) [phone]  Andra Palafox (sister,lives in [place]) [phone]    During admission, patient's caregiver plans to stay in patient's room.  Confirmed patient and patients caregivers do have access to reliable transportation.    Cognitive Status/Learning     Patient reports reading ability as college and states patient does not have difficulty with reading, writing, seeing, hearing, comprehension, learning and memory.  Pt does wear glasses.   Patient reports patient learns best by one on one.     Needed: No.   Highest education level: Attended College/Technical School    Vocation/Disability   .  Working for Income: No  If no, reason not working: Disability  Patient has been disabled since 2000 due to pulmonary hypertension.  Prior to disability the pt was a .   The pt's spouse is a ER MD who works about 10 shifts per month.     Adherence      Patient reports a high level of adherence to patients health care regimen.  Adherence counseling and education provided. Patient verbalizes understanding.    Substance Use    Patient reports the following substance usage.    Tobacco: none, patient denies any use.  Alcohol: none, patient denies any use.  Illicit Drugs/Non-prescribed Medications: none, patient denies any use.  Patient states clear understanding of the potential impact of substance use.  Substance abstinence/cessation counseling, education and resources provided and reviewed.     Services Utilizing/ADLS    Infusion Service: Prior to admission, patient utilizing? yes, IV remodulin with [facility]  Home Health: Prior to admission, patient utilizing? no  DME: Prior to admission, yes, home 02 set up (system in pt's room), Wheelchair, walker, cane and BSC.   Pulmonary/Cardiac Rehab: Prior to admission, no, pt has done pulmonary rehab in the past.  Dialysis:  Prior to admission, no  Transplant Specialty Pharmacy:  Prior to admission, no.    Prior to admission, patient reports patient was independent with ADLS and was driving a little.  Patient reports patient is not able to care for self at this time due to compromised medical condition (as documented in medical record) and physical weakness..  Patient indicates a willingness to care for self once medically cleared to do so.    Insurance/Medications    Insured by   Payor/Plan Subscr  Sex Relation Sub. Ins. ID Effective Group Num   1. MEDICARE - [place]* NORAH QUINONESRAUL FARRAR 1945 Female  [id number]                                    [address]   2. STANDARD LIFE* MARBELLA QUINONES 1945 Female  [id number]                                    [address] [address]      Primary Insurance (for UNOS reporting): Public Insurance - Medicare FFS (Fee For Service)  Secondary Insurance (for UNOS reporting): Private Insurance    Patient reports patient is able to obtain and afford medications at this time and  at time of discharge.    Living Will/Healthcare Power of     Patient states patient does not have a LW and/or HCPA.  Pt and spouse did not want [facility] LW because it was not detailed enough.    provided education regarding LW and HCPA and the completion of forms.    Coping/Mental Health    Patient is coping adequately with the aid of  family members. Patient denies mental health difficulties.   Worker provided support.  Pt is happy to be going home today and reports no needs to worker.     Discharge Planning    At time of discharge, patient plans to return to patient's home under the care of self and spouse.  Patients spouse will transport patient.  Per rounds today, expected discharge date is today. Pt and spouse report no needs for discharge.   Patient and patients caregiver  verbalize understanding and are involved in treatment planning and discharge process.    Additional Concerns    Patient's caretaker denies additional needs and/or concerns at this time. Patient is being followed for needs, education, resources, information, emotional support, supportive counseling, and for supportive and skilled discharge plan of care.  providing ongoing psychosocial support, education, resources and d/c planning as needed.  SW remains available. Patient's caregiver verbalizes understanding and agreement with information reviewed,  availability and how to access available resources as needed. Patient denies additional needs and/or concerns at this time. Patient verbalizes understanding and agreement with information reviewed, social work availability, and how to access available resources as needed.

## 2019-07-18 NOTE — DISCHARGE SUMMARY
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 She feels that since her hip fracture she has never returned to her baseline state of health and the recovery is difficult. She has had a negative EPS in the past which did not reveal a terminable rhythm and was on cardizem for a while which was switched to amiodarone. Over the past week while in [place] she had several brief episodes of palpitations (1-7 minutes), but on the day of admission had an episode that lasted 45 minutes while coming out of the shower. She had tried to perform the valsalva but did not do it at first symptom onset because she was so short of breath and dizzy when it occurred. The palpitations spontaneously resolved, she has never identified any triggers or alleviating factors. She does not consume alcohol or caffeine. It has been noted that these episodes may be attributable to hypoxia.     The patient reports that at her baseline she can have oxygen desats to the 70's with ambulation and remain asymptomatic. She denies dyspnea on regular exertion while using oxygen. She reports no worsening orthopnea, denies PND or peripheral edema. She denies fever, chills, night sweats, sick contacts, abdominal pain, dysuria or diarrhea.  She denies chest pain/pressure.      * No surgery found *     Hospital Course: She was placed on telemetry Obs, and had no further episodes of SVT during admit. EP was consulted, and felt no other options for antiarrhythmics at this point. Adding beta blocker or CCB could help but she is not a candidate given underlying RV failure and PH. Another option would try to do a repeat EP study and possible ablation - she it a higher risk given her disease. At this point our recommendation and also patient's wishes is to continue with same treatment - amiodarone 200 mg daily. If SVT continues to be very frequent, will consider ablation as outpatient, however patient and her  have decided against another ablation attempt given associated risks. She felt back to baseline and wanted to go home. She will f/u in the PH clinic with Dr. Tim on 9/6. Of note, her Coumadin is monitored and managed by [facility] in [place].      Consults (From admission, onward)        Status Ordering Provider     Inpatient consult to Electrophysiology  Once     Provider:  (Not yet assigned)    Completed REHAN UNGER          Significant Diagnostic Studies: See above    Pending Diagnostic Studies:     None        Final Active Diagnoses:    Diagnosis Date Noted POA    PRINCIPAL PROBLEM:  SVT (supraventricular tachycardia) [I47.1] 04/07/2017 Yes    Shortness of breath [R06.02] 02/28/2018 Yes    PAH (pulmonary artery hypertension) [I27.21] 07/05/2016 Yes    Anticoagulant long-term use [Z79.01] 12/11/2014 Not Applicable     Chronic      Problems Resolved During this Admission:      Discharged Condition: stable    Disposition: Home or Self Care    Follow Up:  Follow-up Information     Alice Tim MD.    Specialties:  Transplant, Cardiology  Contact information:  [address] [place] [id number]  [phone]             Follow up On 9/6/2019.               Patient Instructions:      Diet Cardiac     Notify your health care provider if you experience any of the  following:  temperature >100.4     Notify your health care provider if you experience any of the following:  persistent nausea and vomiting or diarrhea     Notify your health care provider if you experience any of the following:  severe uncontrolled pain     Notify your health care provider if you experience any of the following:  redness, tenderness, or signs of infection (pain, swelling, redness, odor or green/yellow discharge around incision site)     Notify your health care provider if you experience any of the following:  difficulty breathing or increased cough     Notify your health care provider if you experience any of the following:  severe persistent headache     Notify your health care provider if you experience any of the following:  worsening rash     Notify your health care provider if you experience any of the following:  persistent dizziness, light-headedness, or visual disturbances     Notify your health care provider if you experience any of the following:  increased confusion or weakness     Activity as tolerated     Medications:  Reconciled Home Medications:      Medication List      START taking these medications    acetaminophen 325 MG tablet  Commonly known as:  TYLENOL  Take 2 tablets (650 mg total) by mouth every 6 (six) hours as needed.     bosentan 125 MG Tab  Commonly known as:  TRACLEER  Take 1 tablet (125 mg total) by mouth every 12 (twelve) hours.     docusate sodium 50 MG capsule  Commonly known as:  COLACE  Take 1 capsule (50 mg total) by mouth daily as needed for Constipation.        CHANGE how you take these medications    furosemide 20 MG tablet  Commonly known as:  LASIX  Take 1 tablet (20 mg total) by mouth once daily.  What changed:  when to take this        CONTINUE taking these medications    amiodarone 200 MG Tab  Commonly known as:  PACERONE  TAKE ONE TABLET BY MOUTH ONCE A DAY AS DIRECTED. THANK YOU!     CALCIUM 600 ORAL  Take 2 tablets by mouth once daily.      chlordiazepoxide-clidinium 5-2.5 mg 5-2.5 mg Cap  Commonly known as:  LIBRAX  Take 1 capsule by mouth 3 (three) times daily with meals. TAKE TABLET AS PRN.     ferrous gluconate 324 MG tablet  Commonly known as:  FERGON  Take 1 tablet (324 mg total) by mouth daily with breakfast.     folic acid 1 MG tablet  Commonly known as:  FOLVITE  Take 1 mg by mouth once daily.     loratadine 10 mg tablet  Commonly known as:  CLARITIN  Take 10 mg by mouth once daily.     ondansetron 4 MG Tbdl  Commonly known as:  ZOFRAN-ODT     OPSUMIT 10 mg Tab  Generic drug:  macitentan  Take 10 mg by mouth once daily.     promethazine 25 MG tablet  Commonly known as:  PHENERGAN  promethazine 25 mg tablet     riociguat 0.5 mg Tab tablet  Commonly known as:  ADEMPAS  Take 1 tablet (0.5 mg total) by mouth 3 (three) times daily.     spironolactone 25 MG tablet  Commonly known as:  ALDACTONE  Take 25 mg by mouth once daily.     traMADol 300 MG Tb24  Commonly known as:  ULTRAM-ER  Take 1 tablet (300 mg total) by mouth once daily.     TREPROSTINIL (REMODULIN) PUMP FOR HOME USE  Pt currently on 89.2 ng/kg/min=45 mL/day dosing weight 70.05 kg     triamcinolone acetonide 0.1% 0.1 % cream  Commonly known as:  KENALOG  Apply topically as needed.     warfarin 10 MG tablet  Commonly known as:  COUMADIN  Take 1 tablet (10mg) by mouth daily, except a half tablet (5mg) every Tues, Thurs, and Saturdays        STOP taking these medications    enoxaparin 30 mg/0.3 mL Syrg  Commonly known as:  LOVENOX     influenza 180 mcg/0.5 mL vaccine  Commonly known as:  FLUZONE HIGH-DOSE     predniSONE 10 MG tablet  Commonly known as:  DELTASONE     sodium chloride 0.9% 0.9 % SolP 100 mL with treprostinil 1 mg/mL Soln 9,000,000 ng            [place] Galeano [place] [id number]  Heart Transplant  [facility]

## 2019-07-18 NOTE — HPI
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 She feels that since her hip fracture she has never returned to her baseline state of health and the recovery is difficult. She has had a negative EPS in the past which did not reveal a terminable rhythm and was on cardizem for a while which was switched to amiodarone. Over the past week while in [place] she had several brief episodes of palpitations (1-7 minutes), but on the day of admission had an episode that lasted 45 minutes while coming out of the shower. She had tried to perform the valsalva but did not do it at first symptom onset because she was so short of breath and dizzy when it occurred. The palpitations spontaneously resolved, she has never identified any triggers or alleviating factors. She does not consume alcohol or caffeine. It has been noted that these episodes may be attributable to hypoxia.    The patient reports that at her baseline she can have oxygen desats to the 70's with ambulation and remain asymptomatic. She denies dyspnea on regular exertion while using oxygen. She reports no worsening orthopnea, denies PND or peripheral edema. She denies fever, chills, night sweats, sick contacts, abdominal pain, dysuria or diarrhea.  She denies chest pain/pressure.

## 2019-07-18 NOTE — ASSESSMENT & PLAN NOTE
- WHO Group 1  - Continue IV Remodulin at 89.2 ng/kg/min. Does not tolerate a higher dose 2/2 right to left shunting  - Continue adempas 0.5 mg TID  - Continue Tracleer 125 mg bid. Opsumit has been approved, and patient will start once current Tracleer runs out  - Continue supplemental O2

## 2019-07-18 NOTE — ASSESSMENT & PLAN NOTE
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 However, prior to this week, her episodes have been infrequent while on amiodarone.   - No other options for antiarrhythmics at this point. Adding beta blocker or CCB could help but she is not a candidate given underlying RV failure and PH.   - Another option would try to do a repeat EP study and possible ablation - she it a higher risk given her disease.  - At this point our recommendation and also patient's wishes is to continue with same treatment - amiodarone 200 mg daily. If SVT continues to be very frequent, will consider ablation as outpatient.  - Ok to discharge from our standpoint.    Seen with Dr. Hinojosa

## 2019-07-18 NOTE — SUBJECTIVE & OBJECTIVE
Past Medical History:   Diagnosis Date    Allergy     Anticoagulant long-term use     Arthritis     Heart murmur     Pneumonia     Pulmonary hypertension     Tachycardia        Past Surgical History:   Procedure Laterality Date    ABLATION N/A 6/17/2013    Performed by Jose J Hinojosa MD at [facility] CATH LAB    BACK SURGERY      HEART CATH-RIGHT Right 7/10/2017    Performed by Alice Tim MD at [facility] CATH LAB    HEMIARTHROPLASTY, HIP - left Left 3/25/2019    Performed by Kemal Esposito MD at [facility] OR 2ND FLR    INSERTION-CATHETER-ROBERTSON Left 6/24/2014    Performed by González Hernandez MD at [facility] OR 2ND FLR    REPLACEMENT, CATHETER, DIALYSIS, OVER GUIDEWIRE, USING EXISTING VENOUS ACCESS N/A 1/17/2019    Performed by Phoenix Hand MD at [facility] CATH LAB       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    Multaq [dronedarone]        No current facility-administered medications on file prior to encounter.      Current Outpatient Medications on File Prior to Encounter   Medication Sig    amiodarone (PACERONE) 200 MG Tab TAKE ONE TABLET BY MOUTH ONCE A DAY AS DIRECTED. THANK YOU!    CALCIUM CARBONATE (CALCIUM 600 ORAL) Take 2 tablets by mouth once daily.      chlordiazepoxide-clidinium 5-2.5 mg (LIBRAX) 5-2.5 mg Cap Take 1 capsule by mouth 3 (three) times daily with meals. TAKE TABLET AS PRN.    ferrous gluconate (FERGON) 324 MG tablet Take 1 tablet (324 mg total) by mouth daily with breakfast.    folic acid (FOLVITE) 1 MG tablet Take 1 mg by mouth once daily.    furosemide (LASIX) 20 MG tablet Take 1 tablet (20 mg total) by mouth once daily. (Patient taking differently: Take 20 mg by mouth after lunch. )    riociguat (ADEMPAS) 0.5 mg Tab tablet Take 1 tablet (0.5 mg total) by mouth 3 (three) times daily.    sodium chloride 0.9% 0.9 % SolP 100 mL with treprostinil 1 mg/mL Soln 9,000,000 ng Inject 5,005.5 ng/min into the vein continuous.     spironolactone (ALDACTONE) 25 MG tablet Take 25 mg by mouth once daily.      traMADol (ULTRAM-ER) 300 MG Tb24 Take 1 tablet (300 mg total) by mouth once daily.    TREPROSTINIL SODIUM (TREPROSTINIL, REMODULIN, PUMP FOR HOME USE) Pt currently on 89.2 ng/kg/min=45 mL/day dosing weight 70.05 kg    warfarin (COUMADIN) 10 MG tablet Take 1 tablet (10mg) by mouth daily, except a half tablet (5mg) every Tues, Thurs, and Saturdays    enoxaparin (LOVENOX) 30 mg/0.3 mL Syrg Inject 0.3 mLs (30 mg total) into the skin every 12 (twelve) hours.    influenza (FLUZONE HIGH-DOSE) 180 mcg/0.5 mL vaccine Fluzone High-Dose 6037-9433 (PF) 180 mcg/0.5 mL intramuscular syringe   ADM 0.5ML IM UTD    loratadine (CLARITIN) 10 mg tablet Take 10 mg by mouth once daily.    macitentan (OPSUMIT) 10 mg Tab Take 10 mg by mouth once daily.    ondansetron (ZOFRAN-ODT) 4 MG TbDL     promethazine (PHENERGAN) 25 MG tablet promethazine 25 mg tablet    triamcinolone acetonide 0.1% (KENALOG) 0.1 % cream Apply topically as needed.     [DISCONTINUED] predniSONE (DELTASONE) 10 MG tablet Take 10 mg by mouth as needed.      Family History     Problem Relation (Age of Onset)    Arthritis Mother, Father    Diabetes Father    Heart disease Father    Hypertension Father        Tobacco Use    Smoking status: Never Smoker    Smokeless tobacco: Never Used   Substance and Sexual Activity    Alcohol use: No     Comment: rarely    Drug use: No    Sexual activity: Not on file     Review of Systems   Constitution: Negative for chills and fever.   Cardiovascular: Positive for dyspnea on exertion (baseline). Negative for chest pain, palpitations and syncope.   Respiratory: Negative for cough and sleep disturbances due to breathing.    Musculoskeletal: Negative for back pain.   Gastrointestinal: Negative for abdominal pain, nausea and vomiting.   Neurological: Negative for dizziness and focal weakness.   Psychiatric/Behavioral: Negative for altered mental  status.     Objective:     Vital Signs (Most Recent):  Temp: 98.2 °F (36.8 °C) (07/18/19 0810)  Pulse: 71 (07/18/19 0810)  Resp: 13 (07/18/19 0810)  BP: (!) 85/47 (07/18/19 0810)  SpO2: (!) 86 % (07/18/19 0810) Vital Signs (24h Range):  Temp:  [98 °F (36.7 °C)-98.9 °F (37.2 °C)] 98.2 °F (36.8 °C)  Pulse:  [67-93] 71  Resp:  [13-24] 13  SpO2:  [86 %-93 %] 86 %  BP: ()/(47-73) 85/47       Weight: 54.1 kg (119 lb 2.5 oz)(standing)  Body mass index is 21.11 kg/m².    SpO2: (!) 86 %  O2 Device (Oxygen Therapy): nasal cannula w/ humidification    Physical Exam   Constitutional: She is oriented to person, place, and time. She appears well-developed. No distress.   HENT:   Head: Normocephalic and atraumatic.   Cardiovascular: Normal rate and regular rhythm.   Pulmonary/Chest: Effort normal and breath sounds normal. No respiratory distress. She has no wheezes.   Abdominal: Soft. Bowel sounds are normal. She exhibits no distension. There is no tenderness.   Musculoskeletal:   Mild peripheral cyanosis. Cold extremities.    Neurological: She is alert and oriented to person, place, and time.   Skin: She is not diaphoretic.   Nursing note and vitals reviewed.      Significant Labs: All pertinent lab results from the last 24 hours have been reviewed.    Significant Imaging: All pertinent images from the last 24h have been reviewed.

## 2019-07-18 NOTE — SUBJECTIVE & OBJECTIVE
Interval History: Feels back to baseline, no SVT since admit. The episode that occurred PTA lasted ~ 30 minutes long, preceded by drop in sPO2 to 70's and did not convert with valsalva. Has been having shorter episodes that convert with Valsalva 2-3 times a week since her hip fx in March. Patient and  have not noticed a baseline sPO2 that correlates with most of her episodes. Currently on 6LPM with sPO2 93% at rest    Continuous Infusions:   treprostinil (REMODULIN) infusion Stopped (07/18/19 0130)     Scheduled Meds:   amiodarone  200 mg Oral Daily    bosentan  125 mg Oral Q12H    folic acid  1 mg Oral Daily    furosemide  20 mg Oral Daily    riociguat  0.5 mg Oral TID    spironolactone  25 mg Oral Daily    warfarin  10 mg Oral Daily     PRN Meds:acetaminophen, docusate sodium, ondansetron, sodium chloride 0.9%    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    Multaq [dronedarone]      Objective:     Vital Signs (Most Recent):  Temp: 98.2 °F (36.8 °C) (07/18/19 0810)  Pulse: 71 (07/18/19 0810)  Resp: 13 (07/18/19 0810)  BP: (!) 85/47 (07/18/19 0810)  SpO2: (!) 86 % (07/18/19 0810) Vital Signs (24h Range):  Temp:  [98 °F (36.7 °C)-98.9 °F (37.2 °C)] 98.2 °F (36.8 °C)  Pulse:  [67-93] 71  Resp:  [13-24] 13  SpO2:  [86 %-93 %] 86 %  BP: ()/(47-73) 85/47     Patient Vitals for the past 72 hrs (Last 3 readings):   Weight   07/18/19 0340 54.1 kg (119 lb 2.5 oz)   07/17/19 2246 57.2 kg (126 lb)     Body mass index is 21.11 kg/m².      Intake/Output Summary (Last 24 hours) at 7/18/2019 1044  Last data filed at 7/18/2019 0923  Gross per 24 hour   Intake 340 ml   Output 200 ml   Net 140 ml       Hemodynamic Parameters:       Telemetry: SR    Physical Exam   Constitutional: She is oriented to person, place, and time. She appears well-developed and well-nourished.   HENT:   Head: Normocephalic and atraumatic.   Eyes: Pupils are equal, round, and  reactive to light. Conjunctivae and EOM are normal.   Neck: Normal range of motion. Neck supple. No JVD present. No thyromegaly present.   Cardiovascular: Normal rate and regular rhythm.   Accentuated P2   Pulmonary/Chest: Effort normal.   Breath sounds decreased in bases, few bibasilar crackles   Abdominal: Soft. Bowel sounds are normal.   Musculoskeletal: Normal range of motion. She exhibits no edema.   Neurological: She is alert and oriented to person, place, and time.   Skin: Skin is warm and dry. Capillary refill takes more than 3 seconds.   Psychiatric: She has a normal mood and affect. Her behavior is normal. Judgment and thought content normal.       Significant Labs:  CBC:  Recent Labs   Lab 07/18/19 0010 07/18/19 0622   WBC 5.67 3.96   RBC 4.14 4.27   HGB 12.4 12.7   HCT 39.5 40.7    230   MCV 95 95   MCH 30.0 29.7   MCHC 31.4* 31.2*     BNP:  Recent Labs   Lab 07/18/19  0010   *     CMP:  Recent Labs   Lab 07/18/19 0010 07/18/19 0622    83   CALCIUM 9.9 10.1   ALBUMIN 3.6 3.5   PROT 6.3 6.1    142   K 3.7 4.1   CO2 23 27    107   BUN 28* 24*   CREATININE 1.1 1.1   ALKPHOS 40* 37*   ALT 14 13   AST 19 15   BILITOT 0.7 0.8      Coagulation:   Recent Labs   Lab 07/18/19 0010 07/18/19 0622   INR 1.7* 1.5*     LDH:  No results for input(s): LDH in the last 72 hours.  Microbiology:  Microbiology Results (last 7 days)     ** No results found for the last 168 hours. **          I have reviewed all pertinent labs within the past 24 hours.    Estimated Creatinine Clearance: 38.2 mL/min (based on SCr of 1.1 mg/dL).    Diagnostic Results:  I have reviewed all pertinent imaging results/findings within the past 24 hours.

## 2019-07-18 NOTE — HPI
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    Patient reports having episodes of lightheadedness and SOB that are consistent with her known SVT. These episodes usually last only 1-3 minutes and after sitting down for a few minutes, they resolve. They have been infrequent since she started taking amiodarone on 03/2018. However, since Sunday she has experienced about 2-3 episodes per day and yesterday she had one episode that lasted at least 30 minutes. She called her  who was outside and she came to ED. By the time she left her house, the episode had already resolved. No further episodes in the hospital. This morning she feels well. Denies palpitations, chest pain or SOB.

## 2019-07-18 NOTE — PROGRESS NOTES
[facility]  Heart Transplant  Progress Note    Patient Name: Molly Smith  MRN: [id number]  Admission Date: 7/17/2019  Hospital Length of Stay: 0 days  Attending Physician: Fox Hendrickson Jr.,*  Primary Care Provider: Roberto Braun MD  Principal Problem:SVT (supraventricular tachycardia)    Subjective:     Interval History: Feels back to baseline, no SVT since admit. The episode that occurred PTA lasted ~ 30 minutes long, preceded by drop in sPO2 to 70's and did not convert with valsalva. Has been having shorter episodes that convert with Valsalva 2-3 times a week since her hip fx in March. Patient and  have not noticed a baseline sPO2 that correlates with most of her episodes. Currently on 6LPM with sPO2 93% at rest    Continuous Infusions:   treprostinil (REMODULIN) infusion Stopped (07/18/19 0130)     Scheduled Meds:   amiodarone  200 mg Oral Daily    bosentan  125 mg Oral Q12H    folic acid  1 mg Oral Daily    furosemide  20 mg Oral Daily    riociguat  0.5 mg Oral TID    spironolactone  25 mg Oral Daily    warfarin  10 mg Oral Daily     PRN Meds:acetaminophen, docusate sodium, ondansetron, sodium chloride 0.9%    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    Multaq [dronedarone]      Objective:     Vital Signs (Most Recent):  Temp: 98.2 °F (36.8 °C) (07/18/19 0810)  Pulse: 71 (07/18/19 0810)  Resp: 13 (07/18/19 0810)  BP: (!) 85/47 (07/18/19 0810)  SpO2: (!) 86 % (07/18/19 0810) Vital Signs (24h Range):  Temp:  [98 °F (36.7 °C)-98.9 °F (37.2 °C)] 98.2 °F (36.8 °C)  Pulse:  [67-93] 71  Resp:  [13-24] 13  SpO2:  [86 %-93 %] 86 %  BP: ()/(47-73) 85/47     Patient Vitals for the past 72 hrs (Last 3 readings):   Weight   07/18/19 0340 54.1 kg (119 lb 2.5 oz)   07/17/19 2246 57.2 kg (126 lb)     Body mass index is 21.11 kg/m².      Intake/Output Summary (Last 24 hours) at 7/18/2019 1044  Last data filed at  7/18/2019 0923  Gross per 24 hour   Intake 340 ml   Output 200 ml   Net 140 ml       Hemodynamic Parameters:       Telemetry: SR    Physical Exam   Constitutional: She is oriented to person, place, and time. She appears well-developed and well-nourished.   HENT:   Head: Normocephalic and atraumatic.   Eyes: Pupils are equal, round, and reactive to light. Conjunctivae and EOM are normal.   Neck: Normal range of motion. Neck supple. No JVD present. No thyromegaly present.   Cardiovascular: Normal rate and regular rhythm.   Accentuated P2   Pulmonary/Chest: Effort normal.   Breath sounds decreased in bases, few bibasilar crackles   Abdominal: Soft. Bowel sounds are normal.   Musculoskeletal: Normal range of motion. She exhibits no edema.   Neurological: She is alert and oriented to person, place, and time.   Skin: Skin is warm and dry. Capillary refill takes more than 3 seconds.   Psychiatric: She has a normal mood and affect. Her behavior is normal. Judgment and thought content normal.       Significant Labs:  CBC:  Recent Labs   Lab 07/18/19 0010 07/18/19 0622   WBC 5.67 3.96   RBC 4.14 4.27   HGB 12.4 12.7   HCT 39.5 40.7    230   MCV 95 95   MCH 30.0 29.7   MCHC 31.4* 31.2*     BNP:  Recent Labs   Lab 07/18/19  0010   *     CMP:  Recent Labs   Lab 07/18/19 0010 07/18/19 0622    83   CALCIUM 9.9 10.1   ALBUMIN 3.6 3.5   PROT 6.3 6.1    142   K 3.7 4.1   CO2 23 27    107   BUN 28* 24*   CREATININE 1.1 1.1   ALKPHOS 40* 37*   ALT 14 13   AST 19 15   BILITOT 0.7 0.8      Coagulation:   Recent Labs   Lab 07/18/19 0010 07/18/19 0622   INR 1.7* 1.5*     LDH:  No results for input(s): LDH in the last 72 hours.  Microbiology:  Microbiology Results (last 7 days)     ** No results found for the last 168 hours. **          I have reviewed all pertinent labs within the past 24 hours.    Estimated Creatinine Clearance: 38.2 mL/min (based on SCr of 1.1 mg/dL).    Diagnostic Results:  I have  reviewed all pertinent imaging results/findings within the past 24 hours.    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 She feels that since her hip fracture she has never returned to her baseline state of health and the recovery is difficult. She has had a negative EPS in the past which did not reveal a terminable rhythm and was on cardizem for a while which was switched to amiodarone. Over the past week while in [place] she had several brief episodes of palpitations (1-7 minutes), but on the day of admission had an episode that lasted 45 minutes while coming out of the shower. She had tried to perform the valsalva but did not do it at first symptom onset because she was so short of breath and dizzy when it occurred. The palpitations spontaneously resolved, she has never identified any triggers or alleviating factors. She does not consume alcohol or caffeine. It has been noted that these episodes may be attributable to hypoxia.    The patient reports that at her baseline she can have oxygen desats to the 70's with ambulation and remain asymptomatic. She denies dyspnea on regular exertion while using oxygen. She reports no worsening orthopnea, denies PND or peripheral edema. She denies fever, chills, night sweats, sick contacts, abdominal pain, dysuria or diarrhea.  She denies chest pain/pressure.    * SVT (supraventricular tachycardia)  - Resolved prior to presentation, EKG with NSR, RVH pattern, RBBB, inferolateral ST depressions similar to prior  - Cardiac monitoring  - Instructed to valsalva within first 5 minutes of any recurrence  - Continue  amiodarone  - Monitor O2 sats, may be driven by hypoxia  - Consult EP    Shortness of breath  - INR's are monitored by [facility] with goal 2.0-3.0 (indication PH). Per patient, her home INR are usually therapeutic, but INR's done here are always < 2.0    PAH (pulmonary artery hypertension)  - WHO Group 1  - Continue IV Remodulin at 89.2 ng/kg/min. Does not tolerate a higher dose 2/2 right to left shunting  - Continue adempas 0.5 mg TID  - Continue Tracleer 125 mg bid. Opsumit has been approved, and patient will start once current Tracleer runs out  - Continue [place] [id number]  Heart Transplant  [facility]

## 2019-07-18 NOTE — SUBJECTIVE & OBJECTIVE
Past Medical History:   Diagnosis Date    Allergy     Anticoagulant long-term use     Arthritis     Heart murmur     Pneumonia     Pulmonary hypertension     Tachycardia        Past Surgical History:   Procedure Laterality Date    ABLATION N/A 6/17/2013    Performed by [facility] J [facility]josa [facility] at [facility] CATH LAB    BACK SURGERY      HEART CATH-RIGHT Right 7/10/2017    Performed by Alice Tim MD at [facility] CATH LAB    HEMIARTHROPLASTY, HIP - left Left 3/25/2019    Performed by Kemal Esposito MD at [facility] OR 2ND FLR    INSERTION-CATHETER-ROBERTSON Left 6/24/2014    Performed by González Hernandez MD at [facility] OR 2ND FLR    REPLACEMENT, CATHETER, DIALYSIS, OVER GUIDEWIRE, USING EXISTING VENOUS ACCESS N/A 1/17/2019    Performed by Phoenix Hand MD at [facility] CATH LAB       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    Multaq [dronedarone]        Current Facility-Administered Medications   Medication    acetaminophen tablet 650 mg    amiodarone tablet 200 mg    folic acid tablet 1 mg    furosemide tablet 20 mg    ondansetron injection 4 mg    riociguat (ADEMPAS) tablet 0.5 mg    sodium chloride 0.9% flush 10 mL    spironolactone tablet 25 mg    treprostinil (REMODULIN) 12,000,000 ng in sodium chloride 0.9% 100 mL infusion    warfarin (COUMADIN) tablet 10 mg     Current Outpatient Medications   Medication Sig    amiodarone (PACERONE) 200 MG Tab TAKE ONE TABLET BY MOUTH ONCE A DAY AS DIRECTED. THANK YOU!    CALCIUM CARBONATE (CALCIUM 600 ORAL) Take 2 tablets by mouth once daily.      chlordiazepoxide-clidinium 5-2.5 mg (LIBRAX) 5-2.5 mg Cap Take 1 capsule by mouth 3 (three) times daily with meals. TAKE TABLET AS PRN.    ferrous gluconate (FERGON) 324 MG tablet Take 1 tablet (324 mg total) by mouth daily with breakfast.    folic acid (FOLVITE) 1 MG tablet Take 1 mg by mouth once daily.    furosemide (LASIX) 20 MG tablet Take 1 tablet (20 mg  total) by mouth once daily. (Patient taking differently: Take 20 mg by mouth after lunch. )    riociguat (ADEMPAS) 0.5 mg Tab tablet Take 1 tablet (0.5 mg total) by mouth 3 (three) times daily.    sodium chloride 0.9% 0.9 % SolP 100 mL with treprostinil 1 mg/mL Soln 9,000,000 ng Inject 5,005.5 ng/min into the vein continuous.    spironolactone (ALDACTONE) 25 MG tablet Take 25 mg by mouth once daily.      traMADol (ULTRAM-ER) 300 MG Tb24 Take 1 tablet (300 mg total) by mouth once daily.    TREPROSTINIL SODIUM (TREPROSTINIL, REMODULIN, PUMP FOR HOME USE) Pt currently on 89.2 ng/kg/min=45 mL/day dosing weight 70.05 kg    warfarin (COUMADIN) 10 MG tablet Take 1 tablet (10mg) by mouth daily, except a half tablet (5mg) every Tues, Thurs, and Saturdays    enoxaparin (LOVENOX) 30 mg/0.3 mL Syrg Inject 0.3 mLs (30 mg total) into the skin every 12 (twelve) hours.    influenza (FLUZONE HIGH-DOSE) 180 mcg/0.5 mL vaccine Fluzone High-Dose 1842-3459 (PF) 180 mcg/0.5 mL intramuscular syringe   ADM 0.5ML IM UTD    loratadine (CLARITIN) 10 mg tablet Take 10 mg by mouth once daily.    macitentan (OPSUMIT) 10 mg Tab Take 10 mg by mouth once daily.    ondansetron (ZOFRAN-ODT) 4 MG TbDL     promethazine (PHENERGAN) 25 MG tablet promethazine 25 mg tablet    triamcinolone acetonide 0.1% (KENALOG) 0.1 % cream Apply topically as needed.      Family History     Problem Relation (Age of Onset)    Arthritis Mother, Father    Diabetes Father    Heart disease Father    Hypertension Father        Tobacco Use    Smoking status: Never Smoker    Smokeless tobacco: Never Used   Substance and Sexual Activity    Alcohol use: No     Comment: rarely    Drug use: No    Sexual activity: Not on file     Review of Systems   All other systems reviewed and are negative.    Objective:     Vital Signs (Most Recent):  Temp: 98.9 °F (37.2 °C) (07/17/19 2246)  Pulse: 87 (07/18/19 0003)  Resp: 19 (07/18/19 0003)  BP: 100/60 (07/18/19 0003)  SpO2:  (!) 91 % (07/18/19 0003) Vital Signs (24h Range):  Temp:  [98.9 °F (37.2 °C)] 98.9 °F (37.2 °C)  Pulse:  [87-93] 87  Resp:  [16-24] 19  SpO2:  [90 %-93 %] 91 %  BP: (100-104)/(60-66) 100/60     Patient Vitals for the past 72 hrs (Last 3 readings):   Weight   07/17/19 2246 57.2 kg (126 lb)     Body mass index is 22.32 kg/m².    No intake or output data in the 24 hours ending 07/18/19 0104    Physical Exam   Constitutional: She is oriented to person, place, and time. She appears well-developed and well-nourished.   HENT:   Head: Normocephalic and atraumatic.   Eyes: Pupils are equal, round, and reactive to light. EOM are normal.   Neck: Normal range of motion. No JVD present. Carotid bruit is not present.   Cardiovascular: Normal rate, regular rhythm and intact distal pulses. Exam reveals no gallop, no S3, no S4, no distant heart sounds and no friction rub.   No murmur heard.  Loud S2   Pulmonary/Chest: Effort normal. No respiratory distress. She has no wheezes.   Bilateral crackles, panlobular   Abdominal: Soft. Bowel sounds are normal. She exhibits no distension. There is no tenderness.   Neurological: She is alert and oriented to person, place, and time.   Skin: Skin is warm. She is not diaphoretic. No erythema.   Psychiatric: She has a normal mood and affect. Her behavior is normal.       Significant Labs:  CBC:  Recent Labs   Lab 07/18/19  0010   WBC 5.67   RBC 4.14   HGB 12.4   HCT 39.5      MCV 95   MCH 30.0   MCHC 31.4*     BNP:  No results for input(s): BNP in the last 168 hours.    Invalid input(s): BNPTRIAGELBLO  CMP:  Recent Labs   Lab 07/18/19  0010      CALCIUM 9.9   ALBUMIN 3.6   PROT 6.3      K 3.7   CO2 23      BUN 28*   CREATININE 1.1   ALKPHOS 40*   ALT 14   AST 19   BILITOT 0.7      Coagulation:   Recent Labs   Lab 07/18/19  0010   INR 1.7*     LDH:  No results for input(s): LDH in the last 72 hours.  Microbiology:  Microbiology Results (last 7 days)     ** No results  found for the last 168 hours. **          I have reviewed all pertinent labs within the past 24 hours.    Diagnostic Results:  I have reviewed all pertinent imaging results/findings within the past 24 hours.

## 2019-07-18 NOTE — CONSULTS
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    Patient reports having episodes of lightheadedness and SOB that are consistent with her known SVT. These episodes usually last only 1-3 minutes and after sitting down for a few minutes, they resolve. They have been infrequent since she started taking amiodarone on 03/2018. However, since Sunday she has experienced about 2-3 episodes per day and yesterday she had one episode that lasted at least 30 minutes. She called her  who was outside and she came to ED. By the time she left her house, the episode had already resolved. No further episodes in the hospital. This morning she feels well. Denies palpitations, chest pain or SOB.       Past Medical History:   Diagnosis Date    Allergy     Anticoagulant long-term use     Arthritis     Heart murmur     Pneumonia     Pulmonary hypertension     Tachycardia        Past Surgical History:   Procedure Laterality Date    ABLATION N/A 6/17/2013    Performed by [facility] J [facility] at [facility] CATH LAB    BACK SURGERY      HEART CATH-RIGHT Right 7/10/2017    Performed by Alice Tim [facility] at [facility] CATH LAB    HEMIARTHROPLASTY, HIP - left Left 3/25/2019    Performed by Kemal [facility]ito MD at [facility] OR 2ND FLR    INSERTION-CATHETER-ROBERTSON Left 6/24/2014     Performed by González Hernandez MD at [facility] OR 2ND FLR    REPLACEMENT, CATHETER, DIALYSIS, OVER GUIDEWIRE, USING EXISTING VENOUS ACCESS N/A 1/17/2019    Performed by Phoenix Hand MD at [facility] CATH LAB       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    Multaq [dronedarone]        No current facility-administered medications on file prior to encounter.      Current Outpatient Medications on File Prior to Encounter   Medication Sig    amiodarone (PACERONE) 200 MG Tab TAKE ONE TABLET BY MOUTH ONCE A DAY AS DIRECTED. THANK YOU!    CALCIUM CARBONATE (CALCIUM 600 ORAL) Take 2 tablets by mouth once daily.      chlordiazepoxide-clidinium 5-2.5 mg (LIBRAX) 5-2.5 mg Cap Take 1 capsule by mouth 3 (three) times daily with meals. TAKE TABLET AS PRN.    ferrous gluconate (FERGON) 324 MG tablet Take 1 tablet (324 mg total) by mouth daily with breakfast.    folic acid (FOLVITE) 1 MG tablet Take 1 mg by mouth once daily.    furosemide (LASIX) 20 MG tablet Take 1 tablet (20 mg total) by mouth once daily. (Patient taking differently: Take 20 mg by mouth after lunch. )    riociguat (ADEMPAS) 0.5 mg Tab tablet Take 1 tablet (0.5 mg total) by mouth 3 (three) times daily.    sodium chloride 0.9% 0.9 % SolP 100 mL with treprostinil 1 mg/mL Soln 9,000,000 ng Inject 5,005.5 ng/min into the vein continuous.    spironolactone (ALDACTONE) 25 MG tablet Take 25 mg by mouth once daily.      traMADol (ULTRAM-ER) 300 MG Tb24 Take 1 tablet (300 mg total) by mouth once daily.    TREPROSTINIL SODIUM (TREPROSTINIL, REMODULIN, PUMP FOR HOME USE) Pt currently on 89.2 ng/kg/min=45 mL/day dosing weight 70.05 kg    warfarin (COUMADIN) 10 MG tablet Take 1 tablet (10mg) by mouth daily, except a half tablet (5mg) every Tues, Thurs, and Saturdays    enoxaparin (LOVENOX) 30 mg/0.3 mL Syrg Inject 0.3 mLs (30 mg total) into the skin every 12 (twelve) hours.    influenza (FLUZONE HIGH-DOSE)  180 mcg/0.5 mL vaccine Fluzone High-Dose 9518-0735 (PF) 180 mcg/0.5 mL intramuscular syringe   ADM 0.5ML IM UTD    loratadine (CLARITIN) 10 mg tablet Take 10 mg by mouth once daily.    macitentan (OPSUMIT) 10 mg Tab Take 10 mg by mouth once daily.    ondansetron (ZOFRAN-ODT) 4 MG TbDL     promethazine (PHENERGAN) 25 MG tablet promethazine 25 mg tablet    triamcinolone acetonide 0.1% (KENALOG) 0.1 % cream Apply topically as needed.     [DISCONTINUED] predniSONE (DELTASONE) 10 MG tablet Take 10 mg by mouth as needed.      Family History     Problem Relation (Age of Onset)    Arthritis Mother, Father    Diabetes Father    Heart disease Father    Hypertension Father        Tobacco Use    Smoking status: Never Smoker    Smokeless tobacco: Never Used   Substance and Sexual Activity    Alcohol use: No     Comment: rarely    Drug use: No    Sexual activity: Not on file     Review of Systems   Constitution: Negative for chills and fever.   Cardiovascular: Positive for dyspnea on exertion (baseline). Negative for chest pain, palpitations and syncope.   Respiratory: Negative for cough and sleep disturbances due to breathing.    Musculoskeletal: Negative for back pain.   Gastrointestinal: Negative for abdominal pain, nausea and vomiting.   Neurological: Negative for dizziness and focal weakness.   Psychiatric/Behavioral: Negative for altered mental status.     Objective:     Vital Signs (Most Recent):  Temp: 98.2 °F (36.8 °C) (07/18/19 0810)  Pulse: 71 (07/18/19 0810)  Resp: 13 (07/18/19 0810)  BP: (!) 85/47 (07/18/19 0810)  SpO2: (!) 86 % (07/18/19 0810) Vital Signs (24h Range):  Temp:  [98 °F (36.7 °C)-98.9 °F (37.2 °C)] 98.2 °F (36.8 °C)  Pulse:  [67-93] 71  Resp:  [13-24] 13  SpO2:  [86 %-93 %] 86 %  BP: ()/(47-73) 85/47       Weight: 54.1 kg (119 lb 2.5 oz)(standing)  Body mass index is 21.11 kg/m².    SpO2: (!) 86 %  O2 Device (Oxygen Therapy): nasal cannula w/ humidification    Physical Exam    Constitutional: She is oriented to person, place, and time. She appears well-developed. No distress.   HENT:   Head: Normocephalic and atraumatic.   Cardiovascular: Normal rate and regular rhythm.   Pulmonary/Chest: Effort normal and breath sounds normal. No respiratory distress. She has no wheezes.   Abdominal: Soft. Bowel sounds are normal. She exhibits no distension. There is no tenderness.   Musculoskeletal:   Mild peripheral cyanosis. Cold extremities.    Neurological: She is alert and oriented to person, place, and time.   Skin: She is not diaphoretic.   Nursing note and vitals reviewed.      Significant Labs: All pertinent lab results from the last 24 hours have been reviewed.    Significant Imaging: All pertinent images from the last 24h have been reviewed.                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 However, prior to this week, her episodes have been infrequent while on amiodarone.   - No other options for antiarrhythmics at this point. Adding beta blocker or CCB could help but she is not a candidate given underlying RV failure and PH.   - Another option would try to do a repeat EP study and possible ablation - she it a higher risk given her disease.  - At this point our recommendation and also patient's wishes is to continue with same treatment - amiodarone 200 mg daily. If SVT continues to be very frequent, will consider ablation as outpatient.  - Ok to discharge from our standpoint.    Seen with Dr. Hinojosa        Thank you for your consult.    Phoenix Mason MD  Cardiac  Electrophysiology  [facility]

## 2019-07-18 NOTE — ASSESSMENT & PLAN NOTE
Resolved prior to presentation, EKG with NSR, RVH pattern, RBBB, inferolateral ST depressions similar to prior  Cardiac monitoring  Instructed to valsalva within first 5 minutes of any recurrence  Continue amiodarone  Monitor O2 sats, may be driven by hypoxia  Consult EP

## 2019-07-22 ENCOUNTER — PATIENT OUTREACH (OUTPATIENT)
Dept: ADMINISTRATIVE | Facility: CLINIC | Age: 74
End: 2019-07-22

## 2019-07-22 NOTE — PATIENT INSTRUCTIONS
Treatment for Supraventricular Tachycardia  Supraventricular tachycardia (SVT) is a type of abnormally fast heartbeat. The heart normally beats 60 to 100 beats per minute. With SVT, the heart beats more than 100 times a minute. It may beat as fast as 250 times a minute. This is caused by a problem in the electrical system of the heart. It can lessen the amount of blood pumped through the heart.  Types of treatment   You may not need treatment for SVT if you have only had one episode. If you do need treatment, there are several kinds. They include:  · Valsalva maneuver. This is a way to increase pressure in the abdomen and chest. It can correct your heart rhythm right away. To do it, you bear down with your stomach muscles, as though you are trying to have a bowel movement.  · Carotid massage. Your healthcare provider may gently rub the carotid artery in your neck. This can also help correct the heart rhythm right away.  · Medicine. There are various kinds you can take. Calcium channel blockers or adenosine can help correct heart rhythm right away. If you have SVT only 1 or 2 times a year, you may take beta-blockers or calcium channel medicine as needed. If your SVT is more frequent, you may need to take medicine every day. Some people may need to take several medicines to prevent episodes of SVT.  · Electrocardioversion. This is a shock to the heart to restart a normal rhythm right away. This may be done if you have a severe episode of SVT.  · Catheter ablation. This can help permanently stop SVT. Your healthcare provider puts a thin, flexible tube (catheter) into a blood vessel in the groin. He or she then gently pushes it up into your heart. The area of your heart that causes your SVT is then burned. This prevents that area from starting a signal that causes SVT.   Lifestyle changes to help prevent SVT episodes  Your healthcare provider might suggest other ways to help prevent SVT, such as:  · Having less  alcohol and caffeine  · Not smoking  · Lowering your stress  · Eating foods that are healthy for your heart  When to call your healthcare provider  Call your healthcare provider if you have any of the following:  · Sudden shortness of breath (call 911)  · Severe palpitations  · Severe dizziness  · Severe chest pain  · Symptoms that are happening more often   Date Last Reviewed: 8/10/2015  © 8934-2990 [employer]. [address] [place] [address], [place] [address]. All rights reserved. This information is not intended as a substitute for professional medical care. Always follow your healthcare professional's instructions.        Understanding Supraventricular Tachycardia  Supraventricular tachycardia (SVT) is a type of abnormal heart rhythm that results in fast heartbeats. The heart normally beats 60 to 100 beats per minute while you are at rest and awake. With SVT, the heart beats more than 100 times a minute. It may even beat over 200 times a minute. This is caused by a problem in the electrical system of the heart. It can lessen the amount of blood pumped through the heart.  How the heart beats  A heartbeat is the rhythm of the heart as it contracts to squeeze blood through the body. Its caused by electrical signals in the heart. A beat starts when a special group of cells give off an electrical signal. These cells are in the sinoatrial (SA) node. The SA node is in the upper right chamber of your heart (atrium). The signal from the SA node travels down to the 2 lower chambers of your heart (ventricles). On the way, the signal goes through the atrioventricular (AV) node. This is a special group of cells between the atria and ventricles. From there, the signal travels to your left and right ventricles. As it travels, the signal tells nearby parts of your heart to contract. This causes your heart to pump in a coordinated way.  What is SVT?  When you have SVT, the signal to start your heartbeat doesnt come from  the SA node. Instead it comes from another part of the left or right atrium. Or it comes from the AV node. Some area outside the SA node begins to fire quickly, causing a rapid heartbeat of over 100 beats per minute or the electrical signals are caught up in an abnormal looping circuit. This shortens the time your ventricles have to fill. If your heartbeat is fast enough, your heart may be unable to pump enough blood forward to the rest of your body. The abnormal heartbeat may last for a few seconds to a few hours before your heart returns to its normal rhythm. Some SVT rhythms can last for days or weeks, or even become permanent.  Types of SVT  There are several types of SVT. They include:  · Atrial fibrillation. This is most common type of SVT. The upper chambers of the heart quiver very fast instead of pumping due to disorganized electrical activity in the atria.  · Atrial flutter. This type of SVT is a milder form of fibrillation. The upper chambers of the heart flutter instead of pumping normally.  · Atrioventricular michel reentrant tachycardia. It occurs when you have two channels through the AV node, instead of just one. The signal goes down one channel and up the other.  · Atrioventricular reciprocating tachycardia. With this condition, there is an extra connection of muscle between the atrium and the ventricle. This is known as an accessory pathway and can conduct electricity upwards and downwards. The signal goes down the AV node and back to the atrium through the accessory pathway. It then goes down the AV node again. In rare cases, this condition leads to an abnormal heart rhythm that causes sudden death. This is a congenital defect, which means you were born with it.  · Atrial tachycardia. This is another common type of SVT. A small group of cells in the atria begin to fire abnormally and trigger the fast heartbeat.  · Multifocal atrial tachycardia. Multiple groups of cells in your atria fire abnormally  and trigger a fast heartbeat.  What causes SVT?  Some types of SVT run in families. Some people have heart problems from birth that cause SVT. High blood pressure, heart failure, mitral valve disease, sleep apnea, thyroid problems, and heart attacks can cause SVT. Smoking, excess caffeine or alcohol, and some medicines can increase your risk of having SVT.  Symptoms of SVT  When SVT happens, you may feel no symptoms. Or you may have:  · Fluttering feelings in your chest (palpitations)  · A tight feeling or pain in your chest  · A pulsing feeling in your neck  · Dizziness  · Shortness of breath  · Tiredness  · Fainting  · Nausea  In very rare cases, SVT can cause sudden death.  Diagnosing SVT  Your primary healthcare provider may diagnose you. Or you may see a heart doctor (cardiologist). The doctor will ask about your health history. He or she will also give you a physical exam. You may also have tests. These help show what kind of SVT you have, and what may cause it. They also help check for other problems. The tests may include:  · Electrocardiogram (ECG), to analyze the abnormal rhythm  · Continuous heart monitors such as a holter monitor or an event recorder to watch your heart rhythm over a longer period in an attempt to catch the SVT rhythm  · Blood tests, to look for various causes such as thyroid problems or electrolyte abnormalities  · Chest X-ray, to check for lung problems and look at the size of your heart  · Exercise stress test, to see how well your heart works under stress  · Echocardiography, to check your heart structure and function  · Electrophysiologic study (EPS), an invasive procedure using wires in the heart to check the heart's electrical signals and diagnose the SVT  Date Last Reviewed: 5/1/2016  © 3901-4551 [employer]. [address] [place] [address], [place] [address]. All rights reserved. This information is not intended as a substitute for professional medical care. Always follow  your healthcare professional's instructions.        Understanding Supraventricular Tachycardia  Supraventricular tachycardia (SVT) is a type of abnormal heart rhythm that results in fast heartbeats. The heart normally beats 60 to 100 beats per minute while you are at rest and awake. With SVT, the heart beats more than 100 times a minute. It may even beat over 200 times a minute. This is caused by a problem in the electrical system of the heart. It can lessen the amount of blood pumped through the heart.  How the heart beats  A heartbeat is the rhythm of the heart as it contracts to squeeze blood through the body. Its caused by electrical signals in the heart. A beat starts when a special group of cells give off an electrical signal. These cells are in the sinoatrial (SA) node. The SA node is in the upper right chamber of your heart (atrium). The signal from the SA node travels down to the 2 lower chambers of your heart (ventricles). On the way, the signal goes through the atrioventricular (AV) node. This is a special group of cells between the atria and ventricles. From there, the signal travels to your left and right ventricles. As it travels, the signal tells nearby parts of your heart to contract. This causes your heart to pump in a coordinated way.  What is SVT?  When you have SVT, the signal to start your heartbeat doesnt come from the SA node. Instead it comes from another part of the left or right atrium. Or it comes from the AV node. Some area outside the SA node begins to fire quickly, causing a rapid heartbeat of over 100 beats per minute or the electrical signals are caught up in an abnormal looping circuit. This shortens the time your ventricles have to fill. If your heartbeat is fast enough, your heart may be unable to pump enough blood forward to the rest of your body. The abnormal heartbeat may last for a few seconds to a few hours before your heart returns to its normal rhythm. Some SVT rhythms can  last for days or weeks, or even become permanent.  Types of SVT  There are several types of SVT. They include:  · Atrial fibrillation. This is most common type of SVT. The upper chambers of the heart quiver very fast instead of pumping due to disorganized electrical activity in the atria.  · Atrial flutter. This type of SVT is a milder form of fibrillation. The upper chambers of the heart flutter instead of pumping normally.  · Atrioventricular michel reentrant tachycardia. It occurs when you have two channels through the AV node, instead of just one. The signal goes down one channel and up the other.  · Atrioventricular reciprocating tachycardia. With this condition, there is an extra connection of muscle between the atrium and the ventricle. This is known as an accessory pathway and can conduct electricity upwards and downwards. The signal goes down the AV node and back to the atrium through the accessory pathway. It then goes down the AV node again. In rare cases, this condition leads to an abnormal heart rhythm that causes sudden death. This is a congenital defect, which means you were born with it.  · Atrial tachycardia. This is another common type of SVT. A small group of cells in the atria begin to fire abnormally and trigger the fast heartbeat.  · Multifocal atrial tachycardia. Multiple groups of cells in your atria fire abnormally and trigger a fast heartbeat.  What causes SVT?  Some types of SVT run in families. Some people have heart problems from birth that cause SVT. High blood pressure, heart failure, mitral valve disease, sleep apnea, thyroid problems, and heart attacks can cause SVT. Smoking, excess caffeine or alcohol, and some medicines can increase your risk of having SVT.  Symptoms of SVT  When SVT happens, you may feel no symptoms. Or you may have:  · Fluttering feelings in your chest (palpitations)  · A tight feeling or pain in your chest  · A pulsing feeling in your  neck  · Dizziness  · Shortness of breath  · Tiredness  · Fainting  · Nausea  In very rare cases, SVT can cause sudden death.  Diagnosing SVT  Your primary healthcare provider may diagnose you. Or you may see a heart doctor (cardiologist). The doctor will ask about your health history. He or she will also give you a physical exam. You may also have tests. These help show what kind of SVT you have, and what may cause it. They also help check for other problems. The tests may include:  · Electrocardiogram (ECG), to analyze the abnormal rhythm  · Continuous heart monitors such as a holter monitor or an event recorder to watch your heart rhythm over a longer period in an attempt to catch the SVT rhythm  · Blood tests, to look for various causes such as thyroid problems or electrolyte abnormalities  · Chest X-ray, to check for lung problems and look at the size of your heart  · Exercise stress test, to see how well your heart works under stress  · Echocardiography, to check your heart structure and function  · Electrophysiologic study (EPS), an invasive procedure using wires in the heart to check the heart's electrical signals and diagnose the SVT    © 6173-2541 [employer]. [address] [place] [address]. All rights reserved. This information is not intended as a substitute for professional medical care. Always follow your healthcare professional's instructions.        Treatment for Supraventricular Tachycardia  Supraventricular tachycardia (SVT) is a type of abnormally fast heartbeat. The heart normally beats 60 to 100 beats per minute. With SVT, the heart beats more than 100 times a minute. It may beat as fast as 250 times a minute. This is caused by a problem in the electrical system of the heart. It can lessen the amount of blood pumped through the heart.  Types of treatment   You may not need treatment for SVT if you have only had one episode. If you do need treatment, there are several  kinds. They include:  · Valsalva maneuver. This is a way to increase pressure in the abdomen and chest. It can correct your heart rhythm right away. To do it, you bear down with your stomach muscles, as though you are trying to have a bowel movement.  · Carotid massage. Your healthcare provider may gently rub the carotid artery in your neck. This can also help correct the heart rhythm right away.  · Medicine. There are various kinds you can take. Calcium channel blockers or adenosine can help correct heart rhythm right away. If you have SVT only 1 or 2 times a year, you may take beta-blockers or calcium channel medicine as needed. If your SVT is more frequent, you may need to take medicine every day. Some people may need to take several medicines to prevent episodes of SVT.  · Electrocardioversion. This is a shock to the heart to restart a normal rhythm right away. This may be done if you have a severe episode of SVT.  · Catheter ablation. This can help permanently stop SVT. Your healthcare provider puts a thin, flexible tube (catheter) into a blood vessel in the groin. He or she then gently pushes it up into your heart. The area of your heart that causes your SVT is then burned. This prevents that area from starting a signal that causes SVT.   Lifestyle changes to help prevent SVT episodes  Your healthcare provider might suggest other ways to help prevent SVT, such as:  · Having less alcohol and caffeine  · Not smoking  · Lowering your stress  · Eating foods that are healthy for your heart  When to call your healthcare provider  Call your healthcare provider if you have any of the following:  · Sudden shortness of breath (call 911)  · Severe palpitations  · Severe dizziness  · Severe chest pain  · Symptoms that are happening more often     © 7066-5941 [employer]. [address] [place] [address], [place] [address]. All rights reserved. This information is not intended as a substitute for professional medical  care. Always follow your healthcare professional's instructions.

## 2019-07-22 NOTE — TELEPHONE ENCOUNTER
C3 nurse attempted to contact patient. The following occurred:   C3 nurse attempted to contact name for a TCC post hospital discharge follow up call. The patient is unable to conduct the call @ this time. The patient requested a callback.    The patient does not have a scheduled HOSFU appointment within 7 days post hospital discharge date 7/18/2019.

## 2019-07-24 ENCOUNTER — TELEPHONE (OUTPATIENT)
Dept: TRANSPLANT | Facility: CLINIC | Age: 74
End: 2019-07-24

## 2019-07-24 RX ORDER — TRAMADOL HYDROCHLORIDE 200 MG/1
TABLET, EXTENDED RELEASE ORAL
Qty: 30 TABLET | Refills: 5 | Status: SHIPPED | OUTPATIENT
Start: 2019-07-24 | End: 2020-01-13

## 2019-07-24 NOTE — TELEPHONE ENCOUNTER
----- Message from Lakia Ng RN sent at 7/23/2019  4:53 PM CDT -----  Sent by telephone message - probably go to [facility]?    Please call Gloria from [employer]  she need to get  More information about the patient  Hospital  Stay. Please call [phone] #  [phone]  Case number [id number]. She said she sent a fax early today. Thank you.

## 2019-07-25 ENCOUNTER — TELEPHONE (OUTPATIENT)
Dept: TRANSPLANT | Facility: CLINIC | Age: 74
End: 2019-07-25

## 2019-07-25 NOTE — TELEPHONE ENCOUNTER
All correspondence forwarded to Dr Tim for her review/input.          THIS EMAIL IS FROM AN EXTERNAL SENDER!  DO YOU TRUST THIS EMAIL?  If you are unsure, remember to use the Report Suspicious Email button in [place] to send to [employer] for review. DO NOT click any links and NEVER input your username and password.    I havent had another flutter or tachycardia (whatever it was) but that was miserable. One of my friends who doesnt have PH takes klonpen & said it slows her heart rate. I was curious if anything might help slow it down if it occurs again     Sent from my iPhone    > On Jul 25, 2019, at 12:25 PM, Albertina Ruffin <[email or web address]> wrote:  >   > Hi Ms Smith-  > I don't think klonopin would do anything to help lower your heartrate, but I will check with Dr Tim and let you know what she says.  > Yes, you are correct regarding the opsumit. It is once daily, and can be taken in AM (usually preferable) or PM, but just be as consistent as possible w/the time you decide.  > Please let me know if I can help w/anything else, and I hope you are feeling better!  > Albertina   >   > -----Original Message-----  > From: Abdiel Gilbert <[email or web address]>   > Sent: Thursday, July 25, 2019 12:21 PM  > To: Albertina Ruffin [email or web address]  > Subject:   >   > THIS EMAIL IS FROM AN EXTERNAL SENDER!  > DO YOU TRUST THIS EMAIL?  > If you are unsure, remember to use the Report Suspicious Email button in [place] to send to [employer] for review. DO NOT click any links and NEVER input your username and password.  >   > Vasile Eng  > I have 2 questions:  > When I left [facility] Thursday I was told nothing else Dr Hinojosa could do & no additional meds. Would it make sense to take something like Clonapin? Would that work to slow the heart when Im having the rapid heartbeat?  > I have a few days , then I start opsumit. I understand that I should take 1 tablet once a day . Is that correct?

## 2019-07-26 NOTE — TELEPHONE ENCOUNTER
Hi Ms Smith-  Dr Tim said this is probably a better question for Dr Hinojosa. I hope you have a restful weekend!  Albertina     -----Original Message-----  From: Abdiel Gilbert <[email or web address]>   Sent: Thursday, July 25, 2019 5:20 PM  To: Albertina Ruffin [email or web address]>  Subject: [EXTERNAL] Re: Your questions     THIS EMAIL IS FROM AN EXTERNAL SENDER!  DO YOU TRUST THIS EMAIL?  If you are unsure, remember to use the Report Suspicious Email button in [employer] to send to [employer] for review. DO NOT click any links and NEVER input your username and password.    Would that be something then that might trigger the flutter?    Sent from my iPhone    > On Jul 25, 2019, at 4:07 PM, Albertina Ruffin <[email or web address]> wrote:  >   > Hi Ms Smith--  > Dr Tim would not recommend Klonopin because it is sedating and can also suppress you respiratory drive. This medication is something wed only typically use in hospice patients. She also mentioned your resting heart rate will be higher because of the right ventricular failure and being on vasodilators. Please let me know if you have any additional questions or concerns.  > Albertina  >   > CHEVY ManzoN, BA, RN

## 2019-08-13 ENCOUNTER — TELEPHONE (OUTPATIENT)
Dept: TRANSPLANT | Facility: CLINIC | Age: 74
End: 2019-08-13

## 2019-08-13 NOTE — TELEPHONE ENCOUNTER
email correspondence with patient--forwarded to Dr Tim for review    Hi-  This could quite possibly be the opsumit. I will check with Dr Tim to see what she'd recommend. We've had a handful of people report the itching and lethargy, and of those, some were able to work through it, but some had to come off. I will talk with her and let you know what she recommends.  Thank you,  Albertina     -----Original Message-----  From: Abdiel Gilbert <[email or web address]>   Sent: Tuesday, August 13, 2019 8:09 AM  To: Albertina Ruffin [email or web address]>  Subject:     THIS EMAIL IS FROM AN EXTERNAL SENDER!  DO YOU TRUST THIS EMAIL?  If you are unsure, remember to use the Report Suspicious Email button in [place] to send to [employer] for review. DO NOT click any links and NEVER input your username and password.    Vasile Eng  I just started Opsumit about 1 week ago and have had some really rough days since then . Last night it struck me that it could be side effects from Opsumit. I have had a few days of nausea , diarrhea, terrible constipation, headache, & itching . Because of the nausea I have had no appetite and feel so tired that I feel like I cant do anything. My blood pressure & oxygen levels have been in my normal range. What are your thoughts?  Sent from my iPhone

## 2019-08-13 NOTE — TELEPHONE ENCOUNTER
Pt returned email, verbalizing she understood to stop Opsumit. Message sent to [facility] and [facility], notifying them that pt needs to be restarted on Tracleer 125mg (per Dr Tim).

## 2019-08-13 NOTE — TELEPHONE ENCOUNTER
Awaiting pt's response  ----------------  Good morning-  Dr Tim thinks it sounds like the opsumit is doing more harm than good, so ok to stop it. You may go back to your tracleer. Do you have any left, or should [facility]?  Thank you,  Albertina     -----Original Message-----  From: Abdiel Gilbert <[email or web address]>   Sent: Tuesday, August 13, 2019 8:20 AM  To: Albertina Ruffin [email or web address]>  Subject: [EXTERNAL] Re: RE:     THIS EMAIL IS FROM AN EXTERNAL SENDER!  DO YOU TRUST THIS EMAIL?  If you are unsure, remember to use the Report Suspicious Email button in [place] to send to [employer] for review. DO NOT click any links and NEVER input your username and password.    Thanks. I dont feel like lve  had many side effects of anything Ive taken over 20 hrs so I really didnt give that a thought till last night    Sent from my iPhone      > Sent from my iPhone

## 2019-08-16 RX ORDER — BOSENTAN 125 MG/1
TABLET, FILM COATED ORAL EVERY 12 HOURS
COMMUNITY

## 2019-09-03 ENCOUNTER — TELEPHONE (OUTPATIENT)
Dept: RESEARCH | Facility: HOSPITAL | Age: 74
End: 2019-09-03

## 2019-09-06 ENCOUNTER — HOSPITAL ENCOUNTER (OUTPATIENT)
Dept: PULMONOLOGY | Facility: CLINIC | Age: 74
Discharge: HOME | End: 2019-09-06
Payer: MEDICARE

## 2019-09-06 ENCOUNTER — OFFICE VISIT (OUTPATIENT)
Dept: TRANSPLANT | Facility: CLINIC | Age: 74
End: 2019-09-06
Payer: MEDICARE

## 2019-09-06 ENCOUNTER — HOSPITAL ENCOUNTER (OUTPATIENT)
Dept: CARDIOLOGY | Facility: CLINIC | Age: 74
Discharge: HOME | End: 2019-09-06
Attending: INTERNAL MEDICINE
Payer: MEDICARE

## 2019-09-06 VITALS
DIASTOLIC BLOOD PRESSURE: 60 MMHG | SYSTOLIC BLOOD PRESSURE: 92 MMHG | WEIGHT: 125 LBS | HEART RATE: 84 BPM | BODY MASS INDEX: 22.15 KG/M2 | HEIGHT: 63 IN

## 2019-09-06 VITALS
WEIGHT: 127.44 LBS | HEIGHT: 63 IN | BODY MASS INDEX: 22.58 KG/M2 | OXYGEN SATURATION: 86 % | SYSTOLIC BLOOD PRESSURE: 96 MMHG | DIASTOLIC BLOOD PRESSURE: 51 MMHG | HEART RATE: 89 BPM

## 2019-09-06 VITALS — WEIGHT: 125 LBS | HEIGHT: 63 IN | BODY MASS INDEX: 22.15 KG/M2

## 2019-09-06 DIAGNOSIS — Q26.3 PARTIAL ANOMALOUS PULMONARY VENOUS CONNECTION (PAPVC): ICD-10-CM

## 2019-09-06 DIAGNOSIS — J96.11 CHRONIC RESPIRATORY FAILURE WITH HYPOXIA: ICD-10-CM

## 2019-09-06 DIAGNOSIS — I47.10 SVT (SUPRAVENTRICULAR TACHYCARDIA): ICD-10-CM

## 2019-09-06 DIAGNOSIS — Q21.16 SINUS VENOSUS DEFECT: ICD-10-CM

## 2019-09-06 DIAGNOSIS — I21.4 NSTEMI (NON-ST ELEVATED MYOCARDIAL INFARCTION): ICD-10-CM

## 2019-09-06 DIAGNOSIS — I27.20 PULMONARY HYPERTENSION: ICD-10-CM

## 2019-09-06 DIAGNOSIS — I48.3 TYPICAL ATRIAL FLUTTER: ICD-10-CM

## 2019-09-06 DIAGNOSIS — I50.810 RVF (RIGHT VENTRICULAR FAILURE): ICD-10-CM

## 2019-09-06 DIAGNOSIS — I27.21 PAH (PULMONARY ARTERY HYPERTENSION): Primary | ICD-10-CM

## 2019-09-06 DIAGNOSIS — Z79.899 LONG TERM CURRENT USE OF AMIODARONE: ICD-10-CM

## 2019-09-06 DIAGNOSIS — Q24.9 ADULT CONGENITAL HEART DISEASE: ICD-10-CM

## 2019-09-06 DIAGNOSIS — Z79.01 ANTICOAGULANT LONG-TERM USE: Chronic | ICD-10-CM

## 2019-09-06 DIAGNOSIS — I27.21 PAH (PULMONARY ARTERY HYPERTENSION): ICD-10-CM

## 2019-09-06 DIAGNOSIS — I27.9 CHRONIC PULMONARY HEART DISEASE: ICD-10-CM

## 2019-09-06 LAB
ASCENDING AORTA: 2.47 CM
AV INDEX (PROSTH): 0.57
AV MEAN GRADIENT: 5 MMHG
AV PEAK GRADIENT: 9 MMHG
AV VALVE AREA: 1.75 CM2
AV VELOCITY RATIO: 0.58
BSA FOR ECHO PROCEDURE: 1.59 M2
CV ECHO LV RWT: 0.35 CM
DOP CALC AO PEAK VEL: 1.54 M/S
DOP CALC AO VTI: 29.48 CM
DOP CALC LVOT AREA: 3.1 CM2
DOP CALC LVOT DIAMETER: 1.98 CM
DOP CALC LVOT PEAK VEL: 0.9 M/S
DOP CALC LVOT STROKE VOLUME: 51.52 CM3
DOP CALCLVOT PEAK VEL VTI: 16.74 CM
E WAVE DECELERATION TIME: 191.65 MSEC
E/A RATIO: 0.82
E/E' RATIO: 12.31 M/S
ECHO LV POSTERIOR WALL: 0.66 CM (ref 0.6–1.1)
FRACTIONAL SHORTENING: 24 % (ref 28–44)
INTERVENTRICULAR SEPTUM: 0.92 CM (ref 0.6–1.1)
LA MAJOR: 5.25 CM
LA MINOR: 4.96 CM
LA WIDTH: 3.35 CM
LEFT ATRIUM SIZE: 4.79 CM
LEFT ATRIUM VOLUME INDEX: 43.9 ML/M2
LEFT ATRIUM VOLUME: 69.57 CM3
LEFT INTERNAL DIMENSION IN SYSTOLE: 2.9 CM (ref 2.1–4)
LEFT VENTRICLE DIASTOLIC VOLUME INDEX: 50.22 ML/M2
LEFT VENTRICLE DIASTOLIC VOLUME: 79.53 ML
LEFT VENTRICLE MASS INDEX: 53 G/M2
LEFT VENTRICLE SYSTOLIC VOLUME INDEX: 22.1 ML/M2
LEFT VENTRICLE SYSTOLIC VOLUME: 34.96 ML
LEFT VENTRICULAR INTERNAL DIMENSION IN DIASTOLE: 3.8 CM (ref 3.5–6)
LEFT VENTRICULAR MASS: 84.51 G
LV LATERAL E/E' RATIO: 8.89 M/S
LV SEPTAL E/E' RATIO: 20 M/S
MV PEAK A VEL: 0.98 M/S
MV PEAK E VEL: 0.8 M/S
PISA TR MAX VEL: 4.83 M/S
PULM VEIN S/D RATIO: 2.55
PV PEAK D VEL: 0.31 M/S
PV PEAK S VEL: 0.79 M/S
RA MAJOR: 5.2 CM
RA PRESSURE: 15 MMHG
RA WIDTH: 5.88 CM
RIGHT VENTRICULAR END-DIASTOLIC DIMENSION: 6.03 CM
SINUS: 2.82 CM
STJ: 2.25 CM
TDI LATERAL: 0.09 M/S
TDI SEPTAL: 0.04 M/S
TDI: 0.07 M/S
TR MAX PG: 93 MMHG
TRICUSPID ANNULAR PLANE SYSTOLIC EXCURSION: 1.4 CM
TV REST PULMONARY ARTERY PRESSURE: 108 MMHG

## 2019-09-06 PROCEDURE — 94618 PULMONARY STRESS TESTING: ICD-10-PCS | Mod: 26,S$PBB,, | Performed by: INTERNAL MEDICINE

## 2019-09-06 PROCEDURE — 99214 OFFICE O/P EST MOD 30 MIN: CPT | Mod: PBBFAC,25 | Performed by: INTERNAL MEDICINE

## 2019-09-06 PROCEDURE — 94618 PULMONARY STRESS TESTING: CPT | Mod: PBBFAC | Performed by: INTERNAL MEDICINE

## 2019-09-06 PROCEDURE — 99214 PR OFFICE/OUTPT VISIT, EST, LEVL IV, 30-39 MIN: ICD-10-PCS | Mod: S$PBB,,, | Performed by: INTERNAL MEDICINE

## 2019-09-06 PROCEDURE — 99214 OFFICE O/P EST MOD 30 MIN: CPT | Mod: S$PBB,,, | Performed by: INTERNAL MEDICINE

## 2019-09-06 PROCEDURE — 93306 TTE W/DOPPLER COMPLETE: CPT | Mod: PBBFAC | Performed by: INTERNAL MEDICINE

## 2019-09-06 PROCEDURE — 93306 TRANSTHORACIC ECHO (TTE) COMPLETE (CUPID ONLY): ICD-10-PCS | Mod: 26,S$PBB,, | Performed by: INTERNAL MEDICINE

## 2019-09-06 PROCEDURE — 94618 PULMONARY STRESS TESTING: CPT | Mod: 26,S$PBB,, | Performed by: INTERNAL MEDICINE

## 2019-09-06 PROCEDURE — 99999 PR PBB SHADOW E&M-EST. PATIENT-LVL IV: ICD-10-PCS | Mod: PBBFAC,,, | Performed by: INTERNAL MEDICINE

## 2019-09-06 PROCEDURE — 99999 PR PBB SHADOW E&M-EST. PATIENT-LVL IV: CPT | Mod: PBBFAC,,, | Performed by: INTERNAL MEDICINE

## 2019-09-06 RX ORDER — FUROSEMIDE 20 MG/1
20 TABLET ORAL 2 TIMES DAILY
Qty: 60 TABLET | Refills: 11 | Status: SHIPPED | OUTPATIENT
Start: 2019-09-06 | End: 2021-10-13 | Stop reason: ALTCHOICE

## 2019-09-06 RX ORDER — HYDROGEN PEROXIDE 3 %
20 SOLUTION, NON-ORAL MISCELLANEOUS
Qty: 30 CAPSULE | Refills: 11 | Status: SHIPPED | OUTPATIENT
Start: 2019-09-06 | End: 2020-03-06

## 2019-09-06 NOTE — PATIENT INSTRUCTIONS
Increase lasix to twice a day three times a day    Check your weights every morning after getting out of bed and urinating. If your weight goes up 3# overnight or 5# in one week take lasix twice that day and let us know if the fluid doesn't come off.    Try nexium 30 minutes before breakfast to help with your stomach    Continue zofran as needed    [facility]    Flu shot     Keep salt intake to under 2000 mg sodium, fluids to under 2 L (64 oz)

## 2019-09-06 NOTE — PROGRESS NOTES
Subjective:      HPI  Delightful 73 y.o. 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 She also has h/o Rosemonas bacteremia s/p central line removal, and IV abx per ID with replacement by nephrology.  Pt was admitted 10/4-10/9 with worsening fatigue, diarrhea since the morning of 10/4/17, nausea and low grade temperature at home (100.4 being the highest). Also had a single event of hypoxia (SpO2 50s) that resolved without intervention. During that hospitalization she was placed on 100% FiO2 via HFNC on admit secondary to hypoxia. Blood cultures were drawn and remodulin was moved to peripheral line. Blood cultures were drawn from [facility] as well. ID was consulted. He symptoms improved gradually throughout hospitalization and she was eventually weaned back to low flow NC. Cultures remained negative and remodulin was moved back to central access. They attributed fevers/hypoxia to possible viral infection. She ws eventually weaned down to 4L NC and discharged home in good condition. She was then admitted at [facility] 11/29/17 with hypotension w severe vomiting, fever 101.9, chills and diarrhea- she was over hydrated in the ER, GI w/u was (-), had recently increased her remodulin- was discharged in time to go to her son's wedding-  pt was then admitted 2/28-3/5 with  increased SOB and O2 requirements in setting of prolonged episode of A tach with associated neck pressure/pain. Troponin was positive and EKG had nonspecific ST changes. Pt was loaded with ASA and Plavix and started on Heparin bridge. Interventional Cards consulted for NSTEMI/LHC, but LHC 3/1/18 showed normal coronary arteries.  EP was consulted for A Tach.  There are very limited options in this patient-  previously discussed tikosyn but would avoid given baseline Qt. Not a candidate for sotalol. Given that pt has had ongoing intermittent atrial tach and does not tolerate it, will try amio in place of cardizem. Pt had no further episodes on tele. Pt will need monitoring of PFTs, TFTs, and LFTs while on Amio.   Pt was continued on home dose of IV Remodulin. We attempted to wean off Adempas, but with continued increasing oxygen requirements, restarted Adempas and this improved so she will continue on this for now. Also of note, pt has been taking bosentan TID for years, when this should be a BID dosed med. So we will have her change bosentan to BID dosing on d/c.      Since last visit, we tried to switch pt to opsumit which she did not tolerate due to nausea , diarrhea, terrible constipation, headache, & itching)  Now back on bosentan (generic)   Her 6mw distance is lower today but not because of her breathing but rather pain/weakness in her leg residual from her hip fx/repair-  Completed PT for the hip, so exercising at home on stationary bike- goes about 20 minutes. With her ADls her breathing is about the same. Wt has been pretty stable around 122#, no fluid retention, though she has early satiety- feels full when she eats- zofran helps with the nausea as does eating small portions     no f/c and line is clean    Does not have much swelling now, no cp/palps/LH.       Remodulin is at 89.2 ng/kg/min (unchanged from Flowsheet) and on Tracleer, adempas 0.5mg tid      6MWT today: 206. 5m (223m (unchanged from August) 231m (195m (236m Abdiel, 225m, 283m May, 222m Mar, 375m Nov, 305 Sept, 274.5 July,  335.5May, 365  m prev 2 visits)                        O2 sat  91->80% 6L pull tank                                                          HR 88-->104                           BP  98/ 50  ->104/57                                                      Roberto 0.5 ->3    TTE  today  · Normal left ventricular systolic function. The estimated ejection fraction is 60%, Small and underfilled LV  · Septal wall has abnormal motion. Systolic flattening of the interventricular septum consistent with right ventricle pressure overload.  RV 6.0cm, TAPSE 1.4  · Biatrial enlargement.  · Grade II (moderate) left ventricular diastolic dysfunction consistent with pseudonormalization.  · Elevated left atrial pressure.  · Interatrial septum bows to left, consider elevated right atrial pressure.  · Moderately to severely reduced right ventricular systolic function.  · Severe right ventricular enlargement.  · There is right ventricular hypertrophy.  · Severe tricuspid regurgitation.  · Moderate pulmonic regurgitation.  · The estimated PA systolic pressure is 108 mm Hg  · Pulmonary hypertension present.  · Elevated central venous pressure (15 mm Hg).         TTE 3/23/18    1 - Normal left ventricular systolic function (EF 60-65%).     2 - No wall motion abnormalities.     3 - Biatrial enlargement.     4 - Impaired LV relaxation, elevated LAP (grade 2 diastolic dysfunction).     5 - Right ventricular enlargement with hypertrophy, with moderately depressed systolic function.     6 - Pulmonary hypertension. The estimated PA systolic pressure is 90 mmHg.     7 - Small pericardial effusion.     8 - Intermediate central venous pressure.     TTE 3/1/18    1 - Normal left ventricular systolic function (EF 55-60%).     2 - Indeterminate LV diastolic function.     3 - Right atrial enlargement.     4 - Right ventricular enlargement with severely depressed systolic function.     5 - Trivial mitral regurgitation.     6 - Moderate tricuspid regurgitation.     7 - No wall motion abnormalities.     8 - Pulmonary hypertension. The estimated PA systolic pressure is greater than 77 mmHg.     TTE 5/8/17:    1 - Right ventricular enlargement with hypertrophy, with severely depressed systolic function.     2 - Moderate to severe  "tricuspid regurgitation.     3 - Pulmonary hypertension. The estimated PA systolic pressure is 116 mmHg.     4 - Biatrial enlargement.     5 - Normal left ventricular systolic function (EF 60-65%); reduced LV stroke volume.  The right ventricle is enlarged measuring 5.8 cm at the base in the apical right ventricle-focused view. There is RVH. Global right ventricular systolic function appears severely depressed. There is abnormal septal motion consistent with   RV pressure/volume overload. Tricuspid annular plane systolic excursion (TAPSE) is 1.5 cm. The estimated PA systolic pressure is 116 mmHg.     [facility] 9/25/17:  CONDITION 1 (9/25/2017 11:20:07):  RA: 5/2 (1)  RV: 84/-5  RVEDP: 6     PW: 17/19 (16)  PA: 87/21 (47)  AO_SAT: 95  AO: 115/71 (86)  PA_SAT: 71  SPO2: 97  FICKCI: 3.2800  FICKCO: 5.5500    Review of Systems   Constitution: Positive for decreased appetite and weight loss. Negative for chills, fever, malaise/fatigue and weight gain.   HENT: Negative.    Eyes: Negative.    Cardiovascular: Positive for dyspnea on exertion and palpitations. Negative for chest pain, leg swelling, near-syncope, orthopnea, paroxysmal nocturnal dyspnea and syncope.   Respiratory: Negative for shortness of breath.    Endocrine: Negative.    Skin: Negative.    Musculoskeletal: Positive for arthritis and joint pain. Negative for back pain.   Gastrointestinal: Positive for nausea. Negative for bloating, abdominal pain and change in bowel habit.        Early satiety   Neurological: Negative for dizziness and light-headedness.   Psychiatric/Behavioral: Negative for depression.        Objective:   BP (!) 96/51 (BP Location: Right arm, Patient Position: Sitting, BP Method: Medium (Automatic))   Pulse 89   Ht 5' 3" (1.6 m)   Wt 57.8 kg (127 lb 6.8 oz)   SpO2 (!) 86% Comment: 5 liters O2  BMI 22.57 kg/m²       Physical Exam   Constitutional: She is oriented to person, place, and time. She appears well-developed and well-nourished. "   HENT:   Head: Normocephalic and atraumatic.   Eyes: Right eye exhibits no discharge. Left eye exhibits no discharge.   Neck: Neck supple. No JVD present. No thyromegaly present.   Cardiovascular: Regular rhythm. Exam reveals no gallop and no friction rub.   No murmur heard.  Tachy, accentuated S2     Pulmonary/Chest: Effort normal and breath sounds normal. No respiratory distress. She has no wheezes. She has no rales.   few rhonchi   Abdominal: Soft. Bowel sounds are normal. She exhibits no distension. There is no tenderness.   Musculoskeletal: Normal range of motion. She exhibits edema. She exhibits no tenderness.   Mild edema in the left ankle (always more swollen,broke it years ago)   Neurological: She is alert and oriented to person, place, and time. No cranial nerve deficit. Coordination normal.   Skin: Skin is warm and dry. No rash noted.   Psychiatric: She has a normal mood and affect. Judgment and thought content normal.                         Chemistry        Component Value Date/Time     09/06/2019 1040    K 4.2 09/06/2019 1040     09/06/2019 1040    CO2 26 09/06/2019 1040    BUN 20 09/06/2019 1040    CREATININE 1.2 09/06/2019 1040    GLU 89 09/06/2019 1040        Component Value Date/Time    CALCIUM 9.2 09/06/2019 1040    ALKPHOS 43 (L) 09/06/2019 1040    AST 17 09/06/2019 1040    ALT 11 09/06/2019 1040    BILITOT 1.4 (H) 09/06/2019 1040            Magnesium   Date Value Ref Range Status   07/18/2019 2.1 1.6 - 2.6 mg/dL Final       Lab Results   Component Value Date    WBC 4.24 09/06/2019    HGB 13.3 09/06/2019    HCT 43.9 09/06/2019    MCV 92 09/06/2019     09/06/2019         BNP   Date Value Ref Range Status   09/06/2019 323 (H) 0 - 99 pg/mL Final     Comment:     Values of less than 100 pg/ml are consistent with non-CHF populations.   07/18/2019 240 (H) 0 - 99 pg/mL Final     Comment:     Values of less than 100 pg/ml are consistent with non-CHF populations.   06/10/2019 300 (H) 0  - 99 pg/mL Final     Comment:     Values of less than 100 pg/ml are consistent with non-CHF populations.                 Assessment:       1. Pulmonary hypertension- WHO Group 1, poor 6mw distance today given hip Fx- without a doubt, pt suffers from R to left shunting- was unable to tolerate increasing remodulin  BNP  Mildly increased in her usual range - FC III on triple tx- echo has me worried as RV is enlarged and severely depressed, and now with small pericardial effusion- focus now should be on improving her QOL as much as possible   2. PFO (patent foramen ovale)    3. Palpitations/tachycardia- stale, followed by EP, on amio now  4. Chronic hypoxemic resp failure- stable on O2  5. PAPVR,sinus venosus ASD- decision made to treat conservatively     Plan:    -  No change to remodulin today but given increased CVP on echo and elevated BNP will increase lasix to bid 3 days a week with additional doses prn wt gain 3# overnight or 5# in a week    Did not tolerate opsumit (will remain on generic bosentan)    Ordered nexium for nausea/indigestion likely related to adempas    - Keep salt intake to under 2000 mg sodium, fluids to under 2 L (64 oz)    Interested in implantable pump once we are ready to start implanting them, though with her size I'm not sure she will be a candidate.     flu shot this fall    F/u 3 mo with walk if able to do it without leg pain, labs- please send order to [facility] in [employer] for her monthly LFTs

## 2019-09-06 NOTE — PROCEDURES
Molly Smith is a 73 y.o.  female patient, who presents for a 6 minute walk test ordered by Alice Tim MD.  The diagnosis is Pulmonary Hypertension.  The patient's BMI is 22.2 kg/m2.  Predicted distance (lower limit of normal) is 319.71 meters.      Test Results:    The test was completed without stopping.  The total time walked was 360 seconds.  During walking, the patient reported:  Dyspnea, Leg pain.  The patient used supplemental oxygen during testing.     09/06/2019---------Distance: 206.35 meters (677 feet)     O2 Sat % Supplemental Oxygen Heart Rate Blood Pressure Roberto Scale   Pre-exercise  (Resting) 91 % 6 L/M 88 bpm 98/50 mmHg 0   During Exercise 80 % 6 L/M 104 bpm 104/57 mmHg 3   Post-exercise  (Recovery) 90 % 6 L/M  92 bpm       Recovery Time:  147 seconds    Performing nurse/tech:  Jenny PALOMARES      PREVIOUS STUDY:   01/29/2019---------Distance: 243.84 meters (800 feet)       O2 Sat % Supplemental Oxygen Heart Rate Blood Pressure Roberto Scale   Pre-exercise  (Resting) 91 % 4 L/M 98 bpm 104/64 mmHg 2   During Exercise   83 % 4 L/M 113 bpm 113/56 mmHg 4   Post-exercise  (Recovery) 91 % 4 L/M  100 bpm   mmHg        CLINICAL INTERPRETATION:  Six minute walk distance is 206.35 meters (677 feet) with moderate dyspnea.  During exercise, there was significant desaturation while breathing supplemental oxygen.  Both blood pressure and heart rate remained stable with walking.  The patient reported non-pulmonary symptoms during exercise.  Significant exercise impairment is likely due to desaturation and subjective symptoms.  The patient did complete the study, walking 360 seconds of the 360 second test.  Since the previous study in January 2019, exercise capacity may be somewhat worse.  Based upon age and body mass index, exercise capacity is less than predicted.

## 2019-09-09 ENCOUNTER — TELEPHONE (OUTPATIENT)
Dept: TRANSPLANT | Facility: CLINIC | Age: 74
End: 2019-09-09

## 2019-09-09 NOTE — TELEPHONE ENCOUNTER
----- Message from Alice Tim MD sent at 9/9/2019 10:21 AM CDT -----  yes  ----- Message -----  From: MELISA Gutierrez, RN  Sent: 9/9/2019  10:00 AM  To: Alice Tim MD    Looks like she had pneumovax in 2016--Guessing she would be due in 2021?  Albertina

## 2019-12-07 NOTE — SUBJECTIVE & OBJECTIVE
Interval History: No acute events overnight.     Review of Systems   Constitution: Negative for chills and fever.   HENT: Negative for congestion.    Cardiovascular: Positive for dyspnea on exertion and palpitations. Negative for chest pain, irregular heartbeat, leg swelling, near-syncope, orthopnea and paroxysmal nocturnal dyspnea.   Respiratory: Positive for shortness of breath. Negative for cough, sputum production and wheezing.    Gastrointestinal: Negative for abdominal pain, nausea and vomiting.   Genitourinary: Negative for dysuria.   Neurological: Negative for dizziness, focal weakness, headaches, light-headedness and weakness.     Objective:     Vital Signs (Most Recent):  Temp: 98.3 °F (36.8 °C) (03/31/19 0000)  Pulse: 102 (03/31/19 1019)  Resp: (!) 22 (03/31/19 1019)  BP: 130/63 (03/31/19 0800)  SpO2: (!) 90 % (03/31/19 1019) Vital Signs (24h Range):  Temp:  [98.3 °F (36.8 °C)] 98.3 °F (36.8 °C)  Pulse:  [] 102  Resp:  [12-37] 22  SpO2:  [76 %-94 %] 90 %  BP: ()/(51-71) 130/63     Weight: 57.2 kg (126 lb)  Body mass index is 22.32 kg/m².     SpO2: (!) 90 %  O2 Device (Oxygen Therapy): High Flow nasal Cannula    Physical Exam   Constitutional: She is oriented to person, place, and time.   HENT:   Mouth/Throat: Oropharynx is clear and moist.   Wearing NC for inhaled nitric oxide   Eyes: Pupils are equal, round, and reactive to light.   Cardiovascular: Normal rate, regular rhythm and intact distal pulses.   Pulmonary/Chest: Effort normal.   Abdominal: Soft. Bowel sounds are normal.   Musculoskeletal: She exhibits no edema.   Neurological: She is alert and oriented to person, place, and time.   Skin: Skin is warm and dry.   Psychiatric: She has a normal mood and affect. Her behavior is normal.   Vitals reviewed.      Significant Labs: All pertinent lab results from the last 24 hours have been reviewed.    Significant Imaging: Echocardiogram:   2D echo with color flow doppler:   Results for orders  placed or performed during the hospital encounter of 03/23/18   2D echo with color flow doppler   Result Value Ref Range    QEF 60 55 - 65    Diastolic Dysfunction Yes (A)     Est. PA Systolic Pressure 89.72 (A)     Pericardial Effusion SMALL (A)     Mitral Valve Mobility NORMAL     Tricuspid Valve Regurgitation MILD       No

## 2019-12-12 PROBLEM — Z79.899 HIGH RISK MEDICATION USE: Status: ACTIVE | Noted: 2019-12-12

## 2019-12-12 NOTE — PROGRESS NOTES
Subjective:      HPI  Delightful 74 y.o. 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 She also has h/o Rosemonas bacteremia s/p central line removal, and IV abx per ID with replacement by nephrology.  Pt was admitted 10/4-10/9 with worsening fatigue, diarrhea since the morning of 10/4/17, nausea and low grade temperature at home (100.4 being the highest). Also had a single event of hypoxia (SpO2 50s) that resolved without intervention. During that hospitalization she was placed on 100% FiO2 via HFNC on admit secondary to hypoxia. Blood cultures were drawn and remodulin was moved to peripheral line. Blood cultures were drawn from [place] as well. ID was consulted. He symptoms improved gradually throughout hospitalization and she was eventually weaned back to low flow NC. Cultures remained negative and remodulin was moved back to central access. They attributed fevers/hypoxia to possible viral infection. She ws eventually weaned down to 4L NC and discharged home in good condition. She was then admitted at [facility]/29/17 with hypotension w severe vomiting, fever 101.9, chills and diarrhea- she was over hydrated in the ER, GI w/u was (-), had recently increased her remodulin- was discharged in time to go to her son's wedding-  pt was then admitted 2/28-3/5 with  increased SOB and O2 requirements in setting of prolonged episode of A tach with associated neck pressure/pain. Troponin was positive and EKG had nonspecific ST changes. Pt was loaded with ASA and Plavix and started on Heparin bridge. Interventional Cards consulted for NSTEMI/LHC, but LHC 3/1/18 showed normal coronary arteries.  EP was consulted for A Tach.  There are very limited options in this patient-  previously discussed tikosyn but would avoid given baseline Qt. Not a candidate for sotalol. Given that pt has had ongoing intermittent atrial tach and does not tolerate it, will try amio in place of cardizem. Pt had no further episodes on tele. Pt will need monitoring of PFTs, TFTs, and LFTs while on Amio.   Pt was continued on home dose of IV Remodulin. We attempted to wean off Adempas, but with continued increasing oxygen requirements, restarted Adempas and this improved so she will continue on this for now. Also of note, pt has been taking bosentan TID for years, when this should be a BID dosed med. So we will have her change bosentan to BID dosing on d/c.        Since last visit, pt sayas she has had a really bad week- every day has had runs of tachycardia at least once, if not twice a day- her HR has been high, her O2 has been low. Today she feels better but her pulse ox was 65% though it comes back up when she sits on high flow (she cant feel it)- used to cheat and come off for a bit but cant do now- had to sit on 6L for a while but it hurts her nose. These episodes have affected her ADLs and one night was so fatigued she skipped her shower.  no fluid retention, lasix is 40mg in am with an extra 20mg every other day  Gets LH with the palps and pre syncope, though no syncope     With her ADls her breathing is about the same. Wt has been pretty stable around 122#, no fluid retention, though she has early satiety- feels full when she eats- zofran helps with the nausea as does eating small portions     no f/c and line is clean, able to go a week at a time now without changing as she less irritated at her line exit site.           Remodulin is at 89.2 ng/kg/min (unchanged from Flowsheet) and on Tracleer, adempas 0.5mg tid      No 6MWT today as her resting sat was 85%    9/19   206. 5m (223m (unchanged from August) 231m (195m (236m Abdiel, 225m, 283m May, 222m Mar, 375m  Nov, 305 Sept, 274.5 July,  335.5May, 365  m prev 2 visits)                        O2 sat  91->80% 6L pull tank                                                          HR 88-->104                           BP  98/ 50  ->104/57                                                      Roberto 0.5 ->3    TTE today  · Normal left ventricular systolic function. The estimated ejection fraction is 60%, Small and underfilled LV  · Septal wall has abnormal motion. Systolic flattening of the interventricular septum consistent with right ventricle pressure overload.  RV 6.0cm, TAPSE 1.4  · Biatrial enlargement.  · Grade II (moderate) left ventricular diastolic dysfunction consistent with pseudonormalization.  · Elevated left atrial pressure.  · Interatrial septum bows to left, consider elevated right atrial pressure.  · Moderately to severely reduced right ventricular systolic function.  · Severe right ventricular enlargement.  · There is right ventricular hypertrophy.  · Severe tricuspid regurgitation.  · Moderate pulmonic regurgitation.  · The estimated PA systolic pressure is 108 mm Hg  · Pulmonary hypertension present.  · Elevated central venous pressure (15 mm Hg).         TTE 3/23/18    1 - Normal left ventricular systolic function (EF 60-65%).     2 - No wall motion abnormalities.     3 - Biatrial enlargement.     4 - Impaired LV relaxation, elevated LAP (grade 2 diastolic dysfunction).     5 - Right ventricular enlargement with hypertrophy, with moderately depressed systolic function.     6 - Pulmonary hypertension. The estimated PA systolic pressure is 90 mmHg.     7 - Small pericardial effusion.     8 - Intermediate central venous pressure.     TTE 3/1/18    1 - Normal left ventricular systolic function (EF 55-60%).     2 - Indeterminate LV diastolic function.     3 - Right atrial enlargement.     4 - Right ventricular enlargement with severely depressed systolic function.     5 - Trivial mitral regurgitation.     6 -  Moderate tricuspid regurgitation.     7 - No wall motion abnormalities.     8 - Pulmonary hypertension. The estimated PA systolic pressure is greater than 77 mmHg.     TTE 5/8/17:    1 - Right ventricular enlargement with hypertrophy, with severely depressed systolic function.     2 - Moderate to severe tricuspid regurgitation.     3 - Pulmonary hypertension. The estimated PA systolic pressure is 116 mmHg.     4 - Biatrial enlargement.     5 - Normal left ventricular systolic function (EF 60-65%); reduced LV stroke volume.  The right ventricle is enlarged measuring 5.8 cm at the base in the apical right ventricle-focused view. There is RVH. Global right ventricular systolic function appears severely depressed. There is abnormal septal motion consistent with   RV pressure/volume overload. Tricuspid annular plane systolic excursion (TAPSE) is 1.5 cm. The estimated PA systolic pressure is 116 mmHg.     [facility] 9/25/17:  CONDITION 1 (9/25/2017 11:20:07):  RA: 5/2 (1)  RV: 84/-5  RVEDP: 6     PW: 17/19 (16)  PA: 87/21 (47)  AO_SAT: 95  AO: 115/71 (86)  PA_SAT: 71  SPO2: 97  FICKCI: 3.2800  FICKCO: 5.5500    Review of Systems   Constitution: Positive for decreased appetite and weight loss. Negative for chills, fever, malaise/fatigue and weight gain.   HENT: Negative.    Eyes: Negative.    Cardiovascular: Positive for dyspnea on exertion and palpitations. Negative for chest pain, leg swelling, near-syncope, orthopnea, paroxysmal nocturnal dyspnea and syncope.   Respiratory: Negative for shortness of breath.    Endocrine: Negative.    Skin: Negative.    Musculoskeletal: Positive for arthritis and joint pain. Negative for back pain.   Gastrointestinal: Positive for nausea. Negative for bloating, abdominal pain and change in bowel habit.        Early satiety   Neurological: Negative for dizziness and light-headedness.   Psychiatric/Behavioral: Negative for depression.        Objective:   /60 (BP Location: Right arm, Patient  "Position: Sitting, BP Method: Small (Automatic))   Pulse 82   Ht 5' 3" (1.6 m)   Wt 57.9 kg (127 lb 10.3 oz)   SpO2 (!) 83%   BMI 22.61 kg/m²       Physical Exam   Constitutional: She is oriented to person, place, and time. She appears well-developed and well-nourished.   HENT:   Head: Normocephalic and atraumatic.   Eyes: Right eye exhibits no discharge. Left eye exhibits no discharge.   Neck: Neck supple. No JVD present. No thyromegaly present.   Cardiovascular: Regular rhythm. Exam reveals no gallop and no friction rub.   No murmur heard.  Tachy, accentuated S2     Pulmonary/Chest: Effort normal and breath sounds normal. No respiratory distress. She has no wheezes. She has no rales.   few rhonchi   Abdominal: Soft. Bowel sounds are normal. She exhibits no distension. There is no tenderness.   Musculoskeletal: Normal range of motion. She exhibits edema. She exhibits no tenderness.   Mild edema in the left ankle (always more swollen,broke it years ago)   Neurological: She is alert and oriented to person, place, and time. No cranial nerve deficit. Coordination normal.   Skin: Skin is warm and dry. No rash noted.   Psychiatric: She has a normal mood and affect. Judgment and thought content normal.                             Chemistry        Component Value Date/Time     12/13/2019 1127    K 4.3 12/13/2019 1127     12/13/2019 1127    CO2 26 12/13/2019 1127    BUN 26 (H) 12/13/2019 1127    CREATININE 1.4 12/13/2019 1127    GLU 91 12/13/2019 1127        Component Value Date/Time    CALCIUM 9.2 12/13/2019 1127    ALKPHOS 44 (L) 12/13/2019 1127    AST 16 12/13/2019 1127    ALT 11 12/13/2019 1127    BILITOT 1.0 12/13/2019 1127            Magnesium   Date Value Ref Range Status   07/18/2019 2.1 1.6 - 2.6 mg/dL Final       Lab Results   Component Value Date    WBC 4.87 12/13/2019    HGB 13.7 12/13/2019    HCT 45.7 12/13/2019    MCV 87 12/13/2019     12/13/2019         BNP   Date Value Ref Range " Status   12/13/2019 190 (H) 0 - 99 pg/mL Final     Comment:     Values of less than 100 pg/ml are consistent with non-CHF populations.   09/06/2019 323 (H) 0 - 99 pg/mL Final     Comment:     Values of less than 100 pg/ml are consistent with non-CHF populations.   07/18/2019 240 (H) 0 - 99 pg/mL Final     Comment:     Values of less than 100 pg/ml are consistent with non-CHF populations.                 Assessment:       1. Pulmonary hypertension- WHO Group 1, poor 6mw distance today given hip Fx- without a doubt, pt suffers from R to left shunting- was unable to tolerate increasing remodulin  BNP  Mildly increased in her usual range - FC III on triple tx- echo has me worried as RV is enlarged and severely depressed, and now with small pericardial effusion- focus now should be on improving her QOL as much as possible       Did not tolerate attempt to switch to opsumit (on generic bosentan)   2. PFO (patent foramen ovale)    3. Palpitations/tachycardia- stale, followed by EP, on amio now  4. Chronic hypoxemic resp failure- stable on O2  5. PAPVR,sinus venosus ASD- decision made to treat conservatively     Plan:   Would like to see if increasing her remodulin gives her any benefit in terms of her FC-  Will send her dosing sheet that goes to 110 ng/kg/min    Cont lasix 40m mg daily with additional 20mg prn wt gain 3# overnight or 5# in a week (will try to spare her kidneys and only take the extra 20mg as needed rather than every other day given that her BUN is higher today)    Did not tolerate opsumit (will remain on generic bosentan)    Sent a message to Dr Hinojosa to see if any other options interms of her SVT- considered CCB prn but I worry given how sick her RV is that she wont tolerate it. Dig might be another option    - Keep salt intake to under 2000 mg sodium, fluids to under 2 L (64 oz)    Interested in implantable pump once we are ready to start implanting them, though with her size I'm not sure she will be a  candidate.      F/u 3 mo with walk if able to do it without leg pain, echo labs- please send order to [facility] in [place] for her monthly LFTs

## 2019-12-13 ENCOUNTER — HOSPITAL ENCOUNTER (OUTPATIENT)
Dept: PULMONOLOGY | Facility: CLINIC | Age: 74
Discharge: HOME | End: 2019-12-13
Payer: MEDICARE

## 2019-12-13 ENCOUNTER — OFFICE VISIT (OUTPATIENT)
Dept: TRANSPLANT | Facility: CLINIC | Age: 74
End: 2019-12-13
Payer: MEDICARE

## 2019-12-13 ENCOUNTER — LAB VISIT (OUTPATIENT)
Dept: LAB | Facility: HOSPITAL | Age: 74
End: 2019-12-13
Attending: INTERNAL MEDICINE
Payer: MEDICARE

## 2019-12-13 VITALS
BODY MASS INDEX: 22.61 KG/M2 | OXYGEN SATURATION: 83 % | HEIGHT: 63 IN | SYSTOLIC BLOOD PRESSURE: 105 MMHG | WEIGHT: 127.63 LBS | DIASTOLIC BLOOD PRESSURE: 60 MMHG | HEART RATE: 82 BPM

## 2019-12-13 VITALS — BODY MASS INDEX: 22.2 KG/M2 | WEIGHT: 130 LBS | HEIGHT: 64 IN

## 2019-12-13 DIAGNOSIS — J96.11 CHRONIC RESPIRATORY FAILURE WITH HYPOXIA: ICD-10-CM

## 2019-12-13 DIAGNOSIS — R06.02 SOB (SHORTNESS OF BREATH): ICD-10-CM

## 2019-12-13 DIAGNOSIS — R06.02 SHORTNESS OF BREATH: ICD-10-CM

## 2019-12-13 DIAGNOSIS — Q24.9 ADULT CONGENITAL HEART DISEASE: ICD-10-CM

## 2019-12-13 DIAGNOSIS — I27.21 PAH (PULMONARY ARTERY HYPERTENSION): Primary | ICD-10-CM

## 2019-12-13 DIAGNOSIS — I27.20 PULMONARY HYPERTENSION: ICD-10-CM

## 2019-12-13 DIAGNOSIS — Z79.899 POLYPHARMACY: ICD-10-CM

## 2019-12-13 DIAGNOSIS — Z79.899 LONG TERM CURRENT USE OF AMIODARONE: ICD-10-CM

## 2019-12-13 DIAGNOSIS — I50.810 RVF (RIGHT VENTRICULAR FAILURE): ICD-10-CM

## 2019-12-13 DIAGNOSIS — Z79.01 ANTICOAGULANT LONG-TERM USE: Chronic | ICD-10-CM

## 2019-12-13 DIAGNOSIS — I47.10 SVT (SUPRAVENTRICULAR TACHYCARDIA): ICD-10-CM

## 2019-12-13 DIAGNOSIS — R53.81 DEBILITY: ICD-10-CM

## 2019-12-13 DIAGNOSIS — I27.9 CHRONIC PULMONARY HEART DISEASE: Primary | ICD-10-CM

## 2019-12-13 DIAGNOSIS — Z79.899 HIGH RISK MEDICATION USE: ICD-10-CM

## 2019-12-13 DIAGNOSIS — Q26.3 PARTIAL ANOMALOUS PULMONARY VENOUS CONNECTION (PAPVC): ICD-10-CM

## 2019-12-13 DIAGNOSIS — I21.4 NSTEMI (NON-ST ELEVATED MYOCARDIAL INFARCTION): ICD-10-CM

## 2019-12-13 DIAGNOSIS — R06.82 TACHYPNEA: ICD-10-CM

## 2019-12-13 LAB
ALBUMIN SERPL BCP-MCNC: 4.2 G/DL (ref 3.5–5.2)
ALP SERPL-CCNC: 44 U/L (ref 55–135)
ALT SERPL W/O P-5'-P-CCNC: 11 U/L (ref 10–44)
ANION GAP SERPL CALC-SCNC: 8 MMOL/L (ref 8–16)
AST SERPL-CCNC: 16 U/L (ref 10–40)
BASOPHILS # BLD AUTO: 0.08 K/UL (ref 0–0.2)
BASOPHILS NFR BLD: 1.6 % (ref 0–1.9)
BILIRUB SERPL-MCNC: 1 MG/DL (ref 0.1–1)
BNP SERPL-MCNC: 190 PG/ML (ref 0–99)
BUN SERPL-MCNC: 26 MG/DL (ref 8–23)
CALCIUM SERPL-MCNC: 9.2 MG/DL (ref 8.7–10.5)
CHLORIDE SERPL-SCNC: 104 MMOL/L (ref 95–110)
CO2 SERPL-SCNC: 26 MMOL/L (ref 23–29)
CREAT SERPL-MCNC: 1.4 MG/DL (ref 0.5–1.4)
DIFFERENTIAL METHOD: ABNORMAL
EOSINOPHIL # BLD AUTO: 0.1 K/UL (ref 0–0.5)
EOSINOPHIL NFR BLD: 1.4 % (ref 0–8)
ERYTHROCYTE [DISTWIDTH] IN BLOOD BY AUTOMATED COUNT: 15.1 % (ref 11.5–14.5)
EST. GFR  (AFRICAN AMERICAN): 43 ML/MIN/1.73 M^2
EST. GFR  (NON AFRICAN AMERICAN): 37.3 ML/MIN/1.73 M^2
GLUCOSE SERPL-MCNC: 91 MG/DL (ref 70–110)
HCT VFR BLD AUTO: 45.7 % (ref 37–48.5)
HGB BLD-MCNC: 13.7 G/DL (ref 12–16)
IMM GRANULOCYTES # BLD AUTO: 0 K/UL (ref 0–0.04)
IMM GRANULOCYTES NFR BLD AUTO: 0 % (ref 0–0.5)
LYMPHOCYTES # BLD AUTO: 1 K/UL (ref 1–4.8)
LYMPHOCYTES NFR BLD: 20.7 % (ref 18–48)
MCH RBC QN AUTO: 26 PG (ref 27–31)
MCHC RBC AUTO-ENTMCNC: 30 G/DL (ref 32–36)
MCV RBC AUTO: 87 FL (ref 82–98)
MONOCYTES # BLD AUTO: 0.4 K/UL (ref 0.3–1)
MONOCYTES NFR BLD: 8 % (ref 4–15)
NEUTROPHILS # BLD AUTO: 3.3 K/UL (ref 1.8–7.7)
NEUTROPHILS NFR BLD: 68.3 % (ref 38–73)
NRBC BLD-RTO: 0 /100 WBC
PLATELET # BLD AUTO: 281 K/UL (ref 150–350)
PMV BLD AUTO: 9.3 FL (ref 9.2–12.9)
POTASSIUM SERPL-SCNC: 4.3 MMOL/L (ref 3.5–5.1)
PROT SERPL-MCNC: 7.4 G/DL (ref 6–8.4)
RBC # BLD AUTO: 5.27 M/UL (ref 4–5.4)
SODIUM SERPL-SCNC: 138 MMOL/L (ref 136–145)
WBC # BLD AUTO: 4.87 K/UL (ref 3.9–12.7)

## 2019-12-13 PROCEDURE — 99214 OFFICE O/P EST MOD 30 MIN: CPT | Mod: PBBFAC | Performed by: INTERNAL MEDICINE

## 2019-12-13 PROCEDURE — 83880 ASSAY OF NATRIURETIC PEPTIDE: CPT

## 2019-12-13 PROCEDURE — 99999 PR PBB SHADOW E&M-EST. PATIENT-LVL IV: CPT | Mod: PBBFAC,,, | Performed by: INTERNAL MEDICINE

## 2019-12-13 PROCEDURE — 36415 COLL VENOUS BLD VENIPUNCTURE: CPT

## 2019-12-13 PROCEDURE — 85025 COMPLETE CBC W/AUTO DIFF WBC: CPT

## 2019-12-13 PROCEDURE — 1159F MED LIST DOCD IN RCRD: CPT | Mod: ,,, | Performed by: INTERNAL MEDICINE

## 2019-12-13 PROCEDURE — 99999 PR PBB SHADOW E&M-EST. PATIENT-LVL IV: ICD-10-PCS | Mod: PBBFAC,,, | Performed by: INTERNAL MEDICINE

## 2019-12-13 PROCEDURE — 80053 COMPREHEN METABOLIC PANEL: CPT

## 2019-12-13 PROCEDURE — 99214 OFFICE O/P EST MOD 30 MIN: CPT | Mod: S$PBB,,, | Performed by: INTERNAL MEDICINE

## 2019-12-13 PROCEDURE — 99214 PR OFFICE/OUTPT VISIT, EST, LEVL IV, 30-39 MIN: ICD-10-PCS | Mod: S$PBB,,, | Performed by: INTERNAL MEDICINE

## 2019-12-13 PROCEDURE — 1159F PR MEDICATION LIST DOCUMENTED IN MEDICAL RECORD: ICD-10-PCS | Mod: ,,, | Performed by: INTERNAL MEDICINE

## 2019-12-13 NOTE — PROCEDURES
Molly Smith is a 74 y.o.  female patient, who presents for a 6 minute walk test ordered by Alice Tim MD.  The diagnosis is Pulmonary Hypertension.  The patient's BMI is 22.3 kg/m2.    Test Results:    The test was not attempted due to low SaO2 level.      12/13/2019     O2 Sat % Supplemental Oxygen Heart Rate Blood Pressure Roberto Scale   Pre-exercise  (Resting) 85 % 8 L/M 80 bpm 100/57 mmHg 0     Performing nurse/tech: Darren RRT      CLINICAL INTERPRETATION:  Testing was not performed due to significant hypoxemia.

## 2019-12-13 NOTE — Clinical Note
Has been having her runs of tachycardia at least once or twice a day on amio 200-  Anything else  You might suggest to try to suppress it?I'm going to increase her remodulin just to see if I can lower her PVR at all, but she really feels aweful when she has these episodesSAM

## 2019-12-13 NOTE — PATIENT INSTRUCTIONS
We will send you a new dosing sheet that goes to 110 ng/ call us with any side effects    Lets seehow we do an 40mg lasix most days with any extra 20mg in pm only as needed (Check your weights every morning after getting out of bed and urinating. If your weight goes up 3# overnight or 5# in one week take the 20 mg lasix in the pm.)    Call us if you find yourself getting more short of breath, have more swelling or unexpected weight changes, fever, chills or problems with your line    Physical medical rehab consult to get you a scooter

## 2019-12-18 ENCOUNTER — TELEPHONE (OUTPATIENT)
Dept: PHYSICAL MEDICINE AND REHAB | Facility: CLINIC | Age: 74
End: 2019-12-18

## 2019-12-18 NOTE — TELEPHONE ENCOUNTER
----- Message from Maria Isabel Cook sent at 12/18/2019  2:18 PM CST -----  Contact: Molly Sales called to set up an appt per Dr. Alice Tim. [phone]

## 2019-12-20 ENCOUNTER — TELEPHONE (OUTPATIENT)
Dept: PHYSICAL MEDICINE AND REHAB | Facility: CLINIC | Age: 74
End: 2019-12-20

## 2019-12-20 ENCOUNTER — TELEPHONE (OUTPATIENT)
Dept: TRANSPLANT | Facility: CLINIC | Age: 74
End: 2019-12-20

## 2019-12-20 NOTE — TELEPHONE ENCOUNTER
Patient added to wait list.      ----- Message from Melanie Bland sent at 12/20/2019  3:28 PM CST -----  Contact: Molly  Pt stated she needed a sooner appt than March. Pt contact number is [phone]. Please contact pt.

## 2019-12-27 ENCOUNTER — TELEPHONE (OUTPATIENT)
Dept: ELECTROPHYSIOLOGY | Facility: CLINIC | Age: 74
End: 2019-12-27

## 2019-12-27 NOTE — TELEPHONE ENCOUNTER
Call put through from phone team. Patient's , Dr Smith, wanted to speak with Dr Hinojosa. Patient has pulmonary hypertension and is on IV Remodulin. She is having AF episodes that cause her to desat to the 60's. Her resting HR is 80-90 and her resting O2 sat is about 80. She was taken off of Diltiazem and started on amiodarone, but it doesn't seem to be helping. He is wondering if Dr Hinojosa would consider putting her back on Diltiazem along with the amiodarone. I called Dr Hinojosa to discuss. He advised that he had a discussion with Dr Tim and the only option he could offer would be the calcium channel blocker, however, Dr Tim would need to make that call because of the possibility of worsening right sided heart failure. I called Dr Smith back at [phone] to relay the recommendations. He is going to talk to Dr Tim.

## 2019-12-30 ENCOUNTER — TELEPHONE (OUTPATIENT)
Dept: TRANSPLANT | Facility: CLINIC | Age: 74
End: 2019-12-30

## 2019-12-30 NOTE — TELEPHONE ENCOUNTER
Good morning-  I am sorry you have been having a hard time! I went ahead and submitted the exception for one refill. You should be able to refill w/pharmacy without an issue this one time.  Please take care, and let me know if we can help with anything on our end!  Albertina     -----Original Message-----  From: Abdiel Gilbert <[email or web address]>   Sent: Monday, December 30, 2019 9:06 AM  To: Albertina Ruffin [email or web address]>  Subject:     THIS EMAIL IS FROM AN EXTERNAL SENDER!  DO YOU TRUST THIS EMAIL?  If you are unsure, remember to use the Report Suspicious Email button in [employer] to send to [employer] for review. DO NOT click any links and NEVER input your username and password.    Vasile Eng   What should I do for bloodwork if I dont feel well enough to go out? Ive been having rapid heart beats with intermittent a fib and desaturate with movement. Its time to order Bosantan but I havent been out my house in a few days    Sent from my iPhone

## 2020-01-13 RX ORDER — TRAMADOL HYDROCHLORIDE 200 MG/1
TABLET, EXTENDED RELEASE ORAL
Qty: 30 TABLET | Refills: 0 | Status: SHIPPED | OUTPATIENT
Start: 2020-01-13 | End: 2020-02-10

## 2020-01-21 ENCOUNTER — TELEPHONE (OUTPATIENT)
Dept: TRANSPLANT | Facility: CLINIC | Age: 75
End: 2020-01-21

## 2020-01-21 NOTE — TELEPHONE ENCOUNTER
Message sent to patient regarding same.  ------------------------------------------------------    Jose J Hinojosa said we could add back carduzwm bur she hasnt tolerated it well in past-  my thought was to try dig - maybe 125mg every other day to start    Sent from my iPhone    > On Jan 20, 2020, at 2:02 PM, Albertina Ruffin <[email or web address]> wrote:  >   > Not sure if I sent this earlier, or not...Anything to do here?  > Albertina   >   > -----Original Message-----  > From: Abdiel Gilbert <[email or web address]>   > Sent: Saturday, January 18, 2020 3:25 PM  > To: Albertina Ruffin [email or web address]>  > Subject:   >   > THIS EMAIL IS FROM AN EXTERNAL SENDER!  > DO YOU TRUST THIS EMAIL?  > If you are unsure, remember to use the Report Suspicious Email button in [employer] to send to [employer] for review. DO NOT click any links and NEVER input your username and password.  >   > Skip Eng  > I notice that I start to have rapid & crazy heartbeats particularly when I get excited. Is there something I could try for calming that might help reduce the incidence   >   > Sent from my iPhone  >

## 2020-01-27 ENCOUNTER — TELEPHONE (OUTPATIENT)
Dept: TRANSPLANT | Facility: CLINIC | Age: 75
End: 2020-01-27

## 2020-01-27 RX ORDER — DIGOXIN 125 MCG
125 TABLET ORAL EVERY OTHER DAY
Qty: 15 TABLET | Refills: 11 | Status: SHIPPED | OUTPATIENT
Start: 2020-01-27 | End: 2021-02-26 | Stop reason: SDUPTHER

## 2020-01-27 NOTE — TELEPHONE ENCOUNTER
Good afternoon-    I spoke w/Dr Tim, and she thinks that 0.125 every other day of digoxin might be worth a try. She said there is no concern with it causing any other arrhythmias. Regarding the remodulin, she said you could try to go up more, but she is concerned about your lower blood pressures, and also is not convinced that continuing to increase will make a big difference in how you feel.   Please let me know your thoughts, when possible.    Thank you,  Albertina     -----Original Message-----  From: Abdiel Gilbert <[email or web address]   Sent: Monday, January 27, 2020 11:26 AM  To: Albertina Ruffin [email or web address]>  Subject:     THIS EMAIL IS FROM AN EXTERNAL SENDER!  DO YOU TRUST THIS EMAIL?  If you are unsure, remember to use the Report Suspicious Email button in [employer] to send to [employer] for review. DO NOT click any links and NEVER input your username and password.    Can you also ask her if increasing my remodulin could help. I increased once & didnt notice a difference     Sent from my iPhone

## 2020-01-27 NOTE — TELEPHONE ENCOUNTER
Hi Ms Smith-    It is hard to say...However, I would suggest coming to the ER if you are having these episodes more frequently.  Please take care,  Albertina     -----Original Message-----  From: Abdiel Gilbert <[email or web address]>   Sent: Monday, January 27, 2020 2:36 PM  To: Albertina Ruffin [email or web address]>  Subject: [EXTERNAL] Re:     THIS EMAIL IS FROM AN EXTERNAL SENDER!  DO YOU TRUST THIS EMAIL?  If you are unsure, remember to use the Report Suspicious Email button in [employer] to send to [email or web address] for review. DO NOT click any links and NEVER input your username and password.    Albertina  These episodes are lasting longer and to the point that I have to be totally still for a while after. I feel like Im deteriorating. What is the prognosis when one starts to deteriorate like this    Sent from my iPhone

## 2020-02-10 ENCOUNTER — TELEPHONE (OUTPATIENT)
Dept: TRANSPLANT | Facility: CLINIC | Age: 75
End: 2020-02-10

## 2020-02-10 RX ORDER — TRAMADOL HYDROCHLORIDE 200 MG/1
TABLET, EXTENDED RELEASE ORAL
Qty: 30 TABLET | Refills: 0 | Status: SHIPPED | OUTPATIENT
Start: 2020-02-10 | End: 2020-03-06

## 2020-02-10 NOTE — TELEPHONE ENCOUNTER
Good morning-    Dr Tim says she would still recommend you avoid flying.    Take care,  Justin     -----Original Message-----  From: Abdiel Gilbert <[email or web address]>   Sent: Friday, February 07, 2020 4:59 PM  To: Justinluigi Horvathe <[email or web address]>  Subject: [EXTERNAL] Re:     THIS EMAIL IS FROM AN EXTERNAL SENDER!  DO YOU TRUST THIS EMAIL?  If you are unsure, remember to use the Report Suspicious Email button in [employer] to send to [employer] for review. DO NOT click any links and NEVER input your username and password.    Thank you!    Sent from my [employer]    > On Feb 7, 2020, at 4:35 PM, Justin Stahl <[email or web address]> wrote:  >   > ?Terrific!! I will also let Dr Tim know that.  >   > -----Original Message-----  > From: Abdiel Gilbert <[email or web address]>   > Sent: Friday, February 07, 2020 4:32 PM  > To: Justinluigi Stahl <[email or web address]  > Subject: [EXTERNAL] Re:   >   > THIS EMAIL IS FROM AN EXTERNAL SENDER!  > DO YOU TRUST THIS EMAIL?  > If you are unsure, remember to use the Report Suspicious Email button in [employer] to send to [employer] for review. DO NOT click any links and NEVER input your username and password.  >   > Yes. I meant to text you. Havent had another bad rhythm since I started it. Thank you!  >   > Sent from my [employer]  >   >> On Feb 7, 2020, at 3:57 PM, Justin Stahl [email or web address] wrote:  >>   >> ?Hi Ms Smith--  >> I will check in with her and let you know. Are things going any better with starting the digoxin?  >> Justin   >>   >> -----Original Message-----  >> From: Abdiel Gilbert <[email or web address]>   >> Sent: Friday, February 07, 2020 3:52 PM  >> To: Justin Stahl <[email or web address]>  >> Subject:   >>   >> THIS EMAIL IS FROM AN EXTERNAL SENDER!  >> DO YOU TRUST THIS EMAIL?  >> If you are unsure, remember to use the Report Suspicious Email button in [employer] to send to [employer] for review. DO NOT click any links and NEVER input your username and  password.  >>   >> Does dr Tim still feel I shouldnt fly? Or has she had a change of heart

## 2020-02-14 ENCOUNTER — TELEPHONE (OUTPATIENT)
Dept: TRANSPLANT | Facility: CLINIC | Age: 75
End: 2020-02-14

## 2020-03-03 ENCOUNTER — DOCUMENTATION ONLY (OUTPATIENT)
Dept: TRANSPLANT | Facility: CLINIC | Age: 75
End: 2020-03-03

## 2020-03-03 LAB
EXT ALBUMIN: 4.7
EXT ALT: 12
EXT AST: 16
EXT PROTEIN TOTAL: 7.2

## 2020-03-06 ENCOUNTER — HOSPITAL ENCOUNTER (OUTPATIENT)
Dept: PULMONOLOGY | Facility: CLINIC | Age: 75
Discharge: HOME | End: 2020-03-06
Payer: MEDICARE

## 2020-03-06 ENCOUNTER — OFFICE VISIT (OUTPATIENT)
Dept: TRANSPLANT | Facility: CLINIC | Age: 75
End: 2020-03-06
Payer: MEDICARE

## 2020-03-06 ENCOUNTER — LAB VISIT (OUTPATIENT)
Dept: LAB | Facility: HOSPITAL | Age: 75
End: 2020-03-06
Attending: INTERNAL MEDICINE
Payer: MEDICARE

## 2020-03-06 ENCOUNTER — HOSPITAL ENCOUNTER (OUTPATIENT)
Dept: CARDIOLOGY | Facility: CLINIC | Age: 75
Discharge: HOME | End: 2020-03-06
Attending: INTERNAL MEDICINE
Payer: MEDICARE

## 2020-03-06 VITALS
BODY MASS INDEX: 21.17 KG/M2 | HEIGHT: 64 IN | SYSTOLIC BLOOD PRESSURE: 118 MMHG | WEIGHT: 130 LBS | HEIGHT: 64 IN | WEIGHT: 124 LBS | HEART RATE: 76 BPM | DIASTOLIC BLOOD PRESSURE: 68 MMHG | BODY MASS INDEX: 22.2 KG/M2

## 2020-03-06 VITALS
DIASTOLIC BLOOD PRESSURE: 61 MMHG | WEIGHT: 129 LBS | OXYGEN SATURATION: 82 % | BODY MASS INDEX: 22.86 KG/M2 | SYSTOLIC BLOOD PRESSURE: 106 MMHG | HEART RATE: 84 BPM | HEIGHT: 63 IN

## 2020-03-06 DIAGNOSIS — I27.9 CHRONIC PULMONARY HEART DISEASE: ICD-10-CM

## 2020-03-06 DIAGNOSIS — R06.02 SHORTNESS OF BREATH: ICD-10-CM

## 2020-03-06 DIAGNOSIS — Z79.899 HIGH RISK MEDICATION USE: ICD-10-CM

## 2020-03-06 DIAGNOSIS — I27.21 PAH (PULMONARY ARTERY HYPERTENSION): Primary | ICD-10-CM

## 2020-03-06 DIAGNOSIS — J96.11 CHRONIC RESPIRATORY FAILURE WITH HYPOXIA: ICD-10-CM

## 2020-03-06 DIAGNOSIS — I27.21 PAH (PULMONARY ARTERY HYPERTENSION): ICD-10-CM

## 2020-03-06 DIAGNOSIS — Z79.01 ANTICOAGULANT LONG-TERM USE: Chronic | ICD-10-CM

## 2020-03-06 DIAGNOSIS — Q26.3 PARTIAL ANOMALOUS PULMONARY VENOUS CONNECTION (PAPVC): ICD-10-CM

## 2020-03-06 DIAGNOSIS — Q24.9 ADULT CONGENITAL HEART DISEASE: ICD-10-CM

## 2020-03-06 DIAGNOSIS — R00.2 PALPITATIONS: ICD-10-CM

## 2020-03-06 DIAGNOSIS — Q21.16 SINUS VENOSUS DEFECT: ICD-10-CM

## 2020-03-06 DIAGNOSIS — R06.82 TACHYPNEA: ICD-10-CM

## 2020-03-06 DIAGNOSIS — Z79.899 POLYPHARMACY: ICD-10-CM

## 2020-03-06 DIAGNOSIS — I47.10 SVT (SUPRAVENTRICULAR TACHYCARDIA): ICD-10-CM

## 2020-03-06 DIAGNOSIS — I95.9 HYPOTENSION, UNSPECIFIED HYPOTENSION TYPE: ICD-10-CM

## 2020-03-06 DIAGNOSIS — R19.7 DIARRHEA, UNSPECIFIED TYPE: ICD-10-CM

## 2020-03-06 DIAGNOSIS — R06.02 SOB (SHORTNESS OF BREATH): ICD-10-CM

## 2020-03-06 DIAGNOSIS — Z79.899 LONG TERM CURRENT USE OF AMIODARONE: ICD-10-CM

## 2020-03-06 DIAGNOSIS — I50.810 RVF (RIGHT VENTRICULAR FAILURE): ICD-10-CM

## 2020-03-06 LAB
ALBUMIN SERPL BCP-MCNC: 4.1 G/DL (ref 3.5–5.2)
ALP SERPL-CCNC: 62 U/L (ref 55–135)
ALT SERPL W/O P-5'-P-CCNC: 11 U/L (ref 10–44)
ANION GAP SERPL CALC-SCNC: 8 MMOL/L (ref 8–16)
ASCENDING AORTA: 2.3 CM
AST SERPL-CCNC: 15 U/L (ref 10–40)
AV INDEX (PROSTH): 0.54
AV MEAN GRADIENT: 5 MMHG
AV PEAK GRADIENT: 8 MMHG
AV VALVE AREA: 1.77 CM2
AV VELOCITY RATIO: 0.57
BASOPHILS # BLD AUTO: 0.1 K/UL (ref 0–0.2)
BASOPHILS NFR BLD: 2.3 % (ref 0–1.9)
BILIRUB SERPL-MCNC: 1 MG/DL (ref 0.1–1)
BNP SERPL-MCNC: 187 PG/ML (ref 0–99)
BSA FOR ECHO PROCEDURE: 1.63 M2
BUN SERPL-MCNC: 20 MG/DL (ref 8–23)
CALCIUM SERPL-MCNC: 9.1 MG/DL (ref 8.7–10.5)
CHLORIDE SERPL-SCNC: 105 MMOL/L (ref 95–110)
CO2 SERPL-SCNC: 25 MMOL/L (ref 23–29)
CREAT SERPL-MCNC: 1.2 MG/DL (ref 0.5–1.4)
CV ECHO LV RWT: 0.32 CM
DIFFERENTIAL METHOD: ABNORMAL
DOP CALC AO PEAK VEL: 1.38 M/S
DOP CALC AO VTI: 29.14 CM
DOP CALC LVOT AREA: 3.3 CM2
DOP CALC LVOT DIAMETER: 2.04 CM
DOP CALC LVOT PEAK VEL: 0.79 M/S
DOP CALC LVOT STROKE VOLUME: 51.71 CM3
DOP CALCLVOT PEAK VEL VTI: 15.83 CM
E WAVE DECELERATION TIME: 211.06 MSEC
E/A RATIO: 0.54
E/E' RATIO: 6.8 M/S
ECHO LV POSTERIOR WALL: 0.68 CM (ref 0.6–1.1)
EOSINOPHIL # BLD AUTO: 0.2 K/UL (ref 0–0.5)
EOSINOPHIL NFR BLD: 3.9 % (ref 0–8)
ERYTHROCYTE [DISTWIDTH] IN BLOOD BY AUTOMATED COUNT: 15 % (ref 11.5–14.5)
EST. GFR  (AFRICAN AMERICAN): 51.8 ML/MIN/1.73 M^2
EST. GFR  (NON AFRICAN AMERICAN): 44.9 ML/MIN/1.73 M^2
FRACTIONAL SHORTENING: 32 % (ref 28–44)
GLUCOSE SERPL-MCNC: 79 MG/DL (ref 70–110)
HCT VFR BLD AUTO: 42.6 % (ref 37–48.5)
HGB BLD-MCNC: 13 G/DL (ref 12–16)
IMM GRANULOCYTES # BLD AUTO: 0.02 K/UL (ref 0–0.04)
IMM GRANULOCYTES NFR BLD AUTO: 0.5 % (ref 0–0.5)
INTERVENTRICULAR SEPTUM: 0.38 CM (ref 0.6–1.1)
IVRT: 88.49 MSEC
LA MAJOR: 4.94 CM
LA MINOR: 4.96 CM
LA WIDTH: 3.74 CM
LEFT ATRIUM SIZE: 3.7 CM
LEFT ATRIUM VOLUME INDEX: 35.7 ML/M2
LEFT ATRIUM VOLUME: 58.22 CM3
LEFT INTERNAL DIMENSION IN SYSTOLE: 2.87 CM (ref 2.1–4)
LEFT VENTRICLE DIASTOLIC VOLUME INDEX: 48.23 ML/M2
LEFT VENTRICLE DIASTOLIC VOLUME: 78.55 ML
LEFT VENTRICLE MASS INDEX: 37 G/M2
LEFT VENTRICLE SYSTOLIC VOLUME INDEX: 19.2 ML/M2
LEFT VENTRICLE SYSTOLIC VOLUME: 31.3 ML
LEFT VENTRICULAR INTERNAL DIMENSION IN DIASTOLE: 4.2 CM (ref 3.5–6)
LEFT VENTRICULAR MASS: 60.04 G
LV LATERAL E/E' RATIO: 5.67 M/S
LV SEPTAL E/E' RATIO: 8.5 M/S
LYMPHOCYTES # BLD AUTO: 1.2 K/UL (ref 1–4.8)
LYMPHOCYTES NFR BLD: 26.7 % (ref 18–48)
MAGNESIUM SERPL-MCNC: 2.5 MG/DL (ref 1.6–2.6)
MCH RBC QN AUTO: 26.5 PG (ref 27–31)
MCHC RBC AUTO-ENTMCNC: 30.5 G/DL (ref 32–36)
MCV RBC AUTO: 87 FL (ref 82–98)
MONOCYTES # BLD AUTO: 0.4 K/UL (ref 0.3–1)
MONOCYTES NFR BLD: 9.3 % (ref 4–15)
MV PEAK A VEL: 0.94 M/S
MV PEAK E VEL: 0.51 M/S
NEUTROPHILS # BLD AUTO: 2.5 K/UL (ref 1.8–7.7)
NEUTROPHILS NFR BLD: 57.3 % (ref 38–73)
NRBC BLD-RTO: 0 /100 WBC
PISA TR MAX VEL: 4.85 M/S
PLATELET # BLD AUTO: 273 K/UL (ref 150–350)
PMV BLD AUTO: 9.3 FL (ref 9.2–12.9)
POTASSIUM SERPL-SCNC: 4.1 MMOL/L (ref 3.5–5.1)
PROT SERPL-MCNC: 7.5 G/DL (ref 6–8.4)
PULM VEIN S/D RATIO: 2.2
PV PEAK D VEL: 0.3 M/S
PV PEAK S VEL: 0.66 M/S
RA MAJOR: 5.4 CM
RA PRESSURE: 3 MMHG
RA WIDTH: 4.74 CM
RBC # BLD AUTO: 4.9 M/UL (ref 4–5.4)
RIGHT VENTRICULAR END-DIASTOLIC DIMENSION: 5.69 CM
RV TISSUE DOPPLER FREE WALL SYSTOLIC VELOCITY 1 (APICAL 4 CHAMBER VIEW): 6.63 CM/S
SINUS: 2.57 CM
SODIUM SERPL-SCNC: 138 MMOL/L (ref 136–145)
STJ: 2.16 CM
TDI LATERAL: 0.09 M/S
TDI SEPTAL: 0.06 M/S
TDI: 0.08 M/S
TR MAX PG: 94 MMHG
TRICUSPID ANNULAR PLANE SYSTOLIC EXCURSION: 1.19 CM
TV REST PULMONARY ARTERY PRESSURE: 97 MMHG
WBC # BLD AUTO: 4.39 K/UL (ref 3.9–12.7)

## 2020-03-06 PROCEDURE — 93306 TTE W/DOPPLER COMPLETE: CPT | Mod: PBBFAC | Performed by: INTERNAL MEDICINE

## 2020-03-06 PROCEDURE — 93306 ECHO (CUPID ONLY): ICD-10-PCS | Mod: 26,S$PBB,, | Performed by: INTERNAL MEDICINE

## 2020-03-06 PROCEDURE — 83880 ASSAY OF NATRIURETIC PEPTIDE: CPT

## 2020-03-06 PROCEDURE — 99214 OFFICE O/P EST MOD 30 MIN: CPT | Mod: S$PBB,,, | Performed by: INTERNAL MEDICINE

## 2020-03-06 PROCEDURE — 83735 ASSAY OF MAGNESIUM: CPT

## 2020-03-06 PROCEDURE — 94618 PULMONARY STRESS TESTING: CPT | Mod: PBBFAC | Performed by: INTERNAL MEDICINE

## 2020-03-06 PROCEDURE — 99999 PR PBB SHADOW E&M-EST. PATIENT-LVL IV: ICD-10-PCS | Mod: PBBFAC,,, | Performed by: INTERNAL MEDICINE

## 2020-03-06 PROCEDURE — 94618 PULMONARY STRESS TESTING: CPT | Mod: 26,S$PBB,, | Performed by: INTERNAL MEDICINE

## 2020-03-06 PROCEDURE — 99214 PR OFFICE/OUTPT VISIT, EST, LEVL IV, 30-39 MIN: ICD-10-PCS | Mod: S$PBB,,, | Performed by: INTERNAL MEDICINE

## 2020-03-06 PROCEDURE — 94618 PULMONARY STRESS TESTING: ICD-10-PCS | Mod: 26,S$PBB,, | Performed by: INTERNAL MEDICINE

## 2020-03-06 PROCEDURE — 99214 OFFICE O/P EST MOD 30 MIN: CPT | Mod: PBBFAC,25 | Performed by: INTERNAL MEDICINE

## 2020-03-06 PROCEDURE — 36415 COLL VENOUS BLD VENIPUNCTURE: CPT

## 2020-03-06 PROCEDURE — 99999 PR PBB SHADOW E&M-EST. PATIENT-LVL IV: CPT | Mod: PBBFAC,,, | Performed by: INTERNAL MEDICINE

## 2020-03-06 PROCEDURE — 85025 COMPLETE CBC W/AUTO DIFF WBC: CPT

## 2020-03-06 PROCEDURE — 80053 COMPREHEN METABOLIC PANEL: CPT

## 2020-03-06 RX ORDER — TRAMADOL HYDROCHLORIDE 50 MG/1
50 TABLET ORAL EVERY 6 HOURS PRN
Qty: 120 TABLET | Refills: 0 | Status: SHIPPED | OUTPATIENT
Start: 2020-03-06 | End: 2020-03-25 | Stop reason: CLARIF

## 2020-03-06 NOTE — PATIENT INSTRUCTIONS
1. Continue current therapy.  2.  Keep salt intake to under 2000 mg sodium, fluids to under 2 L (64 oz)  3  Check your weights every morning after getting out of bed and urinating. If your weight goes up 3# overnight or 5# in one week take an extra lasix  and call us if the fluid doesn't come off.  4. Call us if you find yourself getting more short of breath, have more swelling or unexpected weight changes, fever, chills, or problems with your line

## 2020-03-06 NOTE — PROGRESS NOTES
Subjective:      HPI  Delightful 74 y.o. 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 She also has h/o Rosemonas bacteremia s/p central line removal, and IV abx per ID with replacement by nephrology.  Pt was admitted 10/4-10/9 with worsening fatigue, diarrhea since the morning of 10/4/17, nausea and low grade temperature at home (100.4 being the highest). Also had a single event of hypoxia (SpO2 50s) that resolved without intervention. During that hospitalization she was placed on 100% FiO2 via HFNC on admit secondary to hypoxia. Blood cultures were drawn and remodulin was moved to peripheral line. Blood cultures were drawn from [place] as well. ID was consulted. He symptoms improved gradually throughout hospitalization and she was eventually weaned back to low flow NC. Cultures remained negative and remodulin was moved back to central access. They attributed fevers/hypoxia to possible viral infection. She ws eventually weaned down to 4L NC and discharged home in good condition. She was then admitted at [facility] 11/29/17 with hypotension w severe vomiting, fever 101.9, chills and diarrhea- she was over hydrated in the ER, GI w/u was (-), had recently increased her remodulin- was discharged in time to go to her son's wedding-  pt was then admitted 2/28-3/5 with  increased SOB and O2 requirements in setting of prolonged episode of A tach with associated neck pressure/pain. Troponin was positive and EKG had nonspecific ST changes. Pt was loaded with ASA and Plavix and started on Heparin bridge. Interventional Cards consulted for NSTEMI/LHC, but LHC 3/1/18 showed normal coronary arteries.  EP was consulted for A Tach.  There are very limited options in this patient-  previously discussed tikosyn but would avoid given baseline Qt. Not a candidate for sotalol. Given that pt has had ongoing intermittent atrial tach and does not tolerate it, will try amio in place of cardizem. Pt had no further episodes on tele. Pt will need monitoring of PFTs, TFTs, and LFTs while on Amio.   Pt was continued on home dose of IV Remodulin. We attempted to wean off Adempas, but with continued increasing oxygen requirements, restarted Adempas and this improved so she will continue on this for now. Also of note, pt has been taking bosentan TID for years, when this should be a BID dosed med. So we will have her change bosentan to BID dosing on d/c.        Since last visit, pt  Thinks that digoxin has helped with the palps/runs of SVT- her breathing has been about the same- has been a week or two since she had a run of tachycardia- a couple of mos ago was so freq she couldn't do anything. She is back to doing some of her activities again- some days can walk to the neighbors house, others can go to the end of the block (with the rhythm actiing up couldn't go 5 ft) Got a little O2 concentrator which she likes. Doing well from a fluid standpoint- watches her salt intake  lasix is 40mg in am with an extra 20mg every other day  Gets LH with the palps and pre syncope, though no syncope     With her ADls her breathing is about the same. Wt has been pretty stable around 122#, no fluid retention, though she has early satiety- feels full when she eats- zofran helps with the nausea as does eating small portions     no f/c and line is clean, able to go a week at a time now without changing as she less irritated at her line exit site.     Remodulin is at 89.2 ng/kg/min (unchanged from Flowsheet) and on Tracleer, adempas 0.5mg tid       6MWT today as her resting sat was 85%  162m (   206. 5m (223m (unchanged from August) 231m (195m (236m Abdiel, 225m, 283m May, 222m Mar,  375m Nov, 305 Sept, 274.5 July,  335.5May, 365  m prev 2 visits)                        O2 sat  85->82% 6L pull tank                                                          HR 79-->98                           BP  106/ 56  ->116/55                                                      Roberto 2 ->3    TTE today  · Normal left ventricular systolic function. The estimated ejection fraction is 60%.   RV 5.7 TAPSE 1.2  · Grade I (mild) left ventricular diastolic dysfunction consistent with impaired relaxation.  · Septal wall has abnormal motion. Systolic flattening of the interventricular septum consistent with right ventricle pressure overload.  · Moderately to severely reduced right ventricular systolic function.  · Severe right ventricular enlargement.  · There is right ventricular hypertrophy.  · Biatrial enlargement.  · Severe tricuspid regurgitation.  · The estimated PA systolic pressure is 97 mmHg.  · Pulmonary hypertension present.  · Normal central venous pressure (3 mmHg).  · Trivial circumferential pericardial effusion.  ·   TTE today 9/19  · Normal left ventricular systolic function. The estimated ejection fraction is 60%, Small and underfilled LV  · Septal wall has abnormal motion. Systolic flattening of the interventricular septum consistent with right ventricle pressure overload.  RV 6.0cm, TAPSE 1.4  · Biatrial enlargement.  · Grade II (moderate) left ventricular diastolic dysfunction consistent with pseudonormalization.  · Elevated left atrial pressure.  · Interatrial septum bows to left, consider elevated right atrial pressure.  · Moderately to severely reduced right ventricular systolic function.  · Severe right ventricular enlargement.  · There is right ventricular hypertrophy.  · Severe tricuspid regurgitation.  · Moderate pulmonic regurgitation.  · The estimated PA systolic pressure is 108 mm Hg  · Pulmonary hypertension present.  · Elevated central venous pressure (15 mm Hg).         TTE 3/23/18    1  - Normal left ventricular systolic function (EF 60-65%).     2 - No wall motion abnormalities.     3 - Biatrial enlargement.     4 - Impaired LV relaxation, elevated LAP (grade 2 diastolic dysfunction).     5 - Right ventricular enlargement with hypertrophy, with moderately depressed systolic function.     6 - Pulmonary hypertension. The estimated PA systolic pressure is 90 mmHg.     7 - Small pericardial effusion.     8 - Intermediate central venous pressure.     TTE 3/1/18    1 - Normal left ventricular systolic function (EF 55-60%).     2 - Indeterminate LV diastolic function.     3 - Right atrial enlargement.     4 - Right ventricular enlargement with severely depressed systolic function.     5 - Trivial mitral regurgitation.     6 - Moderate tricuspid regurgitation.     7 - No wall motion abnormalities.     8 - Pulmonary hypertension. The estimated PA systolic pressure is greater than 77 mmHg.     TTE 5/8/17:    1 - Right ventricular enlargement with hypertrophy, with severely depressed systolic function.     2 - Moderate to severe tricuspid regurgitation.     3 - Pulmonary hypertension. The estimated PA systolic pressure is 116 mmHg.     4 - Biatrial enlargement.     5 - Normal left ventricular systolic function (EF 60-65%); reduced LV stroke volume.  The right ventricle is enlarged measuring 5.8 cm at the base in the apical right ventricle-focused view. There is RVH. Global right ventricular systolic function appears severely depressed. There is abnormal septal motion consistent with   RV pressure/volume overload. Tricuspid annular plane systolic excursion (TAPSE) is 1.5 cm. The estimated PA systolic pressure is 116 mmHg.     [facility] 9/25/17:  CONDITION 1 (9/25/2017 11:20:07):  RA: 5/2 (1)  RV: 84/-5  RVEDP: 6     PW: 17/19 (16)  PA: 87/21 (47)  AO_SAT: 95  AO: 115/71 (86)  PA_SAT: 71  SPO2: 97  FICKCI: 3.2800  FICKCO: 5.5500    Review of Systems   Constitution: Positive for decreased appetite and weight loss.  "Negative for chills, fever, malaise/fatigue and weight gain.   HENT: Negative.    Eyes: Negative.    Cardiovascular: Positive for dyspnea on exertion and palpitations. Negative for chest pain, leg swelling, near-syncope, orthopnea, paroxysmal nocturnal dyspnea and syncope.   Respiratory: Negative for shortness of breath.    Endocrine: Negative.    Skin: Negative.    Musculoskeletal: Positive for arthritis and joint pain. Negative for back pain.   Gastrointestinal: Positive for nausea. Negative for bloating, abdominal pain and change in bowel habit.        Early satiety   Neurological: Negative for dizziness and light-headedness.   Psychiatric/Behavioral: Negative for depression.        Objective:   /61 (BP Location: Right arm, Patient Position: Sitting, BP Method: Medium (Automatic))   Pulse 84   Ht 5' 3" (1.6 m)   Wt 58.5 kg (128 lb 15.5 oz)   SpO2 (!) 82%   BMI 22.85 kg/m²       Physical Exam   Constitutional: She is oriented to person, place, and time. She appears well-developed and well-nourished.   HENT:   Head: Normocephalic and atraumatic.   Eyes: Right eye exhibits no discharge. Left eye exhibits no discharge.   Neck: Neck supple. No JVD present. No thyromegaly present.   Cardiovascular: Regular rhythm. Exam reveals no gallop and no friction rub.   No murmur heard.  Tachy, accentuated S2     Pulmonary/Chest: Effort normal and breath sounds normal. No respiratory distress. She has no wheezes. She has no rales.   few rhonchi   Abdominal: Soft. Bowel sounds are normal. She exhibits no distension. There is no tenderness.   Musculoskeletal: Normal range of motion. She exhibits edema. She exhibits no tenderness.   Mild edema in the left ankle (always more swollen,broke it years ago)   Neurological: She is alert and oriented to person, place, and time. No cranial nerve deficit. Coordination normal.   Skin: Skin is warm and dry. No rash noted.   Psychiatric: She has a normal mood and affect. Judgment and " thought content normal.                                 Chemistry        Component Value Date/Time     03/06/2020 1213    K 4.1 03/06/2020 1213     03/06/2020 1213    CO2 25 03/06/2020 1213    BUN 20 03/06/2020 1213    CREATININE 1.2 03/06/2020 1213    GLU 79 03/06/2020 1213        Component Value Date/Time    CALCIUM 9.1 03/06/2020 1213    ALKPHOS 62 03/06/2020 1213    AST 15 03/06/2020 1213    ALT 11 03/06/2020 1213    BILITOT 1.0 03/06/2020 1213            Magnesium   Date Value Ref Range Status   03/06/2020 2.5 1.6 - 2.6 mg/dL Final       Lab Results   Component Value Date    WBC 4.39 03/06/2020    HGB 13.0 03/06/2020    HCT 42.6 03/06/2020    MCV 87 03/06/2020     03/06/2020         BNP   Date Value Ref Range Status   03/06/2020 187 (H) 0 - 99 pg/mL Final     Comment:     Values of less than 100 pg/ml are consistent with non-CHF populations.   12/13/2019 190 (H) 0 - 99 pg/mL Final     Comment:     Values of less than 100 pg/ml are consistent with non-CHF populations.   09/06/2019 323 (H) 0 - 99 pg/mL Final     Comment:     Values of less than 100 pg/ml are consistent with non-CHF populations.                 Assessment:       1. Pulmonary hypertension- WHO Group 1, poor 6mw distance today - without a doubt, pt suffers from R to left shunting- was unable to tolerate increasing remodulin  BNP  Mildly increased in her usual range - FC III on triple tx- echo still with RV enlarged and severely depressed, and now with small pericardial effusion- focus now should be on improving her QOL as much as possible       Did not tolerate attempt to switch to opsumit (on generic bosentan)   2. PFO (patent foramen ovale)    3. Palpitations/tachycardia- stale, followed by EP, on amio now  4. Chronic hypoxemic resp failure- stable on O2  5. PAPVR,sinus venosus ASD- decision made to treat conservatively     Plan:   No change to remodulin dose now and doing better with dig in terms of her SVT    Cont lasix 40m mg  daily with additional 20mg prn wt gain 3# overnight or 5# in a week (will try to spare her kidneys and only take the extra 20mg as needed rather than every other day given that her BUN is higher today)    Did not tolerate opsumit (will remain on generic bosentan)    Refilled tramadol today- taking 200mg daily to tolerate remodulin assoc muscle/bone pain-  120 tab, no refills    - Keep salt intake to under 2000 mg sodium, fluids to under 2 L (64 oz)    Interested in implantable pump once we are ready to start implanting them, though with her size I'm not sure she will be a candidate.    F/u 3 mo with walk if able to do it without leg pain, labs- please send order to [facility] in [place] for her monthly LFTs

## 2020-03-10 NOTE — PROCEDURES
Molly Smith is a 74 y.o.  female patient, who presents for a 6 minute walk test ordered by MD Meeta.  The diagnosis is Qualify for Oxygen, Pulmonary Hypertension.  The patient's BMI is 21.3 kg/m2. Predicted distance (lower limit of normal) is 319.5 meters.    Test Results:    The test was completed without stopping.  The total time walked was 360 seconds.  During walking, the patient reported:  Dyspnea.  The patient used supplemental oxygen during testing.     03/06/2020---------Distance: 162.15 meters (532 feet)     O2 Sat % Supplemental Oxygen Heart Rate Blood Pressure Roberto Scale   Pre-exercise  (Resting) 87 % 4 L/M 83 bpm 101/55 mmHg 2   Pre-exercise  (Resting) 85 % 6 L/M 79 bpm 106/56 mmHg 2   During Exercise 82 % 6 L/M 98 bpm 116/55 mmHg 3   Post-exercise   86 % 6 L/M  88 bpm       Recovery Time: 88 seconds    Oxygen Qualification:     O2 Sat % Supplemental Oxygen Heart Rate Blood Pressure Roberto Scale   Pre-exercise  (Resting) 87 % 4 L/M  83 bpm  101/55 mmHg 2      Performing nurse/tech: Jenny PALOMARES      PREVIOUS STUDY:   09/06/2019---------Distance: 206.35 meters (677 feet)       O2 Sat % Supplemental Oxygen Heart Rate Blood Pressure Roberto Scale   Pre-exercise  (Resting) 91 % 6 L/M 88 bpm 98/50 mmHg 0   During Exercise   80 % 6 L/M 104 bpm 104/57 mmHg 3   Post-exercise  (Recovery) 90 % 6 L/M  92 bpm           CLINICAL INTERPRETATION:  Six minute walk distance is 162.15 meters (532 feet) with moderate dyspnea.  At rest, there was significant desaturation while breathing supplemental oxygen.  During exercise, there was significant desaturation while breathing supplemental oxygen.  Both blood pressure and heart rate remained stable with walking.  The patient did not report non-pulmonary symptoms during exercise.  Significant exercise impairment is likely due to desaturation and subjective symptoms.  The patient did complete the study, walking 360 seconds of the 360 second test.  The patient may  benefit from using supplemental oxygen.  Since the previous study in September 2019, exercise capacity may be somewhat worse.  Based upon age and body mass index, exercise capacity is less than predicted.   Oxygen saturation did not improve while breathing supplemental oxygen.

## 2020-03-26 ENCOUNTER — TELEPHONE (OUTPATIENT)
Dept: TRANSPLANT | Facility: CLINIC | Age: 75
End: 2020-03-26

## 2020-03-26 RX ORDER — TRAMADOL HYDROCHLORIDE 200 MG/1
200 TABLET, EXTENDED RELEASE ORAL DAILY
Qty: 30 TABLET | Refills: 3 | Status: SHIPPED | OUTPATIENT
Start: 2020-03-26 | End: 2020-07-24

## 2020-03-26 NOTE — TELEPHONE ENCOUNTER
----- Message from Kim Ball sent at 3/26/2020 10:21 AM CDT -----  Contact: pt's   Pls call Dr. Smith at [phone].  He says a rx was supposed to be sent to his local pharmacy yesterday for his wife and they never received it.    Thank you

## 2020-04-09 RX ORDER — AMIODARONE HYDROCHLORIDE 200 MG/1
TABLET ORAL
Qty: 30 TABLET | Refills: 11 | Status: SHIPPED | OUTPATIENT
Start: 2020-04-09

## 2020-04-30 RX ORDER — WARFARIN 10 MG/1
10 TABLET ORAL DAILY
Qty: 90 TABLET | Refills: 1 | Status: SHIPPED | OUTPATIENT
Start: 2020-04-30

## 2020-06-26 ENCOUNTER — TELEPHONE (OUTPATIENT)
Dept: TRANSPLANT | Facility: CLINIC | Age: 75
End: 2020-06-26

## 2020-06-26 NOTE — TELEPHONE ENCOUNTER
"Pt reports weight was 121-123 until around June 6. She has weighed 128 for last several days. She feels her clothes are tighter, but has no visible edema. Pt reports she increased her lasix dose to 40mg in AM and 20mg in PM, on her own and that "hopefully this solves the problem". All info forwarded to Dr Tim, for review and input.   "

## 2020-06-30 ENCOUNTER — TELEPHONE (OUTPATIENT)
Dept: TRANSPLANT | Facility: CLINIC | Age: 75
End: 2020-06-30

## 2020-06-30 NOTE — TELEPHONE ENCOUNTER
Hi-    Dr Tim replied, and said that unfortunately, there isnt much more to do, as you've already tried dig and calcium channel blocker. She is suggesting trying a vagal maneuver when these rapid beats occur, but if you have an episode where you are short of breath, have chest pain, or you're lightheaded, you should go to the ER.    Please take care,  Albertina     -----Original Message-----  From: Abdiel Gilbert <[email or web address]>   Sent: Tuesday, June 30, 2020 12:03 PM  To: Albertina Ruffin <[email or web address]>  Subject:     THIS EMAIL IS FROM AN EXTERNAL SENDER!  DO YOU TRUST THIS EMAIL?  If you are unsure, remember to use the Report Suspicious Email button in [employer] to send to [employer] for review. DO NOT click any links and NEVER input your username and password.    Skip Sheets been having those episodes of rapid heart beats in the last 2 days 5 times. They generally last 3 or 4 min but yesterday it took about 1 1/2 hours before feeling good. Any suggestions?    Sent from my iPhone

## 2020-06-30 NOTE — TELEPHONE ENCOUNTER
She can try it, I worry because of how tiny she is that daily might be too much for her, but it might help.      Alice Tim MD, [facility]  Medical Director, Pulmonary Hypertension , Advanced Heart Failure and Transplant Cardiology Fellowship Associate , Cardiovascular Disease Fellowship Program Senior Lecturer, [employer] [employer] [facility] Section of Cardiomyopathy and Heart Transplantation  [facility] [place], [place], [address]    [phone] (phone)  [phone] (fax)        -----Original Message-----  From: Albertina Ruffin <[email or web address]>  Sent: Tuesday, June 30, 2020 2:18 PM  To: Alice Tim M.D. [email or web address]>  Subject: FW: [EXTERNAL] Re:         -----Original Message-----  From: Abdiel Gilbert <[email or web address]>   Sent: Tuesday, June 30, 2020 2:17 PM  To: Albertina Ruffin <[email or web address]>  Subject: [EXTERNAL] Re:     THIS EMAIL IS FROM AN EXTERNAL SENDER!  DO YOU TRUST THIS EMAIL?  If you are unsure, remember to use the Report Suspicious Email button in [employer] to send to [employer] for review. DO NOT click any links and NEVER input your username and password.    I take dig every other day. Would it make a difference if I took it every day

## 2020-07-02 ENCOUNTER — DOCUMENTATION ONLY (OUTPATIENT)
Dept: TRANSPLANT | Facility: CLINIC | Age: 75
End: 2020-07-02

## 2020-07-02 NOTE — TELEPHONE ENCOUNTER
Hi-    I would not be in a position to say what they would or wouldnt do in the ER. However, they would definitely completely assess you, and hopefully provide a future plan of care, whether thats medications, referrals, hospitalization, etc. It is really difficult to say until youre able to be assessed face to face.     You are able to bring one adult support person to the ER.    Take care, and keep us posted on what you decide to do.    Albertina   From: Abdiel Gilbert <[email or web address]>   Sent: Thursday, July 02, 2020 2:24 PM  To: Albertina Ruffin <[email or web address]>  Subject: [EXTERNAL] Re: digoxin     THIS EMAIL IS FROM AN EXTERNAL SENDER!  DO YOU TRUST THIS EMAIL?  If you are unsure, remember to use the Report Suspicious Email  button in [place] to send to [employer] for review.  DO NOT click any links and NEVER input your username and password.          If I went to the ER is there anything that could be done to help me?  Sent from my iPhone

## 2020-07-02 NOTE — TELEPHONE ENCOUNTER
Again, advised pt to come to ER if increased SOB w/episodes, or chest pain.    ----------  Since Dr Tim was worried about it, I didnt change to every day. I g hv ave had an episode last night and twice this morn though  Sent from my iPhone      On Jul 2, 2020, at 11:39 AM, Albertina Ruffin <[email or web address]> wrote:  ?   Hi-     Checking in if you decided to do the digoxin daily, or not? I wanted to update your prescription if you did, so you dont run out of pills.     Take care,  Albertina

## 2020-07-24 ENCOUNTER — OFFICE VISIT (OUTPATIENT)
Dept: TRANSPLANT | Facility: CLINIC | Age: 75
End: 2020-07-24
Payer: MEDICARE

## 2020-07-24 VITALS
SYSTOLIC BLOOD PRESSURE: 124 MMHG | DIASTOLIC BLOOD PRESSURE: 64 MMHG | BODY MASS INDEX: 21.44 KG/M2 | HEART RATE: 84 BPM | OXYGEN SATURATION: 83 % | WEIGHT: 121 LBS | HEIGHT: 63 IN

## 2020-07-24 DIAGNOSIS — Z79.899 LONG TERM CURRENT USE OF AMIODARONE: ICD-10-CM

## 2020-07-24 DIAGNOSIS — Q21.16 SINUS VENOSUS DEFECT: ICD-10-CM

## 2020-07-24 DIAGNOSIS — R00.2 PALPITATIONS: ICD-10-CM

## 2020-07-24 DIAGNOSIS — Q26.3 PARTIAL ANOMALOUS PULMONARY VENOUS CONNECTION (PAPVC): ICD-10-CM

## 2020-07-24 DIAGNOSIS — Z71.89 GOALS OF CARE, COUNSELING/DISCUSSION: ICD-10-CM

## 2020-07-24 DIAGNOSIS — I27.9 CHRONIC PULMONARY HEART DISEASE: ICD-10-CM

## 2020-07-24 DIAGNOSIS — Q24.9 ADULT CONGENITAL HEART DISEASE: ICD-10-CM

## 2020-07-24 DIAGNOSIS — R06.02 SHORTNESS OF BREATH: ICD-10-CM

## 2020-07-24 DIAGNOSIS — I47.10 SVT (SUPRAVENTRICULAR TACHYCARDIA): ICD-10-CM

## 2020-07-24 DIAGNOSIS — I50.810 RVF (RIGHT VENTRICULAR FAILURE): ICD-10-CM

## 2020-07-24 DIAGNOSIS — Z79.01 ANTICOAGULANT LONG-TERM USE: Chronic | ICD-10-CM

## 2020-07-24 DIAGNOSIS — I48.3 TYPICAL ATRIAL FLUTTER: ICD-10-CM

## 2020-07-24 DIAGNOSIS — I27.21 PAH (PULMONARY ARTERY HYPERTENSION): Primary | ICD-10-CM

## 2020-07-24 DIAGNOSIS — Z79.899 HIGH RISK MEDICATION USE: ICD-10-CM

## 2020-07-24 DIAGNOSIS — J96.11 CHRONIC RESPIRATORY FAILURE WITH HYPOXIA: ICD-10-CM

## 2020-07-24 PROCEDURE — 99443 PR PHYSICIAN TELEPHONE EVALUATION 21-30 MIN: CPT | Mod: 95,,, | Performed by: INTERNAL MEDICINE

## 2020-07-24 PROCEDURE — 99443 PR PHYSICIAN TELEPHONE EVALUATION 21-30 MIN: ICD-10-PCS | Mod: 95,,, | Performed by: INTERNAL MEDICINE

## 2020-07-24 NOTE — PROGRESS NOTES
The patient location is: home  The chief complaint leading to consultation is: PH f/u    Visit type: audio only    Face to Face time with patient: 25 min of total time spent on the encounter, which includes face to face time and non-face to face time preparing to see the patient (eg, review of tests), Obtaining and/or reviewing separately obtained history, Documenting clinical information in the electronic or other health record, Independently interpreting results (not separately reported) and communicating results to the patient/family/caregiver, or Care coordination (not separately reported).         Each patient to whom he or she provides medical services by telemedicine is:  (1) informed of the relationship between the physician and patient and the respective role of any other health care provider with respect to management of the patient; and (2) notified that he or she may decline to receive medical services by telemedicine and may withdraw from such care at any time.    Notes:     Subjective:      HPI  Delightful 74 y.o. 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 She also has h/o Rosemonas bacteremia s/p central line removal, and IV abx per ID with replacement by nephrology.  Pt was admitted 10/4-10/9 with worsening fatigue, diarrhea since the morning of 10/4/17, nausea and low grade temperature at home (100.4 being the highest). Also had a single event of hypoxia (SpO2 50s) that resolved without intervention. During that hospitalization she was placed on 100% FiO2 via HFNC on admit secondary to hypoxia. Blood cultures were drawn and remodulin was moved to peripheral line. Blood cultures were drawn from  [facility] as well. ID was consulted. He symptoms improved gradually throughout hospitalization and she was eventually weaned back to low flow NC. Cultures remained negative and remodulin was moved back to central access. They attributed fevers/hypoxia to possible viral infection. She ws eventually weaned down to 4L NC and discharged home in good condition. She was then admitted at [facility] 11/29/17 with hypotension w severe vomiting, fever 101.9, chills and diarrhea- she was over hydrated in the ER, GI w/u was (-), had recently increased her remodulin- was discharged in time to go to her son's wedding-  pt was then admitted 2/28-3/5 with  increased SOB and O2 requirements in setting of prolonged episode of A tach with associated neck pressure/pain. Troponin was positive and EKG had nonspecific ST changes. Pt was loaded with ASA and Plavix and started on Heparin bridge. Interventional Cards consulted for NSTEMI/LHC, but LHC 3/1/18 showed normal coronary arteries.  EP was consulted for A Tach. There are very limited options in this patient-  previously discussed tikosyn but would avoid given baseline Qt. Not a candidate for sotalol. Given that pt has had ongoing intermittent atrial tach and does not tolerate it, will try amio in place of cardizem. Pt had no further episodes on tele. Pt will need monitoring of PFTs, TFTs, and LFTs while on Amio.   Pt was continued on home dose of IV Remodulin. We attempted to wean off Adempas, but with continued increasing oxygen requirements, restarted Adempas and this improved so she will continue on this for now. Also of note, pt has been taking bosentan TID for years, when this should be a BID dosed med. So we will have her change bosentan to BID dosing on d/c.        Since last visit, pt has not been dong well- for the last week has had 2-4 episodes of tachycardia a day- HR around 140, lasting longer and longer, yest lasted over an half hour- broke out in a sweat- laid flat in bed and it  started to go away,  She increased her O2 yest to 6L and hasn't had one yet today or yest- has cont to lose wt- she is at 121# which is the lowest shes been- eats breakfast and lunch, hungry and eats well during the day, but if she eats at night she is nauseated, so she she had gotten out of the habit of eating at night. A couple of weeks spoke with Albertina about swelling as her wt was up to 128#- but now her wt is back down and she has no swelling at all   lasix is 40mg in am with an extra 20mg which she took one time when her wt was up to 128#.     The day before yest when she has the long attack felt like she might pass out, and in that episode her pulse ox was in the low 60's. struggling now to shower. Tearful as she is very dependent on her  and he is considering retiring to care for her freire time         no f/c and line is clean, able to go a week at a time now without changing as she less irritated at her line exit site.     Remodulin is at 89.2 ng/kg/min (unchanged from Flowsheet) and on Tracleer, adempas 0.5mg tid       6MWT today as her resting sat was 85%  162m (   206. 5m (223m (unchanged from August) 231m (195m (236m Abdiel, 225m, 283m May, 222m Mar, 375m Nov, 305 Sept, 274.5 July,  335.5May, 365  m prev 2 visits)                        O2 sat  85->82% 6L pull tank                                                          HR 79-->98                           BP  106/ 56  ->116/55                                                      Roberto 2 ->3    TTE   · Normal left ventricular systolic function. The estimated ejection fraction is 60%.   RV 5.7 TAPSE 1.2  · Grade I (mild) left ventricular diastolic dysfunction consistent with impaired relaxation.  · Septal wall has abnormal motion. Systolic flattening of the interventricular septum consistent with right ventricle pressure overload.  · Moderately to severely reduced right ventricular systolic function.  · Severe right ventricular enlargement.  · There is  right ventricular hypertrophy.  · Biatrial enlargement.  · Severe tricuspid regurgitation.  · The estimated PA systolic pressure is 97 mmHg.  · Pulmonary hypertension present.  · Normal central venous pressure (3 mmHg).  · Trivial circumferential pericardial effusion.  ·   TTE today 9/19  · Normal left ventricular systolic function. The estimated ejection fraction is 60%, Small and underfilled LV  · Septal wall has abnormal motion. Systolic flattening of the interventricular septum consistent with right ventricle pressure overload.  RV 6.0cm, TAPSE 1.4  · Biatrial enlargement.  · Grade II (moderate) left ventricular diastolic dysfunction consistent with pseudonormalization.  · Elevated left atrial pressure.  · Interatrial septum bows to left, consider elevated right atrial pressure.  · Moderately to severely reduced right ventricular systolic function.  · Severe right ventricular enlargement.  · There is right ventricular hypertrophy.  · Severe tricuspid regurgitation.  · Moderate pulmonic regurgitation.  · The estimated PA systolic pressure is 108 mm Hg  · Pulmonary hypertension present.  · Elevated central venous pressure (15 mm Hg).         TTE 3/23/18    1 - Normal left ventricular systolic function (EF 60-65%).     2 - No wall motion abnormalities.     3 - Biatrial enlargement.     4 - Impaired LV relaxation, elevated LAP (grade 2 diastolic dysfunction).     5 - Right ventricular enlargement with hypertrophy, with moderately depressed systolic function.     6 - Pulmonary hypertension. The estimated PA systolic pressure is 90 mmHg.     7 - Small pericardial effusion.     8 - Intermediate central venous pressure.     TTE 3/1/18    1 - Normal left ventricular systolic function (EF 55-60%).     2 - Indeterminate LV diastolic function.     3 - Right atrial enlargement.     4 - Right ventricular enlargement with severely depressed systolic function.     5 - Trivial mitral regurgitation.     6 - Moderate tricuspid  "regurgitation.     7 - No wall motion abnormalities.     8 - Pulmonary hypertension. The estimated PA systolic pressure is greater than 77 mmHg.     TTE 5/8/17:    1 - Right ventricular enlargement with hypertrophy, with severely depressed systolic function.     2 - Moderate to severe tricuspid regurgitation.     3 - Pulmonary hypertension. The estimated PA systolic pressure is 116 mmHg.     4 - Biatrial enlargement.     5 - Normal left ventricular systolic function (EF 60-65%); reduced LV stroke volume.  The right ventricle is enlarged measuring 5.8 cm at the base in the apical right ventricle-focused view. There is RVH. Global right ventricular systolic function appears severely depressed. There is abnormal septal motion consistent with   RV pressure/volume overload. Tricuspid annular plane systolic excursion (TAPSE) is 1.5 cm. The estimated PA systolic pressure is 116 mmHg.     [facility] 9/25/17:  CONDITION 1 (9/25/2017 11:20:07):  RA: 5/2 (1)  RV: 84/-5  RVEDP: 6     PW: 17/19 (16)  PA: 87/21 (47)  AO_SAT: 95  AO: 115/71 (86)  PA_SAT: 71  SPO2: 97  FICKCI: 3.2800  FICKCO: 5.5500    Review of Systems   Constitution: Positive for decreased appetite and weight loss. Negative for chills, fever, malaise/fatigue and weight gain.   HENT: Negative.    Eyes: Negative.    Cardiovascular: Positive for dyspnea on exertion and palpitations. Negative for chest pain, leg swelling, near-syncope, orthopnea, paroxysmal nocturnal dyspnea and syncope.   Respiratory: Negative for shortness of breath.    Endocrine: Negative.    Skin: Negative.    Musculoskeletal: Positive for arthritis and joint pain. Negative for back pain.   Gastrointestinal: Positive for nausea. Negative for bloating, abdominal pain and change in bowel habit.        Early satiety   Neurological: Negative for dizziness and light-headedness.   Psychiatric/Behavioral: Negative for depression.        Objective:   /64   Pulse 84   Ht 5' 3" (1.6 m)   Wt 54.9 kg (121 " lb)   SpO2 (!) 83%   BMI 21.43 kg/m²       no PE, phone call      P124/64 p 84  SpO2 83%            Chemistry        Component Value Date/Time     03/06/2020 1213    K 4.1 03/06/2020 1213     03/06/2020 1213    CO2 25 03/06/2020 1213    BUN 20 03/06/2020 1213    CREATININE 1.2 03/06/2020 1213    GLU 79 03/06/2020 1213        Component Value Date/Time    CALCIUM 9.1 03/06/2020 1213    ALKPHOS 62 03/06/2020 1213    AST 15 03/06/2020 1213    ALT 11 03/06/2020 1213    BILITOT 1.0 03/06/2020 1213            Magnesium   Date Value Ref Range Status   03/06/2020 2.5 1.6 - 2.6 mg/dL Final       Lab Results   Component Value Date    WBC 4.39 03/06/2020    HGB 13.0 03/06/2020    HCT 42.6 03/06/2020    MCV 87 03/06/2020     03/06/2020         BNP   Date Value Ref Range Status   03/06/2020 187 (H) 0 - 99 pg/mL Final     Comment:     Values of less than 100 pg/ml are consistent with non-CHF populations.   12/13/2019 190 (H) 0 - 99 pg/mL Final     Comment:     Values of less than 100 pg/ml are consistent with non-CHF populations.   09/06/2019 323 (H) 0 - 99 pg/mL Final     Comment:     Values of less than 100 pg/ml are consistent with non-CHF populations.                 Assessment:       1. Pulmonary hypertension- WHO Group 1, poor 6mw distance today - without a doubt, pt suffers from R to left shunting- was unable to tolerate increasing remodulin  BNP  Mildly increased in her usual range - FC III on triple tx- echo still with RV enlarged and severely depressed, and now with small pericardial effusion- focus now should be on improving her QOL as much as possible       Did not tolerate attempt to switch to opsumit (on generic bosentan)   2. PFO (patent foramen ovale)    3. Palpitations/tachycardia- stale, followed by EP, on amio now  4. Chronic hypoxemic resp failure- stable on O2  5. PAPVR,sinus venosus ASD- decision made to treat conservatively     Plan:   No change to remodulin dose now- seems to be  progressing in terms of both her hypoxia and SVT- increase digoxin from every other day to daily    Palliative care consult placed today- pt has been declining and I fear she is approaching the need for hospice soon- she asked insightful ? Today about how to talk with her children about what is going on    Cont lasix 40m mg daily with additional 20mg prn wt gain 3# overnight or 5# in a week (will try to spare her kidneys and only take the extra 20mg as needed rather than every other day given that her BUN is higher today)     Did not tolerate opsumit (will remain on generic bosentan)    - Keep salt intake to under 2000 mg sodium, fluids to under 2 L (64 oz)    Was Interested in implantable pump but with her size and how sick she now is I do not think she is a candidate.    F/u 2-3 mo with labs

## 2020-08-18 ENCOUNTER — TELEPHONE (OUTPATIENT)
Dept: PALLIATIVE MEDICINE | Facility: CLINIC | Age: 75
End: 2020-08-18

## 2020-08-19 ENCOUNTER — CLINICAL SUPPORT (OUTPATIENT)
Dept: PALLIATIVE MEDICINE | Facility: CLINIC | Age: 75
End: 2020-08-19
Payer: MEDICARE

## 2020-08-19 DIAGNOSIS — Z51.5 ENCOUNTER FOR PALLIATIVE CARE: ICD-10-CM

## 2020-08-19 DIAGNOSIS — Z71.89 COUNSELING REGARDING ADVANCE DIRECTIVES AND GOALS OF CARE: ICD-10-CM

## 2020-08-19 DIAGNOSIS — R52 PAIN: Primary | ICD-10-CM

## 2020-08-19 DIAGNOSIS — R06.09 DYSPNEA ON EXERTION: ICD-10-CM

## 2020-08-19 DIAGNOSIS — R11.0 NAUSEA: ICD-10-CM

## 2020-08-19 DIAGNOSIS — M79.2 NEUROPATHIC PAIN: ICD-10-CM

## 2020-08-19 PROCEDURE — 99497 PR ADVNCD CARE PLAN 30 MIN: ICD-10-PCS | Mod: 95,,, | Performed by: CLINICAL NURSE SPECIALIST

## 2020-08-19 PROCEDURE — 99205 OFFICE O/P NEW HI 60 MIN: CPT | Mod: 95,,, | Performed by: CLINICAL NURSE SPECIALIST

## 2020-08-19 PROCEDURE — 99497 ADVNCD CARE PLAN 30 MIN: CPT | Mod: 95,,, | Performed by: CLINICAL NURSE SPECIALIST

## 2020-08-19 PROCEDURE — 99205 PR OFFICE/OUTPT VISIT, NEW, LEVL V, 60-74 MIN: ICD-10-PCS | Mod: 95,,, | Performed by: CLINICAL NURSE SPECIALIST

## 2020-08-19 RX ORDER — GABAPENTIN 100 MG/1
100 CAPSULE ORAL 2 TIMES DAILY
Qty: 60 CAPSULE | Refills: 3 | Status: SHIPPED | OUTPATIENT
Start: 2020-08-19 | End: 2020-12-31 | Stop reason: SDUPTHER

## 2020-08-19 RX ORDER — TRAMADOL HYDROCHLORIDE 100 MG/1
100 TABLET, EXTENDED RELEASE ORAL EVERY 12 HOURS
Qty: 60 TABLET | Refills: 0 | Status: SHIPPED | OUTPATIENT
Start: 2020-08-19 | End: 2020-09-08 | Stop reason: SDUPTHER

## 2020-08-19 NOTE — PROGRESS NOTES
[facility]  Palliative Medicine Clinic  Consult Note    Patient Name: Molly Smith  MRN: [id number]  Referring Provider: Dr. Roberto Braun   Primary Care Physician: Roberto Braun MD    Assessment/Plan:   The patient location is: [place], [place]  The chief complaint leading to consultation is: PHTN/dyspnea    Visit type: audiovisual    Face to Face time with patient: 80 minutes of total time spent on the encounter, which includes face to face time and non-face to face time preparing to see the patient (eg, review of tests), Obtaining and/or reviewing separately obtained history, Documenting clinical information in the electronic or other health record, Independently interpreting results (not separately reported) and communicating results to the patient/family/caregiver, or Care coordination (not separately reported).     Each patient to whom he or she provides medical services by telemedicine is:  (1) informed of the relationship between the physician and patient and the respective role of any other health care provider with respect to management of the patient; and (2) notified that he or she may decline to receive medical services by telemedicine and may withdraw from such care at any time.    Notes:   Diagnoses and all orders for this visit:    Pain    Neuropathic pain    Nausea    Dyspnea on exertion    Encounter for palliative care    Counseling regarding advance directives and goals of care    Other orders  -     traMADoL (ULTRAM-ER) 100 MG Tb24; Take 1 tablet (100 mg total) by mouth every 12 (twelve) hours.  -     gabapentin (NEURONTIN) 100 MG capsule; Take 1 capsule (100 mg total) by mouth 2 (two) times daily. Take 1 tablet po daily at HS  x 7days .  Then begin 1 tablet po bid.    Impression: 74 y.o. female long term patient of Dr. 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  Long discussion today with patient and her  please see goals of care /advance care planning discussion below.  Patient and her  have good understanding,insight and prognsotic awareness, related to her PHTN disease and long term outcomes.  Patient chief c/o today were SOB with exertion secondary to PHTN and at rest and following meals, nausea, nociceptive pain described as aching bones secondary to PHTN therapy, neuropatheic back pain described as stabbing, sharp, shocking.      Plan/ Recommendations    1) Nociceptive Pain-  Change Tramadol XR to 100mg po bid.  Patient instructed not to break 200mg XL Tramadol in half any longer.   2) Neuropathic pain-  Add Gabapentin 100mg hs x 7days and if tolerated progress to 100mg q 12hrs as adjunct   3) Nausea -  5/7 days/per wk following breakfast -Begin Reglan 10mg po 30 mins before breakfast daily    4) Discussed Roxanol prn exertional dyspnea mgm.  They are considering.  But will see if these other changes make an impact first.    5) F/U in PM Clinic 2 weeks.           6) Please send Living Will, MPOA and LaPost documents x 2 documents to patient's home.  Will use in follow-up clinic visit in 2 weeks.                                  Discussed above with  and VIKRAM Horton Pharmacist, [facility].  Orders provided    Thank you for your consult. I will follow along with you. Please call [phone] with questions.     Subjective:     HPI: Patient is a 74 y.o. female long term patient of Dr. Tim who consulted palliative medicine service for increasing symptom burden and advance care planning discussion.  Patient is presenting with PHTN- WHO I- on IV Remodulin/home O2 @ 6L/NC, chronic respiratory failure, PAPVR, sinus venosus, ASD with R to L shunting, Congential PFO, with increasing poor activity tolerance, tachycardia and palpitations now on increase of digoxin 0.125  mg from qod to daily.      Advance Care Planning   Review of Symptoms    Symptom Assessment (ESAS 0-10 Scale)  Pain:  2  Dyspnea:  4  Anxiety:  0  Nausea:  4  Depression:  0  Anorexia:  3  Fatigue:  4  Insomnia:  0  Restlessness:  0  Agitation:  0     CAM / Delirium:  Negative  Constipation:  Negative  Diarrhea:  Negative    Bowel Management Plan (BMP):  Yes      Comments:  Patient reports IBS which causes intermittent diarrhea and constipation which is her normal bowle pattern.  She feels her current colace is adequate.      Pain Assessment:  Location(s): back and trunk    Back       Back Location:  Generalized       Quality:  Stabbing, sharp and tingling       Quantity:  6/10 in intensity       Chronicity:  year(s) ago, unchanged       Aggravating Factors:  Movement       Alleviating Factors:  Recumbency and none       Associated Symptoms:   Trunk       Trunk Location:  Generalized (c/o genralized aching in her bones all over her body )       Quality:  Aching       Quantity:  1/10 in intensity       Chronicity:  year(s) ago, stable       Aggravating Factors (PHTN meds):  None       Alleviating Factors (tramadol):        Associated Symptoms:  Nausea    OME in 24 hours:  20mg    Performance Status:  60    Advanced Directives:  Living Will: No    LaPOST: No    Do Not Resuscitate Status: No    Medical Power of : No     Agent's Name:  Rufus Smith ().  Agent's Contact Number:  Medical Decision Maker    Decision Making:  Patient answered questions and Family answered questions    Living Arrangements:  Lives with spouse    Psychosocial/Cultural:   x 51 yrs with 4 adult children who lives in [place].  Patient's  is ER MD in [place] nearing detention.  Pt and her  have been very open with their children who are aware of health issues especially since she has a long history and were present in her last hospitalization where her wishes were discussed.      Spiritual:  F - Micki and Belief:   [employer] maren  I - Importance:  Yes important to patient   C - Community:  Not specified   A - Address in Care:  Not specified     Time-Based Charting:  Yes  Symptom Assessment: 40 minutes  Coordination of Care: 10 minutes  Advance Care Plannin minutes  Goals of Care: 10 minutes    Total Time Spent: 80 minutes      [facility]  I engaged the patient and her ( Pt's  is ER MD in [place] nearing [facility])  in a conversation about advance care planning including goals of care, Living Will. MPOA and LaPost documents.  We specifically addressed what the goals of care would be moving forward, in light of the patient's current clinical status, and increasing symptom burden .Patient and her  have good understanding,insight and prognsotic awareness, related to her PHTN disease and long term outcomes.  We explored the patient's values and preferences for future care. The patient verbalized her desire not to be intubated, placed on mechanical ventilation or to have CPR if their is no hope for recovery.  She verbalized that she would like her  Dr.Howard Smith ( [phone]) to be her medical decision-maker.  She reports that in her last hospitalization this was discussed with Dr. Tim.    She and her  reported they have not completed advance directive documents and are interested in doing so.  We also discussed the [place]Post document which in light of her decreasing performance status/increasing symtoms and began exploring her care options.  In our follow-up clinic visit, they have agreed to review the documents I am sending and prepare for a follow-up discussion to complete documents documenting her wishes.    She also stated that she and her  have been very open with their children who are aware of health issues especially since she has a long history and were present in her last hospitalization where her wishes were discussed.    The patient endorses that what is most important right  now is to focus on spending time at home and working on her increasing symptom burden to increase her quality of life.  She stated that she is open to hospital admission and feels further discussion of her wishes will be helpful.  We discussed various symptoms and management of those symptoms which is reflected in my orders and above impression.      Accordingly, we have decided that the best plan to meet the patient's goals includes continuing with treatment with adjustment to her symptom mg treatment plan.      I spent a total of 20 minutes engaging the patient in this advance care planning discussion.       Past Medical History:   Diagnosis Date    Allergy     Anticoagulant long-term use     Arthritis     Heart murmur     Pneumonia     Pulmonary hypertension     Tachycardia      Past Surgical History:   Procedure Laterality Date    BACK SURGERY      HEMIARTHROPLASTY OF HIP Left 3/25/2019    Procedure: HEMIARTHROPLASTY, HIP - left;  Surgeon: Kemal Esposito MD;  Location: [facility] OR [address] [place] [address];  Service: Orthopedics;  Laterality: Left;    REPLACEMENT OF DIALYSIS CATHETER OVER GUIDEWIRE THROUGH EXISTING VENOUS ACCESS N/A 1/17/2019    Procedure: REPLACEMENT, CATHETER, DIALYSIS, OVER GUIDEWIRE, USING EXISTING VENOUS ACCESS;  Surgeon: Phoenix Hand MD;  Location: [facility] CATH LAB;  Service: Nephrology;  Laterality: N/A;     Family History   Problem Relation Age of Onset    Arthritis Mother     Arthritis Father     Diabetes Father     Heart disease Father     Hypertension Father      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    Multaq [dronedarone]        Medications:    Current Outpatient Medications:     acetaminophen (TYLENOL) 325 MG tablet, Take 2 tablets (650 mg total) by mouth every 6 (six) hours as needed., Disp: , Rfl: 0    amiodarone (PACERONE) 200 MG Tab, TAKE ONE TABLET BY MOUTH ONCE A DAY AS DIRECTED. THANK YOU!, Disp: 30 tablet, Rfl:  11    bosentan (TRACLEER) 125 MG Tab, Take by mouth every 12 (twelve) hours., Disp: , Rfl:     CALCIUM CARBONATE (CALCIUM 600 ORAL), Take 2 tablets by mouth once daily.  , Disp: , Rfl:     chlordiazepoxide-clidinium 5-2.5 mg (LIBRAX) 5-2.5 mg Cap, Take 1 capsule by mouth 3 (three) times daily with meals. TAKE TABLET AS PRN., Disp: , Rfl:     digoxin (LANOXIN) 125 mcg tablet, Take 1 tablet (125 mcg total) by mouth every other day., Disp: 15 tablet, Rfl: 11    docusate sodium (COLACE) 50 MG capsule, Take 1 capsule (50 mg total) by mouth daily as needed for Constipation., Disp: , Rfl: 0    ferrous gluconate (FERGON) 324 MG tablet, Take 1 tablet (324 mg total) by mouth daily with breakfast., Disp: , Rfl:     folic acid (FOLVITE) 1 MG tablet, Take 1 mg by mouth once daily., Disp: , Rfl:     furosemide (LASIX) 20 MG tablet, Take 1 tablet (20 mg total) by mouth 2 (two) times daily. (Patient taking differently: Take 40 mg by mouth once daily. ), Disp: 60 tablet, Rfl: 11    loratadine (CLARITIN) 10 mg tablet, Take 10 mg by mouth once daily., Disp: , Rfl:     metoclopramide HCl (REGLAN) 10 MG tablet, Take 10 mg by mouth daily as needed., Disp: , Rfl:     ondansetron (ZOFRAN-ODT) 4 MG TbDL, , Disp: , Rfl: 6    riociguat (ADEMPAS) 0.5 mg Tab tablet, Take 1 tablet (0.5 mg total) by mouth 3 (three) times daily., Disp: , Rfl:     spironolactone (ALDACTONE) 25 MG tablet, Take 25 mg by mouth once daily.  , Disp: , Rfl:     TREPROSTINIL SODIUM (TREPROSTINIL, REMODULIN, PUMP FOR HOME USE), Pt currently on 89.2 ng/kg/min=45 mL/day dosing weight 70.05 kg, Disp: 60 mL, Rfl: 11    warfarin (COUMADIN) 10 MG tablet, Take 1 tablet (10 mg total) by mouth Daily. Take 1 tablet (10mg) by mouth daily, except a half tablet (5mg) every Tues, Thurs, and Saturdays, Disp: 90 tablet, Rfl: 1    gabapentin (NEURONTIN) 100 MG capsule, Take 1 capsule (100 mg total) by mouth 2 (two) times daily. Take 1 tablet po daily at HS  x 7days .  Then  begin 1 tablet po bid., Disp: 60 capsule, Rfl: 3    traMADoL (ULTRAM-ER) 100 MG Tb24, Take 1 tablet (100 mg total) by mouth every 12 (twelve) hours., Disp: 60 tablet, Rfl: 0      Review of Systems   Constitutional: Positive for activity change, appetite change and fatigue.   Respiratory: Positive for shortness of breath.    Gastrointestinal: Positive for constipation, diarrhea and nausea.   Musculoskeletal: Positive for back pain and myalgias.   Neurological: Positive for dizziness.     Objective:     There were no vitals filed for this visit.      Physical Exam  Constitutional:       General: She is not in acute distress.     Appearance: Normal appearance.   Cardiovascular:      Comments: Patient reports improving palpitations and tachycardia   Pulmonary:      Comments: Patient reports PO2 @ 70's% exertion and PO2 80's% @ rest.  Patient does not appears SOB during conversation.  O2 in process n/c @6L  Neurological:      Mental Status: She is alert and oriented to person, place, and time.   Psychiatric:         Mood and Affect: Mood normal.         Behavior: Behavior normal.         Thought Content: Thought content normal.         Judgment: Judgment normal.         Laboratory:   CBC:   WBC   Date Value Ref Range Status   03/06/2020 4.39 3.90 - 12.70 K/uL Final     RBC   Date Value Ref Range Status   03/06/2020 4.90 4.00 - 5.40 M/uL Final     Hemoglobin   Date Value Ref Range Status   03/06/2020 13.0 12.0 - 16.0 g/dL Final     Hematocrit   Date Value Ref Range Status   03/06/2020 42.6 37.0 - 48.5 % Final     Platelets   Date Value Ref Range Status   03/06/2020 273 150 - 350 K/uL Final     Mean Corpuscular Volume   Date Value Ref Range Status   03/06/2020 87 82 - 98 fL Final     Mean Corpuscular Hemoglobin   Date Value Ref Range Status   03/06/2020 26.5 (L) 27.0 - 31.0 pg Final     Mean Corpuscular Hemoglobin Conc   Date Value Ref Range Status   03/06/2020 30.5 (L) 32.0 - 36.0 g/dL Final     CMP:   Glucose   Date  "Value Ref Range Status   03/06/2020 79 70 - 110 mg/dL Final     Calcium   Date Value Ref Range Status   03/06/2020 9.1 8.7 - 10.5 mg/dL Final     Albumin   Date Value Ref Range Status   03/06/2020 4.1 3.5 - 5.2 g/dL Final     Total Protein   Date Value Ref Range Status   03/06/2020 7.5 6.0 - 8.4 g/dL Final     Sodium   Date Value Ref Range Status   03/06/2020 138 136 - 145 mmol/L Final     Potassium   Date Value Ref Range Status   03/06/2020 4.1 3.5 - 5.1 mmol/L Final     CO2   Date Value Ref Range Status   03/06/2020 25 23 - 29 mmol/L Final     Chloride   Date Value Ref Range Status   03/06/2020 105 95 - 110 mmol/L Final     BUN, Bld   Date Value Ref Range Status   03/06/2020 20 8 - 23 mg/dL Final     Creatinine   Date Value Ref Range Status   03/06/2020 1.2 0.5 - 1.4 mg/dL Final     BNP   Date Value Ref Range Status   03/06/2020 187 (H) 0 - 99 pg/mL Final     Comment:     Values of less than 100 pg/ml are consistent with non-CHF populations.     Coumadin Monitoring INR   Date Value Ref Range Status   03/16/2005 2.4 (H) 0.8 - 1.2 Final     Comment:     Please note updated guidelines effective 11/16/02:  ACCP Guidelline for Coumadin usage recommends a "target range" of  2.0 - 3.0 for INR for all indicators except mechanical heart valves  and antiphospholipid syndromes which should use 2.5 - 3.5.  .  Please note updated reference range effective 12/06/04.       INR   Date Value Ref Range Status   07/18/2019 1.5 (H) 0.8 - 1.2 Final     Comment:     Coumadin Therapy:  2.0 - 3.0 for INR for all indicators except mechanical heart valves  and antiphospholipid syndromes which should use 2.5 - 3.5.       Alkaline Phosphatase   Date Value Ref Range Status   03/06/2020 62 55 - 135 U/L Final     ALT   Date Value Ref Range Status   03/06/2020 11 10 - 44 U/L Final     AST   Date Value Ref Range Status   03/06/2020 15 10 - 40 U/L Final     Total Bilirubin   Date Value Ref Range Status   03/06/2020 1.0 0.1 - 1.0 mg/dL Final     " Comment:     For infants and newborns, interpretation of results should be based  on gestational age, weight and in agreement with clinical  observations.  Premature Infant recommended reference ranges:  Up to 24 hours.............<8.0 mg/dL  Up to 48 hours............<12.0 mg/dL  3-5 days..................<15.0 mg/dL  6-29 days.................<15.0 mg/dL       30  min visit spent in  face to face discussion of goals of care/advance care planning  50 min visit spent in symptom assessment, coordination of care and emotional support.    DOLORES Hardy, CNS, [facility]  Palliative Medicine  [facility] 9th Fl

## 2020-08-19 NOTE — PATIENT INSTRUCTIONS
What Is Palliative Care?  Palliative care is a way to improve quality of life for someone who is being treated for a serious illness. To palliate means to ease the symptoms of an illness. Palliative care providers are experts in easing symptoms that cause distress. These may include pain, nausea, vomiting, anxiety, constipation, sleeping, and breathing problems. The people who are being treated and their loved ones are given emotional and spiritual support. Palliative care is given at the same time as traditional medical care. Active treatment for the illness does not stop.     Both the person receiving treatment and family members help direct the plan of care with the palliative care team.   Goals of palliative care  · Easing symptoms that cause distress. The main goal of palliative care is to ease symptoms. Symptoms may affect a persons ability to eat, be active, or spend time with others. Medicines and other methods are used. This gives the person a better quality of life while the illness is being treated.  · Coordinating care. This helps to make sure that each care provider is aware of the goals of care. Communication is done on a regular basis among all team members to make sure that the care goals are met.  · Meeting emotional and spiritual needs. The care team helps both the person being treated and family members cope with stress, depression, anxiety, and other issues. They can set up meetings with a counselor or  as desired.  · Giving information and helping with decisions. Care providers can help people and their families get the information they need. They can also help when care decisions need to be made.  · Helping create an advance care plan. This is a series of legal documents that note a persons wishes for their future healthcare. It helps to make sure that if people cant speak for themselves, their wishes can still be carried out. The documents vary by state.  Working with  your palliative care team  Palliative care is given by a team of people who focus on the physical, emotional, and psychosocial aspects of advanced illness. The team may include a palliative care provider or nurse, , pharmacist, dietitian, counselor, , and others. To get the most of palliative care, both the person and his or her loved ones have a role.  What a person who is receiving medical treatment can do  Tell your healthcare provider you are thinking about palliative care. Ask what palliative services are available in your area.  To ensure the best care, learn what you can about your illness and the goals of your care. If you are having pain and other symptoms due to a serious illness, ask your healthcare provider for a palliative care referral.  Treating these symptoms is best for your health and quality of life. If you need support in other ways, speak up. The care team is there to help you get what you need.  What a family member can do  Talk with the palliative care team often. Do your best to understand your loved ones illness and goals of care. When decisions need to be made, act on your loved ones wishes. And if you have a concern or question, speak up. You can help the team make sure that your loved one has the best quality of life possible.  Date Last Reviewed: 3/1/2017  © 9157-8799 [employer]. [address] [place] [address], [place] [address]. All rights reserved. This information is not intended as a substitute for professional medical care. Always follow your healthcare professional's instructions.

## 2020-08-24 ENCOUNTER — TELEPHONE (OUTPATIENT)
Dept: TRANSPLANT | Facility: CLINIC | Age: 75
End: 2020-08-24

## 2020-08-24 ENCOUNTER — TELEPHONE (OUTPATIENT)
Dept: PHYSICAL MEDICINE AND REHAB | Facility: CLINIC | Age: 75
End: 2020-08-24

## 2020-08-24 NOTE — TELEPHONE ENCOUNTER
----- Message from MELISA Gutierrez, RN sent at 8/24/2020  3:14 PM CDT -----  Regarding: Scooter referral  Hi-    Referral was placed for patient to be evaluated for a scooter. Could you please contact pt to schedule?    Thank you,  Albertina

## 2020-08-25 ENCOUNTER — TELEPHONE (OUTPATIENT)
Dept: PALLIATIVE MEDICINE | Facility: CLINIC | Age: 75
End: 2020-08-25

## 2020-08-25 DIAGNOSIS — M62.830 MUSCLE SPASM OF BACK: Primary | ICD-10-CM

## 2020-08-25 RX ORDER — METAXALONE 400 MG/1
400 TABLET ORAL 3 TIMES DAILY PRN
Qty: 21 TABLET | Refills: 0 | Status: SHIPPED | OUTPATIENT
Start: 2020-08-25 | End: 2020-10-07 | Stop reason: ALTCHOICE

## 2020-08-25 NOTE — TELEPHONE ENCOUNTER
Received telephone call from patient reporting improvement in her overall pain until last day or so when she lifted a chair and began having back spasms.    Reviewed with Dr. Tim and recommended Metaxalone 400 mg tid prn for relief.  Patient is also receiving heat and ultrasound therapy.  Advised patient to rest while using medication to facilitate healing.      1) Metaxalone order placed.  2) Patient increased per previous instructions to Gabapentin 100 mg q 12 hrs     3) Continue Tylenol with no more than 3000mg/24hrs     Angelina BERNAL, ACNS-BC, A[facility]

## 2020-08-27 ENCOUNTER — TELEPHONE (OUTPATIENT)
Dept: TRANSPLANT | Facility: CLINIC | Age: 75
End: 2020-08-27

## 2020-08-27 NOTE — TELEPHONE ENCOUNTER
Ok, I have forwarded to Dr Tim. Will let you know what she says.    Take care,  Albertina     -----Original Message-----  From: Abdiel Gilbert <[email or web address]>   Sent: Thursday, August 27, 2020 12:41 PM  To: Albertina Ruffin [email or web address]>  Subject: [EXTERNAL] Re: Help    THIS EMAIL IS FROM AN EXTERNAL SENDER!  DO YOU TRUST THIS EMAIL?  If you are unsure, remember to use the Report Suspicious Email button in [employer] to send to [email or web address] for review. DO NOT click any links and NEVER input your username and password.    On my back. Rufus was going to use ultrasound on what I described to him as muscle or nerve pain. When he looked he saw the rash . Arine been treating it as pulled muscle. Kourtney River helped with the pulled muscle    Sent from my iPhone    > On Aug 27, 2020, at 12:26 PM, Albertina Ruffin [email or web address]> wrote:  >   > ?Hi-  >   > I am so sorry to hear this! This should be fine, but I am forwarding to Dr Tim, for her review. Where were you diagnosed, and where are the shingles?   >   > Albertina   >   > -----Original Message-----  > From: Abdiel Gilbert <[email or web address]>   > Sent: Thursday, August 27, 2020 12:25 PM  > To: Albertina Ruffin <[email or web address]>  > Subject: [EXTERNAL] Help  >   > THIS EMAIL IS FROM AN EXTERNAL SENDER!  > DO YOU TRUST THIS EMAIL?  > If you are unsure, remember to use the Report Suspicious Email button in [employer] to send to [email or web address] for review. DO NOT click any links and NEVER input your username and password.  >   > I have shingles. Can I take acyclovir 800 mg 5 times a day. X 10 days& prednisone 10 mg 4 times a day & a lidocaine patch Sent from my iPhone

## 2020-08-30 ENCOUNTER — NURSE TRIAGE (OUTPATIENT)
Dept: ADMINISTRATIVE | Facility: CLINIC | Age: 75
End: 2020-08-30

## 2020-08-30 NOTE — TELEPHONE ENCOUNTER
"Pt  states that her pain medication that was prescribe on the 19th and 25th of the month are not relieving pain at this time, pt is requesting an increased in dosage will help. Call was trying to reached.  Pt advised per protocol and verbalized understanding.    Reason for Disposition   [1] Caller has NON-URGENT medication question about med that PCP prescribed AND [2] triager unable to answer question    Additional Information   Negative: [1] DOUBLE DOSE (an extra dose or lesser amount) of prescription drug AND [2] NO symptoms (Exception: a double dose of antibiotics)   Negative: Diabetes drug error or overdose (e.g., took wrong type of insulin or took extra dose)   Negative: [1] Request for URGENT new prescription or refill of "essential" medication (i.e., likelihood of harm to patient if not taken) AND [2] triager unable to fill per unit policy   Negative: [1] Prescription not at pharmacy AND [2] was prescribed by PCP recently   Negative: [1] Pharmacy calling with prescription questions AND [2] triager unable to answer question   Negative: [1] Caller has URGENT medication question about med that PCP or specialist prescribed AND [2] triager unable to answer question   Negative: MORE THAN A DOUBLE DOSE of a prescription or over-the-counter (OTC) drug   Negative: [1] DOUBLE DOSE (an extra dose or lesser amount) of over-the-counter (OTC) drug AND [2] any symptoms (e.g., dizziness, nausea, pain, sleepiness)   Negative: [1] DOUBLE DOSE (an extra dose or lesser amount) of prescription drug AND [2] any symptoms (e.g., dizziness, nausea, pain, sleepiness)   Negative: Took another person's prescription drug    Protocols used: MEDICATION QUESTION CALL-A-      "

## 2020-08-31 ENCOUNTER — TELEPHONE (OUTPATIENT)
Dept: PALLIATIVE MEDICINE | Facility: CLINIC | Age: 75
End: 2020-08-31

## 2020-08-31 NOTE — TELEPHONE ENCOUNTER
Patient called complaining of back pain from presumed shingles. Patient has not been seen in the [facility] system for shingles. She has been Rxed acyclovir outside the [facility] system. Recommended increasing gabapentin to 100mg TID. Patient has palliative follow-up 9/2

## 2020-09-01 ENCOUNTER — TELEPHONE (OUTPATIENT)
Dept: PALLIATIVE MEDICINE | Facility: CLINIC | Age: 75
End: 2020-09-01

## 2020-09-01 NOTE — TELEPHONE ENCOUNTER
Symptom Assessment (ESAS 0-10 scale)    ESAS 0 1 2 3 4 5 6 7 8 9 10   Pain []  []  []  []  []  []  []  []  []  []  [x]    Dyspnea [x]  []  []  []  []  []  []  []  []  []  []    Anxiety []  []  []  [x]  []  []  []  []  []  []  []    Nausea [x]  []  []  []  []  []  []  []  []  []  []    Depression  [x]  []  []  []  []  []  []  []  []  []  []    Anorexia [x]  []  []  []  []  []  []  []  []  []  []    Fatigue []  []  []  []  []  [x]  []  []  []  []  []    Insomnia [x]  []  []  []  []  []  []  []  []  []  []    Restlessness  []  []  []  []  []  []  []  []  []  []  []    Agitation []  []  []  []  []  []  []  []  []  []  []

## 2020-09-02 ENCOUNTER — CLINICAL SUPPORT (OUTPATIENT)
Dept: PALLIATIVE MEDICINE | Facility: CLINIC | Age: 75
End: 2020-09-02
Payer: MEDICARE

## 2020-09-02 DIAGNOSIS — Z51.5 ENCOUNTER FOR PALLIATIVE CARE: ICD-10-CM

## 2020-09-02 DIAGNOSIS — M79.2 NEUROPATHIC PAIN: ICD-10-CM

## 2020-09-02 DIAGNOSIS — B02.29 POST HERPETIC NEURALGIA: Primary | ICD-10-CM

## 2020-09-02 DIAGNOSIS — R11.0 NAUSEA: ICD-10-CM

## 2020-09-02 DIAGNOSIS — R06.00 DYSPNEA, UNSPECIFIED TYPE: ICD-10-CM

## 2020-09-02 PROCEDURE — 99215 OFFICE O/P EST HI 40 MIN: CPT | Mod: 95,,, | Performed by: CLINICAL NURSE SPECIALIST

## 2020-09-02 PROCEDURE — 99215 PR OFFICE/OUTPT VISIT, EST, LEVL V, 40-54 MIN: ICD-10-PCS | Mod: 95,,, | Performed by: CLINICAL NURSE SPECIALIST

## 2020-09-02 RX ORDER — CAPSAICIN 0.03 G/100G
CREAM TOPICAL 2 TIMES DAILY
Refills: 0 | COMMUNITY
Start: 2020-09-02 | End: 2020-09-22 | Stop reason: ALTCHOICE

## 2020-09-02 RX ORDER — TRAMADOL HYDROCHLORIDE 50 MG/1
50 TABLET ORAL EVERY 6 HOURS PRN
Qty: 60 TABLET | Refills: 0 | Status: SHIPPED | OUTPATIENT
Start: 2020-09-02 | End: 2020-09-22 | Stop reason: SDUPTHER

## 2020-09-02 NOTE — PROGRESS NOTES
[facility]  Palliative Medicine Clinic  Consult Note    Patient Name: Molly Smith  MRN: [id number]  Referring Provider: No ref. provider found   Primary Care Physician: Roberto Braun MD    Assessment/Plan:   The patient location is: [place], [place]  The chief complaint leading to consultation is: PHTN/dyspnea     Visit type: audiovisual     Face to Face time with patient: 80 minutes of total time spent on the encounter, which includes face to face time and non-face to face time preparing to see the patient (eg, review of tests), Obtaining and/or reviewing separately obtained history, Documenting clinical information in the electronic or other health record, Independently interpreting results (not separately reported) and communicating results to the patient/family/caregiver, or Care coordination (not separately reported).      Each patient to whom he or she provides medical services by telemedicine is:  (1) informed of the relationship between the physician and patient and the respective role of any other health care provider with respect to management of the patient; and (2) notified that he or she may decline to receive medical services by telemedicine and may withdraw from such care at any time.     Notes:     Diagnoses and all orders for this visit:    Post herpetic neuralgia    Neuropathic pain    Nausea    Dyspnea, unspecified type    Encounter for palliative care    Other orders  -     traMADoL (ULTRAM) 50 mg tablet; Take 1 tablet (50 mg total) by mouth every 6 (six) hours as needed for Pain (as needed for breakthrough pain).  -     capsaicin (ZOSTRIX) 0.025 % cream; Apply topically 2 (two) times daily.          -     Increase Gabapentin to 200mg po every 8hrs   Impression: 74 y.o. female long term patient of Dr. Tim who consulted palliative medicine service for increasing symptom burden /poor activity tolerance secondary to PHTN WHO I ( Remodulin) , chronic respiratory failure on  "Home O2 at 6L per N/C,  congential heart disease.  Symptoms  Nociceptive pain described as "aching bones" secondary to PHTN therapy better controlled  Neuropatheic back pain described as stabbing, sharp, shocking improved.    Dyspnea, patient reports improved.  Nausea improved on Reglan.   Chief complaint today is post neuralgia pain 8-10/10 which began on 8/25/20 secondardy to shingles.of R posterior back flank.  Patient on Acyclovir.     Advance care planning;   Will defer to next clinic visit due to patient's discomfort    Plan/ Recommendations    1) Nociceptive Pain-  Change Tramadol XR to 100mg po bid.  Patient re-instructed not to break XL Tramadol in half at any time and to also not take XL Tramadol more than every 12 hrs or use for breakthrough pain.   Will add Tramadol 50 mg q 6hrs prn breakthrough pain    2) Post herpetic neuralgia  pain-  Increase Gabapentin to 200mg po q 8hrs   3) Nausea -  Continue Reglan.  Nausea resolved.    4) Dyspnea -controlled on current treatment plan  Will monitor  5) Capcacin cream bid to shingles area  6) F/U in [facility] 2 weeks.   Will follow up on advance care planning next clinic visit.          7) Discussed with Dr. Tim and Dr. Billy  Thank you for your consult. I will follow along with you. Please call [phone] with questions.     Subjective:     HPI: Patient is a 74 y.o. female long term patient of Dr. Tim who consulted palliative medicine service for increasing symptom burden and advance care planning discussion.  Patient is presenting with PHTN- WHO I- on IV Remodulin/home O2 @ 6L/NC, chronic respiratory failure, PAPVR, sinus venosus, ASD with R to L shunting, Congential PFO, with increasing poor activity tolerance, tachycardia and palpitations now on increase of digoxin 0.125 mg from qod to daily.  Now patient has new onset of shingles as of 8/25/20 and on acyclovir with uncontrolled Post herpetic neuralgia  pain-    Advance Care Planning   Review of " "Symptoms    Symptom Assessment (ESAS 0-10 Scale)  Pain:  8  Dyspnea:  0  Anxiety:  0  Nausea:  0  Depression:  2  Anorexia:  0  Fatigue:  4  Insomnia:  2  Restlessness:  0  Agitation:  0     CAM / Delirium:  Negative  Constipation:  Negative  Diarrhea:  Negative    Bowel Management Plan (BMP):  Yes      Pain Assessment:  Location(s): back    Back       Back Location:  Right and lateral       Quality (like a "torch"):  Burning and stabbing       Quantity:  8/10 in intensity       Chronicity:  Onset 7 day(s) ago, unchanged       Aggravating Factors (touch and pressure):        Alleviating Factors:  None       Associated Symptoms BACK: none.     OME in 24 hours:  30mg    Performance Status:  60    Decision Making:  Patient answered questions and Family answered questions    Living Arrangements:  Lives with spouse     Time-Based Charting:  Yes  Symptom Assessment: 30 minutes  Coordination of Care: 30 minutes    Total Time Spent: 60 minutes       Past Medical History:   Diagnosis Date    Allergy     Anticoagulant long-term use     Arthritis     Heart murmur     Pneumonia     Pulmonary hypertension     Tachycardia      Past Surgical History:   Procedure Laterality Date    BACK SURGERY      HEMIARTHROPLASTY OF HIP Left 3/25/2019    Procedure: HEMIARTHROPLASTY, HIP - left;  Surgeon: Kemal Esposito MD;  Location: [facility] OR [address] [place] [address];  Service: Orthopedics;  Laterality: Left;    REPLACEMENT OF DIALYSIS CATHETER OVER GUIDEWIRE THROUGH EXISTING VENOUS ACCESS N/A 1/17/2019    Procedure: REPLACEMENT, CATHETER, DIALYSIS, OVER GUIDEWIRE, USING EXISTING VENOUS ACCESS;  Surgeon: Phoenix Hand MD;  Location: [facility] CATH LAB;  Service: Nephrology;  Laterality: N/A;     Family History   Problem Relation Age of Onset    Arthritis Mother     Arthritis Father     Diabetes Father     Heart disease Father     Hypertension Father      Review of patient's allergies indicates:   Allergen Reactions    Vancomycin      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 THANK YOU!, Disp: 30 tablet, Rfl: 11    bosentan (TRACLEER) 125 MG Tab, Take by mouth every 12 (twelve) hours., Disp: , Rfl:     CALCIUM CARBONATE (CALCIUM 600 ORAL), Take 2 tablets by mouth once daily.  , Disp: , Rfl:     chlordiazepoxide-clidinium 5-2.5 mg (LIBRAX) 5-2.5 mg Cap, Take 1 capsule by mouth 3 (three) times daily with meals. TAKE TABLET AS PRN., Disp: , Rfl:     digoxin (LANOXIN) 125 mcg tablet, Take 1 tablet (125 mcg total) by mouth every other day., Disp: 15 tablet, Rfl: 11    docusate sodium (COLACE) 50 MG capsule, Take 1 capsule (50 mg total) by mouth daily as needed for Constipation., Disp: , Rfl: 0    ferrous gluconate (FERGON) 324 MG tablet, Take 1 tablet (324 mg total) by mouth daily with breakfast., Disp: , Rfl:     folic acid (FOLVITE) 1 MG tablet, Take 1 mg by mouth once daily., Disp: , Rfl:     furosemide (LASIX) 20 MG tablet, Take 1 tablet (20 mg total) by mouth 2 (two) times daily. (Patient taking differently: Take 40 mg by mouth once daily. ), Disp: 60 tablet, Rfl: 11    gabapentin (NEURONTIN) 100 MG capsule, Take 1 capsule (100 mg total) by mouth 2 (two) times daily. Take 1 tablet po daily at HS  x 7days .  Then begin 1 tablet po bid., Disp: 60 capsule, Rfl: 3    loratadine (CLARITIN) 10 mg tablet, Take 10 mg by mouth once daily., Disp: , Rfl:     metaxalone (SKELAXIN) 400 mg tablet, Take 1 tablet (400 mg total) by mouth 3 (three) times daily as needed., Disp: 21 tablet, Rfl: 0    metoclopramide HCl (REGLAN) 10 MG tablet, Take 10 mg by mouth daily as needed., Disp: , Rfl:     riociguat (ADEMPAS) 0.5 mg Tab tablet, Take 1 tablet (0.5 mg total) by mouth 3 (three) times daily.,  Disp: , Rfl:     spironolactone (ALDACTONE) 25 MG tablet, Take 25 mg by mouth once daily.  , Disp: , Rfl:     traMADoL (ULTRAM-ER) 100 MG Tb24, Take 1 tablet (100 mg total) by mouth every 12 (twelve) hours., Disp: 60 tablet, Rfl: 0    TREPROSTINIL SODIUM (TREPROSTINIL, REMODULIN, PUMP FOR HOME USE), Pt currently on 89.2 ng/kg/min=45 mL/day dosing weight 70.05 kg, Disp: 60 mL, Rfl: 11    warfarin (COUMADIN) 10 MG tablet, Take 1 tablet (10 mg total) by mouth Daily. Take 1 tablet (10mg) by mouth daily, except a half tablet (5mg) every Tues, Thurs, and Saturdays, Disp: 90 tablet, Rfl: 1    capsaicin (ZOSTRIX) 0.025 % cream, Apply topically 2 (two) times daily., Disp: , Rfl: 0    ondansetron (ZOFRAN-ODT) 4 MG TbDL, , Disp: , Rfl: 6    traMADoL (ULTRAM) 50 mg tablet, Take 1 tablet (50 mg total) by mouth every 6 (six) hours as needed for Pain (as needed for breakthrough pain)., Disp: 60 tablet, Rfl: 0      Review of Systems   Constitutional: Positive for fatigue. Negative for activity change and appetite change.   Respiratory: Positive for shortness of breath.         But improved   Gastrointestinal: Negative for constipation.   Skin: Positive for rash.   Psychiatric/Behavioral: Positive for sleep disturbance.     Objective:     There were no vitals filed for this visit.    Physical Exam  Constitutional:       Appearance: Normal appearance.   HENT:      Head: Normocephalic and atraumatic.   Pulmonary:      Effort: Pulmonary effort is normal.   Skin:     Findings: Rash present. Rash is vesicular.      Comments: Cluster right posterior flank back.  Drying   Neurological:      Mental Status: She is alert.   Psychiatric:         Mood and Affect: Mood normal.         Behavior: Behavior normal.         Thought Content: Thought content normal.         Judgment: Judgment normal.         Laboratory:   CBC:   WBC   Date Value Ref Range Status   03/06/2020 4.39 3.90 - 12.70 K/uL Final     RBC   Date Value Ref Range Status  "  03/06/2020 4.90 4.00 - 5.40 M/uL Final     Hemoglobin   Date Value Ref Range Status   03/06/2020 13.0 12.0 - 16.0 g/dL Final     Hematocrit   Date Value Ref Range Status   03/06/2020 42.6 37.0 - 48.5 % Final     Platelets   Date Value Ref Range Status   03/06/2020 273 150 - 350 K/uL Final     Mean Corpuscular Volume   Date Value Ref Range Status   03/06/2020 87 82 - 98 fL Final     Mean Corpuscular Hemoglobin   Date Value Ref Range Status   03/06/2020 26.5 (L) 27.0 - 31.0 pg Final     Mean Corpuscular Hemoglobin Conc   Date Value Ref Range Status   03/06/2020 30.5 (L) 32.0 - 36.0 g/dL Final     CMP:   Glucose   Date Value Ref Range Status   03/06/2020 79 70 - 110 mg/dL Final     Calcium   Date Value Ref Range Status   03/06/2020 9.1 8.7 - 10.5 mg/dL Final     Albumin   Date Value Ref Range Status   03/06/2020 4.1 3.5 - 5.2 g/dL Final     Total Protein   Date Value Ref Range Status   03/06/2020 7.5 6.0 - 8.4 g/dL Final     Sodium   Date Value Ref Range Status   03/06/2020 138 136 - 145 mmol/L Final     Potassium   Date Value Ref Range Status   03/06/2020 4.1 3.5 - 5.1 mmol/L Final     CO2   Date Value Ref Range Status   03/06/2020 25 23 - 29 mmol/L Final     Chloride   Date Value Ref Range Status   03/06/2020 105 95 - 110 mmol/L Final     BUN, Bld   Date Value Ref Range Status   03/06/2020 20 8 - 23 mg/dL Final     Creatinine   Date Value Ref Range Status   03/06/2020 1.2 0.5 - 1.4 mg/dL Final     BNP   Date Value Ref Range Status   03/06/2020 187 (H) 0 - 99 pg/mL Final     Comment:     Values of less than 100 pg/ml are consistent with non-CHF populations.     Coumadin Monitoring INR   Date Value Ref Range Status   03/16/2005 2.4 (H) 0.8 - 1.2 Final     Comment:     Please note updated guidelines effective 11/16/02:  ACCP Guidelline for Coumadin usage recommends a "target range" of  2.0 - 3.0 for INR for all indicators except mechanical heart valves  and antiphospholipid syndromes which should use 2.5 - " 3.5.  .  Please note updated reference range effective 12/06/04.       INR   Date Value Ref Range Status   07/18/2019 1.5 (H) 0.8 - 1.2 Final     Comment:     Coumadin Therapy:  2.0 - 3.0 for INR for all indicators except mechanical heart valves  and antiphospholipid syndromes which should use 2.5 - 3.5.       Alkaline Phosphatase   Date Value Ref Range Status   03/06/2020 62 55 - 135 U/L Final     ALT   Date Value Ref Range Status   03/06/2020 11 10 - 44 U/L Final     AST   Date Value Ref Range Status   03/06/2020 15 10 - 40 U/L Final     Total Bilirubin   Date Value Ref Range Status   03/06/2020 1.0 0.1 - 1.0 mg/dL Final     Comment:     For infants and newborns, interpretation of results should be based  on gestational age, weight and in agreement with clinical  observations.  Premature Infant recommended reference ranges:  Up to 24 hours.............<8.0 mg/dL  Up to 48 hours............<12.0 mg/dL  3-5 days..................<15.0 mg/dL  6-29 days.................<15.0 mg/dL      60 min visit spent in face to face discussion of goals of care,  symptom assessment, coordination of care and emotional support.    Angelina River, APRN, CNS, [facility]  Palliative Medicine  [facility] 9th Fl

## 2020-09-09 ENCOUNTER — TELEPHONE (OUTPATIENT)
Dept: TRANSPLANT | Facility: CLINIC | Age: 75
End: 2020-09-09

## 2020-09-09 RX ORDER — TRAMADOL HYDROCHLORIDE 100 MG/1
100 TABLET, EXTENDED RELEASE ORAL EVERY 12 HOURS
Qty: 60 TABLET | Refills: 0 | Status: SHIPPED | OUTPATIENT
Start: 2020-09-09 | End: 2020-09-15 | Stop reason: SDUPTHER

## 2020-09-09 NOTE — TELEPHONE ENCOUNTER
Patient called to report that she has shingles. She is being treated by a physician in [place]. She reports doing okay except for intense pain yesterday. Her doctor wants to do nerve block but she is unsure about doing this procedure with her conditions. Mrs. Smith says she feels better today.  Advised her that her physician can send a clearance request outlining the procedure to our office and the doctor will provide recommendations and sign off if appropriate. Mrs. Smith verbalized understanding.

## 2020-09-09 NOTE — TELEPHONE ENCOUNTER
----- Message from Ju Francis sent at 9/9/2020 12:53 PM CDT -----  Regarding: please call  Contact: Patient's  Dr. Smith  The pt's  is calling to speak with you regarding her care. Please call him back @ [phone]. Thanks, Ju

## 2020-09-15 RX ORDER — TRAMADOL HYDROCHLORIDE 100 MG/1
100 TABLET, EXTENDED RELEASE ORAL EVERY 12 HOURS
Qty: 60 TABLET | Refills: 0 | Status: SHIPPED | OUTPATIENT
Start: 2020-09-15 | End: 2020-10-15

## 2020-09-16 ENCOUNTER — TELEPHONE (OUTPATIENT)
Dept: PALLIATIVE MEDICINE | Facility: CLINIC | Age: 75
End: 2020-09-16

## 2020-09-16 NOTE — TELEPHONE ENCOUNTER
Patient called inquiring about Gabapentin dosage and wanting to know if she can increase dose. She reports taking 200mg twice daily. Confirmed with Provider per last note that she can take 200mg 3xday. She verbalizes understanding and is satisfied.

## 2020-09-21 ENCOUNTER — TELEPHONE (OUTPATIENT)
Dept: PALLIATIVE MEDICINE | Facility: CLINIC | Age: 75
End: 2020-09-21

## 2020-09-22 ENCOUNTER — CLINICAL SUPPORT (OUTPATIENT)
Dept: PALLIATIVE MEDICINE | Facility: CLINIC | Age: 75
End: 2020-09-22
Payer: MEDICARE

## 2020-09-22 DIAGNOSIS — R06.09 DYSPNEA ON EXERTION: ICD-10-CM

## 2020-09-22 DIAGNOSIS — M79.2 NEUROPATHIC PAIN: Primary | ICD-10-CM

## 2020-09-22 DIAGNOSIS — Z51.5 ENCOUNTER FOR PALLIATIVE CARE: ICD-10-CM

## 2020-09-22 DIAGNOSIS — R11.0 NAUSEA: ICD-10-CM

## 2020-09-22 PROCEDURE — 99443 PR PHYSICIAN TELEPHONE EVALUATION 21-30 MIN: ICD-10-PCS | Mod: 95,,, | Performed by: CLINICAL NURSE SPECIALIST

## 2020-09-22 PROCEDURE — 99443 PR PHYSICIAN TELEPHONE EVALUATION 21-30 MIN: CPT | Mod: 95,,, | Performed by: CLINICAL NURSE SPECIALIST

## 2020-09-22 RX ORDER — TRAMADOL HYDROCHLORIDE 50 MG/1
50 TABLET ORAL EVERY 6 HOURS PRN
Qty: 60 TABLET | Refills: 0 | Status: SHIPPED | OUTPATIENT
Start: 2020-09-22 | End: 2020-12-31 | Stop reason: SDUPTHER

## 2020-09-22 RX ORDER — METOCLOPRAMIDE 10 MG/1
10 TABLET ORAL
Qty: 100 TABLET | Refills: 1 | Status: SHIPPED | OUTPATIENT
Start: 2020-09-22 | End: 2022-05-17

## 2020-09-22 NOTE — PROGRESS NOTES
"Established Patient - Audio Only Telehealth Visit     The patient location is: [place]  The chief complaint leading to consultation is: PHTN/symptom management  Visit type: Virtual visit with audio only (telephone)  Total time spent with patient: 40 mins     The reason for the audio only service rather than synchronous audio and video virtual visit was related to technical difficulties or patient preference/necessity.     Each patient to whom I provide medical services by telemedicine is:  (1) informed of the relationship between the physician and patient and the respective role of any other health care provider with respect to management of the patient; and (2) notified that they may decline to receive medical services by telemedicine and may withdraw from such care at any time. Patient verbally consented to receive this service via voice-only telephone call.    [facility]  Palliative Medicine Clinic  Progress  Note    Patient Name: Molly Smith  MRN: [id number]  Referring Provider: No ref. provider found   Primary Care Physician: Roberto Braun MD    Assessment/Plan:     Diagnoses and all orders for this visit:    Neuropathic pain    Nausea    Dyspnea on exertion    Encounter for palliative care    Other orders  -     traMADoL (ULTRAM) 50 mg tablet; Take 1 tablet (50 mg total) by mouth every 6 (six) hours as needed for Pain (as needed for breakthrough pain).  -     metoclopramide HCl (REGLAN) 10 MG tablet; Take 1 tablet (10 mg total) by mouth 2 (two) times daily before meals.      Impression: 74 y.o. female long term patient of Dr. Tim who consulted palliative medicine service for increasing symptom burden /poor activity tolerance secondary to PHTN WHO I ( Remodulin) , chronic respiratory failure on Home O2 at 6L per N/C,congential heart disease. Patient developed post neuralgia neuropathic pain in late August.  See below:  Symptoms  1) Nociceptive pain described as "aching bones" " "secondary to PHTN therapy better controlled but has some back aching which she feels is "muscular"  2) Neuropatheic back pain described as stabbing, sharp, shocking improved 2/2 to post neuralgia /shingles.  Patient saw Dr. Kemal Khan, pain specialist in [place] for her shingles pain.  He order Ketoprofen/Amitriptylline/Gabapentin/Lidocaine/Prilocaine compounded cream for releif.  Patient feels this alomng with her Tramadol and Gabapentin increase has been effective.Tramadol 100mg daily in breakthrough doses.   Needs refill. Reviewed PNP.    3) Dyspnea, patient reports improved. Although not doing as much activity due to shingles.    4) Nausea- more difficulty today with Nausea.  Now after breakfast and dinner.  Patient only utilizing prn.  Will try scheduled bid trial Reglan.   Plan/ Recommendations    1) Nociceptive Pain-  Refilled Tramadol for breakthrough pain as it has been effective.  Will continue  Tramadol 50 mg q 6hrs prn breakthrough pain.  Tramadol 100mg q 12hrs effective  2) Post herpetic neuralgia  pain-  Gabapentin to 200mg po q 8hrs effective will continue  3) Nausea -  Increased will begin scheduled bid ac trial Reglan 10mg .   4) Dyspnea -controlled on current treatment plan  Will monitor  5) [facility]. Aditi Tillman RN please call Abdiel next week to check on her nausea response to Reglan trial  6) Please resend advance directives and [place] documents x 2 to patient's home address for their review.    Thank you for your consult. I will follow along with you. Please call [phone] with questions.     Subjective:     HPI: Patient is a 74 y.o. female long term patient of Dr. Tim who consulted palliative medicine service for increasing symptom burden and advance care planning discussion.  Patient is presenting with PHTN- WHO I- on IV Remodulin/home O2 @ 6L/NC, chronic respiratory failure, PAPVR, sinus venosus, ASD with R to L shunting, Congential PFO, with increasing poor activity " tolerance, tachycardia and palpitations now on increase of digoxin 0.125 mg from qod to daily.  Now patient has new onset of shingles as of 8/25/20 and on acyclovir with uncontrolled Post herpetic neuralgia pain    Review of Symptoms    Symptom Assessment (ESAS 0-10 Scale)  Pain:  2  Dyspnea:  0  Anxiety:  0  Nausea:  5  Depression:  0  Anorexia:  2  Fatigue:  5  Insomnia:  0  Restlessness:  0  Agitation:  0     CAM / Delirium:  Negative  Constipation:  Negative  Diarrhea:  Negative    Bowel Management Plan (BMP):  Yes      Pain Assessment:  Location(s): back and generalized    Back       Back Location:  Left, right and lower       Quality:  Stabbing, sharp and aching       Quantity:  2/10 in intensity       Chronicity:  month(s) ago, gradually improving       Aggravating Factors:  Movement       Alleviating Factors (medications):  Recumbency       Associated Symptoms:  None    Performance Status:  60    Living Arrangements:  Lives with spouse    Advance Care Planning   Advance Directives:   Living Will: No    [place]: No    Do Not Resuscitate Status: No    Medical Power of : No    Agent's Name:  Rufus Smith, Patient's      Decision Making:  Patient answered questions and Family answered questions    Past Medical History:   Diagnosis Date    Allergy     Anticoagulant long-term use     Arthritis     Heart murmur     Pneumonia     Pulmonary hypertension     Tachycardia      Past Surgical History:   Procedure Laterality Date    BACK SURGERY      HEMIARTHROPLASTY OF HIP Left 3/25/2019    Procedure: HEMIARTHROPLASTY, HIP - left;  Surgeon: Kemal Esposito MD;  [place] [facility] [place] [address] [place] [address];  Service: Orthopedics;  Laterality: Left;    REPLACEMENT OF DIALYSIS CATHETER OVER GUIDEWIRE THROUGH EXISTING VENOUS ACCESS N/A 1/17/2019    Procedure: REPLACEMENT, CATHETER, DIALYSIS, OVER GUIDEWIRE, USING EXISTING VENOUS ACCESS;  Surgeon: Phoenix Hand MD;  [place] [facility];  Service: Nephrology;   Laterality: N/A;     Family History   Problem Relation Age of Onset    Arthritis Mother     Arthritis Father     Diabetes Father     Heart disease Father     Hypertension Father      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    Multaq [dronedarone]        Medications:    Current Outpatient Medications:     amiodarone (PACERONE) 200 MG Tab, TAKE ONE TABLET BY MOUTH ONCE A DAY AS DIRECTED. THANK YOU!, Disp: 30 tablet, Rfl: 11    bosentan (TRACLEER) 125 MG Tab, Take by mouth every 12 (twelve) hours., Disp: , Rfl:     CALCIUM CARBONATE (CALCIUM 600 ORAL), Take 2 tablets by mouth once daily.  , Disp: , Rfl:     chlordiazepoxide-clidinium 5-2.5 mg (LIBRAX) 5-2.5 mg Cap, Take 1 capsule by mouth 3 (three) times daily with meals. TAKE TABLET AS PRN., Disp: , Rfl:     digoxin (LANOXIN) 125 mcg tablet, Take 1 tablet (125 mcg total) by mouth every other day., Disp: 15 tablet, Rfl: 11    docusate sodium (COLACE) 50 MG capsule, Take 1 capsule (50 mg total) by mouth daily as needed for Constipation., Disp: , Rfl: 0    ferrous gluconate (FERGON) 324 MG tablet, Take 1 tablet (324 mg total) by mouth daily with breakfast., Disp: , Rfl:     folic acid (FOLVITE) 1 MG tablet, Take 1 mg by mouth once daily., Disp: , Rfl:     furosemide (LASIX) 20 MG tablet, Take 1 tablet (20 mg total) by mouth 2 (two) times daily. (Patient taking differently: Take 40 mg by mouth once daily. ), Disp: 60 tablet, Rfl: 11    gabapentin (NEURONTIN) 100 MG capsule, Take 1 capsule (100 mg total) by mouth 2 (two) times daily. Take 1 tablet po daily at HS  x 7days .  Then begin 1 tablet po bid. (Patient taking differently: Take 200 mg by mouth 3 (three) times daily. Take 1 tablet po daily at HS  x 7days .  Then begin 1 tablet po bid.), Disp: 60 capsule, Rfl: 3    loratadine (CLARITIN) 10 mg tablet, Take 10 mg by mouth once daily., Disp: , Rfl:     metaxalone (SKELAXIN) 400 mg tablet, Take  1 tablet (400 mg total) by mouth 3 (three) times daily as needed., Disp: 21 tablet, Rfl: 0    metoclopramide HCl (REGLAN) 10 MG tablet, Take 1 tablet (10 mg total) by mouth 2 (two) times daily before meals., Disp: 100 tablet, Rfl: 1    ondansetron (ZOFRAN-ODT) 4 MG TbDL, , Disp: , Rfl: 6    riociguat (ADEMPAS) 0.5 mg Tab tablet, Take 1 tablet (0.5 mg total) by mouth 3 (three) times daily., Disp: , Rfl:     spironolactone (ALDACTONE) 25 MG tablet, Take 25 mg by mouth once daily.  , Disp: , Rfl:     traMADoL (ULTRAM) 50 mg tablet, Take 1 tablet (50 mg total) by mouth every 6 (six) hours as needed for Pain (as needed for breakthrough pain)., Disp: 60 tablet, Rfl: 0    traMADoL (ULTRAM-ER) 100 MG Tb24, Take 1 tablet (100 mg total) by mouth every 12 (twelve) hours., Disp: 60 tablet, Rfl: 0    TREPROSTINIL SODIUM (TREPROSTINIL, REMODULIN, PUMP FOR HOME USE), Pt currently on 89.2 ng/kg/min=45 mL/day dosing weight 70.05 kg, Disp: 60 mL, Rfl: 11    warfarin (COUMADIN) 10 MG tablet, Take 1 tablet (10 mg total) by mouth Daily. Take 1 tablet (10mg) by mouth daily, except a half tablet (5mg) every Tues, Thurs, and Saturdays, Disp: 90 tablet, Rfl: 1      Review of Systems  Objective:     There were no vitals filed for this visit.      Physical Exam    Laboratory:   CBC:   WBC   Date Value Ref Range Status   03/06/2020 4.39 3.90 - 12.70 K/uL Final     RBC   Date Value Ref Range Status   03/06/2020 4.90 4.00 - 5.40 M/uL Final     Hemoglobin   Date Value Ref Range Status   03/06/2020 13.0 12.0 - 16.0 g/dL Final     Hematocrit   Date Value Ref Range Status   03/06/2020 42.6 37.0 - 48.5 % Final     Platelets   Date Value Ref Range Status   03/06/2020 273 150 - 350 K/uL Final     Mean Corpuscular Volume   Date Value Ref Range Status   03/06/2020 87 82 - 98 fL Final     Mean Corpuscular Hemoglobin   Date Value Ref Range Status   03/06/2020 26.5 (L) 27.0 - 31.0 pg Final     Mean Corpuscular Hemoglobin Conc   Date Value Ref Range  "Status   03/06/2020 30.5 (L) 32.0 - 36.0 g/dL Final     CMP:   Glucose   Date Value Ref Range Status   03/06/2020 79 70 - 110 mg/dL Final     Calcium   Date Value Ref Range Status   03/06/2020 9.1 8.7 - 10.5 mg/dL Final     Albumin   Date Value Ref Range Status   03/06/2020 4.1 3.5 - 5.2 g/dL Final     Total Protein   Date Value Ref Range Status   03/06/2020 7.5 6.0 - 8.4 g/dL Final     Sodium   Date Value Ref Range Status   03/06/2020 138 136 - 145 mmol/L Final     Potassium   Date Value Ref Range Status   03/06/2020 4.1 3.5 - 5.1 mmol/L Final     CO2   Date Value Ref Range Status   03/06/2020 25 23 - 29 mmol/L Final     Chloride   Date Value Ref Range Status   03/06/2020 105 95 - 110 mmol/L Final     BUN, Bld   Date Value Ref Range Status   03/06/2020 20 8 - 23 mg/dL Final     Creatinine   Date Value Ref Range Status   03/06/2020 1.2 0.5 - 1.4 mg/dL Final     BNP   Date Value Ref Range Status   03/06/2020 187 (H) 0 - 99 pg/mL Final     Comment:     Values of less than 100 pg/ml are consistent with non-CHF populations.     Coumadin Monitoring INR   Date Value Ref Range Status   03/16/2005 2.4 (H) 0.8 - 1.2 Final     Comment:     Please note updated guidelines effective 11/16/02:  ACCP Guidelline for Coumadin usage recommends a "target range" of  2.0 - 3.0 for INR for all indicators except mechanical heart valves  and antiphospholipid syndromes which should use 2.5 - 3.5.  .  Please note updated reference range effective 12/06/04.       INR   Date Value Ref Range Status   07/18/2019 1.5 (H) 0.8 - 1.2 Final     Comment:     Coumadin Therapy:  2.0 - 3.0 for INR for all indicators except mechanical heart valves  and antiphospholipid syndromes which should use 2.5 - 3.5.       Alkaline Phosphatase   Date Value Ref Range Status   03/06/2020 62 55 - 135 U/L Final     ALT   Date Value Ref Range Status   03/06/2020 11 10 - 44 U/L Final     AST   Date Value Ref Range Status   03/06/2020 15 10 - 40 U/L Final     Total " Bilirubin   Date Value Ref Range Status   03/06/2020 1.0 0.1 - 1.0 mg/dL Final     Comment:     For infants and newborns, interpretation of results should be based  on gestational age, weight and in agreement with clinical  observations.  Premature Infant recommended reference ranges:  Up to 24 hours.............<8.0 mg/dL  Up to 48 hours............<12.0 mg/dL  3-5 days..................<15.0 mg/dL  6-29 days.................<15.0 mg/dL       30 min visit spent in chart review,  symptom assessment, coordination of care and emotional support.    Angelina River, APRN, CNS, [facility]  Palliative Medicine  [facility] 9th Fl      This service was not originating from a related E/M service provided within the previous 7 days nor will  to an E/M service or procedure within the next 24 hours or my soonest available appointment.  Prevailing standard of care was able to be met in this audio-only visit.

## 2020-09-29 DIAGNOSIS — Z79.899 POLYPHARMACY: ICD-10-CM

## 2020-09-29 DIAGNOSIS — R06.82 TACHYPNEA: Primary | ICD-10-CM

## 2020-10-01 ENCOUNTER — TELEPHONE (OUTPATIENT)
Dept: TRANSPLANT | Facility: CLINIC | Age: 75
End: 2020-10-01

## 2020-10-01 NOTE — TELEPHONE ENCOUNTER
Will do override for [facility] portal this month, as patient continues to suffer from tremendous debility (shingles and lumbar fractures).   ----------------    Ill be at [facility] early December. At least by then Ill get them     Sent from my iPhone    > On Oct 1, 2020, at 10:59 AM, Albertina Ruffin <[email or web address]> wrote:  >   > ?Hi-  > Yes, this should be fine. Do you think you'll be able to get a set of labs, at some point in the future?  > Take care,  > Albertina   >   > -----Original Message-----  > From: Abdiel Gilbert <[email or web address]>   > Sent: Thursday, October 01, 2020 9:51 AM  > To: Albertina Ruffin [email or web address]  > Subject:   >   > THIS EMAIL IS FROM AN EXTERNAL SENDER!  > DO YOU TRUST THIS EMAIL?  > If you are unsure, remember to use the Report Suspicious Email button in [facility] to send to [employer] for review. DO NOT click any links and NEVER input your username and password.  >   > Vasile Eng  > Can you send an override for bloodwork to [facility] so they send my meds Sent from my iPhone

## 2020-10-06 ENCOUNTER — TELEPHONE (OUTPATIENT)
Dept: PALLIATIVE MEDICINE | Facility: CLINIC | Age: 75
End: 2020-10-06

## 2020-10-07 ENCOUNTER — CLINICAL SUPPORT (OUTPATIENT)
Dept: PALLIATIVE MEDICINE | Facility: CLINIC | Age: 75
End: 2020-10-07
Payer: MEDICARE

## 2020-10-07 DIAGNOSIS — M79.2 NEUROPATHIC PAIN: ICD-10-CM

## 2020-10-07 DIAGNOSIS — R06.09 DYSPNEA ON EXERTION: Primary | ICD-10-CM

## 2020-10-07 DIAGNOSIS — R11.0 NAUSEA: ICD-10-CM

## 2020-10-07 DIAGNOSIS — Z51.5 ENCOUNTER FOR PALLIATIVE CARE: ICD-10-CM

## 2020-10-07 PROCEDURE — 99443 PR PHYSICIAN TELEPHONE EVALUATION 21-30 MIN: ICD-10-PCS | Mod: 95,,, | Performed by: CLINICAL NURSE SPECIALIST

## 2020-10-07 PROCEDURE — 99443 PR PHYSICIAN TELEPHONE EVALUATION 21-30 MIN: CPT | Mod: 95,,, | Performed by: CLINICAL NURSE SPECIALIST

## 2020-10-08 NOTE — PROGRESS NOTES
Established Patient - Audio Only Telehealth Visit     The patient location is: [place], [place]  The chief complaint leading to consultation is: PHTN/symptom mgm  Visit type: Virtual visit with audio only (telephone)  Total time spent with patient: 40     The reason for the audio only service rather than synchronous audio and video virtual visit was related to technical difficulties or patient preference/necessity.     Each patient to whom I provide medical services by telemedicine is:  (1) informed of the relationship between the physician and patient and the respective role of any other health care provider with respect to management of the patient; and (2) notified that they may decline to receive medical services by telemedicine and may withdraw from such care at any time. Patient verbally consented to receive this service via voice-only telephone call.       HPI: Patient is a 74 y.o. female long term patient of Dr. Tim who consulted palliative medicine service for increasing symptom burden and advance care planning discussion.  Patient is presenting with PHTN- WHO I- on IV Remodulin/home O2 @ 6L/NC, chronic respiratory failure, PAPVR, sinus venosus, ASD with R to L shunting, Congential PFO, with increasing poor activity tolerance, tachycardia and palpitations now on increase of digoxin 0.125 mg from qod to daily.  Now patient has new onset of shingles as of 8/25/20 with controlled Post herpetic neuralgia pain.  Mid September patient developed spinal compression fx x2 and received pain intervention treatment in [place].        Assessment and Plan:   Impression: 74 y.o. female long term patient of Dr. Tim who consulted palliative medicine service for increasing symptom burden /poor activity tolerance secondary to PHTN WHO I ( Remodulin) , chronic respiratory failure on Home O2 at 6L per N/C,congential heart disease. Patient developed post neuralgia neuropathic pain in late August and spinal compression fx x2  "in mid Septmeber.  See below:  Symptoms  1) Nociceptive pain described as "aching bones" secondary to PHTN therapy controlled with tramadol and gabapentin  2) Neuropatheic back pain-Patient saw Dr. Kemal Khan, pain specialist in [place] due to increase in back pain which seemed unrelated to her other pain issues.  patient dx with compression fx x 2 treated with spinal injections and trigger point therapy which has greatly improved.  Patient is currently being followed by Dr. Khan.  May begin PT.   3)Post herpetic neuralgia  pain-  Patient reported by telephone "shakes" on 200mg tid dose of Gabapentin.  Reduced  to 100 mg po q 8hrs about 1 week ago pt reports shakes have resolved.  Pain resolved.   Will continue.  4) Dyspnea, patient reports improved. Although not doing as much activity due to shingles.    5) Nausea- improved on Reglan.before breakfast and dinner.      Plan/ Recommendations    1) Nociceptive Pain-  no longer requiring Tramadol for breakthrough pain.  Will continue Tramadol 50 mg q 6hrs prn breakthrough pain as patient has vacilliating pain needs.  Tramadol 100mg q 12hrs effective  2) Post herpetic neuralgia  pain-  Patient reported by telephone "shakes" on 200mg tid dose of Gabapentin.  Reduced  to 100 mg po q 8hrs about 1 week ago pt reports shakes have resolved.  Pain resolved Will continue.  3) Nausea -  scheduled bid ac trial Reglan 10mg . Patient reports nausea has improved.  Will continue  4) Dyspnea -controlled on current treatment plan  Will monitor  5) F[facility]U in [facility] 4 weeks with Dr. Conte.  6) Please resend advance directives and [place] documents x 2 to patient's home address for their review.     This service was not originating from a related E/M service provided within the previous 7 days nor will  to an E/M service or procedure within the next 24 hours or my soonest available appointment.  Prevailing standard of care was able to be met in this audio-only visit.      Angelina" Perico BERNAL, ACNS-BC, ACHPN

## 2020-10-12 ENCOUNTER — TELEPHONE (OUTPATIENT)
Dept: TRANSPLANT | Facility: CLINIC | Age: 75
End: 2020-10-12

## 2020-10-12 NOTE — TELEPHONE ENCOUNTER
Thank you    Sent from my iPhone    > On Oct 12, 2020, at 8:35 AM, Albertina Ruffin [email or web address] wrote:  >   > ?Hi-  >   > We have patients who are certainly going out more, and some are even returning to the workplace. There is honestly no specific guideline regarding staying in or out from our providers. They recognize that COVID is something that is probably not going away anytime soon, so we continue to recommend always masking, practicing social distancing, and frequent hand washing. Of course, it is still advisable to avoid larger crowds of people, especially if they are not adhering to masking/distancing.  >   > Take care,  > Albertina   >   > -----Original Message-----  > From: Abdiel Gilbert <[email or web address]>   > Sent: Sunday, October 11, 2020 7:02 PM  > To: Albertina Ruffin [email or web address]  > Subject:   >   > THIS EMAIL IS FROM AN EXTERNAL SENDER!  > DO YOU TRUST THIS EMAIL?  > If you are unsure, remember to use the Report Suspicious Email button in [place] to send to [employer] for review. DO NOT click any links and NEVER input your username and password.  >   > Vasile Eng   > How are the docs feeling about us going out a little more?

## 2020-10-15 RX ORDER — TRAMADOL HYDROCHLORIDE 100 MG/1
TABLET, EXTENDED RELEASE ORAL
Qty: 60 TABLET | Refills: 0 | Status: SHIPPED | OUTPATIENT
Start: 2020-10-15 | End: 2020-10-15 | Stop reason: SDUPTHER

## 2020-10-15 RX ORDER — TRAMADOL HYDROCHLORIDE 100 MG/1
TABLET, EXTENDED RELEASE ORAL
Qty: 60 TABLET | Refills: 0 | Status: SHIPPED | OUTPATIENT
Start: 2020-10-15 | End: 2020-11-09 | Stop reason: SDUPTHER

## 2020-11-05 ENCOUNTER — OFFICE VISIT (OUTPATIENT)
Dept: PALLIATIVE MEDICINE | Facility: CLINIC | Age: 75
End: 2020-11-05
Payer: MEDICARE

## 2020-11-05 DIAGNOSIS — Z51.5 ENCOUNTER FOR PALLIATIVE CARE: ICD-10-CM

## 2020-11-05 DIAGNOSIS — B02.29 POST HERPETIC NEURALGIA: ICD-10-CM

## 2020-11-05 DIAGNOSIS — R06.09 DYSPNEA ON EXERTION: ICD-10-CM

## 2020-11-05 DIAGNOSIS — R11.0 NAUSEA: ICD-10-CM

## 2020-11-05 DIAGNOSIS — I27.9 CHRONIC PULMONARY HEART DISEASE: Primary | ICD-10-CM

## 2020-11-05 DIAGNOSIS — M79.2 NEUROPATHIC PAIN: ICD-10-CM

## 2020-11-05 PROCEDURE — 99443 PR PHYSICIAN TELEPHONE EVALUATION 21-30 MIN: CPT | Mod: 95,,, | Performed by: STUDENT IN AN ORGANIZED HEALTH CARE EDUCATION/TRAINING PROGRAM

## 2020-11-05 PROCEDURE — 99443 PR PHYSICIAN TELEPHONE EVALUATION 21-30 MIN: ICD-10-PCS | Mod: 95,,, | Performed by: STUDENT IN AN ORGANIZED HEALTH CARE EDUCATION/TRAINING PROGRAM

## 2020-11-05 NOTE — PROGRESS NOTES
Established Patient - Audio Only Telehealth Visit     The patient location is: [facility]  The chief complaint leading to consultation is: pHTN dyspnea  Visit type: Virtual visit with audio only (telephone)  Total time spent with patient: 30min       The reason for the audio only service rather than synchronous audio and video virtual visit was related to technical difficulties or patient preference/necessity.     Each patient to whom I provide medical services by telemedicine is:  (1) informed of the relationship between the physician and patient and the respective role of any other health care provider with respect to management of the patient; and (2) notified that they may decline to receive medical services by telemedicine and may withdraw from such care at any time. Patient verbally consented to receive this service via voice-only telephone call.            Consult Note  [facility]      Consult Requested By: No ref. provider found  Dr. Tim    Primary Care Physician: Roberto Braun MD    Reason for Consult: symptom management      ASSESSMENT/PLAN:     Plan/Recommendations:  Molly was seen today for shortness of breath.    Diagnoses and all orders for this visit:    Chronic pulmonary heart disease  - Followed by cardiology       Dyspnea on exertion  - Doing well on O2    Nausea  -Taking reglan with meals    Neuropathic pain/ Post herpetic neuralgia  -Gabapentin 100mg TID, may increase nighttime dose to 200mg       Back pain  -Tramadol 100mg BID  -Take Tylenol PRN up to 1g TID if this doesn't work will refill breakthrough Tramadol 50mg       Will discuss ACP on next visit when she will be coming in person 12/11    SUBJECTIVE:     History obtained from: patient and      complaint:   Chief Complaint   Patient presents with    Shortness of Breath         History of Present Illness:  Mrs. Molly Smith  is 75 y.o. year old female presenting with dyspnea and pain.  Referred to  Palliative Care for evaluation and management of physical symptoms, advance care planning, and clarification of goals of care. She attended the appointment with her .      Interval History:  Doing ok, they are moving as they want to downsize, stating that it is a lot of work.    Dyspnea is manageable, she is able to walk around the house    Pain - back pain she continues to have pain in one spot in her back, she is following with pain management Dr Kemal Khan, she had a recent spinal injection and is waiting to experience the relief.  said that back pain is likely from compression fractures. They have imaging pending Also has Bone pain and Post herpetic neuralgia pain Taking Tramadol 100mg q 12h, not taking Tramadol 50mg. Taking Gabapentin 100mg TID. Sometimes tries with Tylenol 500mg     Nausea - continues to require Reglan before meals, however is controlled with that       Disease History:  PHTN WHO I ( Remodulin) , chronic respiratory failure on Home O2 at 6L per N/C,congential heart disease.   Patient developed post neuralgia neuropathic pain in late August/2020   Spinal compression fx x2 in mid September/2020.    Previous experience or exposure to a serious illness:    Past Medical History:   Diagnosis Date    Allergy     Anticoagulant long-term use     Arthritis     Heart murmur     Pneumonia     Pulmonary hypertension     Tachycardia      Past Surgical History:   Procedure Laterality Date    BACK SURGERY      HEMIARTHROPLASTY OF HIP Left 3/25/2019    Procedure: HEMIARTHROPLASTY, HIP - left;  Surgeon: Kemal Esposito MD;  Location: [facility] OR [address] [place] [address];  Service: Orthopedics;  Laterality: Left;    REPLACEMENT OF DIALYSIS CATHETER OVER GUIDEWIRE THROUGH EXISTING VENOUS ACCESS N/A 1/17/2019    Procedure: REPLACEMENT, CATHETER, DIALYSIS, OVER GUIDEWIRE, USING EXISTING VENOUS ACCESS;  Surgeon: Phoenix Hand MD;  Location: [facility] CATH LAB;  Service: Nephrology;  Laterality: N/A;      Family History   Problem Relation Age of Onset    Arthritis Mother     Arthritis Father     Diabetes Father     Heart disease Father     Hypertension Father      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    Multaq [dronedarone]        Medications:    Current Outpatient Medications:     amiodarone (PACERONE) 200 MG Tab, TAKE ONE TABLET BY MOUTH ONCE A DAY AS DIRECTED. THANK YOU!, Disp: 30 tablet, Rfl: 11    bosentan (TRACLEER) 125 MG Tab, Take by mouth every 12 (twelve) hours., Disp: , Rfl:     CALCIUM CARBONATE (CALCIUM 600 ORAL), Take 2 tablets by mouth once daily.  , Disp: , Rfl:     chlordiazepoxide-clidinium 5-2.5 mg (LIBRAX) 5-2.5 mg Cap, Take 1 capsule by mouth 3 (three) times daily with meals. TAKE TABLET AS PRN., Disp: , Rfl:     digoxin (LANOXIN) 125 mcg tablet, Take 1 tablet (125 mcg total) by mouth every other day., Disp: 15 tablet, Rfl: 11    docusate sodium (COLACE) 50 MG capsule, Take 1 capsule (50 mg total) by mouth daily as needed for Constipation., Disp: , Rfl: 0    ferrous gluconate (FERGON) 324 MG tablet, Take 1 tablet (324 mg total) by mouth daily with breakfast., Disp: , Rfl:     folic acid (FOLVITE) 1 MG tablet, Take 1 mg by mouth once daily., Disp: , Rfl:     furosemide (LASIX) 20 MG tablet, Take 1 tablet (20 mg total) by mouth 2 (two) times daily. (Patient taking differently: Take 40 mg by mouth once daily. ), Disp: 60 tablet, Rfl: 11    gabapentin (NEURONTIN) 100 MG capsule, Take 1 capsule (100 mg total) by mouth 2 (two) times daily. Take 1 tablet po daily at HS  x 7days .  Then begin 1 tablet po bid. (Patient taking differently: Take 100 mg by mouth 3 (three) times daily. ), Disp: 60 capsule, Rfl: 3    loratadine (CLARITIN) 10 mg tablet, Take 10 mg by mouth once daily., Disp: , Rfl:     metoclopramide HCl (REGLAN) 10 MG tablet, Take 1 tablet (10 mg total) by mouth 2 (two) times daily before meals., Disp: 100  tablet, Rfl: 1    ondansetron (ZOFRAN-ODT) 4 MG TbDL, , Disp: , Rfl: 6    riociguat (ADEMPAS) 0.5 mg Tab tablet, Take 1 tablet (0.5 mg total) by mouth 3 (three) times daily., Disp: , Rfl:     spironolactone (ALDACTONE) 25 MG tablet, Take 25 mg by mouth once daily.  , Disp: , Rfl:     traMADoL (ULTRAM) 50 mg tablet, Take 1 tablet (50 mg total) by mouth every 6 (six) hours as needed for Pain (as needed for breakthrough pain)., Disp: 60 tablet, Rfl: 0    traMADoL (ULTRAM-ER) 100 MG Tb24, TAKE ONE TABLET BY MOUTH EVERY TWELVE HOURS AS DIRECTED. THANK YOU!, Disp: 60 tablet, Rfl: 0    TREPROSTINIL SODIUM (TREPROSTINIL, REMODULIN, PUMP FOR HOME USE), Pt currently on 89.2 ng/kg/min=45 mL/day dosing weight 70.05 kg, Disp: 60 mL, Rfl: 11    warfarin (COUMADIN) 10 MG tablet, Take 1 tablet (10 mg total) by mouth Daily. Take 1 tablet (10mg) by mouth daily, except a half tablet (5mg) every Tues, Thurs, and Saturdays, Disp: 90 tablet, Rfl: 1     database queried on 11/05/2020  by Sonia Kelly . The results reviewed and considered with the clinical data in the decision whether or not to prescribe a controlled substance.        OBJECTIVE:       ROS:  Review of Systems   Constitutional: Positive for fatigue. Negative for activity change, appetite change, fever and unexpected weight change.   HENT: Negative for hearing loss, sore throat and trouble swallowing.    Eyes: Negative for photophobia, pain and visual disturbance.   Respiratory: Positive for shortness of breath. Negative for cough and wheezing.    Cardiovascular: Negative for chest pain and leg swelling.   Gastrointestinal: Positive for nausea. Negative for abdominal pain, constipation, diarrhea, vomiting and reflux.   Genitourinary: Negative for dysuria and urgency.   Musculoskeletal: Positive for back pain. Negative for gait problem, leg pain and myalgias.   Neurological: Negative for light-headedness, headaches and memory loss.   Psychiatric/Behavioral:  Negative for behavioral problems and confusion. The patient is not nervous/anxious.          Review of Symptoms    Symptom Assessment (ESAS 0-10 Scale)  Pain:  4  Dyspnea:  3  Anxiety:  0  Nausea:  3  Depression:  0  Anorexia:  0  Fatigue:  5  Insomnia:  0  Restlessness:  0  Agitation:  0     Constipation:  Negative  Diarrhea:  Negative          Advance Care Planning   Advance Care Planning            Physical Exam: Unable to perform audio only visit  Vitals:    Physical Exam      Labs:  CBC:   WBC   Date Value Ref Range Status   03/06/2020 4.39 3.90 - 12.70 K/uL Final       Hemoglobin   Date Value Ref Range Status   03/06/2020 13.0 12.0 - 16.0 g/dL Final       Hematocrit   Date Value Ref Range Status   03/06/2020 42.6 37.0 - 48.5 % Final       MCV   Date Value Ref Range Status   03/06/2020 87 82 - 98 fL Final       Platelets   Date Value Ref Range Status   03/06/2020 273 150 - 350 K/uL Final           LFT:   Lab Results   Component Value Date    AST 15 03/06/2020    ALKPHOS 62 03/06/2020    BILITOT 1.0 03/06/2020       Albumin:   Albumin   Date Value Ref Range Status   03/06/2020 4.1 3.5 - 5.2 g/dL Final     Protein:   Total Protein   Date Value Ref Range Status   03/06/2020 7.5 6.0 - 8.4 g/dL Final       Radiology:I have reviewed all pertinent imaging results/findings within the past 24 hours.        Encounter occurred during period of COVID-19 emergency. Encounter performed under the concurrent guidelines, limitations and protocols.      Signature: Sonia Kelly MD                 This service was not originating from a related E/M service provided within the previous 7 days nor will  to an E/M service or procedure within the next 24 hours or my soonest available appointment.  Prevailing standard of care was able to be met in this audio-only visit.

## 2020-11-09 ENCOUNTER — TELEPHONE (OUTPATIENT)
Dept: PALLIATIVE MEDICINE | Facility: CLINIC | Age: 75
End: 2020-11-09

## 2020-11-09 DIAGNOSIS — R52 PAIN: Primary | ICD-10-CM

## 2020-11-09 RX ORDER — TRAMADOL HYDROCHLORIDE 100 MG/1
TABLET, EXTENDED RELEASE ORAL
Qty: 60 TABLET | Refills: 0 | Status: SHIPPED | OUTPATIENT
Start: 2020-11-09 | End: 2020-12-11

## 2020-11-09 NOTE — TELEPHONE ENCOUNTER
Pharmacy called requesting a refill for Tramadol.     Called patient to clarify what strength,  Patient says she is doing well with the Trial of Tylenol TID in addition to Tramadol Er 100mg BID, she does not need Tramadol 50mg.   Will send refill of Tramadol 100.

## 2020-11-30 ENCOUNTER — TELEPHONE (OUTPATIENT)
Dept: TRANSPLANT | Facility: CLINIC | Age: 75
End: 2020-11-30

## 2020-11-30 DIAGNOSIS — I27.9 CHRONIC PULMONARY HEART DISEASE: Primary | ICD-10-CM

## 2020-11-30 NOTE — TELEPHONE ENCOUNTER
"Spoke w/patient this AM, regarding upcoming appointments. Patient and her  report Ms Smith has a "left groin lump and fullness in left rib cage." This issues has been going on for "quite a while." She wonders how this issue should best be addressed, and reports these things were revealed w/recent CT scan. She notes she is also going to have a bone scan, which was recommended after the CT scan. Patient has upcoming appointments w/Dr Gusman and Dr Kelly w/ palliative medicine on 12/11/20. Recommended keeping these appointments, and further referral to other providers might be recommended during those visits. MDs updated.   "

## 2020-12-01 ENCOUNTER — PATIENT MESSAGE (OUTPATIENT)
Dept: TRANSPLANT | Facility: CLINIC | Age: 75
End: 2020-12-01

## 2020-12-03 ENCOUNTER — TELEPHONE (OUTPATIENT)
Dept: TRANSPLANT | Facility: CLINIC | Age: 75
End: 2020-12-03

## 2020-12-03 NOTE — TELEPHONE ENCOUNTER
"Returned call to Ms Smith and spouse. Both were concerned regarding patient's upcoming appointments, and that they are on different days. Explained that appt with Dr Conte is virtual, and patient agreed that schedule was ok. Patient and  report that only Mr Smith, who is a physician, has evaluated/seen patient's recent "groin lump" and "swelling in left ribcage." Advised patient and  that per Dr Gusman, she should be seen and evaluated for same. Provided number for [facility], as patient reports she does not have PCP at home. All questions/concerns addressed to patient and  satisfaction.   "

## 2020-12-11 ENCOUNTER — HOSPITAL ENCOUNTER (OUTPATIENT)
Dept: PULMONOLOGY | Facility: CLINIC | Age: 75
Discharge: HOME | End: 2020-12-11
Payer: MEDICARE

## 2020-12-11 ENCOUNTER — OFFICE VISIT (OUTPATIENT)
Dept: TRANSPLANT | Facility: CLINIC | Age: 75
End: 2020-12-11
Payer: MEDICARE

## 2020-12-11 VITALS — HEIGHT: 64 IN | BODY MASS INDEX: 20.14 KG/M2 | WEIGHT: 118 LBS

## 2020-12-11 VITALS
BODY MASS INDEX: 21.48 KG/M2 | WEIGHT: 121.25 LBS | HEART RATE: 85 BPM | DIASTOLIC BLOOD PRESSURE: 57 MMHG | SYSTOLIC BLOOD PRESSURE: 106 MMHG | HEIGHT: 63 IN

## 2020-12-11 DIAGNOSIS — I27.21 PAH (PULMONARY ARTERY HYPERTENSION): ICD-10-CM

## 2020-12-11 DIAGNOSIS — Z79.899 LONG TERM CURRENT USE OF AMIODARONE: ICD-10-CM

## 2020-12-11 DIAGNOSIS — I27.9 CHRONIC PULMONARY HEART DISEASE: Primary | ICD-10-CM

## 2020-12-11 DIAGNOSIS — I27.9 CHRONIC PULMONARY HEART DISEASE: ICD-10-CM

## 2020-12-11 DIAGNOSIS — J96.11 CHRONIC RESPIRATORY FAILURE WITH HYPOXIA: ICD-10-CM

## 2020-12-11 PROCEDURE — 94618 PULMONARY STRESS TESTING: CPT | Mod: 26,S$PBB,, | Performed by: INTERNAL MEDICINE

## 2020-12-11 PROCEDURE — 99999 PR PBB SHADOW E&M-EST. PATIENT-LVL IV: ICD-10-PCS | Mod: PBBFAC,,, | Performed by: INTERNAL MEDICINE

## 2020-12-11 PROCEDURE — 99214 OFFICE O/P EST MOD 30 MIN: CPT | Mod: S$PBB,,, | Performed by: INTERNAL MEDICINE

## 2020-12-11 PROCEDURE — 99214 PR OFFICE/OUTPT VISIT, EST, LEVL IV, 30-39 MIN: ICD-10-PCS | Mod: S$PBB,,, | Performed by: INTERNAL MEDICINE

## 2020-12-11 PROCEDURE — 94618 PULMONARY STRESS TESTING: CPT | Mod: PBBFAC | Performed by: INTERNAL MEDICINE

## 2020-12-11 PROCEDURE — 99214 OFFICE O/P EST MOD 30 MIN: CPT | Mod: PBBFAC,25 | Performed by: INTERNAL MEDICINE

## 2020-12-11 PROCEDURE — 99999 PR PBB SHADOW E&M-EST. PATIENT-LVL IV: CPT | Mod: PBBFAC,,, | Performed by: INTERNAL MEDICINE

## 2020-12-11 PROCEDURE — 94618 PULMONARY STRESS TESTING: ICD-10-PCS | Mod: 26,S$PBB,, | Performed by: INTERNAL MEDICINE

## 2020-12-11 NOTE — PROGRESS NOTES
Subjective:      HPI  Delightful 75 y.o. 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 She also has h/o Rosemonas bacteremia s/p central line removal, and IV abx per ID with replacement by nephrology.  Pt was admitted 10/4-10/9 with worsening fatigue, diarrhea since the morning of 10/4/17, nausea and low grade temperature at home (100.4 being the highest). Also had a single event of hypoxia (SpO2 50s) that resolved without intervention. During that hospitalization she was placed on 100% FiO2 via HFNC on admit secondary to hypoxia. Blood cultures were drawn and remodulin was moved to peripheral line. Blood cultures were drawn from [facility] as well. ID was consulted. He symptoms improved gradually throughout hospitalization and she was eventually weaned back to low flow NC. Cultures remained negative and remodulin was moved back to central access. They attributed fevers/hypoxia to possible viral infection. She ws eventually weaned down to 4L NC and discharged home in good condition. She was then admitted at [facility] 11/29/17 with hypotension w severe vomiting, fever 101.9, chills and diarrhea- she was over hydrated in the ER, GI w/u was (-), had recently increased her remodulin- was discharged in time to go to her son's wedding-  pt was then admitted 2/28-3/5 with  increased SOB and O2 requirements in setting of prolonged episode of A tach with associated neck pressure/pain. Troponin was positive and EKG had nonspecific ST changes. Pt was loaded with ASA and Plavix and started on Heparin bridge. Interventional Cards consulted for NSTEMI/LHC, but LHC 3/1/18 showed normal coronary arteries.  EP was consulted for A Tach.  There are very limited options in this patient-  previously discussed tikosyn but would avoid given baseline Qt. Not a candidate for sotalol. Given that pt has had ongoing intermittent atrial tach and does not tolerate it, will try amio in place of cardizem. Pt had no further episodes on tele. Pt will need monitoring of PFTs, TFTs, and LFTs while on Amio.   Pt was continued on home dose of IV Remodulin. We attempted to wean off Adempas, but with continued increasing oxygen requirements, restarted Adempas and this improved so she will continue on this for now. Also of note, pt has been taking bosentan TID for years, when this should be a BID dosed med. So we will have her change bosentan to BID dosing on d/c.      Patient is having some more fatigue since her last visit, here with her . She denies any AE from her meds, states they are well controlled overall. Denies any bleeding symptoms. Patient denies N/V/F/C, lightheadedness, dizziness, PND, orthopnea, LE edema, abdominal pain, abdominal pressure, chest pain, chest pressure, syncope, pre-syncope. Neuropathy is there but stable. Had some discussion about goals of care, they are interested in staying out of hospital and trying to keep quality of life as well as possible. From  does not seem they are interested in aggressive measures, they understand the disease process.     Remodulin is at 89.2 ng/kg/min (unchanged from Flowsheet) and on Tracleer, adempas 0.5mg tid    6MWT 12/11/2020:  12/11/2020---------Distance: 171.3 meters (562 feet)      O2 Sat % Supplemental Oxygen Heart Rate Blood Pressure Roberto   Scale   Pre-exercise   (Resting) 89 % 6 L/M 82 bpm 107/53 mmHg 2   During Exercise 80 % 6 L/M 96 bpm 119/57 mmHg 3   Post-exercise   (Recovery) 90 % 6 L/M  87 bpm       Recovery Time: 152 seconds     Performing nurse/tech: MARIELLE Martinez      6MWT last visit as her resting sat was 85%  162m (   206. 5m (223m (unchanged from August) 231m (195m (236m Abdiel,  225m, 283m May, 222m Mar, 375m Nov, 305 Sept, 274.5 July,  335.5May, 365  m prev 2 visits)                        O2 sat  85->82% 6L pull tank                                                          HR 79-->98                           BP  106/ 56  ->116/55                                                      Roberto 2 ->3    TTE today  · Normal left ventricular systolic function. The estimated ejection fraction is 60%.   RV 5.7 TAPSE 1.2  · Grade I (mild) left ventricular diastolic dysfunction consistent with impaired relaxation.  · Septal wall has abnormal motion. Systolic flattening of the interventricular septum consistent with right ventricle pressure overload.  · Moderately to severely reduced right ventricular systolic function.  · Severe right ventricular enlargement.  · There is right ventricular hypertrophy.  · Biatrial enlargement.  · Severe tricuspid regurgitation.  · The estimated PA systolic pressure is 97 mmHg.  · Pulmonary hypertension present.  · Normal central venous pressure (3 mmHg).  · Trivial circumferential pericardial effusion.  ·   TTE today 9/19  · Normal left ventricular systolic function. The estimated ejection fraction is 60%, Small and underfilled LV  · Septal wall has abnormal motion. Systolic flattening of the interventricular septum consistent with right ventricle pressure overload.  RV 6.0cm, TAPSE 1.4  · Biatrial enlargement.  · Grade II (moderate) left ventricular diastolic dysfunction consistent with pseudonormalization.  · Elevated left atrial pressure.  · Interatrial septum bows to left, consider elevated right atrial pressure.  · Moderately to severely reduced right ventricular systolic function.  · Severe right ventricular enlargement.  · There is right ventricular hypertrophy.  · Severe tricuspid regurgitation.  · Moderate pulmonic regurgitation.  · The estimated PA systolic pressure is 108 mm Hg  · Pulmonary hypertension present.  · Elevated central venous pressure (15 mm  Hg).         TTE 3/23/18    1 - Normal left ventricular systolic function (EF 60-65%).     2 - No wall motion abnormalities.     3 - Biatrial enlargement.     4 - Impaired LV relaxation, elevated LAP (grade 2 diastolic dysfunction).     5 - Right ventricular enlargement with hypertrophy, with moderately depressed systolic function.     6 - Pulmonary hypertension. The estimated PA systolic pressure is 90 mmHg.     7 - Small pericardial effusion.     8 - Intermediate central venous pressure.     TTE 3/1/18    1 - Normal left ventricular systolic function (EF 55-60%).     2 - Indeterminate LV diastolic function.     3 - Right atrial enlargement.     4 - Right ventricular enlargement with severely depressed systolic function.     5 - Trivial mitral regurgitation.     6 - Moderate tricuspid regurgitation.     7 - No wall motion abnormalities.     8 - Pulmonary hypertension. The estimated PA systolic pressure is greater than 77 mmHg.     TTE 5/8/17:    1 - Right ventricular enlargement with hypertrophy, with severely depressed systolic function.     2 - Moderate to severe tricuspid regurgitation.     3 - Pulmonary hypertension. The estimated PA systolic pressure is 116 mmHg.     4 - Biatrial enlargement.     5 - Normal left ventricular systolic function (EF 60-65%); reduced LV stroke volume.  The right ventricle is enlarged measuring 5.8 cm at the base in the apical right ventricle-focused view. There is RVH. Global right ventricular systolic function appears severely depressed. There is abnormal septal motion consistent with   RV pressure/volume overload. Tricuspid annular plane systolic excursion (TAPSE) is 1.5 cm. The estimated PA systolic pressure is 116 mmHg.     [facility] 9/25/17:  CONDITION 1 (9/25/2017 11:20:07):  RA: 5/2 (1)  RV: 84/-5  RVEDP: 6     PW: 17/19 (16)  PA: 87/21 (47)  AO_SAT: 95  AO: 115/71 (86)  PA_SAT: 71  SPO2: 97  FICKCI: 3.2800  FICKCO: 5.5500    Review of Systems   Constitution: Positive for  "decreased appetite and weight loss. Negative for chills, diaphoresis, fever, malaise/fatigue and weight gain.   HENT: Negative.  Negative for congestion.    Eyes: Negative.  Negative for blurred vision and visual disturbance.   Cardiovascular: Positive for dyspnea on exertion and palpitations. Negative for chest pain, irregular heartbeat, leg swelling, near-syncope, orthopnea, paroxysmal nocturnal dyspnea and syncope.   Respiratory: Negative for cough, shortness of breath, sleep disturbances due to breathing, snoring and wheezing.    Endocrine: Negative.    Hematologic/Lymphatic: Negative for bleeding problem. Does not bruise/bleed easily.   Skin: Negative.  Negative for poor wound healing and rash.   Musculoskeletal: Positive for arthritis and joint pain. Negative for back pain and muscle weakness.   Gastrointestinal: Positive for nausea. Negative for bloating, abdominal pain, anorexia, change in bowel habit, constipation, diarrhea, hematemesis, hematochezia, melena and vomiting.        Early satiety   Genitourinary: Negative for frequency and urgency.   Neurological: Negative for difficulty with concentration, excessive daytime sleepiness, dizziness, headaches, light-headedness and weakness.   Psychiatric/Behavioral: Negative for depression. The patient does not have insomnia and is not nervous/anxious.         Objective:   BP (!) 106/57 (BP Location: Left arm, Patient Position: Sitting, BP Method: Small (Automatic))   Pulse 85   Ht 5' 3" (1.6 m)   Wt 55 kg (121 lb 4.1 oz)   BMI 21.48 kg/m²       Physical Exam   Constitutional: She is oriented to person, place, and time. She appears well-developed and well-nourished.   HENT:   Head: Normocephalic and atraumatic.   Eyes: Right eye exhibits no discharge. Left eye exhibits no discharge.   Neck: Neck supple. No JVD present. No thyromegaly present.   Cardiovascular: Regular rhythm. Exam reveals no gallop and no friction rub.   No murmur heard.  Tachy, accentuated " S2     Pulmonary/Chest: Effort normal and breath sounds normal. No respiratory distress. She has no wheezes. She has no rales.   few rhonchi   Abdominal: Soft. Bowel sounds are normal. She exhibits no distension. There is no abdominal tenderness.   Musculoskeletal: Normal range of motion.         General: Edema present. No tenderness.      Comments: Mild edema in the left ankle (always more swollen,broke it years ago)   Neurological: She is alert and oriented to person, place, and time. No cranial nerve deficit. Coordination normal.   Skin: Skin is warm and dry. No rash noted.   Psychiatric: She has a normal mood and affect. Judgment and thought content normal.   Nursing note and vitals reviewed.      Chemistry        Component Value Date/Time     12/11/2020 1503    K 3.8 12/11/2020 1503     12/11/2020 1503    CO2 25 12/11/2020 1503    BUN 22 12/11/2020 1503    CREATININE 1.2 12/11/2020 1503     12/11/2020 1503        Component Value Date/Time    CALCIUM 10.0 12/11/2020 1503    ALKPHOS 41 (L) 12/11/2020 1503    AST 13 12/11/2020 1503    ALT 9 (L) 12/11/2020 1503    BILITOT 0.8 12/11/2020 1503        Magnesium   Date Value Ref Range Status   12/11/2020 2.0 1.6 - 2.6 mg/dL Final       Lab Results   Component Value Date    WBC 4.39 03/06/2020    HGB 13.0 03/06/2020    HCT 42.6 03/06/2020    MCV 87 03/06/2020     03/06/2020         BNP   Date Value Ref Range Status   12/11/2020 307 (H) 0 - 99 pg/mL Final     Comment:     Values of less than 100 pg/ml are consistent with non-CHF populations.   03/06/2020 187 (H) 0 - 99 pg/mL Final     Comment:     Values of less than 100 pg/ml are consistent with non-CHF populations.   12/13/2019 190 (H) 0 - 99 pg/mL Final     Comment:     Values of less than 100 pg/ml are consistent with non-CHF populations.     Assessment:       1. Pulmonary hypertension- WHO Group 1  BNP  Mildly increased in her usual range - FC III on triple tx- echo still with RV enlarged  and severely depressed, and now with small pericardial effusion- focus now should be on improving her QOL as much as possible  Did not tolerate attempt to switch to opsumit (on generic bosentan)   2. PFO (patent foramen ovale)    3. Palpitations/tachycardia- stale, followed by EP, on amio now  4. Chronic hypoxemic resp failure- stable on O2  5. 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 Of note not able to tolerate macitentan.   Recommend 2 gram sodium restriction and 1500cc fluid restriction.  Encourage physical activity with graded exercise program.  Requested patient to weigh themselves daily, and to notify us if their weight increases by more than 3 lbs in 1 day or 5 lbs in 1 week.  hgb came back much lower than before, although patient did not have complaint of bleeding. Will repeat lab next week to confirm it was not an error/lab draw issue.   Patient is followed closely by palliative care as well.

## 2020-12-12 NOTE — PROCEDURES
Molly Smith is a 75 y.o.  female patient, who presents for a 6 minute walk test ordered by MD Uzma.  The diagnosis is Pulmonary Hypertension.  The patient's BMI is 20.2 kg/m2.  Predicted distance (lower limit of normal) is 320.53 meters.      Test Results:    The test was completed without stopping.  The total time walked was 360 seconds.  During walking, the patient reported:  Dyspnea.  The patient used a walker for assistance and supplemental oxygen during testing.     12/11/2020---------Distance: 171.3 meters (562 feet)     O2 Sat % Supplemental Oxygen Heart Rate Blood Pressure Roberto Scale   Pre-exercise  (Resting) 89 % 6 L/M 82 bpm 107/53 mmHg 2   During Exercise 80 % 6 L/M 96 bpm 119/57 mmHg 3   Post-exercise  (Recovery) 90 % 6 L/M  87 bpm       Recovery Time: 152 seconds    Performing nurse/tech: MARIELLE Martinez      PREVIOUS STUDY:   03/06/2020---------Distance: 162.15 meters (532 feet)       O2 Sat % Supplemental Oxygen Heart Rate Blood Pressure Roberto Scale   Pre-exercise  (Resting) 87 % 4 L/M 83 bpm 101/55 mmHg 2   Pre-exercise  (Resting) 85 % 6 L/M 79 bpm 106/56 mmHg 2   During Exercise   82 % 6 L/M 98 bpm 116/55 mmHg 3   Post-exercise    86 % 6 L/M  88 bpm           CLINICAL INTERPRETATION:  Six minute walk distance is 171.3 meters (562 feet) with moderate dyspnea.  During exercise, there was significant desaturation while breathing supplemental oxygen.  Both blood pressure and heart rate remained stable with walking.  The patient did not report non-pulmonary symptoms during exercise.  Significant exercise impairment is likely due to desaturation and subjective symptoms.  The patient did complete the study, walking 360 seconds of the 360 second test.  Since the previous study in March 2020, exercise capacity is unchanged.  Based upon age and body mass index, exercise capacity is less than predicted.

## 2020-12-14 ENCOUNTER — TELEPHONE (OUTPATIENT)
Dept: TRANSPLANT | Facility: CLINIC | Age: 75
End: 2020-12-14

## 2020-12-14 NOTE — TELEPHONE ENCOUNTER
"Late entry for 12/11, when patient was seen in PH clinic. She reports that overall, she is doing "ok." Shingles pain resolved, but she is now having "left ribcage swelling and knot on left groin." Dr Gumsan aware. Patient reports no issues w/obtaining medications, nor any issue/concern with maguire catheter, which is C/D/I. All questions answered and concerns addressed to patient and spouse satisfaction.          "

## 2020-12-15 ENCOUNTER — TELEPHONE (OUTPATIENT)
Dept: TRANSPLANT | Facility: CLINIC | Age: 75
End: 2020-12-15

## 2020-12-15 ENCOUNTER — TELEPHONE (OUTPATIENT)
Dept: PALLIATIVE MEDICINE | Facility: CLINIC | Age: 75
End: 2020-12-15

## 2020-12-15 NOTE — TELEPHONE ENCOUNTER
"Spoke w/patient, who reports that her stool has been "black" for "a while". Ms Smith endorses taking iron tabs at one point, but quit a little while ago when "my IBS went from diarrhea type to constipated". Encouraged to restart, when possible. Patient reports that "the constipated stool is always black or dark". Patient will go for repeat CBC this week, and she will contact our office when she goes ([facility]).  "

## 2020-12-15 NOTE — TELEPHONE ENCOUNTER
----- Message from Katia Gusman MD sent at 12/15/2020  1:06 PM CST -----  Yes that is significant. She did not complain of any bleeding issues, it was not back when I saw her, can you give her a call and see if she has noticed anything just in case? Would be good to repeat near her if possible the cbc at least to see if it was an error or real. Thank you. I think they talked about IV iron but now I don't remember if I asked them or it was someone prior. Thank you.   ----- Message -----  From: MELISA Gutierrez, RN  Sent: 12/14/2020   8:36 AM CST  To: Katia Gusman MD    Not sure if these were resulted for her appointment. Quite anemic, compared w/previous labs.

## 2020-12-17 LAB
EXT HEMATOCRIT: 33.2
EXT HEMOGLOBIN: 9.7
EXT PLATELETS: 325
EXT TIBC: 13
EXT WBC: 3.6
IRON SERPL-MCNC: 35 UG/DL

## 2020-12-18 ENCOUNTER — TELEPHONE (OUTPATIENT)
Dept: TRANSPLANT | Facility: CLINIC | Age: 75
End: 2020-12-18

## 2020-12-18 ENCOUNTER — PATIENT MESSAGE (OUTPATIENT)
Dept: TRANSPLANT | Facility: CLINIC | Age: 75
End: 2020-12-18

## 2020-12-18 NOTE — TELEPHONE ENCOUNTER
Lab results from 12/17 forwarded to Dr Gusman. CBC essentially unchanged from most recent [facility] CBC and serum ferritin slightly decreased at 13, w/normal serum iron level.

## 2020-12-21 ENCOUNTER — DOCUMENTATION ONLY (OUTPATIENT)
Dept: TRANSPLANT | Facility: CLINIC | Age: 75
End: 2020-12-21

## 2020-12-22 ENCOUNTER — TELEPHONE (OUTPATIENT)
Dept: TRANSPLANT | Facility: CLINIC | Age: 75
End: 2020-12-22

## 2020-12-22 NOTE — TELEPHONE ENCOUNTER
Results for NORAH QUINONES (MRN [id number]) as of 12/22/2020 14:50    12/17/2020 00:00  EXT Hematocrit: 33.2  EXT Hemoglobin: 9.7  EXT Platelets: 325  EXT TIBC: 13  EXT WBC: 3.6

## 2020-12-22 NOTE — TELEPHONE ENCOUNTER
Hi-    I just sent her another message, along with the results. Hopefully I will hear back soon. I am out of the office tomorrow and Thursday, returning Monday. Sonia is here, though, for any questions or follow up!    Regarding the COVID vaccine, our information regarding availability to the general public is somewhat limited, but answers to some common questions can be found here: [email or web address]    I hope you have a wonderful holiday, and Happy New Year!  Albertina   -----Original Message-----  From: Abdiel Gilbert <[email or web address]>   Sent: Tuesday, December 22, 2020 2:49 PM  To: Albertina Ruffin [email or web address]>  Subject:     THIS EMAIL IS FROM AN EXTERNAL SENDER!  DO YOU TRUST THIS EMAIL?  If you are unsure, remember to use the Report Suspicious Email button in [place] to send to [employer] for review. DO NOT click any links and NEVER input your username and password.    Vasile Eng  No word from Dr Gusman on bloodwork. Also when& how will I know by when & where to get my covid vaccine     Sent from my iPhone

## 2020-12-22 NOTE — TELEPHONE ENCOUNTER
----- Message from Rosario Arriaga MA sent at 12/22/2020  4:37 PM CST -----  Regarding: FW: Prescription    ----- Message -----  From: Elda Mckeon  Sent: 12/22/2020   2:59 PM CST  To: [facility]  Subject: Prescription                                     Henny [phone] option 2 ref# [id number] with [facility] is calling in ref to the pt Bosentan 125 mg. She states the pt saw Dr. Gusman.    Thanks

## 2020-12-28 ENCOUNTER — PATIENT MESSAGE (OUTPATIENT)
Dept: TRANSPLANT | Facility: CLINIC | Age: 75
End: 2020-12-28

## 2020-12-30 NOTE — PROGRESS NOTES
Established Patient - Audio Only Telehealth Visit     The patient location is: [place]  The chief complaint leading to consultation is: pHTN dyspnea  Visit type: Virtual visit with audio only (telephone)  Total time spent with patient: 30min       The reason for the audio only service rather than synchronous audio and video virtual visit was related to technical difficulties or patient preference/necessity.     Each patient to whom I provide medical services by telemedicine is:  (1) informed of the relationship between the physician and patient and the respective role of any other health care provider with respect to management of the patient; and (2) notified that they may decline to receive medical services by telemedicine and may withdraw from such care at any time. Patient verbally consented to receive this service via voice-only telephone call.         Consult Note  Palliative Medicine Clinic      Consult Requested By: Dr. Tim    Primary Care Physician: Roberto Braun MD    Reason for Consult: symptom management      ASSESSMENT/PLAN:     Plan/Recommendations:  Molly was seen today for shortness of breath.    Diagnoses and all orders for this visit:    Chronic pulmonary heart disease  - Followed by [facility]  - Currently on IV remodulin and tracleer and adempas      Dyspnea on exertion  - Today expressing worsening dyspnea  -Non-pharmacologic therapies discussed:  - relaxation/breathing techniques  - Emphasis on activity modification, balance rest with activity  -She needs to have someone with her at home because fo dyspnea crises  -Continue supplemental O2    Nausea  -Improved, not happening daily  -Taking reglan with meals  -Will keep a food diary    Neuropathic pain/ Post herpetic neuralgia  -Gabapentin 100mg TID, may increase nighttime dose to 200mg   -comfortable with current dose     Back pain  -Tramadol 100mg BID  -Will refill breakthrough Tramadol 50mg for episodic severe back pain      ACP to be  discussed next visit.     SUBJECTIVE:     History obtained from: patient and      complaint: Shortness of breath       History of Present Illness:  Mrs. Molly Smith  is 75 y.o. year old female presenting with dyspnea and pain.  Referred to Palliative Care for evaluation and management of physical symptoms, advance care planning, and clarification of goals of care. She attended the appointment with her .      Interval History:    Dyspnea - UNGER, can't move too fast, she has been busy all life and would like to be able to do things faster, otherwise it is not fun.  Still able to walk and shower on her own howerev she ahs noticed that she neds to shower in the morning because otherwise she gets very fatigued if she does it in the afternoon. Sometimes while showering she develops tachycardia and feels like she is going to pass out. Dyspnea crisis - has them frequently start w/ SOB and tachycardia, feels like fainting - depends on activity - usually when she is trying to rush.        is helping going back to work in Marcha nd is going to work  5-6 days a month  Daughter lives down the block, they work and the kids are in school  She will need to hire someone, because her sister is 80s and her twisted ankle last time that she was caring for her.   Haven't moved/downsized yet because of pandemic.      Constipation - thinks due to tramadol - IBS periods diarrhea and then constip - has been taking colace but needs something helse    Nausea -Depends on time eat, no longer in morning after medications. Will keep a food diary    Pain - back pain she continues to have pain in one spot in her back, she is following with pain management Dr Kemal Khan, also has Bone pain and Post herpetic neuralgia pain Taking Tramadol 100mg q 12h, not taking Tramadol 50mg. Taking Gabapentin 100mg TID. Has occasional severe back pain episodes that subside in a couple of hrs.       Did not discussed ACP today      Disease History:  PHTN WHO I ( Remodulin) , chronic respiratory failure on Home O2 at 6L per N/C,congential heart disease.   Patient developed post neuralgia neuropathic pain in late August/2020   Spinal compression fx x2 in mid September/2020.      Past Medical History:   Diagnosis Date    Allergy     Anticoagulant long-term use     Arthritis     Heart murmur     Pneumonia     Pulmonary hypertension     Tachycardia      Past Surgical History:   Procedure Laterality Date    BACK SURGERY      HEMIARTHROPLASTY OF HIP Left 3/25/2019    Procedure: HEMIARTHROPLASTY, HIP - left;  Surgeon: Kemal Esposito MD;  Location: [facility] OR [address] [place] [address];  Service: Orthopedics;  Laterality: Left;    REPLACEMENT OF DIALYSIS CATHETER OVER GUIDEWIRE THROUGH EXISTING VENOUS ACCESS N/A 1/17/2019    Procedure: REPLACEMENT, CATHETER, DIALYSIS, OVER GUIDEWIRE, USING EXISTING VENOUS ACCESS;  Surgeon: Phoenix Hand MD;  Location: [facility] CATH LAB;  Service: Nephrology;  Laterality: N/A;     Family History   Problem Relation Age of Onset    Arthritis Mother     Arthritis Father     Diabetes Father     Heart disease Father     Hypertension Father      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    Multaq [dronedarone]        Medications:    Current Outpatient Medications:     amiodarone (PACERONE) 200 MG Tab, TAKE ONE TABLET BY MOUTH ONCE A DAY AS DIRECTED. THANK YOU!, Disp: 30 tablet, Rfl: 11    bosentan (TRACLEER) 125 MG Tab, Take by mouth every 12 (twelve) hours., Disp: , Rfl:     CALCIUM CARBONATE (CALCIUM 600 ORAL), Take 2 tablets by mouth once daily.  , Disp: , Rfl:     chlordiazepoxide-clidinium 5-2.5 mg (LIBRAX) 5-2.5 mg Cap, Take 1 capsule by mouth 3 (three) times daily with meals. TAKE TABLET AS PRN., Disp: , Rfl:     digoxin (LANOXIN) 125 mcg tablet, Take 1 tablet (125 mcg total) by mouth every other day., Disp: 15 tablet, Rfl: 11    docusate sodium (COLACE)  50 MG capsule, Take 1 capsule (50 mg total) by mouth daily as needed for Constipation., Disp: , Rfl: 0    ferrous gluconate (FERGON) 324 MG tablet, Take 1 tablet (324 mg total) by mouth daily with breakfast. (Patient not taking: Reported on 12/11/2020), Disp: , Rfl:     folic acid (FOLVITE) 1 MG tablet, Take 1 mg by mouth once daily., Disp: , Rfl:     furosemide (LASIX) 20 MG tablet, Take 1 tablet (20 mg total) by mouth 2 (two) times daily. (Patient taking differently: Take 40 mg by mouth once daily. ), Disp: 60 tablet, Rfl: 11    gabapentin (NEURONTIN) 100 MG capsule, Take 1 capsule (100 mg total) by mouth 2 (two) times daily. Take 1 tablet po daily at HS  x 7days .  Then begin 1 tablet po bid. (Patient taking differently: Take 100 mg by mouth 3 (three) times daily. ), Disp: 60 capsule, Rfl: 3    loratadine (CLARITIN) 10 mg tablet, Take 10 mg by mouth once daily., Disp: , Rfl:     metoclopramide HCl (REGLAN) 10 MG tablet, Take 1 tablet (10 mg total) by mouth 2 (two) times daily before meals., Disp: 100 tablet, Rfl: 1    ondansetron (ZOFRAN-ODT) 4 MG TbDL, , Disp: , Rfl: 6    riociguat (ADEMPAS) 0.5 mg Tab tablet, Take 1 tablet (0.5 mg total) by mouth 3 (three) times daily., Disp: , Rfl:     spironolactone (ALDACTONE) 25 MG tablet, Take 25 mg by mouth once daily.  , Disp: , Rfl:     traMADoL (ULTRAM) 50 mg tablet, Take 1 tablet (50 mg total) by mouth every 6 (six) hours as needed for Pain (as needed for breakthrough pain)., Disp: 60 tablet, Rfl: 0    traMADoL (ULTRAM-ER) 100 MG Tb24, TAKE ONE TABLET BY MOUTH EVERY TWELVE HOURS AS NEEDED FOR PAIN THANK YOU! **NO REFILL**, Disp: 60 tablet, Rfl: 0    TREPROSTINIL SODIUM (TREPROSTINIL, REMODULIN, PUMP FOR HOME USE), Pt currently on 89.2 ng/kg/min=45 mL/day dosing weight 70.05 kg, Disp: 60 mL, Rfl: 11    warfarin (COUMADIN) 10 MG tablet, Take 1 tablet (10 mg total) by mouth Daily. Take 1 tablet (10mg) by mouth daily, except a half tablet (5mg) every Tues,  Thurs, and Saturdays, Disp: 90 tablet, Rfl: 1     database queried on 12/31/2020  by Sonia Kelly . The results reviewed and considered with the clinical data in the decision whether or not to prescribe a controlled substance.    Last filled Tramadol 23/11 and Gabapentin 12/23    OBJECTIVE:       ROS:  Review of Systems   Constitutional: Negative for activity change, appetite change, fatigue, fever and unexpected weight change.   HENT: Negative for hearing loss, sore throat and trouble swallowing.    Eyes: Negative for photophobia, pain and visual disturbance.   Respiratory: Positive for shortness of breath. Negative for cough and wheezing.    Cardiovascular: Negative for chest pain and leg swelling.   Gastrointestinal: Negative for abdominal pain, constipation, diarrhea, nausea, vomiting and reflux.   Genitourinary: Negative for dysuria and urgency.   Musculoskeletal: Positive for back pain. Negative for gait problem, leg pain and myalgias.   Neurological: Negative for light-headedness, headaches and memory loss.   Psychiatric/Behavioral: Negative for behavioral problems and confusion. The patient is not nervous/anxious.          Review of Symptoms    Symptom Assessment (ESAS 0-10 Scale)  Pain:  3  Dyspnea:  6  Anxiety:  0  Nausea:  1  Depression:  0  Anorexia:  0  Fatigue:  2  Insomnia:  0  Restlessness:  0  Agitation:  0     Constipation:  Negative  Diarrhea:  Negative          Psychosocial/Cultural: Lives with her , has a daughter who lives down the block.     Advance Care Planning   Advance Care Planning            Physical Exam: Unable to perform audio only visit  Vitals:    Physical Exam      Labs:  CBC:   WBC   Date Value Ref Range Status   12/11/2020 4.02 3.90 - 12.70 K/uL Final     MCV   Date Value Ref Range Status   03/06/2020 87 82 - 98 fL Final           Hematocrit   Date Value Ref Range Status   12/11/2020 34.7 (L) 37.0 - 48.5 % Final                         Albumin:   Albumin   Date  Value Ref Range Status   12/11/2020 4.0 3.5 - 5.2 g/dL Final     Protein:   Total Protein   Date Value Ref Range Status   12/11/2020 7.1 6.0 - 8.4 g/dL Final       Radiology:I have reviewed all pertinent imaging results/findings within the past 24 hours.    Encounter occurred during period of COVID-19 emergency. Encounter performed under the concurrent guidelines, limitations and protocols.      Signature: Sonia Kelly MD             This service was not originating from a related E/M service provided within the previous 7 days nor will  to an E/M service or procedure within the next 24 hours or my soonest available appointment.  Prevailing standard of care was able to be met in this audio-only visit.

## 2020-12-31 ENCOUNTER — OFFICE VISIT (OUTPATIENT)
Dept: PALLIATIVE MEDICINE | Facility: CLINIC | Age: 75
End: 2020-12-31
Payer: MEDICARE

## 2020-12-31 DIAGNOSIS — R52 PAIN: Primary | ICD-10-CM

## 2020-12-31 DIAGNOSIS — M79.2 NEUROPATHIC PAIN: ICD-10-CM

## 2020-12-31 PROCEDURE — 99443 PR PHYSICIAN TELEPHONE EVALUATION 21-30 MIN: CPT | Mod: 95,,, | Performed by: STUDENT IN AN ORGANIZED HEALTH CARE EDUCATION/TRAINING PROGRAM

## 2020-12-31 PROCEDURE — 99443 PR PHYSICIAN TELEPHONE EVALUATION 21-30 MIN: ICD-10-PCS | Mod: 95,,, | Performed by: STUDENT IN AN ORGANIZED HEALTH CARE EDUCATION/TRAINING PROGRAM

## 2020-12-31 RX ORDER — GABAPENTIN 100 MG/1
100 CAPSULE ORAL 3 TIMES DAILY
Qty: 90 CAPSULE | Refills: 3 | Status: SHIPPED | OUTPATIENT
Start: 2020-12-31 | End: 2021-02-23

## 2020-12-31 RX ORDER — TRAMADOL HYDROCHLORIDE 50 MG/1
50 TABLET ORAL EVERY 6 HOURS PRN
Qty: 60 TABLET | Refills: 0 | Status: SHIPPED | OUTPATIENT
Start: 2020-12-31 | End: 2021-02-01

## 2021-01-05 ENCOUNTER — TELEPHONE (OUTPATIENT)
Dept: TRANSPLANT | Facility: CLINIC | Age: 76
End: 2021-01-05

## 2021-01-15 PROBLEM — D50.9 IRON DEFICIENCY ANEMIA, UNSPECIFIED: Status: ACTIVE | Noted: 2021-01-15

## 2021-01-20 ENCOUNTER — OFFICE VISIT (OUTPATIENT)
Dept: PALLIATIVE MEDICINE | Facility: CLINIC | Age: 76
End: 2021-01-20
Payer: MEDICARE

## 2021-01-20 DIAGNOSIS — K59.00 CONSTIPATION, UNSPECIFIED CONSTIPATION TYPE: ICD-10-CM

## 2021-01-20 DIAGNOSIS — R11.0 NAUSEA: ICD-10-CM

## 2021-01-20 DIAGNOSIS — R52 PAIN: Primary | ICD-10-CM

## 2021-01-20 PROCEDURE — 99443 PR PHYSICIAN TELEPHONE EVALUATION 21-30 MIN: CPT | Mod: 95,,, | Performed by: STUDENT IN AN ORGANIZED HEALTH CARE EDUCATION/TRAINING PROGRAM

## 2021-01-20 PROCEDURE — 99443 PR PHYSICIAN TELEPHONE EVALUATION 21-30 MIN: ICD-10-PCS | Mod: 95,,, | Performed by: STUDENT IN AN ORGANIZED HEALTH CARE EDUCATION/TRAINING PROGRAM

## 2021-01-21 ENCOUNTER — TELEPHONE (OUTPATIENT)
Dept: TRANSPLANT | Facility: CLINIC | Age: 76
End: 2021-01-21

## 2021-01-22 ENCOUNTER — TELEPHONE (OUTPATIENT)
Dept: PALLIATIVE MEDICINE | Facility: CLINIC | Age: 76
End: 2021-01-22

## 2021-01-23 DIAGNOSIS — R22.2 MASS IN CHEST: Primary | ICD-10-CM

## 2021-02-01 ENCOUNTER — PATIENT MESSAGE (OUTPATIENT)
Dept: PALLIATIVE MEDICINE | Facility: CLINIC | Age: 76
End: 2021-02-01

## 2021-02-23 ENCOUNTER — OFFICE VISIT (OUTPATIENT)
Dept: PALLIATIVE MEDICINE | Facility: CLINIC | Age: 76
End: 2021-02-23
Payer: MEDICARE

## 2021-02-23 DIAGNOSIS — R52 PAIN: ICD-10-CM

## 2021-02-23 DIAGNOSIS — I27.9 CHRONIC PULMONARY HEART DISEASE: ICD-10-CM

## 2021-02-23 DIAGNOSIS — K59.00 CONSTIPATION, UNSPECIFIED CONSTIPATION TYPE: Primary | ICD-10-CM

## 2021-02-23 DIAGNOSIS — M79.2 NEUROPATHIC PAIN: ICD-10-CM

## 2021-02-23 DIAGNOSIS — Z51.5 ENCOUNTER FOR PALLIATIVE CARE: ICD-10-CM

## 2021-02-23 DIAGNOSIS — R06.00 DYSPNEA, UNSPECIFIED TYPE: ICD-10-CM

## 2021-02-23 PROCEDURE — 99497 ADVNCD CARE PLAN 30 MIN: CPT | Mod: S$PBB,,, | Performed by: STUDENT IN AN ORGANIZED HEALTH CARE EDUCATION/TRAINING PROGRAM

## 2021-02-23 PROCEDURE — 99497 ADVNCD CARE PLAN 30 MIN: CPT | Mod: PBBFAC | Performed by: STUDENT IN AN ORGANIZED HEALTH CARE EDUCATION/TRAINING PROGRAM

## 2021-02-23 PROCEDURE — 99215 PR OFFICE/OUTPT VISIT, EST, LEVL V, 40-54 MIN: ICD-10-PCS | Mod: S$PBB,,, | Performed by: STUDENT IN AN ORGANIZED HEALTH CARE EDUCATION/TRAINING PROGRAM

## 2021-02-23 PROCEDURE — 99999 PR PBB SHADOW E&M-EST. PATIENT-LVL III: CPT | Mod: PBBFAC,,, | Performed by: STUDENT IN AN ORGANIZED HEALTH CARE EDUCATION/TRAINING PROGRAM

## 2021-02-23 PROCEDURE — 99497 PR ADVNCD CARE PLAN 30 MIN: ICD-10-PCS | Mod: S$PBB,,, | Performed by: STUDENT IN AN ORGANIZED HEALTH CARE EDUCATION/TRAINING PROGRAM

## 2021-02-23 PROCEDURE — 99215 OFFICE O/P EST HI 40 MIN: CPT | Mod: S$PBB,,, | Performed by: STUDENT IN AN ORGANIZED HEALTH CARE EDUCATION/TRAINING PROGRAM

## 2021-02-23 PROCEDURE — 99213 OFFICE O/P EST LOW 20 MIN: CPT | Mod: PBBFAC | Performed by: STUDENT IN AN ORGANIZED HEALTH CARE EDUCATION/TRAINING PROGRAM

## 2021-02-23 PROCEDURE — 99999 PR PBB SHADOW E&M-EST. PATIENT-LVL III: ICD-10-PCS | Mod: PBBFAC,,, | Performed by: STUDENT IN AN ORGANIZED HEALTH CARE EDUCATION/TRAINING PROGRAM

## 2021-02-23 RX ORDER — TRAMADOL HYDROCHLORIDE 100 MG/1
TABLET, EXTENDED RELEASE ORAL
Qty: 60 TABLET | Refills: 0 | Status: SHIPPED | OUTPATIENT
Start: 2021-02-23 | End: 2021-02-26 | Stop reason: SDUPTHER

## 2021-02-23 RX ORDER — GABAPENTIN 300 MG/1
300 CAPSULE ORAL 3 TIMES DAILY
Qty: 90 CAPSULE | Refills: 1 | Status: SHIPPED | OUTPATIENT
Start: 2021-02-23 | End: 2021-02-23

## 2021-02-23 RX ORDER — DICLOFENAC SODIUM 10 MG/G
2 GEL TOPICAL DAILY
Qty: 1 TUBE | Refills: 5 | Status: SHIPPED | OUTPATIENT
Start: 2021-02-23 | End: 2021-08-17

## 2021-02-23 RX ORDER — GABAPENTIN 100 MG/1
200 CAPSULE ORAL 3 TIMES DAILY
Qty: 90 CAPSULE | Refills: 1 | Status: CANCELLED | OUTPATIENT
Start: 2021-02-23

## 2021-02-23 RX ORDER — GABAPENTIN 300 MG/1
300 CAPSULE ORAL 3 TIMES DAILY
Qty: 90 CAPSULE | Refills: 1 | Status: SHIPPED | OUTPATIENT
Start: 2021-02-23 | End: 2021-02-26 | Stop reason: SDUPTHER

## 2021-02-23 RX ORDER — TRAMADOL HYDROCHLORIDE 100 MG/1
TABLET, EXTENDED RELEASE ORAL
Qty: 60 TABLET | Refills: 0 | Status: SHIPPED | OUTPATIENT
Start: 2021-02-23 | End: 2021-02-23

## 2021-02-26 ENCOUNTER — OFFICE VISIT (OUTPATIENT)
Dept: TRANSPLANT | Facility: CLINIC | Age: 76
End: 2021-02-26
Payer: MEDICARE

## 2021-02-26 ENCOUNTER — HOSPITAL ENCOUNTER (OUTPATIENT)
Dept: PULMONOLOGY | Facility: CLINIC | Age: 76
Discharge: HOME | End: 2021-02-26
Payer: MEDICARE

## 2021-02-26 VITALS
SYSTOLIC BLOOD PRESSURE: 106 MMHG | DIASTOLIC BLOOD PRESSURE: 59 MMHG | OXYGEN SATURATION: 87 % | BODY MASS INDEX: 21.64 KG/M2 | HEART RATE: 80 BPM | HEIGHT: 63 IN | WEIGHT: 122.13 LBS

## 2021-02-26 VITALS — BODY MASS INDEX: 19.97 KG/M2 | HEIGHT: 64 IN | WEIGHT: 117 LBS

## 2021-02-26 DIAGNOSIS — M79.2 NEUROPATHIC PAIN: ICD-10-CM

## 2021-02-26 DIAGNOSIS — R52 PAIN: ICD-10-CM

## 2021-02-26 DIAGNOSIS — M11.20 PSEUDOGOUT: Primary | ICD-10-CM

## 2021-02-26 DIAGNOSIS — I27.9 CHRONIC PULMONARY HEART DISEASE: ICD-10-CM

## 2021-02-26 PROCEDURE — 94618 PULMONARY STRESS TESTING: CPT | Mod: PBBFAC | Performed by: INTERNAL MEDICINE

## 2021-02-26 PROCEDURE — 94618 PULMONARY STRESS TESTING: ICD-10-PCS | Mod: 26,S$PBB,, | Performed by: INTERNAL MEDICINE

## 2021-02-26 PROCEDURE — 99214 OFFICE O/P EST MOD 30 MIN: CPT | Mod: PBBFAC,25 | Performed by: INTERNAL MEDICINE

## 2021-02-26 PROCEDURE — 99999 PR PBB SHADOW E&M-EST. PATIENT-LVL IV: ICD-10-PCS | Mod: PBBFAC,,, | Performed by: INTERNAL MEDICINE

## 2021-02-26 PROCEDURE — 94618 PULMONARY STRESS TESTING: CPT | Mod: 26,S$PBB,, | Performed by: INTERNAL MEDICINE

## 2021-02-26 PROCEDURE — 99999 PR PBB SHADOW E&M-EST. PATIENT-LVL IV: CPT | Mod: PBBFAC,,, | Performed by: INTERNAL MEDICINE

## 2021-02-26 PROCEDURE — 99213 OFFICE O/P EST LOW 20 MIN: CPT | Mod: S$PBB,,, | Performed by: INTERNAL MEDICINE

## 2021-02-26 PROCEDURE — 99213 PR OFFICE/OUTPT VISIT, EST, LEVL III, 20-29 MIN: ICD-10-PCS | Mod: S$PBB,,, | Performed by: INTERNAL MEDICINE

## 2021-02-26 RX ORDER — TRAMADOL HYDROCHLORIDE 100 MG/1
TABLET, EXTENDED RELEASE ORAL
Qty: 60 TABLET | Refills: 5 | Status: SHIPPED | OUTPATIENT
Start: 2021-02-26 | End: 2021-04-14

## 2021-02-26 RX ORDER — DIGOXIN 125 MCG
125 TABLET ORAL DAILY
Qty: 30 TABLET | Refills: 11 | Status: SHIPPED | OUTPATIENT
Start: 2021-02-26 | End: 2022-03-10

## 2021-02-26 RX ORDER — LIDOCAINE 50 MG/G
1 PATCH TOPICAL DAILY
Qty: 10 PATCH | Refills: 2 | Status: SHIPPED | OUTPATIENT
Start: 2021-02-26 | End: 2021-08-26

## 2021-02-26 RX ORDER — FUROSEMIDE 40 MG/1
40 TABLET ORAL DAILY
COMMUNITY
End: 2021-11-04

## 2021-02-26 RX ORDER — GABAPENTIN 100 MG/1
200 CAPSULE ORAL 3 TIMES DAILY
Qty: 180 CAPSULE | Refills: 6 | Status: SHIPPED | OUTPATIENT
Start: 2021-02-26 | End: 2021-07-06

## 2021-03-22 ENCOUNTER — TELEPHONE (OUTPATIENT)
Dept: PALLIATIVE MEDICINE | Facility: CLINIC | Age: 76
End: 2021-03-22

## 2021-03-23 ENCOUNTER — OFFICE VISIT (OUTPATIENT)
Dept: PALLIATIVE MEDICINE | Facility: CLINIC | Age: 76
End: 2021-03-23
Payer: MEDICARE

## 2021-03-23 DIAGNOSIS — M25.473 ANKLE SWELLING, UNSPECIFIED LATERALITY: Primary | ICD-10-CM

## 2021-03-23 DIAGNOSIS — M79.2 NEUROPATHIC PAIN: ICD-10-CM

## 2021-03-23 PROCEDURE — 99443 PR PHYSICIAN TELEPHONE EVALUATION 21-30 MIN: ICD-10-PCS | Mod: 95,,, | Performed by: STUDENT IN AN ORGANIZED HEALTH CARE EDUCATION/TRAINING PROGRAM

## 2021-03-23 PROCEDURE — 99443 PR PHYSICIAN TELEPHONE EVALUATION 21-30 MIN: CPT | Mod: 95,,, | Performed by: STUDENT IN AN ORGANIZED HEALTH CARE EDUCATION/TRAINING PROGRAM

## 2021-03-29 ENCOUNTER — TELEPHONE (OUTPATIENT)
Dept: TRANSPLANT | Facility: CLINIC | Age: 76
End: 2021-03-29

## 2021-03-30 LAB
EXT HEMATOCRIT: 45.5
EXT HEMOGLOBIN: 14.6
EXT PLATELETS: 229
EXT WBC: 4

## 2021-03-31 ENCOUNTER — TELEPHONE (OUTPATIENT)
Dept: TRANSPLANT | Facility: CLINIC | Age: 76
End: 2021-03-31

## 2021-04-07 ENCOUNTER — TELEPHONE (OUTPATIENT)
Dept: PALLIATIVE MEDICINE | Facility: CLINIC | Age: 76
End: 2021-04-07

## 2021-04-08 ENCOUNTER — OFFICE VISIT (OUTPATIENT)
Dept: PALLIATIVE MEDICINE | Facility: CLINIC | Age: 76
End: 2021-04-08
Payer: MEDICARE

## 2021-04-08 DIAGNOSIS — R06.00 DYSPNEA, UNSPECIFIED TYPE: ICD-10-CM

## 2021-04-08 DIAGNOSIS — M79.2 NEUROPATHIC PAIN: Primary | ICD-10-CM

## 2021-04-08 DIAGNOSIS — R52 PAIN: ICD-10-CM

## 2021-04-08 DIAGNOSIS — R11.0 NAUSEA: ICD-10-CM

## 2021-04-08 PROCEDURE — 99442 PR PHYSICIAN TELEPHONE EVALUATION 11-20 MIN: CPT | Mod: 95,,, | Performed by: STUDENT IN AN ORGANIZED HEALTH CARE EDUCATION/TRAINING PROGRAM

## 2021-04-08 PROCEDURE — 99442 PR PHYSICIAN TELEPHONE EVALUATION 11-20 MIN: ICD-10-PCS | Mod: 95,,, | Performed by: STUDENT IN AN ORGANIZED HEALTH CARE EDUCATION/TRAINING PROGRAM

## 2021-04-09 ENCOUNTER — TELEPHONE (OUTPATIENT)
Dept: PALLIATIVE MEDICINE | Facility: CLINIC | Age: 76
End: 2021-04-09

## 2021-04-14 ENCOUNTER — TELEPHONE (OUTPATIENT)
Dept: PALLIATIVE MEDICINE | Facility: CLINIC | Age: 76
End: 2021-04-14

## 2021-04-14 DIAGNOSIS — R52 PAIN: ICD-10-CM

## 2021-04-14 RX ORDER — TRAMADOL HYDROCHLORIDE 200 MG/1
TABLET, EXTENDED RELEASE ORAL
Qty: 30 TABLET | Refills: 0 | Status: SHIPPED | OUTPATIENT
Start: 2021-04-14 | End: 2021-05-07

## 2021-04-14 RX ORDER — TRAMADOL HYDROCHLORIDE 100 MG/1
100 TABLET, COATED ORAL EVERY 6 HOURS PRN
Qty: 120 TABLET | Refills: 0 | Status: SHIPPED | OUTPATIENT
Start: 2021-04-14 | End: 2021-05-13

## 2021-04-26 ENCOUNTER — TELEPHONE (OUTPATIENT)
Dept: TRANSPLANT | Facility: CLINIC | Age: 76
End: 2021-04-26

## 2021-05-07 DIAGNOSIS — R52 PAIN: ICD-10-CM

## 2021-05-07 RX ORDER — TRAMADOL HYDROCHLORIDE 100 MG/1
100 TABLET, COATED ORAL EVERY 6 HOURS PRN
Qty: 120 TABLET | Refills: 0 | Status: CANCELLED | OUTPATIENT
Start: 2021-05-07

## 2021-05-07 RX ORDER — TRAMADOL HYDROCHLORIDE 100 MG/1
100 TABLET, EXTENDED RELEASE ORAL DAILY
Qty: 30 TABLET | Refills: 0 | Status: SHIPPED | OUTPATIENT
Start: 2021-05-07 | End: 2021-05-13

## 2021-05-10 LAB
BILIRUBIN TOTAL: 0.8
CRP QUANTITATIVE: 1
EXT ALBUMIN: 4.4
EXT ALT: 9
EXT AST: 15
EXT BUN: 22
EXT CALCIUM: 9.7
EXT CHLORIDE: 101
EXT CREATININE: 1.18 MG/DL
EXT GLUCOSE: 86
EXT HEMATOCRIT: 47.3
EXT HEMOGLOBIN: 14.9
EXT PLATELETS: 224
EXT POTASSIUM: 4.1
EXT PROTEIN TOTAL: 6.7
EXT SODIUM: 140 MMOL/L
EXT WBC: 4.3
SED RATE (WESTERGREN): 6
URIC ACID: 4.1
VITAMIN D, 25-OH, TOTAL: 40.9

## 2021-05-11 ENCOUNTER — TELEPHONE (OUTPATIENT)
Dept: TRANSPLANT | Facility: CLINIC | Age: 76
End: 2021-05-11

## 2021-05-12 ENCOUNTER — EPISODE CHANGES (OUTPATIENT)
Dept: TRANSPLANT | Facility: CLINIC | Age: 76
End: 2021-05-12

## 2021-05-13 DIAGNOSIS — R52 PAIN: ICD-10-CM

## 2021-05-13 RX ORDER — TRAMADOL HYDROCHLORIDE 100 MG/1
100 TABLET, EXTENDED RELEASE ORAL 2 TIMES DAILY
Qty: 60 TABLET | Refills: 0 | Status: SHIPPED | OUTPATIENT
Start: 2021-05-13 | End: 2021-07-06 | Stop reason: SDUPTHER

## 2021-05-13 RX ORDER — TRAMADOL HYDROCHLORIDE 100 MG/1
100 TABLET, COATED ORAL 2 TIMES DAILY
Qty: 60 TABLET | Refills: 0 | Status: SHIPPED | OUTPATIENT
Start: 2021-05-13 | End: 2021-05-13

## 2021-05-17 PROBLEM — E55.9 VITAMIN D DEFICIENCY: Status: ACTIVE | Noted: 2021-05-17

## 2021-05-18 ENCOUNTER — TELEPHONE (OUTPATIENT)
Dept: TRANSPLANT | Facility: CLINIC | Age: 76
End: 2021-05-18

## 2021-06-14 ENCOUNTER — TELEPHONE (OUTPATIENT)
Dept: PULMONOLOGY | Facility: CLINIC | Age: 76
End: 2021-06-14

## 2021-06-22 ENCOUNTER — TELEPHONE (OUTPATIENT)
Dept: PALLIATIVE MEDICINE | Facility: CLINIC | Age: 76
End: 2021-06-22

## 2021-07-02 ENCOUNTER — OFFICE VISIT (OUTPATIENT)
Dept: TRANSPLANT | Facility: CLINIC | Age: 76
End: 2021-07-02
Payer: MEDICARE

## 2021-07-02 VITALS
HEART RATE: 85 BPM | DIASTOLIC BLOOD PRESSURE: 59 MMHG | SYSTOLIC BLOOD PRESSURE: 105 MMHG | HEIGHT: 64 IN | WEIGHT: 120 LBS | BODY MASS INDEX: 20.49 KG/M2

## 2021-07-02 DIAGNOSIS — Q24.9 ADULT CONGENITAL HEART DISEASE: ICD-10-CM

## 2021-07-02 DIAGNOSIS — I27.9 CHRONIC PULMONARY HEART DISEASE: ICD-10-CM

## 2021-07-02 DIAGNOSIS — M61.00: ICD-10-CM

## 2021-07-02 DIAGNOSIS — I27.21 PAH (PULMONARY ARTERY HYPERTENSION): Primary | ICD-10-CM

## 2021-07-02 DIAGNOSIS — J96.11 CHRONIC RESPIRATORY FAILURE WITH HYPOXIA: ICD-10-CM

## 2021-07-02 DIAGNOSIS — Q26.3 PARTIAL ANOMALOUS PULMONARY VENOUS CONNECTION (PAPVC): ICD-10-CM

## 2021-07-02 PROCEDURE — 99213 PR OFFICE/OUTPT VISIT, EST, LEVL III, 20-29 MIN: ICD-10-PCS | Mod: 95,,, | Performed by: INTERNAL MEDICINE

## 2021-07-02 PROCEDURE — 99213 OFFICE O/P EST LOW 20 MIN: CPT | Mod: 95,,, | Performed by: INTERNAL MEDICINE

## 2021-07-02 RX ORDER — DICYCLOMINE HYDROCHLORIDE 20 MG/1
20 TABLET ORAL 3 TIMES DAILY PRN
COMMUNITY
Start: 2021-06-17

## 2021-07-06 ENCOUNTER — OFFICE VISIT (OUTPATIENT)
Dept: PALLIATIVE MEDICINE | Facility: CLINIC | Age: 76
End: 2021-07-06
Payer: MEDICARE

## 2021-07-06 DIAGNOSIS — I27.9 CHRONIC PULMONARY HEART DISEASE: ICD-10-CM

## 2021-07-06 DIAGNOSIS — M62.830 MUSCLE SPASM OF BACK: ICD-10-CM

## 2021-07-06 DIAGNOSIS — R52 PAIN: ICD-10-CM

## 2021-07-06 DIAGNOSIS — R10.9 ABDOMINAL PAIN, UNSPECIFIED ABDOMINAL LOCATION: ICD-10-CM

## 2021-07-06 DIAGNOSIS — M79.2 NEUROPATHIC PAIN: ICD-10-CM

## 2021-07-06 DIAGNOSIS — K59.00 CONSTIPATION, UNSPECIFIED CONSTIPATION TYPE: Primary | ICD-10-CM

## 2021-07-06 DIAGNOSIS — R14.0 ABDOMINAL DISTENSION (GASEOUS): ICD-10-CM

## 2021-07-06 PROCEDURE — 99215 OFFICE O/P EST HI 40 MIN: CPT | Mod: S$PBB,,, | Performed by: STUDENT IN AN ORGANIZED HEALTH CARE EDUCATION/TRAINING PROGRAM

## 2021-07-06 PROCEDURE — 99999 PR PBB SHADOW E&M-EST. PATIENT-LVL III: ICD-10-PCS | Mod: PBBFAC,,, | Performed by: STUDENT IN AN ORGANIZED HEALTH CARE EDUCATION/TRAINING PROGRAM

## 2021-07-06 PROCEDURE — 99999 PR PBB SHADOW E&M-EST. PATIENT-LVL III: CPT | Mod: PBBFAC,,, | Performed by: STUDENT IN AN ORGANIZED HEALTH CARE EDUCATION/TRAINING PROGRAM

## 2021-07-06 PROCEDURE — 99213 OFFICE O/P EST LOW 20 MIN: CPT | Mod: PBBFAC | Performed by: STUDENT IN AN ORGANIZED HEALTH CARE EDUCATION/TRAINING PROGRAM

## 2021-07-06 PROCEDURE — 99215 PR OFFICE/OUTPT VISIT, EST, LEVL V, 40-54 MIN: ICD-10-PCS | Mod: S$PBB,,, | Performed by: STUDENT IN AN ORGANIZED HEALTH CARE EDUCATION/TRAINING PROGRAM

## 2021-07-06 RX ORDER — TRAMADOL HYDROCHLORIDE 50 MG/1
50 TABLET ORAL EVERY 8 HOURS PRN
Qty: 90 TABLET | Refills: 0 | Status: SHIPPED | OUTPATIENT
Start: 2021-07-06 | End: 2021-08-25

## 2021-07-06 RX ORDER — TRAMADOL HYDROCHLORIDE 100 MG/1
100 TABLET, EXTENDED RELEASE ORAL 2 TIMES DAILY
Qty: 30 TABLET | Refills: 0 | Status: SHIPPED | OUTPATIENT
Start: 2021-07-06 | End: 2021-08-13 | Stop reason: SDUPTHER

## 2021-07-06 RX ORDER — GABAPENTIN 300 MG/1
300 CAPSULE ORAL 3 TIMES DAILY
Qty: 30 CAPSULE | Refills: 2 | Status: SHIPPED | OUTPATIENT
Start: 2021-07-06 | End: 2021-08-26

## 2021-07-14 ENCOUNTER — TELEPHONE (OUTPATIENT)
Dept: TRANSPLANT | Facility: CLINIC | Age: 76
End: 2021-07-14

## 2021-07-19 ENCOUNTER — PATIENT MESSAGE (OUTPATIENT)
Dept: TRANSPLANT | Facility: CLINIC | Age: 76
End: 2021-07-19

## 2021-07-19 ENCOUNTER — TELEPHONE (OUTPATIENT)
Dept: TRANSPLANT | Facility: CLINIC | Age: 76
End: 2021-07-19

## 2021-07-22 ENCOUNTER — PATIENT MESSAGE (OUTPATIENT)
Dept: TRANSPLANT | Facility: CLINIC | Age: 76
End: 2021-07-22

## 2021-07-23 ENCOUNTER — TELEPHONE (OUTPATIENT)
Dept: ORTHOPEDICS | Facility: CLINIC | Age: 76
End: 2021-07-23

## 2021-07-23 ENCOUNTER — TELEPHONE (OUTPATIENT)
Dept: PAIN MEDICINE | Facility: CLINIC | Age: 76
End: 2021-07-23

## 2021-07-26 ENCOUNTER — TELEPHONE (OUTPATIENT)
Dept: TRANSPLANT | Facility: CLINIC | Age: 76
End: 2021-07-26

## 2021-07-26 LAB
ALP ISOS SERPL LEV INH-CCNC: 44 U/L
EXT ALBUMIN: 4.5
EXT ALT: 8
EXT AST: 14
EXT BUN: 24
EXT CALCIUM: 10.2
EXT CHLORIDE: 101
EXT CREATININE: 1.42 MG/DL
EXT GLUCOSE: 93
EXT POTASSIUM: 4
EXT PROTEIN TOTAL: 6.8
EXT SODIUM: 142 MMOL/L

## 2021-08-16 ENCOUNTER — PATIENT MESSAGE (OUTPATIENT)
Dept: PAIN MEDICINE | Facility: CLINIC | Age: 76
End: 2021-08-16

## 2021-08-17 ENCOUNTER — OFFICE VISIT (OUTPATIENT)
Dept: PAIN MEDICINE | Facility: CLINIC | Age: 76
End: 2021-08-17
Attending: ANESTHESIOLOGY
Payer: MEDICARE

## 2021-08-17 VITALS
DIASTOLIC BLOOD PRESSURE: 66 MMHG | WEIGHT: 120 LBS | HEART RATE: 83 BPM | SYSTOLIC BLOOD PRESSURE: 117 MMHG | HEIGHT: 64 IN | BODY MASS INDEX: 20.49 KG/M2 | TEMPERATURE: 98 F

## 2021-08-17 DIAGNOSIS — M80.88XA OSTEOPOROTIC COMPRESSION FRACTURE OF VERTEBRA, INITIAL ENCOUNTER: Primary | ICD-10-CM

## 2021-08-17 PROCEDURE — 99999 PR PBB SHADOW E&M-EST. PATIENT-LVL V: ICD-10-PCS | Mod: PBBFAC,,, | Performed by: ANESTHESIOLOGY

## 2021-08-17 PROCEDURE — 99215 OFFICE O/P EST HI 40 MIN: CPT | Mod: PBBFAC | Performed by: ANESTHESIOLOGY

## 2021-08-17 PROCEDURE — 99999 PR PBB SHADOW E&M-EST. PATIENT-LVL V: CPT | Mod: PBBFAC,,, | Performed by: ANESTHESIOLOGY

## 2021-08-17 PROCEDURE — 99205 OFFICE O/P NEW HI 60 MIN: CPT | Mod: S$PBB,,, | Performed by: ANESTHESIOLOGY

## 2021-08-17 PROCEDURE — 99205 PR OFFICE/OUTPT VISIT, NEW, LEVL V, 60-74 MIN: ICD-10-PCS | Mod: S$PBB,,, | Performed by: ANESTHESIOLOGY

## 2021-08-24 ENCOUNTER — TELEPHONE (OUTPATIENT)
Dept: PALLIATIVE MEDICINE | Facility: CLINIC | Age: 76
End: 2021-08-24

## 2021-08-25 ENCOUNTER — DOCUMENTATION ONLY (OUTPATIENT)
Dept: TRANSPLANT | Facility: CLINIC | Age: 76
End: 2021-08-25

## 2021-08-25 ENCOUNTER — OFFICE VISIT (OUTPATIENT)
Dept: PALLIATIVE MEDICINE | Facility: CLINIC | Age: 76
End: 2021-08-25
Payer: MEDICARE

## 2021-08-25 DIAGNOSIS — R52 PAIN: ICD-10-CM

## 2021-08-25 DIAGNOSIS — R10.9 ABDOMINAL PAIN, UNSPECIFIED ABDOMINAL LOCATION: ICD-10-CM

## 2021-08-25 DIAGNOSIS — M62.830 MUSCLE SPASM OF BACK: ICD-10-CM

## 2021-08-25 PROCEDURE — 99215 PR OFFICE/OUTPT VISIT, EST, LEVL V, 40-54 MIN: ICD-10-PCS | Mod: 95,,, | Performed by: STUDENT IN AN ORGANIZED HEALTH CARE EDUCATION/TRAINING PROGRAM

## 2021-08-25 PROCEDURE — 99215 OFFICE O/P EST HI 40 MIN: CPT | Mod: 95,,, | Performed by: STUDENT IN AN ORGANIZED HEALTH CARE EDUCATION/TRAINING PROGRAM

## 2021-08-25 RX ORDER — TRAMADOL HYDROCHLORIDE 100 MG/1
100 TABLET, EXTENDED RELEASE ORAL DAILY
Qty: 30 TABLET | Refills: 0 | Status: SHIPPED | OUTPATIENT
Start: 2021-08-25 | End: 2021-09-20 | Stop reason: SDUPTHER

## 2021-08-26 RX ORDER — GABAPENTIN 100 MG/1
200 CAPSULE ORAL 3 TIMES DAILY
Qty: 180 CAPSULE | Refills: 11 | Status: SHIPPED | OUTPATIENT
Start: 2021-08-26 | End: 2021-09-09 | Stop reason: SDUPTHER

## 2021-09-09 DIAGNOSIS — R52 PAIN: ICD-10-CM

## 2021-09-09 RX ORDER — GABAPENTIN 100 MG/1
200 CAPSULE ORAL 3 TIMES DAILY
Qty: 180 CAPSULE | Refills: 11 | Status: SHIPPED | OUTPATIENT
Start: 2021-09-09 | End: 2021-11-02

## 2021-09-15 ENCOUNTER — TELEPHONE (OUTPATIENT)
Dept: PAIN MEDICINE | Facility: CLINIC | Age: 76
End: 2021-09-15

## 2021-09-20 ENCOUNTER — PATIENT MESSAGE (OUTPATIENT)
Dept: TRANSPLANT | Facility: CLINIC | Age: 76
End: 2021-09-20

## 2021-09-20 DIAGNOSIS — M62.830 MUSCLE SPASM OF BACK: ICD-10-CM

## 2021-09-20 DIAGNOSIS — R52 PAIN: ICD-10-CM

## 2021-09-20 DIAGNOSIS — R10.9 ABDOMINAL PAIN, UNSPECIFIED ABDOMINAL LOCATION: ICD-10-CM

## 2021-09-20 RX ORDER — TRAMADOL HYDROCHLORIDE 100 MG/1
100 TABLET, EXTENDED RELEASE ORAL DAILY
Qty: 30 TABLET | Refills: 0 | Status: SHIPPED | OUTPATIENT
Start: 2021-09-20 | End: 2021-10-25 | Stop reason: SDUPTHER

## 2021-09-22 ENCOUNTER — TELEPHONE (OUTPATIENT)
Dept: PAIN MEDICINE | Facility: CLINIC | Age: 76
End: 2021-09-22

## 2021-09-28 ENCOUNTER — IMMUNIZATION (OUTPATIENT)
Dept: PRIMARY CARE CLINIC | Facility: CLINIC | Age: 76
End: 2021-09-28
Payer: MEDICARE

## 2021-09-28 DIAGNOSIS — Z23 NEED FOR VACCINATION: Primary | ICD-10-CM

## 2021-09-28 PROCEDURE — 91300 COVID-19, MRNA, LNP-S, PF, 30 MCG/0.3 ML DOSE VACCINE: CPT | Mod: PBBFAC | Performed by: INTERNAL MEDICINE

## 2021-09-28 PROCEDURE — 0003A COVID-19, MRNA, LNP-S, PF, 30 MCG/0.3 ML DOSE VACCINE: CPT | Mod: CV19,PBBFAC | Performed by: INTERNAL MEDICINE

## 2021-09-29 ENCOUNTER — OFFICE VISIT (OUTPATIENT)
Dept: PAIN MEDICINE | Facility: CLINIC | Age: 76
End: 2021-09-29
Attending: ANESTHESIOLOGY
Payer: MEDICARE

## 2021-09-29 ENCOUNTER — HOSPITAL ENCOUNTER (OUTPATIENT)
Dept: RADIOLOGY | Facility: OTHER | Age: 76
Discharge: HOME | End: 2021-09-29
Attending: ANESTHESIOLOGY
Payer: MEDICARE

## 2021-09-29 ENCOUNTER — TELEPHONE (OUTPATIENT)
Dept: PAIN MEDICINE | Facility: CLINIC | Age: 76
End: 2021-09-29

## 2021-09-29 VITALS
RESPIRATION RATE: 18 BRPM | HEIGHT: 64 IN | HEART RATE: 86 BPM | DIASTOLIC BLOOD PRESSURE: 51 MMHG | SYSTOLIC BLOOD PRESSURE: 99 MMHG | WEIGHT: 119.94 LBS | BODY MASS INDEX: 20.47 KG/M2

## 2021-09-29 DIAGNOSIS — M80.88XA OSTEOPOROTIC COMPRESSION FRACTURE OF VERTEBRA, INITIAL ENCOUNTER: ICD-10-CM

## 2021-09-29 DIAGNOSIS — M80.88XA OSTEOPOROTIC COMPRESSION FRACTURE OF VERTEBRA, INITIAL ENCOUNTER: Primary | ICD-10-CM

## 2021-09-29 PROCEDURE — A9503 TC99M MEDRONATE: HCPCS

## 2021-09-29 PROCEDURE — 99214 OFFICE O/P EST MOD 30 MIN: CPT | Mod: PBBFAC,25 | Performed by: ANESTHESIOLOGY

## 2021-09-29 PROCEDURE — 99999 PR PBB SHADOW E&M-EST. PATIENT-LVL IV: CPT | Mod: PBBFAC,,, | Performed by: ANESTHESIOLOGY

## 2021-09-29 PROCEDURE — 99999 PR PBB SHADOW E&M-EST. PATIENT-LVL IV: ICD-10-PCS | Mod: PBBFAC,,, | Performed by: ANESTHESIOLOGY

## 2021-09-29 PROCEDURE — 78803 NM BONE SCAN SPECT: ICD-10-PCS | Mod: 26,,, | Performed by: RADIOLOGY

## 2021-09-29 PROCEDURE — 78803 RP LOCLZJ TUM SPECT 1 AREA: CPT | Mod: 26,,, | Performed by: RADIOLOGY

## 2021-09-29 PROCEDURE — 99213 OFFICE O/P EST LOW 20 MIN: CPT | Mod: S$PBB,,, | Performed by: ANESTHESIOLOGY

## 2021-09-29 PROCEDURE — 78803 RP LOCLZJ TUM SPECT 1 AREA: CPT | Mod: TC

## 2021-09-29 PROCEDURE — 99213 PR OFFICE/OUTPT VISIT, EST, LEVL III, 20-29 MIN: ICD-10-PCS | Mod: S$PBB,,, | Performed by: ANESTHESIOLOGY

## 2021-10-07 ENCOUNTER — TELEPHONE (OUTPATIENT)
Dept: PAIN MEDICINE | Facility: CLINIC | Age: 76
End: 2021-10-07

## 2021-10-11 ENCOUNTER — DOCUMENTATION ONLY (OUTPATIENT)
Dept: TRANSPLANT | Facility: CLINIC | Age: 76
End: 2021-10-11

## 2021-10-11 ENCOUNTER — TELEPHONE (OUTPATIENT)
Dept: TRANSPLANT | Facility: CLINIC | Age: 76
End: 2021-10-11

## 2021-10-11 DIAGNOSIS — Z01.818 PREOP TESTING: Primary | ICD-10-CM

## 2021-10-11 DIAGNOSIS — R06.02 SHORTNESS OF BREATH: Primary | ICD-10-CM

## 2021-10-11 DIAGNOSIS — S22.000A COMPRESSION FRACTURE OF THORACIC VERTEBRA, UNSPECIFIED THORACIC VERTEBRAL LEVEL, INITIAL ENCOUNTER: Primary | ICD-10-CM

## 2021-10-12 ENCOUNTER — HOSPITAL ENCOUNTER (OUTPATIENT)
Dept: CARDIOLOGY | Facility: HOSPITAL | Age: 76
Discharge: HOME | End: 2021-10-12
Attending: INTERNAL MEDICINE
Payer: MEDICARE

## 2021-10-12 ENCOUNTER — TELEPHONE (OUTPATIENT)
Dept: TRANSPLANT | Facility: CLINIC | Age: 76
End: 2021-10-12

## 2021-10-12 VITALS — HEIGHT: 63 IN | BODY MASS INDEX: 21.09 KG/M2 | HEART RATE: 70 BPM | WEIGHT: 119 LBS

## 2021-10-12 DIAGNOSIS — R06.02 SHORTNESS OF BREATH: ICD-10-CM

## 2021-10-12 LAB
ASCENDING AORTA: 2.76 CM
AV INDEX (PROSTH): 0.47
AV MEAN GRADIENT: 7 MMHG
AV PEAK GRADIENT: 14 MMHG
AV VALVE AREA: 1.96 CM2
AV VELOCITY RATIO: 0.46
BSA FOR ECHO PROCEDURE: 1.55 M2
CV ECHO LV RWT: 0.3 CM
DOP CALC AO PEAK VEL: 1.86 M/S
DOP CALC AO VTI: 36.64 CM
DOP CALC LVOT AREA: 4.2 CM2
DOP CALC LVOT DIAMETER: 2.3 CM
DOP CALC LVOT PEAK VEL: 0.86 M/S
DOP CALC LVOT STROKE VOLUME: 71.72 CM3
DOP CALCLVOT PEAK VEL VTI: 17.27 CM
E WAVE DECELERATION TIME: 333.38 MSEC
E/A RATIO: 0.87
E/E' RATIO: 16 M/S
ECHO LV POSTERIOR WALL: 0.66 CM (ref 0.6–1.1)
EJECTION FRACTION: 60 %
FRACTIONAL SHORTENING: 37 % (ref 28–44)
INTERVENTRICULAR SEPTUM: 0.7 CM (ref 0.6–1.1)
LA MAJOR: 4.92 CM
LA MINOR: 4.84 CM
LA WIDTH: 3.87 CM
LEFT ATRIUM SIZE: 4.97 CM
LEFT ATRIUM VOLUME INDEX MOD: 33.5 ML/M2
LEFT ATRIUM VOLUME INDEX: 51.5 ML/M2
LEFT ATRIUM VOLUME MOD: 51.94 CM3
LEFT ATRIUM VOLUME: 79.78 CM3
LEFT INTERNAL DIMENSION IN SYSTOLE: 2.8 CM (ref 2.1–4)
LEFT VENTRICLE DIASTOLIC VOLUME INDEX: 57.19 ML/M2
LEFT VENTRICLE DIASTOLIC VOLUME: 88.64 ML
LEFT VENTRICLE MASS INDEX: 58 G/M2
LEFT VENTRICLE SYSTOLIC VOLUME INDEX: 19 ML/M2
LEFT VENTRICLE SYSTOLIC VOLUME: 29.48 ML
LEFT VENTRICULAR INTERNAL DIMENSION IN DIASTOLE: 4.42 CM (ref 3.5–6)
LEFT VENTRICULAR MASS: 89.42 G
LV LATERAL E/E' RATIO: 11 M/S
LV SEPTAL E/E' RATIO: 29.33 M/S
MV A" WAVE DURATION": 10.28 MSEC
MV PEAK A VEL: 1.01 M/S
MV PEAK E VEL: 0.88 M/S
MV STENOSIS PRESSURE HALF TIME: 96.68 MS
MV VALVE AREA P 1/2 METHOD: 2.28 CM2
PISA TR MAX VEL: 4.58 M/S
PULM VEIN S/D RATIO: 1.51
PV PEAK D VEL: 0.35 M/S
PV PEAK S VEL: 0.53 M/S
RA MAJOR: 5.32 CM
RA PRESSURE: 15 MMHG
RA WIDTH: 6.31 CM
RIGHT VENTRICULAR END-DIASTOLIC DIMENSION: 6.17 CM
RV TISSUE DOPPLER FREE WALL SYSTOLIC VELOCITY 1 (APICAL 4 CHAMBER VIEW): 8.27 CM/S
SINUS: 2.94 CM
STJ: 2.35 CM
TDI LATERAL: 0.08 M/S
TDI SEPTAL: 0.03 M/S
TDI: 0.06 M/S
TR MAX PG: 84 MMHG
TRICUSPID ANNULAR PLANE SYSTOLIC EXCURSION: 1.76 CM
TV REST PULMONARY ARTERY PRESSURE: 99 MMHG

## 2021-10-12 PROCEDURE — 93306 TTE W/DOPPLER COMPLETE: CPT

## 2021-10-12 PROCEDURE — 93306 TTE W/DOPPLER COMPLETE: CPT | Mod: 26,,, | Performed by: INTERNAL MEDICINE

## 2021-10-12 PROCEDURE — 93306 ECHO (CUPID ONLY): ICD-10-PCS | Mod: 26,,, | Performed by: INTERNAL MEDICINE

## 2021-10-13 ENCOUNTER — OFFICE VISIT (OUTPATIENT)
Dept: TRANSPLANT | Facility: CLINIC | Age: 76
End: 2021-10-13
Payer: MEDICARE

## 2021-10-13 ENCOUNTER — TELEPHONE (OUTPATIENT)
Dept: PAIN MEDICINE | Facility: CLINIC | Age: 76
End: 2021-10-13

## 2021-10-13 VITALS
BODY MASS INDEX: 22.3 KG/M2 | OXYGEN SATURATION: 74 % | SYSTOLIC BLOOD PRESSURE: 101 MMHG | WEIGHT: 125.88 LBS | DIASTOLIC BLOOD PRESSURE: 54 MMHG | HEIGHT: 63 IN | HEART RATE: 80 BPM

## 2021-10-13 DIAGNOSIS — Q26.3 PARTIAL ANOMALOUS PULMONARY VENOUS CONNECTION (PAPVC): ICD-10-CM

## 2021-10-13 DIAGNOSIS — I27.20 PULMONARY HTN: Primary | ICD-10-CM

## 2021-10-13 DIAGNOSIS — I47.10 SVT (SUPRAVENTRICULAR TACHYCARDIA): ICD-10-CM

## 2021-10-13 DIAGNOSIS — J96.11 CHRONIC RESPIRATORY FAILURE WITH HYPOXIA: ICD-10-CM

## 2021-10-13 DIAGNOSIS — I50.810 RVF (RIGHT VENTRICULAR FAILURE): ICD-10-CM

## 2021-10-13 PROCEDURE — 99213 OFFICE O/P EST LOW 20 MIN: CPT | Mod: PBBFAC | Performed by: INTERNAL MEDICINE

## 2021-10-13 PROCEDURE — 99215 PR OFFICE/OUTPT VISIT, EST, LEVL V, 40-54 MIN: ICD-10-PCS | Mod: S$PBB,,, | Performed by: INTERNAL MEDICINE

## 2021-10-13 PROCEDURE — 99215 OFFICE O/P EST HI 40 MIN: CPT | Mod: S$PBB,,, | Performed by: INTERNAL MEDICINE

## 2021-10-13 PROCEDURE — 99999 PR PBB SHADOW E&M-EST. PATIENT-LVL III: CPT | Mod: PBBFAC,,, | Performed by: INTERNAL MEDICINE

## 2021-10-13 PROCEDURE — 99999 PR PBB SHADOW E&M-EST. PATIENT-LVL III: ICD-10-PCS | Mod: PBBFAC,,, | Performed by: INTERNAL MEDICINE

## 2021-10-13 RX ORDER — BUMETANIDE 1 MG/1
TABLET ORAL
Qty: 180 TABLET | Refills: 3 | Status: SHIPPED | OUTPATIENT
Start: 2021-10-13 | End: 2021-11-23

## 2021-10-18 ENCOUNTER — TELEPHONE (OUTPATIENT)
Dept: TRANSPLANT | Facility: CLINIC | Age: 76
End: 2021-10-18

## 2021-10-18 ENCOUNTER — PATIENT MESSAGE (OUTPATIENT)
Dept: TRANSPLANT | Facility: CLINIC | Age: 76
End: 2021-10-18
Payer: MEDICARE

## 2021-10-21 ENCOUNTER — HOSPITAL ENCOUNTER (OUTPATIENT)
Facility: OTHER | Age: 76
Discharge: HOME | End: 2021-10-21
Attending: ANESTHESIOLOGY | Admitting: ANESTHESIOLOGY
Payer: MEDICARE

## 2021-10-21 DIAGNOSIS — S22.000A COMPRESSION FRACTURE OF THORACIC VERTEBRA, UNSPECIFIED THORACIC VERTEBRAL LEVEL, INITIAL ENCOUNTER: ICD-10-CM

## 2021-10-21 DIAGNOSIS — M80.88XG OSTEOPOROTIC COMPRESSION FRACTURE OF VERTEBRA WITH DELAYED HEALING, SUBSEQUENT ENCOUNTER: Primary | ICD-10-CM

## 2021-10-21 DIAGNOSIS — S22.080G COMPRESSION FRACTURE OF T12 VERTEBRA WITH DELAYED HEALING, SUBSEQUENT ENCOUNTER: ICD-10-CM

## 2021-10-21 PROCEDURE — C1726 CATH, BAL DIL, NON-VASCULAR: HCPCS | Performed by: ANESTHESIOLOGY

## 2021-10-21 PROCEDURE — 99152 MOD SED SAME PHYS/QHP 5/>YRS: CPT | Mod: ,,, | Performed by: ANESTHESIOLOGY

## 2021-10-21 PROCEDURE — 99152 PR MOD CONSCIOUS SEDATION, SAME PHYS, 5+ YRS, FIRST 15 MIN: ICD-10-PCS | Mod: ,,, | Performed by: ANESTHESIOLOGY

## 2021-10-21 PROCEDURE — 63600175 PHARM REV CODE 636 W HCPCS: Performed by: ANESTHESIOLOGY

## 2021-10-21 PROCEDURE — 22513 PERQ VERTEBRAL AUGMENTATION: CPT | Mod: ,,, | Performed by: ANESTHESIOLOGY

## 2021-10-21 PROCEDURE — 22513 PERQ VERTEBRAL AUGMENTATION: CPT | Mod: 50 | Performed by: ANESTHESIOLOGY

## 2021-10-21 PROCEDURE — 62321 NJX INTERLAMINAR CRV/THRC: CPT | Mod: 59 | Performed by: ANESTHESIOLOGY

## 2021-10-21 PROCEDURE — 25000003 PHARM REV CODE 250: Performed by: ANESTHESIOLOGY

## 2021-10-21 PROCEDURE — 25500020 PHARM REV CODE 255: Performed by: ANESTHESIOLOGY

## 2021-10-21 PROCEDURE — 99152 MOD SED SAME PHYS/QHP 5/>YRS: CPT | Performed by: ANESTHESIOLOGY

## 2021-10-21 PROCEDURE — C1713 ANCHOR/SCREW BN/BN,TIS/BN: HCPCS | Performed by: ANESTHESIOLOGY

## 2021-10-21 PROCEDURE — 22513 PR PERQ VERTEBRAL AUGMENTATION UNI/BILAT THORACIC: ICD-10-PCS | Mod: ,,, | Performed by: ANESTHESIOLOGY

## 2021-10-21 DEVICE — BONE CEMENT CX01B XPEDE W MIXER US
Type: IMPLANTABLE DEVICE | Site: SPINE THORACIC | Status: FUNCTIONAL
Brand: KYPHON® XPEDE™ BONE CEMENT AND KYPHON® MIXER PACK

## 2021-10-21 RX ORDER — CEFAZOLIN SODIUM 1 G/3ML
2 INJECTION, POWDER, FOR SOLUTION INTRAMUSCULAR; INTRAVENOUS
Status: DISCONTINUED | OUTPATIENT
Start: 2021-10-21 | End: 2021-10-21 | Stop reason: HOSPADM

## 2021-10-21 RX ORDER — FENTANYL CITRATE 50 UG/ML
INJECTION, SOLUTION INTRAMUSCULAR; INTRAVENOUS
Status: DISCONTINUED | OUTPATIENT
Start: 2021-10-21 | End: 2021-10-21 | Stop reason: HOSPADM

## 2021-10-21 RX ORDER — SODIUM CHLORIDE 9 MG/ML
INJECTION, SOLUTION INTRAVENOUS CONTINUOUS
Status: DISCONTINUED | OUTPATIENT
Start: 2021-10-21 | End: 2021-10-21 | Stop reason: HOSPADM

## 2021-10-21 RX ORDER — ONDANSETRON 2 MG/ML
4 INJECTION INTRAMUSCULAR; INTRAVENOUS ONCE
Status: DISCONTINUED | OUTPATIENT
Start: 2021-10-21 | End: 2021-10-21

## 2021-10-21 RX ORDER — CEFAZOLIN SODIUM 1 G/3ML
INJECTION, POWDER, FOR SOLUTION INTRAMUSCULAR; INTRAVENOUS
Status: DISCONTINUED | OUTPATIENT
Start: 2021-10-21 | End: 2021-10-21 | Stop reason: HOSPADM

## 2021-10-21 RX ORDER — BUPIVACAINE HYDROCHLORIDE 2.5 MG/ML
INJECTION, SOLUTION EPIDURAL; INFILTRATION; INTRACAUDAL
Status: DISCONTINUED | OUTPATIENT
Start: 2021-10-21 | End: 2021-10-21 | Stop reason: HOSPADM

## 2021-10-21 RX ORDER — LIDOCAINE HYDROCHLORIDE 10 MG/ML
INJECTION INFILTRATION; PERINEURAL
Status: DISCONTINUED | OUTPATIENT
Start: 2021-10-21 | End: 2021-10-21 | Stop reason: HOSPADM

## 2021-10-21 RX ORDER — ONDANSETRON 4 MG/1
4 TABLET, ORALLY DISINTEGRATING ORAL ONCE
Status: COMPLETED | OUTPATIENT
Start: 2021-10-21 | End: 2021-10-21

## 2021-10-21 RX ADMIN — ONDANSETRON 4 MG: 4 TABLET, ORALLY DISINTEGRATING ORAL at 12:10

## 2021-10-22 VITALS
OXYGEN SATURATION: 75 % | SYSTOLIC BLOOD PRESSURE: 87 MMHG | DIASTOLIC BLOOD PRESSURE: 51 MMHG | RESPIRATION RATE: 16 BRPM | HEART RATE: 66 BPM | TEMPERATURE: 98 F

## 2021-10-25 DIAGNOSIS — R52 PAIN: ICD-10-CM

## 2021-10-25 DIAGNOSIS — M79.2 NEUROPATHIC PAIN: ICD-10-CM

## 2021-10-25 DIAGNOSIS — M62.830 MUSCLE SPASM OF BACK: ICD-10-CM

## 2021-10-25 DIAGNOSIS — R10.9 ABDOMINAL PAIN, UNSPECIFIED ABDOMINAL LOCATION: ICD-10-CM

## 2021-10-25 RX ORDER — TRAMADOL HYDROCHLORIDE 100 MG/1
100 TABLET, EXTENDED RELEASE ORAL DAILY
Qty: 30 TABLET | Refills: 0 | Status: SHIPPED | OUTPATIENT
Start: 2021-10-25 | End: 2021-11-17

## 2021-10-25 RX ORDER — TRAMADOL HYDROCHLORIDE 50 MG/1
TABLET ORAL
Qty: 90 TABLET | Refills: 0 | Status: SHIPPED | OUTPATIENT
Start: 2021-10-25 | End: 2021-11-17

## 2021-10-26 ENCOUNTER — TELEPHONE (OUTPATIENT)
Dept: PALLIATIVE MEDICINE | Facility: CLINIC | Age: 76
End: 2021-10-26
Payer: MEDICARE

## 2021-10-27 ENCOUNTER — OFFICE VISIT (OUTPATIENT)
Dept: PALLIATIVE MEDICINE | Facility: CLINIC | Age: 76
End: 2021-10-27
Payer: MEDICARE

## 2021-10-27 DIAGNOSIS — R06.00 DYSPNEA, UNSPECIFIED TYPE: ICD-10-CM

## 2021-10-27 DIAGNOSIS — M62.830 MUSCLE SPASM OF BACK: ICD-10-CM

## 2021-10-27 DIAGNOSIS — Z51.5 ENCOUNTER FOR PALLIATIVE CARE: ICD-10-CM

## 2021-10-27 DIAGNOSIS — R52 PAIN: Primary | ICD-10-CM

## 2021-10-27 DIAGNOSIS — R11.0 NAUSEA: ICD-10-CM

## 2021-10-27 PROCEDURE — 99214 PR OFFICE/OUTPT VISIT, EST, LEVL IV, 30-39 MIN: ICD-10-PCS | Mod: 95,,, | Performed by: STUDENT IN AN ORGANIZED HEALTH CARE EDUCATION/TRAINING PROGRAM

## 2021-10-27 PROCEDURE — 99214 OFFICE O/P EST MOD 30 MIN: CPT | Mod: 95,,, | Performed by: STUDENT IN AN ORGANIZED HEALTH CARE EDUCATION/TRAINING PROGRAM

## 2021-10-27 RX ORDER — ACETAMINOPHEN 500 MG
1000 TABLET ORAL EVERY 8 HOURS PRN
Qty: 90 TABLET | Refills: 1 | Status: SHIPPED | OUTPATIENT
Start: 2021-10-27

## 2021-11-03 ENCOUNTER — TELEPHONE (OUTPATIENT)
Dept: PAIN MEDICINE | Facility: CLINIC | Age: 76
End: 2021-11-03
Payer: MEDICARE

## 2021-11-03 LAB
COMMENT: NORMAL
FINAL PATHOLOGIC DIAGNOSIS: NORMAL
GROSS: NORMAL
Lab: NORMAL

## 2021-11-04 ENCOUNTER — TELEPHONE (OUTPATIENT)
Dept: PAIN MEDICINE | Facility: CLINIC | Age: 76
End: 2021-11-04
Payer: MEDICARE

## 2021-11-04 ENCOUNTER — OFFICE VISIT (OUTPATIENT)
Dept: PAIN MEDICINE | Facility: CLINIC | Age: 76
End: 2021-11-04
Attending: ANESTHESIOLOGY
Payer: MEDICARE

## 2021-11-04 VITALS
SYSTOLIC BLOOD PRESSURE: 108 MMHG | HEART RATE: 86 BPM | OXYGEN SATURATION: 98 % | BODY MASS INDEX: 21.62 KG/M2 | HEIGHT: 63 IN | DIASTOLIC BLOOD PRESSURE: 51 MMHG | WEIGHT: 122 LBS | RESPIRATION RATE: 18 BRPM | TEMPERATURE: 98 F

## 2021-11-04 DIAGNOSIS — M47.816 FACET ARTHRITIS, DEGENERATIVE, LUMBAR SPINE: Primary | ICD-10-CM

## 2021-11-04 DIAGNOSIS — M47.816 SPONDYLOSIS OF LUMBAR REGION WITHOUT MYELOPATHY OR RADICULOPATHY: ICD-10-CM

## 2021-11-04 DIAGNOSIS — M51.36 DDD (DEGENERATIVE DISC DISEASE), LUMBAR: ICD-10-CM

## 2021-11-04 PROCEDURE — 99999 PR PBB SHADOW E&M-EST. PATIENT-LVL V: ICD-10-PCS | Mod: PBBFAC,,, | Performed by: ANESTHESIOLOGY

## 2021-11-04 PROCEDURE — 99024 PR POST-OP FOLLOW-UP VISIT: ICD-10-PCS | Mod: POP,,, | Performed by: ANESTHESIOLOGY

## 2021-11-04 PROCEDURE — 99999 PR PBB SHADOW E&M-EST. PATIENT-LVL V: CPT | Mod: PBBFAC,,, | Performed by: ANESTHESIOLOGY

## 2021-11-04 PROCEDURE — 99024 POSTOP FOLLOW-UP VISIT: CPT | Mod: POP,,, | Performed by: ANESTHESIOLOGY

## 2021-11-04 PROCEDURE — 99215 OFFICE O/P EST HI 40 MIN: CPT | Mod: PBBFAC | Performed by: ANESTHESIOLOGY

## 2021-11-05 ENCOUNTER — TELEPHONE (OUTPATIENT)
Dept: TRANSPLANT | Facility: CLINIC | Age: 76
End: 2021-11-05
Payer: MEDICARE

## 2021-11-16 ENCOUNTER — TELEPHONE (OUTPATIENT)
Dept: PALLIATIVE MEDICINE | Facility: CLINIC | Age: 76
End: 2021-11-16
Payer: MEDICARE

## 2021-11-16 DIAGNOSIS — Z79.899 POLYPHARMACY: Primary | ICD-10-CM

## 2021-11-16 DIAGNOSIS — Z79.01 ANTICOAGULATION ADEQUATE: ICD-10-CM

## 2021-11-16 DIAGNOSIS — I27.9 CHRONIC PULMONARY HEART DISEASE: ICD-10-CM

## 2021-11-17 ENCOUNTER — PATIENT MESSAGE (OUTPATIENT)
Dept: TRANSPLANT | Facility: CLINIC | Age: 76
End: 2021-11-17
Payer: MEDICARE

## 2021-11-17 ENCOUNTER — TELEPHONE (OUTPATIENT)
Dept: TRANSPLANT | Facility: CLINIC | Age: 76
End: 2021-11-17
Payer: MEDICARE

## 2021-11-17 ENCOUNTER — OFFICE VISIT (OUTPATIENT)
Dept: PALLIATIVE MEDICINE | Facility: CLINIC | Age: 76
End: 2021-11-17
Payer: MEDICARE

## 2021-11-17 DIAGNOSIS — R11.0 NAUSEA: ICD-10-CM

## 2021-11-17 DIAGNOSIS — I27.9 CHRONIC PULMONARY HEART DISEASE: ICD-10-CM

## 2021-11-17 DIAGNOSIS — R52 PAIN: Primary | ICD-10-CM

## 2021-11-17 DIAGNOSIS — I27.9 CHRONIC PULMONARY HEART DISEASE: Primary | ICD-10-CM

## 2021-11-17 DIAGNOSIS — R06.00 DYSPNEA, UNSPECIFIED TYPE: ICD-10-CM

## 2021-11-17 PROCEDURE — 99214 PR OFFICE/OUTPT VISIT, EST, LEVL IV, 30-39 MIN: ICD-10-PCS | Mod: 95,,, | Performed by: STUDENT IN AN ORGANIZED HEALTH CARE EDUCATION/TRAINING PROGRAM

## 2021-11-17 PROCEDURE — 99214 OFFICE O/P EST MOD 30 MIN: CPT | Mod: 95,,, | Performed by: STUDENT IN AN ORGANIZED HEALTH CARE EDUCATION/TRAINING PROGRAM

## 2021-11-18 ENCOUNTER — OFFICE VISIT (OUTPATIENT)
Dept: PALLIATIVE MEDICINE | Facility: CLINIC | Age: 76
End: 2021-11-18
Payer: MEDICARE

## 2021-11-18 ENCOUNTER — PATIENT MESSAGE (OUTPATIENT)
Dept: PALLIATIVE MEDICINE | Facility: CLINIC | Age: 76
End: 2021-11-18

## 2021-11-18 DIAGNOSIS — R06.00 DYSPNEA, UNSPECIFIED TYPE: ICD-10-CM

## 2021-11-18 DIAGNOSIS — Z79.01 ANTICOAGULATION ADEQUATE: Primary | ICD-10-CM

## 2021-11-18 DIAGNOSIS — Z79.899 POLYPHARMACY: ICD-10-CM

## 2021-11-18 DIAGNOSIS — I27.9 CHRONIC PULMONARY HEART DISEASE: Primary | ICD-10-CM

## 2021-11-18 PROCEDURE — 99213 OFFICE O/P EST LOW 20 MIN: CPT | Mod: 95,,, | Performed by: STUDENT IN AN ORGANIZED HEALTH CARE EDUCATION/TRAINING PROGRAM

## 2021-11-18 PROCEDURE — 99497 PR ADVNCD CARE PLAN 30 MIN: ICD-10-PCS | Mod: 95,,, | Performed by: STUDENT IN AN ORGANIZED HEALTH CARE EDUCATION/TRAINING PROGRAM

## 2021-11-18 PROCEDURE — 99497 ADVNCD CARE PLAN 30 MIN: CPT | Mod: 95,,, | Performed by: STUDENT IN AN ORGANIZED HEALTH CARE EDUCATION/TRAINING PROGRAM

## 2021-11-18 PROCEDURE — 99213 PR OFFICE/OUTPT VISIT, EST, LEVL III, 20-29 MIN: ICD-10-PCS | Mod: 95,,, | Performed by: STUDENT IN AN ORGANIZED HEALTH CARE EDUCATION/TRAINING PROGRAM

## 2021-11-23 ENCOUNTER — OFFICE VISIT (OUTPATIENT)
Dept: TRANSPLANT | Facility: CLINIC | Age: 76
End: 2021-11-23
Payer: MEDICARE

## 2021-11-23 ENCOUNTER — TELEPHONE (OUTPATIENT)
Dept: TRANSPLANT | Facility: CLINIC | Age: 76
End: 2021-11-23
Payer: MEDICARE

## 2021-11-23 ENCOUNTER — LAB VISIT (OUTPATIENT)
Dept: LAB | Facility: HOSPITAL | Age: 76
End: 2021-11-23
Payer: MEDICARE

## 2021-11-23 VITALS
SYSTOLIC BLOOD PRESSURE: 118 MMHG | HEART RATE: 80 BPM | OXYGEN SATURATION: 75 % | HEIGHT: 63 IN | BODY MASS INDEX: 21.95 KG/M2 | WEIGHT: 123.88 LBS | DIASTOLIC BLOOD PRESSURE: 59 MMHG

## 2021-11-23 DIAGNOSIS — Z79.899 POLYPHARMACY: ICD-10-CM

## 2021-11-23 DIAGNOSIS — I50.810 RVF (RIGHT VENTRICULAR FAILURE): ICD-10-CM

## 2021-11-23 DIAGNOSIS — R06.82 TACHYPNEA: ICD-10-CM

## 2021-11-23 DIAGNOSIS — Q24.9 ADULT CONGENITAL HEART DISEASE: ICD-10-CM

## 2021-11-23 DIAGNOSIS — I27.20 PULMONARY HTN: ICD-10-CM

## 2021-11-23 DIAGNOSIS — I27.9 CHRONIC PULMONARY HEART DISEASE: Primary | ICD-10-CM

## 2021-11-23 LAB
ALBUMIN SERPL BCP-MCNC: 3.9 G/DL (ref 3.5–5.2)
ALP SERPL-CCNC: 45 U/L (ref 55–135)
ALT SERPL W/O P-5'-P-CCNC: 17 U/L (ref 10–44)
ANION GAP SERPL CALC-SCNC: 8 MMOL/L (ref 8–16)
AST SERPL-CCNC: 16 U/L (ref 10–40)
BASOPHILS # BLD AUTO: 0.08 K/UL (ref 0–0.2)
BASOPHILS NFR BLD: 2.3 % (ref 0–1.9)
BILIRUB SERPL-MCNC: 0.9 MG/DL (ref 0.1–1)
BNP SERPL-MCNC: 863 PG/ML (ref 0–99)
BUN SERPL-MCNC: 22 MG/DL (ref 8–23)
CALCIUM SERPL-MCNC: 10.3 MG/DL (ref 8.7–10.5)
CHLORIDE SERPL-SCNC: 101 MMOL/L (ref 95–110)
CO2 SERPL-SCNC: 30 MMOL/L (ref 23–29)
CREAT SERPL-MCNC: 1.1 MG/DL (ref 0.5–1.4)
DIFFERENTIAL METHOD: ABNORMAL
EOSINOPHIL # BLD AUTO: 0.1 K/UL (ref 0–0.5)
EOSINOPHIL NFR BLD: 2.6 % (ref 0–8)
ERYTHROCYTE [DISTWIDTH] IN BLOOD BY AUTOMATED COUNT: 18.7 % (ref 11.5–14.5)
EST. GFR  (AFRICAN AMERICAN): 56.8 ML/MIN/1.73 M^2
EST. GFR  (NON AFRICAN AMERICAN): 49.2 ML/MIN/1.73 M^2
GLUCOSE SERPL-MCNC: 88 MG/DL (ref 70–110)
HCT VFR BLD AUTO: 37.1 % (ref 37–48.5)
HGB BLD-MCNC: 10.3 G/DL (ref 12–16)
IMM GRANULOCYTES # BLD AUTO: 0.01 K/UL (ref 0–0.04)
IMM GRANULOCYTES NFR BLD AUTO: 0.3 % (ref 0–0.5)
LYMPHOCYTES # BLD AUTO: 0.7 K/UL (ref 1–4.8)
LYMPHOCYTES NFR BLD: 21 % (ref 18–48)
MAGNESIUM SERPL-MCNC: 2 MG/DL (ref 1.6–2.6)
MCH RBC QN AUTO: 21 PG (ref 27–31)
MCHC RBC AUTO-ENTMCNC: 27.8 G/DL (ref 32–36)
MCV RBC AUTO: 76 FL (ref 82–98)
MONOCYTES # BLD AUTO: 0.4 K/UL (ref 0.3–1)
MONOCYTES NFR BLD: 11.1 % (ref 4–15)
NEUTROPHILS # BLD AUTO: 2.2 K/UL (ref 1.8–7.7)
NEUTROPHILS NFR BLD: 62.7 % (ref 38–73)
NRBC BLD-RTO: 0 /100 WBC
PLATELET # BLD AUTO: 263 K/UL (ref 150–450)
PMV BLD AUTO: 9.1 FL (ref 9.2–12.9)
POTASSIUM SERPL-SCNC: 3.9 MMOL/L (ref 3.5–5.1)
PROT SERPL-MCNC: 7 G/DL (ref 6–8.4)
RBC # BLD AUTO: 4.91 M/UL (ref 4–5.4)
SODIUM SERPL-SCNC: 139 MMOL/L (ref 136–145)
WBC # BLD AUTO: 3.52 K/UL (ref 3.9–12.7)

## 2021-11-23 PROCEDURE — 85025 COMPLETE CBC W/AUTO DIFF WBC: CPT | Performed by: INTERNAL MEDICINE

## 2021-11-23 PROCEDURE — 99214 OFFICE O/P EST MOD 30 MIN: CPT | Mod: S$PBB,,, | Performed by: INTERNAL MEDICINE

## 2021-11-23 PROCEDURE — 83880 ASSAY OF NATRIURETIC PEPTIDE: CPT | Performed by: INTERNAL MEDICINE

## 2021-11-23 PROCEDURE — 83735 ASSAY OF MAGNESIUM: CPT | Performed by: INTERNAL MEDICINE

## 2021-11-23 PROCEDURE — 80053 COMPREHEN METABOLIC PANEL: CPT | Performed by: INTERNAL MEDICINE

## 2021-11-23 PROCEDURE — 99214 PR OFFICE/OUTPT VISIT, EST, LEVL IV, 30-39 MIN: ICD-10-PCS | Mod: S$PBB,,, | Performed by: INTERNAL MEDICINE

## 2021-11-23 PROCEDURE — 99213 OFFICE O/P EST LOW 20 MIN: CPT | Mod: PBBFAC | Performed by: INTERNAL MEDICINE

## 2021-11-23 PROCEDURE — 36415 COLL VENOUS BLD VENIPUNCTURE: CPT | Performed by: INTERNAL MEDICINE

## 2021-11-23 PROCEDURE — 99999 PR PBB SHADOW E&M-EST. PATIENT-LVL III: ICD-10-PCS | Mod: PBBFAC,,, | Performed by: INTERNAL MEDICINE

## 2021-11-23 PROCEDURE — 99999 PR PBB SHADOW E&M-EST. PATIENT-LVL III: CPT | Mod: PBBFAC,,, | Performed by: INTERNAL MEDICINE

## 2021-11-29 ENCOUNTER — TELEPHONE (OUTPATIENT)
Dept: TRANSPLANT | Facility: CLINIC | Age: 76
End: 2021-11-29
Payer: MEDICARE

## 2021-11-30 RX ORDER — POTASSIUM CHLORIDE 20 MEQ/1
20 TABLET, EXTENDED RELEASE ORAL DAILY
Qty: 30 TABLET | Refills: 11 | Status: SHIPPED | OUTPATIENT
Start: 2021-11-30

## 2021-11-30 RX ORDER — BUMETANIDE 1 MG/1
TABLET ORAL
Qty: 120 TABLET | Refills: 3 | Status: SHIPPED | OUTPATIENT
Start: 2021-11-30 | End: 2022-01-12 | Stop reason: ALTCHOICE

## 2021-12-03 ENCOUNTER — TELEPHONE (OUTPATIENT)
Dept: PAIN MEDICINE | Facility: CLINIC | Age: 76
End: 2021-12-03
Payer: MEDICARE

## 2021-12-06 ENCOUNTER — TELEPHONE (OUTPATIENT)
Dept: TRANSPLANT | Facility: CLINIC | Age: 76
End: 2021-12-06
Payer: MEDICARE

## 2021-12-06 ENCOUNTER — OFFICE VISIT (OUTPATIENT)
Dept: PAIN MEDICINE | Facility: CLINIC | Age: 76
End: 2021-12-06
Payer: MEDICARE

## 2021-12-06 VITALS
HEART RATE: 74 BPM | DIASTOLIC BLOOD PRESSURE: 50 MMHG | SYSTOLIC BLOOD PRESSURE: 100 MMHG | TEMPERATURE: 97 F | WEIGHT: 120 LBS | RESPIRATION RATE: 20 BRPM | OXYGEN SATURATION: 98 % | BODY MASS INDEX: 21.26 KG/M2 | HEIGHT: 63 IN

## 2021-12-06 DIAGNOSIS — M47.816 LUMBAR SPONDYLOSIS: Primary | ICD-10-CM

## 2021-12-06 PROCEDURE — 99999 PR PBB SHADOW E&M-EST. PATIENT-LVL V: CPT | Mod: PBBFAC,,, | Performed by: NURSE PRACTITIONER

## 2021-12-06 PROCEDURE — 99213 OFFICE O/P EST LOW 20 MIN: CPT | Mod: S$PBB,,, | Performed by: NURSE PRACTITIONER

## 2021-12-06 PROCEDURE — 99213 PR OFFICE/OUTPT VISIT, EST, LEVL III, 20-29 MIN: ICD-10-PCS | Mod: S$PBB,,, | Performed by: NURSE PRACTITIONER

## 2021-12-06 PROCEDURE — 99215 OFFICE O/P EST HI 40 MIN: CPT | Mod: PBBFAC | Performed by: NURSE PRACTITIONER

## 2021-12-06 PROCEDURE — 99999 PR PBB SHADOW E&M-EST. PATIENT-LVL V: ICD-10-PCS | Mod: PBBFAC,,, | Performed by: NURSE PRACTITIONER

## 2021-12-09 ENCOUNTER — TELEPHONE (OUTPATIENT)
Dept: PALLIATIVE MEDICINE | Facility: CLINIC | Age: 76
End: 2021-12-09
Payer: MEDICARE

## 2021-12-09 ENCOUNTER — OFFICE VISIT (OUTPATIENT)
Dept: PALLIATIVE MEDICINE | Facility: CLINIC | Age: 76
End: 2021-12-09
Payer: MEDICARE

## 2021-12-09 ENCOUNTER — TELEPHONE (OUTPATIENT)
Dept: PALLIATIVE MEDICINE | Facility: CLINIC | Age: 76
End: 2021-12-09

## 2021-12-09 DIAGNOSIS — Z51.5 PALLIATIVE CARE BY SPECIALIST: ICD-10-CM

## 2021-12-09 DIAGNOSIS — I27.21 PAH (PULMONARY ARTERY HYPERTENSION): ICD-10-CM

## 2021-12-09 DIAGNOSIS — S22.080D COMPRESSION FRACTURE OF T12 VERTEBRA WITH ROUTINE HEALING, SUBSEQUENT ENCOUNTER: ICD-10-CM

## 2021-12-09 DIAGNOSIS — R06.00 DYSPNEA, UNSPECIFIED TYPE: Primary | ICD-10-CM

## 2021-12-09 PROCEDURE — 99204 PR OFFICE/OUTPT VISIT, NEW, LEVL IV, 45-59 MIN: ICD-10-PCS | Mod: 95,,, | Performed by: INTERNAL MEDICINE

## 2021-12-09 PROCEDURE — 99204 OFFICE O/P NEW MOD 45 MIN: CPT | Mod: 95,,, | Performed by: INTERNAL MEDICINE

## 2021-12-09 RX ORDER — MORPHINE SULFATE 20 MG/ML
5 SOLUTION ORAL
Qty: 30 ML | Refills: 0 | Status: SHIPPED | OUTPATIENT
Start: 2021-12-09 | End: 2022-01-08

## 2021-12-14 LAB
EXT ALBUMIN: 4.4
EXT ALT: 18
EXT AST: 20
EXT BILIRUBIN TOTAL: 0.6
EXT BUN: 25
EXT CALCIUM: 9.2
EXT CHLORIDE: 97
EXT CREATININE: 1.34 MG/DL
EXT GLUCOSE: 85
EXT HEMATOCRIT: 38
EXT HEMOGLOBIN: 10.6
EXT PLATELETS: 301
EXT POTASSIUM: 4.2
EXT PROTEIN TOTAL: 7
EXT SODIUM: 138 MMOL/L
EXT WBC: 5.2

## 2021-12-15 ENCOUNTER — DOCUMENTATION ONLY (OUTPATIENT)
Dept: TRANSPLANT | Facility: CLINIC | Age: 76
End: 2021-12-15
Payer: MEDICARE

## 2021-12-15 ENCOUNTER — TELEPHONE (OUTPATIENT)
Dept: TRANSPLANT | Facility: CLINIC | Age: 76
End: 2021-12-15
Payer: MEDICARE

## 2021-12-16 ENCOUNTER — TELEPHONE (OUTPATIENT)
Dept: TRANSPLANT | Facility: CLINIC | Age: 76
End: 2021-12-16
Payer: MEDICARE

## 2021-12-27 DIAGNOSIS — R10.9 ABDOMINAL PAIN, UNSPECIFIED ABDOMINAL LOCATION: ICD-10-CM

## 2021-12-27 DIAGNOSIS — M62.830 MUSCLE SPASM OF BACK: ICD-10-CM

## 2021-12-27 DIAGNOSIS — M79.2 NEUROPATHIC PAIN: ICD-10-CM

## 2021-12-27 DIAGNOSIS — R52 PAIN: ICD-10-CM

## 2021-12-27 RX ORDER — TRAMADOL HYDROCHLORIDE 100 MG/1
100 TABLET, EXTENDED RELEASE ORAL DAILY
Qty: 30 TABLET | Refills: 0 | Status: SHIPPED | OUTPATIENT
Start: 2021-12-27 | End: 2022-01-25

## 2021-12-27 RX ORDER — TRAMADOL HYDROCHLORIDE 50 MG/1
TABLET ORAL
Qty: 90 TABLET | Refills: 0 | Status: SHIPPED | OUTPATIENT
Start: 2021-12-27 | End: 2022-05-25 | Stop reason: SDUPTHER

## 2022-01-03 ENCOUNTER — TELEPHONE (OUTPATIENT)
Dept: TRANSPLANT | Facility: CLINIC | Age: 77
End: 2022-01-03
Payer: MEDICARE

## 2022-01-03 NOTE — TELEPHONE ENCOUNTER
"Patient and  called to make sure "we are on the same page about Jan." They reinforced that Mrs. Smith is not ready to "give up" and they want to explore all treatments, including clinical trials. Mrs. Smith states that she had a good holiday break and is doing well - reports O2 sat dipping into the 40s only a few times and coming back up quickly. She also says she has been taking bumex 2mg in the morning and 1/2 tab in the afternoon. Felt that 2mg twice a day was too much. Has not noticed too much swelling other than in her ankles, which is better in the mornings, and worsens over the course of the day.    Noted that we understand that they both want all treatments and continued care that is available. Will send a list of clinical trials. Discussed that patient's age and length of time on therapy might be prohibitive and Dr. Smith agreed.   Advised Mrs. Smith to monitor for symptoms of fluid overload - swelling in extremities that doesn't go down, abdominal fullness, increased shortness of breath. Will add CBC and CMP to monthly Tracleer labs.  The baldemar's had questions about medical marijuana. Noted that inhaling marijuana was not recommended but discussed use of CBD products.  "

## 2022-01-12 RX ORDER — FUROSEMIDE 40 MG/1
40 TABLET ORAL 2 TIMES DAILY
Qty: 60 TABLET | Refills: 11 | Status: SHIPPED | OUTPATIENT
Start: 2022-01-12 | End: 2025-08-14

## 2022-01-12 NOTE — PROGRESS NOTES
Spoke with Dr. Smith, patient's . He and his wife feel that she did better on lasix vs the bumex. Patient has been taking 2mg bumex in the am and 1mg in the afternoon. This dose would normally convert to 80 mg lasix in the AM and 40 mg in the afternoon, but Dr. Smith would like her to try 40mg lasix, BID, first.   Advised them to contact our office with any concerns or questions.

## 2022-02-16 ENCOUNTER — TELEPHONE (OUTPATIENT)
Dept: TRANSPLANT | Facility: CLINIC | Age: 77
End: 2022-02-16
Payer: MEDICARE

## 2022-02-16 NOTE — TELEPHONE ENCOUNTER
Dr. Smith called to inform Dr. Gusman and CARRINGTON Scott that patient desaturates on exertion (into the 30's). Dr. Smith wants to know if titrating medication would help patient. Per Dr. Gusman, unfortunately there is nothing that can be done at this point and she can all Dr. Smith to discuss. Dr. Smith verbalized understanding and will await phone call from Dr. Gusman.

## 2022-02-21 ENCOUNTER — TELEPHONE (OUTPATIENT)
Dept: PALLIATIVE MEDICINE | Facility: CLINIC | Age: 77
End: 2022-02-21
Payer: MEDICARE

## 2022-02-21 NOTE — TELEPHONE ENCOUNTER
Called and spoke with patient and spouse on speakerphone. Stated that I was following up on hospice discussion that she had with Sonia and Dr. Gusman. She states that she has done some research over the weekend and has been in contact with [facility] in [place]. She states that they informed her that they would not cover cost of her pulm htn medication. Reassured patient that medications will continue to be supplied by Sonia and Dr. Gusman's clinic.  states that he will need this in writing and notarized. Assured them that I will have Sonia contact hospice agency to discuss. Patient states that she feels that we are giving up on her and wants to know if she can still be seen in clinic. Discussed that disease has progressed and providers believe that hospice con provide an extra layer of support at home for her including increased oxygen supply. Also reinforced that this is an  option for her and not something she has to do and she can change her mind at any time. Also reassured her that she can continue to call clinic for support. At end of conversation she agrees to have Sonia talk to [facility] about medication coverage.

## 2022-03-02 DIAGNOSIS — M62.830 MUSCLE SPASM OF BACK: ICD-10-CM

## 2022-03-02 DIAGNOSIS — R10.9 ABDOMINAL PAIN, UNSPECIFIED ABDOMINAL LOCATION: ICD-10-CM

## 2022-03-02 DIAGNOSIS — R52 PAIN: ICD-10-CM

## 2022-03-02 RX ORDER — TRAMADOL HYDROCHLORIDE 100 MG/1
TABLET, EXTENDED RELEASE ORAL
Qty: 30 TABLET | Refills: 0 | OUTPATIENT
Start: 2022-03-02

## 2022-03-18 LAB
ALP SERPL-CCNC: 43 U/L (ref 25–125)
CO2 SERPL-SCNC: 25 MMOL/L
EXT ALBUMIN: 4.4
EXT ALT: 9
EXT AST: 13
EXT BILIRUBIN TOTAL: 1
EXT BUN: 23
EXT CALCIUM: 9.5
EXT CHLORIDE: 101
EXT CREATININE: 1.38 MG/DL
EXT GLUCOSE: 87
EXT HEMATOCRIT: 36
EXT HEMOGLOBIN: 9.8
EXT PLATELETS: 220
EXT POTASSIUM: 4.3
EXT PROTEIN TOTAL: 6.6
EXT SODIUM: 141 MMOL/L
EXT WBC: 2.9

## 2022-04-04 DIAGNOSIS — M62.830 MUSCLE SPASM OF BACK: ICD-10-CM

## 2022-04-04 DIAGNOSIS — R10.9 ABDOMINAL PAIN, UNSPECIFIED ABDOMINAL LOCATION: ICD-10-CM

## 2022-04-04 DIAGNOSIS — R52 PAIN: ICD-10-CM

## 2022-04-04 RX ORDER — TRAMADOL HYDROCHLORIDE 100 MG/1
TABLET, EXTENDED RELEASE ORAL
Qty: 30 TABLET | Refills: 0 | Status: SHIPPED | OUTPATIENT
Start: 2022-04-04 | End: 2022-04-25 | Stop reason: SDUPTHER

## 2022-04-13 ENCOUNTER — TELEPHONE (OUTPATIENT)
Dept: TRANSPLANT | Facility: CLINIC | Age: 77
End: 2022-04-13
Payer: MEDICARE

## 2022-04-13 NOTE — TELEPHONE ENCOUNTER
Spoke with patient's  because nurse navigator received email from [employer] that  was requesting Adempas when it was discontinued by Dr. Fair and CARRINGTON Scott. Per , when patient's adempas was d/c'd her O2 dropped to 30's and Dr. Fair was consulted and patient was put back on adempas 0.5 mg (he had leftover Adempas at home and restarted her) but because he had a small supply then patient is out. Per CARRINGTON Scott-it is okay to restart Adempas 0.5 mg, patients current BP is 94/80. Message sent to [facility] and to Dr. Fair.

## 2022-04-18 ENCOUNTER — DOCUMENTATION ONLY (OUTPATIENT)
Dept: TRANSPLANT | Facility: CLINIC | Age: 77
End: 2022-04-18
Payer: MEDICARE

## 2022-04-18 ENCOUNTER — TELEPHONE (OUTPATIENT)
Dept: TRANSPLANT | Facility: CLINIC | Age: 77
End: 2022-04-18
Payer: MEDICARE

## 2022-04-18 NOTE — TELEPHONE ENCOUNTER
Contacted [facility] to obtain most recent lab results    Most recent lab results are from 3/18/22    Patient hasnt completed labs for the Month of April    Spoke with Vikram    Fax number provided        ED Provider Note    Scribed for Fina Payan M.D. by Surinder Bean. 1/9/2019, 12:16 PM.    Primary care provider: BETTE Saavedra  Means of arrival: walk in  History obtained from: patient  History limited by: none    CHIEF COMPLAINT  Chief Complaint   Patient presents with   • T-5000 GLF     GLF yesterday and now has pain in the leg.       [facility]  Antoni Sebas Martínez is a 3 y.o. male who presents to the Emergency Department for evaluation of right leg pain status post ground level fall sustained yesterday. Patient localizes his pain over the right femur. Mother reports associated intermittent limping when he complains of pain. She states that the patient sustained a ground level fall yesterday while with his grandparents, tripping and falling onto a stick.  She came in for evaluation today given persistent pain in the right thigh. mother denies weakness, any other medical complaints.  He is still acting like his normal self and has been alert and playful    REVIEW OF SYSTEMS  Review of Systems   Musculoskeletal:        Right leg pain.    Neurological: Negative for focal weakness.   ROS limited by age.     PAST MEDICAL HISTORY   has a past medical history of Bowel habit changes; Dental disorder; and Healthy pediatric patient.    SURGICAL HISTORY   has a past surgical history that includes circumcision child and gastroscopy (N/A, 2016).    SOCIAL HISTORY    Patient presents to the ED with mother who he lives with.     FAMILY HISTORY  History reviewed. No pertinent family history.    CURRENT MEDICATIONS  Home Medications     Reviewed by Natasha Loredo R.N. (Registered Nurse) on 01/09/19 at 1205  Med List Status: Unable to Obtain   Medication Last Dose Status   Lactobacillus Rhamnosus, GG, (CULTURELLE KIDS PO)  Active                ALLERGIES  Allergies   Allergen Reactions   • Tape      Gets rash with tegaderm and plastic tape.  Paper tape OK       PHYSICAL EXAM  VITAL SIGNS: BP  "111/66   Pulse 120   Temp 36.6 °C (97.9 °F) (Temporal)   Resp 26   Ht 0.965 m (3' 2\")   Wt 17 kg (37 lb 7.7 oz)   SpO2 99%   BMI 18.25 kg/m²   Vitals reviewed by myself.  Physical Exam  Nursing note and vitals reviewed.  Constitutional: Well-developed and well-nourished. No acute distress.   HENT: Head is normocephalic and atraumatic.  Eyes: extra-ocular movements intact  Cardiovascular: egular rate and regular rhythm. No murmur heard.2+ bilateral distal pedal pulses  Pulmonary/Chest: Breath sounds normal. No wheezes or rales.   Abdominal: Soft and non-tender. No distention.    Musculoskeletal: Extremities exhibit normal range of motion without edema or tenderness. Able to ambulate unassisted, but with a slight limp, favoring his left.  When I palpate patient's limb he has no tenderness to palpation in any focal region.  I am able to passively range all of his joints without re-creating pain or crying.  Neurological: Awake and alert  Skin: Skin is warm and dry. No rash.     DIAGNOSTIC STUDIES    RADIOLOGY  DX-FEMUR-2+ RIGHT   Final Result      No radiographic evidence of acute traumatic injury right femur.      DX-PELVIS-1 OR 2 VIEWS   Final Result      Negative AP view of the pelvis.      The radiologist's interpretation of all radiological studies have been reviewed by me.    PROCEDURES  None    REASSESSMENT  12:22 PM - Patient seen and evaluated at bedside. Discussed evaluation in the ED to rule out occult fractures with radiology. Patient verbalizes understanding and agreement to this plan of care.     1:45 PM - Recheck: Patient re-evaluated at beside. Patient's diagnostic results discussed. Discussed patient's condition and treatment plan. Patient will be discharged with instructions and provided with strict return precautions. Advised to follow up with His primary. Instructed to return to Emergency Department immediately if any new or worsening symptoms.    Discharge vitals: /66   Pulse 120   " "Temp 36.6 °C (97.9 °F) (Temporal)   Resp 26   Ht 0.965 m (3' 2\")   Wt 17 kg (37 lb 7.7 oz)   SpO2 99%   BMI 18.25 kg/m²     COURSE & MEDICAL DECISION MAKING  Nursing notes, VS, PMSFHx reviewed in chart.    Patient is a 3-year-old male who comes in with right hip pain after falling.  Differential diagnosis includes contusion, muscle strain, fracture, dislocation.  Diagnostic workup includes right hip and right femur x-ray.    On initial exam patient is well-appearing with vitals in normal limits.  He is neurovascularly intact on exam.  I am able to passively range all his joints and palpate his extremities without any pain.  Patient is able to ambulate and does not complain of pain.  Imaging returns demonstrates no acute abnormalities.  Therefore parent is reassured, advised on symptomatic management of likely muscle strain, and given strict return precautions.  Patient is then discharged home in stable condition.    DISPOSITION:  Patient will be discharged home with parent in good condition.    FOLLOW UP:  MARIA DE JESUS SaavedraPGonzálezRGonzálezNGonzález  [address] [place] [id number] [phone]          Parent was given return precautions and verbalizes understanding. Parent will return with patient for new or worsening symptoms.     FINAL IMPRESSION  1. Musculoskeletal pain          Surinder HENNING (Scribe), am scribing for, and in the presence of, Fina Payan M.D..    Electronically signed by: Surinder Bean (Scribe), 1/9/2019    IFina M.D. personally performed the services described in this documentation, as scribed by Surinder Bean in my presence, and it is both accurate and complete. E    The note accurately reflects work and decisions made by me.  Fina Payan  1/9/2019  1:54 PM      "

## 2022-04-18 NOTE — PROGRESS NOTES
External labs entered  Labs completed on 3/18/22  Labs have been routed to PH coordinator review

## 2022-04-19 LAB
EXT ALBUMIN: 4.4
EXT ALT: 12
EXT AST: 16
EXT BUN: 23
EXT CALCIUM: 9.7
EXT CHLORIDE: 99
EXT CREATININE: 1.36 MG/DL
EXT HEMATOCRIT: 37.2
EXT HEMOGLOBIN: 10
EXT PLATELETS: 215
EXT PROTEIN TOTAL: 6.6
EXT SODIUM: 139 MMOL/L
EXT WBC: 3

## 2022-04-25 DIAGNOSIS — R52 PAIN: ICD-10-CM

## 2022-04-25 DIAGNOSIS — M62.830 MUSCLE SPASM OF BACK: ICD-10-CM

## 2022-04-25 DIAGNOSIS — R10.9 ABDOMINAL PAIN, UNSPECIFIED ABDOMINAL LOCATION: ICD-10-CM

## 2022-04-25 RX ORDER — TRAMADOL HYDROCHLORIDE 100 MG/1
TABLET, EXTENDED RELEASE ORAL
Qty: 30 TABLET | Refills: 0 | Status: SHIPPED | OUTPATIENT
Start: 2022-04-25 | End: 2022-06-08 | Stop reason: SDUPTHER

## 2022-04-25 NOTE — TELEPHONE ENCOUNTER
Pt's  left voicemail stating  pt needs tramadol refill pt was last seen in our clinic in December and a telephone call in feb about hospice. Message was  sent to Dr Conte provider agreed to send RX but stated pt needs to be seen in our clinic. appt was scheduled by Gloria

## 2022-05-16 ENCOUNTER — TELEPHONE (OUTPATIENT)
Dept: PALLIATIVE MEDICINE | Facility: CLINIC | Age: 77
End: 2022-05-16
Payer: MEDICARE

## 2022-05-17 ENCOUNTER — DOCUMENTATION ONLY (OUTPATIENT)
Dept: TRANSPLANT | Facility: CLINIC | Age: 77
End: 2022-05-17
Payer: MEDICARE

## 2022-05-17 ENCOUNTER — TELEPHONE (OUTPATIENT)
Dept: TRANSPLANT | Facility: CLINIC | Age: 77
End: 2022-05-17
Payer: MEDICARE

## 2022-05-17 ENCOUNTER — OFFICE VISIT (OUTPATIENT)
Dept: PALLIATIVE MEDICINE | Facility: CLINIC | Age: 77
End: 2022-05-17
Payer: MEDICARE

## 2022-05-17 DIAGNOSIS — R10.9 ABDOMINAL PAIN, UNSPECIFIED ABDOMINAL LOCATION: ICD-10-CM

## 2022-05-17 DIAGNOSIS — M62.830 MUSCLE SPASM OF BACK: ICD-10-CM

## 2022-05-17 DIAGNOSIS — R11.0 NAUSEA: Primary | ICD-10-CM

## 2022-05-17 DIAGNOSIS — R52 PAIN: ICD-10-CM

## 2022-05-17 PROCEDURE — 99214 PR OFFICE/OUTPT VISIT, EST, LEVL IV, 30-39 MIN: ICD-10-PCS | Mod: 95,,, | Performed by: STUDENT IN AN ORGANIZED HEALTH CARE EDUCATION/TRAINING PROGRAM

## 2022-05-17 PROCEDURE — 99214 OFFICE O/P EST MOD 30 MIN: CPT | Mod: 95,,, | Performed by: STUDENT IN AN ORGANIZED HEALTH CARE EDUCATION/TRAINING PROGRAM

## 2022-05-17 RX ORDER — ONDANSETRON 8 MG/1
8 TABLET, ORALLY DISINTEGRATING ORAL EVERY 12 HOURS PRN
Qty: 60 TABLET | Refills: 5 | Status: SHIPPED | OUTPATIENT
Start: 2022-05-17 | End: 2022-08-24

## 2022-05-17 NOTE — PROGRESS NOTES
Consult Note  Palliative Medicine Clinic      Consult Requested By: Dr. Tim / Uzma    Primary Care Physician: Roberto Braun MD    Reason for Consult: symptom management/ACP    The patient location is: [facility]    The chief complaint leading to consultation is: Dyspnea    Visit type: audiovisual    Face to Face time with patient: 15min  30 minutes of total time spent on the encounter, which includes face to face time and non-face to face time preparing to see the patient (eg, review of tests), Obtaining and/or reviewing separately obtained history, Documenting clinical information in the electronic or other health record, Independently interpreting results (not separately reported) and communicating results to the patient/family/caregiver, or Care coordination (not separately reported).       Each patient provided with medical services by telemedicine is:  (1) informed of the relationship between the physician and patient and the respective role of any other health care provider with respect to management of the patient; and (2) notified that he or she may decline to receive medical services by telemedicine and may withdraw from such care at any time.      ASSESSMENT/PLAN:     Plan/Recommendations:  Molly was seen today for pain    Diagnoses and all orders for this visit:    Chronic pulmonary heart disease / Dyspnea on exertion  - Followed by HTS  - Currently on IV remodulin and tracleer and adempas  - Resp failure continues to worsen her reported O2 sats are 60-70s at baseline and 30s-40s on exertion  -Still going out to eat, mostly on a wheelchair  -Reports worsening dyspnea, however does not want to try Morphine at this time, she feels her subjective dyspnea is manageable.       Neuropathic pain/ Post herpetic neuralgia/ Back pain  -Now s/p kyphoplasty   -Pain has improved   -Gabapentin to 200mg TID   - Tramadol 100mg ER daily, she has been more somnolent lately and she tried to stop the long acting but  feels she needs it. May try taking every other day now.   -Tramadol 50mg q6h PRN pain (taking 2/day)      Nausea  -More often now, especially after eating   -Will try Zofran 8mg BID or TID around the clock to prevent nausea  -She has an appetite and tried to eat 3x per day, no weight loss reported.     Constipation:  -Daily BMs      Palliative Care Encounter:  1) Symptoms:  as above     2) Medicolegal: Pt has decision making capacity      is surrogate/HCPOA     3) Psychosocial: Has good support system,  cares for her, daughter lives close by, they have access to home care if needed     4) Spiritual:     5) Prognostication: Patient with pHTN, chronic respiratory failure on Home O2 , frail - prognosis is fair to poor    6) Understanding of disease and Illness Trajectory: Patient understands that she has pHTN, that she is frail and that at this point she is high risk for any procedures. She knows her disease is progressive and not reversible.  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     7) Goals of care:  Maintain her current level of health, continue to be independent     8) Code status: Partial code - No chest compression, no intubation. OK with medications    9) Advance directives:  HCPOA on file        SUBJECTIVE:     History obtained from: patient      complaint: Pain      History of Present Illness:  Mrs. Molly Smith  is 76 y.o. year old female presenting with dyspnea and pain.  Referred to Palliative Care for evaluation and management of physical symptoms, advance care planning, and clarification of goals of care. She attended the appointment alone.      Interval History:    Pain - s/p kyphoplasty, pain is manageable, taking tramadol ER once a day, tried to stop it but required more short acting so she restarted it.       Dyspnea - continues to worsening, she is unable to do much because of the breathlessness, however she states that she doesn't need  morphine yet and she is managing well at this time. She is using a wheelchair more and she is still going out to eat.         Disease History:  PHTN WHO I ( Remodulin) , chronic respiratory failure on Home O2 at 6L per N/C,congential heart disease.   Patient developed post neuralgia neuropathic pain in late August/2020   Spinal compression fx x2 in mid September/2020 s/p kyphoplasty    Past Medical History:   Diagnosis Date    Allergy     Anticoagulant long-term use     Arthritis     Heart murmur     Pneumonia     Pulmonary hypertension     Tachycardia      Past Surgical History:   Procedure Laterality Date    BACK SURGERY      HEMIARTHROPLASTY OF HIP Left 3/25/2019    Procedure: HEMIARTHROPLASTY, HIP - left;  Surgeon: Kemal Esposito MD;  Location: [facility] OR [address] [place] [address];  Service: Orthopedics;  Laterality: Left;    REPLACEMENT OF DIALYSIS CATHETER OVER GUIDEWIRE THROUGH EXISTING VENOUS ACCESS N/A 1/17/2019    Procedure: REPLACEMENT, CATHETER, DIALYSIS, OVER GUIDEWIRE, USING EXISTING VENOUS ACCESS;  Surgeon: Phoenix Hand MD;  Location: [facility] CATH LAB;  Service: Nephrology;  Laterality: N/A;     Family History   Problem Relation Age of Onset    Arthritis Mother     Arthritis Father     Diabetes Father     Heart disease Father     Hypertension Father      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    Multaq [dronedarone]        Medications:    Current Outpatient Medications on File Prior to Visit   Medication Sig Dispense Refill    acetaminophen (TYLENOL) 500 MG tablet Take 2 tablets (1,000 mg total) by mouth every 8 (eight) hours as needed for Pain. 90 tablet 1    amiodarone (PACERONE) 200 MG Tab TAKE ONE TABLET BY MOUTH ONCE A DAY AS DIRECTED. THANK YOU! 30 tablet 11    bosentan (TRACLEER) 125 MG Tab Take by mouth every 12 (twelve) hours.      CALCIUM CARBONATE (CALCIUM 600 ORAL) Take 2 tablets by mouth once daily.      dicyclomine  (BENTYL) 20 mg tablet Take 20 mg by mouth 3 (three) times daily as needed.      digoxin (LANOXIN) 125 mcg tablet TAKE ONE TABLET BY MOUTH ONCE A DAY AS DIRECTED. THANK YOU! 30 tablet 11    docusate sodium (COLACE) 50 MG capsule Take 1 capsule (50 mg total) by mouth daily as needed for Constipation.  0    ferrous gluconate (FERGON) 324 MG tablet Take 1 tablet (324 mg total) by mouth daily with breakfast.      folic acid (FOLVITE) 1 MG tablet Take 1 mg by mouth once daily.      furosemide (LASIX) 40 MG tablet Take 1 tablet (40 mg total) by mouth 2 (two) times daily. 60 tablet 11    gabapentin (NEURONTIN) 100 MG capsule TAKE TWO CAPSULES BY MOUTH THREE TIMES DAILY 180 capsule 11    loratadine (CLARITIN) 10 mg tablet Take 10 mg by mouth once daily.      potassium chloride SA (K-DUR,KLOR-CON) 20 MEQ tablet Take 1 tablet (20 mEq total) by mouth once daily. 30 tablet 11    riociguat (ADEMPAS) 0.5 mg Tab tablet Take 1 tablet (0.5 mg total) by mouth 3 (three) times daily. 90 tablet 1    spironolactone (ALDACTONE) 25 MG tablet Take 25 mg by mouth once daily.      traMADoL (ULTRAM) 50 mg tablet TAKE ONE TABLET BY MOUTH EVERY 8 HOURS AS NEEDED FOR PAIN 90 tablet 0    traMADoL (ULTRAM-ER) 100 MG Tb24 TAKE ONE TABLET BY MOUTH ONCE A DAY AS NEEDED FOR PAIN 30 tablet 0    TREPROSTINIL SODIUM (TREPROSTINIL, REMODULIN, PUMP FOR HOME USE) Pt currently on 89.2 ng/kg/min=45 mL/day dosing weight 70.05 kg 60 mL 11    warfarin (COUMADIN) 10 MG tablet Take 1 tablet (10 mg total) by mouth Daily. Take 1 tablet (10mg) by mouth daily, except a half tablet (5mg) every Tues, Thurs, and Saturdays 90 tablet 1     Current Facility-Administered Medications on File Prior to Visit   Medication Dose Route Frequency Provider Last Rate Last Admin    denosumab (PROLIA) injection 60 mg  60 mg Subcutaneous 1 time in Clinic/HOD Roberto Braun MD        denosumab (PROLIA) injection 60 mg  60 mg Subcutaneous 1 time in Clinic/HOD Roberto PEREIRA  MD Kade           [facility] database queried on 05/17/2022 by Sonia Kelly . The results reviewed and considered with the clinical data in the decision whether or not to prescribe a controlled substance.    04/25/2022 04/25/2022   1  Tramadol Hcl Er 100 Mg Tablet   30.00  30  Er Chiquita  [id number]  [facility] ([id number])  0  10.00 MME  Other  [place]     04/21/2022 11/02/2021   1  Gabapentin 100 Mg Capsule   180.00  30  Er Chiquita  [id number]  [facility] ([id number])  5   [place]  [place]     04/06/2022 04/06/2022   4 * Gdfla.thc.60.mg.sl.pc   10.00  5  Br Mat  [id number]  [facility] ([id number])  0            OBJECTIVE:       ROS:  Review of Systems   Constitutional: Positive for fatigue. Negative for activity change, appetite change, fever and unexpected weight change.   HENT: Negative for hearing loss, sore throat and trouble swallowing.    Eyes: Negative for photophobia, pain and visual disturbance.   Respiratory: Positive for shortness of breath. Negative for cough and wheezing.    Cardiovascular: Negative for chest pain and leg swelling.   Gastrointestinal: Negative for abdominal distention, abdominal pain, constipation, diarrhea, nausea, vomiting and reflux.   Genitourinary: Negative for dysuria and urgency.   Musculoskeletal: Negative for back pain, gait problem, leg pain and myalgias.   Neurological: Negative for light-headedness, headaches and memory loss.   Psychiatric/Behavioral: Negative for behavioral problems and confusion. The patient is not nervous/anxious.          Review of Symptoms    Symptom Assessment (ESAS 0-10 Scale)  Pain:  3  Dyspnea:  6  Anxiety:  1  Nausea:  1  Depression:  0  Anorexia:  0  Fatigue:  6  Insomnia:  0  Restlessness:  0  Agitation:  0     CAM / Delirium:  Negative  Constipation:  Negative  Diarrhea:  Negative    Bowel Management Plan (BMP):  Yes      Comments:  Senna and Miralax    Pain Assessment:  Location(s): back    Back       Location: lower        Quality: aching        Quantity: 3/10 in intensity        Chronicity: Onset 12  month(s) ago, gradually improving        Aggravating Factors: none        Alleviating Factors: none       Associated Symptoms: none    Performance Status:  60    Psychosocial/Cultural: Lives with her  who is an EM physician who works 5-6 times per month, has a daughter who lives down the block who works and has school aged kids. Sister is part of her support system however she is in her 80s. They can have access to home care if needed      Advance Care Planning   Advance Directives:   Living Will: No    LaPOST: No    Do Not Resuscitate Status: Yes    Medical Power of : Yes      Decision Making:  Patient answered questions              Physical Exam: limited due to video visit  Vitals:    Physical Exam  Constitutional:       General: She is not in acute distress.     Comments: Frail, thin   HENT:      Head: Normocephalic and atraumatic.      Nose:      Comments: Wearing NC  Eyes:      General: No scleral icterus.  Pulmonary:      Effort: Pulmonary effort is normal. No respiratory distress.   Abdominal:      General: There is no distension.   Musculoskeletal:      Cervical back: Neck supple.   Skin:     General: Skin is warm.   Neurological:      General: No focal deficit present.      Mental Status: She is alert and oriented to person, place, and time.   Psychiatric:         Mood and Affect: Mood and affect normal.           Labs:  CBC:   WBC   Date Value Ref Range Status   12/11/2020 4.02 3.90 - 12.70 K/uL Final     MCV   Date Value Ref Range Status   03/06/2020 87 82 - 98 fL Final           Hematocrit   Date Value Ref Range Status   12/11/2020 34.7 (L) 37.0 - 48.5 % Final                         Albumin:   Albumin   Date Value Ref Range Status   12/11/2020 4.0 3.5 - 5.2 g/dL Final     Protein:   Total Protein   Date Value Ref Range Status   12/11/2020 7.1 6.0 - 8.4 g/dL Final       Radiology:I have reviewed all pertinent imaging results/findings within the past 24 hours.    Results for orders placed  during the hospital encounter of 10/12/21    Echo Saline Bubble? Yes    Interpretation Summary  · The left ventricle is normal in size with normal systolic function. The estimated ejection fraction is 60%.  · Severe right ventricular enlargement with moderately reduced right ventricular systolic function.  · Grade II left ventricular diastolic dysfunction.  · Severe biatrial enlargement.  · Mild to moderate tricuspid regurgitation.  · The estimated PA systolic pressure is 99 mmHg.  · Elevated central venous pressure (15 mmHg).      Encounter occurred during period of COVID-19 emergency. Encounter performed under the concurrent guidelines, limitations and protocols.      Signature: Sonia Kelly MD

## 2022-05-17 NOTE — PROGRESS NOTES
"External labs entered  Labs completed on 4/19/22  Labs have been routed to PH coordinator review    Glucose test not performed "serum was in contact with cells  when received which will make the result inaccurate    Potassium test not performed. Serum was in contact with cells when received which will make the result inaccurate.   "

## 2022-05-25 DIAGNOSIS — R52 PAIN: ICD-10-CM

## 2022-05-25 DIAGNOSIS — M79.2 NEUROPATHIC PAIN: ICD-10-CM

## 2022-05-25 DIAGNOSIS — R10.9 ABDOMINAL PAIN, UNSPECIFIED ABDOMINAL LOCATION: ICD-10-CM

## 2022-05-25 RX ORDER — TRAMADOL HYDROCHLORIDE 50 MG/1
TABLET ORAL
Qty: 90 TABLET | Refills: 0 | Status: SHIPPED | OUTPATIENT
Start: 2022-05-25 | End: 2022-07-25 | Stop reason: SDUPTHER

## 2022-05-31 LAB
ALP SERPL-CCNC: 60 U/L (ref 25–125)
CO2 SERPL-SCNC: 22 MMOL/L
EXT ALBUMIN: 3.9
EXT ALT: 13
EXT AST: 20
EXT BUN: 20
EXT CALCIUM: 9.5
EXT CHLORIDE: 101
EXT CREATININE: 1.28 MG/DL
EXT GLUCOSE: 58
EXT HEMATOCRIT: 36.3
EXT HEMOGLOBIN: 9.6
EXT PLATELETS: 229
EXT POTASSIUM: 4.2
EXT PROTEIN TOTAL: 7.1
EXT SODIUM: 147 MMOL/L
EXT WBC: 5

## 2022-06-08 DIAGNOSIS — R52 PAIN: ICD-10-CM

## 2022-06-08 DIAGNOSIS — M62.830 MUSCLE SPASM OF BACK: ICD-10-CM

## 2022-06-08 DIAGNOSIS — R10.9 ABDOMINAL PAIN, UNSPECIFIED ABDOMINAL LOCATION: ICD-10-CM

## 2022-06-08 RX ORDER — TRAMADOL HYDROCHLORIDE 100 MG/1
TABLET, EXTENDED RELEASE ORAL
Qty: 30 TABLET | Refills: 0 | Status: SHIPPED | OUTPATIENT
Start: 2022-06-08 | End: 2022-07-06 | Stop reason: SDUPTHER

## 2022-06-15 ENCOUNTER — TELEPHONE (OUTPATIENT)
Dept: TRANSPLANT | Facility: CLINIC | Age: 77
End: 2022-06-15
Payer: MEDICARE

## 2022-06-15 NOTE — TELEPHONE ENCOUNTER
Contacted [facility] to obtain lab results . No recent results found. Only results located was from 5/30/22 dig level

## 2022-06-22 ENCOUNTER — DOCUMENTATION ONLY (OUTPATIENT)
Dept: TRANSPLANT | Facility: CLINIC | Age: 77
End: 2022-06-22
Payer: MEDICARE

## 2022-06-22 ENCOUNTER — TELEPHONE (OUTPATIENT)
Dept: TRANSPLANT | Facility: CLINIC | Age: 77
End: 2022-06-22
Payer: MEDICARE

## 2022-06-22 NOTE — TELEPHONE ENCOUNTER
Contacted [facility] to obtain most recent lab results    Most recent results are from 5/31/2022.  They will fax over results to the number provided.     Awaiting results to document any new findings.

## 2022-06-22 NOTE — PROGRESS NOTES
External labs entered  Labs completed on 5/31/22  Labs have been routed to PH coordinator review

## 2022-07-06 ENCOUNTER — TELEPHONE (OUTPATIENT)
Dept: TRANSPLANT | Facility: CLINIC | Age: 77
End: 2022-07-06
Payer: MEDICARE

## 2022-07-06 DIAGNOSIS — M62.830 MUSCLE SPASM OF BACK: ICD-10-CM

## 2022-07-06 DIAGNOSIS — R52 PAIN: ICD-10-CM

## 2022-07-06 DIAGNOSIS — R10.9 ABDOMINAL PAIN, UNSPECIFIED ABDOMINAL LOCATION: ICD-10-CM

## 2022-07-06 RX ORDER — TRAMADOL HYDROCHLORIDE 100 MG/1
TABLET, EXTENDED RELEASE ORAL
Qty: 30 TABLET | Refills: 0 | Status: SHIPPED | OUTPATIENT
Start: 2022-07-06 | End: 2022-08-05 | Stop reason: SDUPTHER

## 2022-07-06 NOTE — TELEPHONE ENCOUNTER
Dr. Smith and patient called to ask about an EP consult for dig levels. Dr. Smith states that he genesis a dig level on his wife and it was elevated, so he had her changed to digoxin every other day. Patient states that she has actually not been taking the dig for about a week, however. Denies palpitations or other issues.  They are on their way to the lab today so will order a repeat level, then determine next steps. It has been 3 years since patient has seen Dr. Hinojosa in EP.  Dr. Smith states that they also stopped patient's gabapentin because she was feeling nauseous and weak. He said that she has improved greatly after stopping this medication.  Mrs. Smith was last seen by Dr. Fair (Pulmonology) and Dr. Conte (Palliative Care) in May. She has appointments with both of them again in July.    Will F/U once labs have resulted.

## 2022-07-19 ENCOUNTER — OFFICE VISIT (OUTPATIENT)
Dept: PALLIATIVE MEDICINE | Facility: CLINIC | Age: 77
End: 2022-07-19
Payer: MEDICARE

## 2022-07-19 DIAGNOSIS — M62.830 MUSCLE SPASM OF BACK: Primary | ICD-10-CM

## 2022-07-19 DIAGNOSIS — R52 PAIN: ICD-10-CM

## 2022-07-19 PROCEDURE — 99214 OFFICE O/P EST MOD 30 MIN: CPT | Mod: 95,,, | Performed by: STUDENT IN AN ORGANIZED HEALTH CARE EDUCATION/TRAINING PROGRAM

## 2022-07-19 PROCEDURE — 99214 PR OFFICE/OUTPT VISIT, EST, LEVL IV, 30-39 MIN: ICD-10-PCS | Mod: 95,,, | Performed by: STUDENT IN AN ORGANIZED HEALTH CARE EDUCATION/TRAINING PROGRAM

## 2022-07-19 RX ORDER — DICLOFENAC SODIUM 10 MG/G
2 GEL TOPICAL DAILY
Qty: 200 G | Refills: 3 | Status: SHIPPED | OUTPATIENT
Start: 2022-07-19 | End: 2023-01-12

## 2022-07-25 DIAGNOSIS — R52 PAIN: ICD-10-CM

## 2022-07-25 DIAGNOSIS — M79.2 NEUROPATHIC PAIN: ICD-10-CM

## 2022-07-25 DIAGNOSIS — R10.9 ABDOMINAL PAIN, UNSPECIFIED ABDOMINAL LOCATION: ICD-10-CM

## 2022-07-25 RX ORDER — TRAMADOL HYDROCHLORIDE 50 MG/1
TABLET ORAL
Qty: 90 TABLET | Refills: 0 | Status: SHIPPED | OUTPATIENT
Start: 2022-07-25 | End: 2022-08-24 | Stop reason: SDUPTHER

## 2022-08-02 ENCOUNTER — IMMUNIZATION (OUTPATIENT)
Dept: PRIMARY CARE CLINIC | Facility: CLINIC | Age: 77
End: 2022-08-02
Payer: MEDICARE

## 2022-08-02 DIAGNOSIS — Z23 NEED FOR VACCINATION: Primary | ICD-10-CM

## 2022-08-02 PROCEDURE — 0054A COVID-19, MRNA, LNP-S, PF, 30 MCG/0.3 ML DOSE VACCINE (PFIZER): CPT | Mod: CV19,PBBFAC | Performed by: INTERNAL MEDICINE

## 2022-08-05 DIAGNOSIS — R52 PAIN: ICD-10-CM

## 2022-08-05 DIAGNOSIS — M62.830 MUSCLE SPASM OF BACK: ICD-10-CM

## 2022-08-05 DIAGNOSIS — R10.9 ABDOMINAL PAIN, UNSPECIFIED ABDOMINAL LOCATION: ICD-10-CM

## 2022-08-05 RX ORDER — TRAMADOL HYDROCHLORIDE 100 MG/1
TABLET, EXTENDED RELEASE ORAL
Qty: 30 TABLET | Refills: 0 | Status: SHIPPED | OUTPATIENT
Start: 2022-08-05 | End: 2022-09-07 | Stop reason: SDUPTHER

## 2022-08-19 ENCOUNTER — OFFICE VISIT (OUTPATIENT)
Dept: PALLIATIVE MEDICINE | Facility: CLINIC | Age: 77
End: 2022-08-19
Payer: MEDICARE

## 2022-08-19 DIAGNOSIS — R53.81 DEBILITY: ICD-10-CM

## 2022-08-19 DIAGNOSIS — R06.09 DYSPNEA ON EXERTION: Primary | ICD-10-CM

## 2022-08-19 DIAGNOSIS — R53.83 FATIGUE, UNSPECIFIED TYPE: ICD-10-CM

## 2022-08-19 PROCEDURE — 99497 PR ADVNCD CARE PLAN 30 MIN: ICD-10-PCS | Mod: 95,,, | Performed by: STUDENT IN AN ORGANIZED HEALTH CARE EDUCATION/TRAINING PROGRAM

## 2022-08-19 PROCEDURE — 99215 OFFICE O/P EST HI 40 MIN: CPT | Mod: 95,,, | Performed by: STUDENT IN AN ORGANIZED HEALTH CARE EDUCATION/TRAINING PROGRAM

## 2022-08-19 PROCEDURE — 99215 PR OFFICE/OUTPT VISIT, EST, LEVL V, 40-54 MIN: ICD-10-PCS | Mod: 95,,, | Performed by: STUDENT IN AN ORGANIZED HEALTH CARE EDUCATION/TRAINING PROGRAM

## 2022-08-19 PROCEDURE — 99497 ADVNCD CARE PLAN 30 MIN: CPT | Mod: 95,,, | Performed by: STUDENT IN AN ORGANIZED HEALTH CARE EDUCATION/TRAINING PROGRAM

## 2022-08-19 RX ORDER — PREDNISONE 5 MG/1
5 TABLET ORAL DAILY
Qty: 30 TABLET | Refills: 5 | Status: SHIPPED | OUTPATIENT
Start: 2022-08-19

## 2022-08-19 NOTE — PROGRESS NOTES
Consult Note  Palliative Medicine Clinic      Consult Requested By: Dr. Tim / Uzma    Primary Care Physician: Roberto Braun MD    Reason for Consult: symptom management/ACP    The patient location is: [place]     The chief complaint leading to consultation is: Dyspnea    Visit type: audiovisual    Face to Face time with patient: 39min    45 minutes of total time spent on the encounter, which includes face to face time and non-face to face time preparing to see the patient (eg, review of tests), Obtaining and/or reviewing separately obtained history, Documenting clinical information in the electronic or other health record, Independently interpreting results (not separately reported) and communicating results to the patient/family/caregiver, or Care coordination (not separately reported).   16 additional minutes spent on ACP     Each patient provided with medical services by telemedicine is:  (1) informed of the relationship between the physician and patient and the respective role of any other health care provider with respect to management of the patient; and (2) notified that he or she may decline to receive medical services by telemedicine and may withdraw from such care at any time.      ASSESSMENT/PLAN:     Plan/Recommendations:  Molly was seen today for pain    Diagnoses and all orders for this visit:    Chronic pulmonary heart disease / Dyspnea on exertion  - Followed by [facility], now has a local Pulmonologist Dr Fair  - Currently on IV remodulin and tracleer and adempas  - Reported O2 sats are high 50s-70s at baseline and 30s-40s on exertion   - Dyspnea has worsened recently  -reporting more fatigue  -Discussed progression of her illness       Neuropathic pain/ Post herpetic neuralgia/ Back pain  -Now s/p kyphoplasty    - Continues to report pain, she states she is able to manage ok with current regimen they do not want to change pain regimen  - Tramadol 100mg ER daily  -Tramadol 50 - 100mg q6h  PRN pain (taking 1-2 times per day)  -Voltaren Gel, heating pad      Nausea / Anorexia  -Worsening nausea, related to the time when she eats   -Taking Zofran 3 -4x per day  -has Reglan and phenergan available  -Given pattern of nausea might benefit more from Reglan, this was discussed during the appt   -Eating less- yesterday only had scrambled eggs and 1 boost       Fatigue/Frailty  -Discussed that it is most likely due to progression of her PH  - Counseling and education regarding high frequency and multi-factorial etiology of fatigue symptoms  - Discussion on the benefits of activity, including frequent, short bouts of mild activity, as tolerated, -Discussed graded excercise- she wanted to join an exercise program, discussed that hypoxia might limit this- they want to try home PT  - Counseled regarding activity modulation and ways to conserve/preserve energy and direct limited energy to priority activities  - Reviewed the potential risks of methylphenidate and modafinil  -Will trial very low dose steroids given her h/o of SE to steroids (leg swelling) Prednisone 5mg daily - will reassess in 1 week         Palliative Care Encounter:   Medicolegal: Pt has decision making capacity      is surrogate/HCPOA      Psychosocial: Has good support system,  cares for her, daughter lives close by, they have access to home care if needed      Spiritual:      Prognostication: Patient with pHTN, chronic respiratory failure on Home O2 , frail - prognosis is fair to poor     Understanding of disease and Illness Trajectory: Patient understands that she has pHTN, that she is frail and that at this point she is high risk for any procedures. She knows her disease is progressive and not reversible.  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     Goals of care:  Maintain her current level of health, continue to be independent     Advance Care Planning     Date: 08/19/2022    I engaged the  patient and   in a conversation about advance care planning and we specifically addressed what the goals of care would be moving forward, in light of the patient's change in clinical status, specifically worsening hypoxia, and fatigue.    We discussed that her symptoms are likely due to progression of her PH and condition. We explored the patient's values and preferences for future care.  The patient endorses that what is most important right now is to focus on symptom/pain control and improvement in condition. She is hoping to regain her energy and to maintain her independence.  is hoping to continue to prolong her life and for her to improve her strength.     Accordingly, we have decided that the best plan to meet the patient's goals includes continuing with treatment and palliative care.          16 minutes spent discussing ACP     Code status: Partial code - No chest compressions, no intubation. OK with medications     Advance directives:  HCPOA on file    F/U in 1week     SUBJECTIVE:     History obtained from: patient and      complaint: Pain      History of Present Illness:  Mrs. Molly Smith  is 76 y.o. year old female presenting with dyspnea and pain.  Referred to Palliative Care for evaluation and management of physical symptoms, advance care planning, and clarification of goals of care. She attended the appointment alone.      Interval History:    She was able to go to the wedding last weekend and she was able to enjoy and participate.     Pain - s/p kyphoplasty, experiencing moderate pain taking tramadol 100mg ER daily and 50-100mg 1-2 times per day times per day       Dyspnea - has worsened lately now in high 60s low 50s and goes down to 35% on exertion    Has nausea daily, maybe related to meals, taking Zofran 3-4 times per day       She feels very weak and fatigued, she feels that she is unable to walk well.      Disease History:  PHTN WHO I ( Remodulin) , chronic  respiratory failure on Home O2 at 6L per N/C,congential heart disease.   Patient developed post neuralgia neuropathic pain in late August/2020   Spinal compression fx x2 in mid September/2020 s/p kyphoplasty    Past Medical History:   Diagnosis Date    Allergy     Anticoagulant long-term use     Arthritis     Heart murmur     Pneumonia     Pulmonary hypertension     Tachycardia      Past Surgical History:   Procedure Laterality Date    BACK SURGERY      HEMIARTHROPLASTY OF HIP Left 3/25/2019    Procedure: HEMIARTHROPLASTY, HIP - left;  Surgeon: Kemal Esposito MD;  [place] [facility] [address] [place] [address];  Service: Orthopedics;  Laterality: Left;    REPLACEMENT OF DIALYSIS CATHETER OVER GUIDEWIRE THROUGH EXISTING VENOUS ACCESS N/A 1/17/2019    Procedure: REPLACEMENT, CATHETER, DIALYSIS, OVER GUIDEWIRE, USING EXISTING VENOUS ACCESS;  Surgeon: Phoenix Hand MD;  [place] [facility] [facility];  Service: Nephrology;  Laterality: N/A;     Family History   Problem Relation Age of Onset    Arthritis Mother     Arthritis Father     Diabetes Father     Heart disease Father     Hypertension Father      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    Multaq [dronedarone]        Medications:    Current Outpatient Medications on File Prior to Visit   Medication Sig Dispense Refill    acetaminophen (TYLENOL) 500 MG tablet Take 2 tablets (1,000 mg total) by mouth every 8 (eight) hours as needed for Pain. 90 tablet 1    amiodarone (PACERONE) 200 MG Tab TAKE ONE TABLET BY MOUTH ONCE A DAY AS DIRECTED. THANK YOU! 30 tablet 11    bosentan (TRACLEER) 125 MG Tab Take by mouth every 12 (twelve) hours.      CALCIUM CARBONATE (CALCIUM 600 ORAL) Take 2 tablets by mouth once daily.      diclofenac sodium (VOLTAREN) 1 % Gel Apply 2 g topically once daily. 200 g 3    dicyclomine (BENTYL) 20 mg tablet Take 20 mg by mouth 3 (three) times daily as needed.      digoxin (LANOXIN) 125  mcg tablet TAKE ONE TABLET BY MOUTH ONCE A DAY AS DIRECTED. THANK YOU! 30 tablet 11    docusate sodium (COLACE) 50 MG capsule Take 1 capsule (50 mg total) by mouth daily as needed for Constipation.  0    ferrous gluconate (FERGON) 324 MG tablet Take 1 tablet (324 mg total) by mouth daily with breakfast.      folic acid (FOLVITE) 1 MG tablet Take 1 mg by mouth once daily.      furosemide (LASIX) 40 MG tablet Take 1 tablet (40 mg total) by mouth 2 (two) times daily. 60 tablet 11    loratadine (CLARITIN) 10 mg tablet Take 10 mg by mouth once daily.      metoclopramide HCl (REGLAN) 10 MG tablet Take 10 mg by mouth 2 (two) times daily.      metroNIDAZOLE (FLAGYL) 250 MG tablet Take by mouth.      ondansetron (ZOFRAN-ODT) 8 MG TbDL Take 1 tablet (8 mg total) by mouth every 12 (twelve) hours as needed (nausea). 60 tablet 5    pantoprazole (PROTONIX) 40 MG tablet Take 1 tablet (40 mg total) by mouth once daily. 90 tablet 3    potassium chloride SA (K-DUR,KLOR-CON) 20 MEQ tablet Take 1 tablet (20 mEq total) by mouth once daily. 30 tablet 11    riociguat (ADEMPAS) 0.5 mg Tab tablet Take 1 tablet (0.5 mg total) by mouth 3 (three) times daily. 90 tablet 1    spironolactone (ALDACTONE) 25 MG tablet Take 25 mg by mouth once daily.      sucralfate (CARAFATE) 100 mg/mL suspension Take 10 mLs (1 g total) by mouth 4 (four) times daily. 250 mL 1    traMADoL (ULTRAM) 50 mg tablet TAKE ONE TABLET BY MOUTH EVERY 8 HOURS AS NEEDED FOR PAIN 90 tablet 0    traMADoL (ULTRAM-ER) 100 MG Tb24 TAKE ONE TABLET BY MOUTH ONCE A DAY AS NEEDED FOR PAIN 30 tablet 0    TREPROSTINIL SODIUM (TREPROSTINIL, REMODULIN, PUMP FOR HOME USE) Pt currently on 89.2 ng/kg/min=45 mL/day dosing weight 70.05 kg 60 mL 11    warfarin (COUMADIN) 10 MG tablet Take 1 tablet (10 mg total) by mouth Daily. Take 1 tablet (10mg) by mouth daily, except a half tablet (5mg) every Tues, Thurs, and Saturdays 90 tablet 1     Current Facility-Administered Medications  on File Prior to Visit   Medication Dose Route Frequency Provider Last Rate Last Admin    denosumab (PROLIA) injection 60 mg  60 mg Subcutaneous 1 time in Clinic/HOD Roberto Braun MD        denosumab (PROLIA) injection 60 mg  60 mg Subcutaneous 1 time in Clinic/HOD Roberto Braun MD            database queried on 08/19/2022 by Sonia Kelly . The results reviewed and considered with the clinical data in the decision whether or not to prescribe a controlled substance.       08/05/2022 08/05/2022   1  Tramadol Hcl Er 100 Mg Tablet   30.00  30  Da Nod  [id number]  Hay ([id number])  0  10.00 MME  Other  [place]     07/25/2022 07/25/2022   1  Tramadol Hcl 50 Mg Tablet   90.00  30  Er Chiquita  [id number]  Hay ([id number])  0  15.00 MME  [place]     07/06/2022 07/06/2022   1  Tramadol Hcl Er 100 Mg Tablet   30.00  30  Er Chiquita  [id number]  Hay ([id number])  0  10.00 MME  [place]     06/08/2022 06/08/2022   1  Tramadol Hcl Er 100 Mg Tablet   30.00  30  Er Chiquita  [id number]  Hay ([id number])  0  10.00 MME  Other  [place]     05/25/2022 11/02/2021   1  Gabapentin 100 Mg Capsule   180.00  30  Er Chiquita  [id number]  Hay ([id number])  6   [place]are  [place]          OBJECTIVE:       ROS:  Review of Systems   Constitutional: Positive for fatigue. Negative for activity change, appetite change, fever and unexpected weight change.   HENT: Negative for hearing loss, sore throat and trouble swallowing.    Eyes: Negative for photophobia, pain and visual disturbance.   Respiratory: Positive for shortness of breath. Negative for cough and wheezing.    Cardiovascular: Negative for chest pain and leg swelling.   Gastrointestinal: Positive for nausea. Negative for abdominal distention, abdominal pain, constipation, diarrhea, vomiting and reflux.   Genitourinary: Negative for dysuria and urgency.   Musculoskeletal: Positive for back pain. Negative for gait problem, leg pain and myalgias.   Neurological: Positive for weakness. Negative for light-headedness, headaches and memory loss.    Psychiatric/Behavioral: Negative for behavioral problems and confusion. The patient is not nervous/anxious.          Review of Symptoms    Symptom Assessment (ESAS 0-10 Scale)  Pain:  7  Dyspnea:  10  Anxiety:  0  Nausea:  6  Depression:  0  Anorexia:  0  Fatigue:  10  Insomnia:  0  Restlessness:  0  Agitation:  0     CAM / Delirium:  Negative  Constipation:  Negative  Diarrhea:  Negative    Anxiety:  Is not nervous/anxious  Constipation:  No constipation    Bowel Management Plan (BMP):  Yes      Comments:  Senna and Miralax    Pain Assessment:  Location(s): back    Back       Location: lower        Quality: aching        Quantity: 7/10 in intensity        Chronicity: Onset 14 month(s) ago, gradually worsening        Aggravating Factors: none        Alleviating Factors: opiates       Associated Symptoms: none    Modified Roberto Scale:  6    Performance Status:  50    Psychosocial/Cultural: Lives with her  who is an EM physician who works 5-6 times per month, has a daughter who lives down the block who works and has school aged kids. Sister is part of her support system however she is in her 80s. They can have access to home care if needed      Advance Care Planning   Advance Directives:   Living Will: No    LaPOST: No    Do Not Resuscitate Status: Yes    Medical Power of : Yes      Decision Making:  Patient answered questions              Physical Exam: limited due to video visit  Vitals:    Physical Exam  Constitutional:       General: She is not in acute distress.     Comments: Frail, somewhat somnolent, seems very fatigued    HENT:      Head: Normocephalic and atraumatic.      Nose:      Comments: Wearing 2 NC  Eyes:      General: No scleral icterus.  Pulmonary:      Effort: Pulmonary effort is normal. No respiratory distress.   Abdominal:      General: There is no distension.   Musculoskeletal:      Cervical back: Neck supple.   Skin:     General: Skin is warm.      Coloration: Skin is pale.    Neurological:      General: No focal deficit present.      Mental Status: She is alert and oriented to person, place, and time.   Psychiatric:         Mood and Affect: Affect normal. Mood is depressed.           Labs:  CBC:   WBC   Date Value Ref Range Status   12/11/2020 4.02 3.90 - 12.70 K/uL Final     MCV   Date Value Ref Range Status   03/06/2020 87 82 - 98 fL Final           Hematocrit   Date Value Ref Range Status   12/11/2020 34.7 (L) 37.0 - 48.5 % Final                         Albumin:   Albumin   Date Value Ref Range Status   12/11/2020 4.0 3.5 - 5.2 g/dL Final     Protein:   Total Protein   Date Value Ref Range Status   12/11/2020 7.1 6.0 - 8.4 g/dL Final       Radiology:I have reviewed all pertinent imaging results/findings within the past 24 hours.    Results for orders placed during the hospital encounter of 10/12/21    Echo Saline Bubble? Yes    Interpretation Summary  · The left ventricle is normal in size with normal systolic function. The estimated ejection fraction is 60%.  · Severe right ventricular enlargement with moderately reduced right ventricular systolic function.  · Grade II left ventricular diastolic dysfunction.  · Severe biatrial enlargement.  · Mild to moderate tricuspid regurgitation.  · The estimated PA systolic pressure is 99 mmHg.  · Elevated central venous pressure (15 mmHg).      Encounter occurred during period of COVID-19 emergency. Encounter performed under the concurrent guidelines, limitations and protocols.    45 minutes of total time spent on the encounter, which includes face to face time and non-face to face time preparing to see the patient (eg, review of tests), Obtaining and/or reviewing separately obtained history, Documenting clinical information in the electronic or other health record, Independently interpreting results (not separately reported) and communicating results to the patient/family/caregiver, or Care coordination (not separately reported).  16  additional minutes spent on ACP         Signature: Sonia Kelly MD

## 2022-08-22 PROCEDURE — G0180 PR HOME HEALTH MD CERTIFICATION: ICD-10-PCS | Mod: ,,, | Performed by: STUDENT IN AN ORGANIZED HEALTH CARE EDUCATION/TRAINING PROGRAM

## 2022-08-22 PROCEDURE — G0180 MD CERTIFICATION HHA PATIENT: HCPCS | Mod: ,,, | Performed by: STUDENT IN AN ORGANIZED HEALTH CARE EDUCATION/TRAINING PROGRAM

## 2022-08-24 ENCOUNTER — OFFICE VISIT (OUTPATIENT)
Dept: PALLIATIVE MEDICINE | Facility: CLINIC | Age: 77
End: 2022-08-24
Payer: MEDICARE

## 2022-08-24 DIAGNOSIS — M79.2 NEUROPATHIC PAIN: ICD-10-CM

## 2022-08-24 DIAGNOSIS — R52 PAIN: ICD-10-CM

## 2022-08-24 DIAGNOSIS — R10.9 ABDOMINAL PAIN, UNSPECIFIED ABDOMINAL LOCATION: ICD-10-CM

## 2022-08-24 DIAGNOSIS — R11.0 NAUSEA: ICD-10-CM

## 2022-08-24 PROCEDURE — 99214 OFFICE O/P EST MOD 30 MIN: CPT | Mod: 95,,, | Performed by: STUDENT IN AN ORGANIZED HEALTH CARE EDUCATION/TRAINING PROGRAM

## 2022-08-24 PROCEDURE — 99214 PR OFFICE/OUTPT VISIT, EST, LEVL IV, 30-39 MIN: ICD-10-PCS | Mod: 95,,, | Performed by: STUDENT IN AN ORGANIZED HEALTH CARE EDUCATION/TRAINING PROGRAM

## 2022-08-24 RX ORDER — ONDANSETRON 8 MG/1
8 TABLET, ORALLY DISINTEGRATING ORAL EVERY 12 HOURS PRN
Qty: 60 TABLET | Refills: 5 | Status: SHIPPED | OUTPATIENT
Start: 2022-08-24

## 2022-08-24 RX ORDER — PROMETHAZINE HYDROCHLORIDE 12.5 MG/1
12.5 TABLET ORAL EVERY 6 HOURS PRN
Qty: 80 TABLET | Refills: 5 | Status: SHIPPED | OUTPATIENT
Start: 2022-08-24 | End: 2023-01-01 | Stop reason: SDUPTHER

## 2022-08-24 RX ORDER — TRAMADOL HYDROCHLORIDE 50 MG/1
TABLET ORAL
Qty: 90 TABLET | Refills: 0 | Status: SHIPPED | OUTPATIENT
Start: 2022-08-24 | End: 2022-09-27 | Stop reason: SDUPTHER

## 2022-08-24 NOTE — PROGRESS NOTES
Consult Note  Palliative Medicine Clinic      Consult Requested By: Dr. Gusman    Primary Care Physician: Roberto Braun MD    Reason for Consult: symptom management/ACP    The patient location is: [place]     The chief complaint leading to consultation is: Dyspnea    Visit type: audiovisual    Face to Face time with patient: 16 min    25 minutes of total time spent on the encounter, which includes face to face time and non-face to face time preparing to see the patient (eg, review of tests), Obtaining and/or reviewing separately obtained history, Documenting clinical information in the electronic or other health record, Independently interpreting results (not separately reported) and communicating results to the patient/family/caregiver, or Care coordination (not separately reported).   16 additional minutes spent on ACP     Each patient provided with medical services by telemedicine is:  (1) informed of the relationship between the physician and patient and the respective role of any other health care provider with respect to management of the patient; and (2) notified that he or she may decline to receive medical services by telemedicine and may withdraw from such care at any time.      ASSESSMENT/PLAN:     Plan/Recommendations:  Molly was seen today for pain    Diagnoses and all orders for this visit:    Chronic pulmonary heart disease / Dyspnea on exertion  - Followed by [facility], now has a local Pulmonologist Dr Fair  - Currently on IV remodulin  and adempas  - Reported O2 sats are high 50s-70s at baseline and 30s-40s on exertion   -Using 14l of O2 NC (combines the lines of 2 O2 concentrators)  - Continues to have fatigue and dyspnea however was tolerating working with PT      Neuropathic pain/ Post herpetic neuralgia/ Back pain  -Now s/p kyphoplasty    - Pain well managed  - Tramadol 100mg ER daily  -Tramadol 50 - 100mg q6h PRN pain (taking 1-2 times per day)  -Voltaren Gel, heating pad      Nausea /  Anorexia  -Continues to report Nausea continuously   -Discussed scheduling Zofran TID  -She has Reglan and phenergan available as PRN   -Continues to have low PO intake       Fatigue/Frailty  -Most likely due to progression of her PH  - Counseling and education regarding high frequency and multi-factorial etiology of fatigue symptoms  - Discussion on the benefits of activity, including frequent, short bouts of mild activity, as tolerated, currently working with home PT  - Counseled regarding activity modulation and ways to conserve/preserve energy and direct limited energy to priority activities  - Unclear if steroids have provided benefit from pt report, she seemed less tired today and  states that she is moving around more  - Prednisone 5mg daily - reports mild LE edema- discussed changing the dose or stopping the medication they both agreed on keeping current dose        Palliative Care Encounter:   Medicolegal: Pt has decision making capacity      is surrogate/HCPOA      Psychosocial: Has good support system,  cares for her, daughter lives close by, they have access to home care if needed      Spiritual:      Prognostication: Patient with pHTN, chronic respiratory failure on Home O2 , frail - prognosis is fair to poor     Understanding of disease and Illness Trajectory: Patient understands that she has pHTN, that she is frail and that at this point she is high risk for any procedures. She knows her disease is progressive and not reversible.  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     Goals of care:  Maintain her current level of health, continue to be independent     ACP Date: 08/19/2022  I engaged the patient and   in a conversation about advance care planning and we specifically addressed what the goals of care would be moving forward, in light of the patient's change in clinical status, specifically worsening hypoxia, and fatigue.    We  discussed that her symptoms are likely due to progression of her PH and condition. We explored the patient's values and preferences for future care.  The patient endorses that what is most important right now is to focus on symptom/pain control and improvement in condition. She is hoping to regain her energy and to maintain her independence.  is hoping to continue to prolong her life and for her to improve her strength.   Accordingly, we have decided that the best plan to meet the patient's goals includes continuing with treatment and palliative care.       Code status: Partial code - No chest compressions, no intubation. OK with medications     Advance directives:  HCPOA on file    F/U in 1week     SUBJECTIVE:     History obtained from: patient and      complaint: Pain      History of Present Illness:  Mrs. Molly Smith  is 76 y.o. year old female presenting with dyspnea and pain.  Referred to Palliative Care for evaluation and management of physical symptoms, advance care planning, and clarification of goals of care. She attended the appointment alone.      Interval History:    She is now working w/ PT, tolerating the routine well     Continues to have nausea around the clock, unrelated to meals, taking zofran and reglan     Now she has a connector that she can wear 1 nc and connect 2 O2 concentrators - she is receiving 14L of O2 and her sats are in 70s      -------------------------  She was able to go to the wedding last weekend and she was able to enjoy and participate.  Pain - s/p kyphoplasty, experiencing moderate pain taking tramadol 100mg ER daily and 50-100mg 1-2 times per day times per day   Dyspnea - has worsened lately now in high 60s low 50s and goes down to 35% on exertion  Has nausea daily, maybe related to meals, taking Zofran 3-4 times per day   She feels very weak and fatigued, she feels that she is unable to walk well.      Disease History:  PHTN WHO I ( Remodulin) , chronic  respiratory failure on Home O2 at 6L per N/C,congential heart disease.   Patient developed post neuralgia neuropathic pain in late August/2020   Spinal compression fx x2 in mid September/2020 s/p kyphoplasty    Past Medical History:   Diagnosis Date    Allergy     Anticoagulant long-term use     Arthritis     Heart murmur     Pneumonia     Pulmonary hypertension     Tachycardia      Past Surgical History:   Procedure Laterality Date    BACK SURGERY      HEMIARTHROPLASTY OF HIP Left 3/25/2019    Procedure: HEMIARTHROPLASTY, HIP - left;  Surgeon: Kemal Esposito MD;  Location: [facility] OR [address] [place] [address];  Service: Orthopedics;  Laterality: Left;    REPLACEMENT OF DIALYSIS CATHETER OVER GUIDEWIRE THROUGH EXISTING VENOUS ACCESS N/A 1/17/2019    Procedure: REPLACEMENT, CATHETER, DIALYSIS, OVER GUIDEWIRE, USING EXISTING VENOUS ACCESS;  Surgeon: Phoenix Hand MD;  Location: [facility] CATH LAB;  Service: Nephrology;  Laterality: N/A;     Family History   Problem Relation Age of Onset    Arthritis Mother     Arthritis Father     Diabetes Father     Heart disease Father     Hypertension Father      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    Multaq [dronedarone]        Medications:    Current Outpatient Medications on File Prior to Visit   Medication Sig Dispense Refill    acetaminophen (TYLENOL) 500 MG tablet Take 2 tablets (1,000 mg total) by mouth every 8 (eight) hours as needed for Pain. 90 tablet 1    amiodarone (PACERONE) 200 MG Tab TAKE ONE TABLET BY MOUTH ONCE A DAY AS DIRECTED. THANK YOU! 30 tablet 11    bosentan (TRACLEER) 125 MG Tab Take by mouth every 12 (twelve) hours.      CALCIUM CARBONATE (CALCIUM 600 ORAL) Take 2 tablets by mouth once daily.      diclofenac sodium (VOLTAREN) 1 % Gel Apply 2 g topically once daily. 200 g 3    dicyclomine (BENTYL) 20 mg tablet Take 20 mg by mouth 3 (three) times daily as needed.      digoxin (LANOXIN) 125  mcg tablet TAKE ONE TABLET BY MOUTH ONCE A DAY AS DIRECTED. THANK YOU! 30 tablet 11    docusate sodium (COLACE) 50 MG capsule Take 1 capsule (50 mg total) by mouth daily as needed for Constipation.  0    ferrous gluconate (FERGON) 324 MG tablet Take 1 tablet (324 mg total) by mouth daily with breakfast.      folic acid (FOLVITE) 1 MG tablet Take 1 mg by mouth once daily.      furosemide (LASIX) 40 MG tablet Take 1 tablet (40 mg total) by mouth 2 (two) times daily. 60 tablet 11    loratadine (CLARITIN) 10 mg tablet Take 10 mg by mouth once daily.      metoclopramide HCl (REGLAN) 10 MG tablet Take 10 mg by mouth 2 (two) times daily.      metroNIDAZOLE (FLAGYL) 250 MG tablet Take by mouth.      ondansetron (ZOFRAN-ODT) 8 MG TbDL Take 1 tablet (8 mg total) by mouth every 12 (twelve) hours as needed (nausea). 60 tablet 5    pantoprazole (PROTONIX) 40 MG tablet Take 1 tablet (40 mg total) by mouth once daily. 90 tablet 3    potassium chloride SA (K-DUR,KLOR-CON) 20 MEQ tablet Take 1 tablet (20 mEq total) by mouth once daily. 30 tablet 11    predniSONE (DELTASONE) 5 MG tablet Take 1 tablet (5 mg total) by mouth once daily. 30 tablet 5    riociguat (ADEMPAS) 0.5 mg Tab tablet Take 1 tablet (0.5 mg total) by mouth 3 (three) times daily. 90 tablet 1    spironolactone (ALDACTONE) 25 MG tablet Take 25 mg by mouth once daily.      sucralfate (CARAFATE) 100 mg/mL suspension Take 10 mLs (1 g total) by mouth 4 (four) times daily. 250 mL 1    traMADoL (ULTRAM) 50 mg tablet TAKE ONE TABLET BY MOUTH EVERY 8 HOURS AS NEEDED FOR PAIN 90 tablet 0    traMADoL (ULTRAM-ER) 100 MG Tb24 TAKE ONE TABLET BY MOUTH ONCE A DAY AS NEEDED FOR PAIN 30 tablet 0    TREPROSTINIL SODIUM (TREPROSTINIL, REMODULIN, PUMP FOR HOME USE) Pt currently on 89.2 ng/kg/min=45 mL/day dosing weight 70.05 kg 60 mL 11    warfarin (COUMADIN) 10 MG tablet Take 1 tablet (10 mg total) by mouth Daily. Take 1 tablet (10mg) by mouth daily, except a half  tablet (5mg) every Tues, Thurs, and Saturdays 90 tablet 1     Current Facility-Administered Medications on File Prior to Visit   Medication Dose Route Frequency Provider Last Rate Last Admin    denosumab (PROLIA) injection 60 mg  60 mg Subcutaneous 1 time in Clinic/HOD Roberto Braun MD        denosumab (PROLIA) injection 60 mg  60 mg Subcutaneous 1 time in Clinic/HOD Roberto Braun MD            database queried on 08/24/2022 by Sonia Kelly . The results reviewed and considered with the clinical data in the decision whether or not to prescribe a controlled substance.       08/05/2022 08/05/2022   1  Tramadol Hcl Er 100 Mg Tablet   30.00  30  Da Nod  [id number]  Hay ([id number])  0  10.00 MME  Other  [place]     07/25/2022 07/25/2022   1  Tramadol Hcl 50 Mg Tablet   90.00  30  Er Chiquita  [id number]  Hay ([id number])  0  15.00 MME  [place]     07/06/2022 07/06/2022   1  Tramadol Hcl Er 100 Mg Tablet   30.00  30  Er Chiquita  [id number]  Hay ([id number])  0  10.00 MME  [place]     06/08/2022 06/08/2022   1  Tramadol Hcl Er 100 Mg Tablet   30.00  30  Er Chiquita  [id number]  Hay ([id number])  0  10.00 MME  Other  [place]     05/25/2022 11/02/2021   1  Gabapentin 100 Mg Capsule   180.00  30  Er Chiquita  [id number]  Hay ([id number])  6   [place]are  [place]          OBJECTIVE:       ROS:  Review of Systems   Constitutional: Positive for fatigue. Negative for activity change, appetite change, fever and unexpected weight change.   HENT: Negative for hearing loss, sore throat and trouble swallowing.    Eyes: Negative for photophobia, pain and visual disturbance.   Respiratory: Positive for shortness of breath. Negative for cough and wheezing.    Cardiovascular: Negative for chest pain and leg swelling.   Gastrointestinal: Positive for nausea. Negative for abdominal distention, abdominal pain, constipation, diarrhea, vomiting and reflux.   Genitourinary: Negative for dysuria and urgency.   Musculoskeletal: Negative for gait problem, leg pain and myalgias.   Neurological:  Positive for weakness. Negative for light-headedness, headaches and memory loss.   Psychiatric/Behavioral: Negative for behavioral problems and confusion. The patient is not nervous/anxious.          Review of Symptoms    Symptom Assessment (ESAS 0-10 Scale)  Pain:  7  Dyspnea:  9  Anxiety:  0  Nausea:  7  Depression:  0  Anorexia:  0  Fatigue:  9  Insomnia:  0  Restlessness:  0  Agitation:  0     CAM / Delirium:  Negative  Constipation:  Negative  Diarrhea:  Negative    Anxiety:  Is not nervous/anxious  Constipation:  No constipation    Bowel Management Plan (BMP):  Yes      Comments:  Senna and Miralax    Pain Assessment:  Location(s): back    Back       Location: lower        Quality: aching        Quantity: 7/10 in intensity        Chronicity: Onset 14 month(s) ago, gradually worsening        Aggravating Factors: none        Alleviating Factors: opiates       Associated Symptoms: none    Modified Roberto Scale:  6    Performance Status:  50    Psychosocial/Cultural: Lives with her  who is an EM physician who works 5-6 times per month, has a daughter who lives down the block who works and has school aged kids. Sister is part of her support system however she is in her 80s. They can have access to home care if needed      Advance Care Planning   Advance Directives:   Living Will: No    LaPOST: No    Do Not Resuscitate Status: Yes    Medical Power of : Yes      Decision Making:  Patient answered questions              Physical Exam: limited due to video visit  Vitals:    Physical Exam  Constitutional:       General: She is not in acute distress.     Comments: Frail, sitting in the living room, seems more awake than last visit    HENT:      Head: Normocephalic and atraumatic.      Nose:      Comments: Wearing 2 NC  Eyes:      General: No scleral icterus.  Pulmonary:      Effort: Pulmonary effort is normal. No respiratory distress.   Abdominal:      General: There is no distension.   Musculoskeletal:       Cervical back: Neck supple.   Skin:     General: Skin is warm.      Coloration: Skin is not pale.   Neurological:      General: No focal deficit present.      Mental Status: She is alert and oriented to person, place, and time.   Psychiatric:         Mood and Affect: Mood normal. Affect is flat.           Labs:  CBC:   WBC   Date Value Ref Range Status   12/11/2020 4.02 3.90 - 12.70 K/uL Final     MCV   Date Value Ref Range Status   03/06/2020 87 82 - 98 fL Final           Hematocrit   Date Value Ref Range Status   12/11/2020 34.7 (L) 37.0 - 48.5 % Final                         Albumin:   Albumin   Date Value Ref Range Status   12/11/2020 4.0 3.5 - 5.2 g/dL Final     Protein:   Total Protein   Date Value Ref Range Status   12/11/2020 7.1 6.0 - 8.4 g/dL Final       Radiology:I have reviewed all pertinent imaging results/findings within the past 24 hours.    Results for orders placed during the hospital encounter of 10/12/21    Echo Saline Bubble? Yes    Interpretation Summary  · The left ventricle is normal in size with normal systolic function. The estimated ejection fraction is 60%.  · Severe right ventricular enlargement with moderately reduced right ventricular systolic function.  · Grade II left ventricular diastolic dysfunction.  · Severe biatrial enlargement.  · Mild to moderate tricuspid regurgitation.  · The estimated PA systolic pressure is 99 mmHg.  · Elevated central venous pressure (15 mmHg).      Encounter occurred during period of COVID-19 emergency. Encounter performed under the concurrent guidelines, limitations and protocols.            Signature: Sonia Kelly MD

## 2022-09-06 ENCOUNTER — EXTERNAL HOME HEALTH (OUTPATIENT)
Dept: HOME HEALTH SERVICES | Facility: HOSPITAL | Age: 77
End: 2022-09-06
Payer: MEDICARE

## 2022-09-07 DIAGNOSIS — R52 PAIN: ICD-10-CM

## 2022-09-07 DIAGNOSIS — R10.9 ABDOMINAL PAIN, UNSPECIFIED ABDOMINAL LOCATION: ICD-10-CM

## 2022-09-07 DIAGNOSIS — M62.830 MUSCLE SPASM OF BACK: ICD-10-CM

## 2022-09-07 RX ORDER — TRAMADOL HYDROCHLORIDE 100 MG/1
TABLET, EXTENDED RELEASE ORAL
Qty: 30 TABLET | Refills: 0 | Status: SHIPPED | OUTPATIENT
Start: 2022-09-07 | End: 2022-09-26 | Stop reason: SDUPTHER

## 2022-09-26 ENCOUNTER — TELEPHONE (OUTPATIENT)
Dept: PALLIATIVE MEDICINE | Facility: CLINIC | Age: 77
End: 2022-09-26
Payer: MEDICARE

## 2022-09-26 DIAGNOSIS — R10.9 ABDOMINAL PAIN, UNSPECIFIED ABDOMINAL LOCATION: ICD-10-CM

## 2022-09-26 DIAGNOSIS — M62.830 MUSCLE SPASM OF BACK: ICD-10-CM

## 2022-09-26 DIAGNOSIS — R52 PAIN: ICD-10-CM

## 2022-09-26 RX ORDER — TRAMADOL HYDROCHLORIDE 100 MG/1
TABLET, EXTENDED RELEASE ORAL
Qty: 30 TABLET | Refills: 0 | Status: SHIPPED | OUTPATIENT
Start: 2022-09-26 | End: 2022-11-07 | Stop reason: SDUPTHER

## 2022-09-27 ENCOUNTER — OFFICE VISIT (OUTPATIENT)
Dept: PALLIATIVE MEDICINE | Facility: CLINIC | Age: 77
End: 2022-09-27
Payer: MEDICARE

## 2022-09-27 DIAGNOSIS — R10.9 ABDOMINAL PAIN, UNSPECIFIED ABDOMINAL LOCATION: ICD-10-CM

## 2022-09-27 DIAGNOSIS — R52 PAIN: ICD-10-CM

## 2022-09-27 DIAGNOSIS — M79.2 NEUROPATHIC PAIN: ICD-10-CM

## 2022-09-27 PROCEDURE — 99214 PR OFFICE/OUTPT VISIT, EST, LEVL IV, 30-39 MIN: ICD-10-PCS | Mod: 95,,, | Performed by: STUDENT IN AN ORGANIZED HEALTH CARE EDUCATION/TRAINING PROGRAM

## 2022-09-27 PROCEDURE — 99214 OFFICE O/P EST MOD 30 MIN: CPT | Mod: 95,,, | Performed by: STUDENT IN AN ORGANIZED HEALTH CARE EDUCATION/TRAINING PROGRAM

## 2022-09-27 RX ORDER — TRAMADOL HYDROCHLORIDE 50 MG/1
TABLET ORAL
Qty: 90 TABLET | Refills: 0 | Status: SHIPPED | OUTPATIENT
Start: 2022-09-27 | End: 2022-11-07 | Stop reason: SDUPTHER

## 2022-09-27 NOTE — PROGRESS NOTES
Consult Note  Palliative Medicine Clinic      Consult Requested By: Dr. Gusman    Primary Care Physician: Roberto Braun MD    Reason for Consult: symptom management/ACP    The patient location is: [place]     The chief complaint leading to consultation is: Dyspnea    Visit type: audiovisual    Face to Face time with patient: 30 min    40 minutes of total time spent on the encounter, which includes face to face time and non-face to face time preparing to see the patient (eg, review of tests), Obtaining and/or reviewing separately obtained history, Documenting clinical information in the electronic or other health record, Independently interpreting results (not separately reported) and communicating results to the patient/family/caregiver, or Care coordination (not separately reported).   16 additional minutes spent on ACP     Each patient provided with medical services by telemedicine is:  (1) informed of the relationship between the physician and patient and the respective role of any other health care provider with respect to management of the patient; and (2) notified that he or she may decline to receive medical services by telemedicine and may withdraw from such care at any time.      ASSESSMENT/PLAN:     Plan/Recommendations:  Molly was seen today for pain    Diagnoses and all orders for this visit:    Chronic pulmonary heart disease / Dyspnea on exertion  - Followed by [facility], now has a local Pulmonologist Dr Fair  - Currently on IV remodulin  and adempas  - Reported O2 sats are high 50s-70s at baseline and 30s-40s on exertion   -Using 14L O2 NC (combines the lines of 2 O2 concentrators)  - Reporting worsening UNGER and fatigue, discussed that this is related to progression of her illness       Neuropathic pain/ Post herpetic neuralgia/ Back pain  -Now s/p kyphoplasty    - Pain well managed  - Tramadol 100mg ER daily  -Tramadol 50mg q 8h PRN   (taking 1-2 times per day)  -Voltaren Gel, heating  pad      Nausea / Anorexia  -Continues to report Nausea continuously   -She has Reglan and phenergan available as PRN   -Continues to have low PO intake - discussed small frequent high calorie meals  -Taking Zofran Prn and this is providing some relief         Fatigue/Frailty  -Most likely due to progression of her PH  - Counseling and education regarding high frequency and multi-factorial etiology of fatigue symptoms  - Discussion on the benefits of activity, including frequent, short bouts of mild activity, as tolerated, currently working with home PT  - Counseled regarding activity modulation and ways to conserve/preserve energy and direct limited energy to priority activities  - patient states that there wasn't a significant benefit from steroids- d/c prednisone        Palliative Care Encounter:   Medicolegal: Pt has decision making capacity      is surrogate/HCPOA      Psychosocial: Has good support system,  cares for her, daughter lives close by, they have access to home care if needed      Spiritual:      Prognostication: Patient with pHTN, chronic respiratory failure on Home O2 , frail - prognosis is poor     Understanding of disease and Illness Trajectory: Patient understands that she has pHTN, that she is frail and that at this point she is high risk for any procedures. She knows her disease is progressive and not reversible.  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  We talked about sharing her status with her children, and enjoying the time that she has left.     Goals of care:  Maintain her current level of health, continue to be independent     ACP Date: 08/19/2022  I engaged the patient and    in a conversation about advance care planning and we specifically addressed what the goals of care would be moving forward, in light of the patient's change in clinical status, specifically worsening hypoxia, and fatigue.    We discussed that her symptoms  are likely due to progression of her PH and condition. We explored the patient's values and preferences for future care.  The patient endorses that what is most important right now is to focus on symptom/pain control and improvement in condition . She is hoping to regain her energy and to maintain her independence.  is hoping to continue to prolong her life and for her to improve her strength.   Accordingly, we have decided that the best plan to meet the patient's goals includes continuing with treatment and palliative care.       Code status: Partial code - No chest compressions, no intubation. OK with medications     Advance directives:  HCPOA on file    F/U in 1m     SUBJECTIVE:     History obtained from: patient and      complaint: Pain      History of Present Illness:  Mrs. Molly Smith  is 77 y.o. female presenting with dyspnea and pain.  Referred to Palliative Care for evaluation and management of physical symptoms, advance care planning, and clarification of goals of care. She attended the appointment alone.      Interval History:    More fatigued, sleeping during the day  More dyspnea  Was able to visit daughter in [place] and saw grandchildren, sad that's he was not able to play with them       She feels all right today was reporting some diarrhea nausea she is taking Zofran as needed    Taking tramadol for the back pain taking the extended release in the morning then dose of 50 mg in the middle today and then a dose of 50 mg before bedtime    Shared that she has more frequent drops in her O2 sat, every time she moves it drops to 35% she is unable to walk to the car or to the front of the house.  Discussed that this is progression of her condition.        Disease History:  PHTN WHO I ( Remodulin) , chronic respiratory failure on Home O2 at 6L per N/C,congential heart disease.   Patient developed post neuralgia neuropathic pain in late August/2020   Spinal compression fx x2 in mid  September/2020 s/p kyphoplasty    Past Medical History:   Diagnosis Date    Allergy     Anticoagulant long-term use     Arthritis     Heart murmur     Pneumonia     Pulmonary hypertension     Tachycardia      Past Surgical History:   Procedure Laterality Date    BACK SURGERY      HEMIARTHROPLASTY OF HIP Left 3/25/2019    Procedure: HEMIARTHROPLASTY, HIP - left;  Surgeon: Kemal Esposito MD;  Location: [facility] OR [address] [place] [address];  Service: Orthopedics;  Laterality: Left;    REPLACEMENT OF DIALYSIS CATHETER OVER GUIDEWIRE THROUGH EXISTING VENOUS ACCESS N/A 1/17/2019    Procedure: REPLACEMENT, CATHETER, DIALYSIS, OVER GUIDEWIRE, USING EXISTING VENOUS ACCESS;  Surgeon: Phoenix Hand MD;  Location: [facility] CATH LAB;  Service: Nephrology;  Laterality: N/A;     Family History   Problem Relation Age of Onset    Arthritis Mother     Arthritis Father     Diabetes Father     Heart disease Father     Hypertension Father      Review of patient's allergies indicates:   Allergen Reactions    Vancomycin      RED 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    Multaq [dronedarone]        Medications:    Current Outpatient Medications on File Prior to Visit   Medication Sig Dispense Refill    acetaminophen (TYLENOL) 500 MG tablet Take 2 tablets (1,000 mg total) by mouth every 8 (eight) hours as needed for Pain. 90 tablet 1    amiodarone (PACERONE) 200 MG Tab TAKE ONE TABLET BY MOUTH ONCE A DAY AS DIRECTED. THANK YOU! 30 tablet 11    bosentan (TRACLEER) 125 MG Tab Take by mouth every 12 (twelve) hours.      CALCIUM CARBONATE (CALCIUM 600 ORAL) Take 2 tablets by mouth once daily.      diclofenac sodium (VOLTAREN) 1 % Gel Apply 2 g topically once daily. 200 g 3    dicyclomine (BENTYL) 20 mg tablet Take 20 mg by mouth 3 (three) times daily as needed.      digoxin (LANOXIN) 125 mcg tablet TAKE ONE TABLET BY MOUTH ONCE A DAY AS DIRECTED. THANK YOU! 30 tablet 11    docusate sodium (COLACE) 50 MG capsule Take 1 capsule (50 mg total) by mouth daily as needed for Constipation.  0     ferrous gluconate (FERGON) 324 MG tablet Take 1 tablet (324 mg total) by mouth daily with breakfast.      folic acid (FOLVITE) 1 MG tablet Take 1 mg by mouth once daily.      furosemide (LASIX) 40 MG tablet Take 1 tablet (40 mg total) by mouth 2 (two) times daily. 60 tablet 11    loratadine (CLARITIN) 10 mg tablet Take 10 mg by mouth once daily.      metoclopramide HCl (REGLAN) 10 MG tablet Take 10 mg by mouth 2 (two) times daily.      metroNIDAZOLE (FLAGYL) 250 MG tablet Take by mouth.      ondansetron (ZOFRAN-ODT) 8 MG TbDL Take 1 tablet (8 mg total) by mouth every 12 (twelve) hours as needed (nausea). 60 tablet 5    pantoprazole (PROTONIX) 40 MG tablet Take 1 tablet (40 mg total) by mouth once daily. 90 tablet 3    potassium chloride SA (K-DUR,KLOR-CON) 20 MEQ tablet Take 1 tablet (20 mEq total) by mouth once daily. 30 tablet 11    predniSONE (DELTASONE) 5 MG tablet Take 1 tablet (5 mg total) by mouth once daily. 30 tablet 5    promethazine (PHENERGAN) 12.5 MG Tab Take 1 tablet (12.5 mg total) by mouth every 6 (six) hours as needed (nausea). 80 tablet 5    riociguat (ADEMPAS) 0.5 mg Tab tablet Take 1 tablet (0.5 mg total) by mouth 3 (three) times daily. 90 tablet 1    spironolactone (ALDACTONE) 25 MG tablet Take 25 mg by mouth once daily.      sucralfate (CARAFATE) 100 mg/mL suspension Take 10 mLs (1 g total) by mouth 4 (four) times daily. 250 mL 1    traMADoL (ULTRAM-ER) 100 MG Tb24 TAKE ONE TABLET BY MOUTH ONCE A DAY AS NEEDED FOR PAIN 30 tablet 0    TREPROSTINIL SODIUM (TREPROSTINIL, REMODULIN, PUMP FOR HOME USE) Pt currently on 89.2 ng/kg/min=45 mL/day dosing weight 70.05 kg 60 mL 11    warfarin (COUMADIN) 10 MG tablet Take 1 tablet (10 mg total) by mouth Daily. Take 1 tablet (10mg) by mouth daily, except a half tablet (5mg) every Tues, Thurs, and Saturdays 90 tablet 1    [DISCONTINUED] traMADoL (ULTRAM) 50 mg tablet TAKE ONE TABLET BY MOUTH EVERY 8 HOURS AS NEEDED FOR PAIN 90 tablet 0     Current  Facility-Administered Medications on File Prior to Visit   Medication Dose Route Frequency Provider Last Rate Last Admin    denosumab (PROLIA) injection 60 mg  60 mg Subcutaneous 1 time in Clinic/HOD Roberto Braun MD        denosumab (PROLIA) injection 60 mg  60 mg Subcutaneous 1 time in Clinic/HOD Roberto Braun MD            database queried on 09/27/2022 by Sonia Kelly . The results reviewed and considered with the clinical data in the decision whether or not to prescribe a controlled substance.       08/05/2022 08/05/2022   1  Tramadol Hcl Er 100 Mg Tablet   30.00  30  Da Nod  [id number]  Hay ([id number])  0  10.00 MME  Other  [place]     07/25/2022 07/25/2022   1  Tramadol Hcl 50 Mg Tablet   90.00  30  Er Chiquita  [id number]  Hay ([id number])  0  15.00 MME  [place]     07/06/2022 07/06/2022   1  Tramadol Hcl Er 100 Mg Tablet   30.00  30  Er Chiquita  [id number]  Hay ([id number])  0  10.00 MME  [place]     06/08/2022 06/08/2022   1  Tramadol Hcl Er 100 Mg Tablet   30.00  30  Er Chiquita  [id number]  Hay ([id number])  0  10.00 MME  [place]     05/25/2022 11/02/2021   1  Gabapentin 100 Mg Capsule   180.00  30  Er Chiquita  [id number]  Hay ([id number])  6   [place]are  [place]          OBJECTIVE:       ROS:  Review of Systems   Constitutional:  Positive for fatigue. Negative for activity change, appetite change, fever and unexpected weight change.   HENT:  Negative for hearing loss, sore throat and trouble swallowing.    Eyes:  Negative for photophobia, pain and visual disturbance.   Respiratory:  Positive for shortness of breath. Negative for cough and wheezing.    Cardiovascular:  Negative for chest pain and leg swelling.   Gastrointestinal:  Positive for nausea. Negative for abdominal distention, abdominal pain, constipation, diarrhea, vomiting and reflux.   Genitourinary:  Negative for dysuria and urgency.   Musculoskeletal:  Negative for gait problem, leg pain and myalgias.   Neurological:  Positive for weakness. Negative for light-headedness, headaches and  memory loss.   Psychiatric/Behavioral:  Negative for behavioral problems and confusion. The patient is not nervous/anxious.        Review of Symptoms      Symptom Assessment (ESAS 0-10 Scale)  Pain:  3  Dyspnea:  9  Anxiety:  0  Nausea:  4  Depression:  0  Anorexia:  7  Fatigue:  8  Insomnia:  0  Restlessness:  0  Agitation:  0     CAM / Delirium:  Negative  Constipation:  Negative  Diarrhea:  Negative    Anxiety:  Is not nervous/anxious  Constipation:  No constipation    Bowel Management Plan (BMP):  Yes      Comments:  Senna and Miralax    Pain Assessment:    Location(s): back    Back       Location: lower        Quality: Aching        Quantity: 4/10 in intensity        Chronicity: Onset 14 month(s) ago, gradually worsening        Aggravating Factors: None        Alleviating Factors: Opiates       Associated Symptoms: None    Modified Roberto Scale:  7    Performance Status:  50    Psychosocial/Cultural: Lives with her  who is an EM physician who works 5-6 times per month, has a daughter who lives down the block who works and has school aged kids. Sister is part of her support system however she is in her 80s. They can have access to home care if needed    Advance Care Planning   Advance Directives:   Living Will: No    LaPOST: No    Do Not Resuscitate Status: Yes    Medical Power of : Yes      Decision Making:  Patient answered questions            Physical Exam: limited due to video visit  Vitals:    Physical Exam  Constitutional:       General: She is not in acute distress.     Comments: Frail, sitting in the living room   HENT:      Head: Normocephalic and atraumatic.      Nose:      Comments: Wearing  NC  Eyes:      General: No scleral icterus.  Pulmonary:      Effort: Pulmonary effort is normal. No respiratory distress.   Abdominal:      General: There is no distension.   Musculoskeletal:      Cervical back: Neck supple.   Skin:     General: Skin is warm.      Coloration: Skin is not pale.    Neurological:      General: No focal deficit present.      Mental Status: She is alert and oriented to person, place, and time.   Psychiatric:         Mood and Affect: Mood normal. Affect is flat.         Labs:  CBC:   WBC   Date Value Ref Range Status   12/11/2020 4.02 3.90 - 12.70 K/uL Final     MCV   Date Value Ref Range Status   03/06/2020 87 82 - 98 fL Final           Hematocrit   Date Value Ref Range Status   12/11/2020 34.7 (L) 37.0 - 48.5 % Final                         Albumin:   Albumin   Date Value Ref Range Status   12/11/2020 4.0 3.5 - 5.2 g/dL Final     Protein:   Total Protein   Date Value Ref Range Status   12/11/2020 7.1 6.0 - 8.4 g/dL Final       Radiology:I have reviewed all pertinent imaging results/findings within the past 24 hours.    Results for orders placed during the hospital encounter of 10/12/21    Echo Saline Bubble? Yes    Interpretation Summary  · The left ventricle is normal in size with normal systolic function. The estimated ejection fraction is 60%.  · Severe right ventricular enlargement with moderately reduced right ventricular systolic function.  · Grade II left ventricular diastolic dysfunction.  · Severe biatrial enlargement.  · Mild to moderate tricuspid regurgitation.  · The estimated PA systolic pressure is 99 mmHg.  · Elevated central venous pressure (15 mmHg).      35 minutes of total time spent on the encounter, which includes face to face time and non-face to face time preparing to see the patient (eg, review of tests), Obtaining and/or reviewing separately obtained history, Documenting clinical information in the electronic or other health record, Independently interpreting results (not separately reported) and communicating results to the patient/family/caregiver, or Care coordination (not separately reported).      Encounter occurred during period of COVID-19 emergency. Encounter performed under the concurrent guidelines, limitations and  protocols.            Signature: Sonia Kelly MD

## 2022-10-07 ENCOUNTER — TELEPHONE (OUTPATIENT)
Dept: TRANSPLANT | Facility: CLINIC | Age: 77
End: 2022-10-07
Payer: MEDICARE

## 2022-10-13 ENCOUNTER — TELEPHONE (OUTPATIENT)
Dept: TRANSPLANT | Facility: CLINIC | Age: 77
End: 2022-10-13
Payer: MEDICARE

## 2022-10-13 NOTE — TELEPHONE ENCOUNTER
Patient's  called to check to see the safety of Xifaxan in the setting of PH. Per thuanNP-she called and LVM a few days ago informing that it is safe to use with PH but Dr. Smith never received voicemail. Dr. Smith also wanted new orders for HH so that labs can be drawn at home for Mrs. Smith. HH orders were sent in by Dr. Conte and labs are from Dr. Fair. Message sent to Dr. Fair's staff yesterday to see if they can call in the orders since they are his standing labs (CMP, CBC, Dig, BNP) and they should be routed to him. Nurse navigator called Dr. Smith to inform him but there was no answer so message was left.

## 2022-10-24 ENCOUNTER — PATIENT OUTREACH (OUTPATIENT)
Dept: HOME HEALTH SERVICES | Facility: HOSPITAL | Age: 77
End: 2022-10-24
Payer: MEDICARE

## 2022-10-25 ENCOUNTER — EXTERNAL HOME HEALTH (OUTPATIENT)
Dept: HOME HEALTH SERVICES | Facility: HOSPITAL | Age: 77
End: 2022-10-25
Payer: MEDICARE

## 2022-10-31 ENCOUNTER — OFFICE VISIT (OUTPATIENT)
Dept: PALLIATIVE MEDICINE | Facility: CLINIC | Age: 77
End: 2022-10-31
Payer: MEDICARE

## 2022-10-31 DIAGNOSIS — Z51.5 PALLIATIVE CARE BY SPECIALIST: ICD-10-CM

## 2022-10-31 DIAGNOSIS — R53.83 FATIGUE, UNSPECIFIED TYPE: ICD-10-CM

## 2022-10-31 DIAGNOSIS — I27.21 PAH (PULMONARY ARTERY HYPERTENSION): ICD-10-CM

## 2022-10-31 DIAGNOSIS — R11.0 NAUSEA: Primary | ICD-10-CM

## 2022-10-31 PROCEDURE — 99214 OFFICE O/P EST MOD 30 MIN: CPT | Mod: 95,,, | Performed by: STUDENT IN AN ORGANIZED HEALTH CARE EDUCATION/TRAINING PROGRAM

## 2022-10-31 PROCEDURE — 99214 PR OFFICE/OUTPT VISIT, EST, LEVL IV, 30-39 MIN: ICD-10-PCS | Mod: 95,,, | Performed by: STUDENT IN AN ORGANIZED HEALTH CARE EDUCATION/TRAINING PROGRAM

## 2022-10-31 NOTE — PROGRESS NOTES
Consult Note  Palliative Medicine Clinic      Consult Requested By: Dr. Gusman    Primary Care Physician: Roberto Braun MD    Reason for Consult: symptom management/ACP    The patient location is: [place]     The chief complaint leading to consultation is: Dyspnea    Visit type: audiovisual    Face to Face time with patient: 29 min    35 minutes of total time spent on the encounter, which includes face to face time and non-face to face time preparing to see the patient (eg, review of tests), Obtaining and/or reviewing separately obtained history, Documenting clinical information in the electronic or other health record, Independently interpreting results (not separately reported) and communicating results to the patient/family/caregiver, or Care coordination (not separately reported).     Each patient provided with medical services by telemedicine is:  (1) informed of the relationship between the physician and patient and the respective role of any other health care provider with respect to management of the patient; and (2) notified that he or she may decline to receive medical services by telemedicine and may withdraw from such care at any time.      ASSESSMENT/PLAN:     Plan/Recommendations:  Molly was seen today for pain    Diagnoses and all orders for this visit:    Chronic pulmonary heart disease / Dyspnea on exertion  - Followed by [facility], now has a local Pulmonologist Dr Fair  - Currently on IV remodulin  and adempas  - Reported O2 sats are high 60s at baseline and 30s on exertion   -Using 14L O2 NC (combines the lines of 2 O2 concentrators)  - she is now more sedentary has decreased activity level,. No longer doing PT       Neuropathic pain/ Post herpetic neuralgia/ Back pain  -Now s/p kyphoplasty    - Pain well managed  - Tramadol 100mg ER daily  -Tramadol 50mg q 8h PRN   (taking 1-2 times per day)  -Voltaren Gel, heating pad      Nausea / Anorexia  -Nausea after meals   -Continues to have low PO  intake - discussed small frequent high calorie meals  -Recd take Zofran TID and then will reassess the need to change meds   -She will keep a food diary  -Phenergan causes SE  -Will visit local Gi doctor because she also has fecal incontinence now     Fatigue/Frailty  -Most likely due to progression of her PH  - Counseling and education regarding high frequency and multi-factorial etiology of fatigue symptoms  - Counseled regarding activity modulation and ways to conserve/preserve energy and direct limited energy to priority activities        Palliative Care Encounter:   Medicolegal: Pt has decision making capacity      is surrogate/HCPOA      Psychosocial: Has good support system,  cares for her, daughter lives close by, they have access to home care if needed     Prognostication: Patient with pHTN, chronic respiratory failure on Home O2 , frail - prognosis is poor     Understanding of disease and Illness Trajectory: Patient understands that she has pHTN, that she is frail and that at this point she is high risk for any procedures. She knows her disease is progressive and not reversible.  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  We talked about sharing her status with her children, and enjoying the time that she has left.     Goals of care:  Maintain her current level of health, continue to be independent     ACP Date: 08/19/2022  I engaged the patient and    in a conversation about advance care planning and we specifically addressed what the goals of care would be moving forward, in light of the patient's change in clinical status, specifically worsening hypoxia, and fatigue.    We discussed that her symptoms are likely due to progression of her PH and condition. We explored the patient's values and preferences for future care.  The patient endorses that what is most important right now is to focus on symptom/pain control and improvement in condition . She  is hoping to regain her energy and to maintain her independence.  is hoping to continue to prolong her life and for her to improve her strength.   Accordingly, we have decided that the best plan to meet the patient's goals includes continuing with treatment and palliative care.       Code status: Partial code - No chest compressions, no intubation. OK with medications     Advance directives:  HCPOA on file    F/U in 1w    SUBJECTIVE:     History obtained from: patient and      complaint: Pain      History of Present Illness:  Mrs. Molly Smith  is 77 y.o. female presenting with dyspnea and pain.  Referred to Palliative Care for evaluation and management of physical symptoms, advance care planning, and clarification of goals of care. She attended the appointment alone.      Interval History:    More sedentary  Continues to have nausea continuously   Not eating much   Tried phenergan, was unsteady   Now w/ fecal incontinence    Disease History:  PHTN WHO I ( Remodulin) , chronic respiratory failure on Home O2 at 6L per N/C,congential heart disease.   Patient developed post neuralgia neuropathic pain in late August/2020   Spinal compression fx x2 in mid September/2020 s/p kyphoplasty    Past Medical History:   Diagnosis Date    Allergy     Anticoagulant long-term use     Arthritis     Heart murmur     Pneumonia     Pulmonary hypertension     Tachycardia      Past Surgical History:   Procedure Laterality Date    BACK SURGERY      HEMIARTHROPLASTY OF HIP Left 3/25/2019    Procedure: HEMIARTHROPLASTY, HIP - left;  Surgeon: Kemal Esposito MD;  Location: [facility] OR [address] [place] [address];  Service: Orthopedics;  Laterality: Left;    REPLACEMENT OF DIALYSIS CATHETER OVER GUIDEWIRE THROUGH EXISTING VENOUS ACCESS N/A 1/17/2019    Procedure: REPLACEMENT, CATHETER, DIALYSIS, OVER GUIDEWIRE, USING EXISTING VENOUS ACCESS;  Surgeon: Phoenix Hand MD;  Location: [facility] CATH LAB;  Service: Nephrology;  Laterality: N/A;      Family History   Problem Relation Age of Onset    Arthritis Mother     Arthritis Father     Diabetes Father     Heart disease Father     Hypertension Father      Review of patient's allergies indicates:   Allergen Reactions    Vancomycin      RED 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    Multaq [dronedarone]        Medications:    Current Outpatient Medications on File Prior to Visit   Medication Sig Dispense Refill    acetaminophen (TYLENOL) 500 MG tablet Take 2 tablets (1,000 mg total) by mouth every 8 (eight) hours as needed for Pain. 90 tablet 1    amiodarone (PACERONE) 200 MG Tab TAKE ONE TABLET BY MOUTH ONCE A DAY AS DIRECTED. THANK YOU! 30 tablet 11    bosentan (TRACLEER) 125 MG Tab Take by mouth every 12 (twelve) hours.      CALCIUM CARBONATE (CALCIUM 600 ORAL) Take 2 tablets by mouth once daily.      diclofenac sodium (VOLTAREN) 1 % Gel Apply 2 g topically once daily. 200 g 3    dicyclomine (BENTYL) 20 mg tablet Take 20 mg by mouth 3 (three) times daily as needed.      digoxin (LANOXIN) 125 mcg tablet TAKE ONE TABLET BY MOUTH ONCE A DAY AS DIRECTED. THANK YOU! 30 tablet 11    docusate sodium (COLACE) 50 MG capsule Take 1 capsule (50 mg total) by mouth daily as needed for Constipation.  0    ferrous gluconate (FERGON) 324 MG tablet Take 1 tablet (324 mg total) by mouth daily with breakfast.      folic acid (FOLVITE) 1 MG tablet Take 1 mg by mouth once daily.      furosemide (LASIX) 40 MG tablet Take 1 tablet (40 mg total) by mouth 2 (two) times daily. 60 tablet 11    loratadine (CLARITIN) 10 mg tablet Take 10 mg by mouth once daily.      metoclopramide HCl (REGLAN) 10 MG tablet Take 10 mg by mouth 2 (two) times daily.      metroNIDAZOLE (FLAGYL) 250 MG tablet Take by mouth.      ondansetron (ZOFRAN-ODT) 8 MG TbDL Take 1 tablet (8 mg total) by mouth every 12 (twelve) hours as needed (nausea). 60 tablet 5    pantoprazole (PROTONIX) 40 MG tablet Take 1 tablet (40 mg total) by mouth once daily. 90 tablet 3    potassium chloride SA  (K-DUR,KLOR-CON) 20 MEQ tablet Take 1 tablet (20 mEq total) by mouth once daily. 30 tablet 11    predniSONE (DELTASONE) 5 MG tablet Take 1 tablet (5 mg total) by mouth once daily. 30 tablet 5    promethazine (PHENERGAN) 12.5 MG Tab Take 1 tablet (12.5 mg total) by mouth every 6 (six) hours as needed (nausea). 80 tablet 5    riociguat (ADEMPAS) 0.5 mg Tab tablet Take 1 tablet (0.5 mg total) by mouth 3 (three) times daily. 90 tablet 1    spironolactone (ALDACTONE) 25 MG tablet Take 25 mg by mouth once daily.      sucralfate (CARAFATE) 100 mg/mL suspension Take 10 mLs (1 g total) by mouth 4 (four) times daily. 250 mL 1    traMADoL (ULTRAM) 50 mg tablet TAKE ONE TABLET BY MOUTH EVERY 8 HOURS AS NEEDED FOR PAIN 90 tablet 0    traMADoL (ULTRAM-ER) 100 MG Tb24 TAKE ONE TABLET BY MOUTH ONCE A DAY AS NEEDED FOR PAIN 30 tablet 0    TREPROSTINIL SODIUM (TREPROSTINIL, REMODULIN, PUMP FOR HOME USE) Pt currently on 89.2 ng/kg/min=45 mL/day dosing weight 70.05 kg 60 mL 11    warfarin (COUMADIN) 10 MG tablet Take 1 tablet (10 mg total) by mouth Daily. Take 1 tablet (10mg) by mouth daily, except a half tablet (5mg) every Tues, Thurs, and Saturdays 90 tablet 1    warfarin (COUMADIN) 5 MG tablet TAKE ONE AND ONE-HALF TABLET BY MOUTH ONCE DAILY AS DIRECTED BY [facility].NEW DOSE AS OF 7/26/2022       Current Facility-Administered Medications on File Prior to Visit   Medication Dose Route Frequency Provider Last Rate Last Admin    denosumab (PROLIA) injection 60 mg  60 mg Subcutaneous 1 time in Clinic/HOD Roberto Braun MD        denosumab (PROLIA) injection 60 mg  60 mg Subcutaneous 1 time in Clinic/HOD Roberto Braun MD            database queried on 10/31/2022 by Sonia Kelly . The results reviewed and considered with the clinical data in the decision whether or not to prescribe a controlled substance.        OBJECTIVE:       ROS:  Review of Systems   Constitutional:  Positive for appetite change and fatigue. Negative for  activity change, fever and unexpected weight change.   HENT:  Negative for hearing loss, sore throat and trouble swallowing.    Eyes:  Negative for photophobia, pain and visual disturbance.   Respiratory:  Positive for shortness of breath. Negative for cough and wheezing.    Cardiovascular:  Negative for chest pain and leg swelling.   Gastrointestinal:  Positive for nausea and fecal incontinence. Negative for abdominal distention, abdominal pain, constipation, diarrhea, vomiting and reflux.   Genitourinary:  Negative for dysuria and urgency.   Musculoskeletal:  Negative for gait problem, leg pain and myalgias.   Neurological:  Positive for weakness. Negative for light-headedness, headaches and memory loss.   Psychiatric/Behavioral:  Negative for behavioral problems and confusion. The patient is not nervous/anxious.        Review of Symptoms      Symptom Assessment (ESAS 0-10 Scale)  Pain:  0  Dyspnea:  8  Anxiety:  0  Nausea:  10  Depression:  0  Anorexia:  8  Fatigue:  7  Insomnia:  0  Restlessness:  0  Agitation:  0     CAM / Delirium:  Negative  Constipation:  Negative  Diarrhea:  Negative    Anxiety:  Is not nervous/anxious  Constipation:  No constipation    Bowel Management Plan (BMP):  Yes      Comments:  Senna and Miralax    Pain Assessment:    Location(s): back    Back       Location: lower        Quality: Aching        Quantity: 2/10 in intensity        Chronicity: Onset 14 month(s) ago, gradually worsening        Aggravating Factors: None        Alleviating Factors: Opiates       Associated Symptoms: None    Modified Roberto Scale:  7    Performance Status:  50    Psychosocial/Cultural: Lives with her  who is an EM physician who works 5-6 times per month, has a daughter who lives down the block who works and has school aged kids. Sister is part of her support system however she is in her 80s. They can have access to home care if needed    Advance Care Planning   Advance Directives:   Living Will: No     LaPOST: No    Do Not Resuscitate Status: Yes    Medical Power of : Yes      Decision Making:  Patient answered questions  Goals of Care: What is most important right now is to focus on symptom/pain control, improvement in condition. Accordingly, we have decided that the best plan to meet the patient's goals includes continuing with treatment.            Physical Exam: limited due to video visit  Vitals:    Physical Exam  Constitutional:       General: She is not in acute distress.     Comments: Frail, sitting in the living room   HENT:      Head: Normocephalic and atraumatic.      Nose:      Comments: Wearing  NC  Eyes:      General: No scleral icterus.  Pulmonary:      Effort: Pulmonary effort is normal. No respiratory distress.   Abdominal:      General: There is no distension.   Musculoskeletal:      Cervical back: Neck supple.   Skin:     General: Skin is warm.      Coloration: Skin is not pale.   Neurological:      General: No focal deficit present.      Mental Status: She is alert and oriented to person, place, and time.   Psychiatric:         Mood and Affect: Mood and affect normal.         Labs:  CBC:   WBC   Date Value Ref Range Status   12/11/2020 4.02 3.90 - 12.70 K/uL Final     MCV   Date Value Ref Range Status   03/06/2020 87 82 - 98 fL Final           Hematocrit   Date Value Ref Range Status   12/11/2020 34.7 (L) 37.0 - 48.5 % Final                         Albumin:   Albumin   Date Value Ref Range Status   12/11/2020 4.0 3.5 - 5.2 g/dL Final     Protein:   Total Protein   Date Value Ref Range Status   12/11/2020 7.1 6.0 - 8.4 g/dL Final       Radiology:I have reviewed all pertinent imaging results/findings within the past 24 hours.    Results for orders placed during the hospital encounter of 10/12/21    Echo Saline Bubble? Yes    Interpretation Summary  · The left ventricle is normal in size with normal systolic function. The estimated ejection fraction is 60%.  · Severe right ventricular  enlargement with moderately reduced right ventricular systolic function.  · Grade II left ventricular diastolic dysfunction.  · Severe biatrial enlargement.  · Mild to moderate tricuspid regurgitation.  · The estimated PA systolic pressure is 99 mmHg.  · Elevated central venous pressure (15 mmHg).      35 minutes of total time spent on the encounter, which includes face to face time and non-face to face time preparing to see the patient (eg, review of tests), Obtaining and/or reviewing separately obtained history, Documenting clinical information in the electronic or other health record, Independently interpreting results (not separately reported) and communicating results to the patient/family/caregiver, or Care coordination (not separately reported).      Encounter occurred during period of COVID-19 emergency. Encounter performed under the concurrent guidelines, limitations and protocols.            Signature: Sonia Kelly MD

## 2022-11-07 DIAGNOSIS — R10.9 ABDOMINAL PAIN, UNSPECIFIED ABDOMINAL LOCATION: ICD-10-CM

## 2022-11-07 DIAGNOSIS — M79.2 NEUROPATHIC PAIN: ICD-10-CM

## 2022-11-07 DIAGNOSIS — R52 PAIN: ICD-10-CM

## 2022-11-07 DIAGNOSIS — M62.830 MUSCLE SPASM OF BACK: ICD-10-CM

## 2022-11-07 RX ORDER — TRAMADOL HYDROCHLORIDE 100 MG/1
TABLET, EXTENDED RELEASE ORAL
Qty: 30 TABLET | Refills: 0 | Status: SHIPPED | OUTPATIENT
Start: 2022-11-07 | End: 2022-12-01 | Stop reason: SDUPTHER

## 2022-11-07 RX ORDER — TRAMADOL HYDROCHLORIDE 50 MG/1
TABLET ORAL
Qty: 90 TABLET | Refills: 0 | Status: SHIPPED | OUTPATIENT
Start: 2022-11-07 | End: 2022-12-07 | Stop reason: SDUPTHER

## 2022-11-09 ENCOUNTER — OFFICE VISIT (OUTPATIENT)
Dept: PALLIATIVE MEDICINE | Facility: CLINIC | Age: 77
End: 2022-11-09
Payer: MEDICARE

## 2022-11-09 DIAGNOSIS — I27.21 PAH (PULMONARY ARTERY HYPERTENSION): Primary | ICD-10-CM

## 2022-11-09 DIAGNOSIS — R06.00 DYSPNEA, UNSPECIFIED TYPE: ICD-10-CM

## 2022-11-09 PROCEDURE — 99442 PR PHYSICIAN TELEPHONE EVALUATION 11-20 MIN: CPT | Mod: 95,,, | Performed by: STUDENT IN AN ORGANIZED HEALTH CARE EDUCATION/TRAINING PROGRAM

## 2022-11-09 PROCEDURE — 99442 PR PHYSICIAN TELEPHONE EVALUATION 11-20 MIN: ICD-10-PCS | Mod: 95,,, | Performed by: STUDENT IN AN ORGANIZED HEALTH CARE EDUCATION/TRAINING PROGRAM

## 2022-11-09 NOTE — PROGRESS NOTES
Consult Note  Palliative Medicine Clinic      Consult Requested By: Dr. Gusman    Primary Care Physician: Roberto Braun MD    Reason for Consult: symptom management/ACP    The patient location is: [place]     The chief complaint leading to consultation is: Dyspnea    Visit type: audio only - patient traveling - poor signal     Face to Face time with patient:     11 minutes of total time spent on the encounter, which includes face to face time and non-face to face time preparing to see the patient (eg, review of tests), Obtaining and/or reviewing separately obtained history, Documenting clinical information in the electronic or other health record, Independently interpreting results (not separately reported) and communicating results to the patient/family/caregiver, or Care coordination (not separately reported).     Each patient provided with medical services by telemedicine is:  (1) informed of the relationship between the physician and patient and the respective role of any other health care provider with respect to management of the patient; and (2) notified that he or she may decline to receive medical services by telemedicine and may withdraw from such care at any time.      ASSESSMENT/PLAN:     Plan/Recommendations:  Molly was seen today for pain    Diagnoses and all orders for this visit:    Chronic pulmonary heart disease / Dyspnea on exertion  - Followed by [facility], now has a local Pulmonologist Dr Fair  - Currently on IV remodulin  and adempas  - Reported O2 sats are high 60s at baseline and 30s on exertion   -Using 14L O2 NC (combines the lines of 2 O2 concentrators)        Neuropathic pain/ Post herpetic neuralgia/ Back pain  -Now s/p kyphoplasty    - Tramadol 100mg ER daily  -Tramadol 50mg q 8h PRN   (taking 1-2 times per day)  -Voltaren Gel, heating pad  -Having more pain because of sitting down for long periods of time     Nausea / Anorexia  -Nausea after meals   -Continues to have low PO  intake - discussed small frequent high calorie meals  -Zofran TID   -Has identified key foods that work well for her     Fatigue/Frailty  -Most likely due to progression of her PH  - Counseling and education regarding high frequency and multi-factorial etiology of fatigue symptoms  - Counseled regarding activity modulation and ways to conserve/preserve energy and direct limited energy to priority activities        Palliative Care Encounter:   Medicolegal: Pt has decision making capacity      is surrogate/HCPOA      Psychosocial: Has good support system,  cares for her, daughter lives close by, they have access to home care if needed     Prognostication: Patient with pHTN, chronic respiratory failure on Home O2 , frail - prognosis is poor     Understanding of disease and Illness Trajectory: Patient understands that she has pHTN, that she is frail and that at this point she is high risk for any procedures. She knows her disease is progressive and not reversible.  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  We talked about sharing her status with her children, and enjoying the time that she has left.     Goals of care:  Maintain her current level of health, continue to be independent     ACP Date: 08/19/2022  I engaged the patient and    in a conversation about advance care planning and we specifically addressed what the goals of care would be moving forward, in light of the patient's change in clinical status, specifically worsening hypoxia, and fatigue.    We discussed that her symptoms are likely due to progression of her PH and condition. We explored the patient's values and preferences for future care.  The patient endorses that what is most important right now is to focus on symptom/pain control and improvement in condition . She is hoping to regain her energy and to maintain her independence.  is hoping to continue to prolong her life and for her to  improve her strength.   Accordingly, we have decided that the best plan to meet the patient's goals includes continuing with treatment and palliative care.       Code status: Partial code - No chest compressions, no intubation. OK with medications     Advance directives:  HCPOA on file    F/U in 1w    SUBJECTIVE:     History obtained from: patient and      complaint: Pain      History of Present Illness:  Mrs. Molly Smith  is 77 y.o. female presenting with dyspnea and pain.  Referred to Palliative Care for evaluation and management of physical symptoms, advance care planning, and clarification of goals of care. She attended the appointment alone.      Interval History:  Today not feeling great because of travel- more nausea more pain   Traveling to [place] to see her daughters and enjoy family time     Disease History:  PHTN WHO I ( Remodulin) , chronic respiratory failure on Home O2 at 6L per N/C,congential heart disease.   Patient developed post neuralgia neuropathic pain in late August/2020   Spinal compression fx x2 in mid September/2020 s/p kyphoplasty    Past Medical History:   Diagnosis Date    Allergy     Anticoagulant long-term use     Arthritis     Heart murmur     Pneumonia     Pulmonary hypertension     Tachycardia      Past Surgical History:   Procedure Laterality Date    BACK SURGERY      HEMIARTHROPLASTY OF HIP Left 3/25/2019    Procedure: HEMIARTHROPLASTY, HIP - left;  Surgeon: Kemal Esposito MD;  Location: [facility] OR [address] [place] [address];  Service: Orthopedics;  Laterality: Left;    REPLACEMENT OF DIALYSIS CATHETER OVER GUIDEWIRE THROUGH EXISTING VENOUS ACCESS N/A 1/17/2019    Procedure: REPLACEMENT, CATHETER, DIALYSIS, OVER GUIDEWIRE, USING EXISTING VENOUS ACCESS;  Surgeon: Phoenix Hand MD;  Location: [facility] CATH LAB;  Service: Nephrology;  Laterality: N/A;     Family History   Problem Relation Age of Onset    Arthritis Mother     Arthritis Father     Diabetes Father     Heart disease  Father     Hypertension Father      Review of patient's allergies indicates:   Allergen Reactions    Vancomycin      RED 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    Multaq [dronedarone]        Medications:    Current Outpatient Medications on File Prior to Visit   Medication Sig Dispense Refill    acetaminophen (TYLENOL) 500 MG tablet Take 2 tablets (1,000 mg total) by mouth every 8 (eight) hours as needed for Pain. 90 tablet 1    amiodarone (PACERONE) 200 MG Tab TAKE ONE TABLET BY MOUTH ONCE A DAY AS DIRECTED. THANK YOU! 30 tablet 11    bosentan (TRACLEER) 125 MG Tab Take by mouth every 12 (twelve) hours.      CALCIUM CARBONATE (CALCIUM 600 ORAL) Take 2 tablets by mouth once daily.      diclofenac sodium (VOLTAREN) 1 % Gel Apply 2 g topically once daily. 200 g 3    dicyclomine (BENTYL) 20 mg tablet Take 20 mg by mouth 3 (three) times daily as needed.      digoxin (LANOXIN) 125 mcg tablet TAKE ONE TABLET BY MOUTH ONCE A DAY AS DIRECTED. THANK YOU! 30 tablet 11    docusate sodium (COLACE) 50 MG capsule Take 1 capsule (50 mg total) by mouth daily as needed for Constipation.  0    ferrous gluconate (FERGON) 324 MG tablet Take 1 tablet (324 mg total) by mouth daily with breakfast.      folic acid (FOLVITE) 1 MG tablet Take 1 mg by mouth once daily.      furosemide (LASIX) 40 MG tablet Take 1 tablet (40 mg total) by mouth 2 (two) times daily. 60 tablet 11    loratadine (CLARITIN) 10 mg tablet Take 10 mg by mouth once daily.      metoclopramide HCl (REGLAN) 10 MG tablet Take 10 mg by mouth 2 (two) times daily.      metroNIDAZOLE (FLAGYL) 250 MG tablet Take by mouth.      ondansetron (ZOFRAN-ODT) 8 MG TbDL Take 1 tablet (8 mg total) by mouth every 12 (twelve) hours as needed (nausea). 60 tablet 5    pantoprazole (PROTONIX) 40 MG tablet Take 1 tablet (40 mg total) by mouth once daily. 90 tablet 3    potassium chloride SA (K-DUR,KLOR-CON) 20 MEQ tablet Take 1 tablet (20 mEq total) by mouth once daily. 30 tablet 11    predniSONE (DELTASONE) 5 MG tablet  Take 1 tablet (5 mg total) by mouth once daily. 30 tablet 5    promethazine (PHENERGAN) 12.5 MG Tab Take 1 tablet (12.5 mg total) by mouth every 6 (six) hours as needed (nausea). 80 tablet 5    riociguat (ADEMPAS) 0.5 mg Tab tablet Take 1 tablet (0.5 mg total) by mouth 3 (three) times daily. 90 tablet 1    spironolactone (ALDACTONE) 25 MG tablet Take 25 mg by mouth once daily.      sucralfate (CARAFATE) 100 mg/mL suspension Take 10 mLs (1 g total) by mouth 4 (four) times daily. 250 mL 1    traMADoL (ULTRAM) 50 mg tablet TAKE ONE TABLET BY MOUTH EVERY 8 HOURS AS NEEDED FOR PAIN 90 tablet 0    traMADoL (ULTRAM-ER) 100 MG Tb24 TAKE ONE TABLET BY MOUTH ONCE A DAY AS NEEDED FOR PAIN 30 tablet 0    TREPROSTINIL SODIUM (TREPROSTINIL, REMODULIN, PUMP FOR HOME USE) Pt currently on 89.2 ng/kg/min=45 mL/day dosing weight 70.05 kg 60 mL 11    warfarin (COUMADIN) 10 MG tablet Take 1 tablet (10 mg total) by mouth Daily. Take 1 tablet (10mg) by mouth daily, except a half tablet (5mg) every Tues, Thurs, and Saturdays 90 tablet 1    warfarin (COUMADIN) 5 MG tablet TAKE ONE AND ONE-HALF TABLET BY MOUTH ONCE DAILY AS DIRECTED BY [facility].NEW DOSE AS OF 7/26/2022       Current Facility-Administered Medications on File Prior to Visit   Medication Dose Route Frequency Provider Last Rate Last Admin    denosumab (PROLIA) injection 60 mg  60 mg Subcutaneous 1 time in Clinic/HOD Roberto Braun MD        denosumab (PROLIA) injection 60 mg  60 mg Subcutaneous 1 time in Clinic/HOD Roberto Braun MD            database queried on 11/09/2022 by Sonia Kelly . The results reviewed and considered with the clinical data in the decision whether or not to prescribe a controlled substance.        OBJECTIVE:       ROS:  Review of Systems   Constitutional:  Positive for appetite change and fatigue. Negative for activity change, fever and unexpected weight change.   HENT:  Negative for hearing loss, sore throat and trouble swallowing.    Eyes:   Negative for photophobia, pain and visual disturbance.   Respiratory:  Positive for shortness of breath. Negative for cough and wheezing.    Cardiovascular:  Negative for chest pain and leg swelling.   Gastrointestinal:  Positive for nausea. Negative for abdominal distention, abdominal pain, constipation, diarrhea, vomiting, reflux and fecal incontinence.   Genitourinary:  Negative for dysuria and urgency.   Musculoskeletal:  Negative for gait problem, leg pain and myalgias.   Neurological:  Positive for weakness. Negative for light-headedness, headaches and memory loss.   Psychiatric/Behavioral:  Negative for behavioral problems and confusion. The patient is not nervous/anxious.        Review of Symptoms      Symptom Assessment (ESAS 0-10 Scale)  Pain:  9  Dyspnea:  0  Anxiety:  0  Nausea:  4  Depression:  0  Anorexia:  9  Fatigue:  9  Insomnia:  0  Restlessness:  0  Agitation:  0     CAM / Delirium:  Negative  Constipation:  Negative  Diarrhea:  Negative    Anxiety:  Is not nervous/anxious  Constipation:  No constipation    Bowel Management Plan (BMP):  Yes      Comments:  Senna and Miralax    Pain Assessment:    Location(s): back    Back       Location: lower        Quality: Aching        Quantity: 2/10 in intensity        Chronicity: Onset 14 month(s) ago, gradually worsening        Aggravating Factors: None        Alleviating Factors: Opiates       Associated Symptoms: None    Modified Roberto Scale:  7    Performance Status:  50    Psychosocial/Cultural: Lives with her  who is an EM physician who works 5-6 times per month, has a daughter who lives down the block who works and has school aged kids. Sister is part of her support system however she is in her 80s. They can have access to home care if needed    Advance Care Planning   Advance Directives:   Living Will: No    LaPOST: No    Do Not Resuscitate Status: Yes    Medical Power of : Yes      Decision Making:  Patient answered questions  Goals of  Care: What is most important right now is to focus on symptom/pain control, improvement in condition. Accordingly, we have decided that the best plan to meet the patient's goals includes continuing with treatment.            Physical Exam: audio only  Vitals:    Physical Exam      Labs:  CBC:   WBC   Date Value Ref Range Status   12/11/2020 4.02 3.90 - 12.70 K/uL Final     MCV   Date Value Ref Range Status   03/06/2020 87 82 - 98 fL Final           Hematocrit   Date Value Ref Range Status   12/11/2020 34.7 (L) 37.0 - 48.5 % Final                         Albumin:   Albumin   Date Value Ref Range Status   12/11/2020 4.0 3.5 - 5.2 g/dL Final     Protein:   Total Protein   Date Value Ref Range Status   12/11/2020 7.1 6.0 - 8.4 g/dL Final       Radiology:I have reviewed all pertinent imaging results/findings within the past 24 hours.    Results for orders placed during the hospital encounter of 10/12/21    Echo Saline Bubble? Yes    Interpretation Summary  · The left ventricle is normal in size with normal systolic function. The estimated ejection fraction is 60%.  · Severe right ventricular enlargement with moderately reduced right ventricular systolic function.  · Grade II left ventricular diastolic dysfunction.  · Severe biatrial enlargement.  · Mild to moderate tricuspid regurgitation.  · The estimated PA systolic pressure is 99 mmHg.  · Elevated central venous pressure (15 mmHg).      11 minutes of total time spent on the encounter, which includes face to face time and non-face to face time preparing to see the patient (eg, review of tests), Obtaining and/or reviewing separately obtained history, Documenting clinical information in the electronic or other health record, Independently interpreting results (not separately reported) and communicating results to the patient/family/caregiver, or Care coordination (not separately reported).      Encounter occurred during period of COVID-19 emergency. Encounter performed  under the concurrent guidelines, limitations and protocols.            Signature: Sonia Kelly MD

## 2022-11-22 NOTE — ADDENDUM NOTE
Addendum  created 03/28/19 1137 by Reid Ricci MD    Sign clinical note       Medical screening examination initiated.  I have conducted a focused provider triage encounter, findings are as follows:    Brief history of present illness:  65 yo female with with a past medical history of diabetes, hypertension who presents with  shortness of breath for 2 weeks intermittently. Also reports associated lower extremity swelling. States she has orthopnea.  Reports that she does take fluid pills however she does not know the name of these medications.  Denies history of heart failure or chest pain at this time.      Vitals:    11/22/22 1628   BP: (S) (!) 248/130  Comment: did NOT take lisinopril today   BP Location: Right arm   Patient Position: Sitting   Pulse: 81   Resp: (S) (!) 24   Temp: 98.7 °F (37.1 °C)   TempSrc: Oral   SpO2: 95%   Weight: 71.2 kg (157 lb)       Pertinent physical exam:  Hypertensive with a blood pressure of 2248/130. Ill appearing, no respiratory distress, no focal neurologic deficits.  4+ pitting edema bilaterally.    Brief workup plan:  labs, chest x-ray, EKG.     Preliminary workup initiated; this workup will be continued and followed by the physician or advanced practice provider that is assigned to the patient when roomed.

## 2022-12-01 DIAGNOSIS — R10.9 ABDOMINAL PAIN, UNSPECIFIED ABDOMINAL LOCATION: ICD-10-CM

## 2022-12-01 DIAGNOSIS — M62.830 MUSCLE SPASM OF BACK: ICD-10-CM

## 2022-12-01 DIAGNOSIS — R52 PAIN: ICD-10-CM

## 2022-12-01 RX ORDER — TRAMADOL HYDROCHLORIDE 100 MG/1
TABLET, EXTENDED RELEASE ORAL
Qty: 30 TABLET | Refills: 0 | Status: SHIPPED | OUTPATIENT
Start: 2022-12-01 | End: 2023-01-04 | Stop reason: SDUPTHER

## 2022-12-07 DIAGNOSIS — R52 PAIN: ICD-10-CM

## 2022-12-07 DIAGNOSIS — R10.9 ABDOMINAL PAIN, UNSPECIFIED ABDOMINAL LOCATION: ICD-10-CM

## 2022-12-07 DIAGNOSIS — M79.2 NEUROPATHIC PAIN: ICD-10-CM

## 2022-12-07 RX ORDER — TRAMADOL HYDROCHLORIDE 50 MG/1
TABLET ORAL
Qty: 90 TABLET | Refills: 0 | Status: SHIPPED | OUTPATIENT
Start: 2022-12-07 | End: 2023-01-01 | Stop reason: SDUPTHER

## 2022-12-20 PROCEDURE — G0179 PR HOME HEALTH MD RECERTIFICATION: ICD-10-PCS | Mod: ,,, | Performed by: STUDENT IN AN ORGANIZED HEALTH CARE EDUCATION/TRAINING PROGRAM

## 2022-12-20 PROCEDURE — G0179 MD RECERTIFICATION HHA PT: HCPCS | Mod: ,,, | Performed by: STUDENT IN AN ORGANIZED HEALTH CARE EDUCATION/TRAINING PROGRAM

## 2022-12-21 ENCOUNTER — PATIENT MESSAGE (OUTPATIENT)
Dept: TRANSPLANT | Facility: CLINIC | Age: 77
End: 2022-12-21
Payer: MEDICARE

## 2023-01-01 ENCOUNTER — TELEPHONE (OUTPATIENT)
Dept: PALLIATIVE MEDICINE | Facility: CLINIC | Age: 78
End: 2023-01-01
Payer: MEDICARE

## 2023-01-01 ENCOUNTER — PATIENT OUTREACH (OUTPATIENT)
Dept: HOME HEALTH SERVICES | Facility: HOSPITAL | Age: 78
End: 2023-01-01
Payer: MEDICARE

## 2023-01-01 ENCOUNTER — PATIENT MESSAGE (OUTPATIENT)
Dept: PALLIATIVE MEDICINE | Facility: CLINIC | Age: 78
End: 2023-01-01
Payer: MEDICARE

## 2023-01-01 ENCOUNTER — OFFICE VISIT (OUTPATIENT)
Dept: PALLIATIVE MEDICINE | Facility: CLINIC | Age: 78
End: 2023-01-01
Payer: MEDICARE

## 2023-01-01 ENCOUNTER — EXTERNAL HOME HEALTH (OUTPATIENT)
Dept: HOME HEALTH SERVICES | Facility: HOSPITAL | Age: 78
End: 2023-01-01
Payer: MEDICARE

## 2023-01-01 ENCOUNTER — OFFICE VISIT (OUTPATIENT)
Dept: TRANSPLANT | Facility: CLINIC | Age: 78
End: 2023-01-01
Payer: MEDICARE

## 2023-01-01 ENCOUNTER — TELEPHONE (OUTPATIENT)
Dept: TRANSPLANT | Facility: CLINIC | Age: 78
End: 2023-01-01
Payer: MEDICARE

## 2023-01-01 ENCOUNTER — PATIENT MESSAGE (OUTPATIENT)
Dept: TRANSPLANT | Facility: CLINIC | Age: 78
End: 2023-01-01
Payer: MEDICARE

## 2023-01-01 ENCOUNTER — DOCUMENT SCAN (OUTPATIENT)
Dept: HOME HEALTH SERVICES | Facility: HOSPITAL | Age: 78
End: 2023-01-01
Payer: MEDICARE

## 2023-01-01 VITALS — SYSTOLIC BLOOD PRESSURE: 100 MMHG | HEART RATE: 86 BPM | OXYGEN SATURATION: 72 % | DIASTOLIC BLOOD PRESSURE: 82 MMHG

## 2023-01-01 DIAGNOSIS — R52 PAIN: ICD-10-CM

## 2023-01-01 DIAGNOSIS — R10.9 ABDOMINAL PAIN, UNSPECIFIED ABDOMINAL LOCATION: ICD-10-CM

## 2023-01-01 DIAGNOSIS — M62.830 MUSCLE SPASM OF BACK: ICD-10-CM

## 2023-01-01 DIAGNOSIS — R53.83 FATIGUE, UNSPECIFIED TYPE: ICD-10-CM

## 2023-01-01 DIAGNOSIS — Z79.01 ANTICOAGULANT LONG-TERM USE: Chronic | ICD-10-CM

## 2023-01-01 DIAGNOSIS — Z51.5 PALLIATIVE CARE BY SPECIALIST: ICD-10-CM

## 2023-01-01 DIAGNOSIS — J96.11 CHRONIC RESPIRATORY FAILURE WITH HYPOXIA: ICD-10-CM

## 2023-01-01 DIAGNOSIS — Z79.899 HIGH RISK MEDICATION USE: ICD-10-CM

## 2023-01-01 DIAGNOSIS — M79.2 NEUROPATHIC PAIN: ICD-10-CM

## 2023-01-01 DIAGNOSIS — I27.21 PAH (PULMONARY ARTERY HYPERTENSION): Primary | ICD-10-CM

## 2023-01-01 DIAGNOSIS — R53.81 DEBILITY: ICD-10-CM

## 2023-01-01 DIAGNOSIS — R06.09 DYSPNEA ON EXERTION: ICD-10-CM

## 2023-01-01 DIAGNOSIS — I47.10 SVT (SUPRAVENTRICULAR TACHYCARDIA): ICD-10-CM

## 2023-01-01 DIAGNOSIS — F41.9 ANXIETY: Primary | ICD-10-CM

## 2023-01-01 DIAGNOSIS — Z79.899 POLYPHARMACY: Primary | ICD-10-CM

## 2023-01-01 DIAGNOSIS — Q21.16 SINUS VENOSUS DEFECT: ICD-10-CM

## 2023-01-01 DIAGNOSIS — I27.21 WHO GROUP 1 PULMONARY ARTERIAL HYPERTENSION: Primary | ICD-10-CM

## 2023-01-01 DIAGNOSIS — R11.0 NAUSEA: ICD-10-CM

## 2023-01-01 PROCEDURE — 99215 OFFICE O/P EST HI 40 MIN: CPT | Mod: 95,,, | Performed by: STUDENT IN AN ORGANIZED HEALTH CARE EDUCATION/TRAINING PROGRAM

## 2023-01-01 PROCEDURE — G0179 MD RECERTIFICATION HHA PT: HCPCS | Mod: ,,, | Performed by: STUDENT IN AN ORGANIZED HEALTH CARE EDUCATION/TRAINING PROGRAM

## 2023-01-01 PROCEDURE — 99213 PR OFFICE/OUTPT VISIT, EST, LEVL III, 20-29 MIN: ICD-10-PCS | Mod: 95,,,

## 2023-01-01 PROCEDURE — 99215 PR OFFICE/OUTPT VISIT, EST, LEVL V, 40-54 MIN: ICD-10-PCS | Mod: 95,,, | Performed by: STUDENT IN AN ORGANIZED HEALTH CARE EDUCATION/TRAINING PROGRAM

## 2023-01-01 PROCEDURE — G0179 PR HOME HEALTH MD RECERTIFICATION: ICD-10-PCS | Mod: ,,, | Performed by: STUDENT IN AN ORGANIZED HEALTH CARE EDUCATION/TRAINING PROGRAM

## 2023-01-01 PROCEDURE — 99213 OFFICE O/P EST LOW 20 MIN: CPT | Mod: 95,,,

## 2023-01-01 RX ORDER — TRAMADOL HYDROCHLORIDE 100 MG/1
TABLET, EXTENDED RELEASE ORAL
Qty: 30 TABLET | Refills: 0 | Status: SHIPPED | OUTPATIENT
Start: 2023-01-01 | End: 2023-01-01 | Stop reason: SDUPTHER

## 2023-01-01 RX ORDER — TRAMADOL HYDROCHLORIDE 100 MG/1
TABLET, EXTENDED RELEASE ORAL
Qty: 30 TABLET | Refills: 0 | Status: SHIPPED | OUTPATIENT
Start: 2023-01-01 | End: 2024-01-01 | Stop reason: SDUPTHER

## 2023-01-01 RX ORDER — TRAMADOL HYDROCHLORIDE 50 MG/1
TABLET ORAL
Qty: 90 TABLET | Refills: 0 | Status: SHIPPED | OUTPATIENT
Start: 2023-01-01 | End: 2023-01-01 | Stop reason: SDUPTHER

## 2023-01-01 RX ORDER — PROMETHAZINE HYDROCHLORIDE 12.5 MG/1
12.5 TABLET ORAL EVERY 6 HOURS PRN
Qty: 90 TABLET | Refills: 5 | Status: SHIPPED | OUTPATIENT
Start: 2023-01-01

## 2023-01-01 RX ORDER — OMEPRAZOLE 40 MG/1
40 CAPSULE, DELAYED RELEASE ORAL DAILY
Qty: 30 CAPSULE | Refills: 11 | Status: SHIPPED | OUTPATIENT
Start: 2023-01-01 | End: 2024-03-05

## 2023-01-01 RX ORDER — LORAZEPAM 0.5 MG/1
0.5 TABLET ORAL EVERY 8 HOURS PRN
Qty: 20 TABLET | Refills: 0 | Status: SHIPPED | OUTPATIENT
Start: 2023-01-01 | End: 2023-01-01

## 2023-01-01 RX ORDER — PREDNISONE 5 MG/1
5 TABLET ORAL DAILY
Qty: 20 TABLET | Refills: 0 | Status: SHIPPED | OUTPATIENT
Start: 2023-01-01

## 2023-01-01 RX ORDER — TRAMADOL HYDROCHLORIDE 50 MG/1
TABLET ORAL
Qty: 90 TABLET | Refills: 0 | Status: SHIPPED | OUTPATIENT
Start: 2023-01-01

## 2023-01-03 ENCOUNTER — EXTERNAL HOME HEALTH (OUTPATIENT)
Dept: HOME HEALTH SERVICES | Facility: HOSPITAL | Age: 78
End: 2023-01-03
Payer: MEDICARE

## 2023-01-03 ENCOUNTER — PATIENT OUTREACH (OUTPATIENT)
Dept: HOME HEALTH SERVICES | Facility: HOSPITAL | Age: 78
End: 2023-01-03
Payer: MEDICARE

## 2023-01-04 DIAGNOSIS — R10.9 ABDOMINAL PAIN, UNSPECIFIED ABDOMINAL LOCATION: ICD-10-CM

## 2023-01-04 DIAGNOSIS — M62.830 MUSCLE SPASM OF BACK: ICD-10-CM

## 2023-01-04 DIAGNOSIS — R52 PAIN: ICD-10-CM

## 2023-01-04 RX ORDER — TRAMADOL HYDROCHLORIDE 100 MG/1
TABLET, EXTENDED RELEASE ORAL
Qty: 30 TABLET | Refills: 0 | Status: SHIPPED | OUTPATIENT
Start: 2023-01-04 | End: 2023-01-01 | Stop reason: SDUPTHER

## 2023-01-11 ENCOUNTER — TELEPHONE (OUTPATIENT)
Dept: PALLIATIVE MEDICINE | Facility: CLINIC | Age: 78
End: 2023-01-11
Payer: MEDICARE

## 2023-01-12 ENCOUNTER — OFFICE VISIT (OUTPATIENT)
Dept: PALLIATIVE MEDICINE | Facility: CLINIC | Age: 78
End: 2023-01-12
Payer: MEDICARE

## 2023-01-12 DIAGNOSIS — R53.83 FATIGUE, UNSPECIFIED TYPE: ICD-10-CM

## 2023-01-12 DIAGNOSIS — Z51.5 PALLIATIVE CARE BY SPECIALIST: ICD-10-CM

## 2023-01-12 DIAGNOSIS — R06.09 DYSPNEA ON EXERTION: ICD-10-CM

## 2023-01-12 DIAGNOSIS — I27.21 PAH (PULMONARY ARTERY HYPERTENSION): Primary | ICD-10-CM

## 2023-01-12 PROCEDURE — 99215 OFFICE O/P EST HI 40 MIN: CPT | Mod: 95,,, | Performed by: STUDENT IN AN ORGANIZED HEALTH CARE EDUCATION/TRAINING PROGRAM

## 2023-01-12 PROCEDURE — 99215 PR OFFICE/OUTPT VISIT, EST, LEVL V, 40-54 MIN: ICD-10-PCS | Mod: 95,,, | Performed by: STUDENT IN AN ORGANIZED HEALTH CARE EDUCATION/TRAINING PROGRAM

## 2023-01-12 NOTE — PROGRESS NOTES
Consult Note  Palliative Medicine Clinic      Consult Requested By: Dr. Gusman    Primary Care Physician: Roberto Braun MD    Reason for Consult: symptom management/ACP    The patient location is: [place]     The chief complaint leading to consultation is: Dyspnea      Visit type: audiovisual    Face to Face time with patient: 28 min  40 minutes of total time spent on the encounter, which includes face to face time and non-face to face time preparing to see the patient (eg, review of tests), Obtaining and/or reviewing separately obtained history, Documenting clinical information in the electronic or other health record, Independently interpreting results (not separately reported) and communicating results to the patient/family/caregiver, or Care coordination (not separately reported).       Each patient provided with medical services by telemedicine is:  (1) informed of the relationship between the physician and patient and the respective role of any other health care provider with respect to management of the patient; and (2) notified that he or she may decline to receive medical services by telemedicine and may withdraw from such care at any time.        ASSESSMENT/PLAN:     Plan/Recommendations:  Molly was seen today for pain    Diagnoses and all orders for this visit:    Chronic pulmonary heart disease / Dyspnea on exertion  - Followed by [facility], now has a local Pulmonologist Dr Fair  - Currently on IV remodulin  and adempas  - Reported O2 sats are high 60s at baseline and 30s on exertion   -Using 10L O2 NC   -discussed balancing rest w/ activity       Neuropathic pain/ Post herpetic neuralgia/ Back pain  -Now s/p kyphoplasty    - Tramadol 100mg ER daily  -Tramadol 50mg q 8h PRN     -Voltaren Gel, heating pad  -Pain is manageable on current regimen       Nausea / Anorexia  -Zofran TID   -Has identified key foods that work well for her   -Nausea has improved some, able to eat a good  lunch      Fatigue/Frailty  -Most likely due to progression of her PH  - Counseled regarding activity modulation and ways to conserve/preserve energy and direct limited energy to priority activities    Constipation/diarrhea  -asked to keep a stool diary       Palliative Care Encounter:   Medicolegal: Pt has decision making capacity      is surrogate/HCPOA      Psychosocial: Has good support system,  cares for her, daughter lives close by, they have access to home care if needed     Prognostication: Patient with pHTN, chronic respiratory failure on Home O2 , frail - prognosis is poor     Understanding of disease and Illness Trajectory: Patient understands that she has pHTN, that she is frail and that at this point she is high risk for any procedures. She knows her disease is progressive and not reversible.  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  We talked about sharing her status with her children, and enjoying the time that she has left.     Goals of care:  Maintain her current level of health, continue to be independent     ACP Date: 08/19/2022  I engaged the patient and    in a conversation about advance care planning and we specifically addressed what the goals of care would be moving forward, in light of the patient's change in clinical status, specifically worsening hypoxia, and fatigue.    We discussed that her symptoms are likely due to progression of her PH and condition. We explored the patient's values and preferences for future care.  The patient endorses that what is most important right now is to focus on symptom/pain control and improvement in condition . She is hoping to regain her energy and to maintain her independence.  is hoping to continue to prolong her life and for her to improve her strength.   Accordingly, we have decided that the best plan to meet the patient's goals includes continuing with treatment and palliative care.        Code status: Partial code - No chest compressions, no intubation. OK with medications     Advance directives:  HCPOA on file      SUBJECTIVE:     History obtained from: patient and      complaint: Pain      History of Present Illness:  Mrs. Molly Smith  is 77 y.o. female presenting with dyspnea and pain.  Referred to Palliative Care for evaluation and management of physical symptoms, advance care planning, and clarification of goals of care. She attended the appointment alone.      Interval History:  She was outside a restaurant, just finished lunch, she had a good holiday season, she is able to travel now in the camper.  Continues to have sever dyspnea, minimal mobility now  Discussed energy preservation   Fatigued    Has diarrjea and constipation-         Disease History:  PHTN WHO I ( Remodulin) , chronic respiratory failure on Home O2 at 6L per N/C,congential heart disease.   Patient developed post neuralgia neuropathic pain in late August/2020   Spinal compression fx x2 in mid September/2020 s/p kyphoplasty    Past Medical History:   Diagnosis Date    Allergy     Anticoagulant long-term use     Arthritis     Heart murmur     Pneumonia     Pulmonary hypertension     Tachycardia      Past Surgical History:   Procedure Laterality Date    BACK SURGERY      HEMIARTHROPLASTY OF HIP Left 3/25/2019    Procedure: HEMIARTHROPLASTY, HIP - left;  Surgeon: Kemal Esposito MD;  Location: [facility] OR [address] [place] [address];  Service: Orthopedics;  Laterality: Left;    REPLACEMENT OF DIALYSIS CATHETER OVER GUIDEWIRE THROUGH EXISTING VENOUS ACCESS N/A 1/17/2019    Procedure: REPLACEMENT, CATHETER, DIALYSIS, OVER GUIDEWIRE, USING EXISTING VENOUS ACCESS;  Surgeon: Phoenix Hand MD;  Location: [facility] CATH LAB;  Service: Nephrology;  Laterality: N/A;     Family History   Problem Relation Age of Onset    Arthritis Mother     Arthritis Father     Diabetes Father     Heart disease Father     Hypertension Father      Review of  patient's allergies indicates:   Allergen Reactions    Vancomycin      RED 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    Multaq [dronedarone]        Medications:    Current Outpatient Medications on File Prior to Visit   Medication Sig Dispense Refill    acetaminophen (TYLENOL) 500 MG tablet Take 2 tablets (1,000 mg total) by mouth every 8 (eight) hours as needed for Pain. 90 tablet 1    amiodarone (PACERONE) 200 MG Tab TAKE ONE TABLET BY MOUTH ONCE A DAY AS DIRECTED. THANK YOU! 30 tablet 11    bosentan (TRACLEER) 125 MG Tab Take by mouth every 12 (twelve) hours.      CALCIUM CARBONATE (CALCIUM 600 ORAL) Take 2 tablets by mouth once daily.      dicyclomine (BENTYL) 20 mg tablet Take 20 mg by mouth 3 (three) times daily as needed.      digoxin (LANOXIN) 125 mcg tablet TAKE ONE TABLET BY MOUTH ONCE A DAY AS DIRECTED. THANK YOU! 30 tablet 11    docusate sodium (COLACE) 50 MG capsule Take 1 capsule (50 mg total) by mouth daily as needed for Constipation.  0    ferrous gluconate (FERGON) 324 MG tablet Take 1 tablet (324 mg total) by mouth daily with breakfast.      folic acid (FOLVITE) 1 MG tablet Take 1 mg by mouth once daily.      furosemide (LASIX) 40 MG tablet Take 1 tablet (40 mg total) by mouth 2 (two) times daily. 60 tablet 11    loratadine (CLARITIN) 10 mg tablet Take 10 mg by mouth once daily.      metoclopramide HCl (REGLAN) 10 MG tablet Take 10 mg by mouth 2 (two) times daily.      ondansetron (ZOFRAN-ODT) 8 MG TbDL Take 1 tablet (8 mg total) by mouth every 12 (twelve) hours as needed (nausea). 60 tablet 5    potassium chloride SA (K-DUR,KLOR-CON) 20 MEQ tablet Take 1 tablet (20 mEq total) by mouth once daily. 30 tablet 11    predniSONE (DELTASONE) 5 MG tablet Take 1 tablet (5 mg total) by mouth once daily. 30 tablet 5    promethazine (PHENERGAN) 12.5 MG Tab Take 1 tablet (12.5 mg total) by mouth every 6 (six) hours as needed (nausea). 80 tablet 5    riociguat (ADEMPAS) 0.5 mg Tab tablet Take 1 tablet (0.5 mg total) by mouth 3 (three)  times daily. 90 tablet 1    spironolactone (ALDACTONE) 25 MG tablet Take 25 mg by mouth once daily.      sucralfate (CARAFATE) 100 mg/mL suspension Take 10 mLs (1 g total) by mouth 4 (four) times daily. 250 mL 1    traMADoL (ULTRAM) 50 mg tablet TAKE ONE TABLET BY MOUTH EVERY 8 HOURS AS NEEDED FOR PAIN 90 tablet 0    traMADoL (ULTRAM-ER) 100 MG Tb24 TAKE ONE TABLET BY MOUTH ONCE A DAY AS NEEDED FOR PAIN 30 tablet 0    TREPROSTINIL SODIUM (TREPROSTINIL, REMODULIN, PUMP FOR HOME USE) Pt currently on 89.2 ng/kg/min=45 mL/day dosing weight 70.05 kg 60 mL 11    warfarin (COUMADIN) 10 MG tablet Take 1 tablet (10 mg total) by mouth Daily. Take 1 tablet (10mg) by mouth daily, except a half tablet (5mg) every Tues, Thurs, and Saturdays 90 tablet 1    warfarin (COUMADIN) 5 MG tablet TAKE ONE AND ONE-HALF TABLET BY MOUTH ONCE DAILY AS DIRECTED BY CIS.NEW DOSE AS OF 7/26/2022      diclofenac sodium (VOLTAREN) 1 % Gel Apply 2 g topically once daily. 200 g 3    metroNIDAZOLE (FLAGYL) 250 MG tablet Take by mouth.      pantoprazole (PROTONIX) 40 MG tablet Take 1 tablet (40 mg total) by mouth once daily. (Patient not taking: Reported on 1/12/2023) 90 tablet 3     Current Facility-Administered Medications on File Prior to Visit   Medication Dose Route Frequency Provider Last Rate Last Admin    denosumab (PROLIA) injection 60 mg  60 mg Subcutaneous 1 time in Clinic/HOD Roberto Braun MD        denosumab (PROLIA) injection 60 mg  60 mg Subcutaneous 1 time in Clinic/HOD Roberto Braun MD            database queried on 01/12/2023 by Sonia Kelly . The results reviewed and considered with the clinical data in the decision whether or not to prescribe a controlled substance.        OBJECTIVE:       ROS:  Review of Systems   Constitutional:  Positive for appetite change and fatigue. Negative for activity change, fever and unexpected weight change.   HENT:  Negative for hearing loss, sore throat and trouble swallowing.    Eyes:   Negative for photophobia, pain and visual disturbance.   Respiratory:  Positive for shortness of breath. Negative for cough and wheezing.    Cardiovascular:  Negative for chest pain and leg swelling.   Gastrointestinal:  Positive for constipation, diarrhea and nausea. Negative for abdominal distention, abdominal pain, vomiting, reflux and fecal incontinence.   Genitourinary:  Negative for dysuria and urgency.   Musculoskeletal:  Negative for gait problem, leg pain and myalgias.   Neurological:  Positive for weakness. Negative for light-headedness, headaches and memory loss.   Psychiatric/Behavioral:  Negative for behavioral problems and confusion. The patient is not nervous/anxious.        Review of Symptoms      Symptom Assessment (ESAS 0-10 Scale)  Pain:  6  Dyspnea:  8  Anxiety:  3  Nausea:  9  Depression:  0  Anorexia:  5  Fatigue:  8  Insomnia:  0  Restlessness:  0  Agitation:  0     CAM / Delirium:  Negative  Constipation:  Positive  Diarrhea:  Positive    Anxiety:  Is not nervous/anxious  Constipation:  Constipation    Bowel Management Plan (BMP):  Yes      Comments:  Senna and Miralax    Pain Assessment:    Location(s): back    Back       Location: lower        Quality: Aching        Quantity: 7/10 in intensity        Chronicity: Onset 14 month(s) ago, gradually worsening        Aggravating Factors: None        Alleviating Factors: Opiates       Associated Symptoms: None    Modified Roberto Scale:  7    Performance Status:  50    Psychosocial/Cultural:   See Palliative Psychosocial Note: No  Lives with her  who is an EM physician who works 5-6 times per month, has a daughter who lives down the block who works and has school aged kids. Sister is part of her support system however she is in her 80s. They can have access to home care if needed  **Primary  to Follow**  Palliative Care  Consult: No    Advance Care Planning   Advance Directives:   Living Will: No    [place]: No    Do  Not Resuscitate Status: Yes    Medical Power of : Yes      Decision Making:  Patient answered questions  Goals of Care: What is most important right now is to focus on symptom/pain control, improvement in condition. Accordingly, we have decided that the best plan to meet the patient's goals includes continuing with treatment.            Physical Exam:   Vitals:    Physical Exam  Constitutional:       General: She is not in acute distress.  HENT:      Head: Normocephalic and atraumatic.      Nose:      Comments: Wearing NC  Eyes:      General: No scleral icterus.  Pulmonary:      Effort: Pulmonary effort is normal. No respiratory distress.   Musculoskeletal:      Cervical back: Neck supple.   Neurological:      Mental Status: She is alert and oriented to person, place, and time.   Psychiatric:         Mood and Affect: Mood and affect normal.         Labs:  CBC:   WBC   Date Value Ref Range Status   12/11/2020 4.02 3.90 - 12.70 K/uL Final     MCV   Date Value Ref Range Status   03/06/2020 87 82 - 98 fL Final           Hematocrit   Date Value Ref Range Status   12/11/2020 34.7 (L) 37.0 - 48.5 % Final                         Albumin:   Albumin   Date Value Ref Range Status   12/11/2020 4.0 3.5 - 5.2 g/dL Final     Protein:   Total Protein   Date Value Ref Range Status   12/11/2020 7.1 6.0 - 8.4 g/dL Final       Radiology:I have reviewed all pertinent imaging results/findings within the past 24 hours.    Results for orders placed during the hospital encounter of 10/12/21    Echo Saline Bubble? Yes    Interpretation Summary  · The left ventricle is normal in size with normal systolic function. The estimated ejection fraction is 60%.  · Severe right ventricular enlargement with moderately reduced right ventricular systolic function.  · Grade II left ventricular diastolic dysfunction.  · Severe biatrial enlargement.  · Mild to moderate tricuspid regurgitation.  · The estimated PA systolic pressure is 99 mmHg.  ·  Elevated central venous pressure (15 mmHg).      40 minutes of total time spent on the encounter, which includes face to face time and non-face to face time preparing to see the patient (eg, review of tests), Obtaining and/or reviewing separately obtained history, Documenting clinical information in the electronic or other health record, Independently interpreting results (not separately reported) and communicating results to the patient/family/caregiver, or Care coordination (not separately reported).      Encounter occurred during period of COVID-19 emergency. Encounter performed under the concurrent guidelines, limitations and protocols.            Signature: Sonia Kelly MD

## 2023-02-17 PROBLEM — M81.0 SENILE OSTEOPOROSIS: Status: ACTIVE | Noted: 2023-01-01

## 2023-03-06 NOTE — PROGRESS NOTES
Consult Note  Palliative Medicine Clinic      Consult Requested By: Dr. Gusman    Primary Care Physician: Roberto Braun MD    Reason for Consult: symptom management/ACP    The patient location is: [place]     The chief complaint leading to consultation is: Dyspnea      Visit type: audiovisual    Face to Face time with patient: 30min  47 minutes of total time spent on the encounter, which includes face to face time and non-face to face time preparing to see the patient (eg, review of tests), Obtaining and/or reviewing separately obtained history, Documenting clinical information in the electronic or other health record, Independently interpreting results (not separately reported) and communicating results to the patient/family/caregiver, or Care coordination (not separately reported).       Each patient provided with medical services by telemedicine is:  (1) informed of the relationship between the physician and patient and the respective role of any other health care provider with respect to management of the patient; and (2) notified that he or she may decline to receive medical services by telemedicine and may withdraw from such care at any time.        ASSESSMENT/PLAN:     Plan/Recommendations:  Molly was seen today for pain    Diagnoses and all orders for this visit:    Chronic pulmonary heart disease / Dyspnea on exertion  - Followed by [facility], now has a local Pulmonologist Dr Fair  - Currently on IV remodulin  and adempas  - Reported O2 sats are high 60s at baseline and 30s on exertion   -Using 10L O2 NC   -Again discussed balancing rest w/ activity   -She wants to maintain her functional level (walking to the restroom) discussed that she might be able to continue to walk however she needs to stop whenever she feels too SOB or dizzy  -Discussed morphine for dyspnea, she is afraid of potential SE because of her sensitivity to meds -will hold off  -will take Lasix 40 mg BID for a couple of days and then  go back to  Regimen    Neuropathic pain/ Post herpetic neuralgia/ Back pain  -Now s/p kyphoplasty    - Tramadol 100mg ER daily  -Tramadol 50mg q 8h PRN     -Voltaren Gel, heating pad  -Having more pain lately, dayna chest pain and back pain - improves with Tums and tramadol       Nausea / Anorexia  -Zofran TID   -Has identified key foods that work well for her   -worsening nausea again, only able to eat once a day, eating significantly less, likely losing weight  -added Phenergan and omeprazole  -discussed steroids or granisetron or olanzapine however we will try Phenergan as  feels that works better for her  -discussed Ensure Clear    Fatigue/Frailty  -Most likely due to progression of her PH  - Counseled regarding activity modulation and ways to conserve/preserve energy and direct limited energy to priority activities    Constipation/diarrhea  -asked to keep a stool diary       Palliative Care Encounter:   Medicolegal: Pt has decision making capacity      is surrogate/HCPOA      Psychosocial: Has good support system,  cares for her, daughter lives close by, they have access to home care if needed     Prognostication: Patient with pHTN, chronic respiratory failure on Home O2 , frail - prognosis is poor     Understanding of disease and Illness Trajectory: Patient understands that she has pHTN, that she is frail and that at this point she is high risk for any procedures. She knows her disease is progressive and not reversible.  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  We talked about sharing her status with her children, and enjoying the time that she has left.     Goals of care:  Maintain her current level of health, continue to be independent     ACP Date: 08/19/2022  I engaged the patient and    in a conversation about advance care planning and we specifically addressed what the goals of care would be moving forward, in light of the patient's change  in clinical status, specifically worsening hypoxia, and fatigue.    We discussed that her symptoms are likely due to progression of her PH and condition. We explored the patient's values and preferences for future care.  The patient endorses that what is most important right now is to focus on symptom/pain control and improvement in condition . She is hoping to regain her energy and to maintain her independence.  is hoping to continue to prolong her life and for her to improve her strength.   Accordingly, we have decided that the best plan to meet the patient's goals includes continuing with treatment and palliative care.       Code status: Partial code - No chest compressions, no intubation. OK with medications     Advance directives:  HCPOA on file      SUBJECTIVE:     History obtained from: patient and      complaint: Pain      History of Present Illness:  Mrs. Molly Smith  is 77 y.o. female presenting with dyspnea and pain.  Referred to Palliative Care for evaluation and management of physical symptoms, advance care planning, and clarification of goals of care. She attended the appointment alone.      Interval History:    She is not feeling great again she is not able to eat she has nausea all day  She now has chest pain at times in her neck and her arm stabbing pulling improves with Tums and tramadol happening almost every day  She desats to the high 30s low 40s with minimal activity even just by standing up and sometimes feels dizzy and may lose balance  Not drinking boost due to nausea    -------------------------  She was outside a restaurant, just finished lunch, she had a good holiday season, she is able to travel now in the camper.  Continues to have sever dyspnea, minimal mobility now  Discussed energy preservation   Fatigued    Has diarrjea and constipation-         Disease History:  PHTN WHO I ( Remodulin) , chronic respiratory failure on Home O2 at 6L per N/C,congential heart  disease.   Patient developed post neuralgia neuropathic pain in late August/2020   Spinal compression fx x2 in mid September/2020 s/p kyphoplasty    Past Medical History:   Diagnosis Date    Allergy     Anticoagulant long-term use     Arthritis     Heart murmur     Pneumonia     Pulmonary hypertension     Tachycardia      Past Surgical History:   Procedure Laterality Date    BACK SURGERY      HEMIARTHROPLASTY OF HIP Left 3/25/2019    Procedure: HEMIARTHROPLASTY, HIP - left;  Surgeon: Kemal Esposito MD;  Location: [facility] OR [address] [place] [address];  Service: Orthopedics;  Laterality: Left;    REPLACEMENT OF DIALYSIS CATHETER OVER GUIDEWIRE THROUGH EXISTING VENOUS ACCESS N/A 1/17/2019    Procedure: REPLACEMENT, CATHETER, DIALYSIS, OVER GUIDEWIRE, USING EXISTING VENOUS ACCESS;  Surgeon: Phoenix Hand MD;  Location: [facility] CATH LAB;  Service: Nephrology;  Laterality: N/A;     Family History   Problem Relation Age of Onset    Arthritis Mother     Arthritis Father     Diabetes Father     Heart disease Father     Hypertension Father      Review of patient's allergies indicates:   Allergen Reactions    Vancomycin      RED 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    Multaq [dronedarone]        Medications:    Current Outpatient Medications on File Prior to Visit   Medication Sig Dispense Refill    acetaminophen (TYLENOL) 500 MG tablet Take 2 tablets (1,000 mg total) by mouth every 8 (eight) hours as needed for Pain. 90 tablet 1    amiodarone (PACERONE) 200 MG Tab TAKE ONE TABLET BY MOUTH ONCE A DAY AS DIRECTED. THANK YOU! 30 tablet 11    bosentan (TRACLEER) 125 MG Tab Take by mouth every 12 (twelve) hours.      CALCIUM CARBONATE (CALCIUM 600 ORAL) Take 2 tablets by mouth once daily.      dicyclomine (BENTYL) 20 mg tablet Take 20 mg by mouth 3 (three) times daily as needed.      digoxin (LANOXIN) 125 mcg tablet TAKE ONE TABLET BY MOUTH ONCE A DAY AS DIRECTED. THANK YOU! 30 tablet 11    docusate sodium (COLACE) 50 MG capsule Take 1 capsule (50 mg total) by mouth  daily as needed for Constipation.  0    ferrous gluconate (FERGON) 324 MG tablet Take 1 tablet (324 mg total) by mouth daily with breakfast.      folic acid (FOLVITE) 1 MG tablet Take 1 mg by mouth once daily.      furosemide (LASIX) 40 MG tablet Take 1 tablet (40 mg total) by mouth 2 (two) times daily. 60 tablet 11    loratadine (CLARITIN) 10 mg tablet Take 10 mg by mouth once daily.      metoclopramide HCl (REGLAN) 10 MG tablet Take 10 mg by mouth 2 (two) times daily.      ondansetron (ZOFRAN-ODT) 8 MG TbDL Take 1 tablet (8 mg total) by mouth every 12 (twelve) hours as needed (nausea). 60 tablet 5    potassium chloride SA (K-DUR,KLOR-CON) 20 MEQ tablet Take 1 tablet (20 mEq total) by mouth once daily. 30 tablet 11    predniSONE (DELTASONE) 5 MG tablet Take 1 tablet (5 mg total) by mouth once daily. (Patient not taking: Reported on 2/17/2023) 30 tablet 5    promethazine (PHENERGAN) 12.5 MG Tab Take 1 tablet (12.5 mg total) by mouth every 6 (six) hours as needed (nausea). 80 tablet 5    riociguat (ADEMPAS) 0.5 mg Tab tablet Take 1 tablet (0.5 mg total) by mouth 3 (three) times daily. 90 tablet 1    spironolactone (ALDACTONE) 25 MG tablet Take 25 mg by mouth once daily.      sucralfate (CARAFATE) 100 mg/mL suspension Take 10 mLs (1 g total) by mouth 4 (four) times daily. 250 mL 1    traMADoL (ULTRAM) 50 mg tablet TAKE ONE TABLET BY MOUTH EVERY 8 HOURS AS NEEDED FOR PAIN 90 tablet 0    traMADoL (ULTRAM-ER) 100 MG Tb24 TAKE ONE TABLET BY MOUTH ONCE A DAY AS NEEDED FOR PAIN 30 tablet 0    TREPROSTINIL SODIUM (TREPROSTINIL, REMODULIN, PUMP FOR HOME USE) Pt currently on 89.2 ng/kg/min=45 mL/day dosing weight 70.05 kg 60 mL 11    warfarin (COUMADIN) 10 MG tablet Take 1 tablet (10 mg total) by mouth Daily. Take 1 tablet (10mg) by mouth daily, except a half tablet (5mg) every Tues, Thurs, and Saturdays 90 tablet 1    warfarin (COUMADIN) 5 MG tablet TAKE ONE AND ONE-HALF TABLET BY MOUTH ONCE DAILY AS DIRECTED BY CIS.NEW DOSE  AS OF 7/26/2022       No current facility-administered medications on file prior to visit.       [facility] database queried on 03/06/2023 by Sonia Kelly . The results reviewed and considered with the clinical data in the decision whether or not to prescribe a controlled substance.        OBJECTIVE:       ROS:  Review of Systems   Constitutional:  Positive for appetite change and fatigue. Negative for activity change, fever and unexpected weight change.   HENT:  Negative for hearing loss, sore throat and trouble swallowing.    Eyes:  Negative for photophobia, pain and visual disturbance.   Respiratory:  Positive for shortness of breath. Negative for cough and wheezing.    Cardiovascular:  Negative for chest pain and leg swelling.   Gastrointestinal:  Positive for constipation, diarrhea and nausea. Negative for abdominal distention, abdominal pain, vomiting, reflux and fecal incontinence.   Genitourinary:  Negative for dysuria and urgency.   Musculoskeletal:  Negative for gait problem, leg pain and myalgias.   Neurological:  Positive for weakness. Negative for light-headedness, headaches and memory loss.   Psychiatric/Behavioral:  Negative for behavioral problems and confusion. The patient is not nervous/anxious.        Review of Symptoms      Symptom Assessment (ESAS 0-10 Scale)  Pain:  0  Dyspnea:  0  Anxiety:  0  Nausea:  0  Depression:  0  Anorexia:  0  Fatigue:  0  Insomnia:  0  Restlessness:  0  Agitation:  0     CAM / Delirium:  Negative  Constipation:  Positive  Diarrhea:  Positive    Anxiety:  Is not nervous/anxious  Constipation:  Constipation    Bowel Management Plan (BMP):  Yes      Comments:  Senna and Miralax    Pain Assessment:    Location(s): back    Back       Location: lower        Quality: Aching        Quantity: 7/10 in intensity        Chronicity: Onset 14 month(s) ago, gradually worsening        Aggravating Factors: None        Alleviating Factors: Opiates       Associated Symptoms:  None    Modified Roberto Scale:  7    Performance Status:  50    Psychosocial/Cultural:   See Palliative Psychosocial Note: No  Lives with her  who is an EM physician who works 5-6 times per month, has a daughter who lives down the block who works and has school aged kids. Sister is part of her support system however she is in her 80s. They can have access to home care if needed  **Primary  to Follow**  Palliative Care  Consult: No    Advance Care Planning   Advance Directives:   Living Will: No    LaPOST: No    Do Not Resuscitate Status: Yes    Medical Power of : Yes      Decision Making:  Patient answered questions  Goals of Care: What is most important right now is to focus on symptom/pain control, improvement in condition. Accordingly, we have decided that the best plan to meet the patient's goals includes continuing with treatment.            Physical Exam:   Vitals:    Physical Exam  Constitutional:       General: She is not in acute distress.  HENT:      Head: Normocephalic and atraumatic.      Nose:      Comments: Wearing NC  Eyes:      General: No scleral icterus.  Pulmonary:      Effort: Pulmonary effort is normal. No respiratory distress.   Musculoskeletal:      Cervical back: Neck supple.   Neurological:      Mental Status: She is alert and oriented to person, place, and time.   Psychiatric:         Mood and Affect: Mood and affect normal.         Labs:  CBC:   WBC   Date Value Ref Range Status   12/11/2020 4.02 3.90 - 12.70 K/uL Final     MCV   Date Value Ref Range Status   03/06/2020 87 82 - 98 fL Final           Hematocrit   Date Value Ref Range Status   12/11/2020 34.7 (L) 37.0 - 48.5 % Final                         Albumin:   Albumin   Date Value Ref Range Status   12/11/2020 4.0 3.5 - 5.2 g/dL Final     Protein:   Total Protein   Date Value Ref Range Status   12/11/2020 7.1 6.0 - 8.4 g/dL Final       Radiology:I have reviewed all pertinent imaging  results/findings within the past 24 hours.    Results for orders placed during the hospital encounter of 10/12/21    Echo Saline Bubble? Yes    Interpretation Summary  · The left ventricle is normal in size with normal systolic function. The estimated ejection fraction is 60%.  · Severe right ventricular enlargement with moderately reduced right ventricular systolic function.  · Grade II left ventricular diastolic dysfunction.  · Severe biatrial enlargement.  · Mild to moderate tricuspid regurgitation.  · The estimated PA systolic pressure is 99 mmHg.  · Elevated central venous pressure (15 mmHg).      40 minutes of total time spent on the encounter, which includes face to face time and non-face to face time preparing to see the patient (eg, review of tests), Obtaining and/or reviewing separately obtained history, Documenting clinical information in the electronic or other health record, Independently interpreting results (not separately reported) and communicating results to the patient/family/caregiver, or Care coordination (not separately reported).      Encounter occurred during period of COVID-19 emergency. Encounter performed under the concurrent guidelines, limitations and protocols.            Signature: Sonia Kelly MD

## 2023-03-21 NOTE — TELEPHONE ENCOUNTER
NN advised that patient needed return call by provider.  NN contacted spouse who in turn requested that he call nurse back.  NN gave spouse direct number.    Spouse called back and requested a virtual appointment for clinic follow up and orders to be sent to [facility].    Spoke to Nurse Kaur. Orders faxed to [phone]  
Yes

## 2023-04-06 NOTE — PROGRESS NOTES
"Subjective:    Patient ID:  Molly Smith is a 77 y.o. female who presents for follow-up of Pulmonary Hypertension.    Audio Only Telehealth Visit     The patient location is: Home  The chief complaint leading to consultation is: Pulmonary Hypertension  Visit type: Virtual visit with audio only (telephone)  Total time spent with patient: 30 minutes     The reason for the audio only service rather than synchronous audio and video virtual visit was related to technical difficulties or patient preference/necessity.     Each patient to whom I provide medical services by telemedicine is:  (1) informed of the relationship between the physician and patient and the respective role of any other health care provider with respect to management of the patient; and (2) notified that they may decline to receive medical services by telemedicine and may withdraw from such care at any time. Patient verbally consented to receive this service via voice-only telephone call.    This service was not originating from a related E/M service provided within the previous 7 days nor will  to an E/M service or procedure within the next 24 hours or my soonest available appointment.  Prevailing standard of care was able to be met in this audio-only visit.       HPI   Mrs. 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 She has other history of chronic respiratory failure on long term oxygen, a-fib on digoxin, amiodarone, and coumadin.    Mrs. Smith reports doing okay. Her , who is an ER doctor, is with her. She is mostly wheelchair bound due to hypoxia and debility. Her O2 sats run in the 70s, at rest, with 10L via NC, but with movement she can drop to the 30s. Both patient and  stated that she "recovers " quickly. They have two concentrators in the home and have about 15 tanks delivered every few weeks. She reports doing chair exercises - mostly leg lifts. Feels like her appetite is better but she can only eat 2 meals a day. Her stomach becomes too swollen to eat more than that. Tolerates PH medication, with side appropriate effect management - has occasional diarrhea. Her maguire catheter site is clear and dressing is C/D/I.    Mrs. Smith is still going out, as much as she can. She went to the beach for 4 days, with family and plans on going camping with family soon. Mrs. Smith also attends PH support groups when she can. She does have Home Health and her  cares for her.  Mrs. Smith follows with pulmonology and palliative care, regularly.    ECHO: 10/12/2021  The left ventricle is normal in size with normal systolic function. The estimated ejection fraction is 60%.  Severe right ventricular enlargement with moderately reduced right ventricular systolic function.  Grade II left ventricular diastolic dysfunction.  Severe biatrial enlargement.  Mild to moderate tricuspid regurgitation.  The estimated PA systolic pressure is 99 mmHg.  Elevated central venous pressure (15 mmHg).    [facility] 9/25/17:  CONDITION 1 (9/25/2017 11:20:07):  RA: 5/2 (1)  RV: 84/-5  RVEDP: 6     PW: 17/19 (16)  PA: 87/21 (47)  AO_SAT: 95  AO: 115/71 (86)  PA_SAT: 71  SPO2: 97  FICKCI: 3.2800  FICKCO: 5.5500    PFTs: 1/29/2019  Mild (small airways) obstruction. Nitrogen washout lung volumes are normal. Normal diffusion capacity.     Review of Systems   Constitutional: Positive for malaise/fatigue and weight loss.   HENT: Negative.     Eyes: Negative.    Cardiovascular:  Positive for dyspnea on exertion. Negative for chest pain, near-syncope and syncope.   Respiratory:  Positive for shortness of breath and sleep disturbances due to breathing.    Endocrine: Negative.    Hematologic/Lymphatic: Bruises/bleeds easily.   Skin: Negative.     Musculoskeletal:  Positive for myalgias.   Gastrointestinal:  Positive for bloating.   Genitourinary: Negative.    Neurological: Negative.    Psychiatric/Behavioral: Negative.        Objective: /82   Pulse 86   SpO2 (!) 72%     Physical Exam - Not done. Virtual appointment.      Lab Results   Component Value Date     (H) 11/23/2021     03/23/2023    K 4.1 03/23/2023    MG 2.0 11/23/2021    CL 98 03/23/2023    CO2 31 (H) 03/23/2023    BUN 26 (H) 03/23/2023    CREATININE 1.18 03/23/2023     03/23/2023    HGBA1C 5.3 03/23/2019    AST 23 03/23/2023    ALT 14 03/23/2023    ALBUMIN 4.6 03/23/2023    PROT 7.4 03/23/2023    BILITOT 0.8 03/23/2023    CHOL 149 12/07/2017    HDL 46 12/07/2017    LDLCALC 75.2 12/07/2017    TRIG 139 12/07/2017       Magnesium   Date Value Ref Range Status   11/23/2021 2.0 1.6 - 2.6 mg/dL Final       Lab Results   Component Value Date    WBC 3.00 (L) 02/08/2023    HGB 9.5 (L) 02/08/2023    HCT 33.0 (L) 02/08/2023    MCV 62 (L) 02/08/2023     02/08/2023       Lab Results   Component Value Date    INR 1.5 (H) 07/18/2019    INR 1.7 (H) 07/18/2019    INR 1.6 (H) 04/05/2019       BNP   Date Value Ref Range Status   11/23/2021 863 (H) 0 - 99 pg/mL Final     Comment:     Values of less than 100 pg/ml are consistent with non-CHF populations.   02/26/2021 342 (H) 0 - 99 pg/mL Final     Comment:     Values of less than 100 pg/ml are consistent with non-CHF populations.   12/11/2020 307 (H) 0 - 99 pg/mL Final     Comment:     Values of less than 100 pg/ml are consistent with non-CHF populations.       No results found for: LDH        WHO Group: 1  Functional Class: IV       Assessment:       1. WHO group 1 pulmonary arterial hypertension    2. High risk medication use- IV remodulin    3. Chronic respiratory failure with hypoxia    4. Sinus venosus defect    5. Anticoagulant long-term use    6. SVT (supraventricular tachycardia)         Plan:       Mrs. Smith has severe  PAH with a poor prognosis but has lived much longer than expected. She is focused on quality of life and functioning as best as she can. Feels like she is okay, right now. Understands her diagnosis and has great family support. She is following with palliative care.  Her goal is to be here for the birth of her great grandchild in August.    No medication changes today. Will plan to follow-up in 6 months, but she knows to call if she wants to be seen sooner.     Encourage physical activity with graded exercise program.  Requested patient to weigh themselves daily, and to notify us if their weight increases by more than 3 lbs in 1 day or 5 lbs in 1 week.

## 2023-04-18 NOTE — PROGRESS NOTES
Consult Note  Palliative Medicine Clinic      Consult Requested By: Dr. Gusman    Primary Care Physician: Roberto Braun MD    Reason for Consult: symptom management/ACP    The patient location is: [place]     The chief complaint leading to consultation is: Dyspnea      Visit type: audiovisual then audio    Face to Face time with patient: 10min then telephone for 33min   45 minutes of total time spent on the encounter, which includes face to face time and non-face to face time preparing to see the patient (eg, review of tests), Obtaining and/or reviewing separately obtained history, Documenting clinical information in the electronic or other health record, Independently interpreting results (not separately reported) and communicating results to the patient/family/caregiver, or Care coordination (not separately reported).       Each patient provided with medical services by telemedicine is:  (1) informed of the relationship between the physician and patient and the respective role of any other health care provider with respect to management of the patient; and (2) notified that he or she may decline to receive medical services by telemedicine and may withdraw from such care at any time.        ASSESSMENT/PLAN:     Plan/Recommendations:  Molly was seen today for pain    Diagnoses and all orders for this visit:    Chronic pulmonary heart disease / Dyspnea on exertion  - Followed by [facility], now has a local Pulmonologist Dr Fair  - Currently on IV remodulin and adempas  - Reported O2 sats are high 60s at baseline and mid 40s to 30s on exertion   -Using 10L O2 NC   -continues to have dyspnea, now having palpitations every couple of days, will reach out to his card team regarding this       Neuropathic pain/ Post herpetic neuralgia/ Back pain  -Now s/p kyphoplasty    -Tramadol 100mg ER daily  -Tramadol 50mg q 8h PRN     -Voltaren Gel, heating pad        Nausea / Anorexia  -Zofran TID   -Has identified key foods  that work well for her   -nausea is manageable taking Zofran, worse when she eats cereal takes pills    Fatigue/Frailty  -Most likely due to progression of her PH  -has been able to visit her children - very tired after the visits however she enjoys seeing her family  - Counseled regarding activity modulation and ways to conserve/preserve energy and direct limited energy to priority activities    Constipation/diarrhea  -continues to be intermittent  -now some abdominal pain after eating resolves with tramadol and heating pad    Palliative Care Encounter:   Medicolegal: Pt has decision making capacity      is surrogate/HCPOA      Psychosocial: Has good support system,  cares for her, daughter lives close by, they have access to home care if needed     Prognostication: Patient with pHTN, chronic respiratory failure on Home O2 , frail - prognosis is poor     Understanding of disease and Illness Trajectory: Patient understands that she has pHTN, that she is frail and that at this point she is high risk for any procedures. She knows her disease is progressive and not reversible.  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  We talked about sharing her status with her children, and enjoying the time that she has left.     Goals of care:  Maintain her current level of health, continue to be independent     ACP Date: 08/19/2022  I engaged the patient and    in a conversation about advance care planning and we specifically addressed what the goals of care would be moving forward, in light of the patient's change in clinical status, specifically worsening hypoxia, and fatigue.    We discussed that her symptoms are likely due to progression of her PH and condition. We explored the patient's values and preferences for future care.  The patient endorses that what is most important right now is to focus on symptom/pain control and improvement in condition . She is hoping to  regain her energy and to maintain her independence.  is hoping to continue to prolong her life and for her to improve her strength.   Accordingly, we have decided that the best plan to meet the patient's goals includes continuing with treatment and palliative care.       Code status: Partial code - No chest compressions, no intubation. OK with medications     Advance directives:  HCPOA on file      SUBJECTIVE:     History obtained from: patient and      complaint: Pain      History of Present Illness:  Mrs. Molly Smith  is 77 y.o. female presenting with dyspnea and pain.  Referred to Palliative Care for evaluation and management of physical symptoms, advance care planning, and clarification of goals of care. She attended the appointment alone.      Interval History:    Had 2 trips to the [place] she went to [place] in [place] and then [facility] with 2 of her children.  She is a her kids and grandkids and was able to enjoy she has some visit planned to [place] to see her daughter.  She is looking forward to meeting her great grandchild in August.    She endorsed some fatigue after the trip she is able to enjoy with her children her symptoms improve when she is with them.    She has some back pain while traveling and sometimes her dyspnea worsens but improves when she takes some tramadol continues to have her O2 sats in mid 40s and 30s.    Endorsed intermittent abdominal pain after meals improves with a heating pad and tramadol.    Chronic lower extremity edema, discussed repositioning    -----------------    She is not feeling great again she is not able to eat she has nausea all day  She now has chest pain at times in her neck and her arm stabbing pulling improves with Tums and tramadol happening almost every day  She desats to the high 30s low 40s with minimal activity even just by standing up and sometimes feels dizzy and may lose balance  Not drinking boost due to  nausea    -------------------------          Disease History:  PHTN WHO I ( Remodulin) , chronic respiratory failure on Home O2 at 6L per N/C,congential heart disease.   Patient developed post neuralgia neuropathic pain in late August/2020   Spinal compression fx x2 in mid September/2020 s/p kyphoplasty    Past Medical History:   Diagnosis Date    Allergy     Anticoagulant long-term use     Arthritis     Heart murmur     Pneumonia     Pulmonary hypertension     Tachycardia      Past Surgical History:   Procedure Laterality Date    BACK SURGERY      HEMIARTHROPLASTY OF HIP Left 3/25/2019    Procedure: HEMIARTHROPLASTY, HIP - left;  Surgeon: Kemal Esposito MD;  Location: [facility] OR [address] [place] [address];  Service: Orthopedics;  Laterality: Left;    REPLACEMENT OF DIALYSIS CATHETER OVER GUIDEWIRE THROUGH EXISTING VENOUS ACCESS N/A 1/17/2019    Procedure: REPLACEMENT, CATHETER, DIALYSIS, OVER GUIDEWIRE, USING EXISTING VENOUS ACCESS;  Surgeon: Phoenix Hand MD;  Location: [facility] CATH LAB;  Service: Nephrology;  Laterality: N/A;     Family History   Problem Relation Age of Onset    Arthritis Mother     Arthritis Father     Diabetes Father     Heart disease Father     Hypertension Father      Review of patient's allergies indicates:   Allergen Reactions    Vancomycin      RED 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    Multaq [dronedarone]        Medications:    Current Outpatient Medications on File Prior to Visit   Medication Sig Dispense Refill    acetaminophen (TYLENOL) 500 MG tablet Take 2 tablets (1,000 mg total) by mouth every 8 (eight) hours as needed for Pain. 90 tablet 1    amiodarone (PACERONE) 200 MG Tab TAKE ONE TABLET BY MOUTH ONCE A DAY AS DIRECTED. THANK YOU! 30 tablet 11    bosentan (TRACLEER) 125 MG Tab Take by mouth every 12 (twelve) hours.      CALCIUM CARBONATE (CALCIUM 600 ORAL) Take 2 tablets by mouth once daily.      dicyclomine (BENTYL) 20 mg tablet Take 20 mg by mouth 3 (three) times daily as needed.      digoxin (LANOXIN) 125 mcg  tablet TAKE ONE TABLET BY MOUTH ONCE A DAY 30 tablet 12    docusate sodium (COLACE) 50 MG capsule Take 1 capsule (50 mg total) by mouth daily as needed for Constipation.  0    ferrous gluconate (FERGON) 324 MG tablet Take 1 tablet (324 mg total) by mouth daily with breakfast.      folic acid (FOLVITE) 1 MG tablet Take 1 mg by mouth once daily.      furosemide (LASIX) 40 MG tablet Take 1 tablet (40 mg total) by mouth 2 (two) times daily. 60 tablet 11    loratadine (CLARITIN) 10 mg tablet Take 10 mg by mouth once daily.      metoclopramide HCl (REGLAN) 10 MG tablet Take 10 mg by mouth 2 (two) times daily.      omeprazole (PRILOSEC) 40 MG capsule Take 1 capsule (40 mg total) by mouth once daily. 30 capsule 11    ondansetron (ZOFRAN-ODT) 8 MG TbDL Take 1 tablet (8 mg total) by mouth every 12 (twelve) hours as needed (nausea). 60 tablet 5    potassium chloride SA (K-DUR,KLOR-CON) 20 MEQ tablet Take 1 tablet (20 mEq total) by mouth once daily. 30 tablet 11    predniSONE (DELTASONE) 5 MG tablet Take 1 tablet (5 mg total) by mouth once daily. 30 tablet 5    promethazine (PHENERGAN) 12.5 MG Tab Take 1 tablet (12.5 mg total) by mouth every 6 (six) hours as needed (nausea). 90 tablet 5    riociguat (ADEMPAS) 0.5 mg Tab tablet Take 1 tablet (0.5 mg total) by mouth 3 (three) times daily. 90 tablet 1    spironolactone (ALDACTONE) 25 MG tablet Take 25 mg by mouth once daily.      sucralfate (CARAFATE) 100 mg/mL suspension Take 10 mLs (1 g total) by mouth 4 (four) times daily. 250 mL 1    traMADoL (ULTRAM) 50 mg tablet TAKE ONE TABLET BY MOUTH EVERY 8 HOURS AS NEEDED FOR PAIN 90 tablet 0    traMADoL (ULTRAM-ER) 100 MG Tb24 TAKE ONE TABLET BY MOUTH ONCE A DAY AS NEEDED FOR PAIN 30 tablet 0    TREPROSTINIL SODIUM (TREPROSTINIL, REMODULIN, PUMP FOR HOME USE) Pt currently on 89.2 ng/kg/min=45 mL/day dosing weight 70.05 kg 60 mL 11    warfarin (COUMADIN) 10 MG tablet Take 1 tablet (10 mg total) by mouth Daily. Take 1 tablet (10mg) by  mouth daily, except a half tablet (5mg) every Tues, Thurs, and Saturdays 90 tablet 1    warfarin (COUMADIN) 5 MG tablet TAKE ONE AND ONE-HALF TABLET BY MOUTH ONCE DAILY AS DIRECTED BY CIS.NEW DOSE AS OF 7/26/2022       No current facility-administered medications on file prior to visit.       [facility] database queried on 04/18/2023 by Sonia Kelly . The results reviewed and considered with the clinical data in the decision whether or not to prescribe a controlled substance.        OBJECTIVE:       ROS:  Review of Systems   Constitutional:  Positive for fatigue. Negative for activity change, appetite change, fever and unexpected weight change.   HENT:  Negative for hearing loss, sore throat and trouble swallowing.    Eyes:  Negative for photophobia, pain and visual disturbance.   Respiratory:  Positive for shortness of breath. Negative for cough and wheezing.    Cardiovascular:  Negative for chest pain and leg swelling.   Gastrointestinal:  Positive for constipation, diarrhea and nausea. Negative for abdominal distention, abdominal pain, vomiting, reflux and fecal incontinence.   Genitourinary:  Negative for dysuria and urgency.   Musculoskeletal:  Negative for gait problem, leg pain and myalgias.   Neurological:  Positive for weakness. Negative for light-headedness, headaches and memory loss.   Psychiatric/Behavioral:  Negative for behavioral problems and confusion. The patient is not nervous/anxious.        Review of Symptoms      Symptom Assessment (ESAS 0-10 Scale)  Pain:  4  Dyspnea:  5  Anxiety:  0  Nausea:  4  Depression:  0  Anorexia:  3  Fatigue:  0  Insomnia:  0  Restlessness:  0  Agitation:  0     CAM / Delirium:  Negative  Constipation:  Positive  Diarrhea:  Positive    Anxiety:  Is not nervous/anxious  Constipation:  Constipation    Bowel Management Plan (BMP):  Yes      Comments:  Senna and Miralax    Pain Assessment:    Location(s): abdomen    Abdomen       Location: generalized        Quality: Aching  and cramping        Quantity: 6/10 in intensity        Chronicity: Onset 1 month(s) ago, unchanged        Aggravating Factors: Eating        Alleviating Factors: Opiates        Associated Symptoms: None    Modified Roberto Scale:  7    Performance Status:  50    Psychosocial/Cultural:   See Palliative Psychosocial Note: No  Lives with her  who is an EM physician who works 5-6 times per month, has a daughter who lives down the block who works and has school aged kids. Sister is part of her support system however she is in her 80s. They can have access to home care if needed  **Primary  to Follow**  Palliative Care  Consult: No    Spiritual:  F - Micki and Belief:  Restorationist  I - Importance:  High   A - Address in Care:  Prayer is important     Advance Care Planning   Advance Directives:   Living Will: No    LaPOST: No    Do Not Resuscitate Status: Yes    Medical Power of : Yes      Decision Making:  Patient answered questions  Goals of Care: What is most important right now is to focus on symptom/pain control, improvement in condition. Accordingly, we have decided that the best plan to meet the patient's goals includes continuing with treatment.            Physical Exam:   Vitals:    Physical Exam  Constitutional:       General: She is not in acute distress.  HENT:      Head: Normocephalic and atraumatic.      Nose:      Comments: Wearing NC  Eyes:      General: No scleral icterus.  Pulmonary:      Effort: Pulmonary effort is normal. No respiratory distress.   Musculoskeletal:      Cervical back: Neck supple.   Neurological:      Mental Status: She is alert and oriented to person, place, and time.   Psychiatric:         Mood and Affect: Mood and affect normal.         Labs:  CBC:   WBC   Date Value Ref Range Status   12/11/2020 4.02 3.90 - 12.70 K/uL Final     MCV   Date Value Ref Range Status   03/06/2020 87 82 - 98 fL Final           Hematocrit   Date Value Ref Range Status    12/11/2020 34.7 (L) 37.0 - 48.5 % Final                         Albumin:   Albumin   Date Value Ref Range Status   12/11/2020 4.0 3.5 - 5.2 g/dL Final     Protein:   Total Protein   Date Value Ref Range Status   12/11/2020 7.1 6.0 - 8.4 g/dL Final       Radiology:I have reviewed all pertinent imaging results/findings within the past 24 hours.    Results for orders placed during the hospital encounter of 10/12/21    Echo Saline Bubble? Yes    Interpretation Summary  · The left ventricle is normal in size with normal systolic function. The estimated ejection fraction is 60%.  · Severe right ventricular enlargement with moderately reduced right ventricular systolic function.  · Grade II left ventricular diastolic dysfunction.  · Severe biatrial enlargement.  · Mild to moderate tricuspid regurgitation.  · The estimated PA systolic pressure is 99 mmHg.  · Elevated central venous pressure (15 mmHg).      I spent a total of 45 minutes on the day of the visit. This includes face to face time in discussion of goals of care, symptom assessment, coordination of care and emotional support.  This also includes non-face to face time preparing to see the patient (eg, review of tests/imaging), obtaining and/or reviewing separately obtained history, documenting clinical information in the electronic or other health record, independently interpreting results and communicating results to the patient/family/caregiver, or care coordinator.         Encounter occurred during period of COVID-19 emergency. Encounter performed under the concurrent guidelines, limitations and protocols.            Signature: Sonia Kelly MD

## 2023-07-20 NOTE — TELEPHONE ENCOUNTER
Pt is going to undergo cataract surgery.  wants a trial of ativan in hopes to avoid versed during surgery. Discussed trying lowest dose

## 2023-08-14 PROBLEM — D45 POLYCYTHEMIA VERA: Status: ACTIVE | Noted: 2023-01-01

## 2023-10-18 NOTE — TELEPHONE ENCOUNTER
"Dr. Smith contacted Pulmonary Hypertension Nurse Navigator to advise that Molly Smith was "turned down" by anesthesiologist for cataract surgery - "that she desperately needs."  NN advised Dr. Smith that the best advice would be to make an appointment with an opthalmologist to consult for cataract surgery.  Dr. Smith requests to "cut out the middle man" and requests a consult with anesthesiologist at [facility] in order to find out if anesthesia would even be possible.  NN advised him, that the best effort would be made to get him that information and that NN would contact him back through the [facility] portal.  He acknowledged.  "

## 2023-11-06 NOTE — TELEPHONE ENCOUNTER
Spoke with Dr. Smith (Rufus, her ).  He indicates she has been having more episodes of palpitations and elevated HR's, to the 200's.  3 episodes over the past 3 weeks.  He is wondering about increasing Cardizem  Discussed options, includin) Increase Cardizem to 180 mg daily  2) Tikosyn  3) RFA    Given the transient nature of the episodes, and her current medical status, I would not favor RFA from a risk/benefit standpoint.  We are going to increase the Cardizem.  If she continues to have increasing events, or we have challenges with hypotension, we will consider Tikosyn.   show

## 2023-11-09 NOTE — PROGRESS NOTES
Consult Note  Palliative Medicine Clinic      Consult Requested By: Dr. Gusman    Primary Care Physician: Roberto Braun MD    Reason for Consult: symptom management/ACP    The patient location is: [place]     The chief complaint leading to consultation is: Dyspnea      Visit type: audiovisual     Face to Face time with patient: 22min   41 minutes of total time spent on the encounter, which includes face to face time and non-face to face time preparing to see the patient (eg, review of tests), Obtaining and/or reviewing separately obtained history, Documenting clinical information in the electronic or other health record, Independently interpreting results (not separately reported) and communicating results to the patient/family/caregiver, or Care coordination (not separately reported).       Each patient provided with medical services by telemedicine is:  (1) informed of the relationship between the physician and patient and the respective role of any other health care provider with respect to management of the patient; and (2) notified that he or she may decline to receive medical services by telemedicine and may withdraw from such care at any time.        ASSESSMENT/PLAN:     Plan/Recommendations:  Molly was seen today for pain    Diagnoses and all orders for this visit:      Chronic pulmonary heart disease / Dyspnea on exertion  - Followed by [facility], now has a local Pulmonologist   - Currently on IV remodulin and adempas  - Reported O2 sats are high 60s at baseline and mid 40s to 30s on exertion   -Using 10L O2 NC   - dyspnea at baseline  -reports independence on ADls and able to walk 30ft      Neuropathic pain/ Post herpetic neuralgia/ Back pain  -s/p kyphoplasty    -Tramadol 100mg ER daily  -Tramadol 50mg q 8h PRN   - taking mostly BID  -Voltaren Gel, heating pad      Nausea / Anorexia  -Zofran -mostly in am  -Has identified key foods that work well for her   -nausea is manageable taking  Zofran      Fatigue/Frailty  -Most likely due to progression of her PH  -  direct limited energy to priority activities  -continues to be able to enjoy her family  -undergoing treatment for secondary polycythemia         Constipation/diarrhea  -much improved with new supplement REBEL        Palliative Care Encounter:   Medicolegal: Pt has decision making capacity  is surrogate/HCPOA      Psychosocial: Has good support system,  cares for her, daughter lives close by, they have access to home care if needed     Prognostication: Patient with pHTN, chronic respiratory failure on Home O2 , frail - prognosis is poor     Understanding of disease and Illness Trajectory: Patient understands that she has pHTN, that she is frail and that at this point she is high risk for any procedures. She knows her disease is progressive and not reversible.  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  We talked about sharing her status with her children, and enjoying the time that she has left.     Goals of care:  Maintain her current level of health, continue to be independent     ACP Date: 08/19/2022  I engaged the patient and    in a conversation about advance care planning and we specifically addressed what the goals of care would be moving forward, in light of the patient's change in clinical status, specifically worsening hypoxia, and fatigue.    We discussed that her symptoms are likely due to progression of her PH and condition. We explored the patient's values and preferences for future care.  The patient endorses that what is most important right now is to focus on symptom/pain control and improvement in condition . She is hoping to regain her energy and to maintain her independence.  is hoping to continue to prolong her life and for her to improve her strength.   Accordingly, we have decided that the best plan to meet the patient's goals includes continuing with  treatment and palliative care.       Code status: Partial code - No chest compressions, no intubation. OK with medications     Advance directives:  HCPOA on file      SUBJECTIVE:     History obtained from: patient and      complaint: Pain      History of Present Illness:  Mrs. Molly Smith  is 78 y.o. female presenting with dyspnea and pain.  Referred to Palliative Care for evaluation and management of physical symptoms, advance care planning, and clarification of goals of care. She attended the appointment alone.      Interval History:    Feeling okay lately.  Yesterday they went to a pulmonary hypertension support group and she ate dinner very late.  At night she is feeling more dyspnea and some palpitations.  Now she is feeling back to normal.  She is feeling well overall taking her tramadol twice a day.  She shared that she was declined for cataract surgery at and now she is hoping to get better glasses so that she can continue doing what she likes which is stitching and reading.    She continues to walk she is able to walk 30 ft she is doing some of the exercises that the home health physical therapist showed her.  She uses a wheelchair for long distances.  She continues to be independent on ADLs.  She is currently vqnasusf301 lb.  She has nausea and takes Zofran in the morning.  Her constipation has been controlled with a new supplement.    -----------    Had 2 trips to the [place] she went to [place] in [place] and then [facility] with 2 of her children.  She is a her kids and grandkids and was able to enjoy she has some visit planned to [place] to see her daughter.  She is looking forward to meeting her great grandchild in August.  She endorsed some fatigue after the trip she is able to enjoy with her children her symptoms improve when she is with them.  She has some back pain while traveling and sometimes her dyspnea worsens but improves when she takes some tramadol continues to have  her O2 sats in mid 40s and 30s.  Endorsed intermittent abdominal pain after meals improves with a heating pad and tramadol.  Chronic lower extremity edema, discussed repositioning    -----------------    She is not feeling great again she is not able to eat she has nausea all day  She now has chest pain at times in her neck and her arm stabbing pulling improves with Tums and tramadol happening almost every day  She desats to the high 30s low 40s with minimal activity even just by standing up and sometimes feels dizzy and may lose balance  Not drinking boost due to nausea    -------------------------          Disease History:  PHTN WHO I ( Remodulin) , chronic respiratory failure on Home O2 at 6L per N/C,congential heart disease.   Patient developed post neuralgia neuropathic pain in late August/2020   Spin  Review of patient's allergies indicates:   Allergen Reactions    Vancomycin      RED 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    Multaq [dronedarone]        Medications:    Current Outpatient Medications on File Prior to Visit   Medication Sig Dispense Refill    acetaminophen (TYLENOL) 500 MG tablet Take 2 tablets (1,000 mg total) by mouth every 8 (eight) hours as needed for Pain. 90 tablet 1    amiodarone (PACERONE) 200 MG Tab TAKE ONE TABLET BY MOUTH ONCE A DAY AS DIRECTED. THANK YOU! 30 tablet 11    bosentan (TRACLEER) 125 MG Tab Take by mouth every 12 (twelve) hours.      CALCIUM CARBONATE (CALCIUM 600 ORAL) Take 2 tablets by mouth once daily.      dicyclomine (BENTYL) 20 mg tablet Take 20 mg by mouth 3 (three) times daily as needed.      digoxin (LANOXIN) 125 mcg tablet TAKE ONE TABLET BY MOUTH ONCE A DAY 30 tablet 12    docusate sodium (COLACE) 50 MG capsule Take 1 capsule (50 mg total) by mouth daily as needed for Constipation.  0    ferrous gluconate (FERGON) 324 MG tablet Take 1 tablet (324 mg total) by mouth daily with breakfast.      folic acid (FOLVITE) 1 MG tablet Take 1 mg by mouth once daily.      furosemide (LASIX) 40 MG  tablet Take 1 tablet (40 mg total) by mouth 2 (two) times daily. (Patient taking differently: Take 40 mg by mouth once daily.) 60 tablet 11    hydroxyurea (HYDREA) 500 mg Cap Take one tablet po Mondays Wednesdays Fridays. 12 each 6    LIDOcaine-prilocaine (EMLA) cream AS DIRECTED      loratadine (CLARITIN) 10 mg tablet Take 10 mg by mouth once daily.      LORazepam (ATIVAN) 0.5 MG tablet Take 1 tablet (0.5 mg total) by mouth every 8 (eight) hours as needed (anxiety). 20 tablet 0    metoclopramide HCl (REGLAN) 10 MG tablet Take 10 mg by mouth 2 (two) times daily.      omeprazole (PRILOSEC) 40 MG capsule Take 1 capsule (40 mg total) by mouth once daily. 30 capsule 11    ondansetron (ZOFRAN-ODT) 8 MG TbDL Take 1 tablet (8 mg total) by mouth every 12 (twelve) hours as needed (nausea). 60 tablet 5    potassium chloride SA (K-DUR,KLOR-CON) 20 MEQ tablet Take 1 tablet (20 mEq total) by mouth once daily. 30 tablet 11    predniSONE (DELTASONE) 5 MG tablet Take 1 tablet (5 mg total) by mouth once daily. 30 tablet 5    promethazine (PHENERGAN) 12.5 MG Tab Take 1 tablet (12.5 mg total) by mouth every 6 (six) hours as needed (nausea). 90 tablet 5    riociguat (ADEMPAS) 0.5 mg Tab tablet Take 1 tablet (0.5 mg total) by mouth 3 (three) times daily. 90 tablet 1    spironolactone (ALDACTONE) 25 MG tablet Take 25 mg by mouth once daily.      sucralfate (CARAFATE) 100 mg/mL suspension Take 10 mLs (1 g total) by mouth 4 (four) times daily. 250 mL 1    traMADoL (ULTRAM) 50 mg tablet TAKE ONE TABLET BY MOUTH EVERY 8 HOURS AS NEEDED FOR PAIN 90 tablet 0    traMADoL (ULTRAM-ER) 100 MG Tb24 TAKE ONE TABLET BY MOUTH ONCE A DAY AS NEEDED FOR PAIN 30 tablet 0    TREPROSTINIL SODIUM (TREPROSTINIL, REMODULIN, PUMP FOR HOME USE) Pt currently on 89.2 ng/kg/min=45 mL/day dosing weight 70.05 kg 60 mL 11    UNABLE TO FIND medication name: REBEL      UNABLE TO FIND medication name: CORTEX I      warfarin (COUMADIN) 10 MG tablet Take 1 tablet (10 mg  total) by mouth Daily. Take 1 tablet (10mg) by mouth daily, except a half tablet (5mg) every Tues, Thurs, and Saturdays (Patient taking differently: Take 10 mg by mouth Daily. Take 1 tablet (10mg) by mouth daily on Thursday, and half tablet (5mg) every M,Tu,W, F,Sa, Thompson) 90 tablet 1    warfarin (COUMADIN) 5 MG tablet TAKE ONE AND ONE-HALF TABLET BY MOUTH ONCE DAILY AS DIRECTED BY [facility].NEW DOSE AS OF 7/26/2022       No current facility-administered medications on file prior to visit.        database queried on 11/09/2023 by Sonia Kelly . The results reviewed and considered with the clinical data in the decision whether or not to prescribe a controlled substance.        OBJECTIVE:       ROS:  Review of Systems   Constitutional:  Positive for fatigue and unexpected weight change.   Respiratory:  Positive for shortness of breath.          Review of Symptoms      Symptom Assessment (ESAS 0-10 Scale)  Pain:  0  Dyspnea:  4  Anxiety:  0  Nausea:  0  Depression:  0  Anorexia:  0  Fatigue:  0  Insomnia:  0  Restlessness:  0  Agitation:  0     CAM / Delirium:  Negative  Constipation:  Negative  Diarrhea:  Negative      Bowel Management Plan (BMP):  Yes      Comments:  Senna and Miralax    Pain Assessment:    Location(s): none      Modified Roberto Scale:  7    Performance Status:  50    Psychosocial/Cultural:   See Palliative Psychosocial Note: No  Lives with her  who is an EM physician who works 5-6 times per month, has a daughter who lives down the block who works and has school aged kids. Sister is part of her support system however she is in her 80s. They can have access to home care if needed  **Primary  to Follow**  Palliative Care  Consult: No    Spiritual:  F - Micki and Belief:  Holiness  I - Importance:  High   A - Address in Care:  Prayer is important       Advance Care Planning   Advance Directives:   Living Will: No    LaPOST: No    Do Not Resuscitate Status: Yes    Medical  Power of : Yes      Decision Making:  Patient answered questions  Goals of Care: What is most important right now is to focus on symptom/pain control, improvement in condition. Accordingly, we have decided that the best plan to meet the patient's goals includes continuing with treatment.              Physical Exam:   Vitals:    Physical Exam  Constitutional:       General: She is not in acute distress.  HENT:      Head: Normocephalic and atraumatic.      Nose:      Comments: Wearing NC  Eyes:      General: No scleral icterus.  Pulmonary:      Effort: Pulmonary effort is normal. No respiratory distress.   Musculoskeletal:      Cervical back: Neck supple.   Neurological:      Mental Status: She is alert and oriented to person, place, and time.   Psychiatric:         Mood and Affect: Mood and affect normal.         Radiology:I have reviewed all pertinent imaging results/findings within the past 24 hours.    Results for orders placed during the hospital encounter of 10/12/21    Echo Saline Bubble? Yes    Interpretation Summary  · The left ventricle is normal in size with normal systolic function. The estimated ejection fraction is 60%.  · Severe right ventricular enlargement with moderately reduced right ventricular systolic function.  · Grade II left ventricular diastolic dysfunction.  · Severe biatrial enlargement.  · Mild to moderate tricuspid regurgitation.  · The estimated PA systolic pressure is 99 mmHg.  · Elevated central venous pressure (15 mmHg).      I spent a total of 41 minutes on the day of the visit. This includes face to face time in discussion of goals of care, symptom assessment, coordination of care and emotional support.  This also includes non-face to face time preparing to see the patient (eg, review of tests/imaging), obtaining and/or reviewing separately obtained history, documenting clinical information in the electronic or other health record, independently interpreting results and  communicating results to the patient/family/caregiver, or care coordinator.         Encounter occurred during period of COVID-19 emergency. Encounter performed under the concurrent guidelines, limitations and protocols.            Signature: Sonia Kelly MD

## 2023-11-09 NOTE — PROGRESS NOTES
4Ms for Medical Decision-Making in Older Adults    What matters most  Patient has had an ACP conversation  Living Will: No  Power of : Yes  LaPOST: No    What is most important right now is to focus on: maintaining independence  Accordingly, we have decided that the best plan to meet the patient's goals includes: continuing medical management, potentially considering cataract surgery if vision deteriorates  What matters most to patient today is: continuing current care so that she is able to spend valuable time with family, spend time on hobbies including sowing, reading, which heavily depend on healthy vision.    Medication  Doing well on current medication regimen for pulmonary hypertension  High Risk Medications:    Total Active Medications: 2    traMADoL - 50 mg    traMADoL Tb24 - 100 MG  Lorazepam from MAR was discontinued as patient is not taking this.  No side effects were mentioned from current regimen. Chronic pain, nausea, and palpitations are under control with current therapies, no escalation required.    Mentation   Mood is described as good today. Has most of her energy during the middle of the day. Went out to dinner last night with her PH support group.  Depression Patient Health Questionnaire: negative screening  Has Dementia Dx: No  Cognitive function screening was deferred today as the patient generally was doing well, and is still able to independently manage all her medications.    Mobility  Able to independently completed ADLs including showering and cooking some meals.   Able to ambulate up to 30ft with a rolling marvin, for longer distances uses a wheelchair.   Continues with home health physical therapy and gently physical activity.   Currently, vision is described as poor, however, patient is working on obtaining a new glasses prescription to assist with vision. Declined for cataract surgery by a private practice in [place] due to chronic hypoxemia. Vision currently does not impair  ADLs or hobbies, but if vision deteriorates patient is open to re-considering cataract surgery at [facility]..   Patient's  has modified their lifestyle to suit her medical problems. They frequently visit family by taking longer trips in their RV - has a trip to [place] coming up for Thanksgiving.

## 2023-12-27 NOTE — TELEPHONE ENCOUNTER
"Dr. Smith called on behalf of patient asking if there were any "acute adjustments" that could be made to patients pH medications because she is is having more SOB and her oxygen saturations have continued to drop.NN consulted with CARRINGTON Scott who states that "There is nothing more to do. Adjustments to her meds would likely drop her BP, possibly make her O2 worse, and generally make her feel bad for no reason."    Information was provided to Dr. Smith and he verbalized understanding. "I figured but just wanted to ask."  "

## 2024-01-01 ENCOUNTER — TELEPHONE (OUTPATIENT)
Dept: PALLIATIVE MEDICINE | Facility: CLINIC | Age: 79
End: 2024-01-01
Payer: MEDICARE

## 2024-01-01 DIAGNOSIS — M62.830 MUSCLE SPASM OF BACK: ICD-10-CM

## 2024-01-01 DIAGNOSIS — R42 DIZZINESS: Primary | ICD-10-CM

## 2024-01-01 DIAGNOSIS — R52 PAIN: ICD-10-CM

## 2024-01-01 DIAGNOSIS — R10.9 ABDOMINAL PAIN, UNSPECIFIED ABDOMINAL LOCATION: ICD-10-CM

## 2024-01-01 DIAGNOSIS — R06.00 DYSPNEA, UNSPECIFIED TYPE: ICD-10-CM

## 2024-01-01 RX ORDER — MECLIZINE HYDROCHLORIDE 25 MG/1
25 TABLET ORAL 3 TIMES DAILY PRN
Qty: 90 TABLET | Refills: 0 | Status: SHIPPED | OUTPATIENT
Start: 2024-01-01

## 2024-01-01 RX ORDER — LORAZEPAM 0.5 MG/1
0.25 TABLET ORAL EVERY 6 HOURS PRN
Qty: 90 TABLET | Refills: 0 | Status: SHIPPED | OUTPATIENT
Start: 2024-01-01 | End: 2024-02-22

## 2024-01-01 RX ORDER — TRAMADOL HYDROCHLORIDE 100 MG/1
TABLET, EXTENDED RELEASE ORAL
Qty: 30 TABLET | Refills: 0 | Status: SHIPPED | OUTPATIENT
Start: 2024-01-01

## 2024-01-08 NOTE — TELEPHONE ENCOUNTER
called.  Ms Barnes shared that she has been experiencing worsening SOB and nausea. Her O2 sats continue to decrease, at baseline high 50s, low 60s and on exertion they decrease down to high 30s. She has been feeling nauseous and dizzy  She is eating less but having a good lunch. The are trying to go out to eat weekly, they visited a friend over the Holidays.  They continue to hope for a miracle, praying for cure.   Discussed energy conservation, using w/c to mobilize  Discussed Lorazepam to help w/ anxiety. Dr Smith has been giving her a combination of Meclizine and lorazepam that has been working for her dizziness and sob.  They prefer benzos over opioids for dyspnea \

## 2024-02-04 NOTE — TRANSFER OF CARE
"Anesthesia Transfer of Care Note    Patient: Molly Smith    Procedure(s) Performed: Procedure(s) (LRB):  HEMIARTHROPLASTY, HIP - left (Left)    Patient location: PACU    Anesthesia Type: regional and MAC    Transport from OR: Transported from OR on 6-10 L/min O2 by face mask with adequate spontaneous ventilation    Post pain: adequate analgesia    Post assessment: no apparent anesthetic complications and tolerated procedure well    Post vital signs: stable    Level of consciousness: awake and alert    Nausea/Vomiting: no nausea/vomiting    Complications: none    Transfer of care protocol was followed      Last vitals:   Visit Vitals  /66 (BP Location: Left arm, Patient Position: Lying)   Pulse 88   Temp 37.1 °C (98.7 °F) (Oral)   Resp 18   Ht 5' 3" (1.6 m)   Wt 57.2 kg (126 lb)   SpO2 (!) 90%   Breastfeeding? No   BMI 22.32 kg/m²     " No

## 2024-02-08 ENCOUNTER — POST MORTEM DOCUMENTATION (OUTPATIENT)
Dept: TRANSPLANT | Facility: CLINIC | Age: 79
End: 2024-02-08
Payer: MEDICARE

## 2024-02-08 ENCOUNTER — TELEPHONE (OUTPATIENT)
Dept: TRANSPLANT | Facility: CLINIC | Age: 79
End: 2024-02-08
Payer: MEDICARE

## 2024-02-08 NOTE — TELEPHONE ENCOUNTER
"NN received a call from Dr. Smith who informed NN that patient passed away on Monday. "The last 6 weeks were hard for her." NN gave condolences and per Dr. Smith,  services will be held tomorrow. Dr. Smith expressed appreciation for PH team. PH team notified of patient's passing.   "

## 2024-03-11 ENCOUNTER — TELEPHONE (OUTPATIENT)
Dept: TRANSPLANT | Facility: CLINIC | Age: 79
End: 2024-03-11
Payer: MEDICARE

## 2024-03-11 NOTE — TELEPHONE ENCOUNTER
Contacted [employer] in efforts to inactivate patient on dashboard    Spoke with Ludy    Letter is still required with physician signature to confirm . Fax number [phone].

## 2024-03-25 ENCOUNTER — TELEPHONE (OUTPATIENT)
Dept: TRANSPLANT | Facility: CLINIC | Age: 79
End: 2024-03-25
Payer: MEDICARE

## (undated) DEVICE — SUT VICRYL BR 1 GEN 27 CT-1

## (undated) DEVICE — KIT KYPHOPAK 1ST FX CDS 15/2

## (undated) DEVICE — Device

## (undated) DEVICE — ADHESIVE DERMABOND ADVANCED

## (undated) DEVICE — SUT VICRYL+ 1 CT1 18IN

## (undated) DEVICE — GAUZE SPONGE 4X4 12PLY

## (undated) DEVICE — SUT 2-0 VICRYL / SH (J417)

## (undated) DEVICE — TRAY FOLEY 16FR INFECTION CONT

## (undated) DEVICE — BAG SNAP KOVER BAND 36 X 28 IN

## (undated) DEVICE — SEE MEDLINE ITEM 157148

## (undated) DEVICE — COVER SANP CLR 36X54

## (undated) DEVICE — GOWN SMART IMP BREATHABLE XXLG

## (undated) DEVICE — NDL SPINAL SPINOCAN 22GX3.5

## (undated) DEVICE — ELECTRODE REM PLYHSV RETURN 9

## (undated) DEVICE — DRESSING TRANS 8X12 TEGADERM

## (undated) DEVICE — SUT VICRYL PLUS 0 CT1 18IN

## (undated) DEVICE — GUIDEWIRE STF .035X180CM ANG

## (undated) DEVICE — NDL TUOHY EPIDURAL 20GX3.5IN

## (undated) DEVICE — APPLICATOR CHLORAPREP ORN 26ML

## (undated) DEVICE — SCALPEL #15 BLADE STRL DISP.

## (undated) DEVICE — SEE MEDLINE ITEM 152622

## (undated) DEVICE — KIT IRR SUCTION HND PIECE

## (undated) DEVICE — SEE MEDLINE ITEM 146292

## (undated) DEVICE — SEE MEDLINE ITEM 152487

## (undated) DEVICE — SET CABLE BEADED 2MM

## (undated) DEVICE — KIT NERVE BLOCK PREP BAPTIST

## (undated) DEVICE — SUT ETHIBOND XTRA2 OS-4 30

## (undated) DEVICE — DRESSING TRANS 4X4 TEGADERM

## (undated) DEVICE — DERMABOND SKIN ADHESIVE PROPEN

## (undated) DEVICE — DRESSING COVER AQUACEL AG SURG

## (undated) DEVICE — CLOSURE SKIN STERI STRIP 1/8X3

## (undated) DEVICE — KIT BIOPSY IVAS 11G

## (undated) DEVICE — SEE L#156916

## (undated) DEVICE — IMMOB KNEE UNIV TRI PANEL 19IN

## (undated) DEVICE — NDL 18GA X1 1/2 REG BEVEL

## (undated) DEVICE — SEE MEDLINE ITEM 154981

## (undated) DEVICE — SUT VICRYL PLUS 2-0

## (undated) DEVICE — HOOD T-5 TEAR AWAY STERILE

## (undated) DEVICE — SOL IRR NACL .9% 3000ML

## (undated) DEVICE — HOOD FLYTE PEELWY STERISHIELD

## (undated) DEVICE — SEE MEDLINE ITEM 156894

## (undated) DEVICE — BLADE RECP OFFSET 77.5X11X1.23

## (undated) DEVICE — DRAPE OPTIMA MAJOR PEDIATRIC

## (undated) DEVICE — SEE MEDLINE ITEM 157131

## (undated) DEVICE — SYR PLASTIC 8ML PERIFIX LL

## (undated) DEVICE — DRESSING AQUACEL AG ADV 3.5X12

## (undated) DEVICE — TAPE SURG DURAPORE 2 X10YD

## (undated) DEVICE — SUTURE STRATAFIX PGA PCL 3-0